# Patient Record
Sex: FEMALE | Race: WHITE | Employment: OTHER | URBAN - METROPOLITAN AREA
[De-identification: names, ages, dates, MRNs, and addresses within clinical notes are randomized per-mention and may not be internally consistent; named-entity substitution may affect disease eponyms.]

---

## 2017-01-05 ENCOUNTER — HOSPITAL ENCOUNTER (OUTPATIENT)
Dept: ENDOSCOPY | Age: 62
Discharge: HOME | End: 2017-01-05
Attending: INTERNAL MEDICINE | Admitting: INTERNAL MEDICINE

## 2017-01-05 VITALS
RESPIRATION RATE: 18 BRPM | OXYGEN SATURATION: 95 % | SYSTOLIC BLOOD PRESSURE: 160 MMHG | HEART RATE: 98 BPM | DIASTOLIC BLOOD PRESSURE: 81 MMHG

## 2017-01-18 ENCOUNTER — OFFICE VISIT (OUTPATIENT)
Dept: ENT CLINIC | Age: 62
End: 2017-01-18

## 2017-01-18 VITALS
SYSTOLIC BLOOD PRESSURE: 140 MMHG | BODY MASS INDEX: 37.21 KG/M2 | HEART RATE: 102 BPM | WEIGHT: 210 LBS | HEIGHT: 63 IN | DIASTOLIC BLOOD PRESSURE: 79 MMHG

## 2017-01-18 DIAGNOSIS — K21.9 LPRD (LARYNGOPHARYNGEAL REFLUX DISEASE): Primary | ICD-10-CM

## 2017-01-18 DIAGNOSIS — J37.0 CHRONIC LARYNGITIS: ICD-10-CM

## 2017-01-18 DIAGNOSIS — R13.12 OROPHARYNGEAL DYSPHAGIA: ICD-10-CM

## 2017-01-18 DIAGNOSIS — T17.308A CHOKING, INITIAL ENCOUNTER: ICD-10-CM

## 2017-01-18 PROCEDURE — 31575 DIAGNOSTIC LARYNGOSCOPY: CPT | Performed by: OTOLARYNGOLOGY

## 2017-01-18 RX ORDER — LANSOPRAZOLE 30 MG/1
30 CAPSULE, DELAYED RELEASE ORAL
Qty: 60 CAPSULE | Refills: 2 | Status: SHIPPED | OUTPATIENT
Start: 2017-01-18 | End: 2017-01-19 | Stop reason: SDUPTHER

## 2017-01-18 RX ORDER — PHENAZOPYRIDINE HYDROCHLORIDE 100 MG/1
TABLET, FILM COATED ORAL
COMMUNITY
Start: 2017-01-13 | End: 2018-05-04

## 2017-01-19 ENCOUNTER — TELEPHONE (OUTPATIENT)
Dept: ENT CLINIC | Age: 62
End: 2017-01-19

## 2017-01-19 DIAGNOSIS — J37.0 CHRONIC LARYNGITIS: ICD-10-CM

## 2017-01-19 DIAGNOSIS — K21.9 LPRD (LARYNGOPHARYNGEAL REFLUX DISEASE): ICD-10-CM

## 2017-01-19 RX ORDER — LANSOPRAZOLE 30 MG/1
CAPSULE, DELAYED RELEASE ORAL
Qty: 180 CAPSULE | Refills: 0 | Status: SHIPPED | OUTPATIENT
Start: 2017-01-19 | End: 2017-05-15 | Stop reason: SDUPTHER

## 2017-01-19 RX ORDER — LANSOPRAZOLE 30 MG/1
CAPSULE, DELAYED RELEASE ORAL
Qty: 60 CAPSULE | Refills: 2 | Status: SHIPPED | OUTPATIENT
Start: 2017-01-19 | End: 2017-01-19 | Stop reason: SDUPTHER

## 2017-02-20 ENCOUNTER — TELEPHONE (OUTPATIENT)
Dept: ENT CLINIC | Age: 62
End: 2017-02-20

## 2017-05-15 DIAGNOSIS — J37.0 CHRONIC LARYNGITIS: ICD-10-CM

## 2017-05-15 DIAGNOSIS — K21.9 LPRD (LARYNGOPHARYNGEAL REFLUX DISEASE): ICD-10-CM

## 2017-05-15 RX ORDER — LANSOPRAZOLE 30 MG/1
CAPSULE, DELAYED RELEASE ORAL
Qty: 60 CAPSULE | Refills: 0 | Status: SHIPPED | OUTPATIENT
Start: 2017-05-15 | End: 2017-06-12 | Stop reason: SDUPTHER

## 2017-06-12 ENCOUNTER — OFFICE VISIT (OUTPATIENT)
Dept: ENT CLINIC | Age: 62
End: 2017-06-12

## 2017-06-12 VITALS
HEIGHT: 63 IN | BODY MASS INDEX: 37.21 KG/M2 | SYSTOLIC BLOOD PRESSURE: 120 MMHG | DIASTOLIC BLOOD PRESSURE: 70 MMHG | HEART RATE: 100 BPM | WEIGHT: 210 LBS

## 2017-06-12 DIAGNOSIS — R05.3 CHRONIC COUGH: ICD-10-CM

## 2017-06-12 DIAGNOSIS — J37.0 CHRONIC LARYNGITIS: Chronic | ICD-10-CM

## 2017-06-12 DIAGNOSIS — K21.9 LPRD (LARYNGOPHARYNGEAL REFLUX DISEASE): ICD-10-CM

## 2017-06-12 DIAGNOSIS — J37.0 CHRONIC LARYNGITIS: ICD-10-CM

## 2017-06-12 DIAGNOSIS — R13.12 OROPHARYNGEAL DYSPHAGIA: ICD-10-CM

## 2017-06-12 DIAGNOSIS — K21.9 LPRD (LARYNGOPHARYNGEAL REFLUX DISEASE): Primary | Chronic | ICD-10-CM

## 2017-06-12 DIAGNOSIS — J38.7 LESION OF LARYNX: ICD-10-CM

## 2017-06-12 PROCEDURE — 31575 DIAGNOSTIC LARYNGOSCOPY: CPT | Performed by: OTOLARYNGOLOGY

## 2017-06-12 RX ORDER — LANSOPRAZOLE 30 MG/1
CAPSULE, DELAYED RELEASE ORAL
Qty: 60 CAPSULE | Refills: 3 | Status: SHIPPED | OUTPATIENT
Start: 2017-06-12 | End: 2017-09-06 | Stop reason: SDUPTHER

## 2017-06-12 RX ORDER — NORTRIPTYLINE HYDROCHLORIDE 25 MG/1
50 CAPSULE ORAL NIGHTLY
COMMUNITY

## 2017-06-12 RX ORDER — BUPROPION HYDROCHLORIDE 300 MG/1
300 TABLET ORAL EVERY MORNING
COMMUNITY

## 2017-06-14 ENCOUNTER — TELEPHONE (OUTPATIENT)
Dept: ENT CLINIC | Age: 62
End: 2017-06-14

## 2017-06-14 PROBLEM — M79.7 FIBROMYALGIA: Status: ACTIVE | Noted: 2017-06-14

## 2017-06-14 PROBLEM — E11.9 DIABETES MELLITUS (HCC): Status: ACTIVE | Noted: 2017-06-14

## 2017-06-14 PROBLEM — F20.0 CHRONIC PARANOID SCHIZOPHRENIA (HCC): Status: ACTIVE | Noted: 2017-06-14

## 2017-06-14 PROBLEM — R05.9 COUGH: Status: ACTIVE | Noted: 2017-06-12

## 2017-08-03 ENCOUNTER — HOSPITAL ENCOUNTER (OUTPATIENT)
Dept: SURGERY | Age: 62
Discharge: HOME | End: 2017-08-03
Attending: OTOLARYNGOLOGY | Admitting: OTOLARYNGOLOGY

## 2017-08-03 VITALS
TEMPERATURE: 97 F | DIASTOLIC BLOOD PRESSURE: 66 MMHG | OXYGEN SATURATION: 95 % | SYSTOLIC BLOOD PRESSURE: 125 MMHG | RESPIRATION RATE: 14 BRPM | WEIGHT: 193.12 LBS | BODY MASS INDEX: 34.22 KG/M2 | HEIGHT: 63 IN | HEART RATE: 90 BPM

## 2017-08-03 LAB
GLUCOSE BLD-MCNC: 100 MG/DL (ref 70–99)
GLUCOSE BLD-MCNC: 123 MG/DL (ref 70–99)
PERFORMED ON: ABNORMAL
PERFORMED ON: ABNORMAL

## 2017-08-03 PROCEDURE — 31535 LARYNGOSCOPY W/BIOPSY: CPT | Performed by: OTOLARYNGOLOGY

## 2017-08-03 RX ORDER — APREPITANT 40 MG/1
40 CAPSULE ORAL ONCE
Status: COMPLETED | OUTPATIENT
Start: 2017-08-03 | End: 2017-08-03

## 2017-08-03 RX ORDER — SODIUM CHLORIDE 9 MG/ML
INJECTION, SOLUTION INTRAVENOUS CONTINUOUS
Status: DISCONTINUED | OUTPATIENT
Start: 2017-08-03 | End: 2017-08-04 | Stop reason: HOSPADM

## 2017-08-03 RX ORDER — SCOLOPAMINE TRANSDERMAL SYSTEM 1 MG/1
PATCH, EXTENDED RELEASE TRANSDERMAL
Status: DISCONTINUED
Start: 2017-08-03 | End: 2017-08-04 | Stop reason: HOSPADM

## 2017-08-03 RX ORDER — APREPITANT 40 MG/1
CAPSULE ORAL
Status: COMPLETED
Start: 2017-08-03 | End: 2017-08-03

## 2017-08-03 RX ORDER — LIDOCAINE HYDROCHLORIDE 10 MG/ML
0.5 INJECTION, SOLUTION EPIDURAL; INFILTRATION; INTRACAUDAL; PERINEURAL ONCE
Status: DISCONTINUED | OUTPATIENT
Start: 2017-08-03 | End: 2017-08-04 | Stop reason: HOSPADM

## 2017-08-03 RX ORDER — SCOLOPAMINE TRANSDERMAL SYSTEM 1 MG/1
1 PATCH, EXTENDED RELEASE TRANSDERMAL
Status: DISCONTINUED | OUTPATIENT
Start: 2017-08-03 | End: 2017-08-04 | Stop reason: HOSPADM

## 2017-08-03 RX ADMIN — APREPITANT 40 MG: 40 CAPSULE ORAL at 09:13

## 2017-08-03 ASSESSMENT — PAIN - FUNCTIONAL ASSESSMENT: PAIN_FUNCTIONAL_ASSESSMENT: 0-10

## 2017-08-03 ASSESSMENT — PAIN SCALES - GENERAL
PAINLEVEL_OUTOF10: 0

## 2017-08-03 ASSESSMENT — ENCOUNTER SYMPTOMS: SHORTNESS OF BREATH: 1

## 2017-08-08 ENCOUNTER — TELEPHONE (OUTPATIENT)
Dept: OTOLARYNGOLOGY | Age: 62
End: 2017-08-08

## 2017-09-06 ENCOUNTER — OFFICE VISIT (OUTPATIENT)
Dept: ENT CLINIC | Age: 62
End: 2017-09-06

## 2017-09-06 ENCOUNTER — TELEPHONE (OUTPATIENT)
Dept: ENT CLINIC | Age: 62
End: 2017-09-06

## 2017-09-06 ENCOUNTER — HOSPITAL ENCOUNTER (OUTPATIENT)
Dept: OTHER | Age: 62
Discharge: HOME | End: 2017-09-06
Attending: OTOLARYNGOLOGY | Admitting: OTOLARYNGOLOGY

## 2017-09-06 VITALS
HEIGHT: 63 IN | WEIGHT: 200.6 LBS | SYSTOLIC BLOOD PRESSURE: 130 MMHG | DIASTOLIC BLOOD PRESSURE: 72 MMHG | HEART RATE: 93 BPM | BODY MASS INDEX: 35.54 KG/M2

## 2017-09-06 DIAGNOSIS — Z79.899 CURRENT USE OF PROTON PUMP INHIBITOR: ICD-10-CM

## 2017-09-06 DIAGNOSIS — J37.0 CHRONIC LARYNGITIS: ICD-10-CM

## 2017-09-06 DIAGNOSIS — K21.9 LPRD (LARYNGOPHARYNGEAL REFLUX DISEASE): Primary | ICD-10-CM

## 2017-09-06 DIAGNOSIS — R49.0 HOARSENESS OF VOICE: ICD-10-CM

## 2017-09-06 DIAGNOSIS — K21.9 LPRD (LARYNGOPHARYNGEAL REFLUX DISEASE): ICD-10-CM

## 2017-09-06 LAB — MAGNESIUM: 2.2 MG/DL (ref 1.8–2.4)

## 2017-09-06 PROCEDURE — 31575 DIAGNOSTIC LARYNGOSCOPY: CPT | Performed by: OTOLARYNGOLOGY

## 2017-09-06 RX ORDER — LANSOPRAZOLE 30 MG/1
CAPSULE, DELAYED RELEASE ORAL
Qty: 60 CAPSULE | Refills: 3 | Status: SHIPPED | OUTPATIENT
Start: 2017-09-06 | End: 2018-03-05 | Stop reason: SDUPTHER

## 2018-03-05 ENCOUNTER — OFFICE VISIT (OUTPATIENT)
Dept: ENT CLINIC | Age: 63
End: 2018-03-05

## 2018-03-05 VITALS
WEIGHT: 200 LBS | HEIGHT: 63 IN | BODY MASS INDEX: 35.44 KG/M2 | HEART RATE: 90 BPM | DIASTOLIC BLOOD PRESSURE: 74 MMHG | SYSTOLIC BLOOD PRESSURE: 126 MMHG

## 2018-03-05 DIAGNOSIS — J37.0 CHRONIC LARYNGITIS: ICD-10-CM

## 2018-03-05 DIAGNOSIS — K21.9 LPRD (LARYNGOPHARYNGEAL REFLUX DISEASE): ICD-10-CM

## 2018-03-05 PROCEDURE — 31575 DIAGNOSTIC LARYNGOSCOPY: CPT | Performed by: OTOLARYNGOLOGY

## 2018-03-05 RX ORDER — LANSOPRAZOLE 30 MG/1
CAPSULE, DELAYED RELEASE ORAL
Qty: 180 CAPSULE | Refills: 1 | Status: SHIPPED | OUTPATIENT
Start: 2018-03-05 | End: 2018-05-17 | Stop reason: SDUPTHER

## 2018-04-02 ENCOUNTER — TELEPHONE (OUTPATIENT)
Dept: ENT CLINIC | Age: 63
End: 2018-04-02

## 2018-04-10 ENCOUNTER — OFFICE VISIT (OUTPATIENT)
Dept: ENT CLINIC | Age: 63
End: 2018-04-10

## 2018-04-10 VITALS
SYSTOLIC BLOOD PRESSURE: 133 MMHG | HEART RATE: 92 BPM | WEIGHT: 217 LBS | HEIGHT: 63 IN | DIASTOLIC BLOOD PRESSURE: 68 MMHG | BODY MASS INDEX: 38.45 KG/M2

## 2018-04-10 DIAGNOSIS — K13.79 LESION OF HARD PALATE: Primary | ICD-10-CM

## 2018-04-10 PROCEDURE — 99213 OFFICE O/P EST LOW 20 MIN: CPT | Performed by: OTOLARYNGOLOGY

## 2018-04-10 RX ORDER — CLARITHROMYCIN 500 MG/1
500 TABLET, COATED ORAL 2 TIMES DAILY
Qty: 20 TABLET | Refills: 0 | Status: SHIPPED | OUTPATIENT
Start: 2018-04-10 | End: 2018-04-20

## 2018-05-04 ENCOUNTER — OFFICE VISIT (OUTPATIENT)
Dept: ENT CLINIC | Age: 63
End: 2018-05-04

## 2018-05-04 VITALS — SYSTOLIC BLOOD PRESSURE: 133 MMHG | HEART RATE: 92 BPM | DIASTOLIC BLOOD PRESSURE: 65 MMHG

## 2018-05-04 DIAGNOSIS — H93.13 SUBJECTIVE TINNITUS OF BOTH EARS: Primary | ICD-10-CM

## 2018-05-04 PROCEDURE — 99214 OFFICE O/P EST MOD 30 MIN: CPT | Performed by: OTOLARYNGOLOGY

## 2018-05-15 ENCOUNTER — OFFICE VISIT (OUTPATIENT)
Dept: AUDIOLOGY | Age: 63
End: 2018-05-15

## 2018-05-15 DIAGNOSIS — H90.3 SENSORY HEARING LOSS, BILATERAL: Primary | ICD-10-CM

## 2018-05-15 PROCEDURE — 92550 TYMPANOMETRY & REFLEX THRESH: CPT | Performed by: AUDIOLOGIST

## 2018-05-15 PROCEDURE — 92557 COMPREHENSIVE HEARING TEST: CPT | Performed by: AUDIOLOGIST

## 2018-05-16 ENCOUNTER — TELEPHONE (OUTPATIENT)
Dept: ENT CLINIC | Age: 63
End: 2018-05-16

## 2018-05-16 DIAGNOSIS — K21.9 LPRD (LARYNGOPHARYNGEAL REFLUX DISEASE): ICD-10-CM

## 2018-05-16 DIAGNOSIS — J37.0 CHRONIC LARYNGITIS: ICD-10-CM

## 2018-05-17 RX ORDER — LANSOPRAZOLE 30 MG/1
CAPSULE, DELAYED RELEASE ORAL
Qty: 180 CAPSULE | Refills: 0 | Status: SHIPPED | OUTPATIENT
Start: 2018-05-17 | End: 2018-08-03 | Stop reason: SDUPTHER

## 2018-06-27 ENCOUNTER — OFFICE VISIT (OUTPATIENT)
Dept: ENT CLINIC | Age: 63
End: 2018-06-27

## 2018-06-27 VITALS
HEIGHT: 63 IN | WEIGHT: 200 LBS | BODY MASS INDEX: 35.44 KG/M2 | DIASTOLIC BLOOD PRESSURE: 73 MMHG | SYSTOLIC BLOOD PRESSURE: 135 MMHG | HEART RATE: 91 BPM

## 2018-06-27 DIAGNOSIS — H93.13 SUBJECTIVE TINNITUS OF BOTH EARS: Primary | Chronic | ICD-10-CM

## 2018-06-27 DIAGNOSIS — H90.3 BILATERAL HIGH FREQUENCY SENSORINEURAL HEARING LOSS: Chronic | ICD-10-CM

## 2018-06-27 PROCEDURE — 99214 OFFICE O/P EST MOD 30 MIN: CPT | Performed by: OTOLARYNGOLOGY

## 2018-06-27 NOTE — PATIENT INSTRUCTIONS
· You should consider amplification therapy, hearing aid, if you are having difficulty communicating and/or hearing important sounds, and feel a need to hear better. · You should return if your ears plug up with ear wax causing difficulty hearing or pressure. · You should return for an annual hearing test to monitor your hearing, and whenever your hearing further decreases significantly. · You should employ the tinnitus masking techniques and strategies we discussed, as needed. Bedside tinnitus masking devices (eg. a white noise machine, noise machine, noise jani on your cell phone, fan on in the room, radio with light music or tuned between stations to white noise static) can be very helpful. · You should avoid exposure to excessively high levels of noise/sound and use hearing protection measures we discussed, as needed, if such exposure is unavoidable. · I recommend that you use Lipoflavonoid Plus, (use brand name, not generic, for the first six months), for treatment of your tinnitus. This is available \"over the counter\" at your pharmacy. You should take two orally three times a day for the first 60 days, then one orally three times a day thereafter. Take this medication continuously for six months. If you have seen no improvement in your tinnitus after six months, you should consider cessation of this treatment. · NO Q-TIPS IN THE EARS  You should never clean your ears with a Q-tip, cotton tipped applicator, Liliya pin, paper clip, or any other instrument. I recommend only use of one the several ear wax removal kits available \"over the counter\" (eg. [employer] or Murine, or The [employer]) if you feel a need to try to remove ear wax. No other methods should be self used for this purpose as there is danger of injury to the ear and risk of irreparable and irreversible permanent hearing loss.

## 2018-08-03 DIAGNOSIS — J37.0 CHRONIC LARYNGITIS: ICD-10-CM

## 2018-08-03 DIAGNOSIS — K21.9 LPRD (LARYNGOPHARYNGEAL REFLUX DISEASE): ICD-10-CM

## 2018-08-03 RX ORDER — LANSOPRAZOLE 30 MG/1
CAPSULE, DELAYED RELEASE ORAL
Qty: 180 CAPSULE | Refills: 0 | Status: SHIPPED | OUTPATIENT
Start: 2018-08-03 | End: 2018-11-01 | Stop reason: SDUPTHER

## 2018-09-05 ENCOUNTER — OFFICE VISIT (OUTPATIENT)
Dept: ENT CLINIC | Age: 63
End: 2018-09-05

## 2018-09-05 VITALS
BODY MASS INDEX: 35.79 KG/M2 | HEART RATE: 89 BPM | DIASTOLIC BLOOD PRESSURE: 75 MMHG | HEIGHT: 63 IN | SYSTOLIC BLOOD PRESSURE: 129 MMHG | WEIGHT: 202 LBS

## 2018-09-05 DIAGNOSIS — K21.9 LPRD (LARYNGOPHARYNGEAL REFLUX DISEASE): ICD-10-CM

## 2018-09-05 DIAGNOSIS — J37.0 CHRONIC LARYNGITIS: ICD-10-CM

## 2018-09-05 PROCEDURE — 31575 DIAGNOSTIC LARYNGOSCOPY: CPT | Performed by: OTOLARYNGOLOGY

## 2018-09-05 PROCEDURE — 99999 PR OFFICE/OUTPT VISIT,PROCEDURE ONLY: CPT | Performed by: OTOLARYNGOLOGY

## 2018-09-05 NOTE — PROGRESS NOTES
lidocaine solution by nasal sprayer, twice. After about ten minutes, the Endo-sheath was placed on the flexible fiberoptic nasopharyngolaryngoscope, which then was inserted through the right nare/nasal cavity and advanced to the nasopharynx and then to the hypopharynx and larynx. After adequate endoscopic visualization, the endoscope was removed. The patient appeared to tolerate the procedure well with no evidence of perioperative complications. Lynch Kendal / Moni Bette / Nixonen Leak / Corral Night was seen today for laryngitis. Diagnoses and all orders for this visit:    Chronic laryngitis    LPRD (laryngopharyngeal reflux disease)        RECOMMENDATIONS / PLAN    1. I discussed Joseph fundoplication. Patient will consider and ask gastroenterologist about this if she has increased problems. 2. Return in about 6 months (around 3/5/2019) for repeat flexible fiberoptic throat endoscopy, recheck/follow-up, and sooner if condition worsens.

## 2018-09-26 PROBLEM — R05.9 COUGH: Status: RESOLVED | Noted: 2017-06-12 | Resolved: 2018-09-26

## 2018-11-01 DIAGNOSIS — J37.0 CHRONIC LARYNGITIS: ICD-10-CM

## 2018-11-01 DIAGNOSIS — K21.9 LPRD (LARYNGOPHARYNGEAL REFLUX DISEASE): ICD-10-CM

## 2018-11-02 RX ORDER — LANSOPRAZOLE 30 MG/1
CAPSULE, DELAYED RELEASE ORAL
Qty: 180 CAPSULE | Refills: 0 | Status: SHIPPED | OUTPATIENT
Start: 2018-11-02 | End: 2019-01-30 | Stop reason: SDUPTHER

## 2019-03-05 ENCOUNTER — OFFICE VISIT (OUTPATIENT)
Dept: ENT CLINIC | Age: 64
End: 2019-03-05
Payer: COMMERCIAL

## 2019-03-05 VITALS
DIASTOLIC BLOOD PRESSURE: 79 MMHG | WEIGHT: 217 LBS | BODY MASS INDEX: 38.45 KG/M2 | HEIGHT: 63 IN | HEART RATE: 93 BPM | SYSTOLIC BLOOD PRESSURE: 136 MMHG

## 2019-03-05 DIAGNOSIS — J37.0 CHRONIC LARYNGITIS: ICD-10-CM

## 2019-03-05 DIAGNOSIS — K21.9 LPRD (LARYNGOPHARYNGEAL REFLUX DISEASE): Primary | ICD-10-CM

## 2019-03-05 PROCEDURE — 31575 DIAGNOSTIC LARYNGOSCOPY: CPT | Performed by: OTOLARYNGOLOGY

## 2019-03-05 RX ORDER — LANSOPRAZOLE 30 MG/1
CAPSULE, DELAYED RELEASE ORAL
Qty: 180 CAPSULE | Refills: 1 | Status: SHIPPED | OUTPATIENT
Start: 2019-03-05 | End: 2019-10-02 | Stop reason: SDUPTHER

## 2019-07-22 ENCOUNTER — APPOINTMENT (OUTPATIENT)
Dept: GENERAL RADIOLOGY | Age: 64
End: 2019-07-22
Payer: COMMERCIAL

## 2019-07-22 ENCOUNTER — HOSPITAL ENCOUNTER (EMERGENCY)
Age: 64
Discharge: HOME | End: 2019-07-22
Payer: COMMERCIAL

## 2019-07-22 ENCOUNTER — APPOINTMENT (OUTPATIENT)
Dept: CT IMAGING | Age: 64
End: 2019-07-22
Payer: COMMERCIAL

## 2019-07-22 VITALS
OXYGEN SATURATION: 93 % | WEIGHT: 200 LBS | DIASTOLIC BLOOD PRESSURE: 73 MMHG | BODY MASS INDEX: 35.44 KG/M2 | TEMPERATURE: 98.8 F | HEIGHT: 63 IN | RESPIRATION RATE: 20 BRPM | SYSTOLIC BLOOD PRESSURE: 148 MMHG | HEART RATE: 99 BPM

## 2019-07-22 DIAGNOSIS — K59.00 CONSTIPATION, UNSPECIFIED CONSTIPATION TYPE: ICD-10-CM

## 2019-07-22 DIAGNOSIS — R13.10 DYSPHAGIA, UNSPECIFIED TYPE: ICD-10-CM

## 2019-07-22 DIAGNOSIS — N10 ACUTE PYELONEPHRITIS: Primary | ICD-10-CM

## 2019-07-22 LAB
A/G RATIO: 1 (ref 1.1–2.2)
ALBUMIN SERPL-MCNC: 3.6 G/DL (ref 3.4–5)
ALP BLD-CCNC: 130 U/L (ref 40–129)
ALT SERPL-CCNC: 15 U/L (ref 10–40)
ANION GAP SERPL CALCULATED.3IONS-SCNC: 14 MMOL/L (ref 3–16)
ANISOCYTOSIS: ABNORMAL
AST SERPL-CCNC: 17 U/L (ref 15–37)
ATYPICAL LYMPHOCYTE RELATIVE PERCENT: 1 % (ref 0–6)
BACTERIA: ABNORMAL /HPF
BANDED NEUTROPHILS RELATIVE PERCENT: 5 % (ref 0–7)
BASOPHILS ABSOLUTE: 0.1 K/UL (ref 0–0.2)
BASOPHILS RELATIVE PERCENT: 1 %
BILIRUB SERPL-MCNC: <0.2 MG/DL (ref 0–1)
BILIRUBIN URINE: NEGATIVE
BLOOD, URINE: NEGATIVE
BUN BLDV-MCNC: 8 MG/DL (ref 7–20)
CALCIUM SERPL-MCNC: 9.1 MG/DL (ref 8.3–10.6)
CHLORIDE BLD-SCNC: 100 MMOL/L (ref 99–110)
CLARITY: CLEAR
CO2: 22 MMOL/L (ref 21–32)
COLOR: YELLOW
CREAT SERPL-MCNC: 0.6 MG/DL (ref 0.6–1.2)
EOSINOPHILS ABSOLUTE: 0 K/UL (ref 0–0.6)
EOSINOPHILS RELATIVE PERCENT: 0 %
EPITHELIAL CELLS, UA: 1 /HPF (ref 0–5)
GFR AFRICAN AMERICAN: >60
GFR NON-AFRICAN AMERICAN: >60
GLOBULIN: 3.7 G/DL
GLUCOSE BLD-MCNC: 119 MG/DL (ref 70–99)
GLUCOSE URINE: NEGATIVE MG/DL
HCT VFR BLD CALC: 33.5 % (ref 36–48)
HEMATOLOGY PATH CONSULT: YES
HEMOGLOBIN: 10.5 G/DL (ref 12–16)
HYALINE CASTS: 0 /LPF (ref 0–8)
KETONES, URINE: NEGATIVE MG/DL
LEUKOCYTE ESTERASE, URINE: ABNORMAL
LIPASE: 6 U/L (ref 13–60)
LYMPHOCYTES ABSOLUTE: 0.7 K/UL (ref 1–5.1)
LYMPHOCYTES RELATIVE PERCENT: 8 %
MCH RBC QN AUTO: 21.8 PG (ref 26–34)
MCHC RBC AUTO-ENTMCNC: 31.5 G/DL (ref 31–36)
MCV RBC AUTO: 69.1 FL (ref 80–100)
MICROCYTES: ABNORMAL
MICROSCOPIC EXAMINATION: YES
MONOCYTES ABSOLUTE: 0.6 K/UL (ref 0–1.3)
MONOCYTES RELATIVE PERCENT: 8 %
NEUTROPHILS ABSOLUTE: 6.2 K/UL (ref 1.7–7.7)
NEUTROPHILS RELATIVE PERCENT: 77 %
NITRITE, URINE: POSITIVE
OCCULT BLOOD DIAGNOSTIC: NORMAL
PDW BLD-RTO: 19.1 % (ref 12.4–15.4)
PH UA: 7.5 (ref 5–8)
PLATELET # BLD: 288 K/UL (ref 135–450)
PLATELET SLIDE REVIEW: ADEQUATE
PMV BLD AUTO: 9 FL (ref 5–10.5)
POTASSIUM SERPL-SCNC: 3.9 MMOL/L (ref 3.5–5.1)
PROTEIN UA: 30 MG/DL
RBC # BLD: 4.84 M/UL (ref 4–5.2)
RBC # BLD: NORMAL 10*6/UL
RBC UA: 2 /HPF (ref 0–4)
SLIDE REVIEW: ABNORMAL
SODIUM BLD-SCNC: 136 MMOL/L (ref 136–145)
SPECIFIC GRAVITY UA: >1.03 (ref 1–1.03)
TOTAL PROTEIN: 7.3 G/DL (ref 6.4–8.2)
URINE REFLEX TO CULTURE: YES
URINE TYPE: ABNORMAL
UROBILINOGEN, URINE: 1 E.U./DL
WBC # BLD: 7.5 K/UL (ref 4–11)
WBC UA: 17 /HPF (ref 0–5)

## 2019-07-22 PROCEDURE — 74177 CT ABD & PELVIS W/CONTRAST: CPT

## 2019-07-22 PROCEDURE — 99283 EMERGENCY DEPT VISIT LOW MDM: CPT

## 2019-07-22 PROCEDURE — 83690 ASSAY OF LIPASE: CPT

## 2019-07-22 PROCEDURE — G0328 FECAL BLOOD SCRN IMMUNOASSAY: HCPCS

## 2019-07-22 PROCEDURE — 87077 CULTURE AEROBIC IDENTIFY: CPT

## 2019-07-22 PROCEDURE — 2580000003 HC RX 258: Performed by: PHYSICIAN ASSISTANT

## 2019-07-22 PROCEDURE — 74018 RADEX ABDOMEN 1 VIEW: CPT

## 2019-07-22 PROCEDURE — 96374 THER/PROPH/DIAG INJ IV PUSH: CPT

## 2019-07-22 PROCEDURE — 36415 COLL VENOUS BLD VENIPUNCTURE: CPT

## 2019-07-22 PROCEDURE — 80053 COMPREHEN METABOLIC PANEL: CPT

## 2019-07-22 PROCEDURE — 96361 HYDRATE IV INFUSION ADD-ON: CPT

## 2019-07-22 PROCEDURE — 87086 URINE CULTURE/COLONY COUNT: CPT

## 2019-07-22 PROCEDURE — 6360000002 HC RX W HCPCS: Performed by: PHYSICIAN ASSISTANT

## 2019-07-22 PROCEDURE — 85025 COMPLETE CBC W/AUTO DIFF WBC: CPT

## 2019-07-22 PROCEDURE — 6360000004 HC RX CONTRAST MEDICATION: Performed by: PHYSICIAN ASSISTANT

## 2019-07-22 PROCEDURE — 87186 SC STD MICRODIL/AGAR DIL: CPT

## 2019-07-22 PROCEDURE — 81001 URINALYSIS AUTO W/SCOPE: CPT

## 2019-07-22 RX ORDER — 0.9 % SODIUM CHLORIDE 0.9 %
1000 INTRAVENOUS SOLUTION INTRAVENOUS ONCE
Status: COMPLETED | OUTPATIENT
Start: 2019-07-22 | End: 2019-07-22

## 2019-07-22 RX ORDER — CEPHALEXIN 500 MG/1
500 CAPSULE ORAL 2 TIMES DAILY
Qty: 20 CAPSULE | Refills: 0 | Status: SHIPPED | OUTPATIENT
Start: 2019-07-22 | End: 2019-08-01

## 2019-07-22 RX ADMIN — Medication 1 G: at 19:11

## 2019-07-22 RX ADMIN — SODIUM CHLORIDE 1000 ML: 9 INJECTION, SOLUTION INTRAVENOUS at 16:28

## 2019-07-22 RX ADMIN — IOPAMIDOL 75 ML: 755 INJECTION, SOLUTION INTRAVENOUS at 17:43

## 2019-07-22 ASSESSMENT — ENCOUNTER SYMPTOMS
NAUSEA: 0
SORE THROAT: 0
VOMITING: 0
TROUBLE SWALLOWING: 1
BACK PAIN: 0
BLOOD IN STOOL: 0
CONSTIPATION: 1
SHORTNESS OF BREATH: 0

## 2019-07-22 ASSESSMENT — PAIN DESCRIPTION - PAIN TYPE: TYPE: ACUTE PAIN;CHRONIC PAIN

## 2019-07-22 ASSESSMENT — PAIN SCALES - GENERAL: PAINLEVEL_OUTOF10: 8

## 2019-07-22 ASSESSMENT — PAIN DESCRIPTION - LOCATION: LOCATION: ABDOMEN

## 2019-07-22 NOTE — ED PROVIDER NOTES
have allergy listed to Keflex but she states this is more of an intolerance and she only had diarrhea with this in the past.  After discussion with shared decision making about antibiotic choice patient does agree to being on Keflex. She will be started on Rocephin here. Discharged with magnesium citrate for constipation. Given her intermittent issues with her constipation and her dysphagia specifically with pills she will be referred to GI also. She would prefer to go home. Her vitals are unremarkable laboratory testing is unremarkable. Believe we can try outpatient treatment for her pyelonephritis. She understood her strict return precautions return here for any continual vomiting, fevers or worsening of symptoms. Low suspicion for perforated viscus, diverticulitis, obstruction or other emergent etiology. FINAL IMPRESSION      1. Acute pyelonephritis    2.  Constipation, unspecified constipation type          DISPOSITION/PLAN   DISPOSITION Discharge - Pending Orders Complete 07/22/2019 07:03:02 PM      PATIENT REFERREDTO:  Hveer Shafer  [address] [place] [address]  [phone]    Schedule an appointment as soon as possible for a visit in 3 days  For re-check    [facility]  [address] [place] [address]  [phone]    As needed      DISCHARGE MEDICATIONS:  New Prescriptions    CEPHALEXIN (KEFLEX) 500 MG CAPSULE    Take 1 capsule by mouth 2 times daily for 10 days    MAGNESIUM CITRATE SOLUTION    Take 296 mLs by mouth once for 1 dose       DISCONTINUED MEDICATIONS:  Discontinued Medications    No medications on file              (Please note that portions ofthis note were completed with a voice recognition program.  Efforts were made to edit the dictations but occasionally words are mis-transcribed.)    Cory Locke PA-C (electronically signed)           Cory Locke PA-C  07/22/19 [id number]

## 2019-07-23 LAB — HEMATOLOGY PATH CONSULT: NORMAL

## 2019-07-24 LAB
ORGANISM: ABNORMAL
URINE CULTURE, ROUTINE: ABNORMAL
URINE CULTURE, ROUTINE: ABNORMAL

## 2019-10-02 ENCOUNTER — OFFICE VISIT (OUTPATIENT)
Dept: ENT CLINIC | Age: 64
End: 2019-10-02
Payer: COMMERCIAL

## 2019-10-02 VITALS
SYSTOLIC BLOOD PRESSURE: 132 MMHG | HEIGHT: 63 IN | BODY MASS INDEX: 34.09 KG/M2 | HEART RATE: 81 BPM | WEIGHT: 192.4 LBS | DIASTOLIC BLOOD PRESSURE: 86 MMHG

## 2019-10-02 DIAGNOSIS — J37.0 CHRONIC LARYNGITIS: ICD-10-CM

## 2019-10-02 DIAGNOSIS — K21.9 LPRD (LARYNGOPHARYNGEAL REFLUX DISEASE): Primary | ICD-10-CM

## 2019-10-02 PROBLEM — Z48.89 ENCOUNTER FOR POST SURGICAL WOUND CHECK: Status: ACTIVE | Noted: 2018-11-08

## 2019-10-02 PROBLEM — Z98.890 PONV (POSTOPERATIVE NAUSEA AND VOMITING): Status: ACTIVE | Noted: 2018-10-01

## 2019-10-02 PROBLEM — R11.2 PONV (POSTOPERATIVE NAUSEA AND VOMITING): Status: ACTIVE | Noted: 2018-10-01

## 2019-10-02 PROBLEM — G45.8 TRANSIENT ISCHEMIC ATTACK, ANTERIOR CIRCULATION, ACUTE: Status: ACTIVE | Noted: 2019-09-18

## 2019-10-02 PROBLEM — Z09 HOSPITAL DISCHARGE FOLLOW-UP: Status: ACTIVE | Noted: 2018-11-08

## 2019-10-02 PROBLEM — J45.30 MILD PERSISTENT ASTHMA WITHOUT COMPLICATION: Status: ACTIVE | Noted: 2018-10-01

## 2019-10-02 PROBLEM — M48.061 SPINAL STENOSIS OF LUMBAR REGION: Status: ACTIVE | Noted: 2018-08-27

## 2019-10-02 PROBLEM — R20.0 NUMBNESS AND TINGLING OF BOTH FEET: Status: ACTIVE | Noted: 2018-11-08

## 2019-10-02 PROBLEM — R20.2 NUMBNESS AND TINGLING OF BOTH FEET: Status: ACTIVE | Noted: 2018-11-08

## 2019-10-02 PROBLEM — R47.89 WORD FINDING DIFFICULTY: Status: ACTIVE | Noted: 2019-09-18

## 2019-10-02 PROBLEM — M54.50 LUMBAR SPINE PAIN: Status: ACTIVE | Noted: 2018-11-08

## 2019-10-02 PROCEDURE — 31575 DIAGNOSTIC LARYNGOSCOPY: CPT | Performed by: OTOLARYNGOLOGY

## 2019-10-02 RX ORDER — GABAPENTIN 300 MG/1
600 CAPSULE ORAL
COMMUNITY
Start: 2019-09-06

## 2019-10-02 RX ORDER — FLUCONAZOLE 100 MG/1
100 TABLET ORAL
COMMUNITY
Start: 2019-09-27 | End: 2019-10-11

## 2019-10-02 RX ORDER — RANITIDINE 300 MG/1
300 TABLET ORAL NIGHTLY
COMMUNITY
Start: 2019-09-06 | End: 2020-09-02

## 2019-10-02 RX ORDER — LANSOPRAZOLE 30 MG/1
CAPSULE, DELAYED RELEASE ORAL
Qty: 180 CAPSULE | Refills: 1 | Status: SHIPPED | OUTPATIENT
Start: 2019-10-02 | End: 2020-04-04 | Stop reason: SDUPTHER

## 2019-11-01 PROBLEM — Z09 HOSPITAL DISCHARGE FOLLOW-UP: Status: RESOLVED | Noted: 2018-11-08 | Resolved: 2019-11-01

## 2020-03-02 ENCOUNTER — OFFICE VISIT (OUTPATIENT)
Dept: ENT CLINIC | Age: 65
End: 2020-03-02
Payer: COMMERCIAL

## 2020-03-02 VITALS
SYSTOLIC BLOOD PRESSURE: 147 MMHG | HEART RATE: 83 BPM | DIASTOLIC BLOOD PRESSURE: 72 MMHG | BODY MASS INDEX: 35.93 KG/M2 | WEIGHT: 202.8 LBS | HEIGHT: 63 IN

## 2020-03-02 PROCEDURE — 31575 DIAGNOSTIC LARYNGOSCOPY: CPT | Performed by: OTOLARYNGOLOGY

## 2020-04-04 RX ORDER — LANSOPRAZOLE 30 MG/1
CAPSULE, DELAYED RELEASE ORAL
Qty: 180 CAPSULE | Refills: 1 | Status: SHIPPED | OUTPATIENT
Start: 2020-04-04 | End: 2020-10-05 | Stop reason: SDUPTHER

## 2020-04-04 RX ORDER — LANSOPRAZOLE 30 MG/1
CAPSULE, DELAYED RELEASE ORAL
Qty: 302 CAPSULE | Refills: 0 | Status: SHIPPED | OUTPATIENT
Start: 2020-04-04 | End: 2020-04-04

## 2020-04-17 ENCOUNTER — VIRTUAL VISIT (OUTPATIENT)
Dept: ENT CLINIC | Age: 65
End: 2020-04-17
Payer: COMMERCIAL

## 2020-04-17 PROBLEM — J04.0 LARYNGITIS, ACUTE: Status: ACTIVE | Noted: 2020-04-17

## 2020-04-17 PROCEDURE — 99213 OFFICE O/P EST LOW 20 MIN: CPT | Performed by: OTOLARYNGOLOGY

## 2020-04-17 RX ORDER — CIPROFLOXACIN 500 MG/1
500 TABLET, FILM COATED ORAL 2 TIMES DAILY
Qty: 20 TABLET | Refills: 0 | Status: SHIPPED | OUTPATIENT
Start: 2020-04-17 | End: 2020-04-27

## 2020-04-17 RX ORDER — METHYLPREDNISOLONE 4 MG/1
TABLET ORAL
Qty: 1 KIT | Refills: 0 | Status: SHIPPED | OUTPATIENT
Start: 2020-04-17 | End: 2020-09-02

## 2020-04-17 NOTE — PATIENT INSTRUCTIONS
1. Rest your voice whenever hoarse. 2. Maintain adequate hydration to avoid dehydration. 3. Use Mucinex 1200 mg twice daily for two weeks. (over the counter medication)   4. Schedule a visit in September for flexible throat endoscopy and follow up. 5. Call for a visit sooner if hoarseness does not clear with treatment.

## 2020-04-17 NOTE — PROGRESS NOTES
2020    TELEHEALTH EVALUATION -- Audio/Visual (During ANR-30 public health emergency)      Chief Complaint   Patient presents with    Hoarse       HPI:  Braden Antoine (:  1955) has requested an audio/video evaluation for the following concern(s):  Hoarseness. She stated that she was hoarse when she woke up yesterday morning. She has continued to be hoarse since yesterday morning. It may be getting a little worse. She denied dyspnea. She has \"a kind of wheezing, in my throat, when I when breathe out. \"  She has an inhaler for her asthma. She has not used it since hoarseness started. She is taking prevacid twice a day for LPRD and chronic reflux laryngitis. She has never been a cigarette smoker or tobacco product user. She had a lot of secondary cigarette exposure at work for 18 years, but not at home. She stated that she saw her PCP for a MyChart VV on . She was diagnosed with sinusitis. This was treated with doxycycline 100 mg Q12H for 10 days. She also used a Neti pot, This was on or about . She stated that the sinusitis cleared up. Review of Systems  CONSTITUTIONAL:  Denied fever. Denied chills. Denied unexplained weight loss, over 20 pounds. EARS, NOSE, THROAT:  (+) Otalgia, left ear and into neck started 3 days ago.  (+) hearing loss, left ear for about two months.  (+) sore throat al ittle bit at top of throat started last night at bedtime. Denied otorrhea, tinnitus, epistaxis, nasal dyspnea, rhinorrhea, and chronic hoarseness. RESPIRATORY:  Denied dyspnea or SOB. Prior to Visit Medications    Medication Sig Taking? Authorizing Provider   ciprofloxacin (CIPRO) 500 MG tablet Take 1 tablet by mouth 2 times daily for 10 days Yes Toy Pitt MD   methylPREDNISolone (MEDROL DOSEPACK) 4 MG tablet Take by mouth, as directed by package instructions.  Yes Toy Pitt MD   lansoprazole (PREVACID) 30 MG delayed release capsule TAKE 1 CAPSULE TWICE A DAY ON EMPTY STOMACH (NOT EATEN/DRUNK ANYTHING FOR 2 HOURS AND EAT MEAL/SNACK 45 TO 60 MINUTES AFTER EACH DOSE)  Kayy Perez MD   ranitidine (ZANTAC) 300 MG tablet Take 300 mg by mouth nightly  Historical Provider, MD   gabapentin (NEURONTIN) 300 MG capsule Take 600 mg by mouth. Historical Provider, MD   magnesium citrate solution Take 296 mLs by mouth once for 1 dose  Jama Wang PA-C   buPROPion (WELLBUTRIN XL) 300 MG extended release tablet Take 300 mg by mouth every morning  Historical Provider, MD   nortriptyline (PAMELOR) 25 MG capsule Take 50 mg by mouth nightly   Historical Provider, MD   [place] 400 MCG/ACT AEPB inhaler   Historical Provider, MD   loperamide (IMODIUM) 2 MG capsule   Historical Provider, MD   budesonide-formoterol (SYMBICORT) 160-4.5 MCG/ACT AERO Inhale 2 puffs into the lungs 2 times daily  Historical Provider, MD   metformin (GLUCOPHAGE) 1000 MG tablet Take 1 tablet by mouth 2 times daily (with meals). Annamaria Ortega PA-C   albuterol-ipratropium (COMBIVENT)  MCG/ACT inhaler Inhale 2 puffs into the lungs 4 times daily as needed for Wheezing. Annamaria Ortega PA-C   venlafaxine (EFFEXOR-XR) 150 MG XR capsule Take 75 mg by mouth 2 times daily. Historical Provider, MD   dextroamphetamine (DEXEDRINE SPANSULE) 15 MG SR capsule Take 15 mg by mouth 4 times daily. Historical Provider, MD   meclizine (ANTIVERT) 12.5 MG tablet Take 25 mg by mouth 4 times daily. Historical Provider, MD   ondansetron (ZOFRAN-ODT) 4 MG disintegrating tablet Take 8 mg by mouth daily   Historical Provider, MD   orphenadrine (NORFLEX) 100 MG tablet Take 100 mg by mouth daily   Historical Provider, MD   aripiprazole (ABILIFY) 5 MG tablet Take 2 mg by mouth daily   Historical Provider, MD   amlodipine (NORVASC) 5 MG tablet Take 10 mg by mouth daily.   Historical Provider, MD       Social History     Tobacco Use    Smoking status: Never Smoker    Smokeless tobacco: Never Used

## 2020-07-22 ENCOUNTER — HOSPITAL ENCOUNTER (OUTPATIENT)
Dept: WOMENS IMAGING | Age: 65
Discharge: HOME | End: 2020-07-22
Payer: MEDICARE

## 2020-07-22 PROCEDURE — 77066 DX MAMMO INCL CAD BI: CPT

## 2020-09-02 ENCOUNTER — OFFICE VISIT (OUTPATIENT)
Dept: ENT CLINIC | Age: 65
End: 2020-09-02
Payer: MEDICARE

## 2020-09-02 VITALS — HEART RATE: 90 BPM | DIASTOLIC BLOOD PRESSURE: 80 MMHG | SYSTOLIC BLOOD PRESSURE: 145 MMHG | TEMPERATURE: 98.2 F

## 2020-09-02 PROCEDURE — 31575 DIAGNOSTIC LARYNGOSCOPY: CPT | Performed by: OTOLARYNGOLOGY

## 2020-09-02 RX ORDER — CLOTRIMAZOLE 10 MG/1
10 LOZENGE ORAL; TOPICAL
Qty: 50 TABLET | Refills: 0 | Status: SHIPPED | OUTPATIENT
Start: 2020-09-02 | End: 2020-09-12

## 2020-09-02 NOTE — PROGRESS NOTES
Chief Complaint   Patient presents with    Laryngitis     recheck LPRD and chronic reflux laryngitis. PROCEDURE:  FLEXIBLE FIBEROPTIC NASOPHARYNGOLARYNGOSCOPY  INDICATION:  Need for detailed endoscopic examination of the larynx and pharynx to evaluate the upper aerodigestive tract for recheck LPRD and chronic reflux laryngitis. Patient stated she is still having trouble with voice and it goes out at times. \"My voice keeps going away on me. \"      INFORMED CONSENT:  The procedure was described to the patient, including method of anesthesia. The patient was advised of the medical necessity for this procedure. The risks and potential complications were discussed, including, but not limited to, bleeding, infection, adverse reaction to medications, hoarseness, sore throat, inability to obtain adequate visualization, and future need for rigid operative endoscopy. The expected outcome, potential benefits and the alternatives of therapy were discussed. Tato Bang asked appropriate questions and then expressed the lack of any further questions, understanding, acceptance, and the desire to undergo with this procedure, granting verbal informed consent. FINDINGS:  There was moderate to severe edema and erythema of the arytenoid and interarytenoid mucosa consistent with posterior laryngitis secondary to laryngopharyngeal reflux. The vocal cords appeared to be normal, with no nodule, ulceration, polyp, leukoplakia or other lesions. The vocal cords appeared to be normally mobile bilaterally with midline approximation on phonation. Sensation of the hypopharynx and larynx appeared to be normal when touched by the end of the flexible scope. The nasopharynx, eustachian tube orifices and fossa of Rosenmüller, oropharynx, base of tongue, hypopharynx, supraglottis, subglottis, and piriform sinuses all appeared to be normal, with no lesions. Visualization was excellent throughout the examination.        DESCRIPTION OF PROCEDURE:  The right and left nasal cavity was topically anesthetized and decongested with a 50-50 mixture of 0.05% oxymetazoline solution and topical 4% lidocaine solution by nasal sprayer, twice. After about ten minutes, the flexible fiberoptic nasopharyngolaryngoscope, with camera attached, was inserted through the right nare/nasal cavity and advanced to the nasopharynx and then to the hypopharynx and larynx. The [employer] [place] video system was used. After adequate endoscopic visualization, the endoscope was removed. The patient appeared to tolerate the procedure well with no evidence of perioperative complications. Rayo Francisco / Omar Ugalde / Bella Salazar was seen today for laryngitis. Diagnoses and all orders for this visit:    LPRD (laryngopharyngeal reflux disease)    Chronic laryngitis    Chronic sinusitis, unspecified location    Chronic cough    Laryngeal candidiasis  -     clotrimazole (MYCELEX) 10 MG aida; Take 1 tablet by mouth 5 times daily for 10 days Slowly dissolve in mouth and swallow. RECOMMENDATIONS / PLAN    1. Proceed with CT sinus. 2. Return in about 1 month (around 10/2/2020) for recheck/follow-up, review CT scan, possible nasal endoscopy, and sooner if condition worsens.

## 2020-09-03 ENCOUNTER — HOSPITAL ENCOUNTER (OUTPATIENT)
Dept: CT IMAGING | Age: 65
Discharge: HOME | End: 2020-09-03
Payer: MEDICARE

## 2020-09-03 PROCEDURE — 70486 CT MAXILLOFACIAL W/O DYE: CPT

## 2020-09-08 ENCOUNTER — TELEPHONE (OUTPATIENT)
Dept: ENT CLINIC | Age: 65
End: 2020-09-08

## 2020-10-05 ENCOUNTER — OFFICE VISIT (OUTPATIENT)
Dept: ENT CLINIC | Age: 65
End: 2020-10-05
Payer: MEDICARE

## 2020-10-05 VITALS — TEMPERATURE: 97.5 F | HEART RATE: 102 BPM | DIASTOLIC BLOOD PRESSURE: 72 MMHG | SYSTOLIC BLOOD PRESSURE: 157 MMHG

## 2020-10-05 PROBLEM — R05.3 CHRONIC COUGH: Status: ACTIVE | Noted: 2020-10-05

## 2020-10-05 PROBLEM — R51.9 FRONTAL HEADACHE: Status: ACTIVE | Noted: 2020-10-05

## 2020-10-05 PROBLEM — J32.9 CHRONIC SINUSITIS: Chronic | Status: ACTIVE | Noted: 2020-10-05

## 2020-10-05 PROCEDURE — 99213 OFFICE O/P EST LOW 20 MIN: CPT | Performed by: OTOLARYNGOLOGY

## 2020-10-05 RX ORDER — FLUTICASONE PROPIONATE 50 MCG
SPRAY, SUSPENSION (ML) NASAL
Qty: 1 BOTTLE | Refills: 2 | Status: SHIPPED | OUTPATIENT
Start: 2020-10-05 | End: 2020-10-06

## 2020-10-05 RX ORDER — LANSOPRAZOLE 30 MG/1
CAPSULE, DELAYED RELEASE ORAL
Qty: 180 CAPSULE | Refills: 1 | Status: SHIPPED | OUTPATIENT
Start: 2020-10-05 | End: 2020-10-06

## 2020-10-05 NOTE — PROGRESS NOTES
[facility]         PCP:  Teresa Dobbs      CHIEF COMPLAINT:  Chief Complaint   Patient presents with    Sinusitis    Cough       HISTORY OF PRESENT ILLNESS:   Gorge Webster is a 72 y.o. female here for recheck and follow-up of a problem located in the sinuses and throat. The quality is chronic cough and chronic sinusitis. She has ongoing LPRD and chronic reflux laryngitis. She stated that [facility] voice is good. \"      REVIEW OF SYSTEMS:   EARS, NOSE, THROAT:  Denied otorrhea, otalgia, hearing loss, tinnitus, epistaxis, nasal dyspnea, rhinorrhea, sore throat and chronic hoarseness. EXAMINATION:      Vitals:    10/05/20 1043   BP: (!) 157/72   Pulse: 102   Temp: 97.5 °F (36.4 °C)      VITALS SIGNS were reviewed. GENERAL APPEARANCE:  Well developed, well nourished, no apparent distress, no apparent deformities. ABILITY TO COMMUNICATE/QUALITY OF VOICE:  Communicated without difficulty. Normal voice. No hot potato voice. No hoarseness. EXTERNAL EAR/NOSE:  Normal pinnae and mastoids. Normal external nose. EARS, OTOSCOPY: The TMs and EACs appeared to be normal bilaterally. NOSE:  The nasal septum was midline. The inferior turbinates were normal.  The nasal mucosa and secretions were normal.  No pus or polyps were seen. SINUSES:  The maxillary and frontal sinuses were nontender, bilaterally. LIPS, TEETH, AND GUMS:   Normal.   (+) OROPHARYNX/ORAL CAVITY:  Post tonsillectomy. Oral mucosa, hard and soft palates, tongue, and pharynx were normal.      NECK:  No masses or tenderness. The laryngeal cartilages and hyoid bone were normal, with normal laryngeal crepitus. No abnormal appearance, asymmetry or abnormal tracheal position. PALPATION OF LYMPH NODES, CERVICAL, FACIAL AND SUPRACLAVICULAR:  No lymphadenopathy.             REVIEW OF IMAGES:       I independently reviewed the images of the CT scan of the sinuses, showing a large septal spur to the left projection to the left lateral nasal wall. Narrative    EXAMINATION:    CT OF THE SINUS WITHOUT CONTRAST  9/3/2020 5:29 pm      FINDINGS:    SINUSES/MASTOIDS:  The maxillary, sphenoid, ethmoid and frontal sinuses are    clear.  The bilateral ostiomeatal units are patent.  Ethmoid roofs are    symmetric.  The mastoid air cells are well aerated.         SOFT TISSUES:  Visualized soft tissues demonstrate no acute abnormality.  The    visualized portion of the intracranial contents demonstrate no gross acute    abnormality.              Impression    No evidence of sinusitis.               IMPRESSION / DIAGNOSES / ORDERS:       Forrest Shannon was seen today for sinusitis and cough. Diagnoses and all orders for this visit:    Chronic sinusitis, unspecified location  Comments:  possible vacuum congestion sinusitis headache syndrome  Orders:  -      fluticasone (FLONASE) 50 MCG/ACT nasal spray; 2 sprays in each nostril daily. Frontal headache  -     fluticasone (FLONASE) 50 MCG/ACT nasal spray; 2 sprays in each nostril daily. LPRD (laryngopharyngeal reflux disease)  -     lansoprazole (PREVACID) 30 MG delayed release capsule; one tab by mouth twice a day on empty stomach (when have not eaten/drunk anything for 2 hrs) and eat meal/snack 45 - 60 mins after each dose    Chronic laryngitis  -     lansoprazole (PREVACID) 30 MG delayed release capsule; one tab by mouth twice a day on empty stomach (when have not eaten/drunk anything for 2 hrs) and eat meal/snack 45 - 60 mins after each dose    Chronic cough  -     lansoprazole (PREVACID) 30 MG delayed release capsule; one tab by mouth twice a day on empty stomach (when have not eaten/drunk anything for 2 hrs) and eat meal/snack 45 - 60 mins after each dose    Chronic rhinitis  -     fluticasone (FLONASE) 50 MCG/ACT nasal spray; 2 sprays in each nostril daily.            RECOMMENDATIONS / PLAN:    Patient Instructions   Afrin rhinogenic headache test:  Use 0.05% oxymetazoline nasal decongestant spray (eg. Afrin, Duration, 4-Way) three sprays in both nostrils at the start of your headache for 10 days. Use never use nasal decongestant sprays for longer than six consecutive days. Use Flonase daily in both sides of the nose. Follow-up    1. Will consider FESS for vacuum congestion sinus headache syndrome. 2. Return in about 6 weeks (around 11/16/2020) for recheck/follow-up, and sooner if condition worsens.

## 2020-10-05 NOTE — PATIENT INSTRUCTIONS
Afrin rhinogenic headache test:  Use 0.05% oxymetazoline nasal decongestant spray (eg. Afrin, Duration, 4-Way) three sprays in both nostrils at the start of your headache for 10 days. Use never use nasal decongestant sprays for longer than six consecutive days. Use Flonase daily in both sides of the nose.

## 2020-10-06 RX ORDER — FLUTICASONE PROPIONATE 50 MCG
SPRAY, SUSPENSION (ML) NASAL
Qty: 1 BOTTLE | Refills: 2 | Status: SHIPPED | OUTPATIENT
Start: 2020-10-06 | End: 2020-10-06

## 2020-10-06 RX ORDER — FLUTICASONE PROPIONATE 50 MCG
SPRAY, SUSPENSION (ML) NASAL
Qty: 1 BOTTLE | Refills: 2 | OUTPATIENT
Start: 2020-10-06 | End: 2021-04-27

## 2020-10-06 RX ORDER — LANSOPRAZOLE 30 MG/1
CAPSULE, DELAYED RELEASE ORAL
Qty: 180 CAPSULE | Refills: 1 | OUTPATIENT
Start: 2020-10-06 | End: 2021-03-22 | Stop reason: SDUPTHER

## 2020-10-06 RX ORDER — LANSOPRAZOLE 30 MG/1
CAPSULE, DELAYED RELEASE ORAL
Qty: 180 CAPSULE | Refills: 1 | Status: SHIPPED | OUTPATIENT
Start: 2020-10-06 | End: 2020-10-06

## 2020-10-22 ENCOUNTER — OFFICE VISIT (OUTPATIENT)
Dept: ENT CLINIC | Age: 65
End: 2020-10-22
Payer: MEDICARE

## 2020-10-22 VITALS
SYSTOLIC BLOOD PRESSURE: 148 MMHG | BODY MASS INDEX: 37.73 KG/M2 | DIASTOLIC BLOOD PRESSURE: 78 MMHG | WEIGHT: 213 LBS | HEART RATE: 99 BPM

## 2020-10-22 PROCEDURE — 99213 OFFICE O/P EST LOW 20 MIN: CPT | Performed by: OTOLARYNGOLOGY

## 2020-10-22 NOTE — PROGRESS NOTES
[facility]       PCP:  Jo Calvert      CHIEF COMPLAINT:  Chief Complaint   Patient presents with    Sinus Problem     sinus pressure, drainage, things feel better        HISTORY OF PRESENT ILLNESS:   Myrle Phalen is a 72 y.o. female here for recheck and follow-up of a sinus problem. Since the last visit here, her sinus symptoms has been improved. The Afrin test did not help. She reported that she had a headache on Saturday. \"I laid down in bed on Saturday and had pain in the middle of my forehead [pointed to the glabella and mid forehead area] and it hurt bad, the worst I've ever had. I was down for three days, 'cause it hurt so bad. Ibuprofen didn't touch it. Sudafed and Tylenol helped some. Patient reported that all her previous sinus symptoms are \"gone now since yesterday afternoon about 3 o'clock. \"        EXAMINATION:       Constitutional:  VITALS SIGNS reviewed. Vitals:    10/22/20 1441   BP: (!) 148/78   Pulse: 99     VITAL SIGNS: Vital signs were reviewed. SINUSES:  The maxillary and frontal sinuses were nontender, bilaterally. EARS, OTOSCOPY: Tympanic membranes and external auditory canals appear to be normal bilaterally. EXTERNAL EAR/NOSE: Normal pinnae and mastoids. Normal external nose. NOSE:  The nasal septum was midline. The inferior turbinates were normal.  The nasal mucosa and secretions were normal.  No pus or polyps were seen. OROPHARYNX/ORAL CAVITY:  Oral mucosa, hard and soft palates, tongue, and pharynx were normal.      NECK:  No masses or tenderness. No abnormal appearance, asymmetry or abnormal tracheal position. PALPATION OF LYMPH NODES, CERVICAL, FACIAL AND SUPRACLAVICULAR:  No lymphadenopathy. IMPRESSION / Earlyne Ates / Satinder Buttner / PROCEDURES:       Myrle Phalen was seen today for sinus problem.     Diagnoses and all orders for this visit:    Nonintractable headache, unspecified

## 2020-10-30 ENCOUNTER — OFFICE VISIT (OUTPATIENT)
Dept: PRIMARY CARE CLINIC | Age: 65
End: 2020-10-30
Payer: MEDICARE

## 2020-10-30 PROCEDURE — 99211 OFF/OP EST MAY X REQ PHY/QHP: CPT | Performed by: NURSE PRACTITIONER

## 2020-10-30 NOTE — PATIENT INSTRUCTIONS

## 2020-10-30 NOTE — PROGRESS NOTES
Aida Gonzales received a viral test for COVID-19. They were educated on isolation and quarantine as appropriate. For any symptoms, they were directed to seek care from their PCP, given contact information to establish with a doctor, directed to an urgent care or the emergency room.

## 2020-10-31 LAB — SARS-COV-2, PCR: NOT DETECTED

## 2020-11-03 NOTE — RESULT ENCOUNTER NOTE

## 2020-11-05 ENCOUNTER — HOSPITAL ENCOUNTER (OUTPATIENT)
Dept: ENDOSCOPY | Age: 65
Discharge: HOME | End: 2020-11-05
Payer: MEDICARE

## 2020-11-05 PROCEDURE — 3609015500 HC GASTRIC/DUODENAL MOTILITY &/OR MANOMETRY STUDY

## 2020-11-05 NOTE — PROGRESS NOTES
Time in Room: 1039   Patient verbalized understanding of PH/Esophageal manometry study. Probe inserted into right nostril with no resistance. Study completed. Discharge instructions given. Patient denied further questions, nausea or pain. Walked to exit by this RN.   Time out of Room: 1151     Patient plans to arrive tomorrow at noon for probe removal.

## 2020-11-09 ENCOUNTER — OFFICE VISIT (OUTPATIENT)
Dept: NEUROLOGY | Age: 65
End: 2020-11-09
Payer: MEDICARE

## 2020-11-09 VITALS
DIASTOLIC BLOOD PRESSURE: 82 MMHG | SYSTOLIC BLOOD PRESSURE: 150 MMHG | BODY MASS INDEX: 37.73 KG/M2 | HEART RATE: 93 BPM | WEIGHT: 213 LBS

## 2020-11-09 PROCEDURE — 99203 OFFICE O/P NEW LOW 30 MIN: CPT | Performed by: PSYCHIATRY & NEUROLOGY
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
## 2020-11-24 ENCOUNTER — HOSPITAL ENCOUNTER (EMERGENCY)
Age: 65
Discharge: HOME | End: 2020-11-24
Attending: EMERGENCY MEDICINE
Payer: MEDICARE

## 2020-11-24 ENCOUNTER — APPOINTMENT (OUTPATIENT)
Dept: CT IMAGING | Age: 65
End: 2020-11-24
Payer: MEDICARE

## 2020-11-24 ENCOUNTER — APPOINTMENT (OUTPATIENT)
Dept: GENERAL RADIOLOGY | Age: 65
End: 2020-11-24
Payer: MEDICARE

## 2020-11-24 VITALS
DIASTOLIC BLOOD PRESSURE: 79 MMHG | SYSTOLIC BLOOD PRESSURE: 155 MMHG | TEMPERATURE: 98.9 F | OXYGEN SATURATION: 93 % | WEIGHT: 213 LBS | BODY MASS INDEX: 37.74 KG/M2 | HEIGHT: 63 IN | HEART RATE: 80 BPM | RESPIRATION RATE: 18 BRPM

## 2020-11-24 LAB
ANION GAP SERPL CALCULATED.3IONS-SCNC: 14 MMOL/L (ref 3–16)
BASOPHILS ABSOLUTE: 0 K/UL (ref 0–0.2)
BASOPHILS RELATIVE PERCENT: 0.4 %
BUN BLDV-MCNC: 15 MG/DL (ref 7–20)
CALCIUM SERPL-MCNC: 8.9 MG/DL (ref 8.3–10.6)
CHLORIDE BLD-SCNC: 100 MMOL/L (ref 99–110)
CO2: 22 MMOL/L (ref 21–32)
CREAT SERPL-MCNC: 0.6 MG/DL (ref 0.6–1.2)
EOSINOPHILS ABSOLUTE: 0.1 K/UL (ref 0–0.6)
EOSINOPHILS RELATIVE PERCENT: 0.9 %
GFR AFRICAN AMERICAN: >60
GFR NON-AFRICAN AMERICAN: >60
GLUCOSE BLD-MCNC: 147 MG/DL (ref 70–99)
HCT VFR BLD CALC: 38.2 % (ref 36–48)
HEMOGLOBIN: 12.4 G/DL (ref 12–16)
LACTIC ACID: 1.3 MMOL/L (ref 0.4–2)
LYMPHOCYTES ABSOLUTE: 1.1 K/UL (ref 1–5.1)
LYMPHOCYTES RELATIVE PERCENT: 12.2 %
MCH RBC QN AUTO: 23.9 PG (ref 26–34)
MCHC RBC AUTO-ENTMCNC: 32.5 G/DL (ref 31–36)
MCV RBC AUTO: 73.7 FL (ref 80–100)
MONOCYTES ABSOLUTE: 0.5 K/UL (ref 0–1.3)
MONOCYTES RELATIVE PERCENT: 5.7 %
NEUTROPHILS ABSOLUTE: 7.3 K/UL (ref 1.7–7.7)
NEUTROPHILS RELATIVE PERCENT: 80.8 %
PDW BLD-RTO: 18.1 % (ref 12.4–15.4)
PLATELET # BLD: 409 K/UL (ref 135–450)
PMV BLD AUTO: 8.2 FL (ref 5–10.5)
POTASSIUM SERPL-SCNC: 4.3 MMOL/L (ref 3.5–5.1)
PRO-BNP: 97 PG/ML (ref 0–124)
PROCALCITONIN: 0.05 NG/ML (ref 0–0.15)
RBC # BLD: 5.18 M/UL (ref 4–5.2)
SODIUM BLD-SCNC: 136 MMOL/L (ref 136–145)
TROPONIN: <0.01 NG/ML
WBC # BLD: 9.1 K/UL (ref 4–11)

## 2020-11-24 PROCEDURE — 80048 BASIC METABOLIC PNL TOTAL CA: CPT

## 2020-11-24 PROCEDURE — 71045 X-RAY EXAM CHEST 1 VIEW: CPT

## 2020-11-24 PROCEDURE — 84145 PROCALCITONIN (PCT): CPT

## 2020-11-24 PROCEDURE — 99283 EMERGENCY DEPT VISIT LOW MDM: CPT

## 2020-11-24 PROCEDURE — 71260 CT THORAX DX C+: CPT

## 2020-11-24 PROCEDURE — 83605 ASSAY OF LACTIC ACID: CPT

## 2020-11-24 PROCEDURE — 85025 COMPLETE CBC W/AUTO DIFF WBC: CPT

## 2020-11-24 PROCEDURE — 84484 ASSAY OF TROPONIN QUANT: CPT

## 2020-11-24 PROCEDURE — 93005 ELECTROCARDIOGRAM TRACING: CPT | Performed by: EMERGENCY MEDICINE

## 2020-11-24 PROCEDURE — 6360000004 HC RX CONTRAST MEDICATION: Performed by: EMERGENCY MEDICINE

## 2020-11-24 PROCEDURE — 83880 ASSAY OF NATRIURETIC PEPTIDE: CPT

## 2020-11-24 PROCEDURE — 87040 BLOOD CULTURE FOR BACTERIA: CPT

## 2020-11-24 RX ORDER — BENZONATATE 100 MG/1
100 CAPSULE ORAL 2 TIMES DAILY PRN
Qty: 20 CAPSULE | Refills: 0 | Status: SHIPPED | OUTPATIENT
Start: 2020-11-24 | End: 2020-12-01

## 2020-11-24 RX ORDER — ONDANSETRON 4 MG/1
4 TABLET, ORALLY DISINTEGRATING ORAL EVERY 8 HOURS PRN
Qty: 20 TABLET | Refills: 0 | Status: SHIPPED | OUTPATIENT
Start: 2020-11-24

## 2020-11-24 RX ADMIN — IOPAMIDOL 75 ML: 755 INJECTION, SOLUTION INTRAVENOUS at 21:06

## 2020-11-24 ASSESSMENT — PAIN SCALES - GENERAL: PAINLEVEL_OUTOF10: 5

## 2020-11-24 ASSESSMENT — PAIN DESCRIPTION - PAIN TYPE: TYPE: ACUTE PAIN

## 2020-11-25 ENCOUNTER — CARE COORDINATION (OUTPATIENT)
Dept: CARE COORDINATION | Age: 65
End: 2020-11-25

## 2020-11-25 LAB
EKG ATRIAL RATE: 99 BPM
EKG DIAGNOSIS: NORMAL
EKG P AXIS: 49 DEGREES
EKG P-R INTERVAL: 148 MS
EKG Q-T INTERVAL: 344 MS
EKG QRS DURATION: 94 MS
EKG QTC CALCULATION (BAZETT): 441 MS
EKG R AXIS: 48 DEGREES
EKG T AXIS: 56 DEGREES
EKG VENTRICULAR RATE: 99 BPM

## 2020-11-25 PROCEDURE — 93010 ELECTROCARDIOGRAM REPORT: CPT | Performed by: INTERNAL MEDICINE
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
## 2020-11-25 NOTE — ED NOTES
O2 sats between 95-97% and HR 97 during ambulation in ED     Rosalba Villavicencio RN  11/24/20 0359

## 2020-11-27 ENCOUNTER — CARE COORDINATION (OUTPATIENT)
Dept: CARE COORDINATION | Age: 65
End: 2020-11-27

## 2020-11-28 LAB
BLOOD CULTURE, ROUTINE: NORMAL
CULTURE, BLOOD 2: NORMAL

## 2021-01-06 ENCOUNTER — HOSPITAL ENCOUNTER (OUTPATIENT)
Dept: GENERAL RADIOLOGY | Age: 66
Discharge: HOME | End: 2021-01-06
Payer: MEDICARE

## 2021-01-06 DIAGNOSIS — R49.0 HOARSENESS: ICD-10-CM

## 2021-01-06 DIAGNOSIS — R49.0 VOICE HOARSENESS: ICD-10-CM

## 2021-01-06 PROCEDURE — 74220 X-RAY XM ESOPHAGUS 1CNTRST: CPT

## 2021-01-06 PROCEDURE — 74240 X-RAY XM UPR GI TRC 1CNTRST: CPT

## 2021-02-10 ENCOUNTER — OFFICE VISIT (OUTPATIENT)
Dept: NEUROLOGY | Age: 66
End: 2021-02-10
Payer: MEDICARE

## 2021-02-10 VITALS
TEMPERATURE: 96.8 F | HEART RATE: 95 BPM | DIASTOLIC BLOOD PRESSURE: 80 MMHG | BODY MASS INDEX: 37.74 KG/M2 | HEIGHT: 63 IN | WEIGHT: 213 LBS | SYSTOLIC BLOOD PRESSURE: 150 MMHG

## 2021-02-10 DIAGNOSIS — G43.009 MIGRAINE WITHOUT AURA AND WITHOUT STATUS MIGRAINOSUS, NOT INTRACTABLE: Primary | ICD-10-CM

## 2021-02-10 PROCEDURE — 99213 OFFICE O/P EST LOW 20 MIN: CPT | Performed by: PSYCHIATRY & NEUROLOGY
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
## 2021-02-17 ENCOUNTER — TELEPHONE (OUTPATIENT)
Dept: NEUROLOGY | Age: 66
End: 2021-02-17

## 2021-02-17 NOTE — TELEPHONE ENCOUNTER
Pt calling and states that last Thursday she had what felt like fluid running down her forehead and pressure in the middle of her forehead. She states her  told her to take baby Asprin and so she did. Pt wants to know if this is normal for her forehead to feel like this?   CB# [phone]

## 2021-02-17 NOTE — TELEPHONE ENCOUNTER
Relayed to Steve Reyes that Per VRR, its hard to say but he is not concerned. I did ask if she was keeping the diary he told her to keep at last visit. She hasn't but will start now. She will  Document the length, how often and what medications if any that she has taken. I advised to now over use the ibuprofen and baby aspirin if at all possible to not use it. She gave verbal understanding and will call back if they get worse in nature or duration.

## 2021-03-22 ENCOUNTER — OFFICE VISIT (OUTPATIENT)
Dept: ENT CLINIC | Age: 66
End: 2021-03-22
Payer: MEDICARE

## 2021-03-22 VITALS
HEIGHT: 63 IN | WEIGHT: 213 LBS | DIASTOLIC BLOOD PRESSURE: 82 MMHG | TEMPERATURE: 97.3 F | SYSTOLIC BLOOD PRESSURE: 170 MMHG | BODY MASS INDEX: 37.74 KG/M2

## 2021-03-22 DIAGNOSIS — R05.3 CHRONIC COUGH: ICD-10-CM

## 2021-03-22 DIAGNOSIS — K21.9 LPRD (LARYNGOPHARYNGEAL REFLUX DISEASE): ICD-10-CM

## 2021-03-22 DIAGNOSIS — J37.0 CHRONIC LARYNGITIS: ICD-10-CM

## 2021-03-22 PROCEDURE — 31575 DIAGNOSTIC LARYNGOSCOPY: CPT | Performed by: OTOLARYNGOLOGY

## 2021-03-22 RX ORDER — LANSOPRAZOLE 30 MG/1
CAPSULE, DELAYED RELEASE ORAL
Qty: 180 CAPSULE | Refills: 0 | Status: SHIPPED | OUTPATIENT
Start: 2021-03-22 | End: 2021-07-20

## 2021-03-22 NOTE — PROGRESS NOTES
Chief Complaint   Patient presents with    Laryngitis     recheck of LPRD and chronic reflux laryngitis       PROCEDURE:  FLEXIBLE FIBEROPTIC NASOPHARYNGOLARYNGOSCOPY  INDICATION:  Inadequate visualization by indirect laryngoscopy mirror examination and need for detailed endoscopic examination of the larynx and pharynx to evaluate the upper aerodigestive tract for recheck LPRD and chronic reflux laryngitis. \"Whenever I go off pantoprazole, I have burning real bad and I feel like I'm [place] [employer]. \"  She had a sinus infection at the end of February which was treated at an Urgent Care with an antibiotic. She \"got better. \"  \"My throat is alright. I can still feel the yucky in my throat and my voice keeps going out, off and on, twice since February. \"      INFORMED CONSENT:  The procedure was described to the patient, including method of anesthesia. The patient was advised of the medical necessity for this procedure. The risks and potential complications were discussed, including, but not limited to, bleeding, infection, adverse reaction to medications, hoarseness, sore throat, inability to obtain adequate visualization, and possible future need for rigid operative endoscopy. The expected outcome, potential benefits and the alternatives of therapy were discussed. Misha Stover asked appropriate questions and then expressed the lack of any further questions, understanding, acceptance, and the desire to undergo with this procedure, granting verbal informed consent. FINDINGS:  There was moderate edema and erythema of the arytenoid and interarytenoid mucosa consistent with posterior laryngitis secondary to laryngopharyngeal reflux. The vocal cords appeared to be normal, with no nodule, ulceration, polyp, leukoplakia or other lesions, and appeared to be normally mobile bilaterally with midline approximation on phonation.   Sensation of the hypopharynx and larynx appeared to be normal when touched by the end of the flexible scope. The nasopharynx, eustachian tube orifices and fossa of Rosenmüller, oropharynx, base of tongue, hypopharynx, supraglottis, subglottis, and piriform sinuses all appeared to be normal, with no lesions. Visualization was excellent throughout the examination. DESCRIPTION OF PROCEDURE:  The right and left nasal cavity was topically anesthetized and decongested with a 50-50 mixture of 0.05% oxymetazoline solution and topical 4% lidocaine solution by nasal sprayer, twice. After about ten minutes, the flexible fiberoptic nasopharyngolaryngoscope, with camera attached, was inserted through the right nare/nasal cavity and advanced to the nasopharynx and then to the hypopharynx and larynx. The [employer] video system was used. After adequate endoscopic visualization, the endoscope was removed. The patient appeared to tolerate the procedure well with no evidence of perioperative complications. Fani Khan / Darla Kussmaul / Alyssa Pettit was seen today for laryngitis. Diagnoses and all orders for this visit:    LPRD (laryngopharyngeal reflux disease)  -     lansoprazole (PREVACID) 30 MG delayed release capsule; one tab by mouth twice a day on empty stomach (when have not eaten/drunk anything for 2 hrs) and eat meal/snack 45 - 60 mins after each dose    Chronic laryngitis  -     lansoprazole (PREVACID) 30 MG delayed release capsule; one tab by mouth twice a day on empty stomach (when have not eaten/drunk anything for 2 hrs) and eat meal/snack 45 - 60 mins after each dose    Chronic cough  -     lansoprazole (PREVACID) 30 MG delayed release capsule; one tab by mouth twice a day on empty stomach (when have not eaten/drunk anything for 2 hrs) and eat meal/snack 45 - 60 mins after each dose           RECOMMENDATIONS / PLAN    Patient Instructions   1. Continue Flonase at 2 sprays in each nostril once daily. 2. Continue Prevacid/lansoprazole once daily.   Call office if your throat symptoms worsen. Follow up  Return in about 6 months (around 9/22/2021) for repeat flexible fiberoptic throat endoscopy, recheck/follow-up, and sooner if condition worsens.

## 2021-04-09 ENCOUNTER — TELEPHONE (OUTPATIENT)
Dept: ENT CLINIC | Age: 66
End: 2021-04-09

## 2021-04-09 NOTE — TELEPHONE ENCOUNTER
Patient calling to inform us that she is taking lansoprazole twice a day. She tried to take it once a day and she was in too much pain.  She is also back to doing flonase twice a day, \"to keep the gunk out of her nose\"

## 2021-04-15 PROBLEM — R00.2 PALPITATIONS: Status: ACTIVE | Noted: 2021-04-15

## 2021-04-15 NOTE — PROGRESS NOTES
[address]   Cardiac Evaluation      Patient: [facility]  YOB: 1955         Chief Complaint   Patient presents with    Hypertension    Tachycardia        Referring provider: Lokesh Yepez MD    History of Present Illness:   Ms Francine Steve is a 72 y.o. female here as a new patient referred by Dr Elvira Evans for heart palpitations. She wore a holter monitor for 24 hours that showed NSR with rare PAC, ECG was unremarkable. She does not have any history of heart disease. Franklin Savage states today that she had COVID in November and was seen in the hospital a couple of times. She has had SOB and feels her heart racing since her diagnosis. She states that she feels burning behind her left breast some times, it is not associated with exertion. With regard to medication therapy he/she has been compliant with prescribed regimen and has tolerated therapy to date. Past Medical History:   has a past medical history of Arthritis, Asthma, Brain injury ([address] Bronchitis, Diabetes mellitus ([address] DM (diabetes mellitus screen), Elevated cholesterol with high triglycerides, Fall, HIGH CHOLESTEROL, Hypertension, Neuropathy, Panic attacks, PONV (postoperative nausea and vomiting), Post traumatic stress disorder, Short-term memory loss, Sleep apnea, and Vertigo. Surgical History:   has a past surgical history that includes Hysterectomy; eye surgery (cataract B); Carpal tunnel release; Finger trigger release; Gallbladder surgery; bladder suspension; Skin cancer excision; blepharoplasty; and laryngoscopy (08/03/2017).      Current Outpatient Medications   Medication Sig Dispense Refill    aclidinium (TUDORZA PRESSAIR) 400 MCG/ACT AEPB inhaler USE 1 INHALATION EVERY 12 HOURS      tiZANidine (ZANAFLEX) 2 MG tablet Take 2 mg by mouth every 12 hours as needed      nabumetone (RELAFEN) 750 MG tablet Take 750 mg by mouth 2 times daily      lansoprazole (PREVACID) 30 MG delayed release capsule one tab by mouth twice a day on empty stomach (when have not eaten/drunk anything for 2 hrs) and eat meal/snack 45 - 60 mins after each dose 180 capsule 0    ondansetron (ZOFRAN ODT) 4 MG disintegrating tablet Take 1 tablet by mouth every 8 hours as needed for Nausea or Vomiting 20 tablet 0    gabapentin (NEURONTIN) 300 MG capsule Take 600 mg by mouth. 2 caps 4 times a day.  buPROPion (WELLBUTRIN XL) 300 MG extended release tablet Take 300 mg by mouth every morning      nortriptyline (PAMELOR) 25 MG capsule Take 50 mg by mouth nightly       TUDORZA PRESSAIR 400 MCG/ACT AEPB inhaler       budesonide-formoterol (SYMBICORT) 160-4.5 MCG/ACT AERO Inhale 2 puffs into the lungs 2 times daily      metformin (GLUCOPHAGE) 1000 MG tablet Take 1 tablet by mouth 2 times daily (with meals). 60 tablet 0    albuterol-ipratropium (COMBIVENT)  MCG/ACT inhaler Inhale 2 puffs into the lungs 4 times daily as needed for Wheezing. 1 Inhaler 0    venlafaxine (EFFEXOR-XR) 150 MG XR capsule Take 75 mg by mouth daily       meclizine (ANTIVERT) 12.5 MG tablet Take 25 mg by mouth 4 times daily.  aripiprazole (ABILIFY) 5 MG tablet Take 2 mg by mouth daily       amLODIPine (NORVASC) 10 MG tablet Take 10 mg by mouth daily       fluticasone (FLONASE) 50 MCG/ACT nasal spray 2 sprays in each nostril daily. 1 Bottle 2     No current facility-administered medications for this visit.         Allergies:  Prochlorperazine, Promethazine, Cymbalta [duloxetine hcl], Morphine and related, Amoxicillin-pot clavulanate, Atenolol, Demerol, Dilaudid [hydromorphone hcl], Duloxetine, Ezetimibe, Furosemide, Glucosamine-chondroitin, Keflex [cephalexin], Ketorolac, Ketorolac tromethamine, Lisinopril, Meperidine, Morphine, Phenergan [promethazine hcl], Prochlorperazine edisylate, Statins, and Codeine     Social History:  Social History     Socioeconomic History    Marital status:      Spouse name: Not on file    Number of children: Not on file  Years of education: Not on file    Highest education level: Not on file   Occupational History    Not on file   Social Needs    Financial resource strain: Not on file    Food insecurity     Worry: Not on file     Inability: Not on file    Transportation needs     Medical: Not on file     Non-medical: Not on file   Tobacco Use    Smoking status: Never Smoker    Smokeless tobacco: Never Used   Substance and Sexual Activity    Alcohol use: No    Drug use: No    Sexual activity: Not on file   Lifestyle    Physical activity     Days per week: Not on file     Minutes per session: Not on file    Stress: Not on file   Relationships    Social connections     Talks on phone: Not on file     Gets together: Not on file     Attends [employer] service: Not on file     Active member of club or organization: Not on file     Attends meetings of clubs or organizations: Not on file     Relationship status: Not on file    Intimate partner violence     Fear of current or ex partner: Not on file     Emotionally abused: Not on file     Physically abused: Not on file     Forced sexual activity: Not on file   Other Topics Concern    Not on file   Social History Narrative    Not on file       Family History:   Family History   Problem Relation Age of Onset    Heart Disease Mother     Heart Disease Father      Family history has been reviewed and not pertinent except as noted above. Review of Systems:   · Constitutional: there has been no unanticipated weight loss. No change in energy or activity level   · Eyes: No visual changes   · ENT: No Headaches, hearing loss or vertigo. No mouth sores or sore throat. · Cardiovascular: Reviewed in HPI  · Respiratory: No cough or wheezing, no sputum production. · Gastrointestinal: No abdominal pain, appetite loss, blood in stools. No change in bowel or bladder habits.   · Genitourinary: No nocturia, dysuria, trouble voiding  · Musculoskeletal:  No gait disturbance, weakness or joint complaints. · Integumentary: No rash or pruritis. · Neurological: No headache, change in muscle strength, numbness or tingling. No change in gait, balance, coordination, mood, affect, memory, mentation, behavior. · Psychiatric: No anxiety or depression  · Endocrine: No malaise or fever  · Hematologic/Lymphatic: No abnormal bruising or bleeding, blood clots or swollen lymph nodes. · Allergic/Immunologic: No nasal congestion or hives. Physical Examination:    Vitals:    04/23/21 0719   BP: 138/64   Site: Right Upper Arm   Position: Sitting   Cuff Size: Large Adult   Pulse: 89   SpO2: 97%   Weight: 206 lb (93.4 kg)   Height: 5' 3\" (1.6 m)     Body mass index is 36.49 kg/m². Wt Readings from Last 3 Encounters:   04/23/21 206 lb (93.4 kg)   03/22/21 213 lb (96.6 kg)   02/10/21 213 lb (96.6 kg)      BP Readings from Last 3 Encounters:   04/23/21 138/64   03/22/21 (!) 170/82   02/10/21 (!) 150/80        Physical Examination:    · CONSTITUTIONAL: Well developed, well nourished  · EYES: PERRLA. No xanthelasma, sclera non icteric  · EARS,NOSE,MOUTH,THROAT:  Mucous membranes moist, normal hearing  · NECK: Supple, JVP normal, thyroid not enlarged. Carotids 2+ without bruits  · RESPIRATORY: Normal effort, no rales or rhonchi  · CARDIOVASCULAR: Normal PMI, regular rate and rhythm, no murmurs, rub or gallop. No edema. Radial pulses present and equal  · CHEST: No scar or masses  · ABDOMEN: Normal bowel sounds. No masses or tenderness. No bruit  · MUSCULOSKELETAL: No clubbing or cyanosis. Moves all extremities well. Normal gait  · SKIN:  Warm and dry. No rashes  · NEUROLOGIC: Cranial nerves intact. Alert and oriented  · PSYCHIATRIC: Calm affect. Appears to have normal judgement and insight    All testing and labs listed below were personally reviewed by myself. Assessment/Plan  1. Palpitations    2. Essential hypertension    3.  SOB (shortness of breath)          Palpitations  3/2021 24 hour holter monitor - NSR

## 2021-04-23 ENCOUNTER — OFFICE VISIT (OUTPATIENT)
Dept: CARDIOLOGY CLINIC | Age: 66
End: 2021-04-23
Payer: MEDICARE

## 2021-04-23 VITALS
DIASTOLIC BLOOD PRESSURE: 64 MMHG | BODY MASS INDEX: 36.5 KG/M2 | HEART RATE: 89 BPM | SYSTOLIC BLOOD PRESSURE: 138 MMHG | HEIGHT: 63 IN | WEIGHT: 206 LBS | OXYGEN SATURATION: 97 %

## 2021-04-23 DIAGNOSIS — R06.02 SOB (SHORTNESS OF BREATH): ICD-10-CM

## 2021-04-23 DIAGNOSIS — I10 ESSENTIAL HYPERTENSION: ICD-10-CM

## 2021-04-23 DIAGNOSIS — R00.2 PALPITATIONS: Primary | ICD-10-CM

## 2021-04-23 PROCEDURE — 99204 OFFICE O/P NEW MOD 45 MIN: CPT | Performed by: INTERNAL MEDICINE

## 2021-04-23 RX ORDER — TIZANIDINE 2 MG/1
2 TABLET ORAL EVERY 12 HOURS PRN
COMMUNITY
Start: 2020-07-17

## 2021-04-23 RX ORDER — NABUMETONE 750 MG/1
750 TABLET, FILM COATED ORAL 2 TIMES DAILY
COMMUNITY
Start: 2021-04-16

## 2021-04-23 RX ORDER — ACLIDINIUM BROMIDE 400 UG/1
POWDER, METERED RESPIRATORY (INHALATION)
COMMUNITY
Start: 2020-08-13

## 2021-04-23 NOTE — ASSESSMENT & PLAN NOTE
Dyspnea with exertion  Moderate   Anginal equivalent~ burning in left side; mid sternal pain/pressure with exertion. Concerning for ischemia.   Plan~ will get [email or web address]

## 2021-04-27 DIAGNOSIS — R51.9 FRONTAL HEADACHE: ICD-10-CM

## 2021-04-27 DIAGNOSIS — J32.9 CHRONIC SINUSITIS, UNSPECIFIED LOCATION: Chronic | ICD-10-CM

## 2021-04-27 DIAGNOSIS — J31.0 CHRONIC RHINITIS: ICD-10-CM

## 2021-04-27 RX ORDER — FLUTICASONE PROPIONATE 50 MCG
SPRAY, SUSPENSION (ML) NASAL
Qty: 1 BOTTLE | Refills: 0 | Status: SHIPPED | OUTPATIENT
Start: 2021-04-27 | End: 2021-06-10

## 2021-04-27 NOTE — TELEPHONE ENCOUNTER
Requested Prescriptions     Pending Prescriptions Disp Refills    fluticasone (FLONASE) 50 MCG/ACT nasal spray [Pharmacy Med Name: FLUTICASONE PROP 50 MCG SPRAY] 1 Bottle 0     Sig: SPRAY TWO SPRAYS IN EACH NOSTRIL ONCE DAILY       Last OV 3/22/21  Next OV 9/20/21

## 2021-04-30 ENCOUNTER — HOSPITAL ENCOUNTER (OUTPATIENT)
Dept: NON INVASIVE DIAGNOSTICS | Age: 66
Discharge: HOME | End: 2021-04-30
Payer: MEDICARE

## 2021-04-30 DIAGNOSIS — R06.02 SOB (SHORTNESS OF BREATH): ICD-10-CM

## 2021-04-30 LAB
LV EF: 70 %
LVEF MODALITY: NORMAL

## 2021-04-30 PROCEDURE — 93017 CV STRESS TEST TRACING ONLY: CPT | Performed by: INTERNAL MEDICINE

## 2021-04-30 PROCEDURE — 6360000002 HC RX W HCPCS: Performed by: INTERNAL MEDICINE

## 2021-04-30 PROCEDURE — 3430000000 HC RX DIAGNOSTIC RADIOPHARMACEUTICAL: Performed by: INTERNAL MEDICINE

## 2021-04-30 PROCEDURE — 78452 HT MUSCLE IMAGE SPECT MULT: CPT | Performed by: INTERNAL MEDICINE

## 2021-04-30 PROCEDURE — A9502 TC99M TETROFOSMIN: HCPCS | Performed by: INTERNAL MEDICINE

## 2021-04-30 RX ORDER — AMINOPHYLLINE DIHYDRATE 25 MG/ML
100 INJECTION, SOLUTION INTRAVENOUS ONCE
Status: COMPLETED | OUTPATIENT
Start: 2021-04-30 | End: 2021-04-30

## 2021-04-30 RX ADMIN — TETROFOSMIN 30 MILLICURIE: 1.38 INJECTION, POWDER, LYOPHILIZED, FOR SOLUTION INTRAVENOUS at 08:57

## 2021-04-30 RX ADMIN — REGADENOSON 0.4 MG: 0.08 INJECTION, SOLUTION INTRAVENOUS at 08:38

## 2021-04-30 RX ADMIN — AMINOPHYLLINE 100 MG: 25 INJECTION, SOLUTION INTRAVENOUS at 08:45

## 2021-04-30 RX ADMIN — TETROFOSMIN 10 MILLICURIE: 1.38 INJECTION, POWDER, LYOPHILIZED, FOR SOLUTION INTRAVENOUS at 07:48

## 2021-04-30 NOTE — RESULT ENCOUNTER NOTE
LM for patient to call back to office. Her stress was normal but need to follow up on her palpitations since last ov to determine if a 30 day monitor is necessary and have her come in to get one.

## 2021-04-30 NOTE — PROGRESS NOTES
Instructed on Lexiscan Stress Test Procedure including possible side effects/ adverse reactions. Patient verbalizes  understanding and denies having any questions . See [address] [place] [address] Cardiology

## 2021-04-30 NOTE — PROGRESS NOTES
Aminophylline given per lexiscan protocol in stress lab for c/o persistant nausea. Aminophylline given per lexiscan protocol in stress lab for c/o persistant nausea. Aminophylline given per lexiscan protocol in stress lab for c/o persistant nausea.

## 2021-05-03 ENCOUNTER — TELEPHONE (OUTPATIENT)
Dept: CARDIOLOGY CLINIC | Age: 66
End: 2021-05-03

## 2021-05-04 NOTE — RESULT ENCOUNTER NOTE
Attempted to call patient again, lm for her to call back for normal stress results. If she returns call and is still feeling palpitations, we can have her come in for a 30 day monitor.  bi

## 2021-05-11 ENCOUNTER — TELEPHONE (OUTPATIENT)
Dept: CARDIOLOGY CLINIC | Age: 66
End: 2021-05-11

## 2021-05-11 NOTE — TELEPHONE ENCOUNTER
----- Message from Alexandria Anderson RN sent at 4/30/2021 12:37 PM EDT -----  LM for patient to call back to office. Her stress was normal but need to follow up on her palpitations since last ov to determine if a 30 day monitor is necessary and have her come in to get one.

## 2021-06-10 DIAGNOSIS — J32.9 CHRONIC SINUSITIS, UNSPECIFIED LOCATION: Chronic | ICD-10-CM

## 2021-06-10 DIAGNOSIS — J31.0 CHRONIC RHINITIS: ICD-10-CM

## 2021-06-10 DIAGNOSIS — R51.9 FRONTAL HEADACHE: ICD-10-CM

## 2021-06-10 RX ORDER — FLUTICASONE PROPIONATE 50 MCG
SPRAY, SUSPENSION (ML) NASAL
Qty: 1 BOTTLE | Refills: 0 | Status: SHIPPED | OUTPATIENT
Start: 2021-06-10 | End: 2021-09-20 | Stop reason: SDUPTHER

## 2021-06-10 NOTE — TELEPHONE ENCOUNTER
Requested Prescriptions     Pending Prescriptions Disp Refills    fluticasone (FLONASE) 50 MCG/ACT nasal spray [Pharmacy Med Name: FLUTICASONE PROP 50 MCG SPRAY] 1 Bottle 0     Sig: SPRAY TWO SPRAYS IN EACH NOSTRIL ONCE DAILY       Last OV 3/22/21  Next OV 9/20/21  Last filled 4/27/21

## 2021-07-19 ENCOUNTER — TELEPHONE (OUTPATIENT)
Dept: ENT CLINIC | Age: 66
End: 2021-07-19

## 2021-07-19 NOTE — TELEPHONE ENCOUNTER
A prescription for 180 tablets was written on 3/22/2021 and she was changed to once daily dosing. That supply will last for 6-month and therefore refill is not needed until at least 9/22/2021. She has a follow-up appointment on 9/20/2021. I will consider refill at that visit.

## 2021-07-20 ENCOUNTER — TELEPHONE (OUTPATIENT)
Dept: ENT CLINIC | Age: 66
End: 2021-07-20

## 2021-07-20 DIAGNOSIS — J37.0 CHRONIC LARYNGITIS: ICD-10-CM

## 2021-07-20 DIAGNOSIS — K21.9 LPRD (LARYNGOPHARYNGEAL REFLUX DISEASE): ICD-10-CM

## 2021-07-20 DIAGNOSIS — R05.3 CHRONIC COUGH: ICD-10-CM

## 2021-07-20 RX ORDER — LANSOPRAZOLE 30 MG/1
CAPSULE, DELAYED RELEASE ORAL
Qty: 180 CAPSULE | Refills: 0 | Status: SHIPPED | OUTPATIENT
Start: 2021-07-20 | End: 2021-07-21 | Stop reason: SDUPTHER

## 2021-07-20 NOTE — TELEPHONE ENCOUNTER
Refill was erroneously sent today for 180 tablets. I called pharmacy and cancelled that prescription. Per my previous note a refill in not needed until at least 9/22/2021. I will refill at the 9/20/21 visit if I am going to continue this medication.

## 2021-07-21 ENCOUNTER — TELEPHONE (OUTPATIENT)
Dept: ENT CLINIC | Age: 66
End: 2021-07-21

## 2021-07-21 DIAGNOSIS — K21.9 LPRD (LARYNGOPHARYNGEAL REFLUX DISEASE): ICD-10-CM

## 2021-07-21 DIAGNOSIS — R05.3 CHRONIC COUGH: ICD-10-CM

## 2021-07-21 DIAGNOSIS — J37.0 CHRONIC LARYNGITIS: ICD-10-CM

## 2021-07-21 RX ORDER — LANSOPRAZOLE 30 MG/1
CAPSULE, DELAYED RELEASE ORAL
Qty: 180 CAPSULE | Refills: 0 | Status: SHIPPED | OUTPATIENT
Start: 2021-07-21 | End: 2021-10-21

## 2021-07-21 NOTE — TELEPHONE ENCOUNTER
Upon further investigation by Renita Machuca, patient went back to twice daily dosing on or about 4/09/2021. Therefore, it turns out that a refill is needed. Refilled lansoprazole.

## 2021-09-20 ENCOUNTER — OFFICE VISIT (OUTPATIENT)
Dept: ENT CLINIC | Age: 66
End: 2021-09-20
Payer: MEDICARE

## 2021-09-20 VITALS — HEART RATE: 82 BPM | SYSTOLIC BLOOD PRESSURE: 155 MMHG | DIASTOLIC BLOOD PRESSURE: 81 MMHG

## 2021-09-20 DIAGNOSIS — J32.9 CHRONIC SINUSITIS, UNSPECIFIED LOCATION: Chronic | ICD-10-CM

## 2021-09-20 DIAGNOSIS — R51.9 FRONTAL HEADACHE: ICD-10-CM

## 2021-09-20 DIAGNOSIS — J31.0 CHRONIC RHINITIS: ICD-10-CM

## 2021-09-20 DIAGNOSIS — R05.3 CHRONIC COUGH: ICD-10-CM

## 2021-09-20 DIAGNOSIS — K21.9 LPRD (LARYNGOPHARYNGEAL REFLUX DISEASE): Primary | ICD-10-CM

## 2021-09-20 DIAGNOSIS — J37.0 CHRONIC LARYNGITIS: ICD-10-CM

## 2021-09-20 PROCEDURE — 31575 DIAGNOSTIC LARYNGOSCOPY: CPT | Performed by: OTOLARYNGOLOGY

## 2021-09-20 RX ORDER — FLUTICASONE PROPIONATE 50 MCG
SPRAY, SUSPENSION (ML) NASAL
Qty: 48 G | Refills: 1 | Status: SHIPPED | OUTPATIENT
Start: 2021-09-20 | End: 2022-08-02 | Stop reason: SDUPTHER

## 2021-09-20 NOTE — PROGRESS NOTES
Chief Complaint   Patient presents with    Follow-up     Flex scope, refill on medications as well        PROCEDURE:  FLEXIBLE FIBEROPTIC NASOPHARYNGOLARYNGOSCOPY  INDICATION:  Need for detailed endoscopic examination of the larynx and pharynx to evaluate the upper aerodigestive tract for recheck LPRD and chronic reflux laryngitis. \"I tried once a day and my stomach hurt. I have to take it twice a day or my stomach burns up. \"  \"I've noticed that my voice is not changing any more. I'm back to my normal voice, almost.\"  Most recently went off for two weeks and throat got worse and resumed PPI therapy. Has sinus congestion symptoms for two days I feel stuffy in nose and around eyes. INFORMED CONSENT:  The procedure was described to the patient, including method of anesthesia. The patient was advised of the medical necessity for this procedure. The risks and potential complications were discussed, including, but not limited to, bleeding, infection, adverse reaction to medications, hoarseness, sore throat, inability to obtain adequate visualization, and possible future need for rigid operative endoscopy. The expected outcome, potential benefits and the alternatives of therapy were discussed. Manolo Boothe asked appropriate questions and then expressed the lack of any further questions, understanding, acceptance, and the desire to undergo with this procedure, granting verbal informed consent. FINDINGS:  There was mild to moderate edema and erythema of the arytenoid and interarytenoid mucosa consistent with posterior laryngitis secondary to laryngopharyngeal reflux. The vocal cords appeared to be normal, with no nodule, ulceration, polyp, leukoplakia or other lesions, and appeared to be normally mobile bilaterally with midline approximation on phonation. Sensation of the hypopharynx and larynx appeared to be normal when touched by the end of the flexible scope.  The nasopharynx, eustachian tube orifices and fossa of Rosenmüller, oropharynx, base of tongue, hypopharynx, supraglottis, subglottis, and piriform sinuses all appeared to be normal, with no lesions. Visualization was excellent throughout the examination. DESCRIPTION OF PROCEDURE:  The right and left nasal cavity was topically anesthetized and decongested with a 50-50 mixture of 0.05% oxymetazoline solution and topical 4% lidocaine solution by nasal sprayer, twice. After about ten minutes, the flexible fiberoptic nasopharyngolaryngoscope, with camera attached, was inserted through the right nare/nasal cavity and advanced to the nasopharynx and then to the hypopharynx and larynx. The [address] video system was used. After adequate endoscopic visualization, the endoscope was removed. The patient appeared to tolerate the procedure well with no evidence of perioperative complications. Marzena Urbina / Felicity Olivarez / Eliseo Hernandez was seen today for follow-up. Diagnoses and all orders for this visit:    LPRD (laryngopharyngeal reflux disease)    Chronic laryngitis    Chronic cough    Chronic sinusitis, unspecified location  Comments:  possible vacuum congestion sinusitis headache syndrome  Orders:  -     fluticasone (FLONASE) 50 MCG/ACT nasal spray; SPRAY TWO SPRAYS IN EACH NOSTRIL ONCE DAILY    Frontal headache  -     fluticasone (FLONASE) 50 MCG/ACT nasal spray; SPRAY TWO SPRAYS IN EACH NOSTRIL ONCE DAILY    Chronic rhinitis  -     fluticasone (FLONASE) 50 MCG/ACT nasal spray; SPRAY TWO SPRAYS IN EACH NOSTRIL ONCE DAILY           RECOMMENDATIONS / PLAN    1. See Patient Instructions on file for this visit, which were discussed with the patient. 2. Return in about 1 year (around 9/20/2022).

## 2021-09-20 NOTE — PATIENT INSTRUCTIONS
RHINOSINUSITIS CARE  · You may use acetaminophen (eg. Tylenol) or Ibuprofen (eg. Advil) (over the counter medications) as needed for fever or pain. · Use a 12 hour decongestant spray, 0.05% oxymetazoline (e.g. Afrin, Duration, 4-Way). Spray each nostril twice three times a day for three days, then two times a day for 2 days, and then stop for two days and then repeat the cycle once. · Take one Mucinex (blue box) maximum strength 1200 mg tablet every 12 hours daily for the next 14 days. · A steam inhaler mask device may be helpful. These can be purchased at your pharmacy. · Use a saline nasal/sinus irrigation system, e.g. NeilMed sinus rinse, twice daily to help to clear secretions and drainage. Use distilled water; DO NOT USE TAP WATER! This is because of the possibility of amoeba or other microorganisms in tap water, which can result in a fatal disease. Alternatively, you could use a blue bulb syringe and solution of 1/4 tsp of table salt in 8 ounces (one cup or 240 ml) of distilled water. · Use fluticasone 2 sprays in each nostril once daily. · It is important to take all your medications as prescribed. Please continue your antibiotics as prescribed.      Please call the office if your condition worsens or if sinus symptoms persist longer than 10 days.       ===================================================================      ADVERSE AND SIDE EFFECTS OF MEDICATIONS:    Please be aware of the following possible adverse reactions, side effects, and complications of the following medications, including, but not limited to: allergic reaction, interactions with other medications, nausea, headache, diarrhea, persistent symptoms, failure to improve, and the following:     Fluticasone:  nosebleed, burning sensation, atrophy of nasal mucosa, septal ulceration or perforation, nasal irritation, nasal yeast infection,  drowsiness, bad taste.      ~~~>>> Also please read the medication information in this AVS and all information given to you by the pharmacist.         Patient Education        fluticasone nasal  Pronunciation:  floo TIK a sone  Brand:  Flonase, Veramyst, Felisha Mustache  What is the most important information I should know about fluticasone nasal?  Follow all directions on your medicine label and package. Tell each of your healthcare providers about all your medical conditions, allergies, and all medicines you use. What is fluticasone nasal?  Fluticasone nasal (for the nose) is a steroid medicine that is used to treat nasal congestion, sneezing, runny nose, and itchy or watery eyes caused by seasonal or year-round allergies. The Felisha Mustache brand of this medicine is for use only in adults. Veramyst may be used in children as young as 3years old. Flonase is for use in adults and children who are at least 3years old. Fluticasone nasal may also be used for purposes not listed in this medication guide. What should I discuss with my healthcare provider before using fluticasone nasal?  You should not use fluticasone nasal if you are allergic to it. Fluticasone can weaken your immune system,  making it easier for you to get an infection or worsening an infection you already have or recently had. Tell your doctor about any illness or infection you have had within the past several weeks. Tell your doctor if you have ever had:  · sores or ulcers inside your nose;  · injury of or surgery on your nose;  · glaucoma or cataracts;  · liver disease;  · diabetes;  · a weak immune system; or  · any type of infection (bacterial, fungal, viral, or parasitic). If you use fluticasone nasal without a prescription and you have any medical conditions, ask a doctor or pharmacist if this medicine is safe for you. Tell your doctor if you are pregnant or breast-feeding. How should I use fluticasone nasal?  Follow all directions on your prescription label and read all medication guides or instruction sheets.  Use the medicine exactly as directed. Do not share this medicine with another person, even if they have the same symptoms you have. Your dose will depend on the fluticasone brand or strength you use, and your dose may change once your symptoms improve. Follow all dosing instructions very carefully. A child using the nasal spray should be supervised by an adult. Read and carefully follow any Instructions for Use provided with your medicine. Ask your doctor or pharmacist if you do not understand these instructions. Shake the nasal spray just before each use. If you switched to fluticasone from another steroid medicine, you should not stop using it suddenly. Follow your doctor's instructions about tapering your dose. It may take several days before your symptoms improve. Keep using the medication as directed and tell your doctor if your symptoms do not improve after a week of treatment. Store fluticasone nasal in an upright position at room temperature, away from moisture and heat. Throw the spray bottle away after you have used 120 sprays, even if there is still medicine left in the bottle. What happens if I miss a dose? Use the medicine as soon as you can, but skip the missed dose if it is almost time for your next dose. Do not use two doses at one time. What happens if I overdose? Seek emergency medical attention or call the Poison Help line at 1-226.729.8710. An overdose of fluticasone nasal is not expected to produce life threatening symptoms. Long term use of steroid medicine can lead to glaucoma, cataracts, thinning skin, easy bruising, changes in body fat (especially in your face, neck, back, and waist), increased acne or facial hair, menstrual problems, impotence, or loss of interest in sex. What should I avoid while using fluticasone nasal?  Avoid getting the spray in your eyes or mouth. If this does happen, rinse with water. Avoid being near people who are sick or have infections.  Call your doctor for preventive treatment if you are exposed to chickenpox or measles. These conditions can be serious or even fatal in people who are using fluticasone nasal.  What are the possible side effects of fluticasone nasal?  Get emergency medical help if you have signs of an allergic reaction: hives, rash; feeling light-headed; difficult breathing; swelling of your face, lips, tongue, or throat. Call your doctor at once if you have:  · severe or ongoing nosebleeds;  · noisy breathing, runny nose, or crusting around your nostrils;  · redness, sores, or white patches in your mouth or throat;  · fever, chills, body aches;  · blurred vision, eye pain, or seeing halos around lights;  · any wound that will not heal; or  · signs of a hormonal disorder --worsening tiredness or muscle weakness, feeling light-headed, nausea, vomiting. Steroid medicine can affect growth in children. Tell your doctor if your child is not growing at a normal rate while using this medicine. Common side effects may include:  · minor nosebleed, burning or itching in your nose;  · sores or white patches inside or around your nose;  · cough, trouble breathing;  · headache, back pain;  · sinus pain, sore throat, fever; or  · nausea, vomiting. This is not a complete list of side effects and others may occur. Call your doctor for medical advice about side effects. You may report side effects to FDA at 9-884-FDA-7210. What other drugs will affect fluticasone nasal?  Tell your doctor about all your other medicines, especially:  · antifungal medicine; or  · antiviral medicine to treat hepatis C or HIV/AIDS. This list is not complete. Other drugs may affect fluticasone nasal, including prescription and over-the-counter medicines, vitamins, and herbal products. Not all possible drug interactions are listed here. Where can I get more information?   Your pharmacist can provide more information about fluticasone nasal.  Remember, keep this and all other medicines out of the reach of children, never share your medicines with others, and use this medication only for the indication prescribed. Every effort has been made to ensure that the information provided by [address] is accurate, up-to-date, and complete, but no guarantee is made to that effect. Drug information contained herein may be time sensitive. [facility] information has been compiled for use by healthcare practitioners and consumers in the [place] and therefore [facility] does not warrant that uses outside of the [place] are appropriate, unless specifically indicated otherwise. [facility]'s drug information does not endorse drugs, diagnose patients or recommend therapy. [facility]'s drug information is an informational resource designed to assist licensed healthcare practitioners in caring for their patients and/or to serve consumers viewing this service as a supplement to, and not a substitute for, the expertise, skill, knowledge and judgment of healthcare practitioners. The absence of a warning for a given drug or drug combination in no way should be construed to indicate that the drug or drug combination is safe, effective or appropriate for any given patient. [facility] does not assume any responsibility for any aspect of healthcare administered with the aid of information [facility] provides. The information contained herein is not intended to cover all possible uses, directions, precautions, warnings, drug interactions, allergic reactions, or adverse effects. If you have questions about the drugs you are taking, check with your doctor, nurse or pharmacist.  Copyright 2585-8478 [address]: 10.02. Revision date: 12/30/2019. Care instructions adapted under license by [facility] ([facility]). If you have questions about a medical condition or this instruction, always ask your healthcare professional. [place] [address] any warranty or liability for your use of this information.

## 2021-10-08 ENCOUNTER — HOSPITAL ENCOUNTER (OUTPATIENT)
Dept: WOMENS IMAGING | Age: 66
Discharge: HOME | End: 2021-10-08
Payer: MEDICARE

## 2021-10-08 VITALS — BODY MASS INDEX: 35.44 KG/M2 | HEIGHT: 63 IN | WEIGHT: 200 LBS

## 2021-10-08 DIAGNOSIS — R92.8 ABNORMAL MAMMOGRAM: ICD-10-CM

## 2021-10-08 PROCEDURE — G0279 TOMOSYNTHESIS, MAMMO: HCPCS

## 2021-10-19 ENCOUNTER — TELEPHONE (OUTPATIENT)
Dept: ENT CLINIC | Age: 66
End: 2021-10-19

## 2021-10-19 DIAGNOSIS — R05.3 CHRONIC COUGH: ICD-10-CM

## 2021-10-19 DIAGNOSIS — J37.0 CHRONIC LARYNGITIS: ICD-10-CM

## 2021-10-19 DIAGNOSIS — K21.9 LPRD (LARYNGOPHARYNGEAL REFLUX DISEASE): ICD-10-CM

## 2021-10-21 RX ORDER — LANSOPRAZOLE 30 MG/1
CAPSULE, DELAYED RELEASE ORAL
Qty: 180 CAPSULE | Refills: 1 | Status: SHIPPED | OUTPATIENT
Start: 2021-10-21 | End: 2022-04-19

## 2021-10-25 NOTE — TELEPHONE ENCOUNTER
Patient calling and requesting her rx rf for her Prevacid  30 mg     She does say that on her last visit dr Davian Brewer said that he would fill the request for her if she called    Patient is out ,    Eric       Please advise

## 2021-11-15 NOTE — PROGRESS NOTES
[facility]   Cardiac Evaluation      Patient: Ralf Keating  YOB: 1955         Chief Complaint   Patient presents with    Hypertension    6 Month Follow-Up        Referring provider: Israel Mcgraw MD    History of Present Illness:   Ms Ellen Malik is a 72 y.o. female here for 6 month follow up for sob and palpitations. She previously wore a holter monitor for 24 hours that showed NSR with rare PAC, ECG was unremarkable. She does not have any history of heart disease. Covid in [place]. Today she is here feeling ok. Her son recently had a heart attack and was coded for 2 minutes while being sent to . He ended up getting stents. She reports feeling ok herself from a cardiac standpoint. She gets short of breath when walking due to her asthma. She denies any chest pain or palpitations. With regard to medication therapy he/she has been compliant with prescribed regimen and has tolerated therapy to date. Past Medical History:   has a past medical history of Arthritis, Asthma, Brain injury ([address] Bronchitis, Diabetes mellitus ([address] DM (diabetes mellitus screen), Elevated cholesterol with high triglycerides, Fall, HIGH CHOLESTEROL, Hypertension, Neuropathy, Panic attacks, PONV (postoperative nausea and vomiting), Post traumatic stress disorder, Short-term memory loss, Sleep apnea, and Vertigo. Surgical History:   has a past surgical history that includes Hysterectomy; eye surgery (cataract B); Carpal tunnel release; Finger trigger release; Gallbladder surgery; bladder suspension; Skin cancer excision; blepharoplasty; and laryngoscopy (08/03/2017).      Current Outpatient Medications   Medication Sig Dispense Refill    oxybutynin (DITROPAN-XL) 10 MG extended release tablet       lansoprazole (PREVACID) 30 MG delayed release capsule TAKE 1 TABLET BY MOUTH TWO TIMES A DAY ON EMPTY STOMACH (WHEN HAVE NOT EATEN/DRUNK ANYTHING FOR 2 HRS) AND EAT MEAL/SNACK 45-60 MINUTES AFTER EACH DOSE 180 capsule 1    fluticasone (FLONASE) 50 MCG/ACT nasal spray SPRAY TWO SPRAYS IN EACH NOSTRIL ONCE DAILY 48 g 1    aclidinium (TUDORZA PRESSAIR) 400 MCG/ACT AEPB inhaler USE 1 INHALATION EVERY 12 HOURS      tiZANidine (ZANAFLEX) 2 MG tablet Take 2 mg by mouth every 12 hours as needed      nabumetone (RELAFEN) 750 MG tablet Take 750 mg by mouth 2 times daily      ondansetron (ZOFRAN ODT) 4 MG disintegrating tablet Take 1 tablet by mouth every 8 hours as needed for Nausea or Vomiting 20 tablet 0    gabapentin (NEURONTIN) 300 MG capsule Take 600 mg by mouth. 2 caps 4 times a day.  buPROPion (WELLBUTRIN XL) 300 MG extended release tablet Take 300 mg by mouth every morning      nortriptyline (PAMELOR) 25 MG capsule Take 50 mg by mouth nightly       TUDORZA PRESSAIR 400 MCG/ACT AEPB inhaler       budesonide-formoterol (SYMBICORT) 160-4.5 MCG/ACT AERO Inhale 2 puffs into the lungs 2 times daily      metformin (GLUCOPHAGE) 1000 MG tablet Take 1 tablet by mouth 2 times daily (with meals). 60 tablet 0    albuterol-ipratropium (COMBIVENT)  MCG/ACT inhaler Inhale 2 puffs into the lungs 4 times daily as needed for Wheezing. 1 Inhaler 0    venlafaxine (EFFEXOR-XR) 150 MG XR capsule Take 75 mg by mouth daily       meclizine (ANTIVERT) 12.5 MG tablet Take 25 mg by mouth 4 times daily.  aripiprazole (ABILIFY) 5 MG tablet Take 2 mg by mouth daily       amLODIPine (NORVASC) 10 MG tablet Take 10 mg by mouth daily        No current facility-administered medications for this visit.        Allergies:  Prochlorperazine, Promethazine, Cymbalta [duloxetine hcl], Morphine and related, Amoxicillin-pot clavulanate, Atenolol, Demerol, Dilaudid [hydromorphone hcl], Duloxetine, Ezetimibe, Furosemide, Glucosamine-chondroitin, Keflex [cephalexin], Ketorolac, Ketorolac tromethamine, Lisinopril, Meperidine, Morphine, Phenergan [promethazine hcl], Prochlorperazine edisylate, Statins, and Codeine     Social History:  Social History     Socioeconomic History    Marital status:      Spouse name: Not on file    Number of children: Not on file    Years of education: Not on file    Highest education level: Not on file   Occupational History    Not on file   Tobacco Use    Smoking status: Never Smoker    Smokeless tobacco: Never Used   Vaping Use    Vaping Use: Never used   Substance and Sexual Activity    Alcohol use: No    Drug use: No    Sexual activity: Not on file   Other Topics Concern    Not on file   Social History Narrative    Not on file     Social Determinants of Health     Financial Resource Strain:     Difficulty of Paying Living Expenses: Not on file   Food Insecurity:     Worried About [address] [employer] in the Last Year: Not on file    [place]ayo of Food in the Last Year: Not on file   Transportation Needs:     Lack of Transportation (Medical): Not on file    Lack of Transportation (Non-Medical):  Not on file   Physical Activity:     Days of Exercise per Week: Not on file    Minutes of Exercise per Session: Not on file   Stress:     Feeling of Stress : Not on file   Social Connections:     Frequency of Communication with Friends and Family: Not on file    Frequency of Social Gatherings with Friends and Family: Not on file    Attends Mosque Services: Not on file    Active Member of [address] [place] [address] [employer] or Organizations: Not on file    Attends Club or Organization Meetings: Not on file    Marital Status: Not on file   Intimate Partner Violence:     Fear of Current or Ex-Partner: Not on file    Emotionally Abused: Not on file    Physically Abused: Not on file    Sexually Abused: Not on file   Housing Stability:     Unable to Pay for Housing in the Last Year: Not on file    Number of [place] in the Last Year: Not on file    Unstable Housing in the Last Year: Not on file       Family History:   Family History   Problem Relation Age of Onset    Heart Disease Mother     Heart Disease Father     Breast Cancer Sister     Breast Cancer Maternal Cousin     Breast Cancer Paternal Cousin      Family history has been reviewed and not pertinent except as noted above. Review of Systems:   · Constitutional: there has been no unanticipated weight loss. No change in energy or activity level   · Eyes: No visual changes   · ENT: No Headaches, hearing loss or vertigo. No mouth sores or sore throat. · Cardiovascular: Reviewed in HPI  · Respiratory: No cough or wheezing, no sputum production. · Gastrointestinal: No abdominal pain, appetite loss, blood in stools. No change in bowel or bladder habits. · Genitourinary: No nocturia, dysuria, trouble voiding  · Musculoskeletal:  No gait disturbance, weakness or joint complaints. · Integumentary: No rash or pruritis. · Neurological: No headache, change in muscle strength, numbness or tingling. No change in gait, balance, coordination, mood, affect, memory, mentation, behavior. · Psychiatric: No anxiety or depression  · Endocrine: No malaise or fever  · Hematologic/Lymphatic: No abnormal bruising or bleeding, blood clots or swollen lymph nodes. · Allergic/Immunologic: No nasal congestion or hives. Physical Examination:    Vitals:    11/19/21 1505   BP: 132/70   Site: Left Upper Arm   Position: Sitting   Cuff Size: Medium Adult   Pulse: 93   SpO2: 99%   Weight: 199 lb (90.3 kg)   Height: 5' 3\" (1.6 m)     Body mass index is 35.25 kg/m². Wt Readings from Last 3 Encounters:   11/19/21 199 lb (90.3 kg)   10/08/21 200 lb (90.7 kg)   04/23/21 206 lb (93.4 kg)      BP Readings from Last 3 Encounters:   11/19/21 132/70   09/20/21 (!) 155/81   04/23/21 138/64        Physical Examination:    · CONSTITUTIONAL: Well developed, well nourished  · EYES: PERRLA. No xanthelasma, sclera non icteric  · EARS,NOSE,MOUTH,THROAT:  Mucous membranes moist, normal hearing  · NECK: Supple, JVP normal, thyroid not enlarged.  Carotids 2+ without bruits  · RESPIRATORY: Normal effort, no rales or rhonchi  · CARDIOVASCULAR: Normal PMI, regular rate and rhythm, no murmurs, rub or gallop. No edema. Radial pulses present and equal  · CHEST: No scar or masses  · ABDOMEN: Normal bowel sounds. No masses or tenderness. No bruit  · MUSCULOSKELETAL: No clubbing or cyanosis. Moves all extremities well. Normal gait  · SKIN:  Warm and dry. No rashes  · NEUROLOGIC: Cranial nerves intact. Alert and oriented  · PSYCHIATRIC: Calm affect. Appears to have normal judgement and insight    All testing and labs listed below were personally reviewed by myself. Stress 4/21    Summary  Normal myocardial perfusion. Normal LV size and systolic function. Assessment/Plan  1. Palpitations    2. SOB (shortness of breath)          Palpitations  3/2021 24 hour holter monitor - NSR with rare PACs  Plan~ no plan for 30 day monitor at this time, palpitations have resolved. SOB (shortness of breath)  Dyspnea~ no  Anginal equivalent~ no  Plan~ stable; normal stress test in April '21      No orders of the defined types were placed in this encounter. Follow up jose antonio Lin MD      Thank you for allowing to me to participate in the care of [facility]. Scribe's Attestation: This note was scribed in the presence of Dr. Adamaris Junior MD by Larry Shabazz RN.    I, Dr. Adamaris Junior, personally performed the services described in this documentation, as scribed by the above signed scribe in my presence. It is both accurate and complete to my knowledge. I agree with the details independently gathered by the clinical support staff, while the remaining scribed note accurately describes my personal service to the patient.

## 2021-11-15 NOTE — ASSESSMENT & PLAN NOTE
3/2021 24 hour holter monitor - NSR with rare PACs  Plan~ no plan for 30 day monitor at this time, palpitations have resolved.

## 2021-11-19 ENCOUNTER — OFFICE VISIT (OUTPATIENT)
Dept: CARDIOLOGY CLINIC | Age: 66
End: 2021-11-19
Payer: MEDICARE

## 2021-11-19 VITALS
HEART RATE: 93 BPM | BODY MASS INDEX: 35.26 KG/M2 | HEIGHT: 63 IN | SYSTOLIC BLOOD PRESSURE: 132 MMHG | DIASTOLIC BLOOD PRESSURE: 70 MMHG | OXYGEN SATURATION: 99 % | WEIGHT: 199 LBS

## 2021-11-19 DIAGNOSIS — R00.2 PALPITATIONS: ICD-10-CM

## 2021-11-19 DIAGNOSIS — R06.02 SOB (SHORTNESS OF BREATH): ICD-10-CM

## 2021-11-19 PROCEDURE — 99214 OFFICE O/P EST MOD 30 MIN: CPT | Performed by: INTERNAL MEDICINE

## 2021-11-19 RX ORDER — OXYBUTYNIN CHLORIDE 10 MG/1
TABLET, EXTENDED RELEASE ORAL
COMMUNITY
Start: 2021-11-09

## 2022-04-18 DIAGNOSIS — R05.3 CHRONIC COUGH: ICD-10-CM

## 2022-04-18 DIAGNOSIS — J37.0 CHRONIC LARYNGITIS: ICD-10-CM

## 2022-04-18 DIAGNOSIS — K21.9 LPRD (LARYNGOPHARYNGEAL REFLUX DISEASE): ICD-10-CM

## 2022-04-19 RX ORDER — LANSOPRAZOLE 30 MG/1
CAPSULE, DELAYED RELEASE ORAL
Qty: 180 CAPSULE | Refills: 0 | Status: SHIPPED | OUTPATIENT
Start: 2022-04-19 | End: 2022-07-18

## 2022-04-19 NOTE — TELEPHONE ENCOUNTER
Refill done. Please call patient and advise that she is to call for refill when this refill runs out in July. Will continue to refill to last until 9/20/2022. Please remind patient she needs an appointment on or before 9/20/2022 for continued treatment and refills.

## 2022-07-16 DIAGNOSIS — K21.9 LPRD (LARYNGOPHARYNGEAL REFLUX DISEASE): ICD-10-CM

## 2022-07-16 DIAGNOSIS — R05.3 CHRONIC COUGH: ICD-10-CM

## 2022-07-16 DIAGNOSIS — J37.0 CHRONIC LARYNGITIS: ICD-10-CM

## 2022-07-18 ENCOUNTER — TELEPHONE (OUTPATIENT)
Dept: ENT CLINIC | Age: 67
End: 2022-07-18

## 2022-07-18 RX ORDER — LANSOPRAZOLE 30 MG/1
CAPSULE, DELAYED RELEASE ORAL
Qty: 120 CAPSULE | Refills: 0 | Status: SHIPPED | OUTPATIENT
Start: 2022-07-18 | End: 2022-08-02 | Stop reason: SDUPTHER

## 2022-07-18 NOTE — TELEPHONE ENCOUNTER
Refill Request:     Last Office Visit:  9/20/2021     Next Scheduled Visit : Visit date not found     Medication Requested:Lansoprazole    Pharmacy:    [facility] [id number] - [address] [place] [address], [address] [place], [place] [address]  Phone: [phone] Fax: [phone]

## 2022-07-18 NOTE — TELEPHONE ENCOUNTER
Please see my note of 4/19/2022. Patient is required to have a visit on or before 9/20/2022 for further refills. I will prescribe enough to last until that date.

## 2022-07-18 NOTE — TELEPHONE ENCOUNTER
Please advise on refill of LANSOPRAZOLE,    Called [facility] for pt to call office and schedule an appt on or before 9/20/22 to continue refills,

## 2022-08-02 ENCOUNTER — OFFICE VISIT (OUTPATIENT)
Dept: ENT CLINIC | Age: 67
End: 2022-08-02
Payer: MEDICARE

## 2022-08-02 VITALS
HEART RATE: 81 BPM | OXYGEN SATURATION: 94 % | DIASTOLIC BLOOD PRESSURE: 84 MMHG | TEMPERATURE: 97.5 F | SYSTOLIC BLOOD PRESSURE: 138 MMHG

## 2022-08-02 DIAGNOSIS — J31.0 CHRONIC RHINITIS: Chronic | ICD-10-CM

## 2022-08-02 DIAGNOSIS — R05.3 CHRONIC COUGH: Chronic | ICD-10-CM

## 2022-08-02 DIAGNOSIS — R51.9 FRONTAL HEADACHE: Chronic | ICD-10-CM

## 2022-08-02 DIAGNOSIS — K21.9 LPRD (LARYNGOPHARYNGEAL REFLUX DISEASE): Chronic | ICD-10-CM

## 2022-08-02 PROCEDURE — 1123F ACP DISCUSS/DSCN MKR DOCD: CPT | Performed by: OTOLARYNGOLOGY

## 2022-08-02 PROCEDURE — 99214 OFFICE O/P EST MOD 30 MIN: CPT | Performed by: OTOLARYNGOLOGY

## 2022-08-02 RX ORDER — FLUTICASONE PROPIONATE 50 MCG
SPRAY, SUSPENSION (ML) NASAL
Qty: 48 G | Refills: 1 | Status: SHIPPED | OUTPATIENT
Start: 2022-08-02

## 2022-08-02 RX ORDER — LANSOPRAZOLE 30 MG/1
CAPSULE, DELAYED RELEASE ORAL
Qty: 120 CAPSULE | Refills: 0 | Status: SHIPPED | OUTPATIENT
Start: 2022-08-02

## 2022-08-02 RX ORDER — TOPIRAMATE 25 MG/1
TABLET ORAL
COMMUNITY
Start: 2022-07-16

## 2022-08-02 ASSESSMENT — ENCOUNTER SYMPTOMS
VOICE CHANGE: 0
SINUS PAIN: 0
RHINORRHEA: 0
SORE THROAT: 0
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
## 2022-09-27 ENCOUNTER — HOSPITAL ENCOUNTER (OUTPATIENT)
Dept: MRI IMAGING | Age: 67
Discharge: HOME | End: 2022-09-27
Payer: MEDICARE

## 2022-09-27 DIAGNOSIS — H04.163: ICD-10-CM

## 2022-09-27 PROCEDURE — 6360000004 HC RX CONTRAST MEDICATION: Performed by: OPHTHALMOLOGY

## 2022-09-27 PROCEDURE — 70543 MRI ORBT/FAC/NCK W/O &W/DYE: CPT

## 2022-09-27 PROCEDURE — A9579 GAD-BASE MR CONTRAST NOS,1ML: HCPCS | Performed by: OPHTHALMOLOGY

## 2022-09-27 RX ADMIN — GADOTERIDOL 19 ML: 279.3 INJECTION, SOLUTION INTRAVENOUS at 14:37

## 2022-11-10 DIAGNOSIS — R05.3 CHRONIC COUGH: Chronic | ICD-10-CM

## 2022-11-10 DIAGNOSIS — K21.9 LPRD (LARYNGOPHARYNGEAL REFLUX DISEASE): Chronic | ICD-10-CM

## 2022-11-14 RX ORDER — LANSOPRAZOLE 30 MG/1
CAPSULE, DELAYED RELEASE ORAL
Qty: 120 CAPSULE | Refills: 0 | Status: SHIPPED | OUTPATIENT
Start: 2022-11-14

## 2022-12-01 ENCOUNTER — HOSPITAL ENCOUNTER (OUTPATIENT)
Dept: MAMMOGRAPHY | Age: 67
Discharge: HOME | End: 2022-12-01
Payer: MEDICARE

## 2022-12-01 VITALS — HEIGHT: 63 IN | WEIGHT: 212 LBS | BODY MASS INDEX: 37.56 KG/M2

## 2022-12-01 DIAGNOSIS — Z12.31 BREAST CANCER SCREENING BY MAMMOGRAM: ICD-10-CM

## 2022-12-01 PROCEDURE — 77063 BREAST TOMOSYNTHESIS BI: CPT

## 2023-02-02 ENCOUNTER — OFFICE VISIT (OUTPATIENT)
Dept: ENT CLINIC | Age: 68
End: 2023-02-02
Payer: MEDICARE

## 2023-02-02 VITALS
WEIGHT: 210 LBS | SYSTOLIC BLOOD PRESSURE: 156 MMHG | DIASTOLIC BLOOD PRESSURE: 91 MMHG | BODY MASS INDEX: 37.21 KG/M2 | HEIGHT: 63 IN | HEART RATE: 96 BPM | TEMPERATURE: 97.3 F

## 2023-02-02 DIAGNOSIS — R51.9 FRONTAL HEADACHE: Chronic | ICD-10-CM

## 2023-02-02 DIAGNOSIS — J31.0 CHRONIC RHINITIS: Chronic | ICD-10-CM

## 2023-02-02 DIAGNOSIS — R05.3 CHRONIC COUGH: Chronic | ICD-10-CM

## 2023-02-02 DIAGNOSIS — K21.9 LPRD (LARYNGOPHARYNGEAL REFLUX DISEASE): Chronic | ICD-10-CM

## 2023-02-02 PROCEDURE — 99214 OFFICE O/P EST MOD 30 MIN: CPT | Performed by: OTOLARYNGOLOGY

## 2023-02-02 PROCEDURE — 1123F ACP DISCUSS/DSCN MKR DOCD: CPT | Performed by: OTOLARYNGOLOGY

## 2023-02-02 PROCEDURE — 3078F DIAST BP <80 MM HG: CPT | Performed by: OTOLARYNGOLOGY

## 2023-02-02 PROCEDURE — 3074F SYST BP LT 130 MM HG: CPT | Performed by: OTOLARYNGOLOGY

## 2023-02-02 RX ORDER — LANSOPRAZOLE 30 MG/1
CAPSULE, DELAYED RELEASE ORAL
Qty: 180 CAPSULE | Refills: 0 | Status: SHIPPED | OUTPATIENT
Start: 2023-02-02

## 2023-02-02 RX ORDER — FLUTICASONE PROPIONATE 50 MCG
SPRAY, SUSPENSION (ML) NASAL
Qty: 48 G | Refills: 0 | Status: SHIPPED | OUTPATIENT
Start: 2023-02-02

## 2023-02-02 ASSESSMENT — ENCOUNTER SYMPTOMS
RHINORRHEA: 0
SORE THROAT: 0
SINUS PAIN: 0
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
## 2023-02-09 NOTE — ED NOTES
Case management is following for discharge planning. The chart was reviewed. Ms. Carroll was transferred to the ICU due to concern for impending acute respiratory failure in the setting of lactic acidosis secondary to acute liver injury.     The pt is noted to be homeless. The previous  added resources to her chart. Will reassess for disposition and service needs once she is more medically stable.    Christen Bray RN  Case Management  [phone]     Nursing Discharge Notes:  -Patient discharged at this time in no acute distress after verbalizing understanding of discharge instructions.  -A copy of the AVS was reviewed with pt.  -Pt received applicable scripts which were reviewed with pt by this RN. -Pt was given the opportunity to ask questions before signing for discharge. -IV removed and dressing applied.    -Pt left ambulatory to lobby / discharge area. Patient Education:  Learner - Patient. Motivation and Readiness To Learn - Medium to High  Barriers To Learning - None  Learning Preference / Provided Instructions - Both written and verbal discharge instructions.        Nannette Arora RN  11/24/20 0651

## 2023-04-05 ENCOUNTER — HOSPITAL ENCOUNTER (EMERGENCY)
Age: 68
Discharge: HOME | End: 2023-04-05
Payer: MEDICARE

## 2023-04-05 ENCOUNTER — APPOINTMENT (OUTPATIENT)
Dept: CT IMAGING | Age: 68
End: 2023-04-05
Payer: MEDICARE

## 2023-04-05 VITALS
TEMPERATURE: 97.9 F | BODY MASS INDEX: 37.21 KG/M2 | SYSTOLIC BLOOD PRESSURE: 158 MMHG | DIASTOLIC BLOOD PRESSURE: 87 MMHG | HEIGHT: 63 IN | HEART RATE: 83 BPM | WEIGHT: 210 LBS | OXYGEN SATURATION: 94 % | RESPIRATION RATE: 14 BRPM
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
## 2023-04-05 DIAGNOSIS — M54.2 NECK PAIN: Primary | ICD-10-CM

## 2023-04-05 DIAGNOSIS — R51.9 NONINTRACTABLE HEADACHE, UNSPECIFIED CHRONICITY PATTERN, UNSPECIFIED HEADACHE TYPE: ICD-10-CM

## 2023-04-05 PROCEDURE — 70450 CT HEAD/BRAIN W/O DYE: CPT

## 2023-04-05 PROCEDURE — 72125 CT NECK SPINE W/O DYE: CPT

## 2023-04-05 ASSESSMENT — PAIN - FUNCTIONAL ASSESSMENT: PAIN_FUNCTIONAL_ASSESSMENT: 0-10

## 2023-04-05 ASSESSMENT — PAIN SCALES - GENERAL: PAINLEVEL_OUTOF10: 9

## 2023-04-05 ASSESSMENT — PAIN DESCRIPTION - LOCATION: LOCATION: NECK

## 2023-04-05 ASSESSMENT — LIFESTYLE VARIABLES: HOW MANY STANDARD DRINKS CONTAINING ALCOHOL DO YOU HAVE ON A TYPICAL DAY: PATIENT DOES NOT DRINK
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
## 2023-05-31 ENCOUNTER — TELEPHONE (OUTPATIENT)
Dept: CARDIOLOGY CLINIC | Age: 68
End: 2023-05-31

## 2023-05-31 NOTE — TELEPHONE ENCOUNTER
Spoke to pt. Agreeable to [address] [facility] location in order to be seen sooner. Given address. Discussed omitting fast foods and reducing chips, pretzels and processes foods, ham, zhu and lunch meats in order to reduce sodium to help her swelling. Pt is agreeable. Pt given address to [place] office.

## 2023-05-31 NOTE — TELEPHONE ENCOUNTER
New Patient Referral    Referring Provider Name:Self Referral Ab Bucio)   Phone [address]  Fax Number:   Address:      Diagnosis/Reason for Visit:CHF    Cardiac Clearance?  No    Cardiac Testing: (Yes/No/Unsure)     Date testing was completed?___________      Have records been requested? (Yes/No) No    Preferred Language:_______Eng________    needed? (Yes/No) No

## 2023-06-10 PROBLEM — I20.8 ANGINAL EQUIVALENT: Status: ACTIVE | Noted: 2023-06-10

## 2023-06-10 PROBLEM — E66.9 OBESITY (BMI 30-39.9): Status: ACTIVE | Noted: 2023-06-10

## 2023-06-10 PROBLEM — I20.89 ANGINAL EQUIVALENT: Status: ACTIVE | Noted: 2023-06-10

## 2023-06-10 PROBLEM — E87.6 HYPOKALEMIA: Status: ACTIVE | Noted: 2023-06-10

## 2023-06-10 PROBLEM — R94.39 ABNORMAL STRESS TEST: Status: ACTIVE | Noted: 2023-06-10

## 2023-06-13 PROBLEM — Z98.61 CAD S/P PERCUTANEOUS CORONARY ANGIOPLASTY: Status: ACTIVE | Noted: 2023-06-13

## 2023-06-13 PROBLEM — E66.09 OBESITY DUE TO EXCESS CALORIES WITH SERIOUS COMORBIDITY: Status: ACTIVE | Noted: 2023-06-10

## 2023-06-13 PROBLEM — E87.6 HYPOKALEMIA: Status: RESOLVED | Noted: 2023-06-10 | Resolved: 2023-06-13

## 2023-06-13 PROBLEM — I25.10 CAD S/P PERCUTANEOUS CORONARY ANGIOPLASTY: Status: ACTIVE | Noted: 2023-06-13

## 2023-06-16 ENCOUNTER — HOSPITAL ENCOUNTER (OUTPATIENT)
Age: 68
Setting detail: OBSERVATION
LOS: 2 days | Discharge: HOME | End: 2023-06-19
Attending: STUDENT IN AN ORGANIZED HEALTH CARE EDUCATION/TRAINING PROGRAM | Admitting: HOSPITALIST
Payer: MEDICARE

## 2023-06-16 DIAGNOSIS — R07.9 CHEST PAIN, UNSPECIFIED TYPE: Primary | ICD-10-CM

## 2023-06-16 DIAGNOSIS — F41.1 ANXIETY STATE: ICD-10-CM

## 2023-06-16 DIAGNOSIS — R77.8 ELEVATED TROPONIN: ICD-10-CM

## 2023-06-16 PROCEDURE — 96375 TX/PRO/DX INJ NEW DRUG ADDON: CPT

## 2023-06-16 PROCEDURE — 83880 ASSAY OF NATRIURETIC PEPTIDE: CPT

## 2023-06-16 PROCEDURE — 96374 THER/PROPH/DIAG INJ IV PUSH: CPT

## 2023-06-16 PROCEDURE — 80053 COMPREHEN METABOLIC PANEL: CPT

## 2023-06-16 PROCEDURE — 99285 EMERGENCY DEPT VISIT HI MDM: CPT

## 2023-06-16 PROCEDURE — 85025 COMPLETE CBC W/AUTO DIFF WBC: CPT

## 2023-06-16 PROCEDURE — 36415 COLL VENOUS BLD VENIPUNCTURE: CPT

## 2023-06-16 PROCEDURE — 84484 ASSAY OF TROPONIN QUANT: CPT

## 2023-06-16 PROCEDURE — 93005 ELECTROCARDIOGRAM TRACING: CPT | Performed by: STUDENT IN AN ORGANIZED HEALTH CARE EDUCATION/TRAINING PROGRAM

## 2023-06-16 PROCEDURE — 83735 ASSAY OF MAGNESIUM: CPT

## 2023-06-16 PROCEDURE — 85520 HEPARIN ASSAY: CPT

## 2023-06-17 ENCOUNTER — APPOINTMENT (OUTPATIENT)
Dept: GENERAL RADIOLOGY | Age: 68
End: 2023-06-17
Payer: MEDICARE

## 2023-06-17 LAB
ALBUMIN SERPL-MCNC: 4 G/DL (ref 3.4–5)
ALBUMIN SERPL-MCNC: 4 G/DL (ref 3.4–5)
ALBUMIN/GLOB SERPL: 1.5 {RATIO} (ref 1.1–2.2)
ALP SERPL-CCNC: 100 U/L (ref 40–129)
ALT SERPL-CCNC: 10 U/L (ref 10–40)
ANION GAP SERPL CALCULATED.3IONS-SCNC: 10 MMOL/L (ref 3–16)
ANION GAP SERPL CALCULATED.3IONS-SCNC: 12 MMOL/L (ref 3–16)
ANTI-XA UNFRAC HEPARIN: 0.38 IU/ML (ref 0.3–0.7)
ANTI-XA UNFRAC HEPARIN: <0.1 IU/ML (ref 0.3–0.7)
ANTI-XA UNFRAC HEPARIN: <0.1 IU/ML (ref 0.3–0.7)
AST SERPL-CCNC: 17 U/L (ref 15–37)
BASOPHILS # BLD: 0.1 K/UL (ref 0–0.2)
BASOPHILS # BLD: 0.1 K/UL (ref 0–0.2)
BASOPHILS NFR BLD: 1.3 %
BASOPHILS NFR BLD: 1.3 %
BILIRUB SERPL-MCNC: <0.2 MG/DL (ref 0–1)
BUN SERPL-MCNC: 13 MG/DL (ref 7–20)
BUN SERPL-MCNC: 14 MG/DL (ref 7–20)
CALCIUM SERPL-MCNC: 8.7 MG/DL (ref 8.3–10.6)
CALCIUM SERPL-MCNC: 8.8 MG/DL (ref 8.3–10.6)
CHLORIDE SERPL-SCNC: 104 MMOL/L (ref 99–110)
CHLORIDE SERPL-SCNC: 105 MMOL/L (ref 99–110)
CO2 SERPL-SCNC: 23 MMOL/L (ref 21–32)
CO2 SERPL-SCNC: 25 MMOL/L (ref 21–32)
CREAT SERPL-MCNC: 0.7 MG/DL (ref 0.6–1.2)
CREAT SERPL-MCNC: 0.8 MG/DL (ref 0.6–1.2)
CRP SERPL-MCNC: 15.5 MG/L (ref 0–5.1)
DEPRECATED RDW RBC AUTO: 15.2 % (ref 12.4–15.4)
DEPRECATED RDW RBC AUTO: 15.3 % (ref 12.4–15.4)
EKG ATRIAL RATE: 73 BPM
EKG DIAGNOSIS: NORMAL
EKG P AXIS: 44 DEGREES
EKG P-R INTERVAL: 172 MS
EKG Q-T INTERVAL: 388 MS
EKG QRS DURATION: 96 MS
EKG QTC CALCULATION (BAZETT): 427 MS
EKG R AXIS: 38 DEGREES
EKG T AXIS: 69 DEGREES
EKG VENTRICULAR RATE: 73 BPM
EOSINOPHIL # BLD: 0.8 K/UL (ref 0–0.6)
EOSINOPHIL # BLD: 0.8 K/UL (ref 0–0.6)
EOSINOPHIL NFR BLD: 9.4 %
EOSINOPHIL NFR BLD: 9.7 %
ERYTHROCYTE [SEDIMENTATION RATE] IN BLOOD BY WESTERGREN METHOD: 46 MM/HR (ref 0–30)
GFR SERPLBLD CREATININE-BSD FMLA CKD-EPI: >60 ML/MIN/{1.73_M2}
GFR SERPLBLD CREATININE-BSD FMLA CKD-EPI: >60 ML/MIN/{1.73_M2}
GLUCOSE BLD-MCNC: 120 MG/DL (ref 70–99)
GLUCOSE BLD-MCNC: 147 MG/DL (ref 70–99)
GLUCOSE BLD-MCNC: 150 MG/DL (ref 70–99)
GLUCOSE SERPL-MCNC: 146 MG/DL (ref 70–99)
GLUCOSE SERPL-MCNC: 151 MG/DL (ref 70–99)
HCT VFR BLD AUTO: 34.5 % (ref 36–48)
HCT VFR BLD AUTO: 35.6 % (ref 36–48)
HGB BLD-MCNC: 11.1 G/DL (ref 12–16)
HGB BLD-MCNC: 11.2 G/DL (ref 12–16)
LYMPHOCYTES # BLD: 1.5 K/UL (ref 1–5.1)
LYMPHOCYTES # BLD: 1.6 K/UL (ref 1–5.1)
LYMPHOCYTES NFR BLD: 19.1 %
LYMPHOCYTES NFR BLD: 19.8 %
MAGNESIUM SERPL-MCNC: 1.9 MG/DL (ref 1.8–2.4)
MCH RBC QN AUTO: 25.3 PG (ref 26–34)
MCH RBC QN AUTO: 25.7 PG (ref 26–34)
MCHC RBC AUTO-ENTMCNC: 31.5 G/DL (ref 31–36)
MCHC RBC AUTO-ENTMCNC: 32.3 G/DL (ref 31–36)
MCV RBC AUTO: 79.7 FL (ref 80–100)
MCV RBC AUTO: 80.4 FL (ref 80–100)
MONOCYTES # BLD: 0.6 K/UL (ref 0–1.3)
MONOCYTES # BLD: 0.7 K/UL (ref 0–1.3)
MONOCYTES NFR BLD: 7.5 %
MONOCYTES NFR BLD: 9.2 %
NEUTROPHILS # BLD: 4.8 K/UL (ref 1.7–7.7)
NEUTROPHILS # BLD: 5 K/UL (ref 1.7–7.7)
NEUTROPHILS NFR BLD: 60.3 %
NEUTROPHILS NFR BLD: 62.4 %
NT-PROBNP SERPL-MCNC: 210 PG/ML (ref 0–124)
PERFORMED ON: ABNORMAL
PHOSPHATE SERPL-MCNC: 3.5 MG/DL (ref 2.5–4.9)
PLATELET # BLD AUTO: 267 K/UL (ref 135–450)
PLATELET # BLD AUTO: 268 K/UL (ref 135–450)
PMV BLD AUTO: 9.5 FL (ref 5–10.5)
PMV BLD AUTO: 9.5 FL (ref 5–10.5)
POTASSIUM SERPL-SCNC: 3.4 MMOL/L (ref 3.5–5.1)
POTASSIUM SERPL-SCNC: 3.9 MMOL/L (ref 3.5–5.1)
PROT SERPL-MCNC: 6.6 G/DL (ref 6.4–8.2)
RBC # BLD AUTO: 4.33 M/UL (ref 4–5.2)
RBC # BLD AUTO: 4.43 M/UL (ref 4–5.2)
SODIUM SERPL-SCNC: 139 MMOL/L (ref 136–145)
SODIUM SERPL-SCNC: 140 MMOL/L (ref 136–145)
TROPONIN, HIGH SENSITIVITY: 1010 NG/L (ref 0–14)
TROPONIN, HIGH SENSITIVITY: 766 NG/L (ref 0–14)
TROPONIN, HIGH SENSITIVITY: 947 NG/L (ref 0–14)
WBC # BLD AUTO: 8 K/UL (ref 4–11)
WBC # BLD AUTO: 8.1 K/UL (ref 4–11)

## 2023-06-17 PROCEDURE — 6370000000 HC RX 637 (ALT 250 FOR IP): Performed by: HOSPITALIST

## 2023-06-17 PROCEDURE — 86140 C-REACTIVE PROTEIN: CPT

## 2023-06-17 PROCEDURE — 85652 RBC SED RATE AUTOMATED: CPT

## 2023-06-17 PROCEDURE — 6370000000 HC RX 637 (ALT 250 FOR IP): Performed by: INTERNAL MEDICINE

## 2023-06-17 PROCEDURE — 85520 HEPARIN ASSAY: CPT

## 2023-06-17 PROCEDURE — 96365 THER/PROPH/DIAG IV INF INIT: CPT

## 2023-06-17 PROCEDURE — 94640 AIRWAY INHALATION TREATMENT: CPT

## 2023-06-17 PROCEDURE — 96366 THER/PROPH/DIAG IV INF ADDON: CPT

## 2023-06-17 PROCEDURE — 6360000002 HC RX W HCPCS: Performed by: STUDENT IN AN ORGANIZED HEALTH CARE EDUCATION/TRAINING PROGRAM

## 2023-06-17 PROCEDURE — 6370000000 HC RX 637 (ALT 250 FOR IP): Performed by: STUDENT IN AN ORGANIZED HEALTH CARE EDUCATION/TRAINING PROGRAM

## 2023-06-17 PROCEDURE — 94761 N-INVAS EAR/PLS OXIMETRY MLT: CPT

## 2023-06-17 PROCEDURE — 2060000000 HC ICU INTERMEDIATE R&B

## 2023-06-17 PROCEDURE — 94150 VITAL CAPACITY TEST: CPT

## 2023-06-17 PROCEDURE — 2580000003 HC RX 258: Performed by: INTERNAL MEDICINE

## 2023-06-17 PROCEDURE — 80069 RENAL FUNCTION PANEL: CPT

## 2023-06-17 PROCEDURE — 36415 COLL VENOUS BLD VENIPUNCTURE: CPT

## 2023-06-17 PROCEDURE — 85025 COMPLETE CBC W/AUTO DIFF WBC: CPT

## 2023-06-17 PROCEDURE — 93010 ELECTROCARDIOGRAM REPORT: CPT | Performed by: INTERNAL MEDICINE

## 2023-06-17 PROCEDURE — 99223 1ST HOSP IP/OBS HIGH 75: CPT | Performed by: INTERNAL MEDICINE

## 2023-06-17 PROCEDURE — 84484 ASSAY OF TROPONIN QUANT: CPT

## 2023-06-17 PROCEDURE — 6360000002 HC RX W HCPCS: Performed by: INTERNAL MEDICINE

## 2023-06-17 PROCEDURE — 71045 X-RAY EXAM CHEST 1 VIEW: CPT

## 2023-06-17 PROCEDURE — 94760 N-INVAS EAR/PLS OXIMETRY 1: CPT

## 2023-06-17 PROCEDURE — 1200000000 HC SEMI PRIVATE

## 2023-06-17 RX ORDER — CARVEDILOL 6.25 MG/1
6.25 TABLET ORAL 2 TIMES DAILY WITH MEALS
Status: DISCONTINUED | OUTPATIENT
Start: 2023-06-17 | End: 2023-06-19 | Stop reason: HOSPADM

## 2023-06-17 RX ORDER — TOPIRAMATE 25 MG/1
100 TABLET ORAL DAILY
Status: DISCONTINUED | OUTPATIENT
Start: 2023-06-17 | End: 2023-06-19 | Stop reason: HOSPADM

## 2023-06-17 RX ORDER — NABUMETONE 750 MG/1
750 TABLET, FILM COATED ORAL 2 TIMES DAILY
Status: DISCONTINUED | OUTPATIENT
Start: 2023-06-18 | End: 2023-06-17

## 2023-06-17 RX ORDER — TIZANIDINE 4 MG/1
2 TABLET ORAL NIGHTLY
Status: DISCONTINUED | OUTPATIENT
Start: 2023-06-17 | End: 2023-06-19 | Stop reason: HOSPADM

## 2023-06-17 RX ORDER — NITROGLYCERIN 20 MG/100ML
5-200 INJECTION INTRAVENOUS CONTINUOUS
Status: DISCONTINUED | OUTPATIENT
Start: 2023-06-17 | End: 2023-06-17

## 2023-06-17 RX ORDER — GABAPENTIN 300 MG/1
600 CAPSULE ORAL 2 TIMES DAILY
Status: DISCONTINUED | OUTPATIENT
Start: 2023-06-17 | End: 2023-06-19 | Stop reason: HOSPADM

## 2023-06-17 RX ORDER — METHENAMINE HIPPURATE 1000 MG/1
1 TABLET ORAL 2 TIMES DAILY WITH MEALS
Status: DISCONTINUED | OUTPATIENT
Start: 2023-06-17 | End: 2023-06-19 | Stop reason: HOSPADM

## 2023-06-17 RX ORDER — VALSARTAN 40 MG/1
40 TABLET ORAL DAILY
Status: DISCONTINUED | OUTPATIENT
Start: 2023-06-17 | End: 2023-06-19 | Stop reason: HOSPADM

## 2023-06-17 RX ORDER — NORTRIPTYLINE HYDROCHLORIDE 25 MG/1
75 CAPSULE ORAL NIGHTLY
Status: DISCONTINUED | OUTPATIENT
Start: 2023-06-17 | End: 2023-06-19 | Stop reason: HOSPADM

## 2023-06-17 RX ORDER — ROSUVASTATIN CALCIUM 20 MG/1
20 TABLET, COATED ORAL NIGHTLY
Status: DISCONTINUED | OUTPATIENT
Start: 2023-06-17 | End: 2023-06-19 | Stop reason: HOSPADM

## 2023-06-17 RX ORDER — LORAZEPAM 2 MG/ML
1 INJECTION INTRAMUSCULAR ONCE
Status: COMPLETED | OUTPATIENT
Start: 2023-06-17 | End: 2023-06-17

## 2023-06-17 RX ORDER — INSULIN LISPRO 100 [IU]/ML
0-4 INJECTION, SOLUTION INTRAVENOUS; SUBCUTANEOUS NIGHTLY
Status: DISCONTINUED | OUTPATIENT
Start: 2023-06-17 | End: 2023-06-19 | Stop reason: HOSPADM

## 2023-06-17 RX ORDER — ISOSORBIDE MONONITRATE 60 MG/1
60 TABLET, EXTENDED RELEASE ORAL DAILY
Status: DISCONTINUED | OUTPATIENT
Start: 2023-06-17 | End: 2023-06-17

## 2023-06-17 RX ORDER — VENLAFAXINE HYDROCHLORIDE 37.5 MG/1
75 CAPSULE, EXTENDED RELEASE ORAL 2 TIMES DAILY
Status: DISCONTINUED | OUTPATIENT
Start: 2023-06-17 | End: 2023-06-19 | Stop reason: HOSPADM

## 2023-06-17 RX ORDER — ARIPIPRAZOLE 2 MG/1
2 TABLET ORAL NIGHTLY
Status: DISCONTINUED | OUTPATIENT
Start: 2023-06-17 | End: 2023-06-19 | Stop reason: HOSPADM

## 2023-06-17 RX ORDER — BUPROPION HYDROCHLORIDE 150 MG/1
300 TABLET ORAL EVERY MORNING
Status: DISCONTINUED | OUTPATIENT
Start: 2023-06-17 | End: 2023-06-19 | Stop reason: HOSPADM

## 2023-06-17 RX ORDER — HEPARIN SODIUM 1000 [USP'U]/ML
4000 INJECTION, SOLUTION INTRAVENOUS; SUBCUTANEOUS ONCE
Status: COMPLETED | OUTPATIENT
Start: 2023-06-17 | End: 2023-06-17

## 2023-06-17 RX ORDER — KETOROLAC TROMETHAMINE 15 MG/ML
15 INJECTION, SOLUTION INTRAMUSCULAR; INTRAVENOUS EVERY 6 HOURS PRN
Status: DISCONTINUED | OUTPATIENT
Start: 2023-06-17 | End: 2023-06-19 | Stop reason: HOSPADM

## 2023-06-17 RX ORDER — DEXTROSE MONOHYDRATE 100 MG/ML
INJECTION, SOLUTION INTRAVENOUS CONTINUOUS PRN
Status: DISCONTINUED | OUTPATIENT
Start: 2023-06-17 | End: 2023-06-19 | Stop reason: HOSPADM

## 2023-06-17 RX ORDER — HEPARIN SODIUM 1000 [USP'U]/ML
2000 INJECTION, SOLUTION INTRAVENOUS; SUBCUTANEOUS PRN
Status: DISCONTINUED | OUTPATIENT
Start: 2023-06-17 | End: 2023-06-17

## 2023-06-17 RX ORDER — ASPIRIN 81 MG/1
81 TABLET ORAL DAILY
Status: DISCONTINUED | OUTPATIENT
Start: 2023-06-17 | End: 2023-06-19 | Stop reason: HOSPADM

## 2023-06-17 RX ORDER — IPRATROPIUM BROMIDE AND ALBUTEROL SULFATE 2.5; .5 MG/3ML; MG/3ML
1 SOLUTION RESPIRATORY (INHALATION) 4 TIMES DAILY PRN
Status: DISCONTINUED | OUTPATIENT
Start: 2023-06-17 | End: 2023-06-19 | Stop reason: HOSPADM

## 2023-06-17 RX ORDER — HEPARIN SODIUM 10000 [USP'U]/100ML
5-30 INJECTION, SOLUTION INTRAVENOUS CONTINUOUS
Status: DISCONTINUED | OUTPATIENT
Start: 2023-06-17 | End: 2023-06-17

## 2023-06-17 RX ORDER — SODIUM CHLORIDE 0.9 % (FLUSH) 0.9 %
5-40 SYRINGE (ML) INJECTION PRN
Status: DISCONTINUED | OUTPATIENT
Start: 2023-06-17 | End: 2023-06-19 | Stop reason: HOSPADM

## 2023-06-17 RX ORDER — PANTOPRAZOLE SODIUM 40 MG/1
40 TABLET, DELAYED RELEASE ORAL
Status: DISCONTINUED | OUTPATIENT
Start: 2023-06-17 | End: 2023-06-19 | Stop reason: HOSPADM

## 2023-06-17 RX ORDER — NABUMETONE 750 MG/1
750 TABLET, FILM COATED ORAL 2 TIMES DAILY
Status: DISCONTINUED | OUTPATIENT
Start: 2023-06-17 | End: 2023-06-19 | Stop reason: HOSPADM

## 2023-06-17 RX ORDER — CLOPIDOGREL BISULFATE 75 MG/1
75 TABLET ORAL DAILY
Status: DISCONTINUED | OUTPATIENT
Start: 2023-06-17 | End: 2023-06-19 | Stop reason: HOSPADM

## 2023-06-17 RX ORDER — INSULIN LISPRO 100 [IU]/ML
0-4 INJECTION, SOLUTION INTRAVENOUS; SUBCUTANEOUS
Status: DISCONTINUED | OUTPATIENT
Start: 2023-06-17 | End: 2023-06-19 | Stop reason: HOSPADM

## 2023-06-17 RX ORDER — ONDANSETRON 2 MG/ML
4 INJECTION INTRAMUSCULAR; INTRAVENOUS EVERY 6 HOURS PRN
Status: DISCONTINUED | OUTPATIENT
Start: 2023-06-17 | End: 2023-06-19 | Stop reason: HOSPADM

## 2023-06-17 RX ORDER — BUDESONIDE 0.5 MG/2ML
1 INHALANT ORAL 2 TIMES DAILY
Status: DISCONTINUED | OUTPATIENT
Start: 2023-06-17 | End: 2023-06-19 | Stop reason: HOSPADM

## 2023-06-17 RX ORDER — ENOXAPARIN SODIUM 100 MG/ML
1 INJECTION SUBCUTANEOUS 2 TIMES DAILY
Status: DISCONTINUED | OUTPATIENT
Start: 2023-06-17 | End: 2023-06-19 | Stop reason: HOSPADM

## 2023-06-17 RX ORDER — MAGNESIUM HYDROXIDE/ALUMINUM HYDROXICE/SIMETHICONE 120; 1200; 1200 MG/30ML; MG/30ML; MG/30ML
30 SUSPENSION ORAL EVERY 6 HOURS PRN
Status: DISCONTINUED | OUTPATIENT
Start: 2023-06-17 | End: 2023-06-19 | Stop reason: HOSPADM

## 2023-06-17 RX ORDER — SODIUM CHLORIDE 0.9 % (FLUSH) 0.9 %
5-40 SYRINGE (ML) INJECTION EVERY 12 HOURS SCHEDULED
Status: DISCONTINUED | OUTPATIENT
Start: 2023-06-17 | End: 2023-06-19 | Stop reason: HOSPADM

## 2023-06-17 RX ORDER — SODIUM CHLORIDE 9 MG/ML
INJECTION, SOLUTION INTRAVENOUS PRN
Status: DISCONTINUED | OUTPATIENT
Start: 2023-06-17 | End: 2023-06-19 | Stop reason: HOSPADM

## 2023-06-17 RX ORDER — ISOSORBIDE MONONITRATE 60 MG/1
120 TABLET, EXTENDED RELEASE ORAL DAILY
Status: DISCONTINUED | OUTPATIENT
Start: 2023-06-18 | End: 2023-06-19 | Stop reason: HOSPADM

## 2023-06-17 RX ORDER — HEPARIN SODIUM 1000 [USP'U]/ML
4000 INJECTION, SOLUTION INTRAVENOUS; SUBCUTANEOUS PRN
Status: DISCONTINUED | OUTPATIENT
Start: 2023-06-17 | End: 2023-06-17

## 2023-06-17 RX ORDER — FUROSEMIDE 20 MG/1
20 TABLET ORAL 2 TIMES DAILY
Status: DISCONTINUED | OUTPATIENT
Start: 2023-06-17 | End: 2023-06-19 | Stop reason: HOSPADM

## 2023-06-17 RX ADMIN — KETOROLAC TROMETHAMINE 15 MG: 15 INJECTION, SOLUTION INTRAMUSCULAR; INTRAVENOUS at 21:18

## 2023-06-17 RX ADMIN — FUROSEMIDE 20 MG: 20 TABLET ORAL at 21:18

## 2023-06-17 RX ADMIN — FUROSEMIDE 20 MG: 20 TABLET ORAL at 11:56

## 2023-06-17 RX ADMIN — SODIUM CHLORIDE, PRESERVATIVE FREE 10 ML: 5 INJECTION INTRAVENOUS at 21:19

## 2023-06-17 RX ADMIN — Medication 2 PUFF: at 12:06

## 2023-06-17 RX ADMIN — HEPARIN SODIUM 10 UNITS/KG/HR: 10000 INJECTION, SOLUTION INTRAVENOUS at 03:51

## 2023-06-17 RX ADMIN — SODIUM CHLORIDE, PRESERVATIVE FREE 10 ML: 5 INJECTION INTRAVENOUS at 10:27

## 2023-06-17 RX ADMIN — NABUMETONE 750 MG: 750 TABLET, FILM COATED ORAL at 22:24

## 2023-06-17 RX ADMIN — GABAPENTIN 600 MG: 300 CAPSULE ORAL at 11:56

## 2023-06-17 RX ADMIN — LORAZEPAM 1 MG: 2 INJECTION, SOLUTION INTRAMUSCULAR; INTRAVENOUS at 00:51

## 2023-06-17 RX ADMIN — NITROGLYCERIN 0.5 INCH: 20 OINTMENT TOPICAL at 23:15

## 2023-06-17 RX ADMIN — NORTRIPTYLINE HYDROCHLORIDE 75 MG: 25 CAPSULE ORAL at 21:17

## 2023-06-17 RX ADMIN — HEPARIN SODIUM 4000 UNITS: 1000 INJECTION INTRAVENOUS; SUBCUTANEOUS at 03:47

## 2023-06-17 RX ADMIN — VENLAFAXINE HYDROCHLORIDE 75 MG: 37.5 CAPSULE, EXTENDED RELEASE ORAL at 21:18

## 2023-06-17 RX ADMIN — TOPIRAMATE 100 MG: 25 TABLET, FILM COATED ORAL at 10:26

## 2023-06-17 RX ADMIN — ONDANSETRON 4 MG: 2 INJECTION INTRAMUSCULAR; INTRAVENOUS at 00:53

## 2023-06-17 RX ADMIN — ROSUVASTATIN 20 MG: 20 TABLET, FILM COATED ORAL at 21:18

## 2023-06-17 RX ADMIN — METHENAMINE HIPPURATE 1 G: 1000 TABLET ORAL at 22:24

## 2023-06-17 RX ADMIN — ASPIRIN 81 MG: 81 TABLET, COATED ORAL at 10:25

## 2023-06-17 RX ADMIN — CARVEDILOL 6.25 MG: 6.25 TABLET, FILM COATED ORAL at 10:26

## 2023-06-17 RX ADMIN — ARIPIPRAZOLE 2 MG: 2 TABLET ORAL at 21:19

## 2023-06-17 RX ADMIN — NITROGLYCERIN 0.5 INCH: 20 OINTMENT TOPICAL at 03:51

## 2023-06-17 RX ADMIN — GABAPENTIN 600 MG: 300 CAPSULE ORAL at 21:18

## 2023-06-17 RX ADMIN — ISOSORBIDE MONONITRATE 60 MG: 60 TABLET, EXTENDED RELEASE ORAL at 10:26

## 2023-06-17 RX ADMIN — VALSARTAN 40 MG: 40 TABLET, FILM COATED ORAL at 10:24

## 2023-06-17 RX ADMIN — BUPROPION HYDROCHLORIDE 300 MG: 150 TABLET, EXTENDED RELEASE ORAL at 11:56

## 2023-06-17 RX ADMIN — PANTOPRAZOLE SODIUM 40 MG: 40 TABLET, DELAYED RELEASE ORAL at 10:24

## 2023-06-17 RX ADMIN — CLOPIDOGREL BISULFATE 75 MG: 75 TABLET ORAL at 10:24

## 2023-06-17 RX ADMIN — ENOXAPARIN SODIUM 100 MG: 100 INJECTION SUBCUTANEOUS at 21:19

## 2023-06-17 RX ADMIN — HEPARIN SODIUM 4000 UNITS: 1000 INJECTION INTRAVENOUS; SUBCUTANEOUS at 10:34

## 2023-06-17 RX ADMIN — Medication 2 PUFF: at 23:38

## 2023-06-17 RX ADMIN — CARVEDILOL 6.25 MG: 6.25 TABLET, FILM COATED ORAL at 16:13

## 2023-06-17 RX ADMIN — NITROGLYCERIN 0.5 INCH: 20 OINTMENT TOPICAL at 18:30

## 2023-06-17 RX ADMIN — TIZANIDINE 2 MG: 4 TABLET ORAL at 21:18

## 2023-06-17 RX ADMIN — VENLAFAXINE HYDROCHLORIDE 75 MG: 37.5 CAPSULE, EXTENDED RELEASE ORAL at 10:49

## 2023-06-17 ASSESSMENT — PAIN DESCRIPTION - DIRECTION: RADIATING_TOWARDS: JAW

## 2023-06-17 ASSESSMENT — PAIN DESCRIPTION - DESCRIPTORS
DESCRIPTORS: SHARP
DESCRIPTORS: STABBING

## 2023-06-17 ASSESSMENT — PAIN DESCRIPTION - ONSET: ONSET: ON-GOING

## 2023-06-17 ASSESSMENT — PAIN DESCRIPTION - ORIENTATION
ORIENTATION: MID;LEFT
ORIENTATION: LEFT;MID

## 2023-06-17 ASSESSMENT — PAIN DESCRIPTION - LOCATION
LOCATION: CHEST
LOCATION: CHEST

## 2023-06-17 ASSESSMENT — PAIN DESCRIPTION - FREQUENCY: FREQUENCY: INTERMITTENT

## 2023-06-17 ASSESSMENT — PAIN SCALES - GENERAL
PAINLEVEL_OUTOF10: 6
PAINLEVEL_OUTOF10: 6

## 2023-06-17 ASSESSMENT — LIFESTYLE VARIABLES
HOW OFTEN DO YOU HAVE A DRINK CONTAINING ALCOHOL: NEVER
HOW MANY STANDARD DRINKS CONTAINING ALCOHOL DO YOU HAVE ON A TYPICAL DAY: PATIENT DOES NOT DRINK

## 2023-06-18 LAB
GLUCOSE BLD-MCNC: 105 MG/DL (ref 70–99)
GLUCOSE BLD-MCNC: 129 MG/DL (ref 70–99)
GLUCOSE BLD-MCNC: 88 MG/DL (ref 70–99)
GLUCOSE BLD-MCNC: 88 MG/DL (ref 70–99)
PERFORMED ON: ABNORMAL
PERFORMED ON: ABNORMAL
PERFORMED ON: NORMAL
PERFORMED ON: NORMAL

## 2023-06-18 PROCEDURE — 94640 AIRWAY INHALATION TREATMENT: CPT

## 2023-06-18 PROCEDURE — 6370000000 HC RX 637 (ALT 250 FOR IP): Performed by: HOSPITALIST

## 2023-06-18 PROCEDURE — 2060000000 HC ICU INTERMEDIATE R&B

## 2023-06-18 PROCEDURE — 6370000000 HC RX 637 (ALT 250 FOR IP): Performed by: NURSE PRACTITIONER

## 2023-06-18 PROCEDURE — 94761 N-INVAS EAR/PLS OXIMETRY MLT: CPT

## 2023-06-18 PROCEDURE — 99233 SBSQ HOSP IP/OBS HIGH 50: CPT | Performed by: NURSE PRACTITIONER

## 2023-06-18 PROCEDURE — 1200000000 HC SEMI PRIVATE

## 2023-06-18 PROCEDURE — 2580000003 HC RX 258: Performed by: INTERNAL MEDICINE

## 2023-06-18 PROCEDURE — 6370000000 HC RX 637 (ALT 250 FOR IP): Performed by: INTERNAL MEDICINE

## 2023-06-18 PROCEDURE — 6360000002 HC RX W HCPCS: Performed by: INTERNAL MEDICINE

## 2023-06-18 RX ORDER — COLCHICINE 0.6 MG/1
0.6 TABLET ORAL DAILY
Status: DISCONTINUED | OUTPATIENT
Start: 2023-06-18 | End: 2023-06-19 | Stop reason: HOSPADM

## 2023-06-18 RX ORDER — IBUPROFEN 400 MG/1
400 TABLET ORAL EVERY 6 HOURS PRN
Status: DISCONTINUED | OUTPATIENT
Start: 2023-06-18 | End: 2023-06-19 | Stop reason: HOSPADM

## 2023-06-18 RX ADMIN — NITROGLYCERIN 0.5 INCH: 20 OINTMENT TOPICAL at 17:13

## 2023-06-18 RX ADMIN — SODIUM CHLORIDE, PRESERVATIVE FREE 10 ML: 5 INJECTION INTRAVENOUS at 09:58

## 2023-06-18 RX ADMIN — VENLAFAXINE HYDROCHLORIDE 75 MG: 37.5 CAPSULE, EXTENDED RELEASE ORAL at 09:56

## 2023-06-18 RX ADMIN — CLOPIDOGREL BISULFATE 75 MG: 75 TABLET ORAL at 09:56

## 2023-06-18 RX ADMIN — NORTRIPTYLINE HYDROCHLORIDE 75 MG: 25 CAPSULE ORAL at 21:28

## 2023-06-18 RX ADMIN — CARVEDILOL 6.25 MG: 6.25 TABLET, FILM COATED ORAL at 17:13

## 2023-06-18 RX ADMIN — NABUMETONE 750 MG: 750 TABLET, FILM COATED ORAL at 09:55

## 2023-06-18 RX ADMIN — TOPIRAMATE 100 MG: 25 TABLET, FILM COATED ORAL at 09:56

## 2023-06-18 RX ADMIN — ISOSORBIDE MONONITRATE 120 MG: 60 TABLET, EXTENDED RELEASE ORAL at 09:56

## 2023-06-18 RX ADMIN — PANTOPRAZOLE SODIUM 40 MG: 40 TABLET, DELAYED RELEASE ORAL at 05:50

## 2023-06-18 RX ADMIN — GABAPENTIN 600 MG: 300 CAPSULE ORAL at 21:27

## 2023-06-18 RX ADMIN — METHENAMINE HIPPURATE 1 G: 1000 TABLET ORAL at 09:54

## 2023-06-18 RX ADMIN — FUROSEMIDE 20 MG: 20 TABLET ORAL at 21:28

## 2023-06-18 RX ADMIN — ENOXAPARIN SODIUM 100 MG: 100 INJECTION SUBCUTANEOUS at 09:57

## 2023-06-18 RX ADMIN — NITROGLYCERIN 0.5 INCH: 20 OINTMENT TOPICAL at 12:32

## 2023-06-18 RX ADMIN — CARVEDILOL 6.25 MG: 6.25 TABLET, FILM COATED ORAL at 09:57

## 2023-06-18 RX ADMIN — TIZANIDINE 2 MG: 4 TABLET ORAL at 21:27

## 2023-06-18 RX ADMIN — BUPROPION HYDROCHLORIDE 300 MG: 150 TABLET, EXTENDED RELEASE ORAL at 09:56

## 2023-06-18 RX ADMIN — SODIUM CHLORIDE, PRESERVATIVE FREE 10 ML: 5 INJECTION INTRAVENOUS at 21:33

## 2023-06-18 RX ADMIN — ARIPIPRAZOLE 2 MG: 2 TABLET ORAL at 21:37

## 2023-06-18 RX ADMIN — VALSARTAN 40 MG: 40 TABLET, FILM COATED ORAL at 09:56

## 2023-06-18 RX ADMIN — Medication 2 PUFF: at 09:57

## 2023-06-18 RX ADMIN — ASPIRIN 81 MG: 81 TABLET, COATED ORAL at 09:56

## 2023-06-18 RX ADMIN — ENOXAPARIN SODIUM 100 MG: 100 INJECTION SUBCUTANEOUS at 21:29

## 2023-06-18 RX ADMIN — FUROSEMIDE 20 MG: 20 TABLET ORAL at 09:57

## 2023-06-18 RX ADMIN — VENLAFAXINE HYDROCHLORIDE 75 MG: 37.5 CAPSULE, EXTENDED RELEASE ORAL at 21:37

## 2023-06-18 RX ADMIN — COLCHICINE 0.6 MG: 0.6 TABLET, FILM COATED ORAL at 09:57

## 2023-06-18 RX ADMIN — ROSUVASTATIN 20 MG: 20 TABLET, FILM COATED ORAL at 21:28

## 2023-06-18 RX ADMIN — Medication 2 PUFF: at 21:43

## 2023-06-18 RX ADMIN — GABAPENTIN 600 MG: 300 CAPSULE ORAL at 09:56

## 2023-06-18 RX ADMIN — METHENAMINE HIPPURATE 1 G: 1000 TABLET ORAL at 21:28

## 2023-06-18 RX ADMIN — NABUMETONE 750 MG: 750 TABLET, FILM COATED ORAL at 21:28

## 2023-06-18 RX ADMIN — NITROGLYCERIN 0.5 INCH: 20 OINTMENT TOPICAL at 05:51

## 2023-06-18 ASSESSMENT — PAIN DESCRIPTION - LOCATION: LOCATION: CHEST

## 2023-06-18 ASSESSMENT — PAIN DESCRIPTION - ONSET: ONSET: ON-GOING

## 2023-06-18 ASSESSMENT — PAIN DESCRIPTION - DESCRIPTORS: DESCRIPTORS: SORE;DULL

## 2023-06-18 ASSESSMENT — PAIN SCALES - GENERAL: PAINLEVEL_OUTOF10: 5

## 2023-06-18 ASSESSMENT — PAIN DESCRIPTION - FREQUENCY: FREQUENCY: INTERMITTENT

## 2023-06-18 ASSESSMENT — PAIN DESCRIPTION - ORIENTATION: ORIENTATION: MID;LEFT

## 2023-06-18 ASSESSMENT — PAIN DESCRIPTION - PAIN TYPE: TYPE: ACUTE PAIN

## 2023-06-19 VITALS
HEART RATE: 70 BPM | RESPIRATION RATE: 18 BRPM | WEIGHT: 222 LBS | BODY MASS INDEX: 39.34 KG/M2 | OXYGEN SATURATION: 94 % | DIASTOLIC BLOOD PRESSURE: 70 MMHG | SYSTOLIC BLOOD PRESSURE: 113 MMHG | TEMPERATURE: 97.6 F | HEIGHT: 63 IN

## 2023-06-19 LAB
DEPRECATED RDW RBC AUTO: 15.4 % (ref 12.4–15.4)
GLUCOSE BLD-MCNC: 100 MG/DL (ref 70–99)
GLUCOSE BLD-MCNC: 104 MG/DL (ref 70–99)
GLUCOSE BLD-MCNC: 115 MG/DL (ref 70–99)
GLUCOSE BLD-MCNC: 138 MG/DL (ref 70–99)
HCT VFR BLD AUTO: 34.1 % (ref 36–48)
HGB BLD-MCNC: 10.9 G/DL (ref 12–16)
LV EF: 55 %
LVEF MODALITY: NORMAL
MCH RBC QN AUTO: 25.9 PG (ref 26–34)
MCHC RBC AUTO-ENTMCNC: 32 G/DL (ref 31–36)
MCV RBC AUTO: 80.9 FL (ref 80–100)
PERFORMED ON: ABNORMAL
PLATELET # BLD AUTO: 256 K/UL (ref 135–450)
PMV BLD AUTO: 8.8 FL (ref 5–10.5)
RBC # BLD AUTO: 4.22 M/UL (ref 4–5.2)
TROPONIN, HIGH SENSITIVITY: 464 NG/L (ref 0–14)
WBC # BLD AUTO: 6.8 K/UL (ref 4–11)

## 2023-06-19 PROCEDURE — 36415 COLL VENOUS BLD VENIPUNCTURE: CPT

## 2023-06-19 PROCEDURE — 94640 AIRWAY INHALATION TREATMENT: CPT

## 2023-06-19 PROCEDURE — G0378 HOSPITAL OBSERVATION PER HR: HCPCS

## 2023-06-19 PROCEDURE — 97530 THERAPEUTIC ACTIVITIES: CPT

## 2023-06-19 PROCEDURE — 84484 ASSAY OF TROPONIN QUANT: CPT

## 2023-06-19 PROCEDURE — 97165 OT EVAL LOW COMPLEX 30 MIN: CPT

## 2023-06-19 PROCEDURE — 2580000003 HC RX 258: Performed by: INTERNAL MEDICINE

## 2023-06-19 PROCEDURE — 93325 DOPPLER ECHO COLOR FLOW MAPG: CPT

## 2023-06-19 PROCEDURE — 6370000000 HC RX 637 (ALT 250 FOR IP): Performed by: NURSE PRACTITIONER

## 2023-06-19 PROCEDURE — 85027 COMPLETE CBC AUTOMATED: CPT

## 2023-06-19 PROCEDURE — 94761 N-INVAS EAR/PLS OXIMETRY MLT: CPT

## 2023-06-19 PROCEDURE — 6360000002 HC RX W HCPCS: Performed by: INTERNAL MEDICINE

## 2023-06-19 PROCEDURE — 97116 GAIT TRAINING THERAPY: CPT

## 2023-06-19 PROCEDURE — 6370000000 HC RX 637 (ALT 250 FOR IP): Performed by: INTERNAL MEDICINE

## 2023-06-19 PROCEDURE — 99233 SBSQ HOSP IP/OBS HIGH 50: CPT | Performed by: NURSE PRACTITIONER

## 2023-06-19 PROCEDURE — 6370000000 HC RX 637 (ALT 250 FOR IP): Performed by: HOSPITALIST

## 2023-06-19 PROCEDURE — 93308 TTE F-UP OR LMTD: CPT

## 2023-06-19 PROCEDURE — 97161 PT EVAL LOW COMPLEX 20 MIN: CPT

## 2023-06-19 PROCEDURE — 93321 DOPPLER ECHO F-UP/LMTD STD: CPT

## 2023-06-19 RX ORDER — COLCHICINE 0.6 MG/1
0.6 TABLET ORAL DAILY
Qty: 30 TABLET | Refills: 0 | Status: SHIPPED | OUTPATIENT
Start: 2023-06-19 | End: 2023-07-19

## 2023-06-19 RX ADMIN — ASPIRIN 81 MG: 81 TABLET, COATED ORAL at 09:28

## 2023-06-19 RX ADMIN — GABAPENTIN 600 MG: 300 CAPSULE ORAL at 09:28

## 2023-06-19 RX ADMIN — CLOPIDOGREL BISULFATE 75 MG: 75 TABLET ORAL at 09:28

## 2023-06-19 RX ADMIN — BUPROPION HYDROCHLORIDE 300 MG: 150 TABLET, EXTENDED RELEASE ORAL at 09:28

## 2023-06-19 RX ADMIN — NABUMETONE 750 MG: 750 TABLET, FILM COATED ORAL at 09:29

## 2023-06-19 RX ADMIN — CARVEDILOL 6.25 MG: 6.25 TABLET, FILM COATED ORAL at 16:25

## 2023-06-19 RX ADMIN — SODIUM CHLORIDE, PRESERVATIVE FREE 10 ML: 5 INJECTION INTRAVENOUS at 09:28

## 2023-06-19 RX ADMIN — Medication 2 PUFF: at 09:25

## 2023-06-19 RX ADMIN — NITROGLYCERIN 0.5 INCH: 20 OINTMENT TOPICAL at 01:19

## 2023-06-19 RX ADMIN — CARVEDILOL 6.25 MG: 6.25 TABLET, FILM COATED ORAL at 09:28

## 2023-06-19 RX ADMIN — TOPIRAMATE 100 MG: 25 TABLET, FILM COATED ORAL at 09:27

## 2023-06-19 RX ADMIN — COLCHICINE 0.6 MG: 0.6 TABLET, FILM COATED ORAL at 09:28

## 2023-06-19 RX ADMIN — VALSARTAN 40 MG: 40 TABLET, FILM COATED ORAL at 09:28

## 2023-06-19 RX ADMIN — ISOSORBIDE MONONITRATE 120 MG: 60 TABLET, EXTENDED RELEASE ORAL at 09:27

## 2023-06-19 RX ADMIN — PANTOPRAZOLE SODIUM 40 MG: 40 TABLET, DELAYED RELEASE ORAL at 07:54

## 2023-06-19 RX ADMIN — FUROSEMIDE 20 MG: 20 TABLET ORAL at 09:28

## 2023-06-19 RX ADMIN — ENOXAPARIN SODIUM 100 MG: 100 INJECTION SUBCUTANEOUS at 09:28

## 2023-06-19 RX ADMIN — VENLAFAXINE HYDROCHLORIDE 75 MG: 37.5 CAPSULE, EXTENDED RELEASE ORAL at 09:58

## 2023-06-19 RX ADMIN — METHENAMINE HIPPURATE 1 G: 1000 TABLET ORAL at 09:32

## 2023-06-19 ASSESSMENT — PAIN DESCRIPTION - LOCATION
LOCATION: CHEST
LOCATION_2: GENERALIZED

## 2023-06-19 ASSESSMENT — PAIN SCALES - GENERAL
PAINLEVEL_OUTOF10: 2
PAINLEVEL_OUTOF10: 0
PAINLEVEL_OUTOF10: 4
PAINLEVEL_OUTOF10: 6
PAINLEVEL_OUTOF10: 2

## 2023-06-19 ASSESSMENT — PAIN DESCRIPTION - DESCRIPTORS
DESCRIPTORS: ACHING
DESCRIPTORS: SHARP

## 2023-06-19 ASSESSMENT — PAIN DESCRIPTION - PAIN TYPE: TYPE: ACUTE PAIN

## 2023-06-19 ASSESSMENT — PAIN DESCRIPTION - ORIENTATION
ORIENTATION: LEFT
ORIENTATION: MID

## 2023-06-19 ASSESSMENT — PAIN DESCRIPTION - INTENSITY: RATING_2: 3

## 2023-06-19 NOTE — PROGRESS NOTES
[facility]   Phone: [phone]    Occupational Therapy    [x] Initial Evaluation            [] Daily Treatment Note         [] Discharge Summary      Patient: Sean Black   : 1955   MRN: [id number]   Date of Service:  2023    Admitting Diagnosis:  Chest pain  Current Admission Summary: :  Sean Black is a 76 y.o. female who presented with complaints of chest pain. Onset of symptoms last night. Patient had a work-up with shortness of breath as well.  2 hours prior to arrival lasted. Describe midsternal chest pain patient said feels anxious similar to when she had stent placed on a day. Patient recently sent patient with. Currently rates pain 4 out of 10 improving nitro. Denies any fever chills nausea vomiting diarrhea. Past Medical History:  has a past medical history of Arthritis, Asthma, Brain injury ([address] Bronchitis, Diabetes mellitus ([address] DM (diabetes mellitus screen), Elevated cholesterol with high triglycerides, Fall, HIGH CHOLESTEROL, Hypertension, Neuropathy, Panic attacks, PONV (postoperative nausea and vomiting), Post traumatic stress disorder, Short-term memory loss, Sleep apnea, and Vertigo. Past Surgical History:  has a past surgical history that includes Hysterectomy; eye surgery (cataract B); Carpal tunnel release; Finger trigger release; Gallbladder surgery; bladder suspension; Skin cancer excision; blepharoplasty; and laryngoscopy (2017). Discharge Recommendations: Sean Black scored a 22/24 on the AM-PAC ADL Inpatient form. Current research shows that an AM-PAC score of 18 or greater is typically associated with a discharge to the patient's home setting. Based on the patient's AM-PAC score, and their current ADL deficits, it is recommended that the patient have 2-3 sessions per week of Occupational Therapy at d/c to increase the patient's independence.   At this time, this patient demonstrates the

## 2023-06-19 NOTE — PLAN OF CARE
Problem: Respiratory - Adult  Goal: Achieves optimal ventilation and oxygenation  Outcome: Progressing  Flowsheets (Taken 6/19/2023 0830)  Achieves optimal ventilation and oxygenation:   Assess for changes in respiratory status   Assess for changes in mentation and behavior   Position to facilitate oxygenation and minimize respiratory effort   Oxygen supplementation based on oxygen saturation or arterial blood gases     Problem: Musculoskeletal - Adult  Goal: Return mobility to safest level of function  Outcome: Progressing  Flowsheets (Taken 6/19/2023 0830)  Return Mobility to Safest Level of Function:   Assess patient stability and activity tolerance for standing, transferring and ambulating with or without assistive devices   Assist with transfers and ambulation using safe patient handling equipment as needed   Ensure adequate protection for wounds/incisions during mobilization   Obtain physical therapy/occupational therapy consults as needed     Problem: Cardiovascular - Adult  Goal: Maintains optimal cardiac output and hemodynamic stability  Outcome: Progressing  Flowsheets (Taken 6/19/2023 0830)  Maintains optimal cardiac output and hemodynamic stability:   Monitor blood pressure and heart rate   Monitor urine output and notify Licensed Independent Practitioner for values outside of normal range   Assess for signs of decreased cardiac output   Administer fluid and/or volume expanders as ordered     Problem: Pain  Goal: Verbalizes/displays adequate comfort level or baseline comfort level  Outcome: Progressing     Problem: Chronic Conditions and Co-morbidities  Goal: Patient's chronic conditions and co-morbidity symptoms are monitored and maintained or improved  Outcome: Progressing  Flowsheets (Taken 6/19/2023 0830)  Care Plan - Patient's Chronic Conditions and Co-Morbidity Symptoms are Monitored and Maintained or Improved:   Collaborate with multidisciplinary team to address chronic and comorbid conditions

## 2023-06-19 NOTE — PLAN OF CARE
Problem: Discharge Planning  Goal: Discharge to home or other facility with appropriate resources  Outcome: Adequate for Discharge  Flowsheets (Taken 6/19/2023 0830)  Discharge to home or other facility with appropriate resources:   Identify barriers to discharge with patient and caregiver   Arrange for needed discharge resources and transportation as appropriate   Identify discharge learning needs (meds, wound care, etc)     Problem: Safety - Adult  Goal: Free from fall injury  Outcome: Adequate for Discharge     Problem: ABCDS Injury Assessment  Goal: Absence of physical injury  Outcome: Adequate for Discharge     Problem: Respiratory - Adult  Goal: Achieves optimal ventilation and oxygenation  6/19/2023 1854 by Mark Sow RN  Outcome: Adequate for Discharge  6/19/2023 1358 by Mark Sow RN  Outcome: Progressing  Flowsheets (Taken 6/19/2023 0830)  Achieves optimal ventilation and oxygenation:   Assess for changes in respiratory status   Assess for changes in mentation and behavior   Position to facilitate oxygenation and minimize respiratory effort   Oxygen supplementation based on oxygen saturation or arterial blood gases

## 2023-06-19 NOTE — PROGRESS NOTES
V2.0    [facility] Progress Note      Name:  Renita Najera /Age/Sex: 1955  (76 y.o. female)   MRN & CSN:  [id number] & [id number] Encounter Date/Time: 2023 7:14 AM EDT   Location:  [address] [place] [id number] PCP: Rafaela Templeton MD     Attending:Con Ambrose MD       Hospital Day: 4    Assessment and Recommendations   Renita Najera is a 76 y.o. female with pmh of  who presents with Chest pain      Plan:   Chest pain  -Non-STEMI  -Patient is status post PCI with elevated troponin  -Appreciate cardiology recommendation  -ESR CRP noted  inflammatory markers elevated. Will start colchicine daily for 30 days. PRN ibuprofen. Awaiting echo  Appreciate cardio recs       Smoker  Discussed cessation. Insulin-dependent diabetes mellitus  -Continue sliding scale Lantus. Asthma continue breathing treatment  Obstructive sleep apnea  Fibromyalgia  Chronic anemia            Diet ADULT DIET; Regular; Low Fat/Low Chol/High Fiber/2 gm Na   DVT Prophylaxis [] Lovenox, []  Heparin, [] SCDs, [] Ambulation,  [] Eliquis, [] Xarelto  [] Coumadin   Code Status Full Code   Disposition   Home pending echo today    Surrogate Decision Maker/ POA       Personally reviewed Lab Studies and Imaging         Subjective:     Chief Complaint:     Renita Najera is a 76 y.o. female who presents with elevated chest pain improving. Currently much better. Denies any shortness of breath this morning      Review of Systems:      Pertinent positives and negatives discussed in HPI    Objective:      Intake/Output Summary (Last 24 hours) at 2023 0726  Last data filed at 2023 1001  Gross per 24 hour   Intake 474 ml   Output --   Net 474 ml      Vitals:   Vitals:    23 2130 23 2143 23 0112 23 0351   BP: (!) 147/80  109/62 103/64   Pulse: 66 68 68 68   Resp:    Temp: 97.9 °F (36.6 °C)  97.6 °F (36.4 °C) 98.1 °F (36.7 °C)   TempSrc: Oral  Oral Oral   SpO2: 93% 93% 93% 95%   Weight:       Height:

## 2023-06-19 NOTE — PROGRESS NOTES
DISCHARGE SUMMARY from Nurse    PATIENT INSTRUCTIONS:    What to do at Home:  Recommended activity: activity as tolerated,     If you experience any of the following symptoms Shortness of breath, chest pain, and/or sudden weight gain, please follow up with pcp. *  Please give a list of your current medications to your Primary Care Provider. *  Please update this list whenever your medications are discontinued, doses are      changed, or new medications (including over-the-counter products) are added. *  Please carry medication information at all times in case of emergency situations. These are general instructions for a healthy lifestyle:    No smoking/ No tobacco products/ Avoid exposure to second hand smoke  Surgeon General's Warning:  Quitting smoking now greatly reduces serious risk to your health. Obesity, smoking, and sedentary lifestyle greatly increases your risk for illness    A healthy diet, regular physical exercise & weight monitoring are important for maintaining a healthy lifestyle    You may be retaining fluid if you have a history of heart failure or if you experience any of the following symptoms:  Weight gain of 3 pounds or more overnight or 5 pounds in a week, increased swelling in our hands or feet or shortness of breath while lying flat in bed. Please call your doctor as soon as you notice any of these symptoms; do not wait until your next office visit. The discharge information has been reviewed with the patient and spouse. The patient and spouse verbalized understanding. Discharge medications reviewed with the patient and spouse and appropriate educational materials and side effects teaching were provided.   ___________________________________________________________________________________________________________________________________

## 2023-06-19 NOTE — CARE COORDINATION
Case Management Assessment  Initial Evaluation    Date/Time of Evaluation: 6/19/2023 11:36 AM  Assessment Completed by: SHIRA Pina    If patient is discharged prior to next notation, then this note serves as note for discharge by case management. Patient Name: Shilpi Cantu                   YOB: 1955  Diagnosis: Chest pain [R07.9]  Anxiety state [F41.1]  Elevated troponin [R77.8]  Chest pain, unspecified type [R07.9]                   Date / Time: 6/16/2023 11:21 PM    Patient Admission Status: Inpatient   Readmission Risk (Low < 19, Mod (19-27), High > 27): Readmission Risk Score: 17.8    Current PCP: Maribell Sahu MD  PCP verified by CM? Yes    Chart Reviewed: Yes      History Provided by: Patient  Patient Orientation: Alert and Oriented    Patient Cognition: Alert    Hospitalization in the last 30 days (Readmission):  Yes    If yes, Readmission Assessment in  Navigator will be completed. Advance Directives:      Code Status: Full Code   Patient's Primary Decision Maker is: Legal Next of Kin      Discharge Planning:    Patient lives with: Spouse/Significant Other Type of Home: House  Primary Care Giver: Self  Patient Support Systems include: Spouse/Significant Other, Family Members   Current Financial resources: Medicare  Current community resources: None  Current services prior to admission: None            Current DME:              Type of Home Care services:  None    ADLS  Prior functional level: Independent in ADLs/IADLs  Current functional level: Independent in ADLs/IADLs    PT AM-PAC: 20 /24  OT AM-PAC: 25 /24    Family can provide assistance at DC: Yes ( can assit with any [facility])  Would you like Case Management to discuss the discharge plan with any other family members/significant others, and if so, who?  Yes ()  Plans to Return to Present Housing: Yes  Other Identified Issues/Barriers to RETURNING to current housing: Yes  Potential Assistance needed

## 2023-06-19 NOTE — PROGRESS NOTES
[address]   Cardiology Progress Note     Date: 6/19/2023  Admit Date: 6/16/2023     Reason for consultation:     Chief Complaint   Patient presents with    Shortness of Breath     PT brought in from home by EMS. PT states it is hard to get a breath in. Followed by mild chest pain. Pt had stents placed Monday. History of Present Illness: History obtained from patient and medical record. Anmol Denton is a 76 y.o. female who presented to the hospital with complaints of chest pain and SOB. She states it is difficult to take a full breath. She feels a tightness in her throat. She also had chest tightness a few days after her stent. She had PCI to the LAD/D1 with MUSHTAQ 4 days ago. She had some discomfort post PCI which improved the next day. She states the tighness/fullness in her throat is unchanged from her admission 1 week ago. She had an elevated troponin. ECG is normal I have been asked to provide consultation regarding further management and testing. (per consult note)    Interval Hx: Prior to PCI main complaint was SOB. No CP. Main presenting symptom was pressure located in high mid chest / lower neck like she could not get air in. Also with left sided sharp chest pain. This has been intermittent and lasts seconds at a time. Today, pt states she is feeling better. One episode on left sharp chest discomfort that lasted \"half a second\". Walked with therapy this AM and felt like she tolerated well without exertional symptoms. Tele has remained stable without arrhythmia. She is feeling ready to dc home. Patient seen and examined. Clinical notes reviewed. Telemetry reviewed. No new complaints today. No major events overnight. Denies having chest pain, palpitations, shortness of breath, orthopnea/PND, cough, or dizziness at the time of this visit.       Past Medical History:  Past Medical History:   Diagnosis Date    Arthritis fingers, knees    Asthma     Brain injury ([facility] [address]     had brain

## 2023-06-19 NOTE — CARE COORDINATION
06/19/23 1132   Readmission Assessment   Number of Days since last admission? 1-7 days   Previous Disposition Home with Family   Who is being Interviewed Patient   What was the patient's/caregiver's perception as to why they think they needed to return back to the hospital? Other (Comment)  (Bruised Artery)   Did you visit your Primary Care Physician after you left the hospital, before you returned this time? No   Why weren't you able to visit your PCP? Did not have an appointment   Did you see a specialist, such as Cardiac, Pulmonary, Orthopedic Physician, etc. after you left the hospital? No   Who advised the patient to return to the hospital? Self-referral   Does the patient report anything that got in the way of taking their medications? No   In our efforts to provide the best possible care to you and others like you, can you think of anything that we could have done to help you after you left the hospital the first time, so that you might not have needed to return so soon?  Other (Comment)  (No comment.)         Electronically signed by SHIRA Tucker on 6/19/2023 at 11:34 AM

## 2023-06-19 NOTE — CARE COORDINATION
06/19/23 1656   IMM Letter   IMM Letter given to Patient/Family/Significant other/Guardian/POA/by: IMM Letter given to Patient by SW. Patient in agreement with D/C home.    IMM Letter date given: 06/19/23   IMM Letter time given: 1655         Electronically signed by SHIRA Purvis on 6/19/2023 at 4:57 PM

## 2023-06-19 NOTE — PROGRESS NOTES
[facility] [facility] Department   Phone: [phone]    Physical Therapy    [x] Initial Evaluation            [] Daily Treatment Note         [] Discharge Summary      Patient: Alfonso Lew   : 1955   MRN: [id number]   Date of Service:  2023  Admitting Diagnosis: Chest pain  Current Admission Summary: Per H&P on  \"A 76 y.o. patient who presented to the hospital with complaints of chest pain and SOB. She states it is difficult to take a full breath. She feels a tightness in her throat. She also had chest tightness a few days after her stent. She had PCI to the LAD/D1 with MUSHTAQ 4 days ago. She had some discomfort post PCI which improved the next day. She states the tighness/fullness in her throat is unchanged from her admission 1 week ago. She had an elevated troponin. ECG is normal I have been asked to provide consultation regarding further management and testing. Past Medical History:  has a past medical history of Arthritis, Asthma, Brain injury ([address] Bronchitis, Diabetes mellitus ([address] DM (diabetes mellitus screen), Elevated cholesterol with high triglycerides, Fall, HIGH CHOLESTEROL, Hypertension, Neuropathy, Panic attacks, PONV (postoperative nausea and vomiting), Post traumatic stress disorder, Short-term memory loss, Sleep apnea, and Vertigo. Past Surgical History:  has a past surgical history that includes Hysterectomy; eye surgery (cataract B); Carpal tunnel release; Finger trigger release; Gallbladder surgery; bladder suspension; Skin cancer excision; blepharoplasty; and laryngoscopy (2017). Discharge Recommendations: Alfonso Lew scored a 20/24 on the AM-PAC short mobility form. Current research shows that an AM-PAC score of 18 or greater is typically associated with a discharge to the patient's home setting.  Based on the patient's AM-PAC score and their current functional mobility deficits, it is recommended that the patient have 2-3

## 2023-06-22 ENCOUNTER — OFFICE VISIT (OUTPATIENT)
Dept: CARDIOLOGY CLINIC | Age: 68
End: 2023-06-22
Payer: MEDICARE

## 2023-06-22 DIAGNOSIS — E11.59 HYPERTENSION ASSOCIATED WITH DIABETES (HCC): ICD-10-CM

## 2023-06-22 DIAGNOSIS — I25.10 CORONARY ARTERY DISEASE INVOLVING NATIVE HEART, UNSPECIFIED VESSEL OR LESION TYPE, UNSPECIFIED WHETHER ANGINA PRESENT: Primary | ICD-10-CM

## 2023-06-22 DIAGNOSIS — I15.2 HYPERTENSION ASSOCIATED WITH DIABETES (HCC): ICD-10-CM

## 2023-06-22 DIAGNOSIS — E78.2 MIXED HYPERLIPIDEMIA: ICD-10-CM

## 2023-06-22 DIAGNOSIS — I31.9 PERICARDITIS, UNSPECIFIED CHRONICITY, UNSPECIFIED TYPE: ICD-10-CM

## 2023-06-22 PROCEDURE — 3078F DIAST BP <80 MM HG: CPT | Performed by: NURSE PRACTITIONER

## 2023-06-22 PROCEDURE — 1123F ACP DISCUSS/DSCN MKR DOCD: CPT | Performed by: NURSE PRACTITIONER

## 2023-06-22 PROCEDURE — 3044F HG A1C LEVEL LT 7.0%: CPT | Performed by: NURSE PRACTITIONER

## 2023-06-22 PROCEDURE — 99214 OFFICE O/P EST MOD 30 MIN: CPT | Performed by: NURSE PRACTITIONER

## 2023-06-22 PROCEDURE — 3074F SYST BP LT 130 MM HG: CPT | Performed by: NURSE PRACTITIONER

## 2023-06-22 PROCEDURE — 93000 ELECTROCARDIOGRAM COMPLETE: CPT | Performed by: NURSE PRACTITIONER

## 2023-06-22 RX ORDER — ONDANSETRON 4 MG/1
4 TABLET, FILM COATED ORAL DAILY
COMMUNITY

## 2023-06-22 RX ORDER — ONDANSETRON HYDROCHLORIDE 8 MG/1
8 TABLET, FILM COATED ORAL DAILY
COMMUNITY

## 2023-06-22 RX ORDER — ISOSORBIDE MONONITRATE 120 MG/1
120 TABLET, EXTENDED RELEASE ORAL DAILY
Qty: 30 TABLET | Refills: 3 | Status: SHIPPED | OUTPATIENT
Start: 2023-06-22

## 2023-06-30 ENCOUNTER — TELEPHONE (OUTPATIENT)
Dept: CARDIOLOGY CLINIC | Age: 68
End: 2023-06-30

## 2023-06-30 RX ORDER — FUROSEMIDE 40 MG/1
40 TABLET ORAL 2 TIMES DAILY
Qty: 90 TABLET | Refills: 3 | Status: SHIPPED | OUTPATIENT
Start: 2023-06-30

## 2023-07-03 ENCOUNTER — HOSPITAL ENCOUNTER (OUTPATIENT)
Age: 68
Discharge: HOME | End: 2023-07-03
Payer: MEDICARE

## 2023-07-03 VITALS
WEIGHT: 214.7 LBS | BODY MASS INDEX: 38.04 KG/M2 | OXYGEN SATURATION: 95 % | HEIGHT: 63 IN | HEART RATE: 75 BPM | DIASTOLIC BLOOD PRESSURE: 62 MMHG | SYSTOLIC BLOOD PRESSURE: 112 MMHG

## 2023-07-03 DIAGNOSIS — I31.9 PERICARDITIS, UNSPECIFIED CHRONICITY, UNSPECIFIED TYPE: ICD-10-CM

## 2023-07-03 LAB
CRP SERPL-MCNC: <3 MG/L (ref 0–5.1)
ERYTHROCYTE [SEDIMENTATION RATE] IN BLOOD BY WESTERGREN METHOD: 21 MM/HR (ref 0–30)

## 2023-07-03 PROCEDURE — 86140 C-REACTIVE PROTEIN: CPT

## 2023-07-03 PROCEDURE — 85652 RBC SED RATE AUTOMATED: CPT

## 2023-07-03 PROCEDURE — 36415 COLL VENOUS BLD VENIPUNCTURE: CPT

## 2023-07-05 DIAGNOSIS — I20.8 ANGINAL EQUIVALENT (HCC): ICD-10-CM

## 2023-07-05 DIAGNOSIS — I25.83 CORONARY ARTERY DISEASE DUE TO LIPID RICH PLAQUE: ICD-10-CM

## 2023-07-05 DIAGNOSIS — R06.02 SOB (SHORTNESS OF BREATH): ICD-10-CM

## 2023-07-05 DIAGNOSIS — I25.10 CAD S/P PERCUTANEOUS CORONARY ANGIOPLASTY: Primary | ICD-10-CM

## 2023-07-05 DIAGNOSIS — I25.10 CORONARY ARTERY DISEASE DUE TO LIPID RICH PLAQUE: ICD-10-CM

## 2023-07-05 DIAGNOSIS — Z98.61 CAD S/P PERCUTANEOUS CORONARY ANGIOPLASTY: Primary | ICD-10-CM

## 2023-07-06 ENCOUNTER — TELEPHONE (OUTPATIENT)
Dept: CARDIOLOGY CLINIC | Age: 68
End: 2023-07-06

## 2023-07-06 NOTE — TELEPHONE ENCOUNTER
----- Message from VALE Rowell CNP sent at 7/6/2023  3:40 PM EDT -----  Inflammatory markers have normalized.  Follow up as planned

## 2023-07-11 ENCOUNTER — HOSPITAL ENCOUNTER (OUTPATIENT)
Dept: CARDIAC CATH/INVASIVE PROCEDURES | Age: 68
Discharge: HOME | End: 2023-07-11
Attending: INTERNAL MEDICINE | Admitting: INTERNAL MEDICINE
Payer: MEDICARE

## 2023-07-11 VITALS
HEART RATE: 72 BPM | SYSTOLIC BLOOD PRESSURE: 161 MMHG | WEIGHT: 214 LBS | HEIGHT: 63 IN | RESPIRATION RATE: 18 BRPM | DIASTOLIC BLOOD PRESSURE: 79 MMHG | BODY MASS INDEX: 37.92 KG/M2

## 2023-07-11 LAB
ANION GAP SERPL CALCULATED.3IONS-SCNC: 14 MMOL/L (ref 3–16)
BUN SERPL-MCNC: 16 MG/DL (ref 7–20)
CALCIUM SERPL-MCNC: 9.4 MG/DL (ref 8.3–10.6)
CHLORIDE SERPL-SCNC: 107 MMOL/L (ref 99–110)
CO2 SERPL-SCNC: 22 MMOL/L (ref 21–32)
CREAT SERPL-MCNC: 0.9 MG/DL (ref 0.6–1.2)
DEPRECATED RDW RBC AUTO: 15.8 % (ref 12.4–15.4)
EKG ATRIAL RATE: 72 BPM
EKG DIAGNOSIS: NORMAL
EKG P AXIS: 49 DEGREES
EKG P-R INTERVAL: 178 MS
EKG Q-T INTERVAL: 392 MS
EKG QRS DURATION: 100 MS
EKG QTC CALCULATION (BAZETT): 429 MS
EKG R AXIS: 46 DEGREES
EKG T AXIS: 65 DEGREES
EKG VENTRICULAR RATE: 72 BPM
GFR SERPLBLD CREATININE-BSD FMLA CKD-EPI: >60 ML/MIN/{1.73_M2}
GLUCOSE SERPL-MCNC: 104 MG/DL (ref 70–99)
HCT VFR BLD AUTO: 40.8 % (ref 36–48)
HGB BLD-MCNC: 12.8 G/DL (ref 12–16)
LEFT VENTRICULAR EJECTION FRACTION MODE: NORMAL
LV EF: 65 %
LV EF: 65 %
LVEF MODALITY: NORMAL
MCH RBC QN AUTO: 25.4 PG (ref 26–34)
MCHC RBC AUTO-ENTMCNC: 31.5 G/DL (ref 31–36)
MCV RBC AUTO: 80.6 FL (ref 80–100)
PLATELET # BLD AUTO: 209 K/UL (ref 135–450)
PMV BLD AUTO: 9.6 FL (ref 5–10.5)
POTASSIUM SERPL-SCNC: 4.1 MMOL/L (ref 3.5–5.1)
RBC # BLD AUTO: 5.06 M/UL (ref 4–5.2)
SODIUM SERPL-SCNC: 143 MMOL/L (ref 136–145)
WBC # BLD AUTO: 6.5 K/UL (ref 4–11)

## 2023-07-11 PROCEDURE — C1894 INTRO/SHEATH, NON-LASER: HCPCS

## 2023-07-11 PROCEDURE — 99152 MOD SED SAME PHYS/QHP 5/>YRS: CPT

## 2023-07-11 PROCEDURE — 93005 ELECTROCARDIOGRAM TRACING: CPT | Performed by: INTERNAL MEDICINE

## 2023-07-11 PROCEDURE — 2709999900 HC NON-CHARGEABLE SUPPLY

## 2023-07-11 PROCEDURE — 93458 L HRT ARTERY/VENTRICLE ANGIO: CPT | Performed by: INTERNAL MEDICINE

## 2023-07-11 PROCEDURE — 85027 COMPLETE CBC AUTOMATED: CPT

## 2023-07-11 PROCEDURE — 6360000002 HC RX W HCPCS

## 2023-07-11 PROCEDURE — 6360000004 HC RX CONTRAST MEDICATION: Performed by: INTERNAL MEDICINE

## 2023-07-11 PROCEDURE — 80048 BASIC METABOLIC PNL TOTAL CA: CPT

## 2023-07-11 PROCEDURE — 93458 L HRT ARTERY/VENTRICLE ANGIO: CPT

## 2023-07-11 PROCEDURE — 99153 MOD SED SAME PHYS/QHP EA: CPT

## 2023-07-11 PROCEDURE — 36415 COLL VENOUS BLD VENIPUNCTURE: CPT

## 2023-07-11 PROCEDURE — C1769 GUIDE WIRE: HCPCS

## 2023-07-11 PROCEDURE — 2500000003 HC RX 250 WO HCPCS

## 2023-07-11 PROCEDURE — 93010 ELECTROCARDIOGRAM REPORT: CPT | Performed by: INTERNAL MEDICINE

## 2023-07-11 PROCEDURE — 99152 MOD SED SAME PHYS/QHP 5/>YRS: CPT | Performed by: INTERNAL MEDICINE

## 2023-07-11 RX ORDER — SODIUM CHLORIDE 0.9 % (FLUSH) 0.9 %
5-40 SYRINGE (ML) INJECTION PRN
Status: DISCONTINUED | OUTPATIENT
Start: 2023-07-11 | End: 2023-07-11 | Stop reason: HOSPADM

## 2023-07-11 RX ORDER — SODIUM CHLORIDE 9 MG/ML
INJECTION, SOLUTION INTRAVENOUS PRN
Status: DISCONTINUED | OUTPATIENT
Start: 2023-07-11 | End: 2023-07-11 | Stop reason: HOSPADM

## 2023-07-11 RX ORDER — ONDANSETRON 2 MG/ML
4 INJECTION INTRAMUSCULAR; INTRAVENOUS EVERY 6 HOURS PRN
Status: DISCONTINUED | OUTPATIENT
Start: 2023-07-11 | End: 2023-07-11 | Stop reason: HOSPADM

## 2023-07-11 RX ORDER — ACETAMINOPHEN 325 MG/1
650 TABLET ORAL EVERY 4 HOURS PRN
Status: DISCONTINUED | OUTPATIENT
Start: 2023-07-11 | End: 2023-07-11 | Stop reason: HOSPADM

## 2023-07-11 RX ORDER — SODIUM CHLORIDE 9 MG/ML
INJECTION, SOLUTION INTRAVENOUS CONTINUOUS
Status: DISCONTINUED | OUTPATIENT
Start: 2023-07-11 | End: 2023-07-11 | Stop reason: HOSPADM

## 2023-07-11 RX ORDER — SODIUM CHLORIDE 0.9 % (FLUSH) 0.9 %
5-40 SYRINGE (ML) INJECTION EVERY 12 HOURS SCHEDULED
Status: DISCONTINUED | OUTPATIENT
Start: 2023-07-11 | End: 2023-07-11 | Stop reason: HOSPADM

## 2023-07-11 RX ADMIN — IOPAMIDOL 68 ML: 755 INJECTION, SOLUTION INTRAVENOUS at 14:39

## 2023-07-11 NOTE — H&P
[facility]     Outpatient Follow Up Note    CHIEF COMPLAINT / HPI: Hospital Follow Up secondary to status post coronary artery stenting    Hospital record has been reviewed  Hospital Course progressed as follows per discharge summary:     Hospital Course: 6/9/23-6/13/23  Devan Otoole is a 76y.o. year old female who presented to [facility] on 6/9/2023  8:20 PM.       Admitted for abnormal stress test. Underwent cardiac cath and had angioplasty to LAD. On the last day of hospital stay, patient was doing well. Denied chest pain. Breathing well. No other acute complaints. The patient expressed appropriate understanding of and agreement with the discharge recommendations, medications, and plan. Hospitalized 6/16/23-6/19/23  Readmitted with CP. Inflammatory markers elevated. Limited echo stable. D/c on colchicine and increased nitrate. Devan Otoole is 76 y.o. female who presents today for a routine follow up after a recent hospitalization related to the above mentioned issues. Subjective:     Prior to PCI main complaint was SOB. No CP. Main presenting symptom on hospital readmission was pressure located in high mid chest / lower neck like she could not get air in. Also with left sided sharp chest pain. Intermittent in nature lasting seconds at a time. Continues to feel mid chest pressure and left sided sharp chest pain. But thinks it is slowly improving since hospital dc. Chest pain is regardless of activity. Has random \"spikes\" in pain. No worsening CP with laying back in bed. No improvement in CP with colchicine. Feels like she has a toothache in her left jaw. This started following her PCI. States she felt  it during second hospitalization when her nitro paste was taken off. Jaw pain has been persistent occurring intermittently over half the day. Sometimes can be associated with CP and sometime can feel it independently. She denies dental concerns.      Sob while

## 2023-07-11 NOTE — PRE SEDATION
Scale: Activity:  2 - Able to move 4 extremities voluntarily on command  Respiration:  2 - Able to breathe deeply and cough freely  Circulation:  2 - BP+/- 20mmHg of normal  Consciousness:  2 - Fully awake  Oxygen Saturation (color):  2 - Able to maintain oxygen saturation >92% on room air    Sedation/Anesthesia Plan:  Guard the patient's safety and welfare. Minimize physical discomfort and pain. Minimize negative psychological responses to treatment by providing sedation and analgesia and maximize the potential amnesia. Patient to meet pre-procedure discharge plan.     Medication Planned:  midazolam intravenously and fentanyl intravenously    Patient is an appropriate candidate for plan of sedation:   yes      Electronically signed by Adithya Lazcano MD on 7/11/2023 at 1:43 PM

## 2023-07-11 NOTE — PROCEDURES
Patient:  Samantha Miner   :   1955    Procedural Summary  ~Consent:   Obtained written and verbal consent      Risks/benefits explained in detail  ~Procedure:    Left Heart Catheterization  ~Medications:    Procedural sedation with minimal conscious sedation  ~Complications:   None  ~Blood Loss:    <10cc  ~Specimens:    None obtained  ~Pre-sedation re-evaluation: Performed immediately prior to procedure. Medication and Procedural Reconciliation:  An independent trained observer pushed medications at my direction. We monitored the patient's level of consciousness and vital signs/physiologic status throughout the procedure duration (see start and stop times below). Sedation: 3 mg Versed, 100 mcg Fentanyl  Sedation start: 140  Sedation stop: 1428    Cardiac Cath LVG:  Anatomy:   LM-nml   LAD-stent patent, Diag stent patent  Cx-dom mid 50%  OM- nml  RCA-smal non dom  LPDA- nml  LVEF- 65%  LVG- nml  LVEDP- 19      Contrast: 68  Flouro Time: 2.0  Access: R radial a    Impression  ~Coronary Angiography w/ patent stents  ~LVG with LVEF of 65 and no regional wall motion abnormalities  ~No cardiac cause for chest pain        Recommendation  ~Aggressive medical treatment and risk factor modification  ~1. Medications reviewed, no changes at this time. 4. Patient has been advised on the importance of regular exercise of at least 20-30 minutes daily alternating with aerobic and isometric activities. 5. Patient counseled about and offered assistance for smoking cessation   6. No indication for cardiac rehab  7.  Follow up in 2-3 months with cardiology           Brandi Gusman MD, MD 2023 2:32 PM

## 2023-07-19 ENCOUNTER — OFFICE VISIT (OUTPATIENT)
Dept: CARDIOLOGY CLINIC | Age: 68
End: 2023-07-19

## 2023-07-19 VITALS
BODY MASS INDEX: 38.15 KG/M2 | WEIGHT: 215.3 LBS | HEIGHT: 63 IN | HEART RATE: 78 BPM | DIASTOLIC BLOOD PRESSURE: 64 MMHG | OXYGEN SATURATION: 98 % | SYSTOLIC BLOOD PRESSURE: 122 MMHG

## 2023-07-19 DIAGNOSIS — I25.10 CORONARY ARTERY DISEASE INVOLVING NATIVE HEART, UNSPECIFIED VESSEL OR LESION TYPE, UNSPECIFIED WHETHER ANGINA PRESENT: Primary | ICD-10-CM

## 2023-07-19 DIAGNOSIS — I10 ESSENTIAL HYPERTENSION: ICD-10-CM

## 2023-07-19 DIAGNOSIS — E78.2 MIXED HYPERLIPIDEMIA: ICD-10-CM
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
## 2023-08-02 ENCOUNTER — TELEPHONE (OUTPATIENT)
Dept: CARDIOLOGY CLINIC | Age: 68
End: 2023-08-02

## 2023-08-02 NOTE — TELEPHONE ENCOUNTER
Pt called stating they have not heard anything from cardiac rehab, however they did call their insurance and the insurance inform them they need a medical necessity letter stating why they need cardiac rehab, pt stated the code they them was 002.     Pls advise thank you

## 2023-08-16 ENCOUNTER — TELEPHONE (OUTPATIENT)
Dept: CARDIAC REHAB | Age: 68
End: 2023-08-16

## 2023-08-16 NOTE — TELEPHONE ENCOUNTER
Called pt. To confirm cardiac rehab evaluation appt. Th 1300. Coming. Told about $15 copay/visit until meets max. 00P. Has met $1547.25 out of $3,000. Pt. Will bill you on monthly basis. Verbalized understanding.

## 2023-08-17 ENCOUNTER — HOSPITAL ENCOUNTER (OUTPATIENT)
Dept: CARDIAC REHAB | Age: 68
Setting detail: THERAPIES SERIES
Discharge: HOME | End: 2023-08-17
Payer: MEDICARE

## 2023-08-17 VITALS — HEIGHT: 63 IN | HEART RATE: 78 BPM | BODY MASS INDEX: 38.27 KG/M2 | WEIGHT: 216 LBS | RESPIRATION RATE: 16 BRPM

## 2023-08-17 PROCEDURE — 93798 PHYS/QHP OP CAR RHAB W/ECG: CPT

## 2023-08-17 ASSESSMENT — PATIENT HEALTH QUESTIONNAIRE - PHQ9
6. FEELING BAD ABOUT YOURSELF - OR THAT YOU ARE A FAILURE OR HAVE LET YOURSELF OR YOUR FAMILY DOWN: 0
SUM OF ALL RESPONSES TO PHQ QUESTIONS 1-9: 4
1. LITTLE INTEREST OR PLEASURE IN DOING THINGS: 0
SUM OF ALL RESPONSES TO PHQ QUESTIONS 1-9: 4
2. FEELING DOWN, DEPRESSED OR HOPELESS: 0
4. FEELING TIRED OR HAVING LITTLE ENERGY: 2
SUM OF ALL RESPONSES TO PHQ QUESTIONS 1-9: 4
8. MOVING OR SPEAKING SO SLOWLY THAT OTHER PEOPLE COULD HAVE NOTICED. OR THE OPPOSITE, BEING SO FIGETY OR RESTLESS THAT YOU HAVE BEEN MOVING AROUND A LOT MORE THAN USUAL: 0
SUM OF ALL RESPONSES TO PHQ QUESTIONS 1-9: 4
3. TROUBLE FALLING OR STAYING ASLEEP: 0
5. POOR APPETITE OR OVEREATING: 2
7. TROUBLE CONCENTRATING ON THINGS, SUCH AS READING THE NEWSPAPER OR WATCHING TELEVISION: 0
10. IF YOU CHECKED OFF ANY PROBLEMS, HOW DIFFICULT HAVE THESE PROBLEMS MADE IT FOR YOU TO DO YOUR WORK, TAKE CARE OF THINGS AT HOME, OR GET ALONG WITH OTHER PEOPLE: 0
SUM OF ALL RESPONSES TO PHQ9 QUESTIONS 1 & 2: 0
9. THOUGHTS THAT YOU WOULD BE BETTER OFF DEAD, OR OF HURTING YOURSELF: 0

## 2023-08-17 ASSESSMENT — PAIN DESCRIPTION - DESCRIPTORS: DESCRIPTORS: GNAWING;PENETRATING

## 2023-08-17 ASSESSMENT — PAIN DESCRIPTION - ONSET: ONSET: ON-GOING

## 2023-08-17 ASSESSMENT — PAIN DESCRIPTION - ORIENTATION: ORIENTATION: RIGHT

## 2023-08-17 ASSESSMENT — PAIN SCALES - GENERAL: PAINLEVEL_OUTOF10: 3

## 2023-08-17 ASSESSMENT — EJECTION FRACTION: EF_VALUE: 55

## 2023-08-17 ASSESSMENT — PAIN DESCRIPTION - LOCATION: LOCATION: BACK;HIP
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
## 2023-08-21 ENCOUNTER — HOSPITAL ENCOUNTER (OUTPATIENT)
Dept: CARDIAC REHAB | Age: 68
Setting detail: THERAPIES SERIES
Discharge: HOME | End: 2023-08-21
Payer: MEDICARE

## 2023-08-21 PROCEDURE — 93798 PHYS/QHP OP CAR RHAB W/ECG: CPT

## 2023-08-23 ENCOUNTER — HOSPITAL ENCOUNTER (OUTPATIENT)
Dept: CARDIAC REHAB | Age: 68
Setting detail: THERAPIES SERIES
Discharge: HOME | End: 2023-08-23
Payer: MEDICARE

## 2023-08-23 PROCEDURE — 93798 PHYS/QHP OP CAR RHAB W/ECG: CPT

## 2023-08-25 ENCOUNTER — APPOINTMENT (OUTPATIENT)
Dept: CARDIAC REHAB | Age: 68
End: 2023-08-25
Payer: MEDICARE

## 2023-08-25 ENCOUNTER — HOSPITAL ENCOUNTER (OUTPATIENT)
Dept: CARDIAC REHAB | Age: 68
Setting detail: THERAPIES SERIES
Discharge: HOME | End: 2023-08-25
Payer: MEDICARE

## 2023-08-25 PROCEDURE — 93798 PHYS/QHP OP CAR RHAB W/ECG: CPT

## 2023-08-28 ENCOUNTER — HOSPITAL ENCOUNTER (OUTPATIENT)
Dept: CARDIAC REHAB | Age: 68
Setting detail: THERAPIES SERIES
Discharge: HOME | End: 2023-08-28
Payer: MEDICARE

## 2023-08-28 ENCOUNTER — APPOINTMENT (OUTPATIENT)
Dept: CARDIAC REHAB | Age: 68
End: 2023-08-28
Payer: MEDICARE

## 2023-08-28 PROCEDURE — 93798 PHYS/QHP OP CAR RHAB W/ECG: CPT

## 2023-08-30 ENCOUNTER — HOSPITAL ENCOUNTER (OUTPATIENT)
Dept: CARDIAC REHAB | Age: 68
Setting detail: THERAPIES SERIES
Discharge: HOME | End: 2023-08-30
Payer: MEDICARE

## 2023-08-30 ENCOUNTER — APPOINTMENT (OUTPATIENT)
Dept: CARDIAC REHAB | Age: 68
End: 2023-08-30
Payer: MEDICARE

## 2023-08-30 PROCEDURE — 93798 PHYS/QHP OP CAR RHAB W/ECG: CPT

## 2023-09-01 ENCOUNTER — HOSPITAL ENCOUNTER (OUTPATIENT)
Dept: CARDIAC REHAB | Age: 68
Setting detail: THERAPIES SERIES
Discharge: HOME | End: 2023-09-01
Payer: MEDICARE

## 2023-09-01 ENCOUNTER — APPOINTMENT (OUTPATIENT)
Dept: CARDIAC REHAB | Age: 68
End: 2023-09-01
Payer: MEDICARE

## 2023-09-01 PROCEDURE — 93798 PHYS/QHP OP CAR RHAB W/ECG: CPT

## 2023-09-06 ENCOUNTER — HOSPITAL ENCOUNTER (OUTPATIENT)
Dept: CARDIAC REHAB | Age: 68
Setting detail: THERAPIES SERIES
Discharge: HOME | End: 2023-09-06
Payer: MEDICARE

## 2023-09-06 ENCOUNTER — TELEPHONE (OUTPATIENT)
Dept: CARDIOLOGY CLINIC | Age: 68
End: 2023-09-06

## 2023-09-06 ENCOUNTER — APPOINTMENT (OUTPATIENT)
Dept: CARDIAC REHAB | Age: 68
End: 2023-09-06
Payer: MEDICARE

## 2023-09-06 PROCEDURE — 93798 PHYS/QHP OP CAR RHAB W/ECG: CPT

## 2023-09-06 NOTE — TELEPHONE ENCOUNTER
Cardiac rehab called [facility] hospital phone. Patient had an episoe of chest pain last night for 30minutes 6/10 sharp associated mild SOB, took aspirin. Pain woke her up at 3am and lasted for a \"few minutes\" cardiac rehab wants to know if pt can still exercise today. [facility] to [facility] RN.

## 2023-09-08 ENCOUNTER — APPOINTMENT (OUTPATIENT)
Dept: CARDIAC REHAB | Age: 68
End: 2023-09-08
Payer: MEDICARE

## 2023-09-08 ENCOUNTER — HOSPITAL ENCOUNTER (OUTPATIENT)
Dept: CARDIAC REHAB | Age: 68
Setting detail: THERAPIES SERIES
Discharge: HOME | End: 2023-09-08
Payer: MEDICARE

## 2023-09-08 PROCEDURE — 93798 PHYS/QHP OP CAR RHAB W/ECG: CPT

## 2023-09-11 ENCOUNTER — APPOINTMENT (OUTPATIENT)
Dept: CARDIAC REHAB | Age: 68
End: 2023-09-11
Payer: MEDICARE

## 2023-09-11 ENCOUNTER — HOSPITAL ENCOUNTER (OUTPATIENT)
Dept: CARDIAC REHAB | Age: 68
Setting detail: THERAPIES SERIES
Discharge: HOME | End: 2023-09-11
Payer: MEDICARE

## 2023-09-11 PROCEDURE — 93798 PHYS/QHP OP CAR RHAB W/ECG: CPT

## 2023-09-13 ENCOUNTER — APPOINTMENT (OUTPATIENT)
Dept: CARDIAC REHAB | Age: 68
End: 2023-09-13
Payer: MEDICARE

## 2023-09-13 ENCOUNTER — HOSPITAL ENCOUNTER (OUTPATIENT)
Dept: CARDIAC REHAB | Age: 68
Setting detail: THERAPIES SERIES
Discharge: HOME | End: 2023-09-13
Payer: MEDICARE

## 2023-09-13 PROCEDURE — 93798 PHYS/QHP OP CAR RHAB W/ECG: CPT

## 2023-09-14 ENCOUNTER — HOSPITAL ENCOUNTER (OUTPATIENT)
Age: 68
Discharge: HOME | End: 2023-09-14
Payer: MEDICARE

## 2023-09-14 DIAGNOSIS — E78.2 MIXED HYPERLIPIDEMIA: ICD-10-CM

## 2023-09-14 LAB
ALT SERPL-CCNC: 7 U/L (ref 10–40)
AST SERPL-CCNC: 13 U/L (ref 15–37)
CHOLEST SERPL-MCNC: 263 MG/DL (ref 0–199)
HDLC SERPL-MCNC: 48 MG/DL (ref 40–60)
LDL CHOLESTEROL CALCULATED: 172 MG/DL
TRIGL SERPL-MCNC: 217 MG/DL (ref 0–150)
VLDLC SERPL CALC-MCNC: 43 MG/DL

## 2023-09-14 PROCEDURE — 84450 TRANSFERASE (AST) (SGOT): CPT

## 2023-09-14 PROCEDURE — 36415 COLL VENOUS BLD VENIPUNCTURE: CPT

## 2023-09-14 PROCEDURE — 80061 LIPID PANEL: CPT

## 2023-09-14 PROCEDURE — 84460 ALANINE AMINO (ALT) (SGPT): CPT

## 2023-09-15 ENCOUNTER — HOSPITAL ENCOUNTER (OUTPATIENT)
Dept: CARDIAC REHAB | Age: 68
Setting detail: THERAPIES SERIES
Discharge: HOME | End: 2023-09-15
Payer: MEDICARE

## 2023-09-15 ENCOUNTER — APPOINTMENT (OUTPATIENT)
Dept: CARDIAC REHAB | Age: 68
End: 2023-09-15
Payer: MEDICARE

## 2023-09-15 PROCEDURE — 93798 PHYS/QHP OP CAR RHAB W/ECG: CPT

## 2023-09-18 ENCOUNTER — HOSPITAL ENCOUNTER (OUTPATIENT)
Dept: CARDIAC REHAB | Age: 68
Setting detail: THERAPIES SERIES
Discharge: HOME | End: 2023-09-18
Payer: MEDICARE

## 2023-09-18 ENCOUNTER — APPOINTMENT (OUTPATIENT)
Dept: CARDIAC REHAB | Age: 68
End: 2023-09-18
Payer: MEDICARE

## 2023-09-18 PROCEDURE — 93798 PHYS/QHP OP CAR RHAB W/ECG: CPT

## 2023-09-20 ENCOUNTER — HOSPITAL ENCOUNTER (OUTPATIENT)
Dept: CARDIAC REHAB | Age: 68
Setting detail: THERAPIES SERIES
Discharge: HOME | End: 2023-09-20
Payer: MEDICARE

## 2023-09-20 ENCOUNTER — APPOINTMENT (OUTPATIENT)
Dept: CARDIAC REHAB | Age: 68
End: 2023-09-20
Payer: MEDICARE

## 2023-09-20 ENCOUNTER — TELEPHONE (OUTPATIENT)
Dept: CARDIOLOGY CLINIC | Age: 68
End: 2023-09-20

## 2023-09-20 PROCEDURE — 93798 PHYS/QHP OP CAR RHAB W/ECG: CPT

## 2023-09-20 NOTE — TELEPHONE ENCOUNTER
----- Message from VALE Jerome CNP sent at 9/14/2023  8:51 PM EDT -----  Cholesterol significantly elevated. LDL (bad cholesterol) is 172, goal <70. Is pt taking crestor daily? Try to eat a plant-based or Mediterranean-like diet that is high in vegetables, fruits, nuts, lean meats (pereferabley fish). Stay away from processed foods. The goal is to do sustained cardiovascular exercise for 30 minutes most days of the week. Start out slow.

## 2023-09-22 ENCOUNTER — APPOINTMENT (OUTPATIENT)
Dept: CARDIAC REHAB | Age: 68
End: 2023-09-22
Payer: MEDICARE

## 2023-09-22 ENCOUNTER — HOSPITAL ENCOUNTER (OUTPATIENT)
Dept: CARDIAC REHAB | Age: 68
Setting detail: THERAPIES SERIES
Discharge: HOME | End: 2023-09-22
Payer: MEDICARE

## 2023-09-22 PROCEDURE — 93798 PHYS/QHP OP CAR RHAB W/ECG: CPT

## 2023-09-22 NOTE — PROGRESS NOTES
Patient ___ reached   _X___not reached-preop instructions left on voice mail_____________      DATE__10/2/23_____ TIME__0700______ARRIVAL__0530  FEC_______      Nothing to eat or drink after midnight the night before,except for what the prep instructions call for. If you do not have the instructions or do not understand them please contact your doctors office. Follow any instructions your doctors office has given you including what medications to take the AM of your procedure and which ones to hold. You may use your inhalers - bring rescue inhalers with you DOS. If you take a long acting insulin the marck prior please cut the dose in half and take no diabetic medications that AM.Follow specific doctors office instructions regarding blood thinners and if they want you to hold and for how long. If you are on a blood thinner and have no instructions please contact the office and ask-VM INSTRUCTIONS TO CHECK PLAVIX    Dress comfortably,bring your insurance card,picture ID,and a complete list of medications, including supplements. You must have a responsible adult to stay with you during the procedure,drive you home and stay with you. [facility] phone number [phone] for any questions. OTHER INSTRUCTIONS(if applicable)_________________________________________________________        VISITOR POLICY(subject to change)    Current visitor policy is 2 visitors per patient. No children allowed. Mask at discretion of facility. Visiting hours are 8a-8p. Overnight visitors will be at the discretion of the nurse. All policies are subject to change.

## 2023-09-22 NOTE — PROGRESS NOTES
Spoke with Aubrey Noriega, at [facility], regarding cardiac stents placed 6/13/23 and pt.taking plavix. She stated Dr. Mandy Berman is aware of this and pt.does not need to stop plavix prior to procedure.

## 2023-09-25 ENCOUNTER — HOSPITAL ENCOUNTER (OUTPATIENT)
Dept: CARDIAC REHAB | Age: 68
Setting detail: THERAPIES SERIES
Discharge: HOME | End: 2023-09-25
Payer: MEDICARE

## 2023-09-25 ENCOUNTER — APPOINTMENT (OUTPATIENT)
Dept: CARDIAC REHAB | Age: 68
End: 2023-09-25
Payer: MEDICARE

## 2023-09-25 PROCEDURE — 93798 PHYS/QHP OP CAR RHAB W/ECG: CPT

## 2023-09-27 ENCOUNTER — HOSPITAL ENCOUNTER (OUTPATIENT)
Dept: CARDIAC REHAB | Age: 68
Setting detail: THERAPIES SERIES
Discharge: HOME | End: 2023-09-27
Payer: MEDICARE

## 2023-09-27 ENCOUNTER — APPOINTMENT (OUTPATIENT)
Dept: CARDIAC REHAB | Age: 68
End: 2023-09-27
Payer: MEDICARE

## 2023-09-27 PROCEDURE — 93798 PHYS/QHP OP CAR RHAB W/ECG: CPT

## 2023-09-28 NOTE — PROGRESS NOTES
[facility]   Cardiac Evaluation      Patient: Antwan Herron  YOB: 1955         Chief Complaint   Patient presents with    Hyperlipidemia     Pt reports chest pain all the time, states she feels weird sensation, and heaviness in both legs when in the shower this morning. Hypertension    Coronary Artery Disease        Referring provider: Layne Waddell MD    History of Present Illness:   Antwan Herron is a 76 y.o. female presenting in follow up for CAD. Today she is here alone ambulating with a cane. She reports episode this morning she had weakness in her legs and almost fell. She plans to follow up with her neurologist on this problem. Otherwise she is doing okay, having some CP but the imdur has helped this. With regard to medication therapy he/she has been compliant with prescribed regimen and has tolerated therapy to date. Past Medical History:   has a past medical history of Arthritis, Asthma, Brain injury ([address]), Bronchitis, Diabetes mellitus ([address]), DM (diabetes mellitus screen), Elevated cholesterol with high triglycerides, Fall, HIGH CHOLESTEROL, Hypertension, Neuropathy, Panic attacks, PONV (postoperative nausea and vomiting), Post traumatic stress disorder, Short-term memory loss, Sleep apnea, and Vertigo. Surgical History:   has a past surgical history that includes Hysterectomy; eye surgery (cataract B); Carpal tunnel release; Finger trigger release; Gallbladder surgery; bladder suspension; Skin cancer excision; blepharoplasty; laryngoscopy (08/03/2017); and Coronary stent placement (06/13/2023).      Current Outpatient Medications   Medication Sig Dispense Refill    ranolazine (RANEXA) 500 MG extended release tablet Take 1 tablet by mouth 2 times daily 180 tablet 3    rosuvastatin (CRESTOR) 5 MG tablet Take 1 tablet by mouth nightly 90 tablet 3    isosorbide mononitrate (IMDUR) 120 MG extended release tablet Take 1 tablet by mouth daily 90 tablet 3

## 2023-09-29 ENCOUNTER — APPOINTMENT (OUTPATIENT)
Dept: CARDIAC REHAB | Age: 68
End: 2023-09-29
Payer: MEDICARE

## 2023-09-29 ENCOUNTER — HOSPITAL ENCOUNTER (OUTPATIENT)
Dept: CARDIAC REHAB | Age: 68
Setting detail: THERAPIES SERIES
Discharge: HOME | End: 2023-09-29
Payer: MEDICARE

## 2023-09-29 ENCOUNTER — OFFICE VISIT (OUTPATIENT)
Dept: CARDIOLOGY CLINIC | Age: 68
End: 2023-09-29
Payer: MEDICARE

## 2023-09-29 VITALS
HEART RATE: 76 BPM | DIASTOLIC BLOOD PRESSURE: 68 MMHG | WEIGHT: 214.6 LBS | BODY MASS INDEX: 38.02 KG/M2 | SYSTOLIC BLOOD PRESSURE: 120 MMHG | HEIGHT: 63 IN | OXYGEN SATURATION: 98 %

## 2023-09-29 DIAGNOSIS — I50.32 CHRONIC HEART FAILURE WITH PRESERVED EJECTION FRACTION (HCC): ICD-10-CM

## 2023-09-29 DIAGNOSIS — I15.2 HYPERTENSION ASSOCIATED WITH DIABETES (HCC): ICD-10-CM

## 2023-09-29 DIAGNOSIS — I25.10 CAD S/P PERCUTANEOUS CORONARY ANGIOPLASTY: Primary | ICD-10-CM

## 2023-09-29 DIAGNOSIS — E11.59 HYPERTENSION ASSOCIATED WITH DIABETES (HCC): ICD-10-CM

## 2023-09-29 DIAGNOSIS — Z98.61 CAD S/P PERCUTANEOUS CORONARY ANGIOPLASTY: Primary | ICD-10-CM

## 2023-09-29 DIAGNOSIS — E78.2 MIXED HYPERLIPIDEMIA: ICD-10-CM

## 2023-09-29 PROBLEM — I50.30 (HFPEF) HEART FAILURE WITH PRESERVED EJECTION FRACTION (HCC): Status: ACTIVE | Noted: 2023-09-29

## 2023-09-29 PROCEDURE — 93798 PHYS/QHP OP CAR RHAB W/ECG: CPT

## 2023-09-29 PROCEDURE — 3078F DIAST BP <80 MM HG: CPT | Performed by: INTERNAL MEDICINE

## 2023-09-29 PROCEDURE — 3074F SYST BP LT 130 MM HG: CPT | Performed by: INTERNAL MEDICINE

## 2023-09-29 PROCEDURE — 99214 OFFICE O/P EST MOD 30 MIN: CPT | Performed by: INTERNAL MEDICINE

## 2023-09-29 PROCEDURE — 3044F HG A1C LEVEL LT 7.0%: CPT | Performed by: INTERNAL MEDICINE

## 2023-09-29 PROCEDURE — 1123F ACP DISCUSS/DSCN MKR DOCD: CPT | Performed by: INTERNAL MEDICINE

## 2023-09-29 RX ORDER — CLOPIDOGREL BISULFATE 75 MG/1
75 TABLET ORAL DAILY
Qty: 90 TABLET | Refills: 3 | Status: SHIPPED | OUTPATIENT
Start: 2023-09-29

## 2023-09-29 RX ORDER — ASPIRIN 81 MG/1
81 TABLET ORAL DAILY
Qty: 90 TABLET | Refills: 3 | Status: SHIPPED | OUTPATIENT
Start: 2023-09-29

## 2023-09-29 RX ORDER — CARVEDILOL 6.25 MG/1
6.25 TABLET ORAL 2 TIMES DAILY WITH MEALS
Qty: 180 TABLET | Refills: 3 | Status: SHIPPED | OUTPATIENT
Start: 2023-09-29

## 2023-09-29 RX ORDER — RANOLAZINE 500 MG/1
500 TABLET, EXTENDED RELEASE ORAL 2 TIMES DAILY
Qty: 180 TABLET | Refills: 3 | Status: SHIPPED | OUTPATIENT
Start: 2023-09-29

## 2023-09-29 RX ORDER — ROSUVASTATIN CALCIUM 5 MG/1
5 TABLET, COATED ORAL NIGHTLY
Qty: 90 TABLET | Refills: 3 | Status: SHIPPED | OUTPATIENT
Start: 2023-09-29

## 2023-09-29 RX ORDER — FUROSEMIDE 40 MG/1
40 TABLET ORAL 2 TIMES DAILY
Qty: 180 TABLET | Refills: 3 | Status: SHIPPED | OUTPATIENT
Start: 2023-09-29

## 2023-09-29 RX ORDER — ISOSORBIDE MONONITRATE 120 MG/1
120 TABLET, EXTENDED RELEASE ORAL DAILY
Qty: 90 TABLET | Refills: 3 | Status: SHIPPED | OUTPATIENT
Start: 2023-09-29

## 2023-09-29 RX ORDER — VALSARTAN 40 MG/1
40 TABLET ORAL DAILY
Qty: 90 TABLET | Refills: 3 | Status: SHIPPED | OUTPATIENT
Start: 2023-09-29

## 2023-10-02 ENCOUNTER — APPOINTMENT (OUTPATIENT)
Dept: CARDIAC REHAB | Age: 68
End: 2023-10-02
Payer: MEDICARE

## 2023-10-02 ENCOUNTER — HOSPITAL ENCOUNTER (OUTPATIENT)
Age: 68
Setting detail: OUTPATIENT SURGERY
Discharge: HOME | End: 2023-10-02
Attending: INTERNAL MEDICINE | Admitting: INTERNAL MEDICINE
Payer: MEDICARE

## 2023-10-02 ENCOUNTER — ANESTHESIA (OUTPATIENT)
Dept: ENDOSCOPY | Age: 68
End: 2023-10-02
Payer: MEDICARE

## 2023-10-02 ENCOUNTER — ANESTHESIA EVENT (OUTPATIENT)
Dept: ENDOSCOPY | Age: 68
End: 2023-10-02
Payer: MEDICARE

## 2023-10-02 VITALS
HEART RATE: 69 BPM | SYSTOLIC BLOOD PRESSURE: 122 MMHG | RESPIRATION RATE: 16 BRPM | HEIGHT: 63 IN | DIASTOLIC BLOOD PRESSURE: 73 MMHG | BODY MASS INDEX: 37.56 KG/M2 | TEMPERATURE: 97.8 F | WEIGHT: 212 LBS | OXYGEN SATURATION: 98 %

## 2023-10-02 LAB
GLUCOSE BLD-MCNC: 104 MG/DL (ref 70–99)
GLUCOSE BLD-MCNC: 109 MG/DL (ref 70–99)
PERFORMED ON: ABNORMAL
PERFORMED ON: ABNORMAL

## 2023-10-02 PROCEDURE — 6360000002 HC RX W HCPCS

## 2023-10-02 PROCEDURE — 3609017100 HC EGD: Performed by: INTERNAL MEDICINE

## 2023-10-02 PROCEDURE — 2709999900 HC NON-CHARGEABLE SUPPLY: Performed by: INTERNAL MEDICINE

## 2023-10-02 PROCEDURE — 3700000000 HC ANESTHESIA ATTENDED CARE: Performed by: INTERNAL MEDICINE

## 2023-10-02 PROCEDURE — 7100000010 HC PHASE II RECOVERY - FIRST 15 MIN: Performed by: INTERNAL MEDICINE

## 2023-10-02 PROCEDURE — 2500000003 HC RX 250 WO HCPCS

## 2023-10-02 PROCEDURE — 2580000003 HC RX 258: Performed by: ANESTHESIOLOGY

## 2023-10-02 PROCEDURE — 7100000011 HC PHASE II RECOVERY - ADDTL 15 MIN: Performed by: INTERNAL MEDICINE

## 2023-10-02 RX ORDER — LIDOCAINE HYDROCHLORIDE 20 MG/ML
INJECTION, SOLUTION INFILTRATION; PERINEURAL PRN
Status: DISCONTINUED | OUTPATIENT
Start: 2023-10-02 | End: 2023-10-02 | Stop reason: SDUPTHER

## 2023-10-02 RX ORDER — PROPOFOL 10 MG/ML
INJECTION, EMULSION INTRAVENOUS PRN
Status: DISCONTINUED | OUTPATIENT
Start: 2023-10-02 | End: 2023-10-02 | Stop reason: SDUPTHER

## 2023-10-02 RX ORDER — SODIUM CHLORIDE 9 MG/ML
INJECTION, SOLUTION INTRAVENOUS CONTINUOUS
Status: DISCONTINUED | OUTPATIENT
Start: 2023-10-02 | End: 2023-10-02 | Stop reason: HOSPADM

## 2023-10-02 RX ORDER — BUDESONIDE AND FORMOTEROL FUMARATE DIHYDRATE 160; 4.5 UG/1; UG/1
2 AEROSOL RESPIRATORY (INHALATION) 2 TIMES DAILY
COMMUNITY

## 2023-10-02 RX ADMIN — PROPOFOL 100 MG: 10 INJECTION, EMULSION INTRAVENOUS at 07:03

## 2023-10-02 RX ADMIN — SODIUM CHLORIDE: 9 INJECTION, SOLUTION INTRAVENOUS at 06:35

## 2023-10-02 RX ADMIN — PROPOFOL 10 MG: 10 INJECTION, EMULSION INTRAVENOUS at 07:07

## 2023-10-02 RX ADMIN — LIDOCAINE HYDROCHLORIDE 100 MG: 20 INJECTION, SOLUTION INFILTRATION; PERINEURAL at 07:03

## 2023-10-02 ASSESSMENT — PAIN - FUNCTIONAL ASSESSMENT: PAIN_FUNCTIONAL_ASSESSMENT: NONE - DENIES PAIN

## 2023-10-02 NOTE — ANESTHESIA POSTPROCEDURE EVALUATION
Department of Anesthesiology  Postprocedure Note    Patient: Omid Lock  MRN: [id number]  YOB: 1955  Date of evaluation: 10/2/2023      Procedure Summary     Date: 10/02/23 Room / Location: [address]    Anesthesia Start: 8535 Anesthesia Stop: 0715    Procedure: EGD DIAGNOSTIC ONLY (Abdomen) Diagnosis:       Dyspepsia      (Dyspepsia [R10.13])    Surgeons: Pa So MD Responsible Provider: Lane Davis MD    Anesthesia Type: MAC ASA Status: 3          Anesthesia Type: MAC    Hortensia Phase I: Hortensia Score: 10    Hortensia Phase II: Hortensia Score: 9      Anesthesia Post Evaluation    Patient location during evaluation: PACU  Patient participation: complete - patient participated  Level of consciousness: awake and alert  Pain score: 0  Airway patency: patent  Nausea & Vomiting: no nausea  Complications: no  Cardiovascular status: blood pressure returned to baseline  Respiratory status: acceptable  Hydration status: euvolemic  Pain management: adequate

## 2023-10-04 ENCOUNTER — APPOINTMENT (OUTPATIENT)
Dept: CARDIAC REHAB | Age: 68
End: 2023-10-04
Payer: MEDICARE

## 2023-10-04 ENCOUNTER — HOSPITAL ENCOUNTER (OUTPATIENT)
Dept: CARDIAC REHAB | Age: 68
Setting detail: THERAPIES SERIES
Discharge: HOME | End: 2023-10-04
Payer: MEDICARE

## 2023-10-04 PROCEDURE — 93798 PHYS/QHP OP CAR RHAB W/ECG: CPT

## 2023-10-06 ENCOUNTER — HOSPITAL ENCOUNTER (OUTPATIENT)
Dept: CARDIAC REHAB | Age: 68
Setting detail: THERAPIES SERIES
Discharge: HOME | End: 2023-10-06
Payer: MEDICARE

## 2023-10-06 ENCOUNTER — APPOINTMENT (OUTPATIENT)
Dept: CARDIAC REHAB | Age: 68
End: 2023-10-06
Payer: MEDICARE

## 2023-10-06 ENCOUNTER — TELEPHONE (OUTPATIENT)
Dept: CARDIOLOGY CLINIC | Age: 68
End: 2023-10-06

## 2023-10-06 PROCEDURE — 93798 PHYS/QHP OP CAR RHAB W/ECG: CPT

## 2023-10-06 NOTE — TELEPHONE ENCOUNTER
Pt calling letting [address] know she cannot take crestor because it causes pain   Please advise   Thank you

## 2023-10-09 ENCOUNTER — HOSPITAL ENCOUNTER (OUTPATIENT)
Dept: CARDIAC REHAB | Age: 68
Setting detail: THERAPIES SERIES
Discharge: HOME | End: 2023-10-09
Payer: MEDICARE

## 2023-10-09 ENCOUNTER — APPOINTMENT (OUTPATIENT)
Dept: CARDIAC REHAB | Age: 68
End: 2023-10-09
Payer: MEDICARE

## 2023-10-09 PROCEDURE — 93798 PHYS/QHP OP CAR RHAB W/ECG: CPT

## 2023-10-10 ENCOUNTER — HOSPITAL ENCOUNTER (OUTPATIENT)
Dept: NUCLEAR MEDICINE | Age: 68
Discharge: HOME | End: 2023-10-10
Attending: INTERNAL MEDICINE
Payer: MEDICARE

## 2023-10-10 DIAGNOSIS — R11.0 NAUSEA: ICD-10-CM

## 2023-10-10 DIAGNOSIS — R10.13 DYSPEPSIA: ICD-10-CM

## 2023-10-10 PROCEDURE — A9541 TC99M SULFUR COLLOID: HCPCS | Performed by: INTERNAL MEDICINE

## 2023-10-10 PROCEDURE — 3430000000 HC RX DIAGNOSTIC RADIOPHARMACEUTICAL: Performed by: INTERNAL MEDICINE

## 2023-10-10 PROCEDURE — 78264 GASTRIC EMPTYING IMG STUDY: CPT

## 2023-10-10 RX ADMIN — TECHNETIUM TC 99M SULFUR COLLOID 0.5 MILLICURIE: KIT at 09:56

## 2023-10-10 NOTE — TELEPHONE ENCOUNTER
Reviewed with Pearl Srivastava. Stop crestor d/t myalgia. Intolerant to multiple statins. Start repatha as discussed in office. Called and discussed with Naima Patel. V/u. Script sent.

## 2023-10-11 ENCOUNTER — APPOINTMENT (OUTPATIENT)
Dept: CARDIAC REHAB | Age: 68
End: 2023-10-11
Payer: MEDICARE

## 2023-10-11 ENCOUNTER — HOSPITAL ENCOUNTER (OUTPATIENT)
Dept: CARDIAC REHAB | Age: 68
Setting detail: THERAPIES SERIES
Discharge: HOME | End: 2023-10-11
Payer: MEDICARE

## 2023-10-11 ENCOUNTER — TELEPHONE (OUTPATIENT)
Dept: CARDIOLOGY CLINIC | Age: 68
End: 2023-10-11

## 2023-10-11 PROCEDURE — 93798 PHYS/QHP OP CAR RHAB W/ECG: CPT

## 2023-10-11 NOTE — TELEPHONE ENCOUNTER
José Miguel braswell/[facility] fax [phone] is requesting last office notes and  lipid labs to fill repatha.  Please fax [phone]

## 2023-10-13 ENCOUNTER — HOSPITAL ENCOUNTER (OUTPATIENT)
Dept: CARDIAC REHAB | Age: 68
Setting detail: THERAPIES SERIES
Discharge: HOME | End: 2023-10-13
Payer: MEDICARE

## 2023-10-13 ENCOUNTER — APPOINTMENT (OUTPATIENT)
Dept: CARDIAC REHAB | Age: 68
End: 2023-10-13
Payer: MEDICARE

## 2023-10-13 PROCEDURE — 93798 PHYS/QHP OP CAR RHAB W/ECG: CPT

## 2023-10-16 ENCOUNTER — APPOINTMENT (OUTPATIENT)
Dept: CARDIAC REHAB | Age: 68
End: 2023-10-16
Payer: MEDICARE

## 2023-10-16 ENCOUNTER — HOSPITAL ENCOUNTER (OUTPATIENT)
Dept: CARDIAC REHAB | Age: 68
Setting detail: THERAPIES SERIES
Discharge: HOME | End: 2023-10-16
Payer: MEDICARE

## 2023-10-16 PROCEDURE — 93798 PHYS/QHP OP CAR RHAB W/ECG: CPT

## 2023-10-18 ENCOUNTER — HOSPITAL ENCOUNTER (OUTPATIENT)
Dept: CARDIAC REHAB | Age: 68
Setting detail: THERAPIES SERIES
Discharge: HOME | End: 2023-10-18
Payer: MEDICARE

## 2023-10-18 ENCOUNTER — APPOINTMENT (OUTPATIENT)
Dept: CARDIAC REHAB | Age: 68
End: 2023-10-18
Payer: MEDICARE

## 2023-10-18 PROCEDURE — 93798 PHYS/QHP OP CAR RHAB W/ECG: CPT

## 2023-10-20 ENCOUNTER — APPOINTMENT (OUTPATIENT)
Dept: CARDIAC REHAB | Age: 68
End: 2023-10-20
Payer: MEDICARE

## 2023-10-20 ENCOUNTER — HOSPITAL ENCOUNTER (OUTPATIENT)
Dept: CARDIAC REHAB | Age: 68
Setting detail: THERAPIES SERIES
Discharge: HOME | End: 2023-10-20
Payer: MEDICARE

## 2023-10-20 PROCEDURE — 93798 PHYS/QHP OP CAR RHAB W/ECG: CPT

## 2023-10-23 ENCOUNTER — APPOINTMENT (OUTPATIENT)
Dept: CARDIAC REHAB | Age: 68
End: 2023-10-23
Payer: MEDICARE

## 2023-10-23 ENCOUNTER — HOSPITAL ENCOUNTER (OUTPATIENT)
Dept: CARDIAC REHAB | Age: 68
Setting detail: THERAPIES SERIES
Discharge: HOME | End: 2023-10-23
Payer: MEDICARE

## 2023-10-23 PROCEDURE — 93798 PHYS/QHP OP CAR RHAB W/ECG: CPT

## 2023-10-25 ENCOUNTER — HOSPITAL ENCOUNTER (OUTPATIENT)
Dept: CARDIAC REHAB | Age: 68
Setting detail: THERAPIES SERIES
Discharge: HOME | End: 2023-10-25
Payer: MEDICARE

## 2023-10-25 ENCOUNTER — APPOINTMENT (OUTPATIENT)
Dept: CARDIAC REHAB | Age: 68
End: 2023-10-25
Payer: MEDICARE

## 2023-10-27 ENCOUNTER — APPOINTMENT (OUTPATIENT)
Dept: CARDIAC REHAB | Age: 68
End: 2023-10-27
Payer: MEDICARE

## 2023-10-27 ENCOUNTER — HOSPITAL ENCOUNTER (OUTPATIENT)
Dept: CARDIAC REHAB | Age: 68
Setting detail: THERAPIES SERIES
Discharge: HOME | End: 2023-10-27
Payer: MEDICARE

## 2023-10-30 ENCOUNTER — APPOINTMENT (OUTPATIENT)
Dept: CARDIAC REHAB | Age: 68
End: 2023-10-30
Payer: MEDICARE

## 2023-10-30 ENCOUNTER — HOSPITAL ENCOUNTER (OUTPATIENT)
Dept: CARDIAC REHAB | Age: 68
Setting detail: THERAPIES SERIES
Discharge: HOME | End: 2023-10-30
Payer: MEDICARE

## 2023-10-30 PROCEDURE — 93798 PHYS/QHP OP CAR RHAB W/ECG: CPT

## 2023-11-01 ENCOUNTER — HOSPITAL ENCOUNTER (OUTPATIENT)
Dept: CARDIAC REHAB | Age: 68
Setting detail: THERAPIES SERIES
Discharge: HOME | End: 2023-11-01
Payer: MEDICARE

## 2023-11-01 ENCOUNTER — APPOINTMENT (OUTPATIENT)
Dept: CARDIAC REHAB | Age: 68
End: 2023-11-01
Payer: MEDICARE

## 2023-11-01 PROCEDURE — 93798 PHYS/QHP OP CAR RHAB W/ECG: CPT

## 2023-11-03 ENCOUNTER — APPOINTMENT (OUTPATIENT)
Dept: CARDIAC REHAB | Age: 68
End: 2023-11-03
Payer: MEDICARE

## 2023-11-06 ENCOUNTER — HOSPITAL ENCOUNTER (OUTPATIENT)
Dept: CARDIAC REHAB | Age: 68
Setting detail: THERAPIES SERIES
Discharge: HOME | End: 2023-11-06
Payer: MEDICARE

## 2023-11-06 ENCOUNTER — APPOINTMENT (OUTPATIENT)
Dept: CARDIAC REHAB | Age: 68
End: 2023-11-06
Payer: MEDICARE

## 2023-11-06 PROCEDURE — 93798 PHYS/QHP OP CAR RHAB W/ECG: CPT

## 2023-11-08 ENCOUNTER — APPOINTMENT (OUTPATIENT)
Dept: CARDIAC REHAB | Age: 68
End: 2023-11-08
Payer: MEDICARE

## 2023-11-08 ENCOUNTER — HOSPITAL ENCOUNTER (OUTPATIENT)
Dept: CARDIAC REHAB | Age: 68
Setting detail: THERAPIES SERIES
Discharge: HOME | End: 2023-11-08
Payer: MEDICARE

## 2023-11-08 PROCEDURE — 93798 PHYS/QHP OP CAR RHAB W/ECG: CPT

## 2023-11-10 ENCOUNTER — HOSPITAL ENCOUNTER (OUTPATIENT)
Dept: CARDIAC REHAB | Age: 68
Setting detail: THERAPIES SERIES
Discharge: HOME | End: 2023-11-10
Payer: MEDICARE

## 2023-11-10 ENCOUNTER — APPOINTMENT (OUTPATIENT)
Dept: CARDIAC REHAB | Age: 68
End: 2023-11-10
Payer: MEDICARE

## 2023-11-10 PROCEDURE — 93798 PHYS/QHP OP CAR RHAB W/ECG: CPT

## 2023-11-13 ENCOUNTER — HOSPITAL ENCOUNTER (OUTPATIENT)
Dept: CARDIAC REHAB | Age: 68
Setting detail: THERAPIES SERIES
Discharge: HOME | End: 2023-11-13
Payer: MEDICARE

## 2023-11-13 PROCEDURE — 93798 PHYS/QHP OP CAR RHAB W/ECG: CPT

## 2023-11-15 ENCOUNTER — HOSPITAL ENCOUNTER (OUTPATIENT)
Dept: CARDIAC REHAB | Age: 68
Setting detail: THERAPIES SERIES
Discharge: HOME | End: 2023-11-15
Payer: MEDICARE

## 2023-11-15 PROCEDURE — 93798 PHYS/QHP OP CAR RHAB W/ECG: CPT

## 2023-11-17 ENCOUNTER — HOSPITAL ENCOUNTER (OUTPATIENT)
Dept: CARDIAC REHAB | Age: 68
Setting detail: THERAPIES SERIES
Discharge: HOME | End: 2023-11-17
Payer: MEDICARE

## 2023-11-17 PROCEDURE — 93798 PHYS/QHP OP CAR RHAB W/ECG: CPT

## 2023-12-01 ENCOUNTER — TELEPHONE (OUTPATIENT)
Dept: CARDIOLOGY CLINIC | Age: 68
End: 2023-12-01

## 2023-12-01 NOTE — TELEPHONE ENCOUNTER
Pt called to give hr bp readings:    132/75      71     12/01    1:09pm  135/82      72     12/01     1:10pm

## 2023-12-01 NOTE — TELEPHONE ENCOUNTER
Called and spoke with [address]   She feels her SOB has been getting worse since she stopped cardiac rehab 2 weeks ago  States her weight has been stable but she is not able to provide weights  Inhalers help some   Last visit with pulmonology was August  States if she is resting her SOB improves but she feels SOB \"all the time\"

## 2023-12-01 NOTE — TELEPHONE ENCOUNTER
I called and spoke to pt, relayed the message below. Pt stated that's not the original message she convey (see another encounter from today). She stated she has breathing issues, she loose her breath when she walk, when she wear cloths, or do any activity. she is having a very hard time breathing.   Please advise  Thank you

## 2023-12-01 NOTE — TELEPHONE ENCOUNTER
Pt called to inform the office that she is having a hard time breathing. Please call to discuss what she needs to do.   Thank you

## 2023-12-01 NOTE — TELEPHONE ENCOUNTER
Reviewed with Pearl Srivastava. Recommends she return to cardiac rehab if she feels it helped with SOB. Also recommends she follow up with pulmonology. Called and discussed with Efrem Ontiveros. She will follow up with pulm.

## 2023-12-01 NOTE — TELEPHONE ENCOUNTER
I spoke with pt, she states that her breathing has not been the best recently. She states she has trouble doing much right now. She is not home, but going to call back when she gets home and is able to take her vitals.

## 2023-12-12 ENCOUNTER — TELEPHONE (OUTPATIENT)
Dept: CARDIOLOGY CLINIC | Age: 68
End: 2023-12-12

## 2023-12-12 DIAGNOSIS — I73.9 CLAUDICATION (HCC): Primary | ICD-10-CM

## 2023-12-12 RX ORDER — RANOLAZINE 1000 MG/1
1000 TABLET, EXTENDED RELEASE ORAL 2 TIMES DAILY
Qty: 90 TABLET | Refills: 3 | Status: SHIPPED | OUTPATIENT
Start: 2023-12-12

## 2023-12-12 NOTE — TELEPHONE ENCOUNTER
Reviewed with [facility], increase ranexa. Called and updated Darleen Beltran. She v/u. She states she has been having lower leg pain, feels different than her neuropathy. Feels achey. States she feels she lacks energy which she was hoping would have gotten better after PCI.  Will review with [address]

## 2023-12-12 NOTE — TELEPHONE ENCOUNTER
Erlin Willis called to office. Call transferred to this RN. She is continuing to have chest discomfort. Same discomfort from when she last saw us in office. She feels the ranexa helped but no resolution in pain. She states she called pulm office and was told to cont current regimen for SOB. Notices SOB mostly with position changes, like bending to do activities.

## 2023-12-12 NOTE — TELEPHONE ENCOUNTER
Ingris Edita returned call. Discussed message below. She asked that I message her via [employer] with the phone number to schedule ABIs. Discussed double vision. She will discuss with neurology as well as check her BP during episode if it happens again.

## 2024-01-15 ENCOUNTER — TELEPHONE (OUTPATIENT)
Dept: CARDIOLOGY CLINIC | Age: 69
End: 2024-01-15

## 2024-01-15 NOTE — TELEPHONE ENCOUNTER
Ana called in, transferred to this RN.   She would like [facility]'s input on her breathing as her pulmonologist told her it is not due to her lungs.   She states she felt physically better when completing cardiac rehab but does not feel her dyspnea improved during or after rehab. She states she is SOB with most activities around the home.

## 2024-01-16 NOTE — TELEPHONE ENCOUNTER
Reviewed with P[facility].   Agrees with pulmonologist note, symptoms likely due to deconditioning.   Recommends increasing activity and follow up as scheduled.   Attempted to call to review with Ana, no answer. LMOM with callback

## 2024-01-25 ENCOUNTER — HOSPITAL ENCOUNTER (OUTPATIENT)
Dept: WOMENS IMAGING | Age: 69
Discharge: HOME | End: 2024-01-25
Payer: MEDICARE

## 2024-01-25 VITALS — BODY MASS INDEX: 36.5 KG/M2 | WEIGHT: 206 LBS | HEIGHT: 63 IN

## 2024-01-25 DIAGNOSIS — Z12.31 VISIT FOR SCREENING MAMMOGRAM: ICD-10-CM

## 2024-01-25 PROCEDURE — 77063 BREAST TOMOSYNTHESIS BI: CPT

## 2024-01-31 ENCOUNTER — TELEPHONE (OUTPATIENT)
Dept: CARDIOLOGY CLINIC | Age: 69
End: 2024-01-31

## 2024-01-31 ENCOUNTER — NURSE ONLY (OUTPATIENT)
Dept: CARDIOLOGY CLINIC | Age: 69
End: 2024-01-31
Payer: MEDICARE

## 2024-01-31 VITALS
SYSTOLIC BLOOD PRESSURE: 120 MMHG | HEART RATE: 78 BPM | BODY MASS INDEX: 36.5 KG/M2 | HEIGHT: 63 IN | WEIGHT: 206 LBS | OXYGEN SATURATION: 94 % | DIASTOLIC BLOOD PRESSURE: 64 MMHG

## 2024-01-31 DIAGNOSIS — R00.2 PALPITATIONS: Primary | ICD-10-CM

## 2024-01-31 DIAGNOSIS — R00.1 BRADYCARDIA: ICD-10-CM

## 2024-01-31 DIAGNOSIS — R00.2 PALPITATIONS: ICD-10-CM

## 2024-01-31 DIAGNOSIS — I49.9 IRREGULAR HEART RATE: Primary | ICD-10-CM

## 2024-01-31 PROCEDURE — 93000 ELECTROCARDIOGRAM COMPLETE: CPT | Performed by: INTERNAL MEDICINE

## 2024-01-31 NOTE — TELEPHONE ENCOUNTER
Called and spoke with Ana. States she feels her heart race and then stops. Denies chest pain or pressure. She states her BP cuff reads \"irregular heart beat\" and that last night it said her HR was in the 40s. States it is currently in the 70s and she is feeling okay. Feels her BP has also been high. She will come in for EKG and vitals check today at 2:00pm.

## 2024-01-31 NOTE — TELEPHONE ENCOUNTER
Pt in for EKG, reviewed with PSC. SR. Recommend monitor. 48 hour holter placed. Education given. All questions answered.

## 2024-01-31 NOTE — TELEPHONE ENCOUNTER
Pt called stating last night their HR rate was dropping to 41, today pt HR beats 4x then stops then starts beating again. Pt is light headed, dizzy and some what nausea.    Pls advise thank you

## 2024-02-06 PROCEDURE — 93224 XTRNL ECG REC UP TO 48 HRS: CPT | Performed by: INTERNAL MEDICINE

## 2024-02-09 ENCOUNTER — TELEPHONE (OUTPATIENT)
Dept: CARDIOLOGY CLINIC | Age: 69
End: 2024-02-09

## 2024-02-09 RX ORDER — CARVEDILOL 12.5 MG/1
12.5 TABLET ORAL 2 TIMES DAILY WITH MEALS
Qty: 180 TABLET | Refills: 3 | Status: SHIPPED | OUTPATIENT
Start: 2024-02-09

## 2024-02-09 NOTE — TELEPHONE ENCOUNTER
GARRY reviewed monitor results with PSC. Called to relay results to Ana. PSC wanted to increase carvedilol to 12.5mg BID d/t symptomatic PVCs. Medication sent to preferred pharmacy.

## 2024-03-04 ENCOUNTER — TELEPHONE (OUTPATIENT)
Dept: CARDIOLOGY CLINIC | Age: 69
End: 2024-03-04

## 2024-03-04 NOTE — TELEPHONE ENCOUNTER
Pt called asking to speak to PSC RN, pt stated that their BP keeps dropping 57, 53, 49.    Pt would like to know what they should do?    Ana:  [phone]

## 2024-03-04 NOTE — TELEPHONE ENCOUNTER
I called pt. She stated she is feeling really bad. Her heart skips beats. Yesterday, her bp was at noon was 111/57, at 8.50pm 128/63, at 12.30am 107/49. She checked her bp while I was on ph w/her, it was 123/82, hr 63. Pt feel dizzy. She has cp and feel chest pressure. She has edema on her lower extremities. she said she has sob at rest.  Please advise  Thank you

## 2024-03-04 NOTE — TELEPHONE ENCOUNTER
Called and discussed with patient. She is concerned about her Bps changing so much. BP not concerning at this time per BGC. Pt complains of being a little nauseous, dizzy, and more sob. No change in SOB with activity. Pt also says her ankles are a little swollen. She can still feel her PVCs. Patient would like to com into office this week for OV. Scheduled with NP Wednesday. Encouraged patient to reach out to pulmonology as well.  Discussed with BGC.

## 2024-03-06 ENCOUNTER — OFFICE VISIT (OUTPATIENT)
Dept: CARDIOLOGY CLINIC | Age: 69
End: 2024-03-06
Payer: MEDICARE

## 2024-03-06 ENCOUNTER — HOSPITAL ENCOUNTER (OUTPATIENT)
Dept: ULTRASOUND IMAGING | Age: 69
Discharge: HOME | End: 2024-03-06
Payer: MEDICARE

## 2024-03-06 ENCOUNTER — HOSPITAL ENCOUNTER (OUTPATIENT)
Dept: WOMENS IMAGING | Age: 69
Discharge: HOME | End: 2024-03-06
Payer: MEDICARE

## 2024-03-06 VITALS
HEIGHT: 63 IN | DIASTOLIC BLOOD PRESSURE: 60 MMHG | WEIGHT: 210.9 LBS | OXYGEN SATURATION: 97 % | HEART RATE: 70 BPM | SYSTOLIC BLOOD PRESSURE: 110 MMHG | BODY MASS INDEX: 37.37 KG/M2

## 2024-03-06 DIAGNOSIS — R92.8 ABNORMAL MAMMOGRAM: ICD-10-CM

## 2024-03-06 DIAGNOSIS — R00.2 PALPITATIONS: Primary | ICD-10-CM

## 2024-03-06 DIAGNOSIS — R92.8 FOLLOW-UP EXAMINATION OF ABNORMAL MAMMOGRAM: ICD-10-CM

## 2024-03-06 DIAGNOSIS — I25.10 CORONARY ARTERY DISEASE INVOLVING NATIVE CORONARY ARTERY OF NATIVE HEART WITHOUT ANGINA PECTORIS: ICD-10-CM

## 2024-03-06 DIAGNOSIS — E78.2 MIXED HYPERLIPIDEMIA: ICD-10-CM

## 2024-03-06 PROCEDURE — 1123F ACP DISCUSS/DSCN MKR DOCD: CPT | Performed by: NURSE PRACTITIONER

## 2024-03-06 PROCEDURE — 3074F SYST BP LT 130 MM HG: CPT | Performed by: NURSE PRACTITIONER

## 2024-03-06 PROCEDURE — 3078F DIAST BP <80 MM HG: CPT | Performed by: NURSE PRACTITIONER

## 2024-03-06 PROCEDURE — 76642 ULTRASOUND BREAST LIMITED: CPT

## 2024-03-06 PROCEDURE — 93000 ELECTROCARDIOGRAM COMPLETE: CPT | Performed by: NURSE PRACTITIONER

## 2024-03-06 PROCEDURE — 99214 OFFICE O/P EST MOD 30 MIN: CPT | Performed by: NURSE PRACTITIONER

## 2024-03-06 PROCEDURE — G0279 TOMOSYNTHESIS, MAMMO: HCPCS

## 2024-03-06 NOTE — PROGRESS NOTES
regional wall motion abnormalities  ~Successful complex angioplasty and stenting of LAD/D2       Echo: April '23   Study Conclusions     - Left ventricle: The cavity size is normal. Wall thickness was increased in a pattern of mild LVH.     There is moderate focal basal hypertrophy. Systolic function was normal. The estimated ejection     fraction was in the range of 60% to 65%. Wall motion was normal; there were no regional wall     motion abnormalities. Abnormal relaxation with increased filling pressures.   - Right ventricle: Systolic function was normal. TAPSE: 2.1cm.Tricuspid annular systolic velocity:     11cm/s.   - Mitral valve: Mildly calcified annulus.   - Pulmonary arteries: Systolic pressure could not be accurately estimated.   - Inferior vena cava: The vessel was normal in size. The respirophasic diameter changes were in the     normal range (>= 50%), consistent with normal central venous pressure.     Stress Test: April '21  Summary   Normal myocardial perfusion.   Normal LV size and systolic function.    Assessment:      Diagnosis Orders   1. Palpitations   ~persistent / bothersome  ~PVC burden 4.84% on Holter : Jan - Feb '24  ~states to have worsened despite increasing carvedilol   ~last K+ 3.8  ~AP regular  ~hx of untreated sleep apnea Cardiac event monitor      2. Coronary artery disease involving native coronary artery of native heart without angina pectoris   ~stable   ~PTCA LAD & diag'2 July '23  ~angina equivalent : fatigue & SOB. Denies recurrence of fatigue  ~normal LVEF  ~DAPT / PCSK-9 / BB       3. Mixed hyperlipidemia   ~trig suboptimal otherwise controlled  ~repatha 140 mg every 14 days           I had the opportunity to review the clinical symptoms and presentation of Ana Gonzales.   Plan:     EKG: sinus rhythm 68, no acute changes  14 day event monitor d/t continued c/o palpitations unrevealing on Holter  Encouraged to f/u with pul for SOB/sleep apnea  F/U as scheduled with

## 2024-03-06 NOTE — PATIENT INSTRUCTIONS
14 day heart monitor since you're still experiencing palpitations / flutters    Make an appt with your pulmonologist for your shortness of breath and for sleep apnea    Keep appt with Dr. Bates

## 2024-03-12 ENCOUNTER — APPOINTMENT (OUTPATIENT)
Dept: GENERAL RADIOLOGY | Age: 69
DRG: 330 | End: 2024-03-12

## 2024-03-12 ENCOUNTER — APPOINTMENT (OUTPATIENT)
Dept: CT IMAGING | Age: 69
DRG: 330 | End: 2024-03-12
Attending: INTERNAL MEDICINE

## 2024-03-12 ENCOUNTER — HOSPITAL ENCOUNTER (INPATIENT)
Age: 69
LOS: 28 days | Discharge: HOME HEALTH | DRG: 330 | End: 2024-04-10
Attending: INTERNAL MEDICINE | Admitting: INTERNAL MEDICINE
Payer: MEDICARE

## 2024-03-12 DIAGNOSIS — I49.8 VENTRICULAR BIGEMINY: ICD-10-CM

## 2024-03-12 DIAGNOSIS — R47.81 SLURRED SPEECH: Primary | ICD-10-CM

## 2024-03-12 DIAGNOSIS — K92.2 UPPER GI BLEED: ICD-10-CM

## 2024-03-12 DIAGNOSIS — R79.89 ELEVATED TROPONIN: ICD-10-CM

## 2024-03-12 DIAGNOSIS — K92.0 HEMATEMESIS, UNSPECIFIED WHETHER NAUSEA PRESENT: ICD-10-CM

## 2024-03-12 PROBLEM — G45.9 TIA (TRANSIENT ISCHEMIC ATTACK): Status: ACTIVE | Noted: 2024-03-12

## 2024-03-12 PROBLEM — I49.3 VENTRICULAR ECTOPY: Status: ACTIVE | Noted: 2024-03-12

## 2024-03-12 PROBLEM — Z95.5 HISTORY OF HEART ARTERY STENT: Status: ACTIVE | Noted: 2024-03-12

## 2024-03-12 PROBLEM — R47.1 DYSARTHRIA: Status: ACTIVE | Noted: 2024-03-12

## 2024-03-12 PROBLEM — N19 ACUTE PRERENAL AZOTEMIA: Status: ACTIVE | Noted: 2024-03-12

## 2024-03-12 LAB
ANION GAP SERPL CALCULATED.3IONS-SCNC: 16 MMOL/L (ref 3–16)
BASOPHILS # BLD: 0.1 K/UL (ref 0–0.2)
BASOPHILS # BLD: 0.1 K/UL (ref 0–0.2)
BASOPHILS NFR BLD: 1 %
BASOPHILS NFR BLD: 1.3 %
BUN SERPL-MCNC: 56 MG/DL (ref 7–20)
CALCIUM SERPL-MCNC: 9.1 MG/DL (ref 8.3–10.6)
CHLORIDE SERPL-SCNC: 107 MMOL/L (ref 99–110)
CO2 SERPL-SCNC: 19 MMOL/L (ref 21–32)
CREAT SERPL-MCNC: 1.2 MG/DL (ref 0.6–1.2)
DEPRECATED RDW RBC AUTO: 16.3 % (ref 12.4–15.4)
DEPRECATED RDW RBC AUTO: 16.7 % (ref 12.4–15.4)
EKG ATRIAL RATE: 95 BPM
EKG DIAGNOSIS: NORMAL
EKG P AXIS: 36 DEGREES
EKG P-R INTERVAL: 166 MS
EKG Q-T INTERVAL: 372 MS
EKG QRS DURATION: 94 MS
EKG QTC CALCULATION (BAZETT): 467 MS
EKG R AXIS: 38 DEGREES
EKG T AXIS: 72 DEGREES
EKG VENTRICULAR RATE: 95 BPM
EOSINOPHIL # BLD: 0 K/UL (ref 0–0.6)
EOSINOPHIL # BLD: 0.1 K/UL (ref 0–0.6)
EOSINOPHIL NFR BLD: 0.3 %
EOSINOPHIL NFR BLD: 0.7 %
GFR SERPLBLD CREATININE-BSD FMLA CKD-EPI: 49 ML/MIN/{1.73_M2}
GLUCOSE BLD-MCNC: 132 MG/DL (ref 70–99)
GLUCOSE BLD-MCNC: 354 MG/DL (ref 70–99)
GLUCOSE SERPL-MCNC: 121 MG/DL (ref 70–99)
HCT VFR BLD AUTO: 24.4 % (ref 36–48)
HCT VFR BLD AUTO: 31.8 % (ref 36–48)
HEMOCCULT STL QL: NORMAL
HGB BLD-MCNC: 10.3 G/DL (ref 12–16)
HGB BLD-MCNC: 8 G/DL (ref 12–16)
LYMPHOCYTES # BLD: 2.1 K/UL (ref 1–5.1)
LYMPHOCYTES # BLD: 2.7 K/UL (ref 1–5.1)
LYMPHOCYTES NFR BLD: 20.9 %
LYMPHOCYTES NFR BLD: 26.1 %
MAGNESIUM SERPL-MCNC: 2.4 MG/DL (ref 1.8–2.4)
MCH RBC QN AUTO: 27.2 PG (ref 26–34)
MCH RBC QN AUTO: 28.1 PG (ref 26–34)
MCHC RBC AUTO-ENTMCNC: 32.3 G/DL (ref 31–36)
MCHC RBC AUTO-ENTMCNC: 32.8 G/DL (ref 31–36)
MCV RBC AUTO: 84.3 FL (ref 80–100)
MCV RBC AUTO: 85.9 FL (ref 80–100)
MONOCYTES # BLD: 0.4 K/UL (ref 0–1.3)
MONOCYTES # BLD: 0.8 K/UL (ref 0–1.3)
MONOCYTES NFR BLD: 4.4 %
MONOCYTES NFR BLD: 8.1 %
NEUTROPHILS # BLD: 6.5 K/UL (ref 1.7–7.7)
NEUTROPHILS # BLD: 7.3 K/UL (ref 1.7–7.7)
NEUTROPHILS NFR BLD: 63.8 %
NEUTROPHILS NFR BLD: 73.4 %
NT-PROBNP SERPL-MCNC: 384 PG/ML (ref 0–124)
PERFORMED ON: ABNORMAL
PERFORMED ON: ABNORMAL
PLATELET # BLD AUTO: 297 K/UL (ref 135–450)
PLATELET # BLD AUTO: 307 K/UL (ref 135–450)
PMV BLD AUTO: 10 FL (ref 5–10.5)
PMV BLD AUTO: 10.3 FL (ref 5–10.5)
POTASSIUM SERPL-SCNC: 3.9 MMOL/L (ref 3.5–5.1)
RBC # BLD AUTO: 2.84 M/UL (ref 4–5.2)
RBC # BLD AUTO: 3.77 M/UL (ref 4–5.2)
SODIUM SERPL-SCNC: 142 MMOL/L (ref 136–145)
TROPONIN, HIGH SENSITIVITY: 16 NG/L (ref 0–14)
TROPONIN, HIGH SENSITIVITY: 18 NG/L (ref 0–14)
TROPONIN, HIGH SENSITIVITY: 20 NG/L (ref 0–14)
TROPONIN, HIGH SENSITIVITY: 22 NG/L (ref 0–14)
WBC # BLD AUTO: 10.2 K/UL (ref 4–11)
WBC # BLD AUTO: 9.9 K/UL (ref 4–11)

## 2024-03-12 PROCEDURE — 93005 ELECTROCARDIOGRAM TRACING: CPT | Performed by: NURSE PRACTITIONER

## 2024-03-12 PROCEDURE — 83735 ASSAY OF MAGNESIUM: CPT

## 2024-03-12 PROCEDURE — 6370000000 HC RX 637 (ALT 250 FOR IP): Performed by: INTERNAL MEDICINE

## 2024-03-12 PROCEDURE — 70498 CT ANGIOGRAPHY NECK: CPT

## 2024-03-12 PROCEDURE — 36415 COLL VENOUS BLD VENIPUNCTURE: CPT

## 2024-03-12 PROCEDURE — 84484 ASSAY OF TROPONIN QUANT: CPT

## 2024-03-12 PROCEDURE — G0378 HOSPITAL OBSERVATION PER HR: HCPCS | Performed by: INTERNAL MEDICINE

## 2024-03-12 PROCEDURE — 96372 THER/PROPH/DIAG INJ SC/IM: CPT

## 2024-03-12 PROCEDURE — 93005 ELECTROCARDIOGRAM TRACING: CPT | Performed by: INTERNAL MEDICINE

## 2024-03-12 PROCEDURE — 99285 EMERGENCY DEPT VISIT HI MDM: CPT

## 2024-03-12 PROCEDURE — 70450 CT HEAD/BRAIN W/O DYE: CPT

## 2024-03-12 PROCEDURE — 6360000002 HC RX W HCPCS: Performed by: INTERNAL MEDICINE

## 2024-03-12 PROCEDURE — 99223 1ST HOSP IP/OBS HIGH 75: CPT | Performed by: INTERNAL MEDICINE

## 2024-03-12 PROCEDURE — 6370000000 HC RX 637 (ALT 250 FOR IP): Performed by: NURSE PRACTITIONER

## 2024-03-12 PROCEDURE — G0378 HOSPITAL OBSERVATION PER HR: HCPCS

## 2024-03-12 PROCEDURE — 6360000004 HC RX CONTRAST MEDICATION: Performed by: INTERNAL MEDICINE

## 2024-03-12 PROCEDURE — 82270 OCCULT BLOOD FECES: CPT

## 2024-03-12 PROCEDURE — 93010 ELECTROCARDIOGRAM REPORT: CPT | Performed by: INTERNAL MEDICINE

## 2024-03-12 PROCEDURE — 83880 ASSAY OF NATRIURETIC PEPTIDE: CPT

## 2024-03-12 PROCEDURE — 2580000003 HC RX 258: Performed by: NURSE PRACTITIONER

## 2024-03-12 PROCEDURE — 99222 1ST HOSP IP/OBS MODERATE 55: CPT | Performed by: INTERNAL MEDICINE

## 2024-03-12 PROCEDURE — 94640 AIRWAY INHALATION TREATMENT: CPT

## 2024-03-12 PROCEDURE — 71045 X-RAY EXAM CHEST 1 VIEW: CPT

## 2024-03-12 PROCEDURE — 85025 COMPLETE CBC W/AUTO DIFF WBC: CPT

## 2024-03-12 PROCEDURE — 83036 HEMOGLOBIN GLYCOSYLATED A1C: CPT

## 2024-03-12 PROCEDURE — 80048 BASIC METABOLIC PNL TOTAL CA: CPT

## 2024-03-12 PROCEDURE — 2580000003 HC RX 258: Performed by: INTERNAL MEDICINE

## 2024-03-12 RX ORDER — MECLIZINE HCL 12.5 MG/1
25 TABLET ORAL 4 TIMES DAILY
Status: DISCONTINUED | OUTPATIENT
Start: 2024-03-12 | End: 2024-04-10 | Stop reason: HOSPADM

## 2024-03-12 RX ORDER — ASPIRIN 81 MG/1
81 TABLET, CHEWABLE ORAL DAILY
Status: DISCONTINUED | OUTPATIENT
Start: 2024-03-12 | End: 2024-03-12

## 2024-03-12 RX ORDER — INSULIN LISPRO 100 [IU]/ML
0-8 INJECTION, SOLUTION INTRAVENOUS; SUBCUTANEOUS
Status: DISCONTINUED | OUTPATIENT
Start: 2024-03-13 | End: 2024-03-13

## 2024-03-12 RX ORDER — TOPIRAMATE 100 MG/1
100 TABLET, FILM COATED ORAL EVERY MORNING
Status: DISCONTINUED | OUTPATIENT
Start: 2024-03-13 | End: 2024-04-10 | Stop reason: HOSPADM

## 2024-03-12 RX ORDER — ACETAMINOPHEN 325 MG/1
650 TABLET ORAL EVERY 4 HOURS PRN
Status: DISCONTINUED | OUTPATIENT
Start: 2024-03-12 | End: 2024-04-10 | Stop reason: HOSPADM

## 2024-03-12 RX ORDER — BUPROPION HYDROCHLORIDE 300 MG/1
300 TABLET ORAL EVERY MORNING
Status: DISCONTINUED | OUTPATIENT
Start: 2024-03-12 | End: 2024-04-10 | Stop reason: HOSPADM

## 2024-03-12 RX ORDER — SODIUM CHLORIDE 9 MG/ML
INJECTION, SOLUTION INTRAVENOUS CONTINUOUS
Status: DISCONTINUED | OUTPATIENT
Start: 2024-03-12 | End: 2024-03-13

## 2024-03-12 RX ORDER — ISOSORBIDE MONONITRATE 30 MG/1
120 TABLET, EXTENDED RELEASE ORAL DAILY
Status: DISCONTINUED | OUTPATIENT
Start: 2024-03-12 | End: 2024-04-10 | Stop reason: HOSPADM

## 2024-03-12 RX ORDER — ENOXAPARIN SODIUM 100 MG/ML
40 INJECTION SUBCUTANEOUS NIGHTLY
Status: DISCONTINUED | OUTPATIENT
Start: 2024-03-12 | End: 2024-03-19

## 2024-03-12 RX ORDER — GLUCAGON 1 MG/ML
1 KIT INJECTION PRN
Status: DISCONTINUED | OUTPATIENT
Start: 2024-03-12 | End: 2024-04-10 | Stop reason: HOSPADM

## 2024-03-12 RX ORDER — CARVEDILOL 3.12 MG/1
3.12 TABLET ORAL 2 TIMES DAILY WITH MEALS
Status: DISCONTINUED | OUTPATIENT
Start: 2024-03-12 | End: 2024-04-10 | Stop reason: HOSPADM

## 2024-03-12 RX ORDER — INSULIN LISPRO 100 [IU]/ML
0-4 INJECTION, SOLUTION INTRAVENOUS; SUBCUTANEOUS NIGHTLY
Status: DISCONTINUED | OUTPATIENT
Start: 2024-03-12 | End: 2024-04-02

## 2024-03-12 RX ORDER — PANTOPRAZOLE SODIUM 40 MG/1
40 TABLET, DELAYED RELEASE ORAL
Status: DISCONTINUED | OUTPATIENT
Start: 2024-03-13 | End: 2024-03-13

## 2024-03-12 RX ORDER — ONDANSETRON 4 MG/1
8 TABLET, ORALLY DISINTEGRATING ORAL EVERY 8 HOURS PRN
Status: DISCONTINUED | OUTPATIENT
Start: 2024-03-12 | End: 2024-03-13

## 2024-03-12 RX ORDER — 0.9 % SODIUM CHLORIDE 0.9 %
500 INTRAVENOUS SOLUTION INTRAVENOUS ONCE
Status: COMPLETED | OUTPATIENT
Start: 2024-03-12 | End: 2024-03-13

## 2024-03-12 RX ORDER — CLOPIDOGREL BISULFATE 75 MG/1
75 TABLET ORAL DAILY
Status: DISCONTINUED | OUTPATIENT
Start: 2024-03-12 | End: 2024-03-14

## 2024-03-12 RX ORDER — DILTIAZEM HYDROCHLORIDE 120 MG/1
120 CAPSULE, COATED, EXTENDED RELEASE ORAL DAILY
Status: DISCONTINUED | OUTPATIENT
Start: 2024-03-12 | End: 2024-04-10 | Stop reason: HOSPADM

## 2024-03-12 RX ORDER — METHENAMINE HIPPURATE 1000 MG/1
1 TABLET ORAL 2 TIMES DAILY WITH MEALS
Status: DISCONTINUED | OUTPATIENT
Start: 2024-03-12 | End: 2024-03-12

## 2024-03-12 RX ORDER — ARIPIPRAZOLE 2 MG/1
2 TABLET ORAL NIGHTLY
Status: DISCONTINUED | OUTPATIENT
Start: 2024-03-12 | End: 2024-04-10 | Stop reason: HOSPADM

## 2024-03-12 RX ORDER — IPRATROPIUM BROMIDE AND ALBUTEROL SULFATE 2.5; .5 MG/3ML; MG/3ML
1 SOLUTION RESPIRATORY (INHALATION) EVERY 6 HOURS PRN
Status: DISCONTINUED | OUTPATIENT
Start: 2024-03-12 | End: 2024-03-21

## 2024-03-12 RX ORDER — ASPIRIN 81 MG/1
81 TABLET ORAL DAILY
Status: DISCONTINUED | OUTPATIENT
Start: 2024-03-12 | End: 2024-04-10 | Stop reason: HOSPADM

## 2024-03-12 RX ORDER — MELOXICAM 7.5 MG/1
15 TABLET ORAL DAILY
Status: DISCONTINUED | OUTPATIENT
Start: 2024-03-12 | End: 2024-03-13

## 2024-03-12 RX ORDER — ONDANSETRON 4 MG/1
8 TABLET, ORALLY DISINTEGRATING ORAL DAILY
Status: DISCONTINUED | OUTPATIENT
Start: 2024-03-12 | End: 2024-03-12

## 2024-03-12 RX ORDER — METHENAMINE HIPPURATE 1000 MG/1
1 TABLET ORAL 2 TIMES DAILY WITH MEALS
Status: DISCONTINUED | OUTPATIENT
Start: 2024-03-13 | End: 2024-04-10 | Stop reason: HOSPADM

## 2024-03-12 RX ORDER — TROSPIUM CHLORIDE 20 MG/1
20 TABLET, FILM COATED ORAL
Status: DISCONTINUED | OUTPATIENT
Start: 2024-03-12 | End: 2024-04-10 | Stop reason: HOSPADM

## 2024-03-12 RX ORDER — TIZANIDINE 4 MG/1
2 TABLET ORAL NIGHTLY
Status: DISCONTINUED | OUTPATIENT
Start: 2024-03-12 | End: 2024-04-10 | Stop reason: HOSPADM

## 2024-03-12 RX ORDER — RANOLAZINE 500 MG/1
1000 TABLET, EXTENDED RELEASE ORAL 2 TIMES DAILY
Status: DISCONTINUED | OUTPATIENT
Start: 2024-03-12 | End: 2024-04-10 | Stop reason: HOSPADM

## 2024-03-12 RX ORDER — NORTRIPTYLINE HYDROCHLORIDE 25 MG/1
75 CAPSULE ORAL NIGHTLY
Status: DISCONTINUED | OUTPATIENT
Start: 2024-03-12 | End: 2024-04-10 | Stop reason: HOSPADM

## 2024-03-12 RX ORDER — FUROSEMIDE 40 MG/1
40 TABLET ORAL 2 TIMES DAILY
Status: DISCONTINUED | OUTPATIENT
Start: 2024-03-12 | End: 2024-04-09

## 2024-03-12 RX ORDER — GABAPENTIN 300 MG/1
600 CAPSULE ORAL 4 TIMES DAILY
Status: DISCONTINUED | OUTPATIENT
Start: 2024-03-12 | End: 2024-04-10 | Stop reason: HOSPADM

## 2024-03-12 RX ORDER — DEXTROSE MONOHYDRATE 100 MG/ML
INJECTION, SOLUTION INTRAVENOUS CONTINUOUS PRN
Status: DISCONTINUED | OUTPATIENT
Start: 2024-03-12 | End: 2024-04-10 | Stop reason: HOSPADM

## 2024-03-12 RX ADMIN — SODIUM CHLORIDE 500 ML: 9 INJECTION, SOLUTION INTRAVENOUS at 23:19

## 2024-03-12 RX ADMIN — FUROSEMIDE 40 MG: 40 TABLET ORAL at 18:11

## 2024-03-12 RX ADMIN — TROSPIUM CHLORIDE 20 MG: 20 TABLET, FILM COATED ORAL at 17:24

## 2024-03-12 RX ADMIN — ASPIRIN 81 MG: 81 TABLET, COATED ORAL at 17:21

## 2024-03-12 RX ADMIN — TOPIRAMATE 150 MG: 100 TABLET, FILM COATED ORAL at 17:22

## 2024-03-12 RX ADMIN — ACETAMINOPHEN 650 MG: 325 TABLET ORAL at 22:30

## 2024-03-12 RX ADMIN — DILTIAZEM HYDROCHLORIDE 120 MG: 120 CAPSULE, COATED, EXTENDED RELEASE ORAL at 17:23

## 2024-03-12 RX ADMIN — TIZANIDINE 2 MG: 4 TABLET ORAL at 21:07

## 2024-03-12 RX ADMIN — GABAPENTIN 600 MG: 300 CAPSULE ORAL at 17:21

## 2024-03-12 RX ADMIN — ONDANSETRON 8 MG: 4 TABLET, ORALLY DISINTEGRATING ORAL at 17:23

## 2024-03-12 RX ADMIN — MELOXICAM 15 MG: 7.5 TABLET ORAL at 17:21

## 2024-03-12 RX ADMIN — ENOXAPARIN SODIUM 40 MG: 100 INJECTION SUBCUTANEOUS at 21:07

## 2024-03-12 RX ADMIN — GABAPENTIN 600 MG: 300 CAPSULE ORAL at 21:07

## 2024-03-12 RX ADMIN — SODIUM CHLORIDE: 9 INJECTION, SOLUTION INTRAVENOUS at 17:51

## 2024-03-12 RX ADMIN — RANOLAZINE 1000 MG: 500 TABLET, EXTENDED RELEASE ORAL at 21:07

## 2024-03-12 RX ADMIN — INSULIN LISPRO 4 UNITS: 100 INJECTION, SOLUTION INTRAVENOUS; SUBCUTANEOUS at 23:35

## 2024-03-12 RX ADMIN — CARVEDILOL 3.12 MG: 3.12 TABLET, FILM COATED ORAL at 18:11

## 2024-03-12 RX ADMIN — NORTRIPTYLINE HYDROCHLORIDE 75 MG: 25 CAPSULE ORAL at 21:07

## 2024-03-12 RX ADMIN — ONDANSETRON 8 MG: 4 TABLET, ORALLY DISINTEGRATING ORAL at 22:30

## 2024-03-12 RX ADMIN — Medication 2 PUFF: at 20:11

## 2024-03-12 RX ADMIN — CLOPIDOGREL BISULFATE 75 MG: 75 TABLET ORAL at 17:22

## 2024-03-12 RX ADMIN — MECLIZINE HYDROCHLORIDE 25 MG: 12.5 TABLET ORAL at 17:22

## 2024-03-12 RX ADMIN — ISOSORBIDE MONONITRATE 120 MG: 60 TABLET, EXTENDED RELEASE ORAL at 17:47

## 2024-03-12 RX ADMIN — IOPAMIDOL 75 ML: 755 INJECTION, SOLUTION INTRAVENOUS at 08:56

## 2024-03-12 RX ADMIN — ARIPIPRAZOLE 2 MG: 2 TABLET ORAL at 21:12

## 2024-03-12 RX ADMIN — MECLIZINE HYDROCHLORIDE 25 MG: 12.5 TABLET ORAL at 21:07

## 2024-03-12 RX ADMIN — BUPROPION HYDROCHLORIDE 300 MG: 300 TABLET, EXTENDED RELEASE ORAL at 18:11

## 2024-03-12 ASSESSMENT — PAIN - FUNCTIONAL ASSESSMENT: PAIN_FUNCTIONAL_ASSESSMENT: 0-10

## 2024-03-12 ASSESSMENT — PAIN DESCRIPTION - DESCRIPTORS: DESCRIPTORS: ACHING

## 2024-03-12 ASSESSMENT — PAIN DESCRIPTION - LOCATION: LOCATION: CHEST

## 2024-03-12 ASSESSMENT — PAIN SCALES - GENERAL: PAINLEVEL_OUTOF10: 5

## 2024-03-12 NOTE — PROGRESS NOTES
Pharmacy Home Medication Reconciliation Note    A medication reconciliation has been completed for Ana Gonzales 1955    Pharmacy: [facility] [address] [place], [place] [address], [place], [place]  Information provided by: patient and  w/med list    The patient's home medication list is as follows:  No current facility-administered medications on file prior to encounter.     Current Outpatient Medications on File Prior to Encounter   Medication Sig Dispense Refill    [DISCONTINUED] carvedilol (COREG) 12.5 MG tablet Take 1 tablet by mouth 2 times daily (with meals) 180 tablet 3    ranolazine (RANEXA) 1000 MG extended release tablet Take 1 tablet by mouth 2 times daily 90 tablet 3    Evolocumab 140 MG/ML SOAJ Inject 1 Adjustable Dose Pre-filled Pen Syringe into the skin every 14 days 6 Adjustable Dose Pre-filled Pen Syringe 3    budesonide-formoterol (SYMBICORT) 160-4.5 MCG/ACT AERO Inhale 2 puffs into the lungs 2 times daily      isosorbide mononitrate (IMDUR) 120 MG extended release tablet Take 1 tablet by mouth daily (Patient taking differently: Take 1 tablet by mouth daily Takes at 9:00 am.) 90 tablet 3    aspirin 81 MG EC tablet Take 1 tablet by mouth daily (Patient taking differently: Take 1 tablet by mouth nightly) 90 tablet 3    clopidogrel (PLAVIX) 75 MG tablet Take 1 tablet by mouth daily (Patient taking differently: Take 1 tablet by mouth daily Takes at 9:00 am.) 90 tablet 3    furosemide (LASIX) 40 MG tablet Take 1 tablet by mouth 2 times daily 180 tablet 3    ondansetron (ZOFRAN) 4 MG tablet Take 1 tablet by mouth nightly      topiramate (TOPAMAX) 100 MG tablet Take 1.5 tablets by mouth every evening 5 pm      Rimegepant Sulfate (NURTEC) 75 MG TBDP Take 1 tablet by mouth as needed      methenamine (HIPREX) 1 g tablet Take 1 tablet by mouth 2 times daily (with meals)      trospium (SANCTURA) 20 MG tablet Take 1 tablet by mouth nightly      topiramate (TOPAMAX) 100 MG tablet Take 1 tablet by mouth every

## 2024-03-12 NOTE — CONSULTS
[facility] [place]   CONSULTATION  [phone]      Chief Complaint   Patient presents with    Bradycardia     Pt coming in with  pt has been bradycardic, weakness, slurred speech for a few days.  Vomiting yesterday with black emesis             History of Present Illness:  Ana Gonzales is a 68 y.o. patient who presented to the hospital with complaints of sob, fatigue. Brought today by family to the ED for bradycardia. HR was 30 per home pulse ox and bp cuff. She has runs of bigeminy which reduce the pulse. I have been asked to provide consultation regarding further management and testing. f CAD s/p PTCA LAD & diag'2 July '23       Past Medical History:   has a past medical history of Arthritis, Asthma, Brain injury ([facility]), Bronchitis, CHF (congestive heart failure) ([facility]), Depression, Diabetes mellitus (HCC), DM (diabetes mellitus screen), Elevated cholesterol with high triglycerides, Fall, Fibromyalgia, GERD (gastroesophageal reflux disease), HIGH CHOLESTEROL, Hypertension, Microvascular angina, Migraine, Neuropathy, Panic attacks, PONV (postoperative nausea and vomiting), Post traumatic stress disorder, Short-term memory loss, Skin cancer, Sleep apnea, and Vertigo.    Surgical History:   has a past surgical history that includes Hysterectomy; eye surgery (cataract B); Carpal tunnel release; Finger trigger release; Gallbladder surgery; bladder suspension; Skin cancer excision; blepharoplasty; laryngoscopy (08/03/2017); Coronary stent placement (06/13/2023); joint replacement (Right); back surgery; and Upper gastrointestinal endoscopy (N/A, 10/2/2023).     Social History:   reports that she has never smoked. She has never used smokeless tobacco. She reports that she does not drink alcohol and does not use drugs.     Family History:  family history includes Breast Cancer in her maternal cousin and paternal cousin; Breast Cancer (age of onset: 49) in her sister; Heart Disease in her father and mother.

## 2024-03-12 NOTE — RT PROTOCOL NOTE
RT Inhaler-Nebulizer Bronchodilator Protocol Note    There is a bronchodilator order in the chart from a provider indicating to follow the RT Bronchodilator Protocol and there is an “Initiate RT Inhaler-Nebulizer Bronchodilator Protocol” order as well (see protocol at bottom of note).    CXR Findings:  XR CHEST PORTABLE    Result Date: 3/12/2024  No significant findings in the chest.       The findings from the last RT Protocol Assessment were as follows:   History Pulmonary Disease: Chronic pulmonary disease  Respiratory Pattern: Regular pattern and RR 12-20 bpm  Breath Sounds: Clear breath sounds  Cough: Strong, spontaneous, non-productive  Indication for Bronchodilator Therapy:    Bronchodilator Assessment Score: 2    Aerosolized bronchodilator medication orders have been revised according to the RT Inhaler-Nebulizer Bronchodilator Protocol below.    Respiratory Therapist to perform RT Therapy Protocol Assessment initially then follow the protocol.  Repeat RT Therapy Protocol Assessment PRN for score 0-3 or on second treatment, BID, and PRN for scores above 3.    No Indications - adjust the frequency to every 6 hours PRN wheezing or bronchospasm, if no treatments needed after 48 hours then discontinue using Per Protocol order mode.     If indication present, adjust the RT bronchodilator orders based on the Bronchodilator Assessment Score as indicated below.  Use Inhaler orders unless patient has one or more of the following: on home nebulizer, not able to hold breath for 10 seconds, is not alert and oriented, cannot activate and use MDI correctly, or respiratory rate 25 breaths per minute or more, then use the equivalent nebulizer order(s) with same Frequency and PRN reasons based on the score.  If a patient is on this medication at home then do not decrease Frequency below that used at home.    0-3 - enter or revise RT bronchodilator order(s) to equivalent RT Bronchodilator order with Frequency of every 4 hours

## 2024-03-12 NOTE — PROGRESS NOTES
03/12/24 1701   RT Protocol   History Pulmonary Disease 2   Respiratory pattern 0   Breath sounds 0   Cough 0   Bronchodilator Assessment Score 2

## 2024-03-12 NOTE — PLAN OF CARE
Problem: Pain  Goal: Verbalizes/displays adequate comfort level or baseline comfort level  3/12/2024 1959 by Lester Ojeda, RN  Outcome: Progressing  3/12/2024 1716 by Sujatha Santillan  Outcome: Progressing     Problem: Discharge Planning  Goal: Discharge to home or other facility with appropriate resources  3/12/2024 1959 by Lester Ojeda, RN  Outcome: Progressing  3/12/2024 1716 by Sujatha Santillan  Outcome: Progressing
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
## 2024-03-12 NOTE — ED NOTES
Patient goes into frequent runs of bigeminy that last approx 15-30 min. PT states her metoprolol was recently increased x3 weeks ago and she feels like it has made it worse.     PT asymptomatic during bigeminy episodes.     PT states her heart rate dropped this morning to 30's. Per  patient verified HR with B/P and SPO2 monitor.     PT states this morning with nico episode she felt \"fluttery\" sensation to her chest, SOB and lightheadedness.    0 = independent
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
## 2024-03-12 NOTE — PROGRESS NOTES
Patient seen in ED, room 07.  Admission completed through History.  IP nurse will need to begin with Implants and complete the admission including the 4 Eyes Assessment, Immunizations, Vaccines, Rights and Responsibilities, Orientation to room, Plan of Care, Education/Learning Assessment and Education Plan, white board, height and weight, pain assessment and head to toe assessment.  Patient is alert and is being admitted for TIA.  Home Medication reconciliation was completed by Shawn Zavala Tech.  All questions answered.    Transport arrived to take patient to assigned inpatient room.

## 2024-03-12 NOTE — ED NOTES
ED TO INPATIENT SBAR HANDOFF    Patient Name: Ana Gonzales   :  1955  68 y.o.   MRN:  [id number]  Preferred Name: N/A  ED Room #:  ED-0007/07  Family/Caregiver Present: yes (significant other)  Restraints no   Sitter no   Sepsis Risk Score Sepsis Risk Score: 1.1    Situation  Code Status: Prior No additional code details.    Allergies: Cymbalta [duloxetine hcl], Ketamine, Prochlorperazine, Promethazine, Amoxicillin-pot clavulanate, Atenolol, Demerol, Ezetimibe, Furosemide, Glucosamine-chondroitin, Keflex [cephalexin], Ketorolac, Lisinopril, Meperidine, Phenergan [promethazine hcl], Prochlorperazine edisylate, Statins, Codeine, and Morphine  Weight: Patient Vitals for the past 96 hrs (Last 3 readings):   Weight   24 0742 93.9 kg (207 lb)     Arrived from: home  Chief Complaint:   Chief Complaint   Patient presents with    Bradycardia     Pt coming in with  pt has been bradycardic, weakness, slurred speech for a few days.  Vomiting yesterday with black emesis      Hospital Problem/Diagnosis:  Principal Problem:    TIA (transient ischemic attack)  Resolved Problems:    * No resolved hospital problems. *    Imaging:   CTA HEAD NECK W CONTRAST   Final Result   1. No acute intracranial abnormality within constraints of CT acquisition.   2. Severe narrowing of the distal left P1 segment with otherwise fetal origin   of the left PCA.   3. Otherwise, no significant stenosis elsewhere within the head or neck.  No   large vessel occlusion.         CT HEAD WO CONTRAST   Final Result   1. No acute intracranial abnormality within constraints of CT acquisition.   2. Severe narrowing of the distal left P1 segment with otherwise fetal origin   of the left PCA.   3. Otherwise, no significant stenosis elsewhere within the head or neck.  No   large vessel occlusion.         XR CHEST PORTABLE   Final Result   No significant findings in the chest.           Abnormal labs:   Abnormal Labs Reviewed   CBC WITH AUTO  (!) 122/51 133/67 (!) 133/52   Pulse: 83 84 84 88   Resp: 17 16 17 20   Temp:       SpO2: 98% 98% 100% 97%   Weight:       Height:         FiO2 (%): N/A  O2 Flow Rate: O2 Device: None (Room air)    Cardiac Rhythm:    Pain Assessment: 0 [x] Verbal [] Xiong Delacruz Scale  Pain Scale: Pain Assessment  Pain Assessment: 0-10  Pain Level: 5  Pain Location: Chest  Pain Descriptors: Aching  Last documented pain score (0-10 scale) Pain Level: 5  Last documented pain medication administered: none  Mental Status: alert  Orientation Level: Orientation Level: Oriented X4  NIH Score: Total: 1  C-SSRS:    Bedside swallow:    Bowler Coma Scale (GCS): Bowler Coma Scale  Eye Opening: Spontaneous  Best Verbal Response: Oriented  Best Motor Response: Obeys commands  Bowler Coma Scale Score: 15  Active LDA's:   Peripheral IV 03/12/24 Distal;Right Forearm (Active)   Site Assessment Clean, dry & intact 03/12/24 0800   Line Status Blood return noted;Normal saline locked 03/12/24 0800   Line Care Connections checked and tightened 03/12/24 0800   Phlebitis Assessment No symptoms 03/12/24 0800   Infiltration Assessment 0 03/12/24 0800   Alcohol Cap Used No 03/12/24 0800   Dressing Status New dressing applied;Clean, dry & intact 03/12/24 0800   Dressing Type Transparent 03/12/24 0800   Dressing Intervention New 03/12/24 0800     PO Status: Regular  Pertinent or High Risk Medications/Drips: no   If Yes, please provide details: N/A  Pending Blood Product Administration: no       You may also review the ED PT Care Timeline found under the Summary Nursing Index tab.    Recommendation    Pending orders: [id number]   Plan for Discharge (if known): home  Additional Comments: Pt alert and neuro intact. PT walks using a cane. PT takes pills whole. PT has frequent runs of Bi-geminy that last 15-30 min. PT asymptomatic during episodes.   If any further questions, please call Sending RN at [phone]    Electronically signed by: Electronically signed by Debo

## 2024-03-13 ENCOUNTER — ANESTHESIA (OUTPATIENT)
Dept: ENDOSCOPY | Age: 69
End: 2024-03-13
Payer: MEDICARE

## 2024-03-13 ENCOUNTER — APPOINTMENT (OUTPATIENT)
Dept: GENERAL RADIOLOGY | Age: 69
DRG: 330 | End: 2024-03-13

## 2024-03-13 ENCOUNTER — ANESTHESIA EVENT (OUTPATIENT)
Dept: ENDOSCOPY | Age: 69
End: 2024-03-13
Payer: MEDICARE

## 2024-03-13 ENCOUNTER — APPOINTMENT (OUTPATIENT)
Dept: MRI IMAGING | Age: 69
DRG: 330 | End: 2024-03-13

## 2024-03-13 PROBLEM — I25.83 CORONARY ARTERY DISEASE DUE TO LIPID RICH PLAQUE: Status: ACTIVE | Noted: 2023-06-13

## 2024-03-13 PROBLEM — K92.2 UPPER GI BLEED: Status: ACTIVE | Noted: 2024-03-13

## 2024-03-13 PROBLEM — R47.81 SLURRED SPEECH: Status: ACTIVE | Noted: 2024-03-13

## 2024-03-13 PROBLEM — E11.9 TYPE 2 DIABETES MELLITUS, WITHOUT LONG-TERM CURRENT USE OF INSULIN (HCC): Status: RESOLVED | Noted: 2017-06-14 | Resolved: 2024-03-13

## 2024-03-13 LAB
ABO + RH BLD: NORMAL
ALBUMIN SERPL-MCNC: 3.2 G/DL (ref 3.4–5)
ALBUMIN/GLOB SERPL: 1.9 {RATIO} (ref 1.1–2.2)
ALP SERPL-CCNC: 40 U/L (ref 40–129)
ALT SERPL-CCNC: 7 U/L (ref 10–40)
ANION GAP SERPL CALCULATED.3IONS-SCNC: 12 MMOL/L (ref 3–16)
ANION GAP SERPL CALCULATED.3IONS-SCNC: 17 MMOL/L (ref 3–16)
AST SERPL-CCNC: 22 U/L (ref 15–37)
BILIRUB SERPL-MCNC: <0.2 MG/DL (ref 0–1)
BLD GP AB SCN SERPL QL: NORMAL
BLOOD BANK DISPENSE STATUS: NORMAL
BLOOD BANK DISPENSE STATUS: NORMAL
BLOOD BANK PRODUCT CODE: NORMAL
BLOOD BANK PRODUCT CODE: NORMAL
BPU ID: NORMAL
BPU ID: NORMAL
BUN SERPL-MCNC: 54 MG/DL (ref 7–20)
BUN SERPL-MCNC: 63 MG/DL (ref 7–20)
CALCIUM SERPL-MCNC: 7.8 MG/DL (ref 8.3–10.6)
CALCIUM SERPL-MCNC: 8.5 MG/DL (ref 8.3–10.6)
CHLORIDE SERPL-SCNC: 110 MMOL/L (ref 99–110)
CHLORIDE SERPL-SCNC: 112 MMOL/L (ref 99–110)
CO2 SERPL-SCNC: 14 MMOL/L (ref 21–32)
CO2 SERPL-SCNC: 20 MMOL/L (ref 21–32)
CREAT SERPL-MCNC: 1.2 MG/DL (ref 0.6–1.2)
CREAT SERPL-MCNC: 1.4 MG/DL (ref 0.6–1.2)
DEPRECATED RDW RBC AUTO: 16.3 % (ref 12.4–15.4)
DESCRIPTION BLOOD BANK: NORMAL
DESCRIPTION BLOOD BANK: NORMAL
EKG ATRIAL RATE: 108 BPM
EKG DIAGNOSIS: NORMAL
EKG P AXIS: 46 DEGREES
EKG P-R INTERVAL: 156 MS
EKG Q-T INTERVAL: 348 MS
EKG QRS DURATION: 100 MS
EKG QTC CALCULATION (BAZETT): 466 MS
EKG R AXIS: 53 DEGREES
EKG T AXIS: 53 DEGREES
EKG VENTRICULAR RATE: 108 BPM
EST. AVERAGE GLUCOSE BLD GHB EST-MCNC: 116.9 MG/DL
FIBRINOGEN PPP-MCNC: 247 MG/DL (ref 243–550)
GFR SERPLBLD CREATININE-BSD FMLA CKD-EPI: 41 ML/MIN/{1.73_M2}
GFR SERPLBLD CREATININE-BSD FMLA CKD-EPI: 49 ML/MIN/{1.73_M2}
GLUCOSE BLD-MCNC: 158 MG/DL (ref 70–99)
GLUCOSE BLD-MCNC: 159 MG/DL (ref 70–99)
GLUCOSE BLD-MCNC: 169 MG/DL (ref 70–99)
GLUCOSE SERPL-MCNC: 173 MG/DL (ref 70–99)
GLUCOSE SERPL-MCNC: 297 MG/DL (ref 70–99)
HBA1C MFR BLD: 5.7 %
HCT VFR BLD AUTO: 21.1 % (ref 36–48)
HCT VFR BLD AUTO: 25.7 % (ref 36–48)
HCT VFR BLD AUTO: 28.8 % (ref 36–48)
HCT VFR BLD AUTO: 30.8 % (ref 36–48)
HGB BLD-MCNC: 10.1 G/DL (ref 12–16)
HGB BLD-MCNC: 6.6 G/DL (ref 12–16)
HGB BLD-MCNC: 8.3 G/DL (ref 12–16)
HGB BLD-MCNC: 9.4 G/DL (ref 12–16)
MCH RBC QN AUTO: 27.9 PG (ref 26–34)
MCHC RBC AUTO-ENTMCNC: 32.3 G/DL (ref 31–36)
MCV RBC AUTO: 86.4 FL (ref 80–100)
PERFORMED ON: ABNORMAL
PLATELET # BLD AUTO: 214 K/UL (ref 135–450)
PMV BLD AUTO: 10.2 FL (ref 5–10.5)
POTASSIUM SERPL-SCNC: 4.2 MMOL/L (ref 3.5–5.1)
POTASSIUM SERPL-SCNC: 4.7 MMOL/L (ref 3.5–5.1)
PROT SERPL-MCNC: 4.9 G/DL (ref 6.4–8.2)
RBC # BLD AUTO: 2.97 M/UL (ref 4–5.2)
SODIUM SERPL-SCNC: 141 MMOL/L (ref 136–145)
SODIUM SERPL-SCNC: 144 MMOL/L (ref 136–145)
WBC # BLD AUTO: 18.6 K/UL (ref 4–11)

## 2024-03-13 PROCEDURE — 86900 BLOOD TYPING SEROLOGIC ABO: CPT

## 2024-03-13 PROCEDURE — 6360000002 HC RX W HCPCS: Performed by: INTERNAL MEDICINE

## 2024-03-13 PROCEDURE — 97530 THERAPEUTIC ACTIVITIES: CPT

## 2024-03-13 PROCEDURE — 93306 TTE W/DOPPLER COMPLETE: CPT

## 2024-03-13 PROCEDURE — 94640 AIRWAY INHALATION TREATMENT: CPT

## 2024-03-13 PROCEDURE — 71045 X-RAY EXAM CHEST 1 VIEW: CPT

## 2024-03-13 PROCEDURE — 2580000003 HC RX 258: Performed by: INTERNAL MEDICINE

## 2024-03-13 PROCEDURE — 86901 BLOOD TYPING SEROLOGIC RH(D): CPT

## 2024-03-13 PROCEDURE — 85384 FIBRINOGEN ACTIVITY: CPT

## 2024-03-13 PROCEDURE — 85027 COMPLETE CBC AUTOMATED: CPT

## 2024-03-13 PROCEDURE — 80053 COMPREHEN METABOLIC PANEL: CPT

## 2024-03-13 PROCEDURE — 6360000002 HC RX W HCPCS: Performed by: STUDENT IN AN ORGANIZED HEALTH CARE EDUCATION/TRAINING PROGRAM

## 2024-03-13 PROCEDURE — 86850 RBC ANTIBODY SCREEN: CPT

## 2024-03-13 PROCEDURE — C9113 INJ PANTOPRAZOLE SODIUM, VIA: HCPCS | Performed by: INTERNAL MEDICINE

## 2024-03-13 PROCEDURE — 2000000000 HC ICU R&B

## 2024-03-13 PROCEDURE — 97166 OT EVAL MOD COMPLEX 45 MIN: CPT

## 2024-03-13 PROCEDURE — 99232 SBSQ HOSP IP/OBS MODERATE 35: CPT | Performed by: NURSE PRACTITIONER

## 2024-03-13 PROCEDURE — 36430 TRANSFUSION BLD/BLD COMPNT: CPT

## 2024-03-13 PROCEDURE — 2709999900 HC NON-CHARGEABLE SUPPLY: Performed by: INTERNAL MEDICINE

## 2024-03-13 PROCEDURE — 36556 INSERT NON-TUNNEL CV CATH: CPT

## 2024-03-13 PROCEDURE — 51798 US URINE CAPACITY MEASURE: CPT

## 2024-03-13 PROCEDURE — 2500000003 HC RX 250 WO HCPCS: Performed by: NURSE ANESTHETIST, CERTIFIED REGISTERED

## 2024-03-13 PROCEDURE — 70551 MRI BRAIN STEM W/O DYE: CPT

## 2024-03-13 PROCEDURE — APPNB30 APP NON BILLABLE TIME 0-30 MINS

## 2024-03-13 PROCEDURE — 6370000000 HC RX 637 (ALT 250 FOR IP): Performed by: INTERNAL MEDICINE

## 2024-03-13 PROCEDURE — 3700000001 HC ADD 15 MINUTES (ANESTHESIA): Performed by: INTERNAL MEDICINE

## 2024-03-13 PROCEDURE — 6360000002 HC RX W HCPCS: Performed by: PHYSICIAN ASSISTANT

## 2024-03-13 PROCEDURE — 86923 COMPATIBILITY TEST ELECTRIC: CPT

## 2024-03-13 PROCEDURE — 7100000000 HC PACU RECOVERY - FIRST 15 MIN: Performed by: INTERNAL MEDICINE

## 2024-03-13 PROCEDURE — 0DJ08ZZ INSPECTION OF UPPER INTESTINAL TRACT, VIA NATURAL OR ARTIFICIAL OPENING ENDOSCOPIC: ICD-10-PCS | Performed by: INTERNAL MEDICINE

## 2024-03-13 PROCEDURE — 85018 HEMOGLOBIN: CPT

## 2024-03-13 PROCEDURE — 36415 COLL VENOUS BLD VENIPUNCTURE: CPT

## 2024-03-13 PROCEDURE — 93010 ELECTROCARDIOGRAM REPORT: CPT | Performed by: INTERNAL MEDICINE

## 2024-03-13 PROCEDURE — 6360000002 HC RX W HCPCS: Performed by: NURSE ANESTHETIST, CERTIFIED REGISTERED

## 2024-03-13 PROCEDURE — C9113 INJ PANTOPRAZOLE SODIUM, VIA: HCPCS | Performed by: PHYSICIAN ASSISTANT

## 2024-03-13 PROCEDURE — 7100000001 HC PACU RECOVERY - ADDTL 15 MIN: Performed by: INTERNAL MEDICINE

## 2024-03-13 PROCEDURE — 3700000000 HC ANESTHESIA ATTENDED CARE: Performed by: INTERNAL MEDICINE

## 2024-03-13 PROCEDURE — 2580000003 HC RX 258: Performed by: PHYSICIAN ASSISTANT

## 2024-03-13 PROCEDURE — 3609017100 HC EGD: Performed by: INTERNAL MEDICINE

## 2024-03-13 PROCEDURE — 94761 N-INVAS EAR/PLS OXIMETRY MLT: CPT

## 2024-03-13 PROCEDURE — P9016 RBC LEUKOCYTES REDUCED: HCPCS

## 2024-03-13 PROCEDURE — 05HM33Z INSERTION OF INFUSION DEVICE INTO RIGHT INTERNAL JUGULAR VEIN, PERCUTANEOUS APPROACH: ICD-10-PCS | Performed by: INTERNAL MEDICINE

## 2024-03-13 PROCEDURE — 85014 HEMATOCRIT: CPT

## 2024-03-13 PROCEDURE — 2580000003 HC RX 258: Performed by: ANESTHESIOLOGY

## 2024-03-13 PROCEDURE — 97162 PT EVAL MOD COMPLEX 30 MIN: CPT

## 2024-03-13 RX ORDER — SODIUM CHLORIDE 9 MG/ML
INJECTION, SOLUTION INTRAVENOUS PRN
Status: DISCONTINUED | OUTPATIENT
Start: 2024-03-13 | End: 2024-04-10 | Stop reason: HOSPADM

## 2024-03-13 RX ORDER — SODIUM CHLORIDE 0.9 % (FLUSH) 0.9 %
5-40 SYRINGE (ML) INJECTION PRN
Status: DISCONTINUED | OUTPATIENT
Start: 2024-03-13 | End: 2024-04-10 | Stop reason: HOSPADM

## 2024-03-13 RX ORDER — ONDANSETRON 2 MG/ML
4 INJECTION INTRAMUSCULAR; INTRAVENOUS EVERY 6 HOURS PRN
Status: DISCONTINUED | OUTPATIENT
Start: 2024-03-13 | End: 2024-04-10 | Stop reason: HOSPADM

## 2024-03-13 RX ORDER — SODIUM CHLORIDE, SODIUM LACTATE, POTASSIUM CHLORIDE, CALCIUM CHLORIDE 600; 310; 30; 20 MG/100ML; MG/100ML; MG/100ML; MG/100ML
INJECTION, SOLUTION INTRAVENOUS CONTINUOUS
Status: DISCONTINUED | OUTPATIENT
Start: 2024-03-13 | End: 2024-03-15

## 2024-03-13 RX ORDER — ACETAMINOPHEN 650 MG/1
650 SUPPOSITORY RECTAL EVERY 6 HOURS PRN
Status: DISCONTINUED | OUTPATIENT
Start: 2024-03-13 | End: 2024-04-10 | Stop reason: HOSPADM

## 2024-03-13 RX ORDER — PANTOPRAZOLE SODIUM 40 MG/10ML
80 INJECTION, POWDER, LYOPHILIZED, FOR SOLUTION INTRAVENOUS ONCE
Status: COMPLETED | OUTPATIENT
Start: 2024-03-13 | End: 2024-03-13

## 2024-03-13 RX ORDER — SODIUM CHLORIDE 9 MG/ML
INJECTION, SOLUTION INTRAVENOUS PRN
Status: DISCONTINUED | OUTPATIENT
Start: 2024-03-12 | End: 2024-03-23

## 2024-03-13 RX ORDER — INSULIN LISPRO 100 [IU]/ML
0-8 INJECTION, SOLUTION INTRAVENOUS; SUBCUTANEOUS EVERY 4 HOURS
Status: DISCONTINUED | OUTPATIENT
Start: 2024-03-13 | End: 2024-04-02

## 2024-03-13 RX ORDER — ONDANSETRON 4 MG/1
4 TABLET, ORALLY DISINTEGRATING ORAL EVERY 8 HOURS PRN
Status: DISCONTINUED | OUTPATIENT
Start: 2024-03-13 | End: 2024-03-13

## 2024-03-13 RX ORDER — SODIUM CHLORIDE 0.9 % (FLUSH) 0.9 %
5-40 SYRINGE (ML) INJECTION EVERY 12 HOURS SCHEDULED
Status: DISCONTINUED | OUTPATIENT
Start: 2024-03-13 | End: 2024-04-10 | Stop reason: HOSPADM

## 2024-03-13 RX ORDER — ONDANSETRON 8 MG/1
8 TABLET, ORALLY DISINTEGRATING ORAL EVERY 8 HOURS PRN
Status: DISCONTINUED | OUTPATIENT
Start: 2024-03-13 | End: 2024-04-10 | Stop reason: HOSPADM

## 2024-03-13 RX ORDER — LIDOCAINE HYDROCHLORIDE 20 MG/ML
INJECTION, SOLUTION INFILTRATION; PERINEURAL PRN
Status: DISCONTINUED | OUTPATIENT
Start: 2024-03-13 | End: 2024-03-13 | Stop reason: SDUPTHER

## 2024-03-13 RX ORDER — SODIUM CHLORIDE, SODIUM LACTATE, POTASSIUM CHLORIDE, AND CALCIUM CHLORIDE .6; .31; .03; .02 G/100ML; G/100ML; G/100ML; G/100ML
500 INJECTION, SOLUTION INTRAVENOUS ONCE
Status: DISCONTINUED | OUTPATIENT
Start: 2024-03-13 | End: 2024-03-13

## 2024-03-13 RX ORDER — METOCLOPRAMIDE HYDROCHLORIDE 5 MG/ML
10 INJECTION INTRAMUSCULAR; INTRAVENOUS EVERY 6 HOURS
Status: DISCONTINUED | OUTPATIENT
Start: 2024-03-13 | End: 2024-03-15

## 2024-03-13 RX ORDER — GLUCAGON 1 MG/ML
1 KIT INJECTION PRN
Status: DISCONTINUED | OUTPATIENT
Start: 2024-03-13 | End: 2024-03-13 | Stop reason: SDUPTHER

## 2024-03-13 RX ORDER — DIAZEPAM 5 MG/ML
5 INJECTION, SOLUTION INTRAMUSCULAR; INTRAVENOUS
Status: DISCONTINUED | OUTPATIENT
Start: 2024-03-13 | End: 2024-03-16

## 2024-03-13 RX ORDER — DIAZEPAM 5 MG/ML
5 INJECTION, SOLUTION INTRAMUSCULAR; INTRAVENOUS EVERY 8 HOURS PRN
Status: COMPLETED | OUTPATIENT
Start: 2024-03-13 | End: 2024-03-21

## 2024-03-13 RX ORDER — ORPHENADRINE CITRATE 30 MG/ML
60 INJECTION INTRAMUSCULAR; INTRAVENOUS NIGHTLY PRN
Status: COMPLETED | OUTPATIENT
Start: 2024-03-13 | End: 2024-03-26

## 2024-03-13 RX ORDER — PROPOFOL 10 MG/ML
INJECTION, EMULSION INTRAVENOUS PRN
Status: DISCONTINUED | OUTPATIENT
Start: 2024-03-13 | End: 2024-03-13 | Stop reason: SDUPTHER

## 2024-03-13 RX ORDER — SODIUM CHLORIDE, SODIUM LACTATE, POTASSIUM CHLORIDE, AND CALCIUM CHLORIDE .6; .31; .03; .02 G/100ML; G/100ML; G/100ML; G/100ML
500 INJECTION, SOLUTION INTRAVENOUS ONCE
Status: COMPLETED | OUTPATIENT
Start: 2024-03-13 | End: 2024-03-13

## 2024-03-13 RX ORDER — DEXTROSE MONOHYDRATE 100 MG/ML
INJECTION, SOLUTION INTRAVENOUS CONTINUOUS PRN
Status: DISCONTINUED | OUTPATIENT
Start: 2024-03-13 | End: 2024-03-17

## 2024-03-13 RX ORDER — SODIUM CHLORIDE 9 MG/ML
INJECTION, SOLUTION INTRAVENOUS CONTINUOUS
Status: DISCONTINUED | OUTPATIENT
Start: 2024-03-13 | End: 2024-03-13 | Stop reason: HOSPADM

## 2024-03-13 RX ORDER — ACETAMINOPHEN 325 MG/1
650 TABLET ORAL EVERY 6 HOURS PRN
Status: DISCONTINUED | OUTPATIENT
Start: 2024-03-13 | End: 2024-03-13

## 2024-03-13 RX ADMIN — Medication 2 PUFF: at 09:21

## 2024-03-13 RX ADMIN — PANTOPRAZOLE SODIUM 80 MG: 40 INJECTION, POWDER, FOR SOLUTION INTRAVENOUS at 00:27

## 2024-03-13 RX ADMIN — DIAZEPAM 5 MG: 5 INJECTION, SOLUTION INTRAMUSCULAR; INTRAVENOUS at 23:29

## 2024-03-13 RX ADMIN — PROPOFOL 50 MG: 10 INJECTION, EMULSION INTRAVENOUS at 10:24

## 2024-03-13 RX ADMIN — ACETAMINOPHEN 650 MG: 650 SUPPOSITORY RECTAL at 17:07

## 2024-03-13 RX ADMIN — METOCLOPRAMIDE 10 MG: 5 INJECTION, SOLUTION INTRAMUSCULAR; INTRAVENOUS at 23:28

## 2024-03-13 RX ADMIN — SODIUM CHLORIDE, POTASSIUM CHLORIDE, SODIUM LACTATE AND CALCIUM CHLORIDE: 600; 310; 30; 20 INJECTION, SOLUTION INTRAVENOUS at 07:28

## 2024-03-13 RX ADMIN — SODIUM CHLORIDE: 9 INJECTION, SOLUTION INTRAVENOUS at 10:19

## 2024-03-13 RX ADMIN — Medication 10 ML: at 17:13

## 2024-03-13 RX ADMIN — SODIUM CHLORIDE, POTASSIUM CHLORIDE, SODIUM LACTATE AND CALCIUM CHLORIDE 500 ML: 600; 310; 30; 20 INJECTION, SOLUTION INTRAVENOUS at 03:19

## 2024-03-13 RX ADMIN — SODIUM CHLORIDE, POTASSIUM CHLORIDE, SODIUM LACTATE AND CALCIUM CHLORIDE: 600; 310; 30; 20 INJECTION, SOLUTION INTRAVENOUS at 19:38

## 2024-03-13 RX ADMIN — SODIUM CHLORIDE, POTASSIUM CHLORIDE, SODIUM LACTATE AND CALCIUM CHLORIDE 500 ML: 600; 310; 30; 20 INJECTION, SOLUTION INTRAVENOUS at 03:36

## 2024-03-13 RX ADMIN — PROPOFOL 100 MCG/KG/MIN: 10 INJECTION, EMULSION INTRAVENOUS at 10:25

## 2024-03-13 RX ADMIN — PANTOPRAZOLE SODIUM 8 MG/HR: 40 INJECTION, POWDER, FOR SOLUTION INTRAVENOUS at 17:14

## 2024-03-13 RX ADMIN — SODIUM CHLORIDE, POTASSIUM CHLORIDE, SODIUM LACTATE AND CALCIUM CHLORIDE 500 ML: 600; 310; 30; 20 INJECTION, SOLUTION INTRAVENOUS at 00:39

## 2024-03-13 RX ADMIN — PANTOPRAZOLE SODIUM 8 MG/HR: 40 INJECTION, POWDER, FOR SOLUTION INTRAVENOUS at 09:04

## 2024-03-13 RX ADMIN — Medication 2 PUFF: at 21:35

## 2024-03-13 RX ADMIN — DIAZEPAM 5 MG: 5 INJECTION, SOLUTION INTRAMUSCULAR; INTRAVENOUS at 17:45

## 2024-03-13 RX ADMIN — Medication 10 ML: at 22:09

## 2024-03-13 RX ADMIN — METOCLOPRAMIDE 10 MG: 5 INJECTION, SOLUTION INTRAMUSCULAR; INTRAVENOUS at 14:35

## 2024-03-13 RX ADMIN — METOCLOPRAMIDE 10 MG: 5 INJECTION, SOLUTION INTRAMUSCULAR; INTRAVENOUS at 17:07

## 2024-03-13 RX ADMIN — SODIUM CHLORIDE, POTASSIUM CHLORIDE, SODIUM LACTATE AND CALCIUM CHLORIDE: 600; 310; 30; 20 INJECTION, SOLUTION INTRAVENOUS at 00:26

## 2024-03-13 RX ADMIN — LIDOCAINE HYDROCHLORIDE 100 MG: 20 INJECTION, SOLUTION INFILTRATION; PERINEURAL at 10:24

## 2024-03-13 ASSESSMENT — PAIN SCALES - GENERAL: PAINLEVEL_OUTOF10: 8

## 2024-03-13 ASSESSMENT — PAIN - FUNCTIONAL ASSESSMENT: PAIN_FUNCTIONAL_ASSESSMENT: 0-10

## 2024-03-13 ASSESSMENT — PAIN DESCRIPTION - DESCRIPTORS: DESCRIPTORS: ACHING

## 2024-03-13 ASSESSMENT — PAIN DESCRIPTION - LOCATION: LOCATION: BACK

## 2024-03-13 NOTE — PROGRESS NOTES
[facility]   Electrophysiology Progress Note     Date: 3/13/2024  Admit Date: 3/12/2024     Reason for consultation: PVC    Chief Complaint:   Chief Complaint   Patient presents with    Bradycardia     Pt coming in with  pt has been bradycardic, weakness, slurred speech for a few days.  Vomiting yesterday with black emesis        History of Present Illness: History obtained from patient and medical record.     Ana Gonzales is a 68 y.o. female with a past medical history of CAD status post PCI, diabetes, hypertension, sleep apnea, asthma, hyperlipidemia came to emergency room due to generalized weakness, slurred speech and low heart rate.  In the emergency room she was noted to have low pulse rate.  After connecting to the telemetry was noted to be due to frequent PVCs.  She has had history of palpitations and has a monitor on. A previous monitor in January showed 4% PVC. Patient was also noted to have coffee ground emesis at home.      Interval Hx: Today, she is awake and in the chair, son at bedside. NG in place. EGD will be repeated tomorrow per patient. NSR on tele currently, she had 11 beat run of vtach overnight, she was unaware this happen and states she can normally feel her PVCs. Denies chest pain or shortness of breath.    Patient seen and examined. Clinical notes reviewed. Telemetry reviewed.  No new complaints today. No major events overnight.   Denies having chest pain, palpitations, shortness of breath, orthopnea/PND, cough, or dizziness at the time of this visit.    Allergies:  Allergies   Allergen Reactions    Cymbalta [Duloxetine Hcl] Anxiety     Migraine, panic attack    Ketamine Nausea And Vomiting    Prochlorperazine Nausea And Vomiting and Other (See Comments)     Dystonic reaction    Promethazine Anxiety and Other (See Comments)     screams  Feels like in a hole    Amoxicillin-Pot Clavulanate      Pt states she tolerates Augmentin    Atenolol      Bradycardia    Demerol

## 2024-03-13 NOTE — PROGRESS NOTES
After ecg performed, this RN was called to the room for reports of bloody emesis. Upon assessment patient found to be hypotensive, oriented, diaphoretic and vomiting dark red fluid.     Provider notified and orders followed. MD en route. Charge RN and RN at bedside with this RN.     Will continue to assess.

## 2024-03-13 NOTE — CARE COORDINATION
Discharge Planning Note:    Chart reviewed and it appears that patient has minimal needs for discharge at this time. Risk Score 15 %     Primary Care Physician is  Mellisa Houston MD   Primary insurance is [employer] Adv.    PT/OT evals ordered and pending, CM will follow for needs.     Pt is from home with spouse and reported 1 sided deficits from previous brain surgery.     Please notify case management if any discharge needs are identified.      Case management will continue to follow progress and update discharge plan as needed.

## 2024-03-13 NOTE — PROGRESS NOTES
Noted  critical events overnight and seen hospitalists have assumed the care . Will follow from distance and reassume the care if and when she gets out of ICU

## 2024-03-13 NOTE — ANESTHESIA POSTPROCEDURE EVALUATION
Department of Anesthesiology  Postprocedure Note    Patient: Ana Gonzales  MRN: [id number]  YOB: 1955  Date of evaluation: 3/13/2024    Procedure Summary       Date: 03/13/24 Room / Location: [facility] 06 / [facility]    Anesthesia Start: 1020 Anesthesia Stop: 1059    Procedure: ESOPHAGOGASTRODUODENOSCOPY DIAGNOSTIC ONLY (Abdomen) Diagnosis:       Hematemesis, unspecified whether nausea present      (Hematemesis, unspecified whether nausea present [K92.0])    Surgeons: Hakeem Zaldivar MD Responsible Provider: Chad Garcia MD    Anesthesia Type: MAC, general ASA Status: 4            Anesthesia Type: No value filed.    Hortensia Phase I: Hortensia Score: 5    Hortensia Phase II:      Anesthesia Post Evaluation    Patient location during evaluation: PACU  Patient participation: complete - patient participated  Level of consciousness: awake and alert  Airway patency: patent  Nausea & Vomiting: no vomiting and no nausea  Cardiovascular status: hemodynamically stable  Respiratory status: acceptable  Hydration status: stable  Pain management: adequate    No notable events documented.

## 2024-03-13 NOTE — ANESTHESIA PRE PROCEDURE
Physical Therapy Discharge Summary    Name: Angel Farmer Jr.  MRN: [id number]   Principal Problem: CVA (cerebral vascular accident)     Patient Discharged from acute Physical Therapy on 2019.  Please refer to prior PT noted date on 2019 for functional status.     Assessment:     Patient appropriate for care in another setting.    Objective:     GOALS:   Multidisciplinary Problems     Physical Therapy Goals        Problem: Physical Therapy Goal    Goal Priority Disciplines Outcome Goal Variances Interventions   Physical Therapy Goal     PT, PT/OT Ongoing (interventions implemented as appropriate)     Description:  Goals to be met by: 3/27/19     Patient will increase functional independence with mobility by performin. Supine <> sit with MInimal Assistance  2. Sit to stand transfer with Contact Guard Assistance  3. Bed to chair transfer with Contact Guard Assistance using Rolling Walker  4. Gait  x 25 feet with Contact Guard Assistance using Rolling Walker.                       Reasons for Discontinuation of Therapy Services  Transfer to alternate level of care.      Plan:     Patient Discharged to: Skilled Nursing Facility.    Ruddy Story, PT  3/1/2019  
CAD:, CHF:                  Neuro/Psych:   (+) neuromuscular disease:, TIA, headaches:, psychiatric history:            GI/Hepatic/Renal:   (+) GERD:          Endo/Other:    (+) DiabetesType I DM, Type II DM.                 Abdominal:             Vascular:          Other Findings:       Anesthesia Plan      MAC and general     ASA 4                               Branden Vasquez MD   3/13/2024

## 2024-03-13 NOTE — PROGRESS NOTES
Patient seen earlier today by my colleague. Full progress note to follow tomorrow.     In brief, patient had some coffee ground emesis on arrival. Concern for GI bleed.      ASSESSMENT:    Active Hospital Problems    Diagnosis     Upper GI bleed [K92.2]     TIA (transient ischemic attack) [G45.9]     Dysarthria [R47.1]     Ventricular ectopy [I49.3]     Acute prerenal azotemia [N19]     History of heart artery stent [Z95.5]     (HFpEF) heart failure with preserved ejection fraction (HCC) [I50.30]     ASHD (arteriosclerotic heart disease) [I25.10]     Type 2 diabetes mellitus, without long-term current use of insulin (HCC) [E11.9]     Hypertension [I10]     Mixed hyperlipidemia [E78.2]          PLAN:    -EGD today  -GI consulted  -PPI gtt  -NPO  -Hold ppx anticoagulation        Tc Shearer DO  3/13/2024  9:32 AM

## 2024-03-13 NOTE — PROGRESS NOTES
Report given to ICU RN at bedside. Patient escorted with MD, RN x2.    All belongings and chart sent with patient.    No further questions.

## 2024-03-13 NOTE — CARE COORDINATION
03/13/24 1434   IMM Letter   IMM Letter given to Patient/Family/Significant other/Guardian/POA/by: Nanda Hernandez RN CM - given for upgrade to inpatient status   IMM Letter date given: 03/13/24   IMM Letter time given: 1422  (copy made for hard chart)

## 2024-03-13 NOTE — PROGRESS NOTES
[facility] - Inpatient Rehabilitation Department   Phone: [phone]    Physical Therapy    [x] Initial Evaluation            [] Daily Treatment Note         [] Discharge Summary      Patient: Ana Gonzales   : 1955   MRN: [id number]   Date of Service:  3/13/2024  Admitting Diagnosis: TIA (transient ischemic attack)    Current Admission Summary: This is a 68 y.o. female who was brought in by self for generalized weakness and slurred speech over the past couple days.  Patient was also found to be bradycardic.  Her  at bedside gives most the history.  He states that she has been following with Dr. Bates and the cardiology group.  He is not sure if he has seen an EP physician.  She has had difficulty with palpitations and PVCs.  She has had a heart monitor placed but recently they placed a second heart monitor and this time will be for a 2-week period.  Patient has intermittent slurred speech.  Patient does have a history of a subdural hemorrhage.  She has had intracranial surgery according to her  states that 1 side of her body is affected chronically.     Past Medical History:  has a past medical history of Arthritis, Asthma, Brain injury ([facility]), Bronchitis, CHF (congestive heart failure) ([facility]), Depression, Diabetes mellitus ([facility]), DM (diabetes mellitus screen), Elevated cholesterol with high triglycerides, Fall, Fibromyalgia, GERD (gastroesophageal reflux disease), HIGH CHOLESTEROL, Hypertension, Microvascular angina, Migraine, Neuropathy, Panic attacks, PONV (postoperative nausea and vomiting), Post traumatic stress disorder, Short-term memory loss, Skin cancer, Sleep apnea, and Vertigo.    Past Surgical History:  has a past surgical history that includes Hysterectomy; eye surgery (cataract B); Carpal tunnel release; Finger trigger release; Gallbladder surgery; bladder suspension; Skin cancer excision; blepharoplasty; laryngoscopy (2017); Coronary stent placement

## 2024-03-13 NOTE — PROGRESS NOTES
Pt back in room 3917.     NG to right nare at 64. Low continuous suction. 30ml flush. No output at this time.     Vitals stable.     Vitals:    03/13/24 1145   BP: (!) 138/56   Pulse: 94   Resp: 18   Temp: 100.2 °F (37.9 °C)   SpO2:          Repeat hgb 10.1.     Pt denies pain.     Orders released.      at bedside and updated.     Pt is now a full code.     Echo is this shift.     MRI, PT/OT/ Speech pending.

## 2024-03-13 NOTE — PROGRESS NOTES
Pt arrived from endo to PACU bay 3. Reported received from endo staff. Pt asleep upon arrival; spontaneous respirations noted. Pt arrives on room air, placed on 2L oxygen via nasal cannula; vitals as charted. Pt arrives with NG tube in place, secured to nares with adhesive dressing.
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
## 2024-03-13 NOTE — PROGRESS NOTES
[facility] - Inpatient Rehabilitation Department   Phone: [phone]    Occupational Therapy    [x] Initial Evaluation            [] Daily Treatment Note         [] Discharge Summary      Patient: Ana Gonzales   : 1955   MRN: [id number]   Date of Service:  3/13/2024    Admitting Diagnosis:  TIA (transient ischemic attack)  Current Admission Summary: Ana Gonzales is a 68 y.o. female with a PMH of depression, diabetes, GERD, hyperlipidemia, hypertension, history of CAD status post PTCA LAD and diagonal 2023 who presented to ED with complaint of bradycardia with weakness and emesis.  Per reports she was brought in today by family due to bradycardia with heart rate in the 30s by home pulse ox and blood pressure cough of note she has bigeminy.  She was seen in consult by cardiology and EP.  Per EP diagnosis of palpitation and PVCs with bigeminy to continue on beta-blocker adding a calcium channel blocker considering adding an antiarrhythmic.  Also and her presenting symptoms was bloody emesis.   endorses that prior to presentation she had dark emesis about 200 cc at home.  No previous episodes.  I was called to patient's bedside by patient's nurse due to abdominal pain nausea and large coffee-ground emesis about 200 cc.   On examination at bedside patient is lethargic, responsive, with cold extremities.  Blood pressure systolic in the 60s with diminished pulse.  Patient was placed on NC oxygen given bolus IV fluid 500 cc  NS.  Of note and labs BUN was elevated at 56.  Stat CBC was 8.0 down from 10.3, troponin of 18.  Due to concerns of bleed.  Patient is being transferred to the ICU for further management.  Patient is being kept n.p.o. type and screen 2 units of blood IV Protonix 80 mg given CBC every 4 hours.  GI consulted.  Discussed plan of care with  at bedside.  Past Medical History:  has a past medical history of Arthritis, Asthma, Brain injury (HCC), Bronchitis, CHF  toileting at stand by assistance   Patient will complete functional transfers at stand by assistance   Patient will complete functional mobility at stand by assistance   Patient will complete bed mobility at modified independent     Above goals reviewed on 3/13/2024.  All goals are ongoing at this time unless indicated above.       Therapy Session Time     Individual Group Co-treatment   Time In    1452   Time Out    1530   Minutes    38        Timed Code Treatment Minutes:   23 mins  Total Treatment Minutes:  38 mins       Electronically Signed By: Aldo Gross, MARY Gross OTR/L [id number]

## 2024-03-13 NOTE — RT PROTOCOL NOTE
RT Inhaler-Nebulizer Bronchodilator Protocol Note    There is a bronchodilator order in the chart from a provider indicating to follow the RT Bronchodilator Protocol and there is an “Initiate RT Inhaler-Nebulizer Bronchodilator Protocol” order as well (see protocol at bottom of note).    CXR Findings:  XR CHEST PORTABLE    Result Date: 3/13/2024  Interval placement a right IJ central venous catheter without discrete pneumothorax seen.     XR CHEST PORTABLE    Result Date: 3/12/2024  No significant findings in the chest.       The findings from the last RT Protocol Assessment were as follows:   History Pulmonary Disease: None or smoker <15 pack years  Respiratory Pattern: Regular pattern and RR 12-20 bpm  Breath Sounds: Slightly diminished and/or crackles  Cough: Strong, spontaneous, non-productive  Indication for Bronchodilator Therapy: On home bronchodilators  Bronchodilator Assessment Score: 2    Aerosolized bronchodilator medication orders have been revised according to the RT Inhaler-Nebulizer Bronchodilator Protocol below.    Respiratory Therapist to perform RT Therapy Protocol Assessment initially then follow the protocol.  Repeat RT Therapy Protocol Assessment PRN for score 0-3 or on second treatment, BID, and PRN for scores above 3.    No Indications - adjust the frequency to every 6 hours PRN wheezing or bronchospasm, if no treatments needed after 48 hours then discontinue using Per Protocol order mode.     If indication present, adjust the RT bronchodilator orders based on the Bronchodilator Assessment Score as indicated below.  Use Inhaler orders unless patient has one or more of the following: on home nebulizer, not able to hold breath for 10 seconds, is not alert and oriented, cannot activate and use MDI correctly, or respiratory rate 25 breaths per minute or more, then use the equivalent nebulizer order(s) with same Frequency and PRN reasons based on the score.  If a patient is on this medication at

## 2024-03-13 NOTE — SIGNIFICANT EVENT
APRN notified by RN of patient with c/o hot flashes, abd pain and worsening nausea.  -will start PRN acetaminophen, ondansetron  -given constellation of symptoms with known CAD, troponin and STAT EKG ordered  -further pending results of above/re-eval by attending in AM    VALE Painting - NP

## 2024-03-13 NOTE — ACP (ADVANCE CARE PLANNING)
Advanced Care Planning Note.    Purpose of Encounter: Advanced care planning in light of GIB  Parties In Attendance: Patient, ,  Kamlesh  Decisional Capacity: Yes  Subjective: Mild abdominal pain. Some coffee ground emesis earlier this AM.  Objective: Hgb 8.3, Cr 1.2.  Goals of Care Determination: Patient/POA wishes to seek full treatment  Plan:  EGD. NPO. PPI gtt. Labs. GI consult.  Code Status: Full code    Time spent on Advanced care Plannin minutes  Advanced Care Planning Documents: Completed advanced directives on chart, , Kamlesh, is the POA.    Tc Shearer DO  3/13/2024 9:35 AM

## 2024-03-13 NOTE — PROGRESS NOTES
Neurology consult noted for dysarthria.  Attempted see the patient on multiple occasions this morning.  Patient has been off Endo procedure.  We will follow-up with patient tomorrow.   Order placed. Therapy plan updated and medication renewed on Therapy plan.    Routed to Dr. Conteh to sign Nucala order under Therapy plan.   Routed to YENNI Knutson.

## 2024-03-13 NOTE — PROGRESS NOTES
Pt transported back to ICU with RN and transporter. Bedside report given to Shiloh PEOPLES, v/u. Pt returned with all belongings.    Breath sounds clear and equal bilaterally.

## 2024-03-13 NOTE — BRIEF OP NOTE
Brief Postoperative Note      Patient: Ana Gonzales  YOB: 1955  MRN: [id number]    Date of Procedure: 3/13/2024    Pre-Op Diagnosis Codes:     * Hematemesis, unspecified whether nausea present [K92.0]       Procedure(s):  ESOPHAGOGASTRODUODENOSCOPY DIAGNOSTIC ONLY    Surgeon(s):  Hakeem Zaldivar MD    Anesthesia: Monitor Anesthesia Care    Estimated Blood Loss (mL): Minimal    Complications: None    Findings:   Limited endoscopic evaluation due to large amounts of clots in the proximal stomach.  Healing distal esophageal ulcer and tortuous esophagus.  1 cm hiatal hernia.  S/p 16 Faroese NG tube at the right nostril.  The NGT was attached to low intermittent suction.    Plans:  Keep patient NPO. IV PPI, monitor H&H, transfuse hemoglobin above 8.  IV Reglan 10 mg every 6 hours to mobilize blood in the stomach.  Will plan for repeat EGD tomorrow (under GA).    Electronically signed by Hakeem Zaldivar MD on 3/13/2024 at 10:51 AM
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
## 2024-03-13 NOTE — PROGRESS NOTES
SLP ATTEMPT  Ana Gonzales  1955    SLP/Dysphagia Evaluation orders received. Pt is currently held NPO for potential EGD this date, with clinical swallow evaluation unable to be completed at this time. Will re-attempt when Pt is no longer held NPO and as schedule allows.    Taisha Shen [facility]-SLP#[id number]    Addendum  Second attempt made to complete SLP/Dysphagia evaluation. Pt remains NPO and is now NG to suction. Nurse requests we re-attempt on 3/14/24 as Pt is able to tolerate.    Taisha Shen [facility]-SLP#[id number]

## 2024-03-14 ENCOUNTER — ANESTHESIA EVENT (OUTPATIENT)
Dept: ENDOSCOPY | Age: 69
End: 2024-03-14
Payer: MEDICARE

## 2024-03-14 ENCOUNTER — ANESTHESIA (OUTPATIENT)
Dept: ENDOSCOPY | Age: 69
End: 2024-03-14
Payer: MEDICARE

## 2024-03-14 PROBLEM — G45.1 TIA INVOLVING LEFT INTERNAL CAROTID ARTERY: Status: ACTIVE | Noted: 2024-03-12

## 2024-03-14 LAB
ALBUMIN SERPL-MCNC: 3 G/DL (ref 3.4–5)
ANION GAP SERPL CALCULATED.3IONS-SCNC: 8 MMOL/L (ref 3–16)
APTT BLD: 30.7 SEC (ref 22.7–35.9)
BASOPHILS # BLD: 0.1 K/UL (ref 0–0.2)
BASOPHILS NFR BLD: 0.6 %
BUN SERPL-MCNC: 33 MG/DL (ref 7–20)
CALCIUM SERPL-MCNC: 8.1 MG/DL (ref 8.3–10.6)
CHLORIDE SERPL-SCNC: 111 MMOL/L (ref 99–110)
CO2 SERPL-SCNC: 23 MMOL/L (ref 21–32)
CREAT SERPL-MCNC: 0.8 MG/DL (ref 0.6–1.2)
DEPRECATED RDW RBC AUTO: 15.5 % (ref 12.4–15.4)
EOSINOPHIL # BLD: 0 K/UL (ref 0–0.6)
EOSINOPHIL NFR BLD: 0.3 %
GFR SERPLBLD CREATININE-BSD FMLA CKD-EPI: >60 ML/MIN/{1.73_M2}
GLUCOSE BLD-MCNC: 146 MG/DL (ref 70–99)
GLUCOSE BLD-MCNC: 151 MG/DL (ref 70–99)
GLUCOSE BLD-MCNC: 152 MG/DL (ref 70–99)
GLUCOSE BLD-MCNC: 155 MG/DL (ref 70–99)
GLUCOSE BLD-MCNC: 164 MG/DL (ref 70–99)
GLUCOSE SERPL-MCNC: 174 MG/DL (ref 70–99)
HCT VFR BLD AUTO: 24.3 % (ref 36–48)
HCT VFR BLD AUTO: 24.7 % (ref 36–48)
HCT VFR BLD AUTO: 25.2 % (ref 36–48)
HCT VFR BLD AUTO: 28.1 % (ref 36–48)
HGB BLD-MCNC: 8.1 G/DL (ref 12–16)
HGB BLD-MCNC: 8.4 G/DL (ref 12–16)
HGB BLD-MCNC: 8.4 G/DL (ref 12–16)
HGB BLD-MCNC: 9.4 G/DL (ref 12–16)
INR PPP: 1.42 (ref 0.84–1.16)
IRON SATN MFR SERPL: 7 % (ref 15–50)
IRON SERPL-MCNC: 16 UG/DL (ref 37–145)
LYMPHOCYTES # BLD: 1.6 K/UL (ref 1–5.1)
LYMPHOCYTES NFR BLD: 17.6 %
MAGNESIUM SERPL-MCNC: 1.9 MG/DL (ref 1.8–2.4)
MCH RBC QN AUTO: 29.2 PG (ref 26–34)
MCHC RBC AUTO-ENTMCNC: 34.1 G/DL (ref 31–36)
MCV RBC AUTO: 85.7 FL (ref 80–100)
MONOCYTES # BLD: 0.8 K/UL (ref 0–1.3)
MONOCYTES NFR BLD: 9.2 %
NEUTROPHILS # BLD: 6.4 K/UL (ref 1.7–7.7)
NEUTROPHILS NFR BLD: 72.3 %
PERFORMED ON: ABNORMAL
PHOSPHATE SERPL-MCNC: 2 MG/DL (ref 2.5–4.9)
PLATELET # BLD AUTO: 168 K/UL (ref 135–450)
PMV BLD AUTO: 9.4 FL (ref 5–10.5)
POTASSIUM SERPL-SCNC: 3.5 MMOL/L (ref 3.5–5.1)
PROTHROMBIN TIME: 17.3 SEC (ref 11.5–14.8)
RBC # BLD AUTO: 2.89 M/UL (ref 4–5.2)
SODIUM SERPL-SCNC: 142 MMOL/L (ref 136–145)
TIBC SERPL-MCNC: 221 UG/DL (ref 260–445)
WBC # BLD AUTO: 8.9 K/UL (ref 4–11)

## 2024-03-14 PROCEDURE — 80069 RENAL FUNCTION PANEL: CPT

## 2024-03-14 PROCEDURE — 0D9680Z DRAINAGE OF STOMACH WITH DRAINAGE DEVICE, VIA NATURAL OR ARTIFICIAL OPENING ENDOSCOPIC: ICD-10-PCS | Performed by: INTERNAL MEDICINE

## 2024-03-14 PROCEDURE — 83540 ASSAY OF IRON: CPT

## 2024-03-14 PROCEDURE — 36592 COLLECT BLOOD FROM PICC: CPT

## 2024-03-14 PROCEDURE — C9113 INJ PANTOPRAZOLE SODIUM, VIA: HCPCS | Performed by: INTERNAL MEDICINE

## 2024-03-14 PROCEDURE — 88305 TISSUE EXAM BY PATHOLOGIST: CPT

## 2024-03-14 PROCEDURE — 85018 HEMOGLOBIN: CPT

## 2024-03-14 PROCEDURE — 7100000000 HC PACU RECOVERY - FIRST 15 MIN: Performed by: INTERNAL MEDICINE

## 2024-03-14 PROCEDURE — 85025 COMPLETE CBC W/AUTO DIFF WBC: CPT

## 2024-03-14 PROCEDURE — 83735 ASSAY OF MAGNESIUM: CPT

## 2024-03-14 PROCEDURE — 85014 HEMATOCRIT: CPT

## 2024-03-14 PROCEDURE — 0DB68ZX EXCISION OF STOMACH, VIA NATURAL OR ARTIFICIAL OPENING ENDOSCOPIC, DIAGNOSTIC: ICD-10-PCS | Performed by: INTERNAL MEDICINE

## 2024-03-14 PROCEDURE — 2709999900 HC NON-CHARGEABLE SUPPLY: Performed by: INTERNAL MEDICINE

## 2024-03-14 PROCEDURE — 2580000003 HC RX 258: Performed by: INTERNAL MEDICINE

## 2024-03-14 PROCEDURE — 6360000002 HC RX W HCPCS: Performed by: STUDENT IN AN ORGANIZED HEALTH CARE EDUCATION/TRAINING PROGRAM

## 2024-03-14 PROCEDURE — 2000000000 HC ICU R&B

## 2024-03-14 PROCEDURE — 83550 IRON BINDING TEST: CPT

## 2024-03-14 PROCEDURE — 0CBS8ZX EXCISION OF LARYNX, VIA NATURAL OR ARTIFICIAL OPENING ENDOSCOPIC, DIAGNOSTIC: ICD-10-PCS | Performed by: INTERNAL MEDICINE

## 2024-03-14 PROCEDURE — APPNB30 APP NON BILLABLE TIME 0-30 MINS

## 2024-03-14 PROCEDURE — 99223 1ST HOSP IP/OBS HIGH 75: CPT | Performed by: PSYCHIATRY & NEUROLOGY

## 2024-03-14 PROCEDURE — 6370000000 HC RX 637 (ALT 250 FOR IP): Performed by: INTERNAL MEDICINE

## 2024-03-14 PROCEDURE — 92610 EVALUATE SWALLOWING FUNCTION: CPT

## 2024-03-14 PROCEDURE — 3609012400 HC EGD TRANSORAL BIOPSY SINGLE/MULTIPLE: Performed by: INTERNAL MEDICINE

## 2024-03-14 PROCEDURE — 92526 ORAL FUNCTION THERAPY: CPT

## 2024-03-14 PROCEDURE — 6360000002 HC RX W HCPCS: Performed by: INTERNAL MEDICINE

## 2024-03-14 PROCEDURE — 3700000001 HC ADD 15 MINUTES (ANESTHESIA): Performed by: INTERNAL MEDICINE

## 2024-03-14 PROCEDURE — 0W3P8ZZ CONTROL BLEEDING IN GASTROINTESTINAL TRACT, VIA NATURAL OR ARTIFICIAL OPENING ENDOSCOPIC: ICD-10-PCS | Performed by: INTERNAL MEDICINE

## 2024-03-14 PROCEDURE — 3700000000 HC ANESTHESIA ATTENDED CARE: Performed by: INTERNAL MEDICINE

## 2024-03-14 PROCEDURE — APPSS60 APP SPLIT SHARED TIME 46-60 MINUTES

## 2024-03-14 PROCEDURE — 2720000010 HC SURG SUPPLY STERILE: Performed by: INTERNAL MEDICINE

## 2024-03-14 PROCEDURE — 85610 PROTHROMBIN TIME: CPT

## 2024-03-14 PROCEDURE — 3609012900 HC EGD FOREIGN BODY REMOVAL: Performed by: INTERNAL MEDICINE

## 2024-03-14 PROCEDURE — 99232 SBSQ HOSP IP/OBS MODERATE 35: CPT | Performed by: NURSE PRACTITIONER

## 2024-03-14 PROCEDURE — 2500000003 HC RX 250 WO HCPCS: Performed by: NURSE ANESTHETIST, CERTIFIED REGISTERED

## 2024-03-14 PROCEDURE — 6360000002 HC RX W HCPCS: Performed by: NURSE ANESTHETIST, CERTIFIED REGISTERED

## 2024-03-14 PROCEDURE — 3609013000 HC EGD TRANSORAL CONTROL BLEEDING ANY METHOD: Performed by: INTERNAL MEDICINE

## 2024-03-14 PROCEDURE — 85730 THROMBOPLASTIN TIME PARTIAL: CPT

## 2024-03-14 PROCEDURE — 7100000001 HC PACU RECOVERY - ADDTL 15 MIN: Performed by: INTERNAL MEDICINE

## 2024-03-14 PROCEDURE — 0DC68ZZ EXTIRPATION OF MATTER FROM STOMACH, VIA NATURAL OR ARTIFICIAL OPENING ENDOSCOPIC: ICD-10-PCS | Performed by: INTERNAL MEDICINE

## 2024-03-14 RX ORDER — SUCCINYLCHOLINE/SOD CL,ISO/PF 200MG/10ML
SYRINGE (ML) INTRAVENOUS PRN
Status: DISCONTINUED | OUTPATIENT
Start: 2024-03-14 | End: 2024-03-14 | Stop reason: SDUPTHER

## 2024-03-14 RX ORDER — CLOPIDOGREL BISULFATE 75 MG/1
75 TABLET ORAL DAILY
Status: DISCONTINUED | OUTPATIENT
Start: 2024-03-17 | End: 2024-04-10 | Stop reason: HOSPADM

## 2024-03-14 RX ORDER — PROPOFOL 10 MG/ML
INJECTION, EMULSION INTRAVENOUS PRN
Status: DISCONTINUED | OUTPATIENT
Start: 2024-03-14 | End: 2024-03-14 | Stop reason: SDUPTHER

## 2024-03-14 RX ORDER — ASPIRIN 81 MG/1
81 TABLET ORAL DAILY
Status: CANCELLED | OUTPATIENT
Start: 2024-03-15

## 2024-03-14 RX ORDER — ONDANSETRON 2 MG/ML
INJECTION INTRAMUSCULAR; INTRAVENOUS PRN
Status: DISCONTINUED | OUTPATIENT
Start: 2024-03-14 | End: 2024-03-14 | Stop reason: SDUPTHER

## 2024-03-14 RX ORDER — LIDOCAINE HYDROCHLORIDE 20 MG/ML
INJECTION, SOLUTION INFILTRATION; PERINEURAL PRN
Status: DISCONTINUED | OUTPATIENT
Start: 2024-03-14 | End: 2024-03-14 | Stop reason: SDUPTHER

## 2024-03-14 RX ORDER — MORPHINE SULFATE 4 MG/ML
4 INJECTION, SOLUTION INTRAMUSCULAR; INTRAVENOUS EVERY 4 HOURS PRN
Status: DISCONTINUED | OUTPATIENT
Start: 2024-03-14 | End: 2024-03-17

## 2024-03-14 RX ADMIN — Medication 10 ML: at 21:07

## 2024-03-14 RX ADMIN — Medication 10 ML: at 12:43

## 2024-03-14 RX ADMIN — SODIUM CHLORIDE, POTASSIUM CHLORIDE, SODIUM LACTATE AND CALCIUM CHLORIDE: 600; 310; 30; 20 INJECTION, SOLUTION INTRAVENOUS at 03:02

## 2024-03-14 RX ADMIN — METOCLOPRAMIDE 10 MG: 5 INJECTION, SOLUTION INTRAMUSCULAR; INTRAVENOUS at 17:47

## 2024-03-14 RX ADMIN — FUROSEMIDE 40 MG: 40 TABLET ORAL at 17:46

## 2024-03-14 RX ADMIN — FUROSEMIDE 40 MG: 40 TABLET ORAL at 12:42

## 2024-03-14 RX ADMIN — Medication 160 MG: at 08:49

## 2024-03-14 RX ADMIN — TOPIRAMATE 150 MG: 100 TABLET, FILM COATED ORAL at 17:46

## 2024-03-14 RX ADMIN — PROPOFOL 60 MG: 10 INJECTION, EMULSION INTRAVENOUS at 08:49

## 2024-03-14 RX ADMIN — ARIPIPRAZOLE 2 MG: 2 TABLET ORAL at 21:06

## 2024-03-14 RX ADMIN — ACETAMINOPHEN 650 MG: 325 TABLET ORAL at 21:06

## 2024-03-14 RX ADMIN — ACETAMINOPHEN 650 MG: 325 TABLET ORAL at 12:47

## 2024-03-14 RX ADMIN — GABAPENTIN 600 MG: 300 CAPSULE ORAL at 17:46

## 2024-03-14 RX ADMIN — PANTOPRAZOLE SODIUM 8 MG/HR: 40 INJECTION, POWDER, FOR SOLUTION INTRAVENOUS at 03:05

## 2024-03-14 RX ADMIN — IRON SUCROSE 200 MG: 20 INJECTION, SOLUTION INTRAVENOUS at 13:05

## 2024-03-14 RX ADMIN — MECLIZINE HYDROCHLORIDE 25 MG: 12.5 TABLET ORAL at 17:23

## 2024-03-14 RX ADMIN — MORPHINE SULFATE 4 MG: 4 INJECTION, SOLUTION INTRAMUSCULAR; INTRAVENOUS at 17:55

## 2024-03-14 RX ADMIN — NORTRIPTYLINE HYDROCHLORIDE 75 MG: 25 CAPSULE ORAL at 21:05

## 2024-03-14 RX ADMIN — METOCLOPRAMIDE 10 MG: 5 INJECTION, SOLUTION INTRAMUSCULAR; INTRAVENOUS at 12:35

## 2024-03-14 RX ADMIN — METOCLOPRAMIDE 10 MG: 5 INJECTION, SOLUTION INTRAMUSCULAR; INTRAVENOUS at 06:12

## 2024-03-14 RX ADMIN — LIDOCAINE HYDROCHLORIDE 60 MG: 20 INJECTION, SOLUTION INFILTRATION; PERINEURAL at 08:49

## 2024-03-14 RX ADMIN — CARVEDILOL 3.12 MG: 3.12 TABLET, FILM COATED ORAL at 17:50

## 2024-03-14 RX ADMIN — GABAPENTIN 600 MG: 300 CAPSULE ORAL at 12:42

## 2024-03-14 RX ADMIN — ISOSORBIDE MONONITRATE 120 MG: 60 TABLET, EXTENDED RELEASE ORAL at 12:41

## 2024-03-14 RX ADMIN — MECLIZINE HYDROCHLORIDE 25 MG: 12.5 TABLET ORAL at 21:05

## 2024-03-14 RX ADMIN — ONDANSETRON 4 MG: 2 INJECTION INTRAMUSCULAR; INTRAVENOUS at 08:52

## 2024-03-14 RX ADMIN — MORPHINE SULFATE 4 MG: 4 INJECTION, SOLUTION INTRAMUSCULAR; INTRAVENOUS at 22:28

## 2024-03-14 RX ADMIN — ACETAMINOPHEN 650 MG: 650 SUPPOSITORY RECTAL at 03:37

## 2024-03-14 RX ADMIN — ONDANSETRON 8 MG: 4 TABLET, ORALLY DISINTEGRATING ORAL at 13:56

## 2024-03-14 RX ADMIN — PANTOPRAZOLE SODIUM 8 MG/HR: 40 INJECTION, POWDER, FOR SOLUTION INTRAVENOUS at 17:38

## 2024-03-14 ASSESSMENT — PAIN SCALES - GENERAL
PAINLEVEL_OUTOF10: 4
PAINLEVEL_OUTOF10: 6
PAINLEVEL_OUTOF10: 8
PAINLEVEL_OUTOF10: 3
PAINLEVEL_OUTOF10: 2
PAINLEVEL_OUTOF10: 6
PAINLEVEL_OUTOF10: 0
PAINLEVEL_OUTOF10: 10

## 2024-03-14 ASSESSMENT — PAIN DESCRIPTION - LOCATION
LOCATION: ABDOMEN
LOCATION: BACK

## 2024-03-14 ASSESSMENT — PAIN - FUNCTIONAL ASSESSMENT
PAIN_FUNCTIONAL_ASSESSMENT: NONE - DENIES PAIN

## 2024-03-14 ASSESSMENT — PAIN SCALES - WONG BAKER: WONGBAKER_NUMERICALRESPONSE: NO HURT

## 2024-03-14 ASSESSMENT — PAIN DESCRIPTION - DESCRIPTORS
DESCRIPTORS: DISCOMFORT
DESCRIPTORS: ACHING;DISCOMFORT
DESCRIPTORS: BURNING

## 2024-03-14 ASSESSMENT — PAIN DESCRIPTION - ORIENTATION
ORIENTATION: MID

## 2024-03-14 NOTE — BRIEF OP NOTE
Brief Postoperative Note      Patient: Ana Gonzales  YOB: 1955  MRN: [id number]    Date of Procedure: 3/14/2024    Pre-Op Diagnosis Codes:     * Hematemesis, unspecified whether nausea present [K92.0]       Procedure(s):  ESOPHAGOGASTRODUODENOSCOPY BIOPSY  ESOPHAGOGASTRODUODENOSCOPY CONTROL HEMORRHAGE  ESOPHAGOGASTRODUODENOSCOPY FOREIGN BODY REMOVAL    Surgeon(s):  Hakeem Zaldivar MD      Anesthesia: General    Estimated Blood Loss (mL): less than 50     Complications: None    Specimens:   ID Type Source Tests Collected by Time Destination   A : A) gastric bx r/o h.pylori Tissue Stomach SURGICAL PATHOLOGY Hakeem Zaldivar MD 3/14/2024 0961        Findings:  Small hiatal hernia with small healing esophageal ulcer.  Her recent upper GI bleed is likely from proximal gastric body ulcer with pigmented spot, 2 clips were placed for endoscopic hemostasis.  Large amounts of liquid and blood clots in the proximal stomach, aggressively suction and blood clots removed with Preston net.  Patchy gastritis, biopsied to rule out H. pylori.     Plans:  Await biopsies.  Absolutely no NSAIDs. IV PPI for 3 days then switch to oral PPI twice daily for 8 weeks then daily thereafter.  Monitor H&H, transfuse to keep hemoglobin above 8.  IV venofer 200 mg while admitted (max of 5 days) and then switch to oral iron sulfate 325 mg BID X 3 months. Restart baby aspirin today. May resume Plavix in 48 hours.  Full liquids then advance as tolerated.          Electronically signed by Hakeem Zaldivar MD on 3/14/2024 at 9:30 AM

## 2024-03-14 NOTE — PROGRESS NOTES
Pt arrived to endo preop accompanied by Floor RN. Win carrillo clamped and reconnected to low continuous suction. Pt is drowsy though oriented x4. Pt agrees with consent and receptive to plan of care. Right neck Triple lumen catheter intact with LR infusion and Prtonix infusing well. White port is clamped with brisk blood return and flushed with 10 cc Normal Saline. IV Normal Saline started per white port.Pt  at bedside.

## 2024-03-14 NOTE — CONSULTS
In patient Neurology consult        [facility] Neurology      MD Ana Mace  1955    Date of Service: 3/14/2024    Referring Physician: Tc Shearer DO      Reason for the consult and CC: Acute speech disturbance    HPI:   The patient is a 68 y.o.  years old female with past medical history of hypertension, hyperlipidemia, diabetes, and other medical problems comes to the hospital with slurred speech, dark emesis, and bradycardia.  Neurology was consulted for speech disturbance.  In the emergency room CT head showed no acute intracranial abnormality, CTA head and neck showed intracranial stenosis.  Lab workup showed hemoglobin 10.6.  While in the emergency room, patient's speech was back to her normal.  Patient was mated to the hospital further evaluation.  Yesterday morning, patient had coffee-ground emesis, abdominal pain, nausea.  Patient was found to have decreased hemoglobin, 6.6.  During this time patient was having constellation of symptoms including abdominal pain, hematemesis, tachycardia.  Patient was transferred to the ICU.  Patient had EGD done yesterday which showed gastric ulcer and several blood clots in her stomach.  Patient also had MRI brain done yesterday which showed no acute stroke.      Today patient is seen after repeat EGD.  Patient is lethargic but can follow directions.  She reports mild nausea but denies headache, dizziness, dysarthria, vision changes.  Patient last year and had cardiac stent placement.  She has been on DAPT with aspirin and Plavix.  The review of systems unremarkable       Constitutional:   Vitals:    03/14/24 1200 03/14/24 1241 03/14/24 1300 03/14/24 1400   BP: (!) 122/95 (!) 122/95 (!) 140/72    Pulse: 88  85 89   Resp: 17  26 19   Temp: 97.6 °F (36.4 °C)      TempSrc: Axillary      SpO2: 96%  95% 93%   Weight:       Height:             I personally reviewed and updated social history, past medical history, medications, allergy, surgical

## 2024-03-14 NOTE — ANESTHESIA POSTPROCEDURE EVALUATION
Department of Anesthesiology  Postprocedure Note    Patient: Ana Gonzales  MRN: [id number]  YOB: 1955  Date of evaluation: 3/14/2024    Procedure Summary       Date: 03/14/24 Room / Location: [place] [address] / [facility]    Anesthesia Start: 0843 Anesthesia Stop: 0935    Procedures:       ESOPHAGOGASTRODUODENOSCOPY BIOPSY (Abdomen)      ESOPHAGOGASTRODUODENOSCOPY CONTROL HEMORRHAGE (Abdomen)      ESOPHAGOGASTRODUODENOSCOPY FOREIGN BODY REMOVAL (Abdomen) Diagnosis:       Hematemesis, unspecified whether nausea present      (Hematemesis, unspecified whether nausea present [K92.0])    Surgeons: Hakeem Zaldivar MD Responsible Provider: VALORIE Wood MD    Anesthesia Type: general ASA Status: 4            Anesthesia Type: No value filed.    Hortensia Phase I: Hortensia Score: 9    Hortensia Phase II:      Anesthesia Post Evaluation    Patient location during evaluation: PACU  Patient participation: complete - patient participated  Level of consciousness: awake and alert  Pain score: 0  Airway patency: patent  Nausea & Vomiting: no nausea  Cardiovascular status: blood pressure returned to baseline  Respiratory status: acceptable  Hydration status: euvolemic  Pain management: adequate    No notable events documented.

## 2024-03-14 NOTE — PROGRESS NOTES
Reviewed pt problem list, history, H&P and assessment preoperatively.  Scope verified using 2 person system.  Family in waiting room.    Electronically signed by Mel Arevalo RN on 3/14/2024 at 8:45 AM

## 2024-03-14 NOTE — ANESTHESIA PRE PROCEDURE
Department of Anesthesiology  Preprocedure Note       Name:  Ana Gonzales   Age:  68 y.o.  :  1955                                          MRN:  [id number]         Date:  3/14/2024      Surgeon: Surgeon(s):  Hakeem Zaldivar MD    Procedure: Procedure(s):  ESOPHAGOGASTRODUODENOSCOPY DIAGNOSTIC ONLY    Medications prior to admission:   Prior to Admission medications    Medication Sig Start Date End Date Taking? Authorizing Provider   ranolazine (RANEXA) 1000 MG extended release tablet Take 1 tablet by mouth 2 times daily 23   Sb Bates MD   Evolocumab 140 MG/ML SOAJ Inject 1 Adjustable Dose Pre-filled Pen Syringe into the skin every 14 days 10/10/23   Sb Bates MD   budesonide-formoterol (SYMBICORT) 160-4.5 MCG/ACT AERO Inhale 2 puffs into the lungs 2 times daily    Lex Cota MD   isosorbide mononitrate (IMDUR) 120 MG extended release tablet Take 1 tablet by mouth daily  Patient taking differently: Take 1 tablet by mouth daily Takes at 9:00 am. 23   Sb Bates MD   aspirin 81 MG EC tablet Take 1 tablet by mouth daily  Patient taking differently: Take 1 tablet by mouth nightly 23   Sb Bates MD   clopidogrel (PLAVIX) 75 MG tablet Take 1 tablet by mouth daily  Patient taking differently: Take 1 tablet by mouth daily Takes at 9:00 am. 23   Sb Bates MD   furosemide (LASIX) 40 MG tablet Take 1 tablet by mouth 2 times daily 23   Sb Bates MD   ondansetron (ZOFRAN) 4 MG tablet Take 1 tablet by mouth nightly    Lex Cota MD   topiramate (TOPAMAX) 100 MG tablet Take 1.5 tablets by mouth every evening 5 pm    Lex Cota MD   Rimegepant Sulfate (NURTEC) 75 MG TBDP Take 1 tablet by mouth as needed    Lex Cota MD   methenamine (HIPREX) 1 g tablet Take 1 tablet by mouth 2 times daily (with meals)    Lex Cota MD   trospium (SANCTURA) 20 MG tablet Take 1 tablet by mouth nightly    Lex Cota MD

## 2024-03-14 NOTE — PROGRESS NOTES
Facility/Department: [facility] ICU  Speech Language Pathology  DYSPHAGIA BEDSIDE SWALLOW EVALUATION     Patient: Ana Gonzales   : 1955   MRN: [id number]      Evaluation Date: 3/14/2024   Admitting Diagnosis: TIA (transient ischemic attack) [G45.9]  Slurred speech [R47.81]  Ventricular bigeminy [I49.8]  Elevated troponin [R79.89]  Upper GI bleed [K92.2]  Pain: Denies                                                       H&P: Ana Gonzales is a 68 y.o. female with history of CAD status post PCI, diabetes, hypertension, sleep apnea, asthma, hyperlipidemia came to emergency room due to generalized weakness, slurred speech and also noticing low heart rate.  In the emergency room she was noted to have low pulse rate.  However after connecting to the telemetry was noted to be due to frequent PVCs.  She has had history of palpitations and has a monitor on.  A previous monitor in January showed 4% PVC.  History of subdural hematoma requiring surgery about 25 years ago.  Has had issues with vertigo and nausea and vomiting since.  History of CAD and had left heart cath 2023 with angioplasty and stenting of LAD/D2.  She is on aspirin and Plavix.  History of diabetes, hypertension, hyperlipidemia, GERD, depression, gastroparesis (not on meds).  History of arthritis on NSAID nabumetone.      She was having weakness, palpitations and bradycardia at home.  Was brought to the ER for this.  Also had nausea and coffee-ground emesis for 2 days at home.  No black or bloody bowel movements.  Would have cramping abdominal pain for 2 days which would be in the upper abdomen but more so in the left abdomen.  Used to be on Prevacid for GERD but reports that she was taken off of PPI by GI.      Imaging:  Chest X-ray:   IMPRESSION:  No significant findings in the chest.    MRI:  IMPRESSION:  No acute intracranial abnormality.     Minimal chronic microvascular disease.    Prior Modified Barium Swallow Study: 16  Modified  Barium Swallow evaluation completed on 11/1/2016.  Results indicate mild/moderate oropharyngeal dysphagia with intermittent SILENT, trace laryngeal penetration noted during the swallow with thin liquids. Laryngeal penetration appears to be non-building with no aspiration directly viewed. Pooling to the underside of the epiglottis, with incomplete epiglottic inversion and with reduced hyolaryngeal elevation contribute to reduced airway protection during the swallow and to subsequent intermittent laryngeal penetration with thin liquids. Pt is also incidentally noted with an atypical  \"prominence\" of the lower pharynx, anterior to the pyriform sinuses, which appears to impede full epiglottic distention and inversion during the swallow with all textures (see Radiologist report). Although no overt aspiration with thin liquids was directly viewed throughout this study, intermittent laryngeal penetration with reduced airway protection during the swallow increases potential for episodic aspiration if precautions are not followed. Intermittent SILENT laryngeal penetration with thin liquids also likely contributes to Pt's reported sensation of something \"stuck\" in her throat when swallowing. No overt evidence of esophageal dysphagia or backflow to the lower pharynx was noted with any texture throughout this study.       History/Prior Level of Function:   Living Status: Home  Prior Dysphagia History: Per chart review, Pt with remote history of dysphagia per MBS results of 11/1/16 (see above). A regular texture diet with thin liquids (no straws), meds with puree was recommended at that time. Currently the Pt reports no overt difficulty with swallowing function prior to this admission.  Reason for referral: SLP evaluation orders received due to prior hx of dysphagia     Dysphagia Impressions/Diagnosis: Oropharyngeal Dysphagia   Pt underwent EGD prior to this assessment. Currently the Pt is awake and verbally responsive but

## 2024-03-14 NOTE — PROGRESS NOTES
Hospitalist Progress Note    Name:  Ana Gonzales    /Age/Sex: 1955  (68 y.o. female)  MRN & CSN:  [id number] & [id number]    PCP: Mellisa Houston MD    Date of Admission: 3/12/2024    Patient Status:  Inpatient     Chief Complaint:   Chief Complaint   Patient presents with    Bradycardia     Pt coming in with  pt has been bradycardic, weakness, slurred speech for a few days.  Vomiting yesterday with black emesis        Hospital Course:   Admitted for bradycardia with slurred speech. Concern for TIA. CT head non acute, but CTA showed severe narrowing of distal left P1 segment of left PCA, but otherwise imaging was non acute. MRI brain was non acute.    However, patient had low hemoglobin on admission and required 2U PRBC.  GI consulted.    Had EGD on 3/13 with GI and did not find any active bleeding, but did find blood clots and a large amount of liquid in the stomach. Placed on PPI gtt.    Underwent repeat EGD on 3/14 and did not show any bleed, but 2 clips were placed on small healing ulcer. Still showed blood clots in the stomach.    Subjective:  Today is:  Hospital Day: 3.  Patient seen and examined in ICU-3917/3917-.     Lying in bed. Having some abdominal pain. Tolerating small sips of water.      Medications:  Reviewed    Infusion Medications    sodium chloride      sodium chloride      lactated ringers IV soln 150 mL/hr at 24 0843    dextrose      pantoprazole (PROTONIX) 80 mg in sodium chloride 0.9 % 100 mL infusion 8 mg/hr (24 0655)    dextrose       Scheduled Medications    iron sucrose (VENOFER) 200 mg in sodium chloride 0.9 % 100 mL IVPB  200 mg IntraVENous Q24H    [START ON 3/17/2024] clopidogrel  75 mg Oral Daily    sodium chloride flush  5-40 mL IntraVENous 2 times per day    insulin lispro  0-8 Units SubCUTAneous Q4H    metoclopramide  10 mg IntraVENous Q6H    [Held by provider] dilTIAZem  120 mg Oral Daily    aspirin  81 mg Oral Daily    isosorbide

## 2024-03-14 NOTE — PROGRESS NOTES
[facility]   Electrophysiology Progress Note     Date: 3/14/2024  Admit Date: 3/12/2024     Reason for consultation: PVCs    Chief Complaint: Ilness  Chief Complaint   Patient presents with    Bradycardia     Pt coming in with  pt has been bradycardic, weakness, slurred speech for a few days.  Vomiting yesterday with black emesis        History of Present Illness: History obtained from patient and medical record.     Ana Gonzales is a 68 y.o. female with a past medical history of CAD status post PCI, diabetes, hypertension, sleep apnea, asthma, hyperlipidemia came to emergency room due to generalized weakness, slurred speech and low heart rate.  In the emergency room she was noted to have low pulse rate.  After connecting to the telemetry was noted to be due to frequent PVCs.  She has had history of palpitations and has a monitor on. A previous monitor in January showed 4% PVC. Patient was also noted to have coffee ground emesis at home.     Interval Hx: Today, she is awake in the chair,  at bedside.  EGD repeated today, two clips were placed. NSR with PVCs on tele currently.Denies chest pain or shortness of breath. ASA was restarted today. Plan to restart Plavix in 48 hours per patient and .     Patient seen and examined. Clinical notes reviewed. Telemetry reviewed.  No new complaints today. No major events overnight.   Denies having chest pain, palpitations, shortness of breath, orthopnea/PND, cough, or dizziness at the time of this visit.    Allergies:  Allergies   Allergen Reactions    Cymbalta [Duloxetine Hcl] Anxiety     Migraine, panic attack    Ketamine Nausea And Vomiting    Prochlorperazine Nausea And Vomiting and Other (See Comments)     Dystonic reaction    Promethazine Anxiety and Other (See Comments)     screams  Feels like in a hole    Amoxicillin-Pot Clavulanate      Pt states she tolerates Augmentin    Atenolol      Bradycardia    Demerol Other (See Comments)      MAKES ME FEEL VERY BIZARRE    Ezetimibe      Burning feet    Glucosamine-Chondroitin Other (See Comments)    Keflex [Cephalexin]     Ketorolac      Pt denies this allergy    Lisinopril      Photosensitive    Meperidine Nausea Only and Other (See Comments)     Dilaudid  MAKES ME FEEL VERY BIZARRE    Phenergan [Promethazine Hcl]     Prochlorperazine Edisylate     Statins Other (See Comments)    Codeine Nausea And Vomiting    Morphine Nausea And Vomiting and Other (See Comments)     MAKES ME FEEL VERY BIZARRE       Home Meds:  Prior to Visit Medications    Medication Sig Taking? Authorizing Provider   ranolazine (RANEXA) 1000 MG extended release tablet Take 1 tablet by mouth 2 times daily  Sb Bates MD   Evolocumab 140 MG/ML SOAJ Inject 1 Adjustable Dose Pre-filled Pen Syringe into the skin every 14 days  Sb Bates MD   budesonide-formoterol (SYMBICORT) 160-4.5 MCG/ACT AERO Inhale 2 puffs into the lungs 2 times daily  Lex Cota MD   isosorbide mononitrate (IMDUR) 120 MG extended release tablet Take 1 tablet by mouth daily  Patient taking differently: Take 1 tablet by mouth daily Takes at 9:00 am.  Sb Bates MD   aspirin 81 MG EC tablet Take 1 tablet by mouth daily  Patient taking differently: Take 1 tablet by mouth nightly  Sb Bates MD   clopidogrel (PLAVIX) 75 MG tablet Take 1 tablet by mouth daily  Patient taking differently: Take 1 tablet by mouth daily Takes at 9:00 am.  Sb Bates MD   furosemide (LASIX) 40 MG tablet Take 1 tablet by mouth 2 times daily  Sb Bates MD   ondansetron (ZOFRAN) 4 MG tablet Take 1 tablet by mouth nightly  Lex Cota MD   topiramate (TOPAMAX) 100 MG tablet Take 1.5 tablets by mouth every evening 5 pm  Lex Cota MD   Rimegepant Sulfate (NURTEC) 75 MG TBDP Take 1 tablet by mouth as needed  Lex Cota MD   methenamine (HIPREX) 1 g tablet Take 1 tablet by mouth 2 times daily (with meals)  Lex Cota

## 2024-03-14 NOTE — PROGRESS NOTES
Awake,alert and oriented ,denies pain or nausea, vss,Criteria met to transfer back to room 3917 ,ICU notified

## 2024-03-14 NOTE — PROGRESS NOTES
Pt in room 3917. Vitals stable.     Liquid diet. NG removed.      at bedside.     Vitals:    03/14/24 1026   BP: (!) 116/102   Pulse: 88   Resp: 19   Temp: 97.5 °F (36.4 °C)   SpO2: 94%

## 2024-03-15 ENCOUNTER — APPOINTMENT (OUTPATIENT)
Dept: GENERAL RADIOLOGY | Age: 69
DRG: 330 | End: 2024-03-15

## 2024-03-15 ENCOUNTER — APPOINTMENT (OUTPATIENT)
Dept: CT IMAGING | Age: 69
DRG: 330 | End: 2024-03-15

## 2024-03-15 PROBLEM — I49.8 VENTRICULAR BIGEMINY: Status: ACTIVE | Noted: 2024-03-15

## 2024-03-15 PROBLEM — K56.609 SBO (SMALL BOWEL OBSTRUCTION) (HCC): Status: ACTIVE | Noted: 2024-03-15

## 2024-03-15 PROBLEM — G45.9 TIA (TRANSIENT ISCHEMIC ATTACK): Status: ACTIVE | Noted: 2019-09-18

## 2024-03-15 PROBLEM — N17.9 AKI (ACUTE KIDNEY INJURY) (HCC): Status: ACTIVE | Noted: 2024-03-15

## 2024-03-15 LAB
ALBUMIN SERPL-MCNC: 3.2 G/DL (ref 3.4–5)
ALBUMIN/GLOB SERPL: 1.3 {RATIO} (ref 1.1–2.2)
ALP SERPL-CCNC: 45 U/L (ref 40–129)
ALT SERPL-CCNC: <5 U/L (ref 10–40)
ANION GAP SERPL CALCULATED.3IONS-SCNC: 12 MMOL/L (ref 3–16)
AST SERPL-CCNC: 9 U/L (ref 15–37)
BASOPHILS # BLD: 0 K/UL (ref 0–0.2)
BASOPHILS NFR BLD: 0.4 %
BILIRUB SERPL-MCNC: <0.2 MG/DL (ref 0–1)
BLOOD BANK DISPENSE STATUS: NORMAL
BLOOD BANK PRODUCT CODE: NORMAL
BPU ID: NORMAL
BUN SERPL-MCNC: 21 MG/DL (ref 7–20)
CALCIUM SERPL-MCNC: 8 MG/DL (ref 8.3–10.6)
CHLORIDE SERPL-SCNC: 104 MMOL/L (ref 99–110)
CHOLEST SERPL-MCNC: 68 MG/DL (ref 0–199)
CO2 SERPL-SCNC: 23 MMOL/L (ref 21–32)
CREAT SERPL-MCNC: 0.8 MG/DL (ref 0.6–1.2)
DEPRECATED RDW RBC AUTO: 15.6 % (ref 12.4–15.4)
DESCRIPTION BLOOD BANK: NORMAL
EOSINOPHIL # BLD: 0.4 K/UL (ref 0–0.6)
EOSINOPHIL NFR BLD: 3.4 %
EST. AVERAGE GLUCOSE BLD GHB EST-MCNC: 108.3 MG/DL
GFR SERPLBLD CREATININE-BSD FMLA CKD-EPI: >60 ML/MIN/{1.73_M2}
GLUCOSE BLD-MCNC: 131 MG/DL (ref 70–99)
GLUCOSE BLD-MCNC: 151 MG/DL (ref 70–99)
GLUCOSE BLD-MCNC: 165 MG/DL (ref 70–99)
GLUCOSE BLD-MCNC: 166 MG/DL (ref 70–99)
GLUCOSE BLD-MCNC: 94 MG/DL (ref 70–99)
GLUCOSE SERPL-MCNC: 181 MG/DL (ref 70–99)
HBA1C MFR BLD: 5.4 %
HCT VFR BLD AUTO: 23.4 % (ref 36–48)
HCT VFR BLD AUTO: 24.1 % (ref 36–48)
HCT VFR BLD AUTO: 24.3 % (ref 36–48)
HCT VFR BLD AUTO: 24.9 % (ref 36–48)
HDLC SERPL-MCNC: 44 MG/DL (ref 40–60)
HGB BLD-MCNC: 7.7 G/DL (ref 12–16)
HGB BLD-MCNC: 8.1 G/DL (ref 12–16)
HGB BLD-MCNC: 8.1 G/DL (ref 12–16)
HGB BLD-MCNC: 8.2 G/DL (ref 12–16)
LDLC SERPL CALC-MCNC: -2 MG/DL
LYMPHOCYTES # BLD: 1.6 K/UL (ref 1–5.1)
LYMPHOCYTES NFR BLD: 15 %
MAGNESIUM SERPL-MCNC: 1.8 MG/DL (ref 1.8–2.4)
MCH RBC QN AUTO: 29.4 PG (ref 26–34)
MCHC RBC AUTO-ENTMCNC: 33.5 G/DL (ref 31–36)
MCV RBC AUTO: 87.6 FL (ref 80–100)
MONOCYTES # BLD: 1 K/UL (ref 0–1.3)
MONOCYTES NFR BLD: 9 %
NEUTROPHILS # BLD: 7.7 K/UL (ref 1.7–7.7)
NEUTROPHILS NFR BLD: 72.2 %
PERFORMED ON: ABNORMAL
PERFORMED ON: NORMAL
PHOSPHATE SERPL-MCNC: 4.1 MG/DL (ref 2.5–4.9)
PLATELET # BLD AUTO: 200 K/UL (ref 135–450)
PMV BLD AUTO: 9.5 FL (ref 5–10.5)
POTASSIUM SERPL-SCNC: 3.4 MMOL/L (ref 3.5–5.1)
PROT SERPL-MCNC: 5.6 G/DL (ref 6.4–8.2)
RBC # BLD AUTO: 2.75 M/UL (ref 4–5.2)
SODIUM SERPL-SCNC: 139 MMOL/L (ref 136–145)
TRIGL SERPL-MCNC: 129 MG/DL (ref 0–150)
VLDLC SERPL CALC-MCNC: 26 MG/DL
WBC # BLD AUTO: 10.6 K/UL (ref 4–11)

## 2024-03-15 PROCEDURE — APPSS60 APP SPLIT SHARED TIME 46-60 MINUTES: Performed by: NURSE PRACTITIONER

## 2024-03-15 PROCEDURE — 85014 HEMATOCRIT: CPT

## 2024-03-15 PROCEDURE — 36592 COLLECT BLOOD FROM PICC: CPT

## 2024-03-15 PROCEDURE — 2580000003 HC RX 258: Performed by: INTERNAL MEDICINE

## 2024-03-15 PROCEDURE — APPNB30 APP NON BILLABLE TIME 0-30 MINS: Performed by: NURSE PRACTITIONER

## 2024-03-15 PROCEDURE — 6360000002 HC RX W HCPCS: Performed by: INTERNAL MEDICINE

## 2024-03-15 PROCEDURE — 97530 THERAPEUTIC ACTIVITIES: CPT

## 2024-03-15 PROCEDURE — 85025 COMPLETE CBC W/AUTO DIFF WBC: CPT

## 2024-03-15 PROCEDURE — 94640 AIRWAY INHALATION TREATMENT: CPT

## 2024-03-15 PROCEDURE — 36415 COLL VENOUS BLD VENIPUNCTURE: CPT

## 2024-03-15 PROCEDURE — 6370000000 HC RX 637 (ALT 250 FOR IP): Performed by: INTERNAL MEDICINE

## 2024-03-15 PROCEDURE — 6360000002 HC RX W HCPCS: Performed by: STUDENT IN AN ORGANIZED HEALTH CARE EDUCATION/TRAINING PROGRAM

## 2024-03-15 PROCEDURE — 83036 HEMOGLOBIN GLYCOSYLATED A1C: CPT

## 2024-03-15 PROCEDURE — 74177 CT ABD & PELVIS W/CONTRAST: CPT

## 2024-03-15 PROCEDURE — 83735 ASSAY OF MAGNESIUM: CPT

## 2024-03-15 PROCEDURE — 2000000000 HC ICU R&B

## 2024-03-15 PROCEDURE — 99233 SBSQ HOSP IP/OBS HIGH 50: CPT | Performed by: INTERNAL MEDICINE

## 2024-03-15 PROCEDURE — C9113 INJ PANTOPRAZOLE SODIUM, VIA: HCPCS | Performed by: INTERNAL MEDICINE

## 2024-03-15 PROCEDURE — 97535 SELF CARE MNGMENT TRAINING: CPT

## 2024-03-15 PROCEDURE — 74018 RADEX ABDOMEN 1 VIEW: CPT

## 2024-03-15 PROCEDURE — APPNB30 APP NON BILLABLE TIME 0-30 MINS

## 2024-03-15 PROCEDURE — 99223 1ST HOSP IP/OBS HIGH 75: CPT | Performed by: SURGERY

## 2024-03-15 PROCEDURE — 99232 SBSQ HOSP IP/OBS MODERATE 35: CPT | Performed by: PSYCHIATRY & NEUROLOGY

## 2024-03-15 PROCEDURE — 84100 ASSAY OF PHOSPHORUS: CPT

## 2024-03-15 PROCEDURE — 80053 COMPREHEN METABOLIC PANEL: CPT

## 2024-03-15 PROCEDURE — 6360000004 HC RX CONTRAST MEDICATION: Performed by: NURSE PRACTITIONER

## 2024-03-15 PROCEDURE — 80061 LIPID PANEL: CPT

## 2024-03-15 PROCEDURE — 85018 HEMOGLOBIN: CPT

## 2024-03-15 PROCEDURE — APPSS30 APP SPLIT SHARED TIME 16-30 MINUTES

## 2024-03-15 PROCEDURE — 36430 TRANSFUSION BLD/BLD COMPNT: CPT

## 2024-03-15 RX ORDER — FUROSEMIDE 10 MG/ML
20 INJECTION INTRAMUSCULAR; INTRAVENOUS ONCE
Status: COMPLETED | OUTPATIENT
Start: 2024-03-15 | End: 2024-03-15

## 2024-03-15 RX ORDER — POTASSIUM CHLORIDE 29.8 MG/ML
20 INJECTION INTRAVENOUS ONCE
Status: COMPLETED | OUTPATIENT
Start: 2024-03-15 | End: 2024-03-15

## 2024-03-15 RX ORDER — SODIUM CHLORIDE 9 MG/ML
INJECTION, SOLUTION INTRAVENOUS PRN
Status: DISCONTINUED | OUTPATIENT
Start: 2024-03-15 | End: 2024-03-23

## 2024-03-15 RX ORDER — FUROSEMIDE 10 MG/ML
20 INJECTION INTRAMUSCULAR; INTRAVENOUS 2 TIMES DAILY
Status: DISCONTINUED | OUTPATIENT
Start: 2024-03-15 | End: 2024-03-19

## 2024-03-15 RX ADMIN — POTASSIUM CHLORIDE 20 MEQ: 29.8 INJECTION, SOLUTION INTRAVENOUS at 11:30

## 2024-03-15 RX ADMIN — GABAPENTIN 600 MG: 300 CAPSULE ORAL at 20:57

## 2024-03-15 RX ADMIN — IOPAMIDOL 75 ML: 755 INJECTION, SOLUTION INTRAVENOUS at 02:42

## 2024-03-15 RX ADMIN — MORPHINE SULFATE 4 MG: 4 INJECTION, SOLUTION INTRAMUSCULAR; INTRAVENOUS at 06:21

## 2024-03-15 RX ADMIN — DIAZEPAM 5 MG: 5 INJECTION, SOLUTION INTRAMUSCULAR; INTRAVENOUS at 00:30

## 2024-03-15 RX ADMIN — TIZANIDINE 2 MG: 4 TABLET ORAL at 20:58

## 2024-03-15 RX ADMIN — MECLIZINE HYDROCHLORIDE 25 MG: 12.5 TABLET ORAL at 20:58

## 2024-03-15 RX ADMIN — FUROSEMIDE 20 MG: 10 INJECTION, SOLUTION INTRAMUSCULAR; INTRAVENOUS at 20:56

## 2024-03-15 RX ADMIN — PANTOPRAZOLE SODIUM 8 MG/HR: 40 INJECTION, POWDER, FOR SOLUTION INTRAVENOUS at 15:11

## 2024-03-15 RX ADMIN — ARIPIPRAZOLE 2 MG: 2 TABLET ORAL at 20:57

## 2024-03-15 RX ADMIN — METOCLOPRAMIDE 10 MG: 5 INJECTION, SOLUTION INTRAMUSCULAR; INTRAVENOUS at 06:21

## 2024-03-15 RX ADMIN — MORPHINE SULFATE 4 MG: 4 INJECTION, SOLUTION INTRAMUSCULAR; INTRAVENOUS at 01:58

## 2024-03-15 RX ADMIN — IRON SUCROSE 200 MG: 20 INJECTION, SOLUTION INTRAVENOUS at 13:56

## 2024-03-15 RX ADMIN — FUROSEMIDE 20 MG: 10 INJECTION, SOLUTION INTRAMUSCULAR; INTRAVENOUS at 16:00

## 2024-03-15 RX ADMIN — Medication 2 PUFF: at 22:13

## 2024-03-15 RX ADMIN — Medication 10 ML: at 11:12

## 2024-03-15 RX ADMIN — METOCLOPRAMIDE 10 MG: 5 INJECTION, SOLUTION INTRAMUSCULAR; INTRAVENOUS at 11:12

## 2024-03-15 RX ADMIN — PANTOPRAZOLE SODIUM 8 MG/HR: 40 INJECTION, POWDER, FOR SOLUTION INTRAVENOUS at 01:45

## 2024-03-15 RX ADMIN — METOCLOPRAMIDE 10 MG: 5 INJECTION, SOLUTION INTRAMUSCULAR; INTRAVENOUS at 00:14

## 2024-03-15 RX ADMIN — Medication 10 ML: at 22:01

## 2024-03-15 RX ADMIN — NORTRIPTYLINE HYDROCHLORIDE 75 MG: 25 CAPSULE ORAL at 20:57

## 2024-03-15 ASSESSMENT — PAIN DESCRIPTION - LOCATION
LOCATION: ABDOMEN
LOCATION: ABDOMEN

## 2024-03-15 ASSESSMENT — PAIN - FUNCTIONAL ASSESSMENT: PAIN_FUNCTIONAL_ASSESSMENT: INTOLERABLE, UNABLE TO DO ANY ACTIVE OR PASSIVE ACTIVITIES

## 2024-03-15 ASSESSMENT — PAIN DESCRIPTION - ORIENTATION: ORIENTATION: MID

## 2024-03-15 ASSESSMENT — PAIN DESCRIPTION - DESCRIPTORS
DESCRIPTORS: BURNING
DESCRIPTORS: BURNING

## 2024-03-15 ASSESSMENT — PAIN SCALES - GENERAL
PAINLEVEL_OUTOF10: 10
PAINLEVEL_OUTOF10: 3
PAINLEVEL_OUTOF10: 8

## 2024-03-15 NOTE — PROGRESS NOTES
Hospitalist Progress Note    Name:  Ana Gonzales    /Age/Sex: 1955  (68 y.o. female)  MRN & CSN:  [id number] & [id number]    PCP: Mellisa Houston MD    Date of Admission: 3/12/2024    Patient Status:  Inpatient     Chief Complaint:   Chief Complaint   Patient presents with    Bradycardia     Pt coming in with  pt has been bradycardic, weakness, slurred speech for a few days.  Vomiting yesterday with black emesis        Hospital Course:   Admitted for bradycardia with slurred speech. Concern for TIA. CT head non acute, but CTA showed severe narrowing of distal left P1 segment of left PCA, but otherwise imaging was non acute. MRI brain was non acute.    However, patient had low hemoglobin on admission and required 2U PRBC.  GI consulted.    Had EGD on 3/13 with GI and did not find any active bleeding, but did find blood clots and a large amount of liquid in the stomach. Placed on PPI gtt.    Underwent repeat EGD on 3/14 and did not show any bleed, but 2 clips were placed on small healing ulcer. Still showed blood clots in the stomach.    CT abd/pel on 3/15 showed dilated stomach and small loops reflecting a partial SBO.    Subjective:  Today is:  Hospital Day: 4.  Patient seen and examined in ICU-3917/3917-.     Lying in bed. Having more abdominal pain and distention today.      Medications:  Reviewed    Infusion Medications    sodium chloride      sodium chloride      lactated ringers IV soln Stopped (24 1400)    dextrose      pantoprazole (PROTONIX) 80 mg in sodium chloride 0.9 % 100 mL infusion 8 mg/hr (03/15/24 0626)    dextrose       Scheduled Medications    furosemide  20 mg IntraVENous BID    iron sucrose (VENOFER) 200 mg in sodium chloride 0.9 % 100 mL IVPB  200 mg IntraVENous Q24H    [START ON 3/17/2024] clopidogrel  75 mg Oral Daily    sodium chloride flush  5-40 mL IntraVENous 2 times per day    insulin lispro  0-8 Units SubCUTAneous Q4H    dilTIAZem  120 mg Oral Daily     Contraindicated  GI ppx: PPI  Diet: Diet NPO Exceptions are: Ice Chips, Sips of Water with Meds  Code Status: Full Code    PT/OT Eval Status: ordered    Disposition:  Continue PPI gtt. SBO seen on CT. NGT in place. General surgery consulted.      I personally spent 31 minutes in providing critical care. Total critical care time includes caring for direct patient contact, management of life support systems, review of data including imaging and labs, discussions with other team members and physicians, but excludes procedures.      Tc Shearer,   3/15/2024  2:16 PM

## 2024-03-15 NOTE — CARE COORDINATION
Case Management Assessment  Initial Evaluation    Date/Time of Evaluation: 3/15/2024 3:54 PM  Assessment Completed by: TATY JOSEPH RN    If patient is discharged prior to next notation, then this note serves as note for discharge by case management.    Patient Name: Ana Gonzales                   YOB: 1955  Diagnosis: TIA (transient ischemic attack) [G45.9]  Slurred speech [R47.81]  Ventricular bigeminy [I49.8]  Elevated troponin [R79.89]  Upper GI bleed [K92.2]                   Date / Time: 3/12/2024  7:35 AM    Patient Admission Status: Inpatient   Readmission Risk (Low < 19, Mod (19-27), High > 27): Readmission Risk Score: 14.6    Current PCP: Mellisa Houston MD  PCP verified by CM? Yes    Chart Reviewed: Yes      History Provided by: Significant Other  Patient Orientation: Alert and Oriented, Other (see comment) (per  alert and oriented pt sleeping at this time)    Patient Cognition: Other (see comment) (patient sleeping)    Hospitalization in the last 30 days (Readmission):  No    If yes, Readmission Assessment in CM Navigator will be completed.    Advance Directives:      Code Status: Full Code   Patient's Primary Decision Maker is: Legal Next of Kin      Discharge Planning:    Patient lives with: Spouse/Significant Other Type of Home: House (they stay on the main level 3 steps up to home and one to get into door)  Primary Care Giver: Self  Patient Support Systems include: Spouse/Significant Other, Children   Current Financial resources: Medicare  Current community resources: None  Current services prior to admission: Durable Medical Equipment            Current DME: [facility]            Type of Home Care services:  None    ADLS  Prior functional level: Independent in ADLs/IADLs  Current functional level: Assistance with the following:, Other (see comment), Mobility (ARU recommended,  assists with setting up medications)    PT AM-PAC: 11 /24  OT AM-PAC: 8 /24    Family can

## 2024-03-15 NOTE — PROGRESS NOTES
WFL      Subjective  General: Patient very lethargic and soft spoken.  Spouse reports he tried to get patient up today but she wouldn't get up for him.    Pain: 0/10  Pain Interventions: not applicable       Functional Mobility  Bed Mobility:  Supine to Sit: 2 person assistance with MOD x2   Sit to Supine: 2 person assistance with MOD x2   Scooting: MAX assist scooting towards  Comments:    Transfers:  Sit to stand transfer: moderate assistance  Stand to sit transfer: moderate assistance  Comments:  Gait belt donned.  Patient able to stand for approximately 2 minutes to allow for brief change and bed to be made.  Patient requesting to sit d/t fatigue.  Ambulation:  Ambulation not tested on this date secondary to weakness, fatigue, lethargy.  Distance: N/A  Gait Mechanics: N/A  Comments:    Stair Mobility:  Stair mobility not completed on this date.  Comments:  Wheelchair Mobility:  No w/c mobility completed on this date.  Comments:  Balance:  Static Sitting Balance: fair (-): maintains balance at SBA with use of UE support  Dynamic Sitting Balance: fair (-): maintains balance at CGA with use of UE support  Static Standing Balance: fair (-): maintains balance at MIN with use of UE support  Dynamic Standing Balance: fair (-): maintains balance at MIN with use of UE support  Comments:    Other Therapeutic Interventions    Functional Outcomes  AM-PAC Inpatient Mobility Raw Score : 16              Cognition  WFL  Orientation:    alert and oriented x 4  Command Following:   WFL    Education  Barriers To Learning: none  Patient Education: patient educated on goals, PT role and benefits, plan of care, general safety, functional mobility training, proper use of assistive device/equipment, family education, transfer training, discharge recommendations  Learning Assessment:  patient verbalizes and demonstrates understanding    Assessment  Activity Tolerance: Significantly limited secondary to weakness, balance deficits, and

## 2024-03-15 NOTE — SIGNIFICANT EVENT
Hospitalist    Called to bedside by nurse.  Patient having 10/10 abdominal pain worse epigastric but pain is diffuse with guarding.    Bowel sounds hypoactive, with distention.   Recent EGD yesterday  Stat labs  CT abdomen and pelvis with IV contrast.      Lauryn Cardenas NP
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
## 2024-03-15 NOTE — PLAN OF CARE
[facility]        Assessment & Plan of Care    History of Present Illness: Ms. Gonzales is a 68 y.o. female who presented to the ED 3 days ago for generalized weakness and slurred speech that began a couple of days prior to presentation.  She was found to be bradycardic on admission.  Other associated symptoms includes chills, sweats, decreased appetite, and coffee-ground emesis.  Hemoglobin noted to decrease from 10.3 g/dL on admission to 6.6 g/dL two nights ago, and the patient received 2 unit PRBC.  No fevers noted, diarrhea, or bloody stools.  Has constipation at baseline and only passes stool once every 1-2 weeks.  No prior bowel obstruction.  Lengthy medical history including history of subdural hematoma requiring surgery about 25 years ago with vertigo and nausea and vomiting since, CAD with angioplasty and stenting of LAD/D2 in June 2023 (takes aspirin and Plavix--last dose was 3/12/2024), diabetes (no longer on medications), hypertension, hyperlipidemia, GERD (no longer on PPI at home), gastroparesis, asthma, arthritis (takes NSAID/Nabumetone, twice daily), fibromyalgia, and depression.  She has been having PVCs, following with Dr. Bates (cardiology), and was placed on Holter monitor just prior to admission.  Prior abdominal surgeries includes hysterectomy, cholecystectomy, and bladder suspension surgery.  Last colonoscopy was in 2008, and reports having a negative Cologuard about 2 weeks ago.  Nonsmoker.    She underwent an EGD yesterday (3/14/2024) which revealed gastric body ulcer with pigmented spot and 2 clips were placed.  NGT was removed following the EGD and she was started on a clear liquid diet.  Intake was poor, and later in the night she developed abdominal distention and severe (10 out of 10), right lower quadrant pain, that was constant.  Patient is unable to characterize the pain.  NGT was replaced this morning and drained nearly 2 liters of bile
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
## 2024-03-15 NOTE — PROGRESS NOTES
Speech Language Pathology  HOLD  Ana Gonzales   1955     Attempted to see pt for dysphagia therapy follow-up. Per discussion with RN, pt NPO at this time for SBO. Will hold and re-attempt to follow-up as pt is able to participate.     Thanks,  Sommer Back MA CCC-SLP #[id number]  Speech Language Pathologist

## 2024-03-15 NOTE — PROGRESS NOTES
Paged GI to report drop in Hgb to 7.7. Dr Luu states that hospitalist would order blood transfusion not GI group. Therefore, no new orders received.

## 2024-03-15 NOTE — PROGRESS NOTES
Received order from Dr Gaitan to transfuse 1 unit PRBC. Unit is transfusing at this time without any adverse reaction noted.

## 2024-03-15 NOTE — PROGRESS NOTES
Ana Gonzales  Neurology Follow-up  [facility] Neurology    Date of Service: 3/15/2024    Subjective:   CC: Follow up today regarding: Acute speech disturbance    Events noted. Chart and lab reviewed.  Patient seen this morning, resting in bed.   is at bedside.  Patient noted to have distention and nausea vomiting overnight.  Stat CT abdomen was done which showed possible small bowel obstruction.  She currently has NG tube replaced.  She currently is n.p.o.  She complains of abdominal pain but otherwise she denies headache, dizziness, dysarthria.  Other review of systems unremarkable      ROS : A 10-12 system review obtained and updated today and is unremarkable except as mentioned  in my interval history.     Past medical history, social history, medication and family history reviewed.       Objective:  Exam:   Constitutional:   Vitals:    03/15/24 1100 03/15/24 1200 03/15/24 1300 03/15/24 1400   BP: (!) 121/53 (!) 139/59     Pulse: 90 97 93 92   Resp: 15 11 16 17   Temp:  98.8 °F (37.1 °C)     TempSrc:  Axillary     SpO2: 93% 94%  94%   Weight:       Height:         General appearance: Lethargic, appears in no acute distress.   Mental Status:   Oriented to person, place, problem, and time.    Memory: Good immediate recall.  Intact remote memory  Normal attention span and concentration.  Language: intact naming, repeating and fluency   Good fund of Knowledge.   Cranial Nerves:   II:   Pupils: equal, round, reactive to light  III,IV,VI: Extra Ocular Movements are intact. No nystagmus  V: Facial sensation is intact  VII: Facial strength and movements: intact and symmetric  XII: Tongue movements are normal  Musculoskeletal: Generalized weakness, able to move all 4 extremities spontaneously.  Tone: Normal tone.   Reflexes: Symmetric 2+ in both arms and legs.  Coordination: No abnormal movements, no tremors  Sensation: No abnormal sensory loss  Gait/Posture: Deferred testing due to patient  AM    MCV 87.6 03/15/2024 02:15 AM    RDW 15.6 03/15/2024 02:15 AM     03/15/2024 02:15 AM     Lab Results   Component Value Date    INR 1.42 (H) 03/14/2024    PROTIME 17.3 (H) 03/14/2024       Neuroimaging was independently reviewed by me and discussed results with the patient  I reviewed blood testing and other test results and discussed results with the patient      Impression:    Acute aphasia, could likely be a TIA.  MRI brain showed no acute stroke  Left PCA stenosis.  Maximize medical therapy  Acute GI bleed  Small bowel obstruction  Hypertension  Hyperlipidemia  Diabetes      Recommendation    Continue supportive care  PT, OT, speech when appropriate  Aspiration precautions  Will discuss with Dr. Mendoza, further recommendations to follow  We will follow from periphery, please call if you have any questions  Discussed with family, ICU nurse        VALE Davis - CNP    Attending Supervising Physician's Attestation Statement       The patient was seen 3/15/2024 in conjunction with the nurse practitioner with independent history, evaluation and examination. I agree with the note which has been adjusted to reflect my findings, with the addition of the following:       Patient seen today and examined  She is currently in the ICU  Remains to be waxing and waning but can follow her EXTR intermittentlyNot having abdominal distention  AAO x 3  Waxing and waning  Poor attention  Normal speech  No focal weakness, poor effort  No abnormal CN    Acute aphasia and metabolic encephalopathy.  Possible acute encephalopathy versus TIA  GI bleed  SBO  Hypertension  Diabetes      Continue current supportive care  Continue current workup for GI bleed and SBO  Neurochecks  Avoid low blood pressure  Aspirin DVT prophylaxis when medically stable  PT, OT and speech  Follow CBC  Nothing to add from neurology at this point  Will follow from periphery    Electronically signed by Lory Robertson MD on 3/15/24 at 3:29 PM

## 2024-03-15 NOTE — PROGRESS NOTES
Gastroenterology Progress Note    Ana Gonzales is a 68 y.o. female patient.  1. Slurred speech    2. Ventricular bigeminy    3. Elevated troponin    4. Upper GI bleed    5. Hematemesis, unspecified whether nausea present        Admission Date: 3/12/2024  Hospital Day: Hospital Day: 4  Attending: Tc Shearer DO  Date of service: 3/15/24    SUBJECTIVE:    Severe abdominal pain last. Urgent CT suggests SBO  Patient is currently very confused  Has not had a bowel movement for 2 days      ROS:  Cardiovascular ROS: no chest pain or dyspnea on exertion  Gastrointestinal ROS: see above  Respiratory ROS: no cough, shortness of breath, or wheezing    Physical    VITALS:  BP (!) 143/61   Pulse 94   Temp 98.3 °F (36.8 °C) (Axillary)   Resp 16   Ht 1.6 m (5' 3\")   Wt 97.6 kg (215 lb 2.7 oz)   SpO2 95%   BMI 38.12 kg/m²   TEMPERATURE:  Current - Temp: 98.3 °F (36.8 °C); Max - Temp  Av.4 °F (36.9 °C)  Min: 97.6 °F (36.4 °C)  Max: 99.7 °F (37.6 °C)    NAD  RRR, Nl s1s2  Lungs CTA Bilaterally, normal effort  Abdomen soft, distended, diffusely tender, no bowel sounds normal  AAOx0, No asterixis     Data      CBC:   Recent Labs     24  0630 24  1030 24  0452 24  1300 03/15/24  0100 03/15/24  0215 03/15/24  0900   WBC 18.6*  --  8.9  --   --  10.6  --    RBC 2.97*  --  2.89*  --   --  2.75*  --    HGB 8.3*   < > 8.4*   < > 8.2* 8.1* 8.1*   HCT 25.7*   < > 24.7*   < > 24.9* 24.1* 24.3*     --  168  --   --  200  --    MCV 86.4  --  85.7  --   --  87.6  --    MCH 27.9  --  29.2  --   --  29.4  --    MCHC 32.3  --  34.1  --   --  33.5  --    RDW 16.3*  --  15.5*  --   --  15.6*  --     < > = values in this interval not displayed.        BMP:  Recent Labs     24  0630 24  0452 03/15/24  0215    142 139   K 4.2 3.5 3.4*   * 111* 104   CO2 20* 23 23   BUN 63* 33* 21*   CREATININE 1.2 0.8 0.8   CALCIUM 7.8* 8.1* 8.0*   GLUCOSE 173* 174* 181*        Hepatic Function  Panel:   Recent Labs     03/13/24  0630 03/15/24  0215   AST 22 9*   ALT 7* <5*   BILITOT <0.2 <0.2   ALKPHOS 40 45       No results for input(s): \"LIPASE\", \"AMYLASE\" in the last 72 hours.  Recent Labs     03/14/24  0452   PROTIME 17.3*   INR 1.42*     No results for input(s): \"PTT\" in the last 72 hours.  No results for input(s): \"OCCULTBLD\" in the last 72 hours.      Radiology Review:    CT ABDOMEN PELVIS W IV CONTRAST Additional Contrast? None   Preliminary Result   Dilated stomach and small loops with multiple air-fluid levels.  Relative   changes of bowel loop caliber in the right lower abdomen reflecting at least   partial small bowel obstruction.  Consider NG tube placement.      Thickening of the wall of the small bowel loops in the right lower abdomen,   which may reflect underlying infectious/inflammatory changes.      Small amount of free mesenteric fluid may be due to small-bowel obstruction   and/or inflammatory changes.      Gas density within the urinary bladder.  This may be due to recent   instrumentation; however, correlate clinically with signs of cystitis.         MRI BRAIN WO CONTRAST   Final Result   No acute intracranial abnormality.      Minimal chronic microvascular disease.         XR CHEST PORTABLE   Final Result   Interval placement a right IJ central venous catheter without discrete   pneumothorax seen.         CTA HEAD NECK W CONTRAST   Final Result   1. No acute intracranial abnormality within constraints of CT acquisition.   2. Severe narrowing of the distal left P1 segment with otherwise fetal origin   of the left PCA.   3. Otherwise, no significant stenosis elsewhere within the head or neck.  No   large vessel occlusion.         CT HEAD WO CONTRAST   Final Result   1. No acute intracranial abnormality within constraints of CT acquisition.   2. Severe narrowing of the distal left P1 segment with otherwise fetal origin   of the left PCA.   3. Otherwise, no significant stenosis

## 2024-03-15 NOTE — PROGRESS NOTES
balance at CGA without use of UE support  Dynamic Sitting Balance: fair: maintains balance at CGA without use of UE support  Static Standing Balance: poor (+): requires min (A) to maintain balance  Dynamic Standing Balance: poor (+): requires min (A) to maintain balance  Comments:    Other Therapeutic Interventions    Functional Outcomes  AM-PAC Inpatient Daily Activity Raw Score: 8                                    Cognition  WFL  Orientation:    alert and oriented x 4  Command Following:   WFL     Education  Barriers To Learning: none  Patient Education: patient educated on goals, OT role and benefits, plan of care, proper use of assistive device/equipment, energy conservation, family education, transfer training, discharge recommendations  Learning Assessment:  patient verbalizes and demonstrates understanding    Assessment  Activity Tolerance: fair - pt's vital monitored throughout. BP: 122/58 (lying supine), 121/50 (sitting EOB), 105/67 (standing EOB)  Impairments Requiring Therapeutic Intervention: decreased functional mobility, decreased ADL status, decreased ROM, decreased strength, decreased endurance, decreased sensation, decreased balance, increased pain  Prognosis: good  Clinical Assessment: Pt presents with deficits listed above. Pt is currently below baseline and requires assistance with ADLs and mobility. Pt would benefit from continued skilled OT to return to OF.   Safety Interventions: patient left in chair, chair alarm in place, call light within reach, gait belt, patient at risk for falls, nurse notified, and family/caregiver present    Plan  Frequency: 3-5 x/per week  Current Treatment Recommendations: strengthening, ROM, balance training, functional mobility training, transfer training, gait training, stair training, endurance training, patient/caregiver education, ADL/self-care training, safety education, and equipment evaluation/education    Goals  Patient Goals: to return home   Short Term  Goals:  Time Frame: prior to D/C  Patient will complete upper body ADL at minimal assistance   Patient will complete lower body ADL at minimal assistance   Patient will complete toileting at stand by assistance   Patient will complete functional transfers at stand by assistance   Patient will complete functional mobility at stand by assistance   Patient will complete bed mobility at modified independent     Above goals reviewed on 3/15/2024.  All goals are ongoing at this time unless indicated above.       Therapy Session Time     Individual Group Co-treatment   Time In    1255   Time Out    1333   Minutes    38        Timed Code Treatment Minutes:   38  Total Treatment Minutes:  38 mins       Electronically Signed By: KATELIN LU OT

## 2024-03-15 NOTE — PROGRESS NOTES
0118: Patient complaining of abdominal pain, burning that is tender with minimal touch. Morphine given at 2228 without any improvement. Abdomen is distended at the middle with hypoactive bowel sounds. Pain was not this bad until now. Perfect served NP Sommer Celis.     0200: Lauryn Cardenas, NP at bedside to assess Pt. New order received for STAT labs and CT of abdomen. PRN Morphine given per NP.     0230: Patient taken down to CT with this RN in stable condition.     0258: Pt return from CT. Patient stated pain is not as bad, keeping NPO except ice chips and sips of water.

## 2024-03-16 ENCOUNTER — APPOINTMENT (OUTPATIENT)
Dept: GENERAL RADIOLOGY | Age: 69
DRG: 330 | End: 2024-03-16

## 2024-03-16 LAB
ALBUMIN SERPL-MCNC: 3.1 G/DL (ref 3.4–5)
ANION GAP SERPL CALCULATED.3IONS-SCNC: 12 MMOL/L (ref 3–16)
ANISOCYTOSIS BLD QL SMEAR: ABNORMAL
BASOPHILS # BLD: 0 K/UL (ref 0–0.2)
BASOPHILS NFR BLD: 0 %
BUN SERPL-MCNC: 16 MG/DL (ref 7–20)
CALCIUM SERPL-MCNC: 8.2 MG/DL (ref 8.3–10.6)
CHLORIDE SERPL-SCNC: 104 MMOL/L (ref 99–110)
CO2 SERPL-SCNC: 23 MMOL/L (ref 21–32)
CREAT SERPL-MCNC: 0.6 MG/DL (ref 0.6–1.2)
DEPRECATED RDW RBC AUTO: 15.8 % (ref 12.4–15.4)
EOSINOPHIL # BLD: 0.3 K/UL (ref 0–0.6)
EOSINOPHIL NFR BLD: 4 %
GFR SERPLBLD CREATININE-BSD FMLA CKD-EPI: >60 ML/MIN/{1.73_M2}
GLUCOSE BLD-MCNC: 101 MG/DL (ref 70–99)
GLUCOSE BLD-MCNC: 102 MG/DL (ref 70–99)
GLUCOSE BLD-MCNC: 112 MG/DL (ref 70–99)
GLUCOSE BLD-MCNC: 117 MG/DL (ref 70–99)
GLUCOSE BLD-MCNC: 133 MG/DL (ref 70–99)
GLUCOSE BLD-MCNC: 93 MG/DL (ref 70–99)
GLUCOSE SERPL-MCNC: 140 MG/DL (ref 70–99)
HCT VFR BLD AUTO: 25 % (ref 36–48)
HCT VFR BLD AUTO: 25.3 % (ref 36–48)
HCT VFR BLD AUTO: 26.3 % (ref 36–48)
HGB BLD-MCNC: 8.7 G/DL (ref 12–16)
HGB BLD-MCNC: 8.7 G/DL (ref 12–16)
HGB BLD-MCNC: 8.9 G/DL (ref 12–16)
LYMPHOCYTES # BLD: 2.3 K/UL (ref 1–5.1)
LYMPHOCYTES NFR BLD: 25 %
MAGNESIUM SERPL-MCNC: 2 MG/DL (ref 1.8–2.4)
MCH RBC QN AUTO: 30.1 PG (ref 26–34)
MCHC RBC AUTO-ENTMCNC: 34.9 G/DL (ref 31–36)
MCV RBC AUTO: 86.1 FL (ref 80–100)
MONOCYTES # BLD: 0.8 K/UL (ref 0–1.3)
MONOCYTES NFR BLD: 9 %
NEUTROPHILS # BLD: 5 K/UL (ref 1.7–7.7)
NEUTROPHILS NFR BLD: 57 %
NEUTS BAND NFR BLD MANUAL: 3 % (ref 0–7)
NRBC BLD-RTO: 1 /100 WBC
PERFORMED ON: ABNORMAL
PERFORMED ON: NORMAL
PHOSPHATE SERPL-MCNC: 2.4 MG/DL (ref 2.5–4.9)
PLATELET # BLD AUTO: 178 K/UL (ref 135–450)
PLATELET BLD QL SMEAR: ADEQUATE
PMV BLD AUTO: 9.3 FL (ref 5–10.5)
POLYCHROMASIA BLD QL SMEAR: ABNORMAL
POTASSIUM SERPL-SCNC: 3 MMOL/L (ref 3.5–5.1)
RBC # BLD AUTO: 2.9 M/UL (ref 4–5.2)
SLIDE REVIEW: ABNORMAL
SODIUM SERPL-SCNC: 139 MMOL/L (ref 136–145)
VARIANT LYMPHS NFR BLD MANUAL: 2 % (ref 0–6)
WBC # BLD AUTO: 8.4 K/UL (ref 4–11)

## 2024-03-16 PROCEDURE — 6370000000 HC RX 637 (ALT 250 FOR IP): Performed by: INTERNAL MEDICINE

## 2024-03-16 PROCEDURE — 2580000003 HC RX 258: Performed by: INTERNAL MEDICINE

## 2024-03-16 PROCEDURE — 99231 SBSQ HOSP IP/OBS SF/LOW 25: CPT | Performed by: SURGERY

## 2024-03-16 PROCEDURE — 2000000000 HC ICU R&B

## 2024-03-16 PROCEDURE — 2580000003 HC RX 258: Performed by: STUDENT IN AN ORGANIZED HEALTH CARE EDUCATION/TRAINING PROGRAM

## 2024-03-16 PROCEDURE — 85014 HEMATOCRIT: CPT

## 2024-03-16 PROCEDURE — 85025 COMPLETE CBC W/AUTO DIFF WBC: CPT

## 2024-03-16 PROCEDURE — 36592 COLLECT BLOOD FROM PICC: CPT

## 2024-03-16 PROCEDURE — 74019 RADEX ABDOMEN 2 VIEWS: CPT

## 2024-03-16 PROCEDURE — 83735 ASSAY OF MAGNESIUM: CPT

## 2024-03-16 PROCEDURE — C9113 INJ PANTOPRAZOLE SODIUM, VIA: HCPCS | Performed by: INTERNAL MEDICINE

## 2024-03-16 PROCEDURE — 6360000002 HC RX W HCPCS: Performed by: INTERNAL MEDICINE

## 2024-03-16 PROCEDURE — 6360000002 HC RX W HCPCS: Performed by: STUDENT IN AN ORGANIZED HEALTH CARE EDUCATION/TRAINING PROGRAM

## 2024-03-16 PROCEDURE — 85018 HEMOGLOBIN: CPT

## 2024-03-16 PROCEDURE — 80069 RENAL FUNCTION PANEL: CPT

## 2024-03-16 PROCEDURE — 94761 N-INVAS EAR/PLS OXIMETRY MLT: CPT

## 2024-03-16 PROCEDURE — 94640 AIRWAY INHALATION TREATMENT: CPT

## 2024-03-16 PROCEDURE — 2500000003 HC RX 250 WO HCPCS: Performed by: STUDENT IN AN ORGANIZED HEALTH CARE EDUCATION/TRAINING PROGRAM

## 2024-03-16 RX ORDER — POTASSIUM CHLORIDE 29.8 MG/ML
20 INJECTION INTRAVENOUS PRN
Status: DISCONTINUED | OUTPATIENT
Start: 2024-03-16 | End: 2024-04-10 | Stop reason: HOSPADM

## 2024-03-16 RX ADMIN — ASPIRIN 81 MG: 81 TABLET, COATED ORAL at 08:30

## 2024-03-16 RX ADMIN — RANOLAZINE 1000 MG: 500 TABLET, EXTENDED RELEASE ORAL at 08:28

## 2024-03-16 RX ADMIN — POTASSIUM CHLORIDE 20 MEQ: 400 INJECTION, SOLUTION INTRAVENOUS at 11:04

## 2024-03-16 RX ADMIN — GABAPENTIN 600 MG: 300 CAPSULE ORAL at 13:43

## 2024-03-16 RX ADMIN — NORTRIPTYLINE HYDROCHLORIDE 75 MG: 25 CAPSULE ORAL at 21:40

## 2024-03-16 RX ADMIN — MECLIZINE HYDROCHLORIDE 25 MG: 12.5 TABLET ORAL at 13:43

## 2024-03-16 RX ADMIN — MECLIZINE HYDROCHLORIDE 25 MG: 12.5 TABLET ORAL at 17:16

## 2024-03-16 RX ADMIN — TIZANIDINE 2 MG: 4 TABLET ORAL at 21:07

## 2024-03-16 RX ADMIN — MORPHINE SULFATE 4 MG: 4 INJECTION, SOLUTION INTRAMUSCULAR; INTRAVENOUS at 23:08

## 2024-03-16 RX ADMIN — Medication 2 PUFF: at 07:56

## 2024-03-16 RX ADMIN — TOPIRAMATE 150 MG: 100 TABLET, FILM COATED ORAL at 17:16

## 2024-03-16 RX ADMIN — RANOLAZINE 1000 MG: 500 TABLET, EXTENDED RELEASE ORAL at 21:08

## 2024-03-16 RX ADMIN — TOPIRAMATE 100 MG: 100 TABLET, FILM COATED ORAL at 08:30

## 2024-03-16 RX ADMIN — GABAPENTIN 600 MG: 300 CAPSULE ORAL at 08:30

## 2024-03-16 RX ADMIN — Medication 2 PUFF: at 21:03

## 2024-03-16 RX ADMIN — MECLIZINE HYDROCHLORIDE 25 MG: 12.5 TABLET ORAL at 21:07

## 2024-03-16 RX ADMIN — PANTOPRAZOLE SODIUM 8 MG/HR: 40 INJECTION, POWDER, FOR SOLUTION INTRAVENOUS at 00:21

## 2024-03-16 RX ADMIN — Medication 10 ML: at 21:06

## 2024-03-16 RX ADMIN — PANTOPRAZOLE SODIUM 8 MG/HR: 40 INJECTION, POWDER, FOR SOLUTION INTRAVENOUS at 11:48

## 2024-03-16 RX ADMIN — ARIPIPRAZOLE 2 MG: 2 TABLET ORAL at 21:06

## 2024-03-16 RX ADMIN — MECLIZINE HYDROCHLORIDE 25 MG: 12.5 TABLET ORAL at 08:27

## 2024-03-16 RX ADMIN — Medication 10 ML: at 08:26

## 2024-03-16 RX ADMIN — GABAPENTIN 600 MG: 300 CAPSULE ORAL at 21:07

## 2024-03-16 RX ADMIN — FUROSEMIDE 20 MG: 10 INJECTION, SOLUTION INTRAMUSCULAR; INTRAVENOUS at 17:16

## 2024-03-16 RX ADMIN — TROSPIUM CHLORIDE 20 MG: 20 TABLET, FILM COATED ORAL at 06:36

## 2024-03-16 RX ADMIN — FUROSEMIDE 20 MG: 10 INJECTION, SOLUTION INTRAMUSCULAR; INTRAVENOUS at 08:27

## 2024-03-16 RX ADMIN — Medication 15.63 MMOL: at 08:52

## 2024-03-16 RX ADMIN — ISOSORBIDE MONONITRATE 120 MG: 60 TABLET, EXTENDED RELEASE ORAL at 08:35

## 2024-03-16 RX ADMIN — DILTIAZEM HYDROCHLORIDE 120 MG: 120 CAPSULE, COATED, EXTENDED RELEASE ORAL at 08:27

## 2024-03-16 RX ADMIN — GABAPENTIN 600 MG: 300 CAPSULE ORAL at 17:16

## 2024-03-16 RX ADMIN — IRON SUCROSE 200 MG: 20 INJECTION, SOLUTION INTRAVENOUS at 14:15

## 2024-03-16 RX ADMIN — BUPROPION HYDROCHLORIDE 300 MG: 300 TABLET, EXTENDED RELEASE ORAL at 08:30

## 2024-03-16 RX ADMIN — POTASSIUM CHLORIDE 20 MEQ: 400 INJECTION, SOLUTION INTRAVENOUS at 08:26

## 2024-03-16 RX ADMIN — PANTOPRAZOLE SODIUM 8 MG/HR: 40 INJECTION, POWDER, FOR SOLUTION INTRAVENOUS at 21:25

## 2024-03-16 RX ADMIN — POTASSIUM CHLORIDE 20 MEQ: 400 INJECTION, SOLUTION INTRAVENOUS at 09:59

## 2024-03-16 ASSESSMENT — PAIN DESCRIPTION - LOCATION
LOCATION: NECK
LOCATION: NECK
LOCATION: KNEE

## 2024-03-16 ASSESSMENT — PAIN SCALES - GENERAL
PAINLEVEL_OUTOF10: 10
PAINLEVEL_OUTOF10: 0
PAINLEVEL_OUTOF10: 8
PAINLEVEL_OUTOF10: 9

## 2024-03-16 ASSESSMENT — PAIN SCALES - WONG BAKER
WONGBAKER_NUMERICALRESPONSE: NO HURT
WONGBAKER_NUMERICALRESPONSE: HURTS LITTLE MORE
WONGBAKER_NUMERICALRESPONSE: NO HURT

## 2024-03-16 ASSESSMENT — PAIN DESCRIPTION - DESCRIPTORS: DESCRIPTORS: SHOOTING;TEARING

## 2024-03-16 ASSESSMENT — PAIN - FUNCTIONAL ASSESSMENT: PAIN_FUNCTIONAL_ASSESSMENT: ACTIVITIES ARE NOT PREVENTED

## 2024-03-16 ASSESSMENT — PAIN DESCRIPTION - ORIENTATION: ORIENTATION: LEFT

## 2024-03-16 NOTE — PROGRESS NOTES
Gastroenterology Progress Note    Ana Gonzales is a 68 y.o. female patient.  1. Slurred speech    2. Ventricular bigeminy    3. Elevated troponin    4. Upper GI bleed    5. Hematemesis, unspecified whether nausea present        Admission Date: 3/12/2024  Hospital Day: Hospital Day: 5  Attending: Tc Shearer DO  Date of service: 3/16/24    SUBJECTIVE:    Feels better after NGT insertion   No recurrent GI bleeding. NGT output greenish yellow   and grand daughter at bedside    ROS:  Cardiovascular ROS: no chest pain or dyspnea on exertion  Gastrointestinal ROS: see above  Respiratory ROS: no cough, shortness of breath, or wheezing    Physical    VITALS:  /77   Pulse 90   Temp 98.6 °F (37 °C) (Temporal)   Resp 17   Ht 1.6 m (5' 3\")   Wt 97.6 kg (215 lb 2.7 oz)   SpO2 96%   BMI 38.12 kg/m²   TEMPERATURE:  Current - Temp: 98.6 °F (37 °C); Max - Temp  Av.6 °F (37 °C)  Min: 98.4 °F (36.9 °C)  Max: 98.8 °F (37.1 °C)    NAD  RRR, Nl s1s2  Lungs CTA Bilaterally, normal effort  Abdomen soft, mild tenderness and distension, hypoactive bowel sounds  AAOx3, No asterixis     Data      CBC:   Recent Labs     24  0452 24  1300 03/15/24  0215 03/15/24  0900 03/15/24  1510 24  0024 24  0526   WBC 8.9  --  10.6  --   --   --  8.4   RBC 2.89*  --  2.75*  --   --   --  2.90*   HGB 8.4*   < > 8.1*   < > 7.7* 8.9* 8.7*   HCT 24.7*   < > 24.1*   < > 23.4* 26.3* 25.0*     --  200  --   --   --  178   MCV 85.7  --  87.6  --   --   --  86.1   MCH 29.2  --  29.4  --   --   --  30.1   MCHC 34.1  --  33.5  --   --   --  34.9   RDW 15.5*  --  15.6*  --   --   --  15.8*   NRBC  --   --   --   --   --   --  1*   BANDSPCT  --   --   --   --   --   --  3    < > = values in this interval not displayed.        BMP:  Recent Labs     24  0452 03/15/24  0215 24  0526    139 139   K 3.5 3.4* 3.0*   * 104 104   CO2 23 23 23   BUN 33* 21* 16   CREATININE 0.8 0.8 0.6

## 2024-03-16 NOTE — PROGRESS NOTES
Hospitalist Progress Note    Name:  Ana Gonzales    /Age/Sex: 1955  (68 y.o. female)  MRN & CSN:  [id number] & [id number]    PCP: Mellisa Houston MD    Date of Admission: 3/12/2024    Patient Status:  Inpatient     Chief Complaint:   Chief Complaint   Patient presents with    Bradycardia     Pt coming in with  pt has been bradycardic, weakness, slurred speech for a few days.  Vomiting yesterday with black emesis        Hospital Course:   Admitted for bradycardia with slurred speech. Concern for TIA. CT head non acute, but CTA showed severe narrowing of distal left P1 segment of left PCA, but otherwise imaging was non acute. MRI brain was non acute.    However, patient had low hemoglobin on admission and required 2U PRBC.  GI consulted.    Had EGD on 3/13 with GI and did not find any active bleeding, but did find blood clots and a large amount of liquid in the stomach. Placed on PPI gtt.    Underwent repeat EGD on 3/14 and did not show any bleed, but 2 clips were placed on small healing ulcer. Still showed blood clots in the stomach.    CT abd/pel on 3/15 showed dilated stomach and small loops reflecting a partial SBO.    Subjective:  Today is:  Hospital Day: 5.  Patient seen and examined in ICU-3917/3917-.     Sitting in chair. Had BM today. Abdominal pain is improved, though still distended. NGT present.      Medications:  Reviewed    Infusion Medications    sodium chloride      sodium chloride      sodium chloride      dextrose      pantoprazole (PROTONIX) 80 mg in sodium chloride 0.9 % 100 mL infusion 8 mg/hr (24 0021)    dextrose       Scheduled Medications    furosemide  20 mg IntraVENous BID    iron sucrose (VENOFER) 200 mg in sodium chloride 0.9 % 100 mL IVPB  200 mg IntraVENous Q24H    [Held by provider] clopidogrel  75 mg Oral Daily    sodium chloride flush  5-40 mL IntraVENous 2 times per day    insulin lispro  0-8 Units SubCUTAneous Q4H    dilTIAZem  120 mg Oral Daily

## 2024-03-16 NOTE — PROGRESS NOTES
Ana Gonzales is a 68 y.o. female patient.    Chief complaint-abdominal pain    [id number]-old female seen in follow-up for a small bowel obstruction  Current Facility-Administered Medications   Medication Dose Route Frequency Provider Last Rate Last Admin    potassium chloride 20 mEq/50 mL IVPB (Central Line)  20 mEq IntraVENous PRN Tc Shearer DO 50 mL/hr at 03/16/24 1104 20 mEq at 03/16/24 1104    sodium phosphate 15.63 mmol in sodium chloride 0.9 % 250 mL IVPB  0.16 mmol/kg IntraVENous PRN Tc Shearer DO 62.5 mL/hr at 03/16/24 0852 15.63 mmol at 03/16/24 0852    furosemide (LASIX) injection 20 mg  20 mg IntraVENous BID Tc Shearer DO   20 mg at 03/16/24 0827    0.9 % sodium chloride infusion   IntraVENous PRN Matthew Gaitan MD        iron sucrose (VENOFER) 200 mg in sodium chloride 0.9 % 100 mL IVPB  200 mg IntraVENous Q24H Hakeem Zaldivar MD   Stopped at 03/15/24 1457    [Held by provider] clopidogrel (PLAVIX) tablet 75 mg  75 mg Oral Daily Tc Shearer DO        morphine injection 4 mg  4 mg IntraVENous Q4H PRN Tc Shearer DO   4 mg at 03/15/24 0621    0.9 % sodium chloride infusion   IntraVENous PRN Hakeem Zaldivar MD        sodium chloride flush 0.9 % injection 5-40 mL  5-40 mL IntraVENous 2 times per day Hakeem Zaldivar MD   10 mL at 03/16/24 0826    sodium chloride flush 0.9 % injection 5-40 mL  5-40 mL IntraVENous PRN Hakeem Zaldivar MD        0.9 % sodium chloride infusion   IntraVENous PRN Hakeem Zaldivar MD        ondansetron (ZOFRAN) injection 4 mg  4 mg IntraVENous Q6H PRN Hakeem Zaldivar MD        acetaminophen (TYLENOL) suppository 650 mg  650 mg Rectal Q6H PRN Hakeem Zaldivar MD   650 mg at 03/14/24 0337    ondansetron (ZOFRAN-ODT) disintegrating tablet 8 mg  8 mg Oral Q8H PRN Hakeem Zaldivar MD   8 mg at 03/14/24 1356    dextrose 10 % infusion   IntraVENous Continuous PRN Hakeem Zaldivar MD        insulin lispro (HUMALOG) injection vial 0-8 Units  0-8 Units SubCUTAneous Q4H Hakeem Zaldivar MD        pantoprazole

## 2024-03-17 ENCOUNTER — APPOINTMENT (OUTPATIENT)
Dept: CT IMAGING | Age: 69
DRG: 330 | End: 2024-03-17

## 2024-03-17 PROBLEM — J04.30 SUPRAGLOTTITIS WITHOUT AIRWAY OBSTRUCTION: Status: ACTIVE | Noted: 2024-03-17

## 2024-03-17 PROBLEM — K21.9 LARYNGOPHARYNGEAL REFLUX (LPR): Status: ACTIVE | Noted: 2024-03-17

## 2024-03-17 PROBLEM — K11.21 ACUTE PAROTITIS: Status: ACTIVE | Noted: 2024-03-17

## 2024-03-17 LAB
ALBUMIN SERPL-MCNC: 3 G/DL (ref 3.4–5)
ANION GAP SERPL CALCULATED.3IONS-SCNC: 10 MMOL/L (ref 3–16)
ANISOCYTOSIS BLD QL SMEAR: ABNORMAL
BASOPHILS # BLD: 0 K/UL (ref 0–0.2)
BASOPHILS NFR BLD: 0 %
BUN SERPL-MCNC: 14 MG/DL (ref 7–20)
CALCIUM SERPL-MCNC: 8.3 MG/DL (ref 8.3–10.6)
CHLORIDE SERPL-SCNC: 107 MMOL/L (ref 99–110)
CO2 SERPL-SCNC: 22 MMOL/L (ref 21–32)
CREAT SERPL-MCNC: 0.6 MG/DL (ref 0.6–1.2)
DEPRECATED RDW RBC AUTO: 16 % (ref 12.4–15.4)
EOSINOPHIL # BLD: 0.2 K/UL (ref 0–0.6)
EOSINOPHIL NFR BLD: 2 %
GFR SERPLBLD CREATININE-BSD FMLA CKD-EPI: >60 ML/MIN/{1.73_M2}
GLUCOSE BLD-MCNC: 105 MG/DL (ref 70–99)
GLUCOSE BLD-MCNC: 109 MG/DL (ref 70–99)
GLUCOSE BLD-MCNC: 121 MG/DL (ref 70–99)
GLUCOSE BLD-MCNC: 124 MG/DL (ref 70–99)
GLUCOSE BLD-MCNC: 146 MG/DL (ref 70–99)
GLUCOSE BLD-MCNC: 154 MG/DL (ref 70–99)
GLUCOSE SERPL-MCNC: 131 MG/DL (ref 70–99)
HCT VFR BLD AUTO: 26.2 % (ref 36–48)
HGB BLD-MCNC: 8.8 G/DL (ref 12–16)
LYMPHOCYTES # BLD: 2.2 K/UL (ref 1–5.1)
LYMPHOCYTES NFR BLD: 19 %
MCH RBC QN AUTO: 28.9 PG (ref 26–34)
MCHC RBC AUTO-ENTMCNC: 33.6 G/DL (ref 31–36)
MCV RBC AUTO: 86.2 FL (ref 80–100)
MONOCYTES # BLD: 0.6 K/UL (ref 0–1.3)
MONOCYTES NFR BLD: 5 %
NEUTROPHILS # BLD: 8.7 K/UL (ref 1.7–7.7)
NEUTROPHILS NFR BLD: 67 %
NEUTS BAND NFR BLD MANUAL: 7 % (ref 0–7)
NRBC BLD-RTO: 1 /100 WBC
PERFORMED ON: ABNORMAL
PHOSPHATE SERPL-MCNC: 2.7 MG/DL (ref 2.5–4.9)
PLATELET # BLD AUTO: 227 K/UL (ref 135–450)
PLATELET BLD QL SMEAR: ADEQUATE
PMV BLD AUTO: 8.9 FL (ref 5–10.5)
POLYCHROMASIA BLD QL SMEAR: ABNORMAL
POTASSIUM SERPL-SCNC: 3.2 MMOL/L (ref 3.5–5.1)
RBC # BLD AUTO: 3.04 M/UL (ref 4–5.2)
SLIDE REVIEW: ABNORMAL
SODIUM SERPL-SCNC: 139 MMOL/L (ref 136–145)
WBC # BLD AUTO: 11.8 K/UL (ref 4–11)

## 2024-03-17 PROCEDURE — 6360000002 HC RX W HCPCS: Performed by: INTERNAL MEDICINE

## 2024-03-17 PROCEDURE — 99231 SBSQ HOSP IP/OBS SF/LOW 25: CPT | Performed by: SURGERY

## 2024-03-17 PROCEDURE — 2580000003 HC RX 258: Performed by: STUDENT IN AN ORGANIZED HEALTH CARE EDUCATION/TRAINING PROGRAM

## 2024-03-17 PROCEDURE — 2580000003 HC RX 258: Performed by: INTERNAL MEDICINE

## 2024-03-17 PROCEDURE — 6370000000 HC RX 637 (ALT 250 FOR IP): Performed by: INTERNAL MEDICINE

## 2024-03-17 PROCEDURE — 1200000000 HC SEMI PRIVATE

## 2024-03-17 PROCEDURE — 2700000000 HC OXYGEN THERAPY PER DAY

## 2024-03-17 PROCEDURE — 6360000004 HC RX CONTRAST MEDICATION: Performed by: STUDENT IN AN ORGANIZED HEALTH CARE EDUCATION/TRAINING PROGRAM

## 2024-03-17 PROCEDURE — 99223 1ST HOSP IP/OBS HIGH 75: CPT | Performed by: STUDENT IN AN ORGANIZED HEALTH CARE EDUCATION/TRAINING PROGRAM

## 2024-03-17 PROCEDURE — 94761 N-INVAS EAR/PLS OXIMETRY MLT: CPT

## 2024-03-17 PROCEDURE — 80069 RENAL FUNCTION PANEL: CPT

## 2024-03-17 PROCEDURE — 70491 CT SOFT TISSUE NECK W/DYE: CPT

## 2024-03-17 PROCEDURE — 85025 COMPLETE CBC W/AUTO DIFF WBC: CPT

## 2024-03-17 PROCEDURE — C9113 INJ PANTOPRAZOLE SODIUM, VIA: HCPCS | Performed by: INTERNAL MEDICINE

## 2024-03-17 PROCEDURE — 6360000002 HC RX W HCPCS: Performed by: STUDENT IN AN ORGANIZED HEALTH CARE EDUCATION/TRAINING PROGRAM

## 2024-03-17 PROCEDURE — 94640 AIRWAY INHALATION TREATMENT: CPT

## 2024-03-17 PROCEDURE — 31575 DIAGNOSTIC LARYNGOSCOPY: CPT | Performed by: STUDENT IN AN ORGANIZED HEALTH CARE EDUCATION/TRAINING PROGRAM

## 2024-03-17 PROCEDURE — C9113 INJ PANTOPRAZOLE SODIUM, VIA: HCPCS | Performed by: STUDENT IN AN ORGANIZED HEALTH CARE EDUCATION/TRAINING PROGRAM

## 2024-03-17 RX ORDER — HYDROMORPHONE HYDROCHLORIDE 1 MG/ML
0.75 INJECTION, SOLUTION INTRAMUSCULAR; INTRAVENOUS; SUBCUTANEOUS
Status: DISCONTINUED | OUTPATIENT
Start: 2024-03-17 | End: 2024-03-19

## 2024-03-17 RX ORDER — PANTOPRAZOLE SODIUM 40 MG/10ML
40 INJECTION, POWDER, LYOPHILIZED, FOR SOLUTION INTRAVENOUS 2 TIMES DAILY
Status: DISCONTINUED | OUTPATIENT
Start: 2024-03-17 | End: 2024-03-25

## 2024-03-17 RX ORDER — DEXAMETHASONE SODIUM PHOSPHATE 10 MG/ML
6 INJECTION, SOLUTION INTRAMUSCULAR; INTRAVENOUS EVERY 8 HOURS
Status: DISCONTINUED | OUTPATIENT
Start: 2024-03-17 | End: 2024-03-20

## 2024-03-17 RX ADMIN — ISOSORBIDE MONONITRATE 120 MG: 60 TABLET, EXTENDED RELEASE ORAL at 09:09

## 2024-03-17 RX ADMIN — BUPROPION HYDROCHLORIDE 300 MG: 300 TABLET, EXTENDED RELEASE ORAL at 08:56

## 2024-03-17 RX ADMIN — TOPIRAMATE 100 MG: 100 TABLET, FILM COATED ORAL at 08:41

## 2024-03-17 RX ADMIN — PANTOPRAZOLE SODIUM 8 MG/HR: 40 INJECTION, POWDER, FOR SOLUTION INTRAVENOUS at 09:30

## 2024-03-17 RX ADMIN — ARIPIPRAZOLE 2 MG: 2 TABLET ORAL at 21:07

## 2024-03-17 RX ADMIN — TROSPIUM CHLORIDE 20 MG: 20 TABLET, FILM COATED ORAL at 18:29

## 2024-03-17 RX ADMIN — SODIUM CHLORIDE 3000 MG: 900 INJECTION INTRAVENOUS at 15:52

## 2024-03-17 RX ADMIN — Medication 2 PUFF: at 20:42

## 2024-03-17 RX ADMIN — GABAPENTIN 600 MG: 300 CAPSULE ORAL at 18:14

## 2024-03-17 RX ADMIN — DEXAMETHASONE SODIUM PHOSPHATE 6 MG: 10 INJECTION, SOLUTION INTRAMUSCULAR; INTRAVENOUS at 15:53

## 2024-03-17 RX ADMIN — Medication 10 ML: at 09:10

## 2024-03-17 RX ADMIN — ASPIRIN 81 MG: 81 TABLET, COATED ORAL at 08:41

## 2024-03-17 RX ADMIN — DEXAMETHASONE SODIUM PHOSPHATE 6 MG: 10 INJECTION, SOLUTION INTRAMUSCULAR; INTRAVENOUS at 22:14

## 2024-03-17 RX ADMIN — GABAPENTIN 600 MG: 300 CAPSULE ORAL at 13:39

## 2024-03-17 RX ADMIN — DILTIAZEM HYDROCHLORIDE 120 MG: 120 CAPSULE, COATED, EXTENDED RELEASE ORAL at 08:42

## 2024-03-17 RX ADMIN — CARVEDILOL 3.12 MG: 3.12 TABLET, FILM COATED ORAL at 08:41

## 2024-03-17 RX ADMIN — TOPIRAMATE 150 MG: 100 TABLET, FILM COATED ORAL at 18:15

## 2024-03-17 RX ADMIN — MORPHINE SULFATE 4 MG: 4 INJECTION, SOLUTION INTRAMUSCULAR; INTRAVENOUS at 04:48

## 2024-03-17 RX ADMIN — IOPAMIDOL 75 ML: 755 INJECTION, SOLUTION INTRAVENOUS at 11:07

## 2024-03-17 RX ADMIN — POTASSIUM CHLORIDE 20 MEQ: 400 INJECTION, SOLUTION INTRAVENOUS at 19:44

## 2024-03-17 RX ADMIN — Medication 2 PUFF: at 09:15

## 2024-03-17 RX ADMIN — MECLIZINE HYDROCHLORIDE 25 MG: 12.5 TABLET ORAL at 17:00

## 2024-03-17 RX ADMIN — MECLIZINE HYDROCHLORIDE 25 MG: 12.5 TABLET ORAL at 21:07

## 2024-03-17 RX ADMIN — FUROSEMIDE 20 MG: 10 INJECTION, SOLUTION INTRAMUSCULAR; INTRAVENOUS at 18:14

## 2024-03-17 RX ADMIN — Medication 10 ML: at 20:37

## 2024-03-17 RX ADMIN — METHENAMINE HIPPURATE 1 G: 1 TABLET ORAL at 18:21

## 2024-03-17 RX ADMIN — MECLIZINE HYDROCHLORIDE 25 MG: 12.5 TABLET ORAL at 09:09

## 2024-03-17 RX ADMIN — TROSPIUM CHLORIDE 20 MG: 20 TABLET, FILM COATED ORAL at 09:09

## 2024-03-17 RX ADMIN — GABAPENTIN 600 MG: 300 CAPSULE ORAL at 21:07

## 2024-03-17 RX ADMIN — FUROSEMIDE 20 MG: 10 INJECTION, SOLUTION INTRAMUSCULAR; INTRAVENOUS at 08:42

## 2024-03-17 RX ADMIN — SODIUM CHLORIDE 3000 MG: 900 INJECTION INTRAVENOUS at 21:22

## 2024-03-17 RX ADMIN — RANOLAZINE 1000 MG: 500 TABLET, EXTENDED RELEASE ORAL at 08:54

## 2024-03-17 RX ADMIN — ENOXAPARIN SODIUM 40 MG: 100 INJECTION SUBCUTANEOUS at 21:06

## 2024-03-17 RX ADMIN — MORPHINE SULFATE 4 MG: 4 INJECTION, SOLUTION INTRAMUSCULAR; INTRAVENOUS at 08:44

## 2024-03-17 RX ADMIN — MECLIZINE HYDROCHLORIDE 25 MG: 12.5 TABLET ORAL at 13:39

## 2024-03-17 RX ADMIN — METHENAMINE HIPPURATE 1 G: 1 TABLET ORAL at 08:41

## 2024-03-17 RX ADMIN — TIZANIDINE 2 MG: 4 TABLET ORAL at 21:06

## 2024-03-17 RX ADMIN — PANTOPRAZOLE SODIUM 40 MG: 40 INJECTION, POWDER, FOR SOLUTION INTRAVENOUS at 21:07

## 2024-03-17 RX ADMIN — RANOLAZINE 1000 MG: 500 TABLET, EXTENDED RELEASE ORAL at 21:07

## 2024-03-17 RX ADMIN — HYDROMORPHONE HYDROCHLORIDE 0.75 MG: 1 INJECTION, SOLUTION INTRAMUSCULAR; INTRAVENOUS; SUBCUTANEOUS at 21:25

## 2024-03-17 RX ADMIN — NORTRIPTYLINE HYDROCHLORIDE 75 MG: 25 CAPSULE ORAL at 22:05

## 2024-03-17 RX ADMIN — GABAPENTIN 600 MG: 300 CAPSULE ORAL at 08:43

## 2024-03-17 RX ADMIN — CARVEDILOL 3.12 MG: 3.12 TABLET, FILM COATED ORAL at 18:15

## 2024-03-17 RX ADMIN — POTASSIUM CHLORIDE 20 MEQ: 400 INJECTION, SOLUTION INTRAVENOUS at 21:22

## 2024-03-17 RX ADMIN — IRON SUCROSE 200 MG: 20 INJECTION, SOLUTION INTRAVENOUS at 13:38

## 2024-03-17 ASSESSMENT — PAIN SCALES - WONG BAKER
WONGBAKER_NUMERICALRESPONSE: NO HURT
WONGBAKER_NUMERICALRESPONSE: HURTS LITTLE MORE
WONGBAKER_NUMERICALRESPONSE: HURTS A LITTLE BIT
WONGBAKER_NUMERICALRESPONSE: HURTS A LITTLE BIT
WONGBAKER_NUMERICALRESPONSE: HURTS LITTLE MORE
WONGBAKER_NUMERICALRESPONSE: NO HURT
WONGBAKER_NUMERICALRESPONSE: NO HURT
WONGBAKER_NUMERICALRESPONSE: HURTS A LITTLE BIT
WONGBAKER_NUMERICALRESPONSE: NO HURT

## 2024-03-17 ASSESSMENT — PAIN DESCRIPTION - LOCATION
LOCATION: NECK
LOCATION: NECK;JAW

## 2024-03-17 ASSESSMENT — PAIN SCALES - GENERAL
PAINLEVEL_OUTOF10: 0
PAINLEVEL_OUTOF10: 7
PAINLEVEL_OUTOF10: 6
PAINLEVEL_OUTOF10: 6
PAINLEVEL_OUTOF10: 3
PAINLEVEL_OUTOF10: 3
PAINLEVEL_OUTOF10: 6
PAINLEVEL_OUTOF10: 6
PAINLEVEL_OUTOF10: 7
PAINLEVEL_OUTOF10: 0
PAINLEVEL_OUTOF10: 5
PAINLEVEL_OUTOF10: 1
PAINLEVEL_OUTOF10: 7

## 2024-03-17 ASSESSMENT — PAIN DESCRIPTION - ORIENTATION
ORIENTATION: LEFT

## 2024-03-17 ASSESSMENT — PAIN DESCRIPTION - DESCRIPTORS
DESCRIPTORS: ACHING;BURNING
DESCRIPTORS: SORE;TEARING
DESCRIPTORS: ACHING

## 2024-03-17 ASSESSMENT — PAIN - FUNCTIONAL ASSESSMENT
PAIN_FUNCTIONAL_ASSESSMENT: PREVENTS OR INTERFERES SOME ACTIVE ACTIVITIES AND ADLS
PAIN_FUNCTIONAL_ASSESSMENT: ACTIVITIES ARE NOT PREVENTED

## 2024-03-17 NOTE — CONSULTS
[facility]  DIVISION OF OTOLARYNGOLOGY- HEAD & NECK SURGERY  CONSULT      Patient Name: Ana Gonzales  Medical Record Number:  [id number]  Primary Care Physician:  Mellisa Houston MD  Date of Consultation: 3/17/2024    Chief Complaint: Left facial swelling, airway swelling      HISTORY OF PRESENT ILLNESS  Ana is a(n) 68 y.o. female who presents as a consult to the ENT service for left-sided facial and neck swelling.  CT scan of the neck was performed demonstrating supraglottic edema and left-sided facial swelling consistent with acute parotitis.  Over the past couple days patient has had mildly worsening left-sided facial and neck pain or tenderness.   Currently on oxygen via nasal cannula.  She has a history of SERVANDO but has not worn her CPAP in many years.  Currently admitted for small bowel obstruction.  She follows with my partner, Dr. Ervin, who has seen her in the past for edema of her bilateral arytenoids, laryngopharyngeal reflux.    Patient's  was at bedside.  Neither the patient nor her  express any worsening shortness of breath or noisy breathing concerns.  Patient denies dysphagia or sore throat.  No recent prolonged intubations.      Patient Active Problem List   Diagnosis    SERVANDO (obstructive sleep apnea)    Hyperlipidemia    HTN (hypertension), benign    PTSD (post-traumatic stress disorder)    Vitamin D deficiency    Acute bronchitis with bronchospasm    Chest pain    Oropharyngeal dysphagia    Choking    Chronic laryngitis    Abdominal pain, epigastric    Allergic rhinitis    Asthma    Acute asthma exacerbation    Carpal tunnel syndrome    Chronic diarrhea    Constipation    Dizziness and giddiness    Other long term (current) drug therapy    Fibromyalgia    Gastro-esophageal reflux disease without esophagitis    History of colonic polyps    Irritable bowel syndrome    Gonalgia    Menopausal and perimenopausal disorder    Microcytic anemia    Muscle ache    Nonspecific  Telephone Encounter by Alma Oh RN at 7/8/2021 10:10 AM     Author: Alma Oh RN Service: -- Author Type: Registered Nurse    Filed: 7/8/2021 10:11 AM Encounter Date: 7/8/2021 Status: Signed    : Alma Oh RN (Registered Nurse)       Patient notified of CT results. Phone number for radiology scheduling provided to set up the MRI. She will follow up with Dr. Alfredo after the MRI is completed.    Alma Oh RN  [facility]  [phone]          3/17/2024    TECHNIQUE:  CT of the neck was performed with the administration of intravenous contrast.  Multiplanar reformatted images are provided for review. Automated exposure  control, iterative reconstruction, and/or weight based adjustment of the  mA/kV was utilized to reduce the radiation dose to as low as reasonably  achievable.    COMPARISON:  CT angiogram head and neck 03/12/2024.    HISTORY:  ORDERING SYSTEM PROVIDED HISTORY:  Left neck lymph node swelling.  TECHNOLOGIST PROVIDED HISTORY:  Reason for Exam:  Left neck lymph node swelling  Additional Contrast?  1  Reason for Exam:  Left neck lymph node swelling.    FINDINGS:  PHARYNX/LARYNX:  An enteric tube is present.  The epiglottis is markedly  edematous.  The supraglottic soft tissues are edematous.  There is severe  supraglottic airway narrowing.  There is no focal fluid collection or soft  tissue mass identified.    SALIVARY GLANDS/THYROID:  The left parotid gland is edematous.  There is  inflammatory stranding surrounding the left parotid gland.  There is no focal  fluid collection.  There is no sialolith.  The right parotid gland is normal.  The submandibular glands are normal.    LYMPH NODES:  No cervical or supraclavicular lymphadenopathy is seen.    SOFT TISSUES:  There is abnormal subcutaneous soft tissue stranding along the  left side of the face and extending into the neck.    BRAIN/ORBITS/SINUSES:  Limited evaluation of the brain and orbits is  unremarkable.  The paranasal sinuses and mastoid air cells are clear.    LUNG APICES/SUPERIOR MEDIASTINUM:  Subsegmental atelectasis is present within  the right upper lobe.  The lung apices are otherwise clear.  There is no  superior mediastinal lymphadenopathy.    BONES:  No lytic or blastic osseous lesions are identified.    Impression  1. Interval development of marked edema involving the epiglottis and  supraglottic soft tissues resulting in severe supraglottic airway narrowing.  The findings are

## 2024-03-17 NOTE — PLAN OF CARE
Problem: Pain  Goal: Verbalizes/displays adequate comfort level or baseline comfort level  Outcome: Progressing     Problem: Discharge Planning  Goal: Discharge to home or other facility with appropriate resources  Outcome: Progressing     Problem: Safety - Adult  Goal: Free from fall injury  Outcome: Progressing     Problem: Chronic Conditions and Co-morbidities  Goal: Patient's chronic conditions and co-morbidity symptoms are monitored and maintained or improved  Outcome: Progressing     Problem: ABCDS Injury Assessment  Goal: Absence of physical injury  Outcome: Progressing     Problem: Skin/Tissue Integrity  Goal: Absence of new skin breakdown  Description: 1.  Monitor for areas of redness and/or skin breakdown  2.  Assess vascular access sites hourly  3.  Every 4-6 hours minimum:  Change oxygen saturation probe site  4.  Every 4-6 hours:  If on nasal continuous positive airway pressure, respiratory therapy assess nares and determine need for appliance change or resting period.  Outcome: Progressing

## 2024-03-17 NOTE — PROGRESS NOTES
Hospitalist Progress Note    Name:  Ana Gonzales    /Age/Sex: 1955  (68 y.o. female)  MRN & CSN:  [id number] & [id number]    PCP: Mellisa Houston MD    Date of Admission: 3/12/2024    Patient Status:  Inpatient     Chief Complaint:   Chief Complaint   Patient presents with    Bradycardia     Pt coming in with  pt has been bradycardic, weakness, slurred speech for a few days.  Vomiting yesterday with black emesis        Hospital Course:   Admitted for bradycardia with slurred speech. Concern for TIA. CT head non acute, but CTA showed severe narrowing of distal left P1 segment of left PCA, but otherwise imaging was non acute. MRI brain was non acute.    However, patient had low hemoglobin on admission and required 2U PRBC.  GI consulted.    Had EGD on 3/13 with GI and did not find any active bleeding, but did find blood clots and a large amount of liquid in the stomach. Placed on PPI gtt.    Underwent repeat EGD on 3/14 and did not show any bleed, but 2 clips were placed on small healing ulcer. Still showed blood clots in the stomach.    CT abd/pel on 3/15 showed dilated stomach and small loops reflecting a partial SBO.    Subjective:  Today is:  Hospital Day: 6.  Patient seen and examined in ICU-3917/3917-.     In bed. Having left sided neck pain with swelling. NGT still present. Decreased abdominal pain.      Medications:  Reviewed    Infusion Medications    sodium chloride      sodium chloride      sodium chloride      dextrose       Scheduled Medications    pantoprazole  40 mg IntraVENous BID    furosemide  20 mg IntraVENous BID    iron sucrose (VENOFER) 200 mg in sodium chloride 0.9 % 100 mL IVPB  200 mg IntraVENous Q24H    [Held by provider] clopidogrel  75 mg Oral Daily    sodium chloride flush  5-40 mL IntraVENous 2 times per day    insulin lispro  0-8 Units SubCUTAneous Q4H    dilTIAZem  120 mg Oral Daily    aspirin  81 mg Oral Daily    isosorbide mononitrate  120 mg Oral Daily  Hypertension  -Continue home antihypertensives    Hyperlipidemia  -Continue home statin    Type 2 diabetes  -SSI        Discussed management of the case with GI who recommended PPI BID for 8 weeks.    Drugs that require monitoring for toxicity include: Subq Insulin and the method of monitoring was/is BG    DVT ppx: Contraindicated  GI ppx: PPI  Diet: Diet NPO Exceptions are: Ice Chips, Sips of Water with Meds, Popsicles  Code Status: Full Code    PT/OT Eval Status: ordered    Disposition:  Convert PPI gtt to IV BID now. SBO seen on CT. NGT in place. General surgery consulted. Left neck edema and erythema noted, CT neck ordered. ENT consulted.      I personally spent 31 minutes in providing critical care. Total critical care time includes caring for direct patient contact, management of life support systems, review of data including imaging and labs, discussions with other team members and physicians, but excludes procedures.      Tc Shearer,   3/17/2024  12:19 PM

## 2024-03-17 NOTE — PROGRESS NOTES
Ana Gonzales is a 68 y.o. female patient.    Chief complaint-abdominal pain    [id number]-old female seen in follow-up for small bowel obstruction  Current Facility-Administered Medications   Medication Dose Route Frequency Provider Last Rate Last Admin    pantoprazole (PROTONIX) injection 40 mg  40 mg IntraVENous BID Tc Shearer DO        HYDROmorphone HCl PF (DILAUDID) injection 0.75 mg  0.75 mg IntraVENous Q3H PRN Tc Shearer DO        ampicillin-sulbactam (UNASYN) 3,000 mg in sodium chloride 0.9 % 100 mL IVPB (mini-bag)  3,000 mg IntraVENous Q6H Tc Shearer DO        dexAMETHasone (PF) (DECADRON) injection 6 mg  6 mg IntraVENous Q8H Tc Shearer DO        potassium chloride 20 mEq/50 mL IVPB (Central Line)  20 mEq IntraVENous PRN Tc Shearer DO 50 mL/hr at 03/16/24 1104 20 mEq at 03/16/24 1104    sodium phosphate 15.63 mmol in sodium chloride 0.9 % 250 mL IVPB  0.16 mmol/kg IntraVENous PRN Tc Shearer DO   Stopped at 03/16/24 1252    furosemide (LASIX) injection 20 mg  20 mg IntraVENous BID Tc Shearer DO   20 mg at 03/17/24 0842    0.9 % sodium chloride infusion   IntraVENous PRN Matthew Gaitan MD        iron sucrose (VENOFER) 200 mg in sodium chloride 0.9 % 100 mL IVPB  200 mg IntraVENous Q24H Hakeem Zaldivar  mL/hr at 03/17/24 1338 200 mg at 03/17/24 1338    [Held by provider] clopidogrel (PLAVIX) tablet 75 mg  75 mg Oral Daily Tc Shearer DO        0.9 % sodium chloride infusion   IntraVENous PRN Hakeem Zaldivar MD        sodium chloride flush 0.9 % injection 5-40 mL  5-40 mL IntraVENous 2 times per day Hakeem Zaldivar MD   10 mL at 03/17/24 0910    sodium chloride flush 0.9 % injection 5-40 mL  5-40 mL IntraVENous PRN Hakeem Zaldivar MD        0.9 % sodium chloride infusion   IntraVENous PRN Hakeem Zaldivar MD        ondansetron (ZOFRAN) injection 4 mg  4 mg IntraVENous Q6H PRN Hakeem Zaldivar MD        acetaminophen (TYLENOL) suppository 650 mg  650 mg Rectal Q6H PRN Hakeem Zaldivar MD   650 mg

## 2024-03-17 NOTE — PROGRESS NOTES
Pt has Left jaw/ lymph area pain ongoing and worsing since day shift. Pt given morphine 4mg Pain 10/10 which decreased to 7/10. Pt admitted for TIA involving left internal carotid artery but currently has a GI bleed. Issue reported to day shift physicians per RN report. Informed Dr. Delgadillo. Care ongoing

## 2024-03-18 ENCOUNTER — APPOINTMENT (OUTPATIENT)
Dept: GENERAL RADIOLOGY | Age: 69
DRG: 330 | End: 2024-03-18

## 2024-03-18 PROBLEM — E44.1 MILD MALNUTRITION (HCC): Chronic | Status: ACTIVE | Noted: 2024-03-18

## 2024-03-18 LAB
ALBUMIN SERPL-MCNC: 3.1 G/DL (ref 3.4–5)
ANION GAP SERPL CALCULATED.3IONS-SCNC: 14 MMOL/L (ref 3–16)
ANION GAP SERPL CALCULATED.3IONS-SCNC: 15 MMOL/L (ref 3–16)
ANISOCYTOSIS BLD QL SMEAR: ABNORMAL
BASOPHILS # BLD: 0 K/UL (ref 0–0.2)
BASOPHILS NFR BLD: 0 %
BUN SERPL-MCNC: 16 MG/DL (ref 7–20)
BUN SERPL-MCNC: 19 MG/DL (ref 7–20)
CALCIUM SERPL-MCNC: 8.7 MG/DL (ref 8.3–10.6)
CALCIUM SERPL-MCNC: 8.8 MG/DL (ref 8.3–10.6)
CHLORIDE SERPL-SCNC: 104 MMOL/L (ref 99–110)
CHLORIDE SERPL-SCNC: 106 MMOL/L (ref 99–110)
CO2 SERPL-SCNC: 20 MMOL/L (ref 21–32)
CO2 SERPL-SCNC: 22 MMOL/L (ref 21–32)
CREAT SERPL-MCNC: 0.6 MG/DL (ref 0.6–1.2)
CREAT SERPL-MCNC: 0.7 MG/DL (ref 0.6–1.2)
DEPRECATED RDW RBC AUTO: 16.5 % (ref 12.4–15.4)
EOSINOPHIL # BLD: 0 K/UL (ref 0–0.6)
EOSINOPHIL NFR BLD: 0 %
GFR SERPLBLD CREATININE-BSD FMLA CKD-EPI: >60 ML/MIN/{1.73_M2}
GFR SERPLBLD CREATININE-BSD FMLA CKD-EPI: >60 ML/MIN/{1.73_M2}
GLUCOSE BLD-MCNC: 163 MG/DL (ref 70–99)
GLUCOSE BLD-MCNC: 167 MG/DL (ref 70–99)
GLUCOSE BLD-MCNC: 168 MG/DL (ref 70–99)
GLUCOSE BLD-MCNC: 168 MG/DL (ref 70–99)
GLUCOSE BLD-MCNC: 170 MG/DL (ref 70–99)
GLUCOSE SERPL-MCNC: 159 MG/DL (ref 70–99)
GLUCOSE SERPL-MCNC: 171 MG/DL (ref 70–99)
HCT VFR BLD AUTO: 25.7 % (ref 36–48)
HGB BLD-MCNC: 8.5 G/DL (ref 12–16)
LYMPHOCYTES # BLD: 1.8 K/UL (ref 1–5.1)
LYMPHOCYTES NFR BLD: 11 %
MCH RBC QN AUTO: 29.2 PG (ref 26–34)
MCHC RBC AUTO-ENTMCNC: 33.3 G/DL (ref 31–36)
MCV RBC AUTO: 87.7 FL (ref 80–100)
MONOCYTES # BLD: 0.5 K/UL (ref 0–1.3)
MONOCYTES NFR BLD: 3 %
MYELOCYTES NFR BLD MANUAL: 1 %
NEUTROPHILS # BLD: 14.1 K/UL (ref 1.7–7.7)
NEUTROPHILS NFR BLD: 80 %
NEUTS BAND NFR BLD MANUAL: 5 % (ref 0–7)
PERFORMED ON: ABNORMAL
PHOSPHATE SERPL-MCNC: 3.3 MG/DL (ref 2.5–4.9)
PLATELET # BLD AUTO: 303 K/UL (ref 135–450)
PLATELET BLD QL SMEAR: ADEQUATE
PMV BLD AUTO: 8.4 FL (ref 5–10.5)
POLYCHROMASIA BLD QL SMEAR: ABNORMAL
POTASSIUM SERPL-SCNC: 3.6 MMOL/L (ref 3.5–5.1)
POTASSIUM SERPL-SCNC: 4 MMOL/L (ref 3.5–5.1)
RBC # BLD AUTO: 2.93 M/UL (ref 4–5.2)
SLIDE REVIEW: ABNORMAL
SODIUM SERPL-SCNC: 140 MMOL/L (ref 136–145)
SODIUM SERPL-SCNC: 141 MMOL/L (ref 136–145)
WBC # BLD AUTO: 16.4 K/UL (ref 4–11)

## 2024-03-18 PROCEDURE — 2500000003 HC RX 250 WO HCPCS: Performed by: NURSE PRACTITIONER

## 2024-03-18 PROCEDURE — 6360000004 HC RX CONTRAST MEDICATION

## 2024-03-18 PROCEDURE — 85025 COMPLETE CBC W/AUTO DIFF WBC: CPT

## 2024-03-18 PROCEDURE — 74019 RADEX ABDOMEN 2 VIEWS: CPT

## 2024-03-18 PROCEDURE — APPSS15 APP SPLIT SHARED TIME 0-15 MINUTES: Performed by: NURSE PRACTITIONER

## 2024-03-18 PROCEDURE — APPNB30 APP NON BILLABLE TIME 0-30 MINS: Performed by: NURSE PRACTITIONER

## 2024-03-18 PROCEDURE — 74250 X-RAY XM SM INT 1CNTRST STD: CPT

## 2024-03-18 PROCEDURE — 6370000000 HC RX 637 (ALT 250 FOR IP): Performed by: INTERNAL MEDICINE

## 2024-03-18 PROCEDURE — 2580000003 HC RX 258: Performed by: STUDENT IN AN ORGANIZED HEALTH CARE EDUCATION/TRAINING PROGRAM

## 2024-03-18 PROCEDURE — 99232 SBSQ HOSP IP/OBS MODERATE 35: CPT | Performed by: SURGERY

## 2024-03-18 PROCEDURE — 6360000002 HC RX W HCPCS: Performed by: STUDENT IN AN ORGANIZED HEALTH CARE EDUCATION/TRAINING PROGRAM

## 2024-03-18 PROCEDURE — 2580000003 HC RX 258: Performed by: INTERNAL MEDICINE

## 2024-03-18 PROCEDURE — 6360000002 HC RX W HCPCS: Performed by: INTERNAL MEDICINE

## 2024-03-18 PROCEDURE — 1200000000 HC SEMI PRIVATE

## 2024-03-18 PROCEDURE — C9113 INJ PANTOPRAZOLE SODIUM, VIA: HCPCS | Performed by: STUDENT IN AN ORGANIZED HEALTH CARE EDUCATION/TRAINING PROGRAM

## 2024-03-18 PROCEDURE — 99222 1ST HOSP IP/OBS MODERATE 55: CPT | Performed by: STUDENT IN AN ORGANIZED HEALTH CARE EDUCATION/TRAINING PROGRAM

## 2024-03-18 PROCEDURE — 80069 RENAL FUNCTION PANEL: CPT

## 2024-03-18 RX ADMIN — GABAPENTIN 600 MG: 300 CAPSULE ORAL at 18:33

## 2024-03-18 RX ADMIN — ISOSORBIDE MONONITRATE 120 MG: 60 TABLET, EXTENDED RELEASE ORAL at 08:36

## 2024-03-18 RX ADMIN — PANTOPRAZOLE SODIUM 40 MG: 40 INJECTION, POWDER, FOR SOLUTION INTRAVENOUS at 08:38

## 2024-03-18 RX ADMIN — ASPIRIN 81 MG: 81 TABLET, COATED ORAL at 08:37

## 2024-03-18 RX ADMIN — Medication 10 ML: at 20:59

## 2024-03-18 RX ADMIN — DILTIAZEM HYDROCHLORIDE 120 MG: 120 CAPSULE, COATED, EXTENDED RELEASE ORAL at 08:38

## 2024-03-18 RX ADMIN — DEXAMETHASONE SODIUM PHOSPHATE 6 MG: 10 INJECTION, SOLUTION INTRAMUSCULAR; INTRAVENOUS at 06:54

## 2024-03-18 RX ADMIN — METHENAMINE HIPPURATE 1 G: 1 TABLET ORAL at 18:42

## 2024-03-18 RX ADMIN — MECLIZINE HYDROCHLORIDE 25 MG: 12.5 TABLET ORAL at 08:36

## 2024-03-18 RX ADMIN — ENOXAPARIN SODIUM 40 MG: 100 INJECTION SUBCUTANEOUS at 20:51

## 2024-03-18 RX ADMIN — HYDROMORPHONE HYDROCHLORIDE 0.75 MG: 1 INJECTION, SOLUTION INTRAMUSCULAR; INTRAVENOUS; SUBCUTANEOUS at 11:13

## 2024-03-18 RX ADMIN — DEXAMETHASONE SODIUM PHOSPHATE 6 MG: 10 INJECTION, SOLUTION INTRAMUSCULAR; INTRAVENOUS at 20:49

## 2024-03-18 RX ADMIN — PANTOPRAZOLE SODIUM 40 MG: 40 INJECTION, POWDER, FOR SOLUTION INTRAVENOUS at 20:49

## 2024-03-18 RX ADMIN — FUROSEMIDE 20 MG: 10 INJECTION, SOLUTION INTRAMUSCULAR; INTRAVENOUS at 08:38

## 2024-03-18 RX ADMIN — IOHEXOL 300 ML: 350 INJECTION, SOLUTION INTRAVENOUS at 15:06

## 2024-03-18 RX ADMIN — GABAPENTIN 600 MG: 300 CAPSULE ORAL at 20:48

## 2024-03-18 RX ADMIN — METHENAMINE HIPPURATE 1 G: 1 TABLET ORAL at 08:29

## 2024-03-18 RX ADMIN — Medication 10 ML: at 08:38

## 2024-03-18 RX ADMIN — ASCORBIC ACID, VITAMIN A PALMITATE, CHOLECALCIFEROL, THIAMINE HYDROCHLORIDE, RIBOFLAVIN-5 PHOSPHATE SODIUM, PYRIDOXINE HYDROCHLORIDE, NIACINAMIDE, DEXPANTHENOL, ALPHA-TOCOPHEROL ACETATE, VITAMIN K1, FOLIC ACID, BIOTIN, CYANOCOBALAMIN: 200; 3300; 200; 6; 3.6; 6; 40; 15; 10; 150; 600; 60; 5 INJECTION, SOLUTION INTRAVENOUS at 19:50

## 2024-03-18 RX ADMIN — TIZANIDINE 2 MG: 4 TABLET ORAL at 20:49

## 2024-03-18 RX ADMIN — SODIUM CHLORIDE, PRESERVATIVE FREE 10 ML: 5 INJECTION INTRAVENOUS at 09:02

## 2024-03-18 RX ADMIN — CARVEDILOL 3.12 MG: 3.12 TABLET, FILM COATED ORAL at 08:28

## 2024-03-18 RX ADMIN — TROSPIUM CHLORIDE 20 MG: 20 TABLET, FILM COATED ORAL at 18:33

## 2024-03-18 RX ADMIN — BUPROPION HYDROCHLORIDE 300 MG: 300 TABLET, EXTENDED RELEASE ORAL at 08:37

## 2024-03-18 RX ADMIN — SODIUM CHLORIDE 3000 MG: 900 INJECTION INTRAVENOUS at 18:41

## 2024-03-18 RX ADMIN — TROSPIUM CHLORIDE 20 MG: 20 TABLET, FILM COATED ORAL at 08:30

## 2024-03-18 RX ADMIN — HYDROMORPHONE HYDROCHLORIDE 0.75 MG: 1 INJECTION, SOLUTION INTRAMUSCULAR; INTRAVENOUS; SUBCUTANEOUS at 06:54

## 2024-03-18 RX ADMIN — RANOLAZINE 1000 MG: 500 TABLET, EXTENDED RELEASE ORAL at 21:03

## 2024-03-18 RX ADMIN — NORTRIPTYLINE HYDROCHLORIDE 75 MG: 25 CAPSULE ORAL at 21:04

## 2024-03-18 RX ADMIN — FUROSEMIDE 20 MG: 10 INJECTION, SOLUTION INTRAMUSCULAR; INTRAVENOUS at 18:32

## 2024-03-18 RX ADMIN — TOPIRAMATE 100 MG: 100 TABLET, FILM COATED ORAL at 08:37

## 2024-03-18 RX ADMIN — SODIUM CHLORIDE, PRESERVATIVE FREE 10 ML: 5 INJECTION INTRAVENOUS at 09:03

## 2024-03-18 RX ADMIN — MECLIZINE HYDROCHLORIDE 25 MG: 12.5 TABLET ORAL at 21:03

## 2024-03-18 RX ADMIN — IRON SUCROSE 200 MG: 20 INJECTION, SOLUTION INTRAVENOUS at 12:57

## 2024-03-18 RX ADMIN — RANOLAZINE 1000 MG: 500 TABLET, EXTENDED RELEASE ORAL at 08:37

## 2024-03-18 RX ADMIN — GABAPENTIN 600 MG: 300 CAPSULE ORAL at 12:50

## 2024-03-18 RX ADMIN — GABAPENTIN 600 MG: 300 CAPSULE ORAL at 08:36

## 2024-03-18 RX ADMIN — TOPIRAMATE 150 MG: 100 TABLET, FILM COATED ORAL at 18:32

## 2024-03-18 RX ADMIN — I.V. FAT EMULSION 250 ML: 20 EMULSION INTRAVENOUS at 19:55

## 2024-03-18 RX ADMIN — CARVEDILOL 3.12 MG: 3.12 TABLET, FILM COATED ORAL at 18:33

## 2024-03-18 RX ADMIN — DEXAMETHASONE SODIUM PHOSPHATE 6 MG: 10 INJECTION, SOLUTION INTRAMUSCULAR; INTRAVENOUS at 12:55

## 2024-03-18 RX ADMIN — SODIUM CHLORIDE 3000 MG: 900 INJECTION INTRAVENOUS at 03:14

## 2024-03-18 RX ADMIN — MECLIZINE HYDROCHLORIDE 25 MG: 12.5 TABLET ORAL at 12:55

## 2024-03-18 RX ADMIN — ARIPIPRAZOLE 2 MG: 2 TABLET ORAL at 20:48

## 2024-03-18 RX ADMIN — SODIUM CHLORIDE 3000 MG: 900 INJECTION INTRAVENOUS at 08:45

## 2024-03-18 RX ADMIN — HYDROMORPHONE HYDROCHLORIDE 0.75 MG: 1 INJECTION, SOLUTION INTRAMUSCULAR; INTRAVENOUS; SUBCUTANEOUS at 20:51

## 2024-03-18 RX ADMIN — MECLIZINE HYDROCHLORIDE 25 MG: 12.5 TABLET ORAL at 18:33

## 2024-03-18 RX ADMIN — HYDROMORPHONE HYDROCHLORIDE 0.75 MG: 1 INJECTION, SOLUTION INTRAMUSCULAR; INTRAVENOUS; SUBCUTANEOUS at 03:10

## 2024-03-18 ASSESSMENT — PAIN DESCRIPTION - LOCATION
LOCATION: NECK
LOCATION: BACK
LOCATION: NECK
LOCATION: NECK

## 2024-03-18 ASSESSMENT — PAIN SCALES - GENERAL
PAINLEVEL_OUTOF10: 7
PAINLEVEL_OUTOF10: 10
PAINLEVEL_OUTOF10: 6
PAINLEVEL_OUTOF10: 7
PAINLEVEL_OUTOF10: 2
PAINLEVEL_OUTOF10: 0
PAINLEVEL_OUTOF10: 8
PAINLEVEL_OUTOF10: 6
PAINLEVEL_OUTOF10: 0

## 2024-03-18 ASSESSMENT — PAIN DESCRIPTION - DESCRIPTORS
DESCRIPTORS: ACHING
DESCRIPTORS: ACHING;SHARP

## 2024-03-18 ASSESSMENT — PAIN DESCRIPTION - ORIENTATION
ORIENTATION: LEFT

## 2024-03-18 ASSESSMENT — PAIN SCALES - WONG BAKER
WONGBAKER_NUMERICALRESPONSE: NO HURT

## 2024-03-18 ASSESSMENT — PAIN - FUNCTIONAL ASSESSMENT: PAIN_FUNCTIONAL_ASSESSMENT: ACTIVITIES ARE NOT PREVENTED

## 2024-03-18 NOTE — PROGRESS NOTES
Hospitalist Progress Note    Name:  Ana Gonzales    /Age/Sex: 1955  (68 y.o. female)  MRN & CSN:  [id number] & [id number]    PCP: Mellisa Houston MD    Date of Admission: 3/12/2024    Patient Status:  Inpatient     Chief Complaint:   Chief Complaint   Patient presents with    Bradycardia     Pt coming in with  pt has been bradycardic, weakness, slurred speech for a few days.  Vomiting yesterday with black emesis        Hospital Course:   Admitted for bradycardia with slurred speech. Concern for TIA. CT head non acute, but CTA showed severe narrowing of distal left P1 segment of left PCA, but otherwise imaging was non acute. MRI brain was non acute.    However, patient had low hemoglobin on admission and required 2U PRBC.  GI consulted.    Had EGD on 3/13 with GI and did not find any active bleeding, but did find blood clots and a large amount of liquid in the stomach. Placed on PPI gtt.    Underwent repeat EGD on 3/14 and did not show any bleed, but 2 clips were placed on small healing ulcer. Still showed blood clots in the stomach.    CT abd/pel on 3/15 showed dilated stomach and small loops reflecting a partial SBO.    CT neck shows left sided neck edema. Started on decadron and unasyn. ENT consulted.    Subjective:  Today is:  Hospital Day: 7.  Patient seen and examined in ICU-3917/3917-01.     In bed. Still having neck pain. Abdominal pain is minimal today. NGT still in place.      Medications:  Reviewed    Infusion Medications    sodium chloride      sodium chloride      sodium chloride      dextrose       Scheduled Medications    pantoprazole  40 mg IntraVENous BID    ampicillin-sulbactam  3,000 mg IntraVENous Q6H    dexAMETHasone  6 mg IntraVENous Q8H    furosemide  20 mg IntraVENous BID    iron sucrose (VENOFER) 200 mg in sodium chloride 0.9 % 100 mL IVPB  200 mg IntraVENous Q24H    [Held by provider] clopidogrel  75 mg Oral Daily    sodium chloride flush  5-40 mL IntraVENous 2  placed on small healing ulcer  -On PPI IV BID; will need BID PPI for 8 weeks starting on 3/17; will switch to PO when able  -Advanace diet per GI  -GI consulted    Left neck swelling  -CT soft tissue neck w/wo contrast shows left neck edema  -Continue decadron 6mg IV QD  -Continue unasyn  -Pain control  -ENT consulted    SBO  -CT abd/pel on 3/15 showed dilated stomach and small loops reflecting a partial SBO  -NGT placed to LIS  -Last BM on 3/16 per nursing  -NPO  -May need ex lap with surgery on 3/19 if no improvement today  -General surgery consulted    TIA - resolved  -Stroke like symptoms likely 2/2 ABLA and not an actual TIA  -MRI head non acute  -Echo 3/13 showed LVEF 55-60%, no RWMA  -ASA ordered  -Has a statin allergy  -TIA/CVA order set ordered  -Neurology consulted  -ARU consulted    Chronic diastolic HF  -Not in an acute exacerbation  -CXR non acute  - on admission  -Lasix 20mg IV BID  -I/Os     Hypertension  -Continue home antihypertensives    Hyperlipidemia  -Continue home statin    Type 2 diabetes  -SSI        Discussed management of the case with GI who recommended PPI BID for 8 weeks.    Drugs that require monitoring for toxicity include: Subq Insulin and the method of monitoring was/is BG    DVT ppx: Contraindicated  GI ppx: PPI  Diet: Diet NPO Exceptions are: Ice Chips, Sips of Water with Meds, Popsicles  Code Status: Full Code    PT/OT Eval Status: ordered    Disposition:  PPI IV BID now. SBO seen on CT. NGT in place and NPO. Abd XR with possible bowel follow through today. General surgery consulted. Left neck edema and erythema seen on CT neck. ENT consulted. On unasyn and decadron.      Tc Shearer DO  3/18/2024  9:07 AM

## 2024-03-18 NOTE — PROGRESS NOTES
Clinical Pharmacy Note    Pharmacy consulted by Aria Carter to manage TPN    Goal TPN rate: 80 ml/hr    Access: CVC  Indication: SBO    Labs:  General Labs:  BMP:    Lab Results   Component Value Date/Time     03/18/2024 05:42 AM    K 3.6 03/18/2024 05:42 AM    K 3.4 03/15/2024 02:15 AM     03/18/2024 05:42 AM    CO2 20 03/18/2024 05:42 AM    BUN 19 03/18/2024 05:42 AM    LABALBU 3.1 03/18/2024 05:42 AM    CREATININE 0.6 03/18/2024 05:42 AM    CALCIUM 8.8 03/18/2024 05:42 AM    GFRAA >60 11/24/2020 06:29 PM    GFRAA >60 01/13/2013 05:24 AM    LABGLOM >60 03/18/2024 05:42 AM    GLUCOSE 171 03/18/2024 05:42 AM       Electrolyte replacement as follows:   No replacement necessary    Blood sugars over past 24 hours: 159-171    Blood sugar management:  Plan to initiate Humalog q4 hour sliding scale at medium dose.    Plan to initiate TPN at 40 ml/hr.    Thank you for allowing pharmacy to participate in the care of this patient.    Kathleen Chung, PharmD  PGY-1 Pharmacy Resident  [id number]

## 2024-03-18 NOTE — PROGRESS NOTES
Pt back from xray.  Baseline assessment unchanged.  VSS  Monitor NSR  Pt states L neck pain 7/10 \"only if I move\"  Pt satisfied with pain control at this time.  Overdue meds given.  NGT hooked to suction prior to giving meds & canister with at least 400 ml's bile colored drainage almost immediately.  Mariangel care provided, brief & purewick changed.  Repositioned

## 2024-03-18 NOTE — PROGRESS NOTES
Speech Language Pathology  HOLD    Ana Gonzales  1955    Attempted to see pt for dysphagia therapy this date. Per discussion with RN, pt is being held NPO for potential procedure. Will hold treatment at this time and attempt to follow-up as schedule allows.     Thank you,     Brina Pisano M.A., CCC-SLP [phone]  Speech-Language Pathologist         [

## 2024-03-18 NOTE — PROGRESS NOTES
Nutrition Note    RECOMMENDATIONS  PO Diet: NPO  Nutrition Support:   Recommend Bag 1: Clinimix 5/20 starting at 40 mL/hr  Recommend Bag 2: As long as electrolytes stable or WNL, advance Clinimix 5/20 to goal rate of 80 mL/hr  Recommend 250 mL 20% lipids 2 times per week     NUTRITION ASSESSMENT   Pt triggered for consult on TPN recommendations, has SBO. Off unit upon visiting this morning, information obtained from chart review. Admitted with 2 day hx of coffee-ground emesis. Started on full liquid diet 3/14 after EGD but developed abdominal distention and severe pain, made NPO 3/15 and NG tube placed for decompression with high output. May require abdominal exploration tomorrow. RD will place TPN recommendations above and continue to monitor.     Nutrition Related Findings: Labs reviewed. LBM 3/16. Edema: trace generalized; non-pitting BUE, BLE.  Wounds: None  Nutrition Education:  Education not indicated   Nutrition Goals: Initiate nutrition support, by next RD assessment, Tolerate nutrition support at goal rate     MALNUTRITION ASSESSMENT   Acute Illness  Malnutrition Status: Mild malnutrition  Findings of the 6 clinical characteristics of malnutrition:  Energy Intake:  50% or less of estimated energy requirements for 5 or more days  Weight Loss:  No significant weight loss     Body Fat Loss:  Unable to assess     Muscle Mass Loss:  Unable to assess    Fluid Accumulation:  No significant fluid accumulation     Strength:  Not Performed    NUTRITION DIAGNOSIS   Other (Comment), In context of acute illness or injury (Mild malnutrition) related to altered GI function, inadequate protein-energy intake as evidenced by NPO or clear liquid status due to medical condition, Criteria as identified in malnutrition assessment    CURRENT NUTRITION THERAPIES  Diet NPO Exceptions are: Ice Chips, Sips of Water with Meds, Popsicles  PN-Adult Premix 5/20 - Standard Electrolytes - Central Line     PO Intake: NPO   PO Supplement

## 2024-03-18 NOTE — PROGRESS NOTES
Shift summary:    Pt had CT of neck that revealed left neck soft tissue swelling.  Dr. Paige consulted and saw patient in afternoon.  Pt started on decadron and antibiotics.  Should pt require to go to surgery, Dr. Paige requests to speak with anesthesia prior to surgery.    Pt family/ updated with all care and plan of care and patient condition.

## 2024-03-18 NOTE — CARE COORDINATION
PM&R referral received.  Chart reviewed and evaluating.  Pending possible ex. Lap on 3/19.  Full PM&R note to follow if patient accepted.  Dr SHANEL Shelley updated.     [facility] will continue to follow progress and update discharge plan as needed.      Felix Paula, BSN, .[phone]

## 2024-03-18 NOTE — PROGRESS NOTES
[facility]  HEAD AND NECK - ENT  PROGRESS  NOTE    Date of Service: 3/18/2024    ASSESSMENT: Ana Gonzales is a 68 y.o. female consulted to the ENT service for:    PLAN/RECOMMENDATIONS:     Supraglottitis without airway obstruction  Laryngopharyngeal reflux  - No concern for respiratory compromise on examination today.  No stridor or noisy breathing.  Recommend continuing Unasyn and at least another 24 hours of scheduled Decadron.  Maintain airway precautions.  Will continue to follow.  -Continue 40 mg Protonix twice daily  -General surgery following for small bowel obstruction with possible plans for abdominal exploration.  If needs intubated would recommend awake fiberoptic intubation unless edema has decreased.     Acute left parotitis  -No overnight worsening  -Likely secondary to oral dryness.  Currently n.p.o. for her small bowel obstruction.  Consider ice chips for comfort to help moisturize the oral cavity.    - Continue Unasyn.  Parotitis can take weeks for full recovery, if left facial swelling and pain worsen consider treatment for MRSA including vancomycin  -Recommend sialagogues such as sugar-free sour candies or lemon drops to help stimulate salivary flow if possible, warm compresses, posterior to anterior left parotid massage.    SUBJECTIVE:   Significant overnight events: Left-sided neck pain feels a little bit better.  No noisy breathing.  No shortness of breath.    OBJECTIVE:  Physical Exam:   Temp (24hrs), Av °F (36.7 °C), Min:97.5 °F (36.4 °C), Max:98.4 °F (36.9 °C)    Vitals:    24 0600 24 0800 24 0900 24 1118   BP: (!) 142/54 (!) 150/63 (!) 147/78    Pulse: 81 80 82    Resp:      Temp:  97.5 °F (36.4 °C)     TempSrc:  Temporal     SpO2: 94% 98% 97%    Weight:       Height:    1.6 m (5' 3\")     I/O last 3 completed shifts:  In: 919.1 [P.O.:500; I.V.:110.2; NG/GT:200; IV Piggyback:108.9]  Out: 3300 [Urine:1300; Emesis/NG output:2000]    GENERAL: No Acute

## 2024-03-18 NOTE — PLAN OF CARE
Problem: Pain  Goal: Verbalizes/displays adequate comfort level or baseline comfort level  3/18/2024 1539 by Lissy Johns RN  Outcome: Progressing  Flowsheets (Taken 3/18/2024 0800)  Verbalizes/displays adequate comfort level or baseline comfort level:   Encourage patient to monitor pain and request assistance   Assess pain using appropriate pain scale   Administer analgesics based on type and severity of pain and evaluate response   Implement non-pharmacological measures as appropriate and evaluate response   Notify Licensed Independent Practitioner if interventions unsuccessful or patient reports new pain    Problem: Discharge Planning  Goal: Discharge to home or other facility with appropriate resources  3/18/2024 1539 by Lissy Johns, RN  Outcome: Progressing  Flowsheets (Taken 3/18/2024 0800)  Discharge to home or other facility with appropriate resources:   Identify barriers to discharge with patient and caregiver   Arrange for needed discharge resources and transportation as appropriate   Identify discharge learning needs (meds, wound care, etc)   Refer to discharge planning if patient needs post-hospital services based on physician order or complex needs related to functional status, cognitive ability or social support system    Problem: Safety - Adult  Goal: Free from fall injury  3/18/2024 1539 by Lissy Johns, RN  Outcome: Progressing  Flowsheets (Taken 3/18/2024 0550 by Jesu Jeronimo, RN)  Free From Fall Injury: Instruct family/caregiver on patient safety    Problem: Chronic Conditions and Co-morbidities  Goal: Patient's chronic conditions and co-morbidity symptoms are monitored and maintained or improved  3/18/2024 1539 by Lissy Johns, RN  Outcome: Progressing  Flowsheets (Taken 3/18/2024 0800)  Care Plan - Patient's Chronic Conditions and Co-Morbidity Symptoms are Monitored and Maintained or Improved:   Monitor and assess patient's chronic conditions and comorbid symptoms for

## 2024-03-18 NOTE — PLAN OF CARE
Problem: Pain  Goal: Verbalizes/displays adequate comfort level or baseline comfort level  Outcome: Progressing  Flowsheets (Taken 3/17/2024 2000)  Verbalizes/displays adequate comfort level or baseline comfort level:   Encourage patient to monitor pain and request assistance   Assess pain using appropriate pain scale   Administer analgesics based on type and severity of pain and evaluate response   Implement non-pharmacological measures as appropriate and evaluate response     Problem: Discharge Planning  Goal: Discharge to home or other facility with appropriate resources  Outcome: Progressing  Flowsheets (Taken 3/17/2024 2000)  Discharge to home or other facility with appropriate resources:   Identify barriers to discharge with patient and caregiver   Arrange for needed discharge resources and transportation as appropriate   Identify discharge learning needs (meds, wound care, etc)     Problem: Safety - Adult  Goal: Free from fall injury  Outcome: Progressing  Flowsheets (Taken 3/18/2024 0550)  Free From Fall Injury: Instruct family/caregiver on patient safety     Problem: Chronic Conditions and Co-morbidities  Goal: Patient's chronic conditions and co-morbidity symptoms are monitored and maintained or improved  Outcome: Progressing  Flowsheets (Taken 3/17/2024 2000)  Care Plan - Patient's Chronic Conditions and Co-Morbidity Symptoms are Monitored and Maintained or Improved:   Monitor and assess patient's chronic conditions and comorbid symptoms for stability, deterioration, or improvement   Collaborate with multidisciplinary team to address chronic and comorbid conditions and prevent exacerbation or deterioration

## 2024-03-18 NOTE — PROGRESS NOTES
[facility]        Progress Note    Patient Name: Ana Gonzales  MRN: [id number]  YOB: 1955  Date of Evaluation: 3/18/2024    Chief Complaint: Abdominal pain    Subjective:  No acute events overnight  Denies significant abdominal pain, neck pain is about the same, swelling reported as improved  No nausea or vomiting, NGT in place with high volume output  No flatus or stool  Resting in bed at this time      Vital Signs:  Patient Vitals for the past 24 hrs:   BP Temp Temp src Pulse Resp SpO2 Height Weight   24 1200 -- 97.2 °F (36.2 °C) Temporal -- 20 -- -- --   24 1118 -- -- -- -- -- -- 1.6 m (5' 3\") --   24 0900 (!) 147/78 -- -- 82 18 97 % -- --   24 0800 (!) 150/63 97.5 °F (36.4 °C) Temporal 80 18 98 % -- --   24 0600 (!) 142/54 -- -- 81 -- 94 % -- --   24 0400 (!) 109/42 98.1 °F (36.7 °C) Temporal 83 16 91 % -- 96.4 kg (212 lb 8.4 oz)   24 0340 -- -- -- -- 20 -- -- --   24 0310 -- -- -- -- 15 -- -- --   24 0200 (!) 108/49 -- -- 85 -- 93 % -- --   24 0000 100/80 98.2 °F (36.8 °C) Temporal 88 20 98 % -- --   24 2200 (!) 104/48 -- -- 88 -- 96 % -- --   24 -- -- -- -- 16 -- -- --   24 -- -- -- -- 15 -- -- --   24 -- -- -- 93 -- 98 % -- --   24 -- -- -- 93 -- 99 % -- --   24 (!) 110/43 97.7 °F (36.5 °C) Temporal 90 20 95 % -- --   24 1900 (!) 113/46 -- -- 89 -- 95 % -- --   24 1800 (!) 104/39 -- -- 88 -- 95 % -- --   24 1700 (!) 113/41 -- -- 88 -- 95 % -- --   24 1600 (!) 113/48 98.4 °F (36.9 °C) Temporal 88 18 99 % -- --   24 1500 (!) 105/44 -- -- 87 -- 95 % -- --   24 1400 (!) 109/46 -- -- 87 -- 96 % -- --      TEMPERATURE HISTORY 24H: Temp (24hrs), Av.9 °F (36.6 °C), Min:97.2 °F (36.2 °C), Max:98.4 °F (36.9 °C)    BLOOD PRESSURE HISTORY: Systolic (36hrs), Av , Min:100 , Max:150    Diastolic

## 2024-03-19 ENCOUNTER — APPOINTMENT (OUTPATIENT)
Dept: GENERAL RADIOLOGY | Age: 69
DRG: 330 | End: 2024-03-19

## 2024-03-19 ENCOUNTER — ANESTHESIA EVENT (OUTPATIENT)
Dept: OPERATING ROOM | Age: 69
End: 2024-03-19
Payer: MEDICARE

## 2024-03-19 ENCOUNTER — ANESTHESIA (OUTPATIENT)
Dept: OPERATING ROOM | Age: 69
End: 2024-03-19
Payer: MEDICARE

## 2024-03-19 LAB
ABO + RH BLD: NORMAL
ALBUMIN SERPL-MCNC: 3.2 G/DL (ref 3.4–5)
ALP SERPL-CCNC: 92 U/L (ref 40–129)
ALT SERPL-CCNC: <5 U/L (ref 10–40)
ANION GAP SERPL CALCULATED.3IONS-SCNC: 12 MMOL/L (ref 3–16)
ANISOCYTOSIS BLD QL SMEAR: ABNORMAL
AST SERPL-CCNC: 6 U/L (ref 15–37)
BASOPHILS # BLD: 0 K/UL (ref 0–0.2)
BASOPHILS NFR BLD: 0 %
BILIRUB DIRECT SERPL-MCNC: <0.2 MG/DL (ref 0–0.3)
BILIRUB INDIRECT SERPL-MCNC: ABNORMAL MG/DL (ref 0–1)
BILIRUB SERPL-MCNC: <0.2 MG/DL (ref 0–1)
BLD GP AB SCN SERPL QL: NORMAL
BUN SERPL-MCNC: 29 MG/DL (ref 7–20)
CALCIUM SERPL-MCNC: 9 MG/DL (ref 8.3–10.6)
CHLORIDE SERPL-SCNC: 101 MMOL/L (ref 99–110)
CO2 SERPL-SCNC: 24 MMOL/L (ref 21–32)
CREAT SERPL-MCNC: 0.6 MG/DL (ref 0.6–1.2)
DEPRECATED RDW RBC AUTO: 16.3 % (ref 12.4–15.4)
EOSINOPHIL # BLD: 0 K/UL (ref 0–0.6)
EOSINOPHIL NFR BLD: 0 %
GFR SERPLBLD CREATININE-BSD FMLA CKD-EPI: >60 ML/MIN/{1.73_M2}
GLUCOSE BLD-MCNC: 178 MG/DL (ref 70–99)
GLUCOSE BLD-MCNC: 204 MG/DL (ref 70–99)
GLUCOSE BLD-MCNC: 210 MG/DL (ref 70–99)
GLUCOSE BLD-MCNC: 224 MG/DL (ref 70–99)
GLUCOSE BLD-MCNC: 244 MG/DL (ref 70–99)
GLUCOSE BLD-MCNC: 245 MG/DL (ref 70–99)
GLUCOSE BLD-MCNC: 252 MG/DL (ref 70–99)
GLUCOSE BLD-MCNC: 322 MG/DL (ref 70–99)
GLUCOSE SERPL-MCNC: 218 MG/DL (ref 70–99)
HCT VFR BLD AUTO: 25.9 % (ref 36–48)
HGB BLD-MCNC: 8.8 G/DL (ref 12–16)
LYMPHOCYTES # BLD: 1.1 K/UL (ref 1–5.1)
LYMPHOCYTES NFR BLD: 6 %
MACROCYTES BLD QL SMEAR: ABNORMAL
MAGNESIUM SERPL-MCNC: 2.3 MG/DL (ref 1.8–2.4)
MCH RBC QN AUTO: 29.4 PG (ref 26–34)
MCHC RBC AUTO-ENTMCNC: 33.9 G/DL (ref 31–36)
MCV RBC AUTO: 86.8 FL (ref 80–100)
MONOCYTES # BLD: 0.6 K/UL (ref 0–1.3)
MONOCYTES NFR BLD: 3 %
NEUTROPHILS # BLD: 17.3 K/UL (ref 1.7–7.7)
NEUTROPHILS NFR BLD: 91 %
PERFORMED ON: ABNORMAL
PHOSPHATE SERPL-MCNC: 3.3 MG/DL (ref 2.5–4.9)
PLATELET # BLD AUTO: 363 K/UL (ref 135–450)
PMV BLD AUTO: 8 FL (ref 5–10.5)
POTASSIUM SERPL-SCNC: 3.1 MMOL/L (ref 3.5–5.1)
PROT SERPL-MCNC: 6.4 G/DL (ref 6.4–8.2)
RBC # BLD AUTO: 2.99 M/UL (ref 4–5.2)
SODIUM SERPL-SCNC: 137 MMOL/L (ref 136–145)
TRIGL SERPL-MCNC: 132 MG/DL (ref 0–150)
WBC # BLD AUTO: 19 K/UL (ref 4–11)

## 2024-03-19 PROCEDURE — 6370000000 HC RX 637 (ALT 250 FOR IP): Performed by: INTERNAL MEDICINE

## 2024-03-19 PROCEDURE — 0DTJ0ZZ RESECTION OF APPENDIX, OPEN APPROACH: ICD-10-PCS | Performed by: SURGERY

## 2024-03-19 PROCEDURE — 1200000000 HC SEMI PRIVATE

## 2024-03-19 PROCEDURE — 80069 RENAL FUNCTION PANEL: CPT

## 2024-03-19 PROCEDURE — 6370000000 HC RX 637 (ALT 250 FOR IP): Performed by: SURGERY

## 2024-03-19 PROCEDURE — 2580000003 HC RX 258: Performed by: NURSE ANESTHETIST, CERTIFIED REGISTERED

## 2024-03-19 PROCEDURE — 6360000002 HC RX W HCPCS: Performed by: STUDENT IN AN ORGANIZED HEALTH CARE EDUCATION/TRAINING PROGRAM

## 2024-03-19 PROCEDURE — 6360000002 HC RX W HCPCS: Performed by: NURSE PRACTITIONER

## 2024-03-19 PROCEDURE — 0DNV0ZZ RELEASE MESENTERY, OPEN APPROACH: ICD-10-PCS | Performed by: SURGERY

## 2024-03-19 PROCEDURE — 2580000003 HC RX 258: Performed by: SURGERY

## 2024-03-19 PROCEDURE — 86901 BLOOD TYPING SEROLOGIC RH(D): CPT

## 2024-03-19 PROCEDURE — 2709999900 HC NON-CHARGEABLE SUPPLY: Performed by: SURGERY

## 2024-03-19 PROCEDURE — APPNB30 APP NON BILLABLE TIME 0-30 MINS: Performed by: NURSE PRACTITIONER

## 2024-03-19 PROCEDURE — 31575 DIAGNOSTIC LARYNGOSCOPY: CPT | Performed by: STUDENT IN AN ORGANIZED HEALTH CARE EDUCATION/TRAINING PROGRAM

## 2024-03-19 PROCEDURE — 3600000004 HC SURGERY LEVEL 4 BASE: Performed by: SURGERY

## 2024-03-19 PROCEDURE — 2720000010 HC SURG SUPPLY STERILE: Performed by: SURGERY

## 2024-03-19 PROCEDURE — 3700000000 HC ANESTHESIA ATTENDED CARE: Performed by: SURGERY

## 2024-03-19 PROCEDURE — 3600000014 HC SURGERY LEVEL 4 ADDTL 15MIN: Performed by: SURGERY

## 2024-03-19 PROCEDURE — 86900 BLOOD TYPING SEROLOGIC ABO: CPT

## 2024-03-19 PROCEDURE — 88305 TISSUE EXAM BY PATHOLOGIST: CPT

## 2024-03-19 PROCEDURE — C9113 INJ PANTOPRAZOLE SODIUM, VIA: HCPCS | Performed by: SURGERY

## 2024-03-19 PROCEDURE — 86850 RBC ANTIBODY SCREEN: CPT

## 2024-03-19 PROCEDURE — 44800 EXCISION OF BOWEL POUCH: CPT | Performed by: SURGERY

## 2024-03-19 PROCEDURE — 83735 ASSAY OF MAGNESIUM: CPT

## 2024-03-19 PROCEDURE — 36415 COLL VENOUS BLD VENIPUNCTURE: CPT

## 2024-03-19 PROCEDURE — 85025 COMPLETE CBC W/AUTO DIFF WBC: CPT

## 2024-03-19 PROCEDURE — P9045 ALBUMIN (HUMAN), 5%, 250 ML: HCPCS | Performed by: NURSE ANESTHETIST, CERTIFIED REGISTERED

## 2024-03-19 PROCEDURE — 6360000002 HC RX W HCPCS: Performed by: NURSE ANESTHETIST, CERTIFIED REGISTERED

## 2024-03-19 PROCEDURE — 6370000000 HC RX 637 (ALT 250 FOR IP): Performed by: PHYSICIAN ASSISTANT

## 2024-03-19 PROCEDURE — 74019 RADEX ABDOMEN 2 VIEWS: CPT

## 2024-03-19 PROCEDURE — 7100000001 HC PACU RECOVERY - ADDTL 15 MIN: Performed by: SURGERY

## 2024-03-19 PROCEDURE — 2500000003 HC RX 250 WO HCPCS: Performed by: SURGERY

## 2024-03-19 PROCEDURE — 3700000001 HC ADD 15 MINUTES (ANESTHESIA): Performed by: SURGERY

## 2024-03-19 PROCEDURE — 2500000003 HC RX 250 WO HCPCS: Performed by: NURSE ANESTHETIST, CERTIFIED REGISTERED

## 2024-03-19 PROCEDURE — 0DBB0ZZ EXCISION OF ILEUM, OPEN APPROACH: ICD-10-PCS | Performed by: SURGERY

## 2024-03-19 PROCEDURE — C9113 INJ PANTOPRAZOLE SODIUM, VIA: HCPCS | Performed by: STUDENT IN AN ORGANIZED HEALTH CARE EDUCATION/TRAINING PROGRAM

## 2024-03-19 PROCEDURE — 7100000000 HC PACU RECOVERY - FIRST 15 MIN: Performed by: SURGERY

## 2024-03-19 PROCEDURE — 80076 HEPATIC FUNCTION PANEL: CPT

## 2024-03-19 PROCEDURE — 99232 SBSQ HOSP IP/OBS MODERATE 35: CPT | Performed by: STUDENT IN AN ORGANIZED HEALTH CARE EDUCATION/TRAINING PROGRAM

## 2024-03-19 PROCEDURE — 84478 ASSAY OF TRIGLYCERIDES: CPT

## 2024-03-19 PROCEDURE — 6360000002 HC RX W HCPCS: Performed by: SURGERY

## 2024-03-19 PROCEDURE — 86923 COMPATIBILITY TEST ELECTRIC: CPT

## 2024-03-19 PROCEDURE — 2580000003 HC RX 258: Performed by: STUDENT IN AN ORGANIZED HEALTH CARE EDUCATION/TRAINING PROGRAM

## 2024-03-19 RX ORDER — INSULIN LISPRO 100 [IU]/ML
5 INJECTION, SOLUTION INTRAVENOUS; SUBCUTANEOUS ONCE
Status: COMPLETED | OUTPATIENT
Start: 2024-03-19 | End: 2024-03-19

## 2024-03-19 RX ORDER — HYDROMORPHONE HYDROCHLORIDE 2 MG/ML
0.5 INJECTION, SOLUTION INTRAMUSCULAR; INTRAVENOUS; SUBCUTANEOUS EVERY 5 MIN PRN
Status: DISCONTINUED | OUTPATIENT
Start: 2024-03-19 | End: 2024-03-19 | Stop reason: HOSPADM

## 2024-03-19 RX ORDER — FENTANYL CITRATE 50 UG/ML
INJECTION, SOLUTION INTRAMUSCULAR; INTRAVENOUS PRN
Status: DISCONTINUED | OUTPATIENT
Start: 2024-03-19 | End: 2024-03-19 | Stop reason: SDUPTHER

## 2024-03-19 RX ORDER — PROPOFOL 10 MG/ML
INJECTION, EMULSION INTRAVENOUS PRN
Status: DISCONTINUED | OUTPATIENT
Start: 2024-03-19 | End: 2024-03-19 | Stop reason: SDUPTHER

## 2024-03-19 RX ORDER — NALOXONE HYDROCHLORIDE 0.4 MG/ML
INJECTION, SOLUTION INTRAMUSCULAR; INTRAVENOUS; SUBCUTANEOUS PRN
Status: DISCONTINUED | OUTPATIENT
Start: 2024-03-19 | End: 2024-03-19 | Stop reason: HOSPADM

## 2024-03-19 RX ORDER — MAGNESIUM HYDROXIDE 1200 MG/15ML
LIQUID ORAL CONTINUOUS PRN
Status: COMPLETED | OUTPATIENT
Start: 2024-03-19 | End: 2024-03-19

## 2024-03-19 RX ORDER — SODIUM CHLORIDE 9 MG/ML
INJECTION, SOLUTION INTRAVENOUS PRN
Status: DISCONTINUED | OUTPATIENT
Start: 2024-03-19 | End: 2024-03-23

## 2024-03-19 RX ORDER — SODIUM CHLORIDE 0.9 % (FLUSH) 0.9 %
5-40 SYRINGE (ML) INJECTION EVERY 12 HOURS SCHEDULED
Status: DISCONTINUED | OUTPATIENT
Start: 2024-03-19 | End: 2024-03-19 | Stop reason: HOSPADM

## 2024-03-19 RX ORDER — HYDROMORPHONE HYDROCHLORIDE 1 MG/ML
0.5 INJECTION, SOLUTION INTRAMUSCULAR; INTRAVENOUS; SUBCUTANEOUS
Status: DISCONTINUED | OUTPATIENT
Start: 2024-03-19 | End: 2024-03-20

## 2024-03-19 RX ORDER — SODIUM CHLORIDE 9 MG/ML
INJECTION, SOLUTION INTRAVENOUS PRN
Status: DISCONTINUED | OUTPATIENT
Start: 2024-03-19 | End: 2024-03-19 | Stop reason: HOSPADM

## 2024-03-19 RX ORDER — GLYCOPYRROLATE 0.2 MG/ML
INJECTION INTRAMUSCULAR; INTRAVENOUS PRN
Status: DISCONTINUED | OUTPATIENT
Start: 2024-03-19 | End: 2024-03-19 | Stop reason: SDUPTHER

## 2024-03-19 RX ORDER — DEXAMETHASONE SODIUM PHOSPHATE 4 MG/ML
INJECTION, SOLUTION INTRA-ARTICULAR; INTRALESIONAL; INTRAMUSCULAR; INTRAVENOUS; SOFT TISSUE PRN
Status: DISCONTINUED | OUTPATIENT
Start: 2024-03-19 | End: 2024-03-19 | Stop reason: SDUPTHER

## 2024-03-19 RX ORDER — FUROSEMIDE 10 MG/ML
20 INJECTION INTRAMUSCULAR; INTRAVENOUS DAILY
Status: DISCONTINUED | OUTPATIENT
Start: 2024-03-20 | End: 2024-03-22

## 2024-03-19 RX ORDER — SODIUM CHLORIDE 9 MG/ML
INJECTION, SOLUTION INTRAVENOUS CONTINUOUS PRN
Status: DISCONTINUED | OUTPATIENT
Start: 2024-03-19 | End: 2024-03-19 | Stop reason: SDUPTHER

## 2024-03-19 RX ORDER — ALBUMIN, HUMAN INJ 5% 5 %
SOLUTION INTRAVENOUS PRN
Status: DISCONTINUED | OUTPATIENT
Start: 2024-03-19 | End: 2024-03-19 | Stop reason: SDUPTHER

## 2024-03-19 RX ORDER — FAMOTIDINE 10 MG/ML
INJECTION, SOLUTION INTRAVENOUS PRN
Status: DISCONTINUED | OUTPATIENT
Start: 2024-03-19 | End: 2024-03-19 | Stop reason: SDUPTHER

## 2024-03-19 RX ORDER — SODIUM CHLORIDE 0.9 % (FLUSH) 0.9 %
5-40 SYRINGE (ML) INJECTION PRN
Status: DISCONTINUED | OUTPATIENT
Start: 2024-03-19 | End: 2024-03-19 | Stop reason: HOSPADM

## 2024-03-19 RX ORDER — HYDROMORPHONE HYDROCHLORIDE 2 MG/ML
INJECTION, SOLUTION INTRAMUSCULAR; INTRAVENOUS; SUBCUTANEOUS PRN
Status: DISCONTINUED | OUTPATIENT
Start: 2024-03-19 | End: 2024-03-19 | Stop reason: SDUPTHER

## 2024-03-19 RX ORDER — LIDOCAINE HYDROCHLORIDE 20 MG/ML
INJECTION, SOLUTION INFILTRATION; PERINEURAL PRN
Status: DISCONTINUED | OUTPATIENT
Start: 2024-03-19 | End: 2024-03-19 | Stop reason: SDUPTHER

## 2024-03-19 RX ORDER — DEXMEDETOMIDINE HYDROCHLORIDE 100 UG/ML
INJECTION, SOLUTION INTRAVENOUS PRN
Status: DISCONTINUED | OUTPATIENT
Start: 2024-03-19 | End: 2024-03-19 | Stop reason: SDUPTHER

## 2024-03-19 RX ORDER — ROCURONIUM BROMIDE 10 MG/ML
INJECTION, SOLUTION INTRAVENOUS PRN
Status: DISCONTINUED | OUTPATIENT
Start: 2024-03-19 | End: 2024-03-19 | Stop reason: SDUPTHER

## 2024-03-19 RX ORDER — ONDANSETRON 2 MG/ML
INJECTION INTRAMUSCULAR; INTRAVENOUS PRN
Status: DISCONTINUED | OUTPATIENT
Start: 2024-03-19 | End: 2024-03-19 | Stop reason: SDUPTHER

## 2024-03-19 RX ORDER — HYDROMORPHONE HYDROCHLORIDE 1 MG/ML
0.75 INJECTION, SOLUTION INTRAMUSCULAR; INTRAVENOUS; SUBCUTANEOUS
Status: DISCONTINUED | OUTPATIENT
Start: 2024-03-19 | End: 2024-03-20

## 2024-03-19 RX ORDER — ENOXAPARIN SODIUM 100 MG/ML
40 INJECTION SUBCUTANEOUS NIGHTLY
Status: DISCONTINUED | OUTPATIENT
Start: 2024-03-20 | End: 2024-04-10 | Stop reason: HOSPADM

## 2024-03-19 RX ORDER — POTASSIUM CHLORIDE 29.8 MG/ML
20 INJECTION INTRAVENOUS
Status: DISPENSED | OUTPATIENT
Start: 2024-03-19 | End: 2024-03-19

## 2024-03-19 RX ORDER — ONDANSETRON 2 MG/ML
4 INJECTION INTRAMUSCULAR; INTRAVENOUS
Status: DISCONTINUED | OUTPATIENT
Start: 2024-03-19 | End: 2024-03-19 | Stop reason: HOSPADM

## 2024-03-19 RX ADMIN — GABAPENTIN 600 MG: 300 CAPSULE ORAL at 21:39

## 2024-03-19 RX ADMIN — SUGAMMADEX 200 MG: 100 INJECTION, SOLUTION INTRAVENOUS at 16:26

## 2024-03-19 RX ADMIN — CARVEDILOL 3.12 MG: 3.12 TABLET, FILM COATED ORAL at 08:59

## 2024-03-19 RX ADMIN — HYDROMORPHONE HYDROCHLORIDE 0.5 MG: 2 INJECTION, SOLUTION INTRAMUSCULAR; INTRAVENOUS; SUBCUTANEOUS at 16:37

## 2024-03-19 RX ADMIN — RANOLAZINE 1000 MG: 500 TABLET, EXTENDED RELEASE ORAL at 21:38

## 2024-03-19 RX ADMIN — PROPOFOL 200 MG: 10 INJECTION, EMULSION INTRAVENOUS at 15:36

## 2024-03-19 RX ADMIN — PANTOPRAZOLE SODIUM 40 MG: 40 INJECTION, POWDER, FOR SOLUTION INTRAVENOUS at 21:41

## 2024-03-19 RX ADMIN — SODIUM CHLORIDE 3000 MG: 900 INJECTION INTRAVENOUS at 09:05

## 2024-03-19 RX ADMIN — SODIUM CHLORIDE: 9 INJECTION, SOLUTION INTRAVENOUS at 14:50

## 2024-03-19 RX ADMIN — FAMOTIDINE 20 MG: 10 INJECTION INTRAVENOUS at 15:25

## 2024-03-19 RX ADMIN — PHENYLEPHRINE HYDROCHLORIDE 100 MCG: 10 INJECTION INTRAVENOUS at 16:12

## 2024-03-19 RX ADMIN — TOPIRAMATE 150 MG: 100 TABLET, FILM COATED ORAL at 21:39

## 2024-03-19 RX ADMIN — DILTIAZEM HYDROCHLORIDE 120 MG: 120 CAPSULE, COATED, EXTENDED RELEASE ORAL at 08:59

## 2024-03-19 RX ADMIN — PHENYLEPHRINE HYDROCHLORIDE 100 MCG: 10 INJECTION INTRAVENOUS at 16:00

## 2024-03-19 RX ADMIN — SODIUM CHLORIDE 3000 MG: 900 INJECTION INTRAVENOUS at 00:57

## 2024-03-19 RX ADMIN — SODIUM CHLORIDE 3000 MG: 900 INJECTION INTRAVENOUS at 17:59

## 2024-03-19 RX ADMIN — PANTOPRAZOLE SODIUM 40 MG: 40 INJECTION, POWDER, FOR SOLUTION INTRAVENOUS at 08:52

## 2024-03-19 RX ADMIN — MECLIZINE HYDROCHLORIDE 25 MG: 12.5 TABLET ORAL at 21:38

## 2024-03-19 RX ADMIN — HYDROMORPHONE HYDROCHLORIDE 0.5 MG: 2 INJECTION, SOLUTION INTRAMUSCULAR; INTRAVENOUS; SUBCUTANEOUS at 16:41

## 2024-03-19 RX ADMIN — GLYCOPYRROLATE 0.2 MG: 0.2 INJECTION, SOLUTION INTRAMUSCULAR; INTRAVENOUS at 15:34

## 2024-03-19 RX ADMIN — NORTRIPTYLINE HYDROCHLORIDE 75 MG: 25 CAPSULE ORAL at 21:38

## 2024-03-19 RX ADMIN — PHENYLEPHRINE HYDROCHLORIDE 100 MCG: 10 INJECTION INTRAVENOUS at 15:40

## 2024-03-19 RX ADMIN — LIDOCAINE HYDROCHLORIDE 100 MG: 20 INJECTION, SOLUTION INFILTRATION; PERINEURAL at 15:36

## 2024-03-19 RX ADMIN — ROCURONIUM BROMIDE 50 MG: 10 INJECTION, SOLUTION INTRAVENOUS at 15:36

## 2024-03-19 RX ADMIN — DEXMEDETOMIDINE HYDROCHLORIDE 8 MCG: 100 INJECTION, SOLUTION INTRAVENOUS at 16:26

## 2024-03-19 RX ADMIN — HYDROMORPHONE HYDROCHLORIDE 0.5 MG: 2 INJECTION, SOLUTION INTRAMUSCULAR; INTRAVENOUS; SUBCUTANEOUS at 16:45

## 2024-03-19 RX ADMIN — ISOSORBIDE MONONITRATE 120 MG: 60 TABLET, EXTENDED RELEASE ORAL at 08:58

## 2024-03-19 RX ADMIN — ALBUMIN (HUMAN) 250 ML: 12.5 INJECTION, SOLUTION INTRAVENOUS at 15:49

## 2024-03-19 RX ADMIN — HYDROMORPHONE HYDROCHLORIDE 0.5 MG: 2 INJECTION, SOLUTION INTRAMUSCULAR; INTRAVENOUS; SUBCUTANEOUS at 16:39

## 2024-03-19 RX ADMIN — RANOLAZINE 1000 MG: 500 TABLET, EXTENDED RELEASE ORAL at 08:59

## 2024-03-19 RX ADMIN — CARVEDILOL 3.12 MG: 3.12 TABLET, FILM COATED ORAL at 21:39

## 2024-03-19 RX ADMIN — HYDROMORPHONE HYDROCHLORIDE 0.75 MG: 1 INJECTION, SOLUTION INTRAMUSCULAR; INTRAVENOUS; SUBCUTANEOUS at 01:37

## 2024-03-19 RX ADMIN — INSULIN LISPRO 5 UNITS: 100 INJECTION, SOLUTION INTRAVENOUS; SUBCUTANEOUS at 09:08

## 2024-03-19 RX ADMIN — DEXAMETHASONE SODIUM PHOSPHATE 4 MG: 4 INJECTION, SOLUTION INTRAMUSCULAR; INTRAVENOUS at 15:40

## 2024-03-19 RX ADMIN — INSULIN LISPRO 2 UNITS: 100 INJECTION, SOLUTION INTRAVENOUS; SUBCUTANEOUS at 07:01

## 2024-03-19 RX ADMIN — TROSPIUM CHLORIDE 20 MG: 20 TABLET, FILM COATED ORAL at 08:58

## 2024-03-19 RX ADMIN — ARIPIPRAZOLE 2 MG: 2 TABLET ORAL at 21:39

## 2024-03-19 RX ADMIN — TOPIRAMATE 100 MG: 100 TABLET, FILM COATED ORAL at 08:59

## 2024-03-19 RX ADMIN — FUROSEMIDE 20 MG: 10 INJECTION, SOLUTION INTRAMUSCULAR; INTRAVENOUS at 08:55

## 2024-03-19 RX ADMIN — POTASSIUM CHLORIDE 20 MEQ: 29.8 INJECTION, SOLUTION INTRAVENOUS at 11:41

## 2024-03-19 RX ADMIN — INSULIN LISPRO 6 UNITS: 100 INJECTION, SOLUTION INTRAVENOUS; SUBCUTANEOUS at 21:41

## 2024-03-19 RX ADMIN — INSULIN LISPRO 2 UNITS: 100 INJECTION, SOLUTION INTRAVENOUS; SUBCUTANEOUS at 11:45

## 2024-03-19 RX ADMIN — LEUCINE, PHENYLALANINE, LYSINE, METHIONINE, ISOLEUCINE, VALINE, HISTIDINE, THREONINE, TRYPTOPHAN, ALANINE, GLYCINE, ARGININE, PROLINE, SERINE, TYROSINE, SODIUM ACETATE, DIBASIC POTASSIUM PHOSPHATE, MAGNESIUM CHLORIDE, SODIUM CHLORIDE, CALCIUM CHLORIDE, DEXTROSE
365; 280; 290; 200; 300; 290; 240; 210; 90; 1035; 515; 575; 340; 250; 20; 340; 261; 51; 59; 33; 20 INJECTION INTRAVENOUS at 18:13

## 2024-03-19 RX ADMIN — PHENYLEPHRINE HYDROCHLORIDE 100 MCG: 10 INJECTION INTRAVENOUS at 15:45

## 2024-03-19 RX ADMIN — ALBUMIN (HUMAN) 250 ML: 12.5 INJECTION, SOLUTION INTRAVENOUS at 16:00

## 2024-03-19 RX ADMIN — INSULIN LISPRO 5 UNITS: 100 INJECTION, SOLUTION INTRAVENOUS; SUBCUTANEOUS at 00:14

## 2024-03-19 RX ADMIN — POTASSIUM CHLORIDE 20 MEQ: 400 INJECTION, SOLUTION INTRAVENOUS at 12:51

## 2024-03-19 RX ADMIN — ROCURONIUM BROMIDE 20 MG: 10 INJECTION, SOLUTION INTRAVENOUS at 15:55

## 2024-03-19 RX ADMIN — TIZANIDINE 2 MG: 4 TABLET ORAL at 21:39

## 2024-03-19 RX ADMIN — DEXAMETHASONE SODIUM PHOSPHATE 6 MG: 10 INJECTION, SOLUTION INTRAMUSCULAR; INTRAVENOUS at 10:04

## 2024-03-19 RX ADMIN — SODIUM CHLORIDE 3000 MG: 900 INJECTION INTRAVENOUS at 21:12

## 2024-03-19 RX ADMIN — FENTANYL CITRATE 50 MCG: 50 INJECTION, SOLUTION INTRAMUSCULAR; INTRAVENOUS at 15:36

## 2024-03-19 RX ADMIN — ONDANSETRON 4 MG: 2 INJECTION INTRAMUSCULAR; INTRAVENOUS at 15:42

## 2024-03-19 RX ADMIN — DEXAMETHASONE SODIUM PHOSPHATE 6 MG: 10 INJECTION, SOLUTION INTRAMUSCULAR; INTRAVENOUS at 21:50

## 2024-03-19 ASSESSMENT — PAIN DESCRIPTION - ORIENTATION
ORIENTATION: LEFT
ORIENTATION: LEFT

## 2024-03-19 ASSESSMENT — PAIN SCALES - WONG BAKER
WONGBAKER_NUMERICALRESPONSE: HURTS A LITTLE BIT

## 2024-03-19 ASSESSMENT — PAIN SCALES - GENERAL
PAINLEVEL_OUTOF10: 0
PAINLEVEL_OUTOF10: 4
PAINLEVEL_OUTOF10: 7
PAINLEVEL_OUTOF10: 7

## 2024-03-19 ASSESSMENT — PAIN - FUNCTIONAL ASSESSMENT: PAIN_FUNCTIONAL_ASSESSMENT: ACTIVITIES ARE NOT PREVENTED

## 2024-03-19 ASSESSMENT — PAIN DESCRIPTION - LOCATION
LOCATION: NECK
LOCATION: NECK

## 2024-03-19 ASSESSMENT — PAIN DESCRIPTION - PAIN TYPE: TYPE: ACUTE PAIN

## 2024-03-19 ASSESSMENT — PAIN DESCRIPTION - DESCRIPTORS: DESCRIPTORS: ACHING

## 2024-03-19 NOTE — PROGRESS NOTES
Pt admitted to PACU, awakens to verbal commands, abd incision CD&I, NGT in place connected to LIS- lg amt of brown drainage, blood sugar 178-  vss, NSR on tele, will monitor
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
## 2024-03-19 NOTE — BRIEF OP NOTE
Brief Postoperative Note      Patient: Ana Gonzales  YOB: 1955  MRN: [id number]    Date of Procedure: 3/19/2024    Pre-Op Diagnosis Codes:     * Small bowel obstruction (HCC) [K56.609]    Post-Op Diagnosis: Same       Procedure(s):  EXPLORATORY LAPAROTOMY, LYSIS OF ADHESIONS,MECKELS DIVERTICULECTOMY, APPENDECTOMY    Surgeon(s):  Srinivasa Morales MD    Assistant:  Surgical Assistant: Pablo Titus    Anesthesia: General    Estimated Blood Loss (mL): Minimal    Complications: None    Specimens:   ID Type Source Tests Collected by Time Destination   A : A)MECKELS DIVERTICULUM, APPENDIX Tissue Tissue SURGICAL PATHOLOGY Srinivasa Morales MD 3/19/2024 1614        Implants:  * No implants in log *      Drains:   NG/OG/NJ/NE Tube Nasogastric 16 fr Right nostril (Active)   Surrounding Skin Clean, dry & intact 03/19/24 0900   Securement device Adhesive based montoya 03/19/24 0900   Status Suction-low intermittent 03/19/24 0900   Placement Verified X-Ray (Initial) 03/19/24 0900   NG/OG/NJ/NE External Measurement (cm) 64 cm 03/19/24 0900   Drainage Appearance Green 03/19/24 0900   Free Water/Flush (mL) 30 mL 03/18/24 1000   Output (mL) 1300 ml 03/19/24 1138       Urinary Catheter 03/19/24 Muniz (Active)       [REMOVED] NG/OG/NJ/NE Tube Nasogastric 16 fr Right nostril (Removed)   Surrounding Skin Clean, dry & intact 03/13/24 2000   Securement device Adhesive based montoya 03/13/24 2000   Status Suction-low continuous 03/13/24 2000   Placement Verified Gastric Contents 03/13/24 2000   NG/OG/NJ/NE External Measurement (cm) 64 cm 03/13/24 2000   Free Water/Flush (mL) 30 mL 03/14/24 0337   Output (mL) 50 ml 03/14/24 0600       [REMOVED] Urinary Catheter 03/13/24 Muniz-Temperature (Removed)   Catheter Indications Need for fluid volume management of the critically ill patient in a critical care setting 03/13/24 0830   Site Assessment Pink 03/13/24 0830   Urine Color Yellow;Sneha 03/13/24 0830   Urine  Appearance Cloudy 03/13/24 0830   Urine Odor Malodorous 03/13/24 0830   Collection Container Standard 03/13/24 0830   Securement Method Securing device (Describe) 03/13/24 0830   Catheter Care  Perineal wipes 03/13/24 1400   Catheter Best Practices  Drainage tube clipped to bed;Catheter secured to thigh;Bag not on floor;Lack of dependent loop in tubing;Bag below bladder;Drainage bag less than half full 03/13/24 0830   Status Draining;Patent 03/13/24 0830   Output (mL) 50 mL 03/13/24 1730       [REMOVED] External Urinary Catheter (Removed)   Site Assessment Clean,dry & intact 03/19/24 0000   Placement Repositioned 03/18/24 1200   Securement Method Other (Comment) 03/18/24 1200   Catheter Care Catheter/Wick replaced 03/19/24 0000   Perineal Care Yes 03/18/24 1800   Suction 40 mmgHg continuous 03/18/24 1200   Urine Color Yellow 03/18/24 1200   Urine Appearance Hazy 03/18/24 1200   Urine Odor Malodorous 03/18/24 0600   Output (mL) 250 mL 03/18/24 1439       Findings: Small bowel obstruction, Meckel's diverticula  This procedure was not performed to treat colon cancer through resection      Electronically signed by Srinivasa Morales MD on 3/19/2024 at 4:23 PM

## 2024-03-19 NOTE — PLAN OF CARE
Problem: Pain  Goal: Verbalizes/displays adequate comfort level or baseline comfort level  3/19/2024 0411 by Alexandria Little RN  Outcome: Progressing  Flowsheets (Taken 3/18/2024 2000 by Jesu Jeronimo, RN)  Verbalizes/displays adequate comfort level or baseline comfort level:   Encourage patient to monitor pain and request assistance   Assess pain using appropriate pain scale   Administer analgesics based on type and severity of pain and evaluate response  3/18/2024 1539 by Lissy Johns RN  Outcome: Progressing  Flowsheets (Taken 3/18/2024 0800)  Verbalizes/displays adequate comfort level or baseline comfort level:   Encourage patient to monitor pain and request assistance   Assess pain using appropriate pain scale   Administer analgesics based on type and severity of pain and evaluate response   Implement non-pharmacological measures as appropriate and evaluate response   Notify Licensed Independent Practitioner if interventions unsuccessful or patient reports new pain     Problem: Discharge Planning  Goal: Discharge to home or other facility with appropriate resources  3/19/2024 0411 by Alexandria Little RN  Outcome: Progressing  Flowsheets (Taken 3/18/2024 2000 by Jesu Jeronimo, RN)  Discharge to home or other facility with appropriate resources:   Identify barriers to discharge with patient and caregiver   Identify discharge learning needs (meds, wound care, etc)   Arrange for interpreters to assist at discharge as needed   Arrange for needed discharge resources and transportation as appropriate  3/18/2024 1539 by Lissy Johns RN  Outcome: Progressing  Flowsheets (Taken 3/18/2024 0800)  Discharge to home or other facility with appropriate resources:   Identify barriers to discharge with patient and caregiver   Arrange for needed discharge resources and transportation as appropriate   Identify discharge learning needs (meds, wound care, etc)   Refer to discharge planning if patient needs

## 2024-03-19 NOTE — PROGRESS NOTES
Clinical Pharmacy Note    Pharmacy consulted by Aria Carter to manage TPN    Goal TPN rate: 80 ml/hr    Access: CVC  Indication: SBO    Labs:  General Labs:  BMP:    Lab Results   Component Value Date/Time     03/19/2024 04:30 AM    K 3.1 03/19/2024 04:30 AM    K 3.4 03/15/2024 02:15 AM     03/19/2024 04:30 AM    CO2 24 03/19/2024 04:30 AM    BUN 29 03/19/2024 04:30 AM    LABALBU 3.2 03/19/2024 04:30 AM    CREATININE 0.6 03/19/2024 04:30 AM    CALCIUM 9.0 03/19/2024 04:30 AM    GFRAA >60 11/24/2020 06:29 PM    GFRAA >60 01/13/2013 05:24 AM    LABGLOM >60 03/19/2024 04:30 AM    GLUCOSE 218 03/19/2024 04:30 AM       Electrolyte replacement as follows:   K 3.1 today, PRN potassium replacement ordered    Blood sugars over past 24 hours: 170-245    Blood sugar management:  Continue Humalog q4 hour sliding scale at medium dose.    Per dietitian recommendations, plan to increase TPN to 80 ml/hr. No signs of refeeding yet.     Thank you for allowing pharmacy to participate in the care of this patient.    Leo Resendez, PharmD  PGY1 Pharmacy Resident  3/19/2024 8:38 AM

## 2024-03-19 NOTE — PROGRESS NOTES
Pt resting, awakens to verbal commands, abd incision CD&I, appears comfortable. Weaned from simple mask to 4L NC. Vss. Muniz in place. IVF/TPN infusing, phase 1 discharge criteria met

## 2024-03-19 NOTE — ANESTHESIA PRE PROCEDURE
HYSTERECTOMY (CERVIX STATUS UNKNOWN)      JOINT REPLACEMENT Right     knee    LARYNGOSCOPY  08/03/2017     DIRECT LARYNGOSCOPY AND BIOPSIES OF RIGHT AND LEFT ARYTENOIDS    SKIN CANCER EXCISION      UPPER GASTROINTESTINAL ENDOSCOPY N/A 10/2/2023    EGD DIAGNOSTIC ONLY performed by Juve Encinas MD at [facility] ENDOSCOPY    UPPER GASTROINTESTINAL ENDOSCOPY N/A 3/13/2024    ESOPHAGOGASTRODUODENOSCOPY DIAGNOSTIC ONLY performed by Hakeem Zaldivar MD at [facility] ENDOSCOPY    UPPER GASTROINTESTINAL ENDOSCOPY N/A 3/14/2024    ESOPHAGOGASTRODUODENOSCOPY BIOPSY performed by Hakeem Zaldivar MD at [facility] ENDOSCOPY    UPPER GASTROINTESTINAL ENDOSCOPY N/A 3/14/2024    ESOPHAGOGASTRODUODENOSCOPY CONTROL HEMORRHAGE performed by Hakeem Zaldivar MD at [facility] ENDOSCOPY    UPPER GASTROINTESTINAL ENDOSCOPY N/A 3/14/2024    ESOPHAGOGASTRODUODENOSCOPY FOREIGN BODY REMOVAL performed by Hakeem Zaldivar MD at [facility] ENDOSCOPY       Social History:    Social History     Tobacco Use    Smoking status: Never    Smokeless tobacco: Never   Substance Use Topics    Alcohol use: No                                Counseling given: Not Answered      Vital Signs (Current):   Vitals:    03/19/24 0207 03/19/24 0400 03/19/24 0522 03/19/24 0845   BP:  126/69  (!) 146/77   Pulse:  72  73   Resp: 16 16     Temp:  98.2 °F (36.8 °C)     TempSrc:  Oral     SpO2:  97%  95%   Weight:   94.8 kg (209 lb)    Height:                                                  BP Readings from Last 3 Encounters:   03/19/24 (!) 146/77   03/06/24 110/60   01/31/24 120/64       NPO Status: Time of last liquid consumption: 0000                        Time of last solid consumption: 0000                        Date of last liquid consumption: 03/13/24                        Date of last solid food consumption: 03/12/24    BMI:   Wt Readings from Last 3 Encounters:   03/19/24 94.8 kg (209 lb)   03/06/24 95.7 kg (210 lb 14.4 oz)   01/31/24 93.4 kg (206 lb)     Body mass index is 37.02

## 2024-03-19 NOTE — PROGRESS NOTES
[facility]  HEAD AND NECK - ENT  PROGRESS  NOTE    Date of Service: 3/19/2024    ASSESSMENT: Ana Gonzales is a 68 y.o. female consulted to the ENT service for:    PLAN/RECOMMENDATIONS:     Supraglottitis without airway obstruction  Laryngopharyngeal reflux  - Repeat flexible fiberoptic laryngoscopy formed at bedside today.  Supraglottitis has resolved.  Bilateral true vocal folds easily visualized.  No airway compromise.  -Continue 40 mg Protonix twice daily  -General surgery planning for surgery later today.  I have informed the anesthesiologist, Dr. Vasquez, of laryngoscopy findings.  -No further ENT interventions.  Can consider discontinuation of scheduled IV Decadron tomorrow.  Follow-up in ENT clinic outpatient.     Acute left parotitis  -No overnight worsening  -Likely secondary to oral dryness.  Currently n.p.o. for her small bowel obstruction.  Consider ice chips for comfort to help moisturize the oral cavity.    - Continue Unasyn.  Parotitis can take weeks for full recovery, if left facial swelling and pain worsen consider treatment for MRSA including vancomycin  -Recommend sialagogues such as sugar-free sour candies or lemon drops to help stimulate salivary flow if possible, warm compresses, posterior to anterior left parotid massage.  -Continue antibiotics upon discharge.  Recommend follow-up in the ENT clinic to ensure resolution.  Please call or reconsult if left neck swelling worsens.    SUBJECTIVE:   Significant overnight events: Left-sided neck pain feels a little bit better.  No noisy breathing.  No shortness of breath.    OBJECTIVE:  Physical Exam:   Temp (24hrs), Av.5 °F (36.4 °C), Min:97.2 °F (36.2 °C), Max:98.2 °F (36.8 °C)    Vitals:    24 0207 24 0400 24 0522 24 0845   BP:  126/69  (!) 146/77   Pulse:  72  73   Resp: 16 16     Temp:  98.2 °F (36.8 °C)     TempSrc:  Oral     SpO2:  97%  95%   Weight:   94.8 kg (209 lb)    Height:         I/O last 3 completed

## 2024-03-19 NOTE — PROGRESS NOTES
Pt took essential meds well with NG clamped. Remains otherwise NPO for poss surgery today. NG in place at 65 Cm. Draining brown drainage. Has purewick in place draining dark sky urine. Denies pain when still. Abdomen is distended. Pt is axox4 and able to answer questions and follow commands throughout assessment. Spouse at bedside. Transport en route to take pt to Xray. .

## 2024-03-19 NOTE — PROGRESS NOTES
Speech Language Pathology  HOLD     Ana Gonzales  1955     Attempted to see pt for dysphagia therapy this date. Per chart review, pt is being held NPO for potential procedure. Will hold treatment at this time and re-attempt as pt is able to participate and as schedule allows.      Thank you,    Maxine Santiago M.A. CCC-SLP #[id number] 3/19/2024 1:43 PM  Speech-Language Pathologist

## 2024-03-19 NOTE — ANESTHESIA POSTPROCEDURE EVALUATION
Department of Anesthesiology  Postprocedure Note    Patient: Ana Gonzales  MRN: [id number]  YOB: 1955  Date of evaluation: 3/19/2024    Procedure Summary       Date: 03/19/24 Room / Location: [address]    Anesthesia Start: 1528 Anesthesia Stop:     Procedure: EXPLORATORY LAPAROTOMY, LYSIS OF ADHESIONS,MECKELS DIVERTICULECTOMY, APPENDECTOMY (Abdomen) Diagnosis:       Small bowel obstruction (HCC)      (Small bowel obstruction (HCC) [K56.609])    Surgeons: Srinivasa Morales MD Responsible Provider: Branden Vasquez MD    Anesthesia Type: general ASA Status: 4            Anesthesia Type: No value filed.    Hortensia Phase I: Hortensia Score: 10    Hortensia Phase II:      Anesthesia Post Evaluation    Patient location during evaluation: PACU  Patient participation: complete - patient participated  Level of consciousness: awake  Airway patency: patent  Nausea & Vomiting: no vomiting and no nausea  Cardiovascular status: hemodynamically stable  Respiratory status: acceptable  Hydration status: stable  Multimodal analgesia pain management approach  Pain management: adequate    No notable events documented.

## 2024-03-19 NOTE — PROGRESS NOTES
Pt lethargic and oriented.  On O2 via NC @ 2L. Saturations WNL.  No signs of SOB nor coughing.  Complaints of pain on the neck area at the abdomen. Given dilaudid as prescribed.  Abdomen distention noted.     Given report to SHELTON Ibrahim from T.  Properties and pt's chart packed and brought together with her.  Transported via bed with  alongside.  No complaints from the patient.

## 2024-03-19 NOTE — PROGRESS NOTES
Hospitalist Progress Note    Name:  Ana Gonzales    /Age/Sex: 1955  (68 y.o. female)  MRN & CSN:  [id number] & [id number]    PCP: Mellisa Houston MD    Date of Admission: 3/12/2024    Patient Status:  Inpatient     Chief Complaint:   Chief Complaint   Patient presents with    Bradycardia     Pt coming in with  pt has been bradycardic, weakness, slurred speech for a few days.  Vomiting yesterday with black emesis        Hospital Course:   Admitted for bradycardia with slurred speech. Concern for TIA. CT head non acute, but CTA showed severe narrowing of distal left P1 segment of left PCA, but otherwise imaging was non acute. MRI brain was non acute.    However, patient had low hemoglobin on admission and required 2U PRBC.  GI consulted and patient underwent an EGD on 3/13 with GI and did not find any active bleeding, but did find blood clots and a large amount of liquid in the stomach. Placed on PPI gtt.    She had a repeat EGD on 3/14 and did not show any bleed, but 2 clips were placed on small healing ulcer. Still showed blood clots in the stomach.    CT abd/pel on 3/15 showed dilated stomach and small loops reflecting a partial SBO.    CT neck shows left sided neck edema. Started on decadron and unasyn. ENT consulted.    Subjective:  Today is:  Hospital Day: 8.  Patient seen and examined in [facility]-[id number].     In bed. Still having L sided jaw/neck pain-soreness, not really any improvement. No difficulty swallowing or difficulty breathing. Denies flatus or BM.      Medications:  Reviewed    Infusion Medications    PN-Adult Premix 5/20 - Standard Electrolytes - Central Line      PN-Adult Premix 5/20 - Standard Electrolytes - Central Line 40 mL/hr at 24 1950    sodium chloride      sodium chloride      sodium chloride      dextrose       Scheduled Medications    potassium chloride  20 mEq IntraVENous Q1H    fat emulsion  250 mL IntraVENous Once per day on Mon Thu    pantoprazole

## 2024-03-19 NOTE — PROGRESS NOTES
Occupational/Physical Therapy       Ana Gonzales    PT/OT treatment sessions attempted but the pt is having surgery this afternoon.  Discussed with RN.  PT/OT will follow-up with this pt as the schedule allows.  Thanks.  Belle Tapia, PT DPT [id number]  Radha Carreno OTR/L

## 2024-03-20 LAB
ALBUMIN SERPL-MCNC: 3.3 G/DL (ref 3.4–5)
ALP SERPL-CCNC: 75 U/L (ref 40–129)
ALT SERPL-CCNC: <5 U/L (ref 10–40)
ANION GAP SERPL CALCULATED.3IONS-SCNC: 12 MMOL/L (ref 3–16)
ANISOCYTOSIS BLD QL SMEAR: ABNORMAL
AST SERPL-CCNC: 8 U/L (ref 15–37)
BASOPHILS # BLD: 0 K/UL (ref 0–0.2)
BASOPHILS NFR BLD: 0 %
BILIRUB DIRECT SERPL-MCNC: <0.2 MG/DL (ref 0–0.3)
BILIRUB INDIRECT SERPL-MCNC: ABNORMAL MG/DL (ref 0–1)
BILIRUB SERPL-MCNC: <0.2 MG/DL (ref 0–1)
BUN SERPL-MCNC: 25 MG/DL (ref 7–20)
CALCIUM SERPL-MCNC: 8.8 MG/DL (ref 8.3–10.6)
CHLORIDE SERPL-SCNC: 108 MMOL/L (ref 99–110)
CO2 SERPL-SCNC: 22 MMOL/L (ref 21–32)
CREAT SERPL-MCNC: 0.6 MG/DL (ref 0.6–1.2)
DEPRECATED RDW RBC AUTO: 16.7 % (ref 12.4–15.4)
EOSINOPHIL # BLD: 0 K/UL (ref 0–0.6)
EOSINOPHIL NFR BLD: 0 %
GFR SERPLBLD CREATININE-BSD FMLA CKD-EPI: >60 ML/MIN/{1.73_M2}
GLUCOSE BLD-MCNC: 132 MG/DL (ref 70–99)
GLUCOSE BLD-MCNC: 162 MG/DL (ref 70–99)
GLUCOSE BLD-MCNC: 278 MG/DL (ref 70–99)
GLUCOSE BLD-MCNC: 313 MG/DL (ref 70–99)
GLUCOSE BLD-MCNC: 314 MG/DL (ref 70–99)
GLUCOSE BLD-MCNC: 330 MG/DL (ref 70–99)
GLUCOSE BLD-MCNC: 351 MG/DL (ref 70–99)
GLUCOSE SERPL-MCNC: 331 MG/DL (ref 70–99)
HCT VFR BLD AUTO: 25.8 % (ref 36–48)
HGB BLD-MCNC: 8.4 G/DL (ref 12–16)
LYMPHOCYTES # BLD: 2.1 K/UL (ref 1–5.1)
LYMPHOCYTES NFR BLD: 14 %
MAGNESIUM SERPL-MCNC: 2 MG/DL (ref 1.8–2.4)
MCH RBC QN AUTO: 29 PG (ref 26–34)
MCHC RBC AUTO-ENTMCNC: 32.6 G/DL (ref 31–36)
MCV RBC AUTO: 89 FL (ref 80–100)
MONOCYTES # BLD: 0.7 K/UL (ref 0–1.3)
MONOCYTES NFR BLD: 5 %
NEUTROPHILS # BLD: 11.9 K/UL (ref 1.7–7.7)
NEUTROPHILS NFR BLD: 77 %
NEUTS BAND NFR BLD MANUAL: 4 % (ref 0–7)
PERFORMED ON: ABNORMAL
PHOSPHATE SERPL-MCNC: 2.2 MG/DL (ref 2.5–4.9)
PLATELET # BLD AUTO: 358 K/UL (ref 135–450)
PMV BLD AUTO: 8 FL (ref 5–10.5)
POLYCHROMASIA BLD QL SMEAR: ABNORMAL
POTASSIUM SERPL-SCNC: 3.8 MMOL/L (ref 3.5–5.1)
PROT SERPL-MCNC: 5.9 G/DL (ref 6.4–8.2)
RBC # BLD AUTO: 2.9 M/UL (ref 4–5.2)
SODIUM SERPL-SCNC: 142 MMOL/L (ref 136–145)
WBC # BLD AUTO: 14.7 K/UL (ref 4–11)

## 2024-03-20 PROCEDURE — 2700000000 HC OXYGEN THERAPY PER DAY

## 2024-03-20 PROCEDURE — 6370000000 HC RX 637 (ALT 250 FOR IP): Performed by: NURSE PRACTITIONER

## 2024-03-20 PROCEDURE — 51798 US URINE CAPACITY MEASURE: CPT

## 2024-03-20 PROCEDURE — 6360000002 HC RX W HCPCS: Performed by: NURSE PRACTITIONER

## 2024-03-20 PROCEDURE — 6370000000 HC RX 637 (ALT 250 FOR IP): Performed by: PHYSICIAN ASSISTANT

## 2024-03-20 PROCEDURE — APPSS15 APP SPLIT SHARED TIME 0-15 MINUTES: Performed by: NURSE PRACTITIONER

## 2024-03-20 PROCEDURE — 2580000003 HC RX 258: Performed by: SURGERY

## 2024-03-20 PROCEDURE — 83735 ASSAY OF MAGNESIUM: CPT

## 2024-03-20 PROCEDURE — 80076 HEPATIC FUNCTION PANEL: CPT

## 2024-03-20 PROCEDURE — 2580000003 HC RX 258: Performed by: INTERNAL MEDICINE

## 2024-03-20 PROCEDURE — 97164 PT RE-EVAL EST PLAN CARE: CPT

## 2024-03-20 PROCEDURE — 6370000000 HC RX 637 (ALT 250 FOR IP): Performed by: SURGERY

## 2024-03-20 PROCEDURE — 94760 N-INVAS EAR/PLS OXIMETRY 1: CPT

## 2024-03-20 PROCEDURE — 97535 SELF CARE MNGMENT TRAINING: CPT

## 2024-03-20 PROCEDURE — 6360000002 HC RX W HCPCS: Performed by: SURGERY

## 2024-03-20 PROCEDURE — 85025 COMPLETE CBC W/AUTO DIFF WBC: CPT

## 2024-03-20 PROCEDURE — 97530 THERAPEUTIC ACTIVITIES: CPT

## 2024-03-20 PROCEDURE — 97166 OT EVAL MOD COMPLEX 45 MIN: CPT

## 2024-03-20 PROCEDURE — 99024 POSTOP FOLLOW-UP VISIT: CPT | Performed by: SURGERY

## 2024-03-20 PROCEDURE — C9113 INJ PANTOPRAZOLE SODIUM, VIA: HCPCS | Performed by: SURGERY

## 2024-03-20 PROCEDURE — APPNB30 APP NON BILLABLE TIME 0-30 MINS: Performed by: NURSE PRACTITIONER

## 2024-03-20 PROCEDURE — 2500000003 HC RX 250 WO HCPCS: Performed by: SURGERY

## 2024-03-20 PROCEDURE — 1200000000 HC SEMI PRIVATE

## 2024-03-20 PROCEDURE — 80069 RENAL FUNCTION PANEL: CPT

## 2024-03-20 RX ORDER — INSULIN GLARGINE 100 [IU]/ML
12 INJECTION, SOLUTION SUBCUTANEOUS NIGHTLY
Status: DISCONTINUED | OUTPATIENT
Start: 2024-03-21 | End: 2024-03-22

## 2024-03-20 RX ORDER — INSULIN LISPRO 100 [IU]/ML
12 INJECTION, SOLUTION INTRAVENOUS; SUBCUTANEOUS ONCE
Status: COMPLETED | OUTPATIENT
Start: 2024-03-20 | End: 2024-03-20

## 2024-03-20 RX ORDER — INSULIN GLARGINE 100 [IU]/ML
18 INJECTION, SOLUTION SUBCUTANEOUS DAILY
Status: DISCONTINUED | OUTPATIENT
Start: 2024-03-20 | End: 2024-03-20

## 2024-03-20 RX ORDER — HYDROMORPHONE HYDROCHLORIDE 1 MG/ML
0.25 INJECTION, SOLUTION INTRAMUSCULAR; INTRAVENOUS; SUBCUTANEOUS
Status: DISCONTINUED | OUTPATIENT
Start: 2024-03-20 | End: 2024-03-22

## 2024-03-20 RX ORDER — HYDROMORPHONE HYDROCHLORIDE 1 MG/ML
0.5 INJECTION, SOLUTION INTRAMUSCULAR; INTRAVENOUS; SUBCUTANEOUS
Status: DISCONTINUED | OUTPATIENT
Start: 2024-03-20 | End: 2024-03-22

## 2024-03-20 RX ORDER — DEXTROSE AND SODIUM CHLORIDE 5; .9 G/100ML; G/100ML
INJECTION, SOLUTION INTRAVENOUS CONTINUOUS
Status: DISCONTINUED | OUTPATIENT
Start: 2024-03-20 | End: 2024-03-20

## 2024-03-20 RX ORDER — SODIUM CHLORIDE 9 MG/ML
INJECTION, SOLUTION INTRAVENOUS CONTINUOUS
Status: DISCONTINUED | OUTPATIENT
Start: 2024-03-20 | End: 2024-03-21

## 2024-03-20 RX ORDER — LIDOCAINE 4 G/G
1 PATCH TOPICAL DAILY
Status: DISCONTINUED | OUTPATIENT
Start: 2024-03-20 | End: 2024-04-10 | Stop reason: HOSPADM

## 2024-03-20 RX ADMIN — SODIUM CHLORIDE: 9 INJECTION, SOLUTION INTRAVENOUS at 20:10

## 2024-03-20 RX ADMIN — GABAPENTIN 600 MG: 300 CAPSULE ORAL at 17:25

## 2024-03-20 RX ADMIN — GABAPENTIN 600 MG: 300 CAPSULE ORAL at 08:02

## 2024-03-20 RX ADMIN — POTASSIUM PHOSPHATE, MONOBASIC POTASSIUM PHOSPHATE, DIBASIC 15 MMOL: 224; 236 INJECTION, SOLUTION, CONCENTRATE INTRAVENOUS at 11:27

## 2024-03-20 RX ADMIN — DILTIAZEM HYDROCHLORIDE 120 MG: 120 CAPSULE, COATED, EXTENDED RELEASE ORAL at 08:01

## 2024-03-20 RX ADMIN — SODIUM CHLORIDE 3000 MG: 900 INJECTION INTRAVENOUS at 20:13

## 2024-03-20 RX ADMIN — METHENAMINE HIPPURATE 1 G: 1 TABLET ORAL at 08:02

## 2024-03-20 RX ADMIN — METHENAMINE HIPPURATE 1 G: 1 TABLET ORAL at 17:24

## 2024-03-20 RX ADMIN — GABAPENTIN 600 MG: 300 CAPSULE ORAL at 20:16

## 2024-03-20 RX ADMIN — ISOSORBIDE MONONITRATE 120 MG: 60 TABLET, EXTENDED RELEASE ORAL at 08:01

## 2024-03-20 RX ADMIN — SODIUM CHLORIDE 3000 MG: 900 INJECTION INTRAVENOUS at 02:30

## 2024-03-20 RX ADMIN — TOPIRAMATE 150 MG: 100 TABLET, FILM COATED ORAL at 17:24

## 2024-03-20 RX ADMIN — MECLIZINE HYDROCHLORIDE 25 MG: 12.5 TABLET ORAL at 20:17

## 2024-03-20 RX ADMIN — CARVEDILOL 3.12 MG: 3.12 TABLET, FILM COATED ORAL at 08:01

## 2024-03-20 RX ADMIN — INSULIN LISPRO 6 UNITS: 100 INJECTION, SOLUTION INTRAVENOUS; SUBCUTANEOUS at 08:02

## 2024-03-20 RX ADMIN — TIZANIDINE 2 MG: 4 TABLET ORAL at 20:17

## 2024-03-20 RX ADMIN — BUPROPION HYDROCHLORIDE 300 MG: 300 TABLET, EXTENDED RELEASE ORAL at 08:02

## 2024-03-20 RX ADMIN — ASCORBIC ACID, VITAMIN A PALMITATE, CHOLECALCIFEROL, THIAMINE HYDROCHLORIDE, RIBOFLAVIN-5 PHOSPHATE SODIUM, PYRIDOXINE HYDROCHLORIDE, NIACINAMIDE, DEXPANTHENOL, ALPHA-TOCOPHEROL ACETATE, VITAMIN K1, FOLIC ACID, BIOTIN, CYANOCOBALAMIN: 200; 3300; 200; 6; 3.6; 6; 40; 15; 10; 150; 600; 60; 5 INJECTION, SOLUTION INTRAVENOUS at 18:39

## 2024-03-20 RX ADMIN — FUROSEMIDE 20 MG: 10 INJECTION, SOLUTION INTRAMUSCULAR; INTRAVENOUS at 08:01

## 2024-03-20 RX ADMIN — PANTOPRAZOLE SODIUM 40 MG: 40 INJECTION, POWDER, FOR SOLUTION INTRAVENOUS at 08:03

## 2024-03-20 RX ADMIN — MECLIZINE HYDROCHLORIDE 25 MG: 12.5 TABLET ORAL at 17:26

## 2024-03-20 RX ADMIN — MECLIZINE HYDROCHLORIDE 25 MG: 12.5 TABLET ORAL at 09:03

## 2024-03-20 RX ADMIN — MECLIZINE HYDROCHLORIDE 25 MG: 12.5 TABLET ORAL at 12:16

## 2024-03-20 RX ADMIN — HYDROMORPHONE HYDROCHLORIDE 0.75 MG: 1 INJECTION, SOLUTION INTRAMUSCULAR; INTRAVENOUS; SUBCUTANEOUS at 09:54

## 2024-03-20 RX ADMIN — RANOLAZINE 1000 MG: 500 TABLET, EXTENDED RELEASE ORAL at 20:16

## 2024-03-20 RX ADMIN — INSULIN LISPRO 8 UNITS: 100 INJECTION, SOLUTION INTRAVENOUS; SUBCUTANEOUS at 01:14

## 2024-03-20 RX ADMIN — INSULIN LISPRO 12 UNITS: 100 INJECTION, SOLUTION INTRAVENOUS; SUBCUTANEOUS at 12:24

## 2024-03-20 RX ADMIN — PANTOPRAZOLE SODIUM 40 MG: 40 INJECTION, POWDER, FOR SOLUTION INTRAVENOUS at 20:13

## 2024-03-20 RX ADMIN — SODIUM CHLORIDE 3000 MG: 900 INJECTION INTRAVENOUS at 08:30

## 2024-03-20 RX ADMIN — TROSPIUM CHLORIDE 20 MG: 20 TABLET, FILM COATED ORAL at 06:19

## 2024-03-20 RX ADMIN — ENOXAPARIN SODIUM 40 MG: 100 INJECTION SUBCUTANEOUS at 20:18

## 2024-03-20 RX ADMIN — GABAPENTIN 600 MG: 300 CAPSULE ORAL at 12:16

## 2024-03-20 RX ADMIN — TROSPIUM CHLORIDE 20 MG: 20 TABLET, FILM COATED ORAL at 17:25

## 2024-03-20 RX ADMIN — TOPIRAMATE 100 MG: 100 TABLET, FILM COATED ORAL at 08:01

## 2024-03-20 RX ADMIN — INSULIN LISPRO 4 UNITS: 100 INJECTION, SOLUTION INTRAVENOUS; SUBCUTANEOUS at 17:24

## 2024-03-20 RX ADMIN — INSULIN LISPRO 6 UNITS: 100 INJECTION, SOLUTION INTRAVENOUS; SUBCUTANEOUS at 04:44

## 2024-03-20 RX ADMIN — SODIUM CHLORIDE 3000 MG: 900 INJECTION INTRAVENOUS at 17:22

## 2024-03-20 RX ADMIN — Medication 10 ML: at 20:16

## 2024-03-20 RX ADMIN — CARVEDILOL 3.12 MG: 3.12 TABLET, FILM COATED ORAL at 17:25

## 2024-03-20 RX ADMIN — HYDROMORPHONE HYDROCHLORIDE 0.5 MG: 1 INJECTION, SOLUTION INTRAMUSCULAR; INTRAVENOUS; SUBCUTANEOUS at 18:33

## 2024-03-20 RX ADMIN — HYDROMORPHONE HYDROCHLORIDE 0.75 MG: 1 INJECTION, SOLUTION INTRAMUSCULAR; INTRAVENOUS; SUBCUTANEOUS at 05:49

## 2024-03-20 RX ADMIN — NORTRIPTYLINE HYDROCHLORIDE 75 MG: 25 CAPSULE ORAL at 20:16

## 2024-03-20 RX ADMIN — DEXAMETHASONE SODIUM PHOSPHATE 6 MG: 10 INJECTION, SOLUTION INTRAMUSCULAR; INTRAVENOUS at 05:52

## 2024-03-20 RX ADMIN — ASPIRIN 81 MG: 81 TABLET, COATED ORAL at 08:01

## 2024-03-20 RX ADMIN — ARIPIPRAZOLE 2 MG: 2 TABLET ORAL at 20:16

## 2024-03-20 RX ADMIN — RANOLAZINE 1000 MG: 500 TABLET, EXTENDED RELEASE ORAL at 08:01

## 2024-03-20 ASSESSMENT — PAIN SCALES - GENERAL
PAINLEVEL_OUTOF10: 1
PAINLEVEL_OUTOF10: 7
PAINLEVEL_OUTOF10: 4
PAINLEVEL_OUTOF10: 0

## 2024-03-20 ASSESSMENT — PAIN SCALES - WONG BAKER: WONGBAKER_NUMERICALRESPONSE: NO HURT

## 2024-03-20 NOTE — PROGRESS NOTES
Shift assessment complete, VSS, A&Ox4, medications given per MAR. Abdominal incision CDI, hypoactive bowel sounds, she is passing flatus. Patient reports pain 7/10, pain interventions implemented. Patient denies any further needs at this time. The care plan and education has been reviewed and mutually agreed upon with the patient.

## 2024-03-20 NOTE — PLAN OF CARE
Problem: Pain  Goal: Verbalizes/displays adequate comfort level or baseline comfort level  Outcome: Progressing     Problem: Discharge Planning  Goal: Discharge to home or other facility with appropriate resources  Outcome: Progressing     Problem: Safety - Adult  Goal: Free from fall injury  Outcome: Progressing     Problem: Chronic Conditions and Co-morbidities  Goal: Patient's chronic conditions and co-morbidity symptoms are monitored and maintained or improved  Outcome: Progressing     Problem: ABCDS Injury Assessment  Goal: Absence of physical injury  Outcome: Progressing     Problem: Skin/Tissue Integrity  Goal: Absence of new skin breakdown  Description: 1.  Monitor for areas of redness and/or skin breakdown  2.  Assess vascular access sites hourly  3.  Every 4-6 hours minimum:  Change oxygen saturation probe site  4.  Every 4-6 hours:  If on nasal continuous positive airway pressure, respiratory therapy assess nares and determine need for appliance change or resting period.  Outcome: Progressing     Problem: Nutrition Deficit:  Goal: Optimize nutritional status  Outcome: Progressing

## 2024-03-20 NOTE — PROGRESS NOTES
Assessment completed, see doc flowsheets. Pt is A&O X4. Lung sounds are clear. VSS. Tele on. Medication given per MAR. Patient has no needs at this time. Call light within in reach, will continue to monitor.

## 2024-03-20 NOTE — PROGRESS NOTES
Speech Language Pathology  Attempt/Discharge    Ana PEDRO Gonzales  1955    Attempted to see pt for dysphagia therapy this date. Per discussion with RN, pt continues with NGT to suction and is going to remain NPO for the next few days. Will discharge pt from SLP caseload at this time. Please re-order as pt is able to accept PO.     Thank you,     Brian Pisano M.A., CCC-SLP [phone]  Speech-Language Pathologist

## 2024-03-20 NOTE — CARE COORDINATION
SW reviewed pt's chart for discharge planning.  [facility] is still following and think pt may be a potential candidate for ARU pending updated therapy notes.  Will continue to follow for therapy recommendations and ARU's decision.    Electronically signed by SHIRA Buck, AGUSTÍN on 3/20/2024 at 9:15 AM

## 2024-03-20 NOTE — PROGRESS NOTES
[facility]        Progress Note    Patient Name: Ana Gonzales  MRN: [id number]  YOB: 1955  Date of Evaluation: 3/20/2024    Postoperative Day #1    Subjective:  No acute events overnight  Pain controlled  No nausea or vomiting, NGT in place with high volume output  Passing flatus, no stool  Resting in bed at this time, drowsy      Vital Signs:  Patient Vitals for the past 24 hrs:   BP Temp Temp src Pulse Resp SpO2 Weight   24 1155 -- -- -- 75 18 96 % --   24 1118 (!) 150/78 97.3 °F (36.3 °C) Oral 75 18 97 % --   24 1024 -- -- -- -- 17 -- --   24 0759 (!) 156/99 97.2 °F (36.2 °C) Axillary 72 16 94 % --   24 0615 -- -- -- -- -- -- 93 kg (205 lb)   24 0433 (!) 146/78 97.9 °F (36.6 °C) Axillary 69 18 95 % --   24 0104 129/63 97.5 °F (36.4 °C) Axillary 72 16 93 % --   24 2108 124/75 97.3 °F (36.3 °C) Axillary 72 18 95 % --   24 1815 114/62 97.1 °F (36.2 °C) Axillary 72 12 93 % --   24 1730 112/62 97.3 °F (36.3 °C) Axillary 72 12 91 % --   24 1700 (!) 118/56 -- -- 71 12 96 % --   24 1655 (!) 119/55 98.2 °F (36.8 °C) Temporal 71 11 96 % --   24 1650 (!) 118/54 -- -- 72 12 95 % --   24 1645 (!) 111/48 -- -- 73 (!) 9 92 % --   24 1640 (!) 122/55 98.6 °F (37 °C) Temporal 71 -- 98 % --   24 1508 133/69 96.8 °F (36 °C) Temporal 76 18 94 % --        TEMPERATURE HISTORY 24H: Temp (24hrs), Av.5 °F (36.4 °C), Min:96.8 °F (36 °C), Max:98.6 °F (37 °C)    BLOOD PRESSURE HISTORY: Systolic (36hrs), Av , Min:111 , Max:156    Diastolic (36hrs), Av, Min:48, Max:99      Intake/Output:  I/O last 3 completed shifts:  In: 4952.5 [P.O.:720; I.V.:1700; IV Piggyback:694.6]  Out: 5455 [Urine:3525; Emesis/NG output:1850; Blood:80]  I/O this shift:  In: -   Out: 1450 [Emesis/NG output:1450]  Drain/tube Output:       Physical Exam:  General: awake, drowsy, oriented to person, place,  methenamine  1 g Oral BID WC    aclidinium  1 puff Inhalation BID RT    Evolocumab  1 Adjustable Dose Pre-filled Pen Syringe SubCUTAneous Q14 Days    insulin lispro  0-4 Units SubCUTAneous Nightly     Continuous Infusions:   PN-Adult Premix 5/20 - Standard Electrolytes - Central Line      PN-Adult Premix 5/20 - Standard Electrolytes - Central Line 80 mL/hr at 03/19/24 1813    sodium chloride      sodium chloride      sodium chloride      sodium chloride      dextrose       PRN Meds:.HYDROmorphone **OR** HYDROmorphone, sodium chloride, potassium chloride, sodium phosphate 15.63 mmol in sodium chloride 0.9 % 250 mL IVPB, sodium chloride, sodium chloride, sodium chloride flush, sodium chloride, [DISCONTINUED] ondansetron **OR** ondansetron, [DISCONTINUED] acetaminophen **OR** acetaminophen, ondansetron, diazePAM, orphenadrine, perflutren lipid microspheres, ipratropium 0.5 mg-albuterol 2.5 mg, acetaminophen, dextrose bolus **OR** dextrose bolus, glucagon (rDNA), dextrose      Assessment:  68 y.o. female admitted with   1. Slurred speech    2. Ventricular bigeminy    3. Elevated troponin    4. Upper GI bleed    5. Hematemesis, unspecified whether nausea present        OR Date 3/19/2024, exploratory laparotomy with lysis of adhesions, Meckel diverticulectomy, and appendectomy for small bowel obstruction  Acute blood loss anemia, gastric ulcer  TIA, acute aphasia  Hypertension  CHF  CAD, on Plavix--last dose was 3/12/2024  Palpitation and PVC, bigeminy  Diabetes  Supraglottitis without airway obstruction, laryngopharyngeal reflux, acute left parotitis      Plan:  1. Pain controlled, incisional tenderness only, incision healing well, no nausea or vomiting, NGT in place with high volume output, passing flatus, no stool, vitals stable, decreased leukocytosis; continued supportive care, remove Muniz catheter, apply abdominal binder  2. NPO with NGT decompression; monitor bowel function  3. Continue TPN; monitor and correct

## 2024-03-20 NOTE — PROGRESS NOTES
Clinical Pharmacy Note    Pharmacy consulted by Aria Carter to manage TPN    Goal TPN rate: 80 ml/hr    Access: CVC  Indication: SBO    Labs:  General Labs:  BMP:    Lab Results   Component Value Date/Time     03/20/2024 04:40 AM    K 3.8 03/20/2024 04:40 AM    K 3.4 03/15/2024 02:15 AM     03/20/2024 04:40 AM    CO2 22 03/20/2024 04:40 AM    BUN 25 03/20/2024 04:40 AM    LABALBU 3.3 03/20/2024 04:40 AM    CREATININE 0.6 03/20/2024 04:40 AM    CALCIUM 8.8 03/20/2024 04:40 AM    GFRAA >60 11/24/2020 06:29 PM    GFRAA >60 01/13/2013 05:24 AM    LABGLOM >60 03/20/2024 04:40 AM    GLUCOSE 331 03/20/2024 04:40 AM       Electrolyte replacement as follows:   K normalized  Mg slight drop from 2.3 ? 2.0  Phos dropped from 3.3 ? 2.2. Concern that patient may be refeeding. Ordered replacement.    Blood sugars over past 24 hours: 170-245    Blood sugar management:  Continue Humalog q4 hour sliding scale at medium dose.  BG likely elevated from IV steroids for the last few days.    Given drop in phos and magnesium, will plan to decrease TPN to 40 ml/hr. Will re-advance from initial rate once electrolytes normalize.     Thank you for allowing pharmacy to participate in the care of this patient.    Leo Resendez, PharmD  PGY1 Pharmacy Resident  3/20/2024 8:19 AM

## 2024-03-20 NOTE — PROGRESS NOTES
[facility] - Inpatient Rehabilitation Department   Phone: [phone]    Occupational Therapy    [x] Initial Evaluation            [] Daily Treatment Note         [] Discharge Summary      Patient: Ana Gonzales   : 1955   MRN: [id number]   Date of Service:  3/20/2024    Admitting Diagnosis:  SBO (small bowel obstruction) ([facility])  Current Admission Summary: Admitted for bradycardia with slurred speech. Concern for TIA. CT head non acute, but CTA showed severe narrowing of distal left P1 segment of left PCA, but otherwise imaging was non acute. MRI brain was non acute.     However, patient had low hemoglobin on admission and required 2U PRBC.  GI consulted and patient underwent an EGD on 3/13 with GI and did not find any active bleeding, but did find blood clots and a large amount of liquid in the stomach. Placed on PPI gtt.     She had a repeat EGD on 3/14 and did not show any bleed, but 2 clips were placed on small healing ulcer. Still showed blood clots in the stomach.     CT abd/pel on 3/15 showed dilated stomach and small loops reflecting a partial SBO.     CT neck shows left sided neck edema. Started on decadron and unasyn. ENT consulted.  Past Medical History:  has a past medical history of Arthritis, Asthma, Brain injury ([facility]), Bronchitis, CHF (congestive heart failure) ([facility]), Depression, Diabetes mellitus ([facility]), DM (diabetes mellitus screen), Elevated cholesterol with high triglycerides, Fall, Fibromyalgia, GERD (gastroesophageal reflux disease), HIGH CHOLESTEROL, Hypertension, Microvascular angina, Migraine, Neuropathy, Panic attacks, PONV (postoperative nausea and vomiting), Post traumatic stress disorder, Short-term memory loss, Skin cancer, Sleep apnea, and Vertigo.  Past Surgical History:  has a past surgical history that includes Hysterectomy; eye surgery (cataract B); Carpal tunnel release; Finger trigger release; Gallbladder surgery; bladder suspension; Skin cancer excision;

## 2024-03-20 NOTE — OP NOTE
[address] [address]                            OPERATIVE REPORT      PATIENT NAME: SCAR KU              : 1955  MED REC NO: [id number]                      ROOM: [address] [place] [address]  ACCOUNT NO: [id number]                       ADMIT DATE: 2024  PROVIDER: Srinivasa Morales MD      DATE OF PROCEDURE:  2024    SURGEON:  Srinivasa Morales MD    PREOPERATIVE DIAGNOSIS:  Small bowel obstruction.    POSTOPERATIVE DIAGNOSIS:  Small bowel obstruction.    PROCEDURE:  Exploratory laparotomy with lysis of adhesions, Meckel diverticulectomy, and appendectomy.    ANESTHESIA:  General endotracheal.    ESTIMATED BLOOD LOSS:  Minimal.    COMPLICATIONS:  None.    SPECIMEN:  Appendix and Meckel diverticulum.    OPERATIVE INDICATIONS:  The patient is a 68-year-old female admitted with abdominal pain and distention.  Workup revealed a small bowel obstruction.  This did not respond to conservative management.  The patient's Plavix has been held for 6 days.  She was brought to the operative room today for exploratory laparotomy with lysis of adhesions and possible small bowel resection.  She was explained the risks, benefits, and possible complications.    DETAILS OF THE PROCEDURE:  The patient was brought to the operative suite, placed in supine position on the operative table.  After general endotracheal anesthesia, she was prepped and draped in the usual sterile fashion.    We made a midline incision surrounding the umbilicus.  The length of the incision was approximately 12 to 15 cm.  Dissection was carried to the midline until the peritoneal cavity was entered.  The omentum was reflected upward.  The patient had proximally dilated small bowel and collapsed distal small bowel.  We found the point of obstruction, which was a band related to Meckel diverticulum.  The band was cut.  This was the transition point of the obstruction.

## 2024-03-20 NOTE — PROGRESS NOTES
[facility] - Inpatient Rehabilitation Department   Phone: [phone]    Physical Therapy    [x] Re-Evaluation            [] Daily Treatment Note         [] Discharge Summary      Patient: Ana Gonzales   : 1955   MRN: [id number]   Date of Service:  3/20/2024  Admitting Diagnosis: SBO (small bowel obstruction) ([facility])    Current Admission Summary: This is a 68 y.o. female who was brought in by self for generalized weakness and slurred speech over the past couple days.  Patient was also found to be bradycardic.  Her  at bedside gives most the history.  He states that she has been following with Dr. Bates and the cardiology group.  He is not sure if he has seen an EP physician.  She has had difficulty with palpitations and PVCs.  She has had a heart monitor placed but recently they placed a second heart monitor and this time will be for a 2-week period.  Patient has intermittent slurred speech.  Patient does have a history of a subdural hemorrhage.  She has had intracranial surgery according to her  states that 1 side of her body is affected chronically.     3/15/2024:  Partial SBO found, being treated conservatively with NG tube placed today.  3/20/24: Exploratory laparotomy with lysis of adhesions, Meckel diverticulectomy and appendectomy     Past Medical History:  has a past medical history of Arthritis, Asthma, Brain injury ([facility]), Bronchitis, CHF (congestive heart failure) ([facility]), Depression, Diabetes mellitus ([facility]), DM (diabetes mellitus screen), Elevated cholesterol with high triglycerides, Fall, Fibromyalgia, GERD (gastroesophageal reflux disease), HIGH CHOLESTEROL, Hypertension, Microvascular angina, Migraine, Neuropathy, Panic attacks, PONV (postoperative nausea and vomiting), Post traumatic stress disorder, Short-term memory loss, Skin cancer, Sleep apnea, and Vertigo.    Past Surgical History:  has a past surgical history that includes Hysterectomy; eye surgery (cataract

## 2024-03-20 NOTE — PLAN OF CARE
Problem: Pain  Goal: Verbalizes/displays adequate comfort level or baseline comfort level  3/20/2024 1009 by Jadyn Locke RN  Outcome: Progressing  3/19/2024 2316 by Samia Alexis RN  Outcome: Progressing     Problem: Discharge Planning  Goal: Discharge to home or other facility with appropriate resources  3/20/2024 1009 by Jadyn Locke RN  Outcome: Progressing  3/19/2024 2316 by Samia Alexis RN  Outcome: Progressing     Problem: Safety - Adult  Goal: Free from fall injury  3/20/2024 1009 by Jadyn Locke RN  Outcome: Progressing  3/19/2024 2316 by Samia Alexis RN  Outcome: Progressing     Problem: Chronic Conditions and Co-morbidities  Goal: Patient's chronic conditions and co-morbidity symptoms are monitored and maintained or improved  3/20/2024 1009 by Jadyn Locke RN  Outcome: Progressing  3/19/2024 2316 by Samia Alexis RN  Outcome: Progressing     Problem: ABCDS Injury Assessment  Goal: Absence of physical injury  3/20/2024 1009 by Jadyn Locke RN  Outcome: Progressing  3/19/2024 2316 by Samia Alexis RN  Outcome: Progressing     Problem: Skin/Tissue Integrity  Goal: Absence of new skin breakdown  Description: 1.  Monitor for areas of redness and/or skin breakdown  2.  Assess vascular access sites hourly  3.  Every 4-6 hours minimum:  Change oxygen saturation probe site  4.  Every 4-6 hours:  If on nasal continuous positive airway pressure, respiratory therapy assess nares and determine need for appliance change or resting period.  3/20/2024 1009 by Jadyn Locke RN  Outcome: Progressing  3/19/2024 2316 by Samia Alexis RN  Outcome: Progressing     Problem: Nutrition Deficit:  Goal: Optimize nutritional status  3/20/2024 1009 by Jadyn Locke RN  Outcome: Progressing  3/19/2024 2316 by Samia Alexis RN  Outcome: Progressing

## 2024-03-20 NOTE — PROGRESS NOTES
Hospitalist Progress Note    Name:  Ana Gonzales    /Age/Sex: 1955  (68 y.o. female)  MRN & CSN:  [id number] & [id number]    PCP: Mellisa Houston MD    Date of Admission: 3/12/2024    Patient Status:  Inpatient     Chief Complaint:   Chief Complaint   Patient presents with    Bradycardia     Pt coming in with  pt has been bradycardic, weakness, slurred speech for a few days.  Vomiting yesterday with black emesis        Hospital Course:   Admitted for bradycardia with slurred speech. Concern for TIA. CT head non acute, but CTA showed severe narrowing of distal left P1 segment of left PCA, but otherwise imaging was non acute. MRI brain was non acute.    However, patient had low hemoglobin on admission and required 2U PRBC.  GI consulted and patient underwent an EGD on 3/13 with GI and did not find any active bleeding, but did find blood clots and a large amount of liquid in the stomach. Placed on PPI gtt.    She had a repeat EGD on 3/14 and did not show any bleed, but 2 clips were placed on small healing ulcer. Still showed blood clots in the stomach.    CT abd/pel on 3/15 showed dilated stomach and small loops reflecting a partial SBO.    CT neck shows left sided neck edema. Started on decadron and unasyn. ENT consulted.    Subjective:  Today is:  Hospital Day: 9.  Patient seen and examined in [address] [place], [place] [id number].     In bed. Underwent exploratory laparotomy with adhesiolysis, meckels diverticulectomy and appendectomy.  L sided jaw/neck pain improved today.She passed flatus once      Medications:  Reviewed    Infusion Medications    PN-Adult Premix 5/20 - Standard Electrolytes - Central Line      PN-Adult Premix 5/20 - Standard Electrolytes - Central Line 80 mL/hr at 24 1813    sodium chloride      sodium chloride      sodium chloride      sodium chloride      dextrose       Scheduled Medications    potassium phosphate IVPB (PERIPHERAL LINE)  15 mmol IntraVENous Once    [Held by  air-fluid levels.  Relative   changes of bowel loop caliber in the right lower abdomen reflecting at least   partial small bowel obstruction.  Consider NG tube placement.      Thickening of the wall of the small bowel loops in the right lower abdomen,   which may reflect underlying infectious/inflammatory changes.      Small amount of free mesenteric fluid may be due to small-bowel obstruction   and/or inflammatory changes.      Gas density within the urinary bladder.  This may be due to recent   instrumentation; however, correlate clinically with signs of cystitis.         MRI BRAIN WO CONTRAST   Final Result   No acute intracranial abnormality.      Minimal chronic microvascular disease.         XR CHEST PORTABLE   Final Result   Interval placement a right IJ central venous catheter without discrete   pneumothorax seen.         CTA HEAD NECK W CONTRAST   Final Result   1. No acute intracranial abnormality within constraints of CT acquisition.   2. Severe narrowing of the distal left P1 segment with otherwise fetal origin   of the left PCA.   3. Otherwise, no significant stenosis elsewhere within the head or neck.  No   large vessel occlusion.         CT HEAD WO CONTRAST   Final Result   1. No acute intracranial abnormality within constraints of CT acquisition.   2. Severe narrowing of the distal left P1 segment with otherwise fetal origin   of the left PCA.   3. Otherwise, no significant stenosis elsewhere within the head or neck.  No   large vessel occlusion.         XR CHEST PORTABLE   Final Result   No significant findings in the chest.                 Assessment/Plan:    Active Hospital Problems    Diagnosis     Mild malnutrition ([facility]) [E44.1]     Supraglottitis without airway obstruction [J04.30]     Acute parotitis [K11.21]     Laryngopharyngeal reflux (LPR) [K21.9]     Ventricular bigeminy [I49.8]     HANS (acute kidney injury) ([facility]) [N17.9]     SBO (small bowel obstruction) ([facility]) [K56.609]     Upper GI

## 2024-03-21 ENCOUNTER — APPOINTMENT (OUTPATIENT)
Dept: GENERAL RADIOLOGY | Age: 69
DRG: 330 | End: 2024-03-21

## 2024-03-21 LAB
ANION GAP SERPL CALCULATED.3IONS-SCNC: 10 MMOL/L (ref 3–16)
ANISOCYTOSIS BLD QL SMEAR: ABNORMAL
BACTERIA URNS QL MICRO: NORMAL /HPF
BASOPHILS # BLD: 0 K/UL (ref 0–0.2)
BASOPHILS NFR BLD: 0 %
BILIRUB UR QL STRIP.AUTO: NEGATIVE
BUN SERPL-MCNC: 22 MG/DL (ref 7–20)
CALCIUM SERPL-MCNC: 8.2 MG/DL (ref 8.3–10.6)
CHLORIDE SERPL-SCNC: 108 MMOL/L (ref 99–110)
CLARITY UR: CLEAR
CO2 SERPL-SCNC: 22 MMOL/L (ref 21–32)
COLOR UR: YELLOW
CREAT SERPL-MCNC: <0.5 MG/DL (ref 0.6–1.2)
DEPRECATED RDW RBC AUTO: 16 % (ref 12.4–15.4)
EOSINOPHIL # BLD: 0 K/UL (ref 0–0.6)
EOSINOPHIL NFR BLD: 0 %
EPI CELLS #/AREA URNS AUTO: 2 /HPF (ref 0–5)
GFR SERPLBLD CREATININE-BSD FMLA CKD-EPI: >60 ML/MIN/{1.73_M2}
GLUCOSE BLD-MCNC: 134 MG/DL (ref 70–99)
GLUCOSE BLD-MCNC: 140 MG/DL (ref 70–99)
GLUCOSE BLD-MCNC: 154 MG/DL (ref 70–99)
GLUCOSE BLD-MCNC: 165 MG/DL (ref 70–99)
GLUCOSE BLD-MCNC: 169 MG/DL (ref 70–99)
GLUCOSE SERPL-MCNC: 165 MG/DL (ref 70–99)
GLUCOSE UR STRIP.AUTO-MCNC: NEGATIVE MG/DL
HCT VFR BLD AUTO: 30 % (ref 36–48)
HGB BLD-MCNC: 9.6 G/DL (ref 12–16)
HGB UR QL STRIP.AUTO: NEGATIVE
HYALINE CASTS #/AREA URNS AUTO: 0 /LPF (ref 0–8)
KETONES UR STRIP.AUTO-MCNC: NEGATIVE MG/DL
LEUKOCYTE ESTERASE UR QL STRIP.AUTO: ABNORMAL
LYMPHOCYTES # BLD: 2.2 K/UL (ref 1–5.1)
LYMPHOCYTES NFR BLD: 12 %
MACROCYTES BLD QL SMEAR: ABNORMAL
MAGNESIUM SERPL-MCNC: 1.8 MG/DL (ref 1.8–2.4)
MCH RBC QN AUTO: 28.6 PG (ref 26–34)
MCHC RBC AUTO-ENTMCNC: 32.1 G/DL (ref 31–36)
MCV RBC AUTO: 89.3 FL (ref 80–100)
MONOCYTES # BLD: 0.9 K/UL (ref 0–1.3)
MONOCYTES NFR BLD: 5 %
NEUTROPHILS # BLD: 15 K/UL (ref 1.7–7.7)
NEUTROPHILS NFR BLD: 83 %
NITRITE UR QL STRIP.AUTO: NEGATIVE
PERFORMED ON: ABNORMAL
PH UR STRIP.AUTO: 6.5 [PH] (ref 5–8)
PHOSPHATE SERPL-MCNC: 3 MG/DL (ref 2.5–4.9)
PLATELET # BLD AUTO: 370 K/UL (ref 135–450)
PLATELET BLD QL SMEAR: ADEQUATE
PMV BLD AUTO: 8.3 FL (ref 5–10.5)
POLYCHROMASIA BLD QL SMEAR: ABNORMAL
POTASSIUM SERPL-SCNC: 3.4 MMOL/L (ref 3.5–5.1)
PROT UR STRIP.AUTO-MCNC: NEGATIVE MG/DL
RBC # BLD AUTO: 3.36 M/UL (ref 4–5.2)
RBC CLUMPS #/AREA URNS AUTO: 2 /HPF (ref 0–4)
SLIDE REVIEW: ABNORMAL
SODIUM SERPL-SCNC: 140 MMOL/L (ref 136–145)
SP GR UR STRIP.AUTO: 1.02 (ref 1–1.03)
UA COMPLETE W REFLEX CULTURE PNL UR: ABNORMAL
UA DIPSTICK W REFLEX MICRO PNL UR: YES
URN SPEC COLLECT METH UR: ABNORMAL
UROBILINOGEN UR STRIP-ACNC: 0.2 E.U./DL
WBC # BLD AUTO: 18.1 K/UL (ref 4–11)
WBC #/AREA URNS AUTO: 2 /HPF (ref 0–5)

## 2024-03-21 PROCEDURE — 84100 ASSAY OF PHOSPHORUS: CPT

## 2024-03-21 PROCEDURE — 2500000003 HC RX 250 WO HCPCS: Performed by: SURGERY

## 2024-03-21 PROCEDURE — 97530 THERAPEUTIC ACTIVITIES: CPT

## 2024-03-21 PROCEDURE — 97535 SELF CARE MNGMENT TRAINING: CPT

## 2024-03-21 PROCEDURE — 51702 INSERT TEMP BLADDER CATH: CPT

## 2024-03-21 PROCEDURE — 51798 US URINE CAPACITY MEASURE: CPT

## 2024-03-21 PROCEDURE — 6370000000 HC RX 637 (ALT 250 FOR IP): Performed by: SURGERY

## 2024-03-21 PROCEDURE — 6360000002 HC RX W HCPCS: Performed by: SURGERY

## 2024-03-21 PROCEDURE — 2580000003 HC RX 258: Performed by: SURGERY

## 2024-03-21 PROCEDURE — 97116 GAIT TRAINING THERAPY: CPT

## 2024-03-21 PROCEDURE — 97110 THERAPEUTIC EXERCISES: CPT

## 2024-03-21 PROCEDURE — 99024 POSTOP FOLLOW-UP VISIT: CPT | Performed by: SURGERY

## 2024-03-21 PROCEDURE — 81001 URINALYSIS AUTO W/SCOPE: CPT

## 2024-03-21 PROCEDURE — 85025 COMPLETE CBC W/AUTO DIFF WBC: CPT

## 2024-03-21 PROCEDURE — 83735 ASSAY OF MAGNESIUM: CPT

## 2024-03-21 PROCEDURE — 6370000000 HC RX 637 (ALT 250 FOR IP): Performed by: PHYSICIAN ASSISTANT

## 2024-03-21 PROCEDURE — 51701 INSERT BLADDER CATHETER: CPT

## 2024-03-21 PROCEDURE — 80048 BASIC METABOLIC PNL TOTAL CA: CPT

## 2024-03-21 PROCEDURE — 6360000002 HC RX W HCPCS: Performed by: NURSE PRACTITIONER

## 2024-03-21 PROCEDURE — 94640 AIRWAY INHALATION TREATMENT: CPT

## 2024-03-21 PROCEDURE — 6370000000 HC RX 637 (ALT 250 FOR IP): Performed by: INTERNAL MEDICINE

## 2024-03-21 PROCEDURE — C9113 INJ PANTOPRAZOLE SODIUM, VIA: HCPCS | Performed by: SURGERY

## 2024-03-21 PROCEDURE — 6370000000 HC RX 637 (ALT 250 FOR IP): Performed by: NURSE PRACTITIONER

## 2024-03-21 PROCEDURE — APPNB30 APP NON BILLABLE TIME 0-30 MINS: Performed by: NURSE PRACTITIONER

## 2024-03-21 PROCEDURE — 74018 RADEX ABDOMEN 1 VIEW: CPT

## 2024-03-21 PROCEDURE — APPSS15 APP SPLIT SHARED TIME 0-15 MINUTES: Performed by: NURSE PRACTITIONER

## 2024-03-21 PROCEDURE — 94761 N-INVAS EAR/PLS OXIMETRY MLT: CPT

## 2024-03-21 PROCEDURE — 1200000000 HC SEMI PRIVATE

## 2024-03-21 RX ORDER — POTASSIUM CHLORIDE 7.45 MG/ML
10 INJECTION INTRAVENOUS
Status: COMPLETED | OUTPATIENT
Start: 2024-03-21 | End: 2024-03-21

## 2024-03-21 RX ORDER — POTASSIUM CHLORIDE 7.45 MG/ML
10 INJECTION INTRAVENOUS
Status: DISCONTINUED | OUTPATIENT
Start: 2024-03-21 | End: 2024-03-21 | Stop reason: DRUGHIGH

## 2024-03-21 RX ORDER — MAGNESIUM SULFATE IN WATER 40 MG/ML
2000 INJECTION, SOLUTION INTRAVENOUS ONCE
Status: COMPLETED | OUTPATIENT
Start: 2024-03-21 | End: 2024-03-21

## 2024-03-21 RX ORDER — ALBUTEROL SULFATE 90 UG/1
2 AEROSOL, METERED RESPIRATORY (INHALATION) EVERY 6 HOURS PRN
Status: DISCONTINUED | OUTPATIENT
Start: 2024-03-21 | End: 2024-04-10 | Stop reason: HOSPADM

## 2024-03-21 RX ADMIN — MECLIZINE HYDROCHLORIDE 25 MG: 12.5 TABLET ORAL at 09:35

## 2024-03-21 RX ADMIN — Medication 2 PUFF: at 08:52

## 2024-03-21 RX ADMIN — RANOLAZINE 1000 MG: 500 TABLET, EXTENDED RELEASE ORAL at 20:09

## 2024-03-21 RX ADMIN — DILTIAZEM HYDROCHLORIDE 120 MG: 120 CAPSULE, COATED, EXTENDED RELEASE ORAL at 09:34

## 2024-03-21 RX ADMIN — MAGNESIUM SULFATE HEPTAHYDRATE 2000 MG: 40 INJECTION, SOLUTION INTRAVENOUS at 12:51

## 2024-03-21 RX ADMIN — LEUCINE, PHENYLALANINE, LYSINE, METHIONINE, ISOLEUCINE, VALINE, HISTIDINE, THREONINE, TRYPTOPHAN, ALANINE, GLYCINE, ARGININE, PROLINE, SERINE, TYROSINE, SODIUM ACETATE, DIBASIC POTASSIUM PHOSPHATE, MAGNESIUM CHLORIDE, SODIUM CHLORIDE, CALCIUM CHLORIDE, DEXTROSE
365; 280; 290; 200; 300; 290; 240; 210; 90; 1035; 515; 575; 340; 250; 20; 340; 261; 51; 59; 33; 20 INJECTION INTRAVENOUS at 18:36

## 2024-03-21 RX ADMIN — CARVEDILOL 3.12 MG: 3.12 TABLET, FILM COATED ORAL at 09:35

## 2024-03-21 RX ADMIN — TIZANIDINE 2 MG: 4 TABLET ORAL at 20:10

## 2024-03-21 RX ADMIN — TOPIRAMATE 100 MG: 100 TABLET, FILM COATED ORAL at 09:35

## 2024-03-21 RX ADMIN — SODIUM CHLORIDE 3000 MG: 900 INJECTION INTRAVENOUS at 08:56

## 2024-03-21 RX ADMIN — MECLIZINE HYDROCHLORIDE 25 MG: 12.5 TABLET ORAL at 13:02

## 2024-03-21 RX ADMIN — HYDROMORPHONE HYDROCHLORIDE 0.25 MG: 1 INJECTION, SOLUTION INTRAMUSCULAR; INTRAVENOUS; SUBCUTANEOUS at 22:07

## 2024-03-21 RX ADMIN — DIAZEPAM 5 MG: 5 INJECTION, SOLUTION INTRAMUSCULAR; INTRAVENOUS at 20:26

## 2024-03-21 RX ADMIN — METHENAMINE HIPPURATE 1 G: 1 TABLET ORAL at 09:34

## 2024-03-21 RX ADMIN — ISOSORBIDE MONONITRATE 120 MG: 60 TABLET, EXTENDED RELEASE ORAL at 09:35

## 2024-03-21 RX ADMIN — Medication 10 ML: at 08:58

## 2024-03-21 RX ADMIN — ENOXAPARIN SODIUM 40 MG: 100 INJECTION SUBCUTANEOUS at 20:10

## 2024-03-21 RX ADMIN — POTASSIUM CHLORIDE 10 MEQ: 7.46 INJECTION, SOLUTION INTRAVENOUS at 16:47

## 2024-03-21 RX ADMIN — ASPIRIN 81 MG: 81 TABLET, COATED ORAL at 09:35

## 2024-03-21 RX ADMIN — SODIUM CHLORIDE 3000 MG: 900 INJECTION INTRAVENOUS at 15:13

## 2024-03-21 RX ADMIN — HYDROMORPHONE HYDROCHLORIDE 0.5 MG: 1 INJECTION, SOLUTION INTRAMUSCULAR; INTRAVENOUS; SUBCUTANEOUS at 04:49

## 2024-03-21 RX ADMIN — GABAPENTIN 600 MG: 300 CAPSULE ORAL at 09:35

## 2024-03-21 RX ADMIN — POTASSIUM CHLORIDE 10 MEQ: 7.46 INJECTION, SOLUTION INTRAVENOUS at 14:03

## 2024-03-21 RX ADMIN — GABAPENTIN 600 MG: 300 CAPSULE ORAL at 20:09

## 2024-03-21 RX ADMIN — PANTOPRAZOLE SODIUM 40 MG: 40 INJECTION, POWDER, FOR SOLUTION INTRAVENOUS at 20:09

## 2024-03-21 RX ADMIN — Medication 2 PUFF: at 21:03

## 2024-03-21 RX ADMIN — FUROSEMIDE 20 MG: 10 INJECTION, SOLUTION INTRAMUSCULAR; INTRAVENOUS at 09:34

## 2024-03-21 RX ADMIN — PANTOPRAZOLE SODIUM 40 MG: 40 INJECTION, POWDER, FOR SOLUTION INTRAVENOUS at 09:34

## 2024-03-21 RX ADMIN — HYDROMORPHONE HYDROCHLORIDE 0.5 MG: 1 INJECTION, SOLUTION INTRAMUSCULAR; INTRAVENOUS; SUBCUTANEOUS at 01:25

## 2024-03-21 RX ADMIN — SODIUM CHLORIDE 3000 MG: 900 INJECTION INTRAVENOUS at 03:07

## 2024-03-21 RX ADMIN — I.V. FAT EMULSION 250 ML: 20 EMULSION INTRAVENOUS at 18:38

## 2024-03-21 RX ADMIN — ARIPIPRAZOLE 2 MG: 2 TABLET ORAL at 20:10

## 2024-03-21 RX ADMIN — RANOLAZINE 1000 MG: 500 TABLET, EXTENDED RELEASE ORAL at 09:35

## 2024-03-21 RX ADMIN — POTASSIUM CHLORIDE 10 MEQ: 7.46 INJECTION, SOLUTION INTRAVENOUS at 12:44

## 2024-03-21 RX ADMIN — INSULIN GLARGINE 12 UNITS: 100 INJECTION, SOLUTION SUBCUTANEOUS at 20:09

## 2024-03-21 RX ADMIN — TROSPIUM CHLORIDE 20 MG: 20 TABLET, FILM COATED ORAL at 09:35

## 2024-03-21 RX ADMIN — HYDROMORPHONE HYDROCHLORIDE 0.25 MG: 1 INJECTION, SOLUTION INTRAMUSCULAR; INTRAVENOUS; SUBCUTANEOUS at 09:32

## 2024-03-21 RX ADMIN — SODIUM CHLORIDE 3000 MG: 900 INJECTION INTRAVENOUS at 20:08

## 2024-03-21 RX ADMIN — GABAPENTIN 600 MG: 300 CAPSULE ORAL at 13:02

## 2024-03-21 RX ADMIN — NORTRIPTYLINE HYDROCHLORIDE 75 MG: 25 CAPSULE ORAL at 20:10

## 2024-03-21 RX ADMIN — MECLIZINE HYDROCHLORIDE 25 MG: 12.5 TABLET ORAL at 20:09

## 2024-03-21 RX ADMIN — POTASSIUM CHLORIDE 10 MEQ: 7.46 INJECTION, SOLUTION INTRAVENOUS at 14:57

## 2024-03-21 RX ADMIN — BUPROPION HYDROCHLORIDE 300 MG: 300 TABLET, EXTENDED RELEASE ORAL at 09:35

## 2024-03-21 ASSESSMENT — PAIN DESCRIPTION - DESCRIPTORS
DESCRIPTORS: BURNING;SHARP

## 2024-03-21 ASSESSMENT — PAIN SCALES - GENERAL
PAINLEVEL_OUTOF10: 0
PAINLEVEL_OUTOF10: 7
PAINLEVEL_OUTOF10: 2
PAINLEVEL_OUTOF10: 10
PAINLEVEL_OUTOF10: 0
PAINLEVEL_OUTOF10: 9
PAINLEVEL_OUTOF10: 5
PAINLEVEL_OUTOF10: 9
PAINLEVEL_OUTOF10: 0

## 2024-03-21 ASSESSMENT — PAIN DESCRIPTION - PAIN TYPE
TYPE: SURGICAL PAIN
TYPE: SURGICAL PAIN

## 2024-03-21 ASSESSMENT — PAIN - FUNCTIONAL ASSESSMENT
PAIN_FUNCTIONAL_ASSESSMENT: ACTIVITIES ARE NOT PREVENTED
PAIN_FUNCTIONAL_ASSESSMENT: ACTIVITIES ARE NOT PREVENTED

## 2024-03-21 ASSESSMENT — PAIN DESCRIPTION - LOCATION
LOCATION: ABDOMEN;BACK

## 2024-03-21 ASSESSMENT — PAIN DESCRIPTION - ORIENTATION
ORIENTATION: MID

## 2024-03-21 NOTE — PROGRESS NOTES
Nutrition Note    RECOMMENDATIONS  PO Diet: NPO  Nutrition Support:   Recommend K+, Mg replacement  Recommend discontinue IV fluids of NS at 75 mL/hr  Recommend advance next bag of Clinimix 5/20 to goal of 80 mL/hr  Recommend 250 mL 20% lipids 2 times per week    NUTRITION ASSESSMENT   Pt triggered follow-up. S/p ex lap with BRANDON, Meckel diverticulectomy, and appendectomy for SBO 3/19. Continues NPO with NG tube in place for decompression. TPN advanced to goal of 80 mL/hr 3/19 but reduced back to 40 mL/hr yesterday per pharm d/t shift in electrolytes. Recommend advance next bag of Clinimix 5/20 back to goal of 80 mL/hr this evening. RD will continue to monitor.     Nutrition Related Findings: NS at 75 mL/hr. K+ 3.4. POCG 134, 140. LBM 3/16. Edema: trace generalized; non-pitting BUE, BLE.  Wounds: Surgical Incision  Nutrition Education:  Education not indicated   Nutrition Goals: Tolerate nutrition support at goal rate, by next RD assessment, Initiate PO diet     MALNUTRITION ASSESSMENT   Acute Illness  Malnutrition Status: Mild malnutrition (as determined by initial RD assessment 3/18)  Findings of the 6 clinical characteristics of malnutrition:  Energy Intake:  50% or less of estimated energy requirements for 5 or more days (as determined by initial RD assessment on 3/18; however, TPN will now provide adequate nutrition)  Weight Loss:  No significant weight loss     Body Fat Loss:  No significant body fat loss     Muscle Mass Loss:  No significant muscle mass loss    Fluid Accumulation:  No significant fluid accumulation Extremities, Generalized   Strength:  Not Performed    NUTRITION DIAGNOSIS   Inadequate oral intake related to altered GI function as evidenced by NPO or clear liquid status due to medical condition, nutrition support - parenteral nutrition    CURRENT NUTRITION THERAPIES  Diet NPO Exceptions are: Ice Chips, Sips of Water with Meds, Popsicles  PN-Adult Premix 5/20 - Standard Electrolytes -  Central Line     PO Intake: NPO   PO Supplement Intake:NPO    Current Parenteral Nutrition Recs:  Type and Formula: Premix Central   Lipids: 250ml, Two times weekly  Duration: Continuous  Current PN Order Provides: Clinimx 5/20 at 40 mL/hr provides 960 mL total volume, 845 calories, 48 grams of protein, and dextrose load of 1.32 mg/kg/min  Goal PN Orders Provides: Clinimix 5/20 at 80 mL/hr provides 1920 mL total volume, 1690 calories, 96 grams of protein, and dextrose load of 2.64 mg/kg/min    ANTHROPOMETRICS  Current Height: 160 cm (5' 3\")  Current Weight - Scale: 100.9 kg (222 lb 6.4 oz)    Admission weight: 93 kg (205 lb)  Ideal Body Weight (IBW): 115 lbs  (52 kg)        BMI: 39.3    COMPARATIVE STANDARDS  Total Energy Requirements (kcals/day): 0086-8312     Protein (g):         Fluid (mL/day):  0412-5897    The patient will be monitored per nutrition standards of care. Consult dietitian if additional nutrition interventions are needed prior to RD reassessment.     Taylor Baird MS, RD, LD    Contact: [phone]

## 2024-03-21 NOTE — PROGRESS NOTES
[facility] - Inpatient Rehabilitation Department   Phone: [phone]    Occupational Therapy    [] Initial Evaluation            [x] Daily Treatment Note         [] Discharge Summary      Patient: Ana Gonzales   : 1955   MRN: [id number]   Date of Service:  3/21/2024    Admitting Diagnosis:  SBO (small bowel obstruction) ([facility])  Current Admission Summary: Admitted for bradycardia with slurred speech. Concern for TIA. CT head non acute, but CTA showed severe narrowing of distal left P1 segment of left PCA, but otherwise imaging was non acute. MRI brain was non acute.     However, patient had low hemoglobin on admission and required 2U PRBC.  GI consulted and patient underwent an EGD on 3/13 with GI and did not find any active bleeding, but did find blood clots and a large amount of liquid in the stomach. Placed on PPI gtt.     She had a repeat EGD on 3/14 and did not show any bleed, but 2 clips were placed on small healing ulcer. Still showed blood clots in the stomach.     CT abd/pel on 3/15 showed dilated stomach and small loops reflecting a partial SBO.     CT neck shows left sided neck edema. Started on decadron and unasyn. ENT consulted.  Past Medical History:  has a past medical history of Arthritis, Asthma, Brain injury ([facility]), Bronchitis, CHF (congestive heart failure) ([facility]), Depression, Diabetes mellitus ([facility]), DM (diabetes mellitus screen), Elevated cholesterol with high triglycerides, Fall, Fibromyalgia, GERD (gastroesophageal reflux disease), HIGH CHOLESTEROL, Hypertension, Microvascular angina, Migraine, Neuropathy, Panic attacks, PONV (postoperative nausea and vomiting), Post traumatic stress disorder, Short-term memory loss, Skin cancer, Sleep apnea, and Vertigo.  Past Surgical History:  has a past surgical history that includes Hysterectomy; eye surgery (cataract B); Carpal tunnel release; Finger trigger release; Gallbladder surgery; bladder suspension; Skin cancer excision;

## 2024-03-21 NOTE — PROGRESS NOTES
Ana Gonzales  3/21/2024  [id number]    Chief Complaint: SBO (small bowel obstruction) ([facility])    Subjective   Patient underwent ex-lap with lysis of adhesions, Meckel diverticulectomy, and appendectomy on 3/20. She remains on TPN, while NPO with NGT decompression. She is feeling well today overall. Complaining mostly of pain, fullness of her left jaw/neck, which is overall improving since starting antibiotics. She continues to report generalized weakness.          Objective   Objective:  Patient Vitals for the past 24 hrs:   BP Temp Temp src Pulse Resp SpO2 Height Weight   03/21/24 1602 136/72 97.2 °F (36.2 °C) Oral 77 19 96 % -- --   03/21/24 1115 127/76 97.2 °F (36.2 °C) Oral 80 18 94 % -- --   03/21/24 1002 -- -- -- -- 16 -- -- --   03/21/24 0921 137/77 97.8 °F (36.6 °C) Oral 80 17 93 % -- --   03/21/24 0852 -- -- -- 78 18 96 % -- --   03/21/24 0850 -- -- -- 78 18 96 % -- --   03/21/24 0820 -- -- -- -- -- -- 1.6 m (5' 3\") --   03/21/24 0541 -- -- -- -- -- -- -- 100.9 kg (222 lb 6.4 oz)   03/21/24 0519 -- -- -- -- 18 -- -- --   03/21/24 0430 130/79 97.6 °F (36.4 °C) Oral 76 18 92 % -- --   03/21/24 0155 -- -- -- -- 18 -- -- --   03/21/24 0125 -- -- -- -- 18 -- -- --   03/20/24 2348 133/72 97.2 °F (36.2 °C) Oral 69 18 95 % -- --   03/20/24 2002 121/68 97.2 °F (36.2 °C) Oral 66 18 96 % -- --   03/20/24 1903 -- -- -- -- 18 -- -- --     Gen: No distress, pleasant.   HEENT: NGT in place. Left angle of the jaw swelling.   CV: No audible murmurs, well perfused extremities  Resp: No respiratory distress. No increased WOB  Abd: Soft, nontender nondistended  Ext: No edema.   Neuro: Alert, oriented, appropriately interactive. 4/5 strength throughout.     Laboratory data: Available via EMR.     Therapy progress:    PT    Rolling: Level of difficulty - A Lot   Sit to Stand from a Chair: Level of difficulty - A Little  Supine to Sit: Level of difficulty - A Lot     Bed to Chair: Physical Assistance Required - A Lot  Ambulate

## 2024-03-21 NOTE — PROGRESS NOTES
Hospitalist Progress Note    Name:  Ana Gonzales    /Age/Sex: 1955  (68 y.o. female)  MRN & CSN:  [id number] & [id number]    PCP: Mellisa Houston MD    Date of Admission: 3/12/2024    Patient Status:  Inpatient     Chief Complaint:   Chief Complaint   Patient presents with    Bradycardia     Pt coming in with  pt has been bradycardic, weakness, slurred speech for a few days.  Vomiting yesterday with black emesis        Hospital Course:   Admitted for bradycardia with slurred speech. Concern for TIA. CT head non acute, but CTA showed severe narrowing of distal left P1 segment of left PCA, but otherwise imaging was non acute. MRI brain was non acute.    However, patient had low hemoglobin on admission and required 2U PRBC.  GI consulted and patient underwent an EGD on 3/13 with GI and did not find any active bleeding, but did find blood clots and a large amount of liquid in the stomach. Placed on PPI gtt.    She had a repeat EGD on 3/14 and did not show any bleed, but 2 clips were placed on small healing ulcer. Still showed blood clots in the stomach.    CT abd/pel on 3/15 showed dilated stomach and small loops reflecting a partial SBO.    CT neck shows left sided neck edema. Started on decadron and unasyn. ENT consulted.    Subjective:  Today is:  Hospital Day: 10.  Patient seen and examined in [id number].     Up in chair. Not feeling. Complaining of low back pain while in chair. Has chronic back issues per . NG was not draining during the night. It was advanced 10 cm then started draining this am. No BM did pass some flatus.      Medications:  Reviewed    Infusion Medications    PN-Adult Premix 5/20 - Standard Electrolytes - Central Line      PN-Adult Premix 5/20 - Standard Electrolytes - Central Line 40 mL/hr at 24 0636    sodium chloride      sodium chloride      sodium chloride      sodium chloride      dextrose       Scheduled Medications    mometasone-formoterol  2  worse and has been using ice)    Supraglottitis, Laryngopharyngeal reflex  -resolved per ENT  -off decadron decadron.    TIA - resolved  -Stroke like symptoms likely 2/2 ABLA and not an actual TIA  -MRI head non acute  -Echo 3/13 showed LVEF 55-60%, no RWMA  -ASA ordered  -Has a statin allergy  -TIA/CVA order set ordered  -Neurology has seen    Chronic diastolic HF  -Not in an acute exacerbation  -CXR non acute  - on admission  -continue lasix at once daily  -I/Os    Bigeminy  -on b blocker and calcium channel blocker  -EP has seen  -will need stress test as well as outpatient cardiac MRI     Hypertension  -Continue home antihypertensives    Hyperlipidemia  -Continue home statin    Type 2 diabetes  -SSI        Discussed management of the case with GI who recommended PPI BID for 8 weeks.    Drugs that require monitoring for toxicity include: Subq Insulin and the method of monitoring was/is BG    DVT ppx: Contraindicated  GI ppx: PPI  Diet: Diet NPO Exceptions are: Ice Chips, Sips of Water with Meds, Popsicles  PN-Adult Premix 5/20 - Standard Electrolytes - Central Line  PN-Adult Premix 5/20 - Standard Electrolytes - Central Line  Code Status: Full Code    PT/OT Eval Status: ordered    Disposition:  PRAVEENAD      Lynsey Garza PA-C  3/21/2024  1:29 PM

## 2024-03-21 NOTE — PROGRESS NOTES
[facility]  Coordination Testing:   WFL    ROM:   (B) LE AROM WFL  Strength:   (B) LE strength grossly +3    Decision Making: medium complexity  Clinical Presentation: evolving      Subjective  General: Patient agreeable to PT/OT treatment. Pt drowsy and VCs needed to keep eyes open during treatment. Patient's  present during session.    Pain: Patient does not rate upon questioning (Complains of abdominal pain with movement)   Pain Interventions: RN notified, repositioned , and therapy activities modified       Functional Mobility    Bed Mobility  Supine to Sit: 2 person assistance with mod A   Scooting: minimal assistance  Comments: HOB elevated, CGA sitting balance EOB.   Transfers  Sit to stand transfer: minimal assistance  Stand to sit transfer: minimal assistance  Comments: Up to RW; verbal cueing for hand placement   Ambulation  Surface:level surface  Assistive Device: rolling walker  Assistance: minimal assistance  Distance: 10 ft  Gait Mechanics: scoliosis lean to R side, mild flexed trunk, VCs to keep RW close. Pt with decreased step length and height, min A for RW guidance.   Comments:  Pt amb in room and sat in chair. Pt moving very slowly and extremely fatigued, SOB. O2 sats on RA 95% and HR 98.   Stair Mobility  Stair mobility not completed on this date.  Comments:  Wheelchair Mobility:  No w/c mobility completed on this date.  Comments:  Balance  Static Sitting Balance: fair: maintains balance at CGA without use of UE support  Dynamic Sitting Balance: fair: maintains balance at CGA without use of UE support  Static Standing Balance: fair (-): maintains balance at CGA with use of UE support  Dynamic Standing Balance: poor (+): requires min (A) to maintain balance  Comments: RW in standing and walking.    Other Therapeutic Interventions  Pt assisted with bathing and gown change, see OT notes. Pt walked in room and positioned in chair. Pt performed seated there ex including LAQs, HR/TR, GS x 10  each bilat. Pt needing constant VCs to stay awake and progress there ex. Discussed DC recommendations with pt and spouse. Pt left with xray in room for imaging.     Functional Outcomes  AM-PAC Inpatient Mobility Raw Score : 13              Cognition  WFL  Orientation:    alert and oriented x 4  Command Following:   WFL    Education  Barriers To Learning: none  Patient Education: patient educated on goals, PT role and benefits, plan of care, general safety, functional mobility training, proper use of assistive device/equipment, family education, transfer training, discharge recommendations  Learning Assessment:  patient verbalizes and demonstrates understanding    Assessment  Activity Tolerance: Improving; limited by abdominal pain and overall weakness from recent surgery and hospital admission.   Impairments Requiring Therapeutic Intervention: decreased functional mobility, decreased strength, decreased endurance, decreased balance  Prognosis: good  Clinical Assessment: Patient admitted with hematemesis, slurred speech, s/p EGD 3/13/24. Patient now s/p exploratory laparotomy with lysis of adhesions. Meckel diverticulectomy and appendectomy.  She required mod A x2 for bed mobility and min A  for transfers and gait with a RW. Not able to assess ambulation due to fatigue following transfer. Goals updated below. Patient would benefit from additional skilled PT upon discharge to promote independence and safety with functional mobility.   Safety Interventions: patient left in chair, chair alarm in place, call light within reach, gait belt, patient at risk for falls, nurse notified, and family/caregiver present    Plan  Frequency: 3-5 x/per week  Current Treatment Recommendations: strengthening, balance training, functional mobility training, transfer training, gait training, stair training, endurance training, patient/caregiver education, safety education, and equipment evaluation/education    Goals  Patient Goals:

## 2024-03-21 NOTE — PROGRESS NOTES
[facility]        Progress Note    Patient Name: Ana Gonzales  MRN: [id number]  YOB: 1955  Date of Evaluation: 3/21/2024    Postoperative Day #2    Subjective:  No acute events overnight  Pain controlled  No nausea or vomiting, NGT in place with high volume output  Passing flatus, no stool, denies feeling bloated  Required straight catheterization overnight--has not voided yet  Up to chair at this time, remains drowsy      Vital Signs:  Patient Vitals for the past 24 hrs:   BP Temp Temp src Pulse Resp SpO2 Height Weight   24 1115 127/76 97.2 °F (36.2 °C) Oral 80 18 94 % -- --   24 1002 -- -- -- -- 16 -- -- --   24 0921 137/77 97.8 °F (36.6 °C) Oral 80 17 93 % -- --   24 0852 -- -- -- 78 18 96 % -- --   24 0850 -- -- -- 78 18 96 % -- --   24 0820 -- -- -- -- -- -- 1.6 m (5' 3\") --   24 0541 -- -- -- -- -- -- -- 100.9 kg (222 lb 6.4 oz)   24 0519 -- -- -- -- 18 -- -- --   24 0430 130/79 97.6 °F (36.4 °C) Oral 76 18 92 % -- --   24 0155 -- -- -- -- 18 -- -- --   24 0125 -- -- -- -- 18 -- -- --   24 2348 133/72 97.2 °F (36.2 °C) Oral 69 18 95 % -- --   24 2002 121/68 97.2 °F (36.2 °C) Oral 66 18 96 % -- --   24 1903 -- -- -- -- 18 -- -- --   24 1558 (!) 155/70 97.7 °F (36.5 °C) Axillary 76 18 99 % -- --   24 1226 135/75 97.7 °F (36.5 °C) Axillary 74 17 95 % -- --        TEMPERATURE HISTORY 24H: Temp (24hrs), Av.5 °F (36.4 °C), Min:97.2 °F (36.2 °C), Max:97.8 °F (36.6 °C)    BLOOD PRESSURE HISTORY: Systolic (36hrs), Av , Min:121 , Max:156    Diastolic (36hrs), Av, Min:63, Max:99      Intake/Output:  I/O last 3 completed shifts:  In: 4633.9 [P.O.:1020; I.V.:4.7; NG/GT:120; IV Piggyback:974.1]  Out: 4275 [Urine:; Emesis/NG output:2250]  I/O this shift:  In: -   Out: 800 [Emesis/NG output:800]  Drain/tube Output:       Physical Exam:  General: awake,

## 2024-03-21 NOTE — CARE COORDINATION
DISCHARGE PLANNING:  [facility] is still following and will start pre-cert today.      CM team will continue to follow.  Isamar Hammonds, RN Case Manager  [phone]

## 2024-03-21 NOTE — CARE COORDINATION
Prior Authorization submitted for ARU approval with a reference number: [id number]     [facility]U will continue to follow progress and update discharge plan as needed.    VANDANA BettsN, .[phone]

## 2024-03-21 NOTE — PROGRESS NOTES
2002: Shift assessment completed. VSS. Medications given per MAR. Bedside table and call light within reach. Son at bedside. Pt was sleeping when I entered the room, easily awoken. NGT,draining green bile; stopped for 30 meds for oral medications. External catheter checks, cleaned, very low output. The care plan and education has been reviewed and mutually agreed upon with the patient.     0515: Bladder scan at 359mL. Ok per hospitalist to straight cath.     Linette Roldan RN

## 2024-03-21 NOTE — PLAN OF CARE
Problem: Pain  Goal: Verbalizes/displays adequate comfort level or baseline comfort level  3/21/2024 1021 by Jadyn Locke RN  Outcome: Progressing  3/20/2024 2233 by Linette Roldan RN  Outcome: Progressing     Problem: Discharge Planning  Goal: Discharge to home or other facility with appropriate resources  3/21/2024 1021 by Jadyn Locke RN  Outcome: Progressing  3/20/2024 2233 by Linette Roldan RN  Outcome: Progressing     Problem: Safety - Adult  Goal: Free from fall injury  3/21/2024 1021 by Jadyn Locke RN  Outcome: Progressing  3/20/2024 2233 by Linette Roldan RN  Outcome: Progressing     Problem: Chronic Conditions and Co-morbidities  Goal: Patient's chronic conditions and co-morbidity symptoms are monitored and maintained or improved  3/21/2024 1021 by Jadyn Locke RN  Outcome: Progressing  3/20/2024 2233 by Linette Roldan RN  Outcome: Progressing     Problem: ABCDS Injury Assessment  Goal: Absence of physical injury  3/21/2024 1021 by Jadyn Locke RN  Outcome: Progressing  3/20/2024 2233 by Linette Roldan RN  Outcome: Progressing     Problem: Skin/Tissue Integrity  Goal: Absence of new skin breakdown  Description: 1.  Monitor for areas of redness and/or skin breakdown  2.  Assess vascular access sites hourly  3.  Every 4-6 hours minimum:  Change oxygen saturation probe site  4.  Every 4-6 hours:  If on nasal continuous positive airway pressure, respiratory therapy assess nares and determine need for appliance change or resting period.  3/21/2024 1021 by Jadyn Locke RN  Outcome: Progressing  3/20/2024 2233 by Linette Roldan RN  Outcome: Progressing     Problem: Nutrition Deficit:  Goal: Optimize nutritional status  3/21/2024 1021 by Jadyn Locke RN  Outcome: Progressing  Flowsheets (Taken 3/21/2024 0820 by Taylor Baird, MS, RD, LD)  Nutrient intake appropriate for improving, restoring, or maintaining nutritional needs:   Recommend, monitor,  and adjust tube feedings and TPN/PPN based on assessed needs   Monitor oral intake, labs, and treatment plans  3/20/2024 2233 by Linette Roldan, RN  Outcome: Progressing

## 2024-03-21 NOTE — PROGRESS NOTES
New order for sainz catheter. 18 Filipino and 10 mL balloon catheter was inserted, pt tolerated well. Mariangel care was performed pre- and post- insertion, securement device used, and educated pt to keep bag below bladder and off the floor. Urine sample collected.

## 2024-03-21 NOTE — PROGRESS NOTES
Shift assessment complete, VSS, A&Ox4, medications given per MAR. Patient with some nausea this AM, NG tube was found at 45, I advanced it to 55 and 800mL immediately emptied. Xray ordered to verify placement. Patient does however take large amounts of water when taking medications. She reports pain 9/10 this AM, pain interventions implemented. Dressing to abdomen is CDI. Patient denies any further needs at this time. The care plan and education has been reviewed and mutually agreed upon with the patient. All safety precautions in place.

## 2024-03-21 NOTE — PROGRESS NOTES
Clinical Pharmacy Note    Pharmacy consulted by Aria Carter to manage TPN    Goal TPN rate: 80 ml/hr    Access: CVC  Indication: SBO    Labs:  General Labs:  BMP:    Lab Results   Component Value Date/Time     03/21/2024 05:25 AM    K 3.4 03/21/2024 05:25 AM    K 3.4 03/15/2024 02:15 AM     03/21/2024 05:25 AM    CO2 22 03/21/2024 05:25 AM    BUN 22 03/21/2024 05:25 AM    LABALBU 3.3 03/20/2024 04:40 AM    CREATININE <0.5 03/21/2024 05:25 AM    CALCIUM 8.2 03/21/2024 05:25 AM    GFRAA >60 11/24/2020 06:29 PM    GFRAA >60 01/13/2013 05:24 AM    LABGLOM >60 03/21/2024 05:25 AM    GLUCOSE 165 03/21/2024 05:25 AM       Electrolyte replacement as follows:   K dropped from 3.8 ? 3.4, ordered 40 mEq IV replacement  Mg slight drop from 2.0 ? 1.8, ordered 2000 mg IV replacement  Phos dropped from 2.2 ? 3.0, normalized    Blood sugars over past 24 hours: 132-162    Blood sugar management:  Continue Humalog q4 hour sliding scale at medium dose.  Lantus 12 units QHS added  Steroids discontinued    Per RD recommendations, will increase TPN to 80 ml/hr.     Thank you for allowing pharmacy to participate in the care of this patient.    Leo Resendez, PharmD  PGY1 Pharmacy Resident  3/21/2024 10:58 AM

## 2024-03-21 NOTE — PLAN OF CARE
Problem: Pain  Goal: Verbalizes/displays adequate comfort level or baseline comfort level  3/20/2024 2233 by Linette Roldan RN  Outcome: Progressing  3/20/2024 1009 by Jadyn Locke RN  Outcome: Progressing     Problem: Discharge Planning  Goal: Discharge to home or other facility with appropriate resources  3/20/2024 2233 by Linette Roldan RN  Outcome: Progressing  3/20/2024 1009 by Jadyn Locke RN  Outcome: Progressing     Problem: Safety - Adult  Goal: Free from fall injury  3/20/2024 2233 by Linette Roldan RN  Outcome: Progressing  3/20/2024 1009 by Jadyn Locke RN  Outcome: Progressing     Problem: Chronic Conditions and Co-morbidities  Goal: Patient's chronic conditions and co-morbidity symptoms are monitored and maintained or improved  3/20/2024 2233 by Linette Roldan RN  Outcome: Progressing  3/20/2024 1009 by Jadyn Locke RN  Outcome: Progressing     Problem: ABCDS Injury Assessment  Goal: Absence of physical injury  3/20/2024 2233 by Linette Roldan RN  Outcome: Progressing  3/20/2024 1009 by Jadyn Locke RN  Outcome: Progressing     Problem: Skin/Tissue Integrity  Goal: Absence of new skin breakdown  Description: 1.  Monitor for areas of redness and/or skin breakdown  2.  Assess vascular access sites hourly  3.  Every 4-6 hours minimum:  Change oxygen saturation probe site  4.  Every 4-6 hours:  If on nasal continuous positive airway pressure, respiratory therapy assess nares and determine need for appliance change or resting period.  3/20/2024 2233 by Linette Roldan RN  Outcome: Progressing  3/20/2024 1009 by Jadyn Locke RN  Outcome: Progressing     Problem: Nutrition Deficit:  Goal: Optimize nutritional status  3/20/2024 2233 by Linette Roldan RN  Outcome: Progressing  3/20/2024 1009 by Jadyn Locke RN  Outcome: Progressing

## 2024-03-22 LAB
ALBUMIN SERPL-MCNC: 2.9 G/DL (ref 3.4–5)
ALP SERPL-CCNC: 67 U/L (ref 40–129)
ALT SERPL-CCNC: <5 U/L (ref 10–40)
ANION GAP SERPL CALCULATED.3IONS-SCNC: 12 MMOL/L (ref 3–16)
AST SERPL-CCNC: 7 U/L (ref 15–37)
BASOPHILS # BLD: 0 K/UL (ref 0–0.2)
BASOPHILS NFR BLD: 0 %
BILIRUB DIRECT SERPL-MCNC: <0.2 MG/DL (ref 0–0.3)
BILIRUB INDIRECT SERPL-MCNC: ABNORMAL MG/DL (ref 0–1)
BILIRUB SERPL-MCNC: <0.2 MG/DL (ref 0–1)
BUN SERPL-MCNC: 12 MG/DL (ref 7–20)
CALCIUM SERPL-MCNC: 8.1 MG/DL (ref 8.3–10.6)
CHLORIDE SERPL-SCNC: 104 MMOL/L (ref 99–110)
CO2 SERPL-SCNC: 20 MMOL/L (ref 21–32)
CREAT SERPL-MCNC: <0.5 MG/DL (ref 0.6–1.2)
DEPRECATED RDW RBC AUTO: 15.6 % (ref 12.4–15.4)
EOSINOPHIL # BLD: 0.8 K/UL (ref 0–0.6)
EOSINOPHIL NFR BLD: 7 %
GFR SERPLBLD CREATININE-BSD FMLA CKD-EPI: >60 ML/MIN/{1.73_M2}
GLUCOSE BLD-MCNC: 212 MG/DL (ref 70–99)
GLUCOSE BLD-MCNC: 218 MG/DL (ref 70–99)
GLUCOSE BLD-MCNC: 218 MG/DL (ref 70–99)
GLUCOSE BLD-MCNC: 224 MG/DL (ref 70–99)
GLUCOSE BLD-MCNC: 228 MG/DL (ref 70–99)
GLUCOSE BLD-MCNC: 230 MG/DL (ref 70–99)
GLUCOSE SERPL-MCNC: 220 MG/DL (ref 70–99)
HCT VFR BLD AUTO: 25 % (ref 36–48)
HGB BLD-MCNC: 8.2 G/DL (ref 12–16)
LYMPHOCYTES # BLD: 1.2 K/UL (ref 1–5.1)
LYMPHOCYTES NFR BLD: 10 %
MAGNESIUM SERPL-MCNC: 1.9 MG/DL (ref 1.8–2.4)
MCH RBC QN AUTO: 29.3 PG (ref 26–34)
MCHC RBC AUTO-ENTMCNC: 32.9 G/DL (ref 31–36)
MCV RBC AUTO: 88.9 FL (ref 80–100)
MONOCYTES # BLD: 0.5 K/UL (ref 0–1.3)
MONOCYTES NFR BLD: 4 %
NEUTROPHILS # BLD: 9.3 K/UL (ref 1.7–7.7)
NEUTROPHILS NFR BLD: 79 %
PERFORMED ON: ABNORMAL
PHOSPHATE SERPL-MCNC: 2.8 MG/DL (ref 2.5–4.9)
PLATELET # BLD AUTO: 309 K/UL (ref 135–450)
PLATELET BLD QL SMEAR: ABNORMAL
PMV BLD AUTO: 7.9 FL (ref 5–10.5)
POLYCHROMASIA BLD QL SMEAR: ABNORMAL
POTASSIUM SERPL-SCNC: 3.2 MMOL/L (ref 3.5–5.1)
POTASSIUM SERPL-SCNC: 4.9 MMOL/L (ref 3.5–5.1)
PROT SERPL-MCNC: 5.4 G/DL (ref 6.4–8.2)
RBC # BLD AUTO: 2.81 M/UL (ref 4–5.2)
SLIDE REVIEW: ABNORMAL
SMUDGE CELLS BLD QL SMEAR: PRESENT
SODIUM SERPL-SCNC: 136 MMOL/L (ref 136–145)
WBC # BLD AUTO: 11.8 K/UL (ref 4–11)

## 2024-03-22 PROCEDURE — 6370000000 HC RX 637 (ALT 250 FOR IP): Performed by: SURGERY

## 2024-03-22 PROCEDURE — 6360000002 HC RX W HCPCS: Performed by: SURGERY

## 2024-03-22 PROCEDURE — 6370000000 HC RX 637 (ALT 250 FOR IP): Performed by: PHYSICIAN ASSISTANT

## 2024-03-22 PROCEDURE — 84132 ASSAY OF SERUM POTASSIUM: CPT

## 2024-03-22 PROCEDURE — 80048 BASIC METABOLIC PNL TOTAL CA: CPT

## 2024-03-22 PROCEDURE — 2580000003 HC RX 258: Performed by: SURGERY

## 2024-03-22 PROCEDURE — 6360000002 HC RX W HCPCS: Performed by: PHYSICIAN ASSISTANT

## 2024-03-22 PROCEDURE — 97110 THERAPEUTIC EXERCISES: CPT

## 2024-03-22 PROCEDURE — APPNB30 APP NON BILLABLE TIME 0-30 MINS: Performed by: NURSE PRACTITIONER

## 2024-03-22 PROCEDURE — 6370000000 HC RX 637 (ALT 250 FOR IP): Performed by: NURSE PRACTITIONER

## 2024-03-22 PROCEDURE — 2580000003 HC RX 258: Performed by: NURSE PRACTITIONER

## 2024-03-22 PROCEDURE — 99024 POSTOP FOLLOW-UP VISIT: CPT | Performed by: SURGERY

## 2024-03-22 PROCEDURE — 1200000000 HC SEMI PRIVATE

## 2024-03-22 PROCEDURE — 6360000002 HC RX W HCPCS: Performed by: NURSE PRACTITIONER

## 2024-03-22 PROCEDURE — 84100 ASSAY OF PHOSPHORUS: CPT

## 2024-03-22 PROCEDURE — 97535 SELF CARE MNGMENT TRAINING: CPT

## 2024-03-22 PROCEDURE — 83735 ASSAY OF MAGNESIUM: CPT

## 2024-03-22 PROCEDURE — 2500000003 HC RX 250 WO HCPCS: Performed by: NURSE PRACTITIONER

## 2024-03-22 PROCEDURE — 80076 HEPATIC FUNCTION PANEL: CPT

## 2024-03-22 PROCEDURE — 97116 GAIT TRAINING THERAPY: CPT

## 2024-03-22 PROCEDURE — 97530 THERAPEUTIC ACTIVITIES: CPT

## 2024-03-22 PROCEDURE — APPSS15 APP SPLIT SHARED TIME 0-15 MINUTES: Performed by: NURSE PRACTITIONER

## 2024-03-22 PROCEDURE — C9113 INJ PANTOPRAZOLE SODIUM, VIA: HCPCS | Performed by: SURGERY

## 2024-03-22 PROCEDURE — 85025 COMPLETE CBC W/AUTO DIFF WBC: CPT

## 2024-03-22 PROCEDURE — 94761 N-INVAS EAR/PLS OXIMETRY MLT: CPT

## 2024-03-22 PROCEDURE — 94640 AIRWAY INHALATION TREATMENT: CPT

## 2024-03-22 RX ORDER — SODIUM CHLORIDE 9 MG/ML
INJECTION, SOLUTION INTRAVENOUS CONTINUOUS
Status: DISCONTINUED | OUTPATIENT
Start: 2024-03-22 | End: 2024-03-23

## 2024-03-22 RX ORDER — POTASSIUM CHLORIDE 29.8 MG/ML
20 INJECTION INTRAVENOUS
Status: COMPLETED | OUTPATIENT
Start: 2024-03-22 | End: 2024-03-22

## 2024-03-22 RX ORDER — FUROSEMIDE 10 MG/ML
20 INJECTION INTRAMUSCULAR; INTRAVENOUS 2 TIMES DAILY
Status: DISCONTINUED | OUTPATIENT
Start: 2024-03-22 | End: 2024-03-24

## 2024-03-22 RX ORDER — HYDROMORPHONE HYDROCHLORIDE 1 MG/ML
0.25 INJECTION, SOLUTION INTRAMUSCULAR; INTRAVENOUS; SUBCUTANEOUS
Status: DISCONTINUED | OUTPATIENT
Start: 2024-03-22 | End: 2024-04-03

## 2024-03-22 RX ORDER — INSULIN GLARGINE 100 [IU]/ML
18 INJECTION, SOLUTION SUBCUTANEOUS NIGHTLY
Status: DISCONTINUED | OUTPATIENT
Start: 2024-03-22 | End: 2024-04-06

## 2024-03-22 RX ADMIN — ONDANSETRON 4 MG: 2 INJECTION INTRAMUSCULAR; INTRAVENOUS at 21:21

## 2024-03-22 RX ADMIN — ISOSORBIDE MONONITRATE 120 MG: 60 TABLET, EXTENDED RELEASE ORAL at 10:00

## 2024-03-22 RX ADMIN — GABAPENTIN 600 MG: 300 CAPSULE ORAL at 17:38

## 2024-03-22 RX ADMIN — MECLIZINE HYDROCHLORIDE 25 MG: 12.5 TABLET ORAL at 10:01

## 2024-03-22 RX ADMIN — POTASSIUM CHLORIDE 20 MEQ: 400 INJECTION, SOLUTION INTRAVENOUS at 10:59

## 2024-03-22 RX ADMIN — BUPROPION HYDROCHLORIDE 300 MG: 300 TABLET, EXTENDED RELEASE ORAL at 10:01

## 2024-03-22 RX ADMIN — SODIUM CHLORIDE 3000 MG: 900 INJECTION INTRAVENOUS at 03:09

## 2024-03-22 RX ADMIN — INSULIN LISPRO 2 UNITS: 100 INJECTION, SOLUTION INTRAVENOUS; SUBCUTANEOUS at 04:35

## 2024-03-22 RX ADMIN — POTASSIUM CHLORIDE 20 MEQ: 29.8 INJECTION, SOLUTION INTRAVENOUS at 14:55

## 2024-03-22 RX ADMIN — NORTRIPTYLINE HYDROCHLORIDE 75 MG: 25 CAPSULE ORAL at 20:35

## 2024-03-22 RX ADMIN — TOPIRAMATE 150 MG: 100 TABLET, FILM COATED ORAL at 17:37

## 2024-03-22 RX ADMIN — TIZANIDINE 2 MG: 4 TABLET ORAL at 20:37

## 2024-03-22 RX ADMIN — INSULIN LISPRO 2 UNITS: 100 INJECTION, SOLUTION INTRAVENOUS; SUBCUTANEOUS at 10:00

## 2024-03-22 RX ADMIN — ASPIRIN 81 MG: 81 TABLET, COATED ORAL at 10:01

## 2024-03-22 RX ADMIN — RANOLAZINE 1000 MG: 500 TABLET, EXTENDED RELEASE ORAL at 20:37

## 2024-03-22 RX ADMIN — HYDROMORPHONE HYDROCHLORIDE 0.5 MG: 1 INJECTION, SOLUTION INTRAMUSCULAR; INTRAVENOUS; SUBCUTANEOUS at 05:44

## 2024-03-22 RX ADMIN — POTASSIUM CHLORIDE 20 MEQ: 400 INJECTION, SOLUTION INTRAVENOUS at 12:04

## 2024-03-22 RX ADMIN — GABAPENTIN 600 MG: 300 CAPSULE ORAL at 20:36

## 2024-03-22 RX ADMIN — INSULIN LISPRO 2 UNITS: 100 INJECTION, SOLUTION INTRAVENOUS; SUBCUTANEOUS at 21:21

## 2024-03-22 RX ADMIN — FUROSEMIDE 20 MG: 10 INJECTION, SOLUTION INTRAMUSCULAR; INTRAVENOUS at 09:45

## 2024-03-22 RX ADMIN — SODIUM CHLORIDE: 9 INJECTION, SOLUTION INTRAVENOUS at 10:56

## 2024-03-22 RX ADMIN — DILTIAZEM HYDROCHLORIDE 120 MG: 120 CAPSULE, COATED, EXTENDED RELEASE ORAL at 10:01

## 2024-03-22 RX ADMIN — Medication 2 PUFF: at 08:58

## 2024-03-22 RX ADMIN — ENOXAPARIN SODIUM 40 MG: 100 INJECTION SUBCUTANEOUS at 20:36

## 2024-03-22 RX ADMIN — ARIPIPRAZOLE 2 MG: 2 TABLET ORAL at 20:36

## 2024-03-22 RX ADMIN — RANOLAZINE 1000 MG: 500 TABLET, EXTENDED RELEASE ORAL at 10:02

## 2024-03-22 RX ADMIN — TROSPIUM CHLORIDE 20 MG: 20 TABLET, FILM COATED ORAL at 16:11

## 2024-03-22 RX ADMIN — HYDROMORPHONE HYDROCHLORIDE 0.25 MG: 1 INJECTION, SOLUTION INTRAMUSCULAR; INTRAVENOUS; SUBCUTANEOUS at 02:31

## 2024-03-22 RX ADMIN — INSULIN LISPRO 2 UNITS: 100 INJECTION, SOLUTION INTRAVENOUS; SUBCUTANEOUS at 16:11

## 2024-03-22 RX ADMIN — MECLIZINE HYDROCHLORIDE 25 MG: 12.5 TABLET ORAL at 17:38

## 2024-03-22 RX ADMIN — PANTOPRAZOLE SODIUM 40 MG: 40 INJECTION, POWDER, FOR SOLUTION INTRAVENOUS at 20:37

## 2024-03-22 RX ADMIN — FUROSEMIDE 20 MG: 10 INJECTION, SOLUTION INTRAMUSCULAR; INTRAVENOUS at 17:38

## 2024-03-22 RX ADMIN — MECLIZINE HYDROCHLORIDE 25 MG: 12.5 TABLET ORAL at 12:04

## 2024-03-22 RX ADMIN — INSULIN LISPRO 2 UNITS: 100 INJECTION, SOLUTION INTRAVENOUS; SUBCUTANEOUS at 11:57

## 2024-03-22 RX ADMIN — ORPHENADRINE CITRATE 60 MG: 60 INJECTION INTRAMUSCULAR; INTRAVENOUS at 23:08

## 2024-03-22 RX ADMIN — POTASSIUM CHLORIDE 20 MEQ: 29.8 INJECTION, SOLUTION INTRAVENOUS at 13:04

## 2024-03-22 RX ADMIN — SODIUM CHLORIDE 3000 MG: 900 INJECTION INTRAVENOUS at 09:48

## 2024-03-22 RX ADMIN — INSULIN GLARGINE 18 UNITS: 100 INJECTION, SOLUTION SUBCUTANEOUS at 21:09

## 2024-03-22 RX ADMIN — GABAPENTIN 600 MG: 300 CAPSULE ORAL at 10:01

## 2024-03-22 RX ADMIN — Medication 2 PUFF: at 19:58

## 2024-03-22 RX ADMIN — PANTOPRAZOLE SODIUM 40 MG: 40 INJECTION, POWDER, FOR SOLUTION INTRAVENOUS at 09:45

## 2024-03-22 RX ADMIN — GABAPENTIN 600 MG: 300 CAPSULE ORAL at 12:04

## 2024-03-22 RX ADMIN — TROSPIUM CHLORIDE 20 MG: 20 TABLET, FILM COATED ORAL at 05:50

## 2024-03-22 RX ADMIN — HYDROMORPHONE HYDROCHLORIDE 0.5 MG: 1 INJECTION, SOLUTION INTRAMUSCULAR; INTRAVENOUS; SUBCUTANEOUS at 09:43

## 2024-03-22 RX ADMIN — MECLIZINE HYDROCHLORIDE 25 MG: 12.5 TABLET ORAL at 21:01

## 2024-03-22 RX ADMIN — CARVEDILOL 3.12 MG: 3.12 TABLET, FILM COATED ORAL at 10:01

## 2024-03-22 RX ADMIN — INSULIN LISPRO 2 UNITS: 100 INJECTION, SOLUTION INTRAVENOUS; SUBCUTANEOUS at 00:23

## 2024-03-22 RX ADMIN — METHENAMINE HIPPURATE 1 G: 1 TABLET ORAL at 17:38

## 2024-03-22 RX ADMIN — METHENAMINE HIPPURATE 1 G: 1 TABLET ORAL at 10:01

## 2024-03-22 RX ADMIN — SODIUM CHLORIDE 3000 MG: 900 INJECTION INTRAVENOUS at 16:11

## 2024-03-22 RX ADMIN — SODIUM CHLORIDE 3000 MG: 900 INJECTION INTRAVENOUS at 20:49

## 2024-03-22 RX ADMIN — TOPIRAMATE 100 MG: 100 TABLET, FILM COATED ORAL at 10:01

## 2024-03-22 RX ADMIN — CARVEDILOL 3.12 MG: 3.12 TABLET, FILM COATED ORAL at 17:39

## 2024-03-22 RX ADMIN — ASCORBIC ACID, VITAMIN A PALMITATE, CHOLECALCIFEROL, THIAMINE HYDROCHLORIDE, RIBOFLAVIN-5 PHOSPHATE SODIUM, PYRIDOXINE HYDROCHLORIDE, NIACINAMIDE, DEXPANTHENOL, ALPHA-TOCOPHEROL ACETATE, VITAMIN K1, FOLIC ACID, BIOTIN, CYANOCOBALAMIN: 200; 3300; 200; 6; 3.6; 6; 40; 15; 10; 150; 600; 60; 5 INJECTION, SOLUTION INTRAVENOUS at 17:50

## 2024-03-22 ASSESSMENT — PAIN SCALES - GENERAL
PAINLEVEL_OUTOF10: 7
PAINLEVEL_OUTOF10: 6
PAINLEVEL_OUTOF10: 9
PAINLEVEL_OUTOF10: 8
PAINLEVEL_OUTOF10: 3
PAINLEVEL_OUTOF10: 8
PAINLEVEL_OUTOF10: 8
PAINLEVEL_OUTOF10: 4
PAINLEVEL_OUTOF10: 6

## 2024-03-22 ASSESSMENT — PAIN DESCRIPTION - DESCRIPTORS
DESCRIPTORS: STABBING
DESCRIPTORS: DISCOMFORT;ACHING;CRAMPING
DESCRIPTORS: SHOOTING;THROBBING
DESCRIPTORS: ACHING;DISCOMFORT
DESCRIPTORS: STABBING

## 2024-03-22 ASSESSMENT — PAIN DESCRIPTION - ORIENTATION
ORIENTATION: RIGHT;LEFT
ORIENTATION: RIGHT;LEFT;MID
ORIENTATION: LEFT
ORIENTATION: RIGHT;LEFT
ORIENTATION: LOWER

## 2024-03-22 ASSESSMENT — PAIN DESCRIPTION - LOCATION
LOCATION: NECK
LOCATION: NECK
LOCATION: ABDOMEN
LOCATION: NECK;ABDOMEN
LOCATION: NECK;ABDOMEN

## 2024-03-22 ASSESSMENT — PAIN DESCRIPTION - PAIN TYPE: TYPE: ACUTE PAIN

## 2024-03-22 NOTE — PROGRESS NOTES
[facility] - Inpatient Rehabilitation Department   Phone: [phone]    Occupational Therapy    [] Initial Evaluation            [x] Daily Treatment Note         [] Discharge Summary      Patient: Ana Gonzales   : 1955   MRN: [id number]   Date of Service:  3/22/2024    Admitting Diagnosis:  SBO (small bowel obstruction) ([facility])  Current Admission Summary: Admitted for bradycardia with slurred speech. Concern for TIA. CT head non acute, but CTA showed severe narrowing of distal left P1 segment of left PCA, but otherwise imaging was non acute. MRI brain was non acute.     However, patient had low hemoglobin on admission and required 2U PRBC.  GI consulted and patient underwent an EGD on 3/13 with GI and did not find any active bleeding, but did find blood clots and a large amount of liquid in the stomach. Placed on PPI gtt.     She had a repeat EGD on 3/14 and did not show any bleed, but 2 clips were placed on small healing ulcer. Still showed blood clots in the stomach.     CT abd/pel on 3/15 showed dilated stomach and small loops reflecting a partial SBO.     CT neck shows left sided neck edema. Started on decadron and unasyn. ENT consulted.  Past Medical History:  has a past medical history of Arthritis, Asthma, Brain injury ([facility]), Bronchitis, CHF (congestive heart failure) ([facility]), Depression, Diabetes mellitus ([facility]), DM (diabetes mellitus screen), Elevated cholesterol with high triglycerides, Fall, Fibromyalgia, GERD (gastroesophageal reflux disease), HIGH CHOLESTEROL, Hypertension, Microvascular angina, Migraine, Neuropathy, Panic attacks, PONV (postoperative nausea and vomiting), Post traumatic stress disorder, Short-term memory loss, Skin cancer, Sleep apnea, and Vertigo.  Past Surgical History:  has a past surgical history that includes Hysterectomy; eye surgery (cataract B); Carpal tunnel release; Finger trigger release; Gallbladder surgery; bladder suspension; Skin cancer excision;

## 2024-03-22 NOTE — PROGRESS NOTES
Hospitalist Progress Note    Name:  Ana Gonzales    /Age/Sex: 1955  (68 y.o. female)  MRN & CSN:  [id number] & [id number]    PCP: Mellisa Houston MD    Date of Admission: 3/12/2024    Patient Status:  Inpatient     Chief Complaint:   Chief Complaint   Patient presents with    Bradycardia     Pt coming in with  pt has been bradycardic, weakness, slurred speech for a few days.  Vomiting yesterday with black emesis        Hospital Course:   Admitted for bradycardia with slurred speech. Concern for TIA. CT head non acute, but CTA showed severe narrowing of distal left P1 segment of left PCA, but otherwise imaging was non acute. MRI brain was non acute.    However, patient had low hemoglobin on admission and required 2U PRBC.  GI consulted and patient underwent an EGD on 3/13 with GI and did not find any active bleeding, but did find blood clots and a large amount of liquid in the stomach. Placed on PPI gtt.    She had a repeat EGD on 3/14 and did not show any bleed, but 2 clips were placed on small healing ulcer. Still showed blood clots in the stomach.    CT abd/pel on 3/15 showed dilated stomach and small loops reflecting a partial SBO.    CT neck shows left sided neck edema. Started on decadron and unasyn. ENT consulted.    Developed some delirium the night of 3/21.     Subjective:  Today is:  Hospital Day: 11.  Patient seen and examined in [id number].     Had delirium last night. Try to pull out lines etc. This am she is sleepy but awakens and is alert and oriented. States she is feeling better. Back pain improved. No nausea. Passing flatus but no BM.      Medications:  Reviewed    Infusion Medications    PN-Adult Premix 5/20 - Standard Electrolytes - Central Line      sodium chloride 20 mL/hr at 24 1056    PN-Adult Premix 5/20 - Standard Electrolytes - Central Line 80 mL/hr at 24 0604    sodium chloride      sodium chloride      sodium chloride      sodium chloride

## 2024-03-22 NOTE — PROGRESS NOTES
1953: Shift assessment completed. VSS. Medications given per MAR. CHG wipes used for f/c and IJ cleanings. Bedside table and call light within reach. The care plan and education has been reviewed and mutually agreed upon with the patient.     Linette Roldan RN

## 2024-03-22 NOTE — PROGRESS NOTES
APRN contacted by RN-reported patient very fidgety pulling on the catheter and G-tube to leads pull up images.  She states  feels this is not the patient's baseline at all.  Likely worsened by hospitalization, current illness or medical concerns, pain  -Frequent reorientation, avoid stimuli at night, keep vitals minimal at night, open blinds during the day, avoid lines and catheters if possible, bowel regimens, pain control, avoid benzodiazepines and antipsychotics  -Could consider trazodone or Seroquel if needed at night

## 2024-03-22 NOTE — PLAN OF CARE
Problem: Pain  Goal: Verbalizes/displays adequate comfort level or baseline comfort level  Outcome: Progressing  Flowsheets (Taken 3/22/2024 0207)  Verbalizes/displays adequate comfort level or baseline comfort level:   Encourage patient to monitor pain and request assistance   Assess pain using appropriate pain scale     Problem: Chronic Conditions and Co-morbidities  Goal: Patient's chronic conditions and co-morbidity symptoms are monitored and maintained or improved  Outcome: Progressing  Flowsheets (Taken 3/22/2024 7942)  Care Plan - Patient's Chronic Conditions and Co-Morbidity Symptoms are Monitored and Maintained or Improved:   Monitor and assess patient's chronic conditions and comorbid symptoms for stability, deterioration, or improvement   Collaborate with multidisciplinary team to address chronic and comorbid conditions and prevent exacerbation or deterioration     Problem: Skin/Tissue Integrity  Goal: Absence of new skin breakdown  Description: 1.  Monitor for areas of redness and/or skin breakdown  2.  Assess vascular access sites hourly  3.  Every 4-6 hours minimum:  Change oxygen saturation probe site  4.  Every 4-6 hours:  If on nasal continuous positive airway pressure, respiratory therapy assess nares and determine need for appliance change or resting period.  Outcome: Progressing   Nikolas Olivo RN  3/22/2024

## 2024-03-22 NOTE — PROGRESS NOTES
Clinical Pharmacy Note    Pharmacy consulted by Aria Carter to manage TPN    Goal TPN rate: 80 ml/hr    Access: CVC  Indication: SBO    Labs:  General Labs:  BMP:    Lab Results   Component Value Date/Time     03/22/2024 05:40 AM    K 3.2 03/22/2024 05:40 AM    K 3.4 03/15/2024 02:15 AM     03/22/2024 05:40 AM    CO2 20 03/22/2024 05:40 AM    BUN 12 03/22/2024 05:40 AM    LABALBU 2.9 03/22/2024 05:40 AM    CREATININE <0.5 03/22/2024 05:40 AM    CALCIUM 8.1 03/22/2024 05:40 AM    GFRAA >60 11/24/2020 06:29 PM    GFRAA >60 01/13/2013 05:24 AM    LABGLOM >60 03/22/2024 05:40 AM    GLUCOSE 220 03/22/2024 05:40 AM       Electrolyte replacement as follows:   K dropped from 3.4 ? 3.2, 40 mEq of replacement already ordered, ordered additional 20 mEq.   Mg increased from 1.8 ? 1.9  Phos dropped normalized at 2.8    Blood sugars over past 24 hours: 140 - 228    Blood sugar management:  Continue Humalog q4 hour sliding scale at medium dose.  Continue Lantus 12 units QHS       Will continue TPN at 80 ml/hr.     Thank you for allowing pharmacy to participate in the care of this patient.    Nicole Norton, PharmD Candidate 2024  Pharmacy Student  3/22/2024 9:55 AM

## 2024-03-22 NOTE — PROGRESS NOTES
[facility]        Progress Note    Patient Name: Ana Gonzales  MRN: [id number]  YOB: 1955  Date of Evaluation: 3/22/2024    Postoperative Day #3    Subjective:  Confusion/restlessness overnight, otherwise no acute events  Pain controlled  No nausea or vomiting, NGT in place with high volume output  Passing flatus, no stool, denies feeling bloated  Was unable to void and Muniz placed yesterday  Resting in bed at this time, remains drowsy      Vital Signs:  Patient Vitals for the past 24 hrs:   BP Temp Temp src Pulse Resp SpO2 Weight   24 1159 122/60 97.2 °F (36.2 °C) Oral 81 20 96 % --   24 1013 -- -- -- -- 22 -- --   24 0934 (!) 151/76 97.6 °F (36.4 °C) Oral 82 20 98 % --   24 0902 -- -- -- -- -- 96 % --   24 0614 -- -- -- -- 18 -- --   24 0600 -- -- -- -- -- -- 100.9 kg (222 lb 6.5 oz)   24 0544 -- -- -- -- 18 -- --   24 0437 117/73 -- -- 82 20 94 % --   24 0301 -- -- -- -- 18 -- --   24 0231 -- -- -- -- 18 -- --   24 0025 131/70 -- -- 84 18 94 % --   24 2237 -- -- -- -- 18 -- --   247 -- -- -- -- 18 -- --   24 1953 127/75 97.7 °F (36.5 °C) Oral 80 18 94 % --   24 1602 136/72 97.2 °F (36.2 °C) Oral 77 19 96 % --        TEMPERATURE HISTORY 24H: Temp (24hrs), Av.4 °F (36.3 °C), Min:97.2 °F (36.2 °C), Max:97.7 °F (36.5 °C)    BLOOD PRESSURE HISTORY: Systolic (36hrs), Av , Min:117 , Max:151    Diastolic (36hrs), Av, Min:60, Max:79      Intake/Output:  I/O last 3 completed shifts:  In: 4773.5 [I.V.:4.7; NG/GT:120; IV Piggyback:1476.7]  Out: 5600 [Urine:3100; Emesis/NG output:2500]  I/O this shift:  In: -   Out: 375 [Urine:375]  Drain/tube Output:       Physical Exam:  General: awake, drowsy, oriented to person, place, time  Lungs: unlabored respirations  Abdomen: soft, non-distended, incisional tenderness only, bowel sounds hypoactive  Skin/Wound: healing  well, no drainage, no erythema, well approximated    Labs:  CBC:    Recent Labs     03/20/24  0440 03/21/24  1133 03/22/24  0540   WBC 14.7* 18.1* 11.8*   HGB 8.4* 9.6* 8.2*   HCT 25.8* 30.0* 25.0*    370 309       BMP:    Recent Labs     03/20/24  0440 03/21/24  0525 03/22/24  0540    140 136   K 3.8 3.4* 3.2*    108 104   CO2 22 22 20*   BUN 25* 22* 12   CREATININE 0.6 <0.5* <0.5*   GLUCOSE 331* 165* 220*       Hepatic:    Recent Labs     03/20/24  0440 03/22/24  0540   AST 8* 7*   ALT <5* <5*   BILITOT <0.2 <0.2   ALKPHOS 75 67       Amylase:  No results found for: \"AMYLASE\"  Lipase:    Lab Results   Component Value Date/Time    LIPASE 6.0 07/22/2019 04:22 PM    LIPASE 21 09/21/2010 12:44 PM      Mag:    Lab Results   Component Value Date/Time    MG 1.90 03/22/2024 05:40 AM    MG 1.80 03/21/2024 05:25 AM     Phos:     Lab Results   Component Value Date/Time    PHOS 2.8 03/22/2024 05:40 AM    PHOS 3.0 03/21/2024 05:25 AM      Coags:   Lab Results   Component Value Date/Time    PROTIME 17.3 03/14/2024 04:52 AM    INR 1.42 03/14/2024 04:52 AM    APTT 30.7 03/14/2024 04:52 AM       Cultures:  Anaerobic culture  No results found for: \"LABANAE\"  Fungus stain  No results found for requested labs within last 30 days.     Gram stain  No results found for requested labs within last 30 days.     Organism  Lab Results   Component Value Date/Time    ORG Escherichia coli (A) 07/22/2019 06:29 PM     Surgical culture  No results found for: \"CXSURG\"  Blood culture 1  No results found for requested labs within last 30 days.     Blood culture 2  No results found for requested labs within last 30 days.     Fecal occult  Results in Past 30 Days  Result Component Current Result Ref Range Previous Result Ref Range   Occult Blood Diagnostic Result: Negative  Normal range: Negative   (3/12/2024)  Not in Time Range      GI bacterial pathogens by PCR  No results found for requested labs within last 30 days.     ROBERT

## 2024-03-22 NOTE — PROGRESS NOTES
[facility] - Inpatient Rehabilitation Department   Phone: [phone]    Physical Therapy    [] Re-Evaluation            [x] Daily Treatment Note         [] Discharge Summary      Patient: Ana Gonzales   : 1955   MRN: [id number]   Date of Service:  3/22/2024  Admitting Diagnosis: SBO (small bowel obstruction) ([facility])    Current Admission Summary: This is a 68 y.o. female who was brought in by self for generalized weakness and slurred speech over the past couple days.  Patient was also found to be bradycardic.  Her  at bedside gives most the history.  He states that she has been following with Dr. Bates and the cardiology group.  He is not sure if he has seen an EP physician.  She has had difficulty with palpitations and PVCs.  She has had a heart monitor placed but recently they placed a second heart monitor and this time will be for a 2-week period.  Patient has intermittent slurred speech.  Patient does have a history of a subdural hemorrhage.  She has had intracranial surgery according to her  states that 1 side of her body is affected chronically.     3/15/2024:  Partial SBO found, being treated conservatively with NG tube placed today.  3/20/24: Exploratory laparotomy with lysis of adhesions, Meckel diverticulectomy and appendectomy     Past Medical History:  has a past medical history of Arthritis, Asthma, Brain injury ([facility]), Bronchitis, CHF (congestive heart failure) ([facility]), Depression, Diabetes mellitus ([facility]), DM (diabetes mellitus screen), Elevated cholesterol with high triglycerides, Fall, Fibromyalgia, GERD (gastroesophageal reflux disease), HIGH CHOLESTEROL, Hypertension, Microvascular angina, Migraine, Neuropathy, Panic attacks, PONV (postoperative nausea and vomiting), Post traumatic stress disorder, Short-term memory loss, Skin cancer, Sleep apnea, and Vertigo.    Past Surgical History:  has a past surgical history that includes Hysterectomy; eye surgery (cataract    [employer]  Coordination Testing:   WFL    ROM:   (B) LE AROM WFL  Strength:   (B) LE strength grossly +3    Decision Making: medium complexity  Clinical Presentation: evolving      Subjective  General: Patient and family eager for PT/OT treatment today. Pt supine in bed upon arrival. 3 family members present during session.   Pain: 5/10.  Location: abdomen, L side of neck  Pain Interventions: RN notified, repositioned , and therapy activities modified       Functional Mobility    Bed Mobility  Supine to Sit: maximum assistance  Rolling Left: moderate assistance  Scooting: minimal assistance  Comments: HOB elevated, CGA Progressing to SBA sitting balance EOB.  Transfers  Sit to stand transfer: minimal assistance  Stand to sit transfer: minimal assistance  Stand step transfer: minimal assistance  Toilet transfer: moderate assistance  Comments: Up to RW; verbal cueing/TCs for hand placement  EOB elevated for each transfer.Pt transferred from EOB, recliner, BSC.   Ambulation  Surface:level surface  Assistive Device: rolling walker  Assistance: minimal assistance, moderate assistance  Distance: 15 ft  Gait Mechanics: scoliosis lean to R side, mild flexed trunk, VCs to keep RW close. Pt with decreased step length and height, min A for RW guidance. Mod A for direction changes and at end of walking due to fatigue.   Comments:  Pt amb in room and sat in chair. Pt moving very slowly and extremely fatigued, SOB. Chair needed to move up to patient due to fatigue.   Stair Mobility  Stair mobility not completed on this date.  Comments:  Wheelchair Mobility:  No w/c mobility completed on this date.  Comments:  Balance  Static Sitting Balance: fair: maintains balance at CGA without use of UE support  Dynamic Sitting Balance: fair: maintains balance at CGA without use of UE support  Static Standing Balance: fair (-): maintains balance at CGA with use of UE support  Dynamic Standing Balance: poor (+): requires min (A) to maintain

## 2024-03-22 NOTE — PROGRESS NOTES
Ana Gonzales  3/22/2024  [id number]    Chief Complaint: SBO (small bowel obstruction) ([facility])    Subjective   Patient reports that she is doing well today. Slept poorly overnight, experiencing some restlessness and confusion. Neck pain is feeling better. Denies any significant abdominal pain.          Objective   Objective:  Patient Vitals for the past 24 hrs:   BP Temp Temp src Pulse Resp SpO2 Weight   03/22/24 1159 122/60 97.2 °F (36.2 °C) Oral 81 20 96 % --   03/22/24 1013 -- -- -- -- 22 -- --   03/22/24 0934 (!) 151/76 97.6 °F (36.4 °C) Oral 82 20 98 % --   03/22/24 0902 -- -- -- -- -- 96 % --   03/22/24 0614 -- -- -- -- 18 -- --   03/22/24 0600 -- -- -- -- -- -- 100.9 kg (222 lb 6.5 oz)   03/22/24 0544 -- -- -- -- 18 -- --   03/22/24 0437 117/73 -- -- 82 20 94 % --   03/22/24 0301 -- -- -- -- 18 -- --   03/22/24 0231 -- -- -- -- 18 -- --   03/22/24 0025 131/70 -- -- 84 18 94 % --   03/21/24 2237 -- -- -- -- 18 -- --   03/21/24 2207 -- -- -- -- 18 -- --   03/21/24 1953 127/75 97.7 °F (36.5 °C) Oral 80 18 94 % --     Gen: No distress, pleasant.   HEENT: NGT in place to suction.   CV: No audible murmurs, well perfused extremities  Resp: No respiratory distress. No increased WOB  Abd: Soft, nontender nondistended  Ext: No edema.   Neuro: Alert, oriented, appropriately interactive. 4/5 strength throughout.     Laboratory data: Available via EMR. Hypokalemic, improved post-repletion.     Therapy progress:    PT    Rolling: Level of difficulty - A Lot   Sit to Stand from a Chair: Level of difficulty - A Little  Supine to Sit: Level of difficulty - A Lot     Bed to Chair: Physical Assistance Required - A Little  Ambulate Across Room: Physical Assistance Required - A Little  Ascend 3-5 Steps With HR: Physical Assistance Required - A Little    OT    Eating: Physical Assistance Required - A Lot  Grooming: Physical Assistance Required - A Lot  LB Dressing: Physical Assistance Required - Total  UB Dressing: Physical

## 2024-03-23 LAB
ABO + RH BLD: NORMAL
ANION GAP SERPL CALCULATED.3IONS-SCNC: 11 MMOL/L (ref 3–16)
BASOPHILS # BLD: 0 K/UL (ref 0–0.2)
BASOPHILS NFR BLD: 0 %
BLD GP AB SCN SERPL QL: NORMAL
BLOOD BANK DISPENSE STATUS: NORMAL
BLOOD BANK DISPENSE STATUS: NORMAL
BLOOD BANK PRODUCT CODE: NORMAL
BLOOD BANK PRODUCT CODE: NORMAL
BPU ID: NORMAL
BPU ID: NORMAL
BUN SERPL-MCNC: 14 MG/DL (ref 7–20)
CALCIUM SERPL-MCNC: 8.4 MG/DL (ref 8.3–10.6)
CHLORIDE SERPL-SCNC: 105 MMOL/L (ref 99–110)
CO2 SERPL-SCNC: 20 MMOL/L (ref 21–32)
CREAT SERPL-MCNC: <0.5 MG/DL (ref 0.6–1.2)
DEPRECATED RDW RBC AUTO: 15.8 % (ref 12.4–15.4)
DESCRIPTION BLOOD BANK: NORMAL
DESCRIPTION BLOOD BANK: NORMAL
EOSINOPHIL # BLD: 0.2 K/UL (ref 0–0.6)
EOSINOPHIL NFR BLD: 2 %
GFR SERPLBLD CREATININE-BSD FMLA CKD-EPI: >60 ML/MIN/{1.73_M2}
GLUCOSE BLD-MCNC: 155 MG/DL (ref 70–99)
GLUCOSE BLD-MCNC: 168 MG/DL (ref 70–99)
GLUCOSE BLD-MCNC: 186 MG/DL (ref 70–99)
GLUCOSE BLD-MCNC: 186 MG/DL (ref 70–99)
GLUCOSE BLD-MCNC: 192 MG/DL (ref 70–99)
GLUCOSE BLD-MCNC: 193 MG/DL (ref 70–99)
GLUCOSE SERPL-MCNC: 187 MG/DL (ref 70–99)
HCT VFR BLD AUTO: 27.2 % (ref 36–48)
HGB BLD-MCNC: 8.7 G/DL (ref 12–16)
LYMPHOCYTES # BLD: 0.4 K/UL (ref 1–5.1)
LYMPHOCYTES NFR BLD: 4 %
MAGNESIUM SERPL-MCNC: 1.8 MG/DL (ref 1.8–2.4)
MCH RBC QN AUTO: 28.9 PG (ref 26–34)
MCHC RBC AUTO-ENTMCNC: 32.2 G/DL (ref 31–36)
MCV RBC AUTO: 89.8 FL (ref 80–100)
MONOCYTES # BLD: 0.1 K/UL (ref 0–1.3)
MONOCYTES NFR BLD: 1 %
NEUTROPHILS # BLD: 10 K/UL (ref 1.7–7.7)
NEUTROPHILS NFR BLD: 93 %
PERFORMED ON: ABNORMAL
PHOSPHATE SERPL-MCNC: 3 MG/DL (ref 2.5–4.9)
PLATELET # BLD AUTO: 342 K/UL (ref 135–450)
PLATELET BLD QL SMEAR: ABNORMAL
PMV BLD AUTO: 8.3 FL (ref 5–10.5)
POLYCHROMASIA BLD QL SMEAR: ABNORMAL
POTASSIUM SERPL-SCNC: 3.8 MMOL/L (ref 3.5–5.1)
RBC # BLD AUTO: 3.02 M/UL (ref 4–5.2)
SLIDE REVIEW: ABNORMAL
SODIUM SERPL-SCNC: 136 MMOL/L (ref 136–145)
TOXIC GRANULES BLD QL SMEAR: PRESENT
WBC # BLD AUTO: 10.7 K/UL (ref 4–11)

## 2024-03-23 PROCEDURE — 6370000000 HC RX 637 (ALT 250 FOR IP): Performed by: NURSE PRACTITIONER

## 2024-03-23 PROCEDURE — 94761 N-INVAS EAR/PLS OXIMETRY MLT: CPT

## 2024-03-23 PROCEDURE — C9113 INJ PANTOPRAZOLE SODIUM, VIA: HCPCS | Performed by: SURGERY

## 2024-03-23 PROCEDURE — 94640 AIRWAY INHALATION TREATMENT: CPT

## 2024-03-23 PROCEDURE — 80048 BASIC METABOLIC PNL TOTAL CA: CPT

## 2024-03-23 PROCEDURE — 6360000002 HC RX W HCPCS: Performed by: SURGERY

## 2024-03-23 PROCEDURE — 2500000003 HC RX 250 WO HCPCS: Performed by: INTERNAL MEDICINE

## 2024-03-23 PROCEDURE — 99024 POSTOP FOLLOW-UP VISIT: CPT | Performed by: SURGERY

## 2024-03-23 PROCEDURE — 6370000000 HC RX 637 (ALT 250 FOR IP): Performed by: SURGERY

## 2024-03-23 PROCEDURE — 6360000002 HC RX W HCPCS: Performed by: NURSE PRACTITIONER

## 2024-03-23 PROCEDURE — APPSS15 APP SPLIT SHARED TIME 0-15 MINUTES: Performed by: NURSE PRACTITIONER

## 2024-03-23 PROCEDURE — 85025 COMPLETE CBC W/AUTO DIFF WBC: CPT

## 2024-03-23 PROCEDURE — 86850 RBC ANTIBODY SCREEN: CPT

## 2024-03-23 PROCEDURE — 86901 BLOOD TYPING SEROLOGIC RH(D): CPT

## 2024-03-23 PROCEDURE — 84100 ASSAY OF PHOSPHORUS: CPT

## 2024-03-23 PROCEDURE — 6370000000 HC RX 637 (ALT 250 FOR IP): Performed by: PHYSICIAN ASSISTANT

## 2024-03-23 PROCEDURE — 6360000002 HC RX W HCPCS: Performed by: PHYSICIAN ASSISTANT

## 2024-03-23 PROCEDURE — 2580000003 HC RX 258: Performed by: SURGERY

## 2024-03-23 PROCEDURE — 83735 ASSAY OF MAGNESIUM: CPT

## 2024-03-23 PROCEDURE — APPNB30 APP NON BILLABLE TIME 0-30 MINS: Performed by: NURSE PRACTITIONER

## 2024-03-23 PROCEDURE — 86900 BLOOD TYPING SEROLOGIC ABO: CPT

## 2024-03-23 PROCEDURE — 1200000000 HC SEMI PRIVATE

## 2024-03-23 RX ADMIN — LEUCINE, PHENYLALANINE, LYSINE, METHIONINE, ISOLEUCINE, VALINE, HISTIDINE, THREONINE, TRYPTOPHAN, ALANINE, GLYCINE, ARGININE, PROLINE, SERINE, TYROSINE, SODIUM ACETATE, DIBASIC POTASSIUM PHOSPHATE, MAGNESIUM CHLORIDE, SODIUM CHLORIDE, CALCIUM CHLORIDE, DEXTROSE
365; 280; 290; 200; 300; 290; 240; 210; 90; 1035; 515; 575; 340; 250; 20; 340; 261; 51; 59; 33; 20 INJECTION INTRAVENOUS at 18:13

## 2024-03-23 RX ADMIN — ISOSORBIDE MONONITRATE 120 MG: 60 TABLET, EXTENDED RELEASE ORAL at 09:04

## 2024-03-23 RX ADMIN — MECLIZINE HYDROCHLORIDE 25 MG: 12.5 TABLET ORAL at 17:09

## 2024-03-23 RX ADMIN — DILTIAZEM HYDROCHLORIDE 120 MG: 120 CAPSULE, COATED, EXTENDED RELEASE ORAL at 09:04

## 2024-03-23 RX ADMIN — TOPIRAMATE 100 MG: 100 TABLET, FILM COATED ORAL at 09:34

## 2024-03-23 RX ADMIN — FUROSEMIDE 20 MG: 10 INJECTION, SOLUTION INTRAMUSCULAR; INTRAVENOUS at 09:05

## 2024-03-23 RX ADMIN — Medication 2 PUFF: at 09:26

## 2024-03-23 RX ADMIN — PANTOPRAZOLE SODIUM 40 MG: 40 INJECTION, POWDER, FOR SOLUTION INTRAVENOUS at 21:28

## 2024-03-23 RX ADMIN — GABAPENTIN 600 MG: 300 CAPSULE ORAL at 09:04

## 2024-03-23 RX ADMIN — CARVEDILOL 3.12 MG: 3.12 TABLET, FILM COATED ORAL at 09:04

## 2024-03-23 RX ADMIN — MECLIZINE HYDROCHLORIDE 25 MG: 12.5 TABLET ORAL at 21:44

## 2024-03-23 RX ADMIN — GABAPENTIN 600 MG: 300 CAPSULE ORAL at 13:34

## 2024-03-23 RX ADMIN — ENOXAPARIN SODIUM 40 MG: 100 INJECTION SUBCUTANEOUS at 21:25

## 2024-03-23 RX ADMIN — ARIPIPRAZOLE 2 MG: 2 TABLET ORAL at 21:25

## 2024-03-23 RX ADMIN — HYDROMORPHONE HYDROCHLORIDE 0.25 MG: 1 INJECTION, SOLUTION INTRAMUSCULAR; INTRAVENOUS; SUBCUTANEOUS at 13:33

## 2024-03-23 RX ADMIN — SODIUM CHLORIDE 3000 MG: 900 INJECTION INTRAVENOUS at 04:00

## 2024-03-23 RX ADMIN — HYDROMORPHONE HYDROCHLORIDE 0.25 MG: 1 INJECTION, SOLUTION INTRAMUSCULAR; INTRAVENOUS; SUBCUTANEOUS at 04:03

## 2024-03-23 RX ADMIN — TROSPIUM CHLORIDE 20 MG: 20 TABLET, FILM COATED ORAL at 05:23

## 2024-03-23 RX ADMIN — GABAPENTIN 600 MG: 300 CAPSULE ORAL at 17:08

## 2024-03-23 RX ADMIN — GABAPENTIN 600 MG: 300 CAPSULE ORAL at 21:26

## 2024-03-23 RX ADMIN — NORTRIPTYLINE HYDROCHLORIDE 75 MG: 25 CAPSULE ORAL at 21:25

## 2024-03-23 RX ADMIN — METHENAMINE HIPPURATE 1 G: 1 TABLET ORAL at 17:08

## 2024-03-23 RX ADMIN — METHENAMINE HIPPURATE 1 G: 1 TABLET ORAL at 09:04

## 2024-03-23 RX ADMIN — Medication 2 PUFF: at 20:21

## 2024-03-23 RX ADMIN — ONDANSETRON 4 MG: 2 INJECTION INTRAMUSCULAR; INTRAVENOUS at 21:28

## 2024-03-23 RX ADMIN — SODIUM CHLORIDE 3000 MG: 900 INJECTION INTRAVENOUS at 08:56

## 2024-03-23 RX ADMIN — CARVEDILOL 3.12 MG: 3.12 TABLET, FILM COATED ORAL at 17:08

## 2024-03-23 RX ADMIN — TOPIRAMATE 150 MG: 100 TABLET, FILM COATED ORAL at 17:16

## 2024-03-23 RX ADMIN — TROSPIUM CHLORIDE 20 MG: 20 TABLET, FILM COATED ORAL at 17:08

## 2024-03-23 RX ADMIN — BUPROPION HYDROCHLORIDE 300 MG: 300 TABLET, EXTENDED RELEASE ORAL at 09:04

## 2024-03-23 RX ADMIN — INSULIN GLARGINE 18 UNITS: 100 INJECTION, SOLUTION SUBCUTANEOUS at 21:28

## 2024-03-23 RX ADMIN — TIZANIDINE 2 MG: 4 TABLET ORAL at 21:25

## 2024-03-23 RX ADMIN — SODIUM CHLORIDE 3000 MG: 900 INJECTION INTRAVENOUS at 16:08

## 2024-03-23 RX ADMIN — Medication 10 ML: at 09:07

## 2024-03-23 RX ADMIN — CLOPIDOGREL BISULFATE 75 MG: 75 TABLET ORAL at 09:05

## 2024-03-23 RX ADMIN — ASPIRIN 81 MG: 81 TABLET, COATED ORAL at 09:05

## 2024-03-23 RX ADMIN — SODIUM CHLORIDE 3000 MG: 900 INJECTION INTRAVENOUS at 21:42

## 2024-03-23 RX ADMIN — FUROSEMIDE 20 MG: 10 INJECTION, SOLUTION INTRAMUSCULAR; INTRAVENOUS at 17:17

## 2024-03-23 RX ADMIN — RANOLAZINE 1000 MG: 500 TABLET, EXTENDED RELEASE ORAL at 21:25

## 2024-03-23 RX ADMIN — HYDROMORPHONE HYDROCHLORIDE 0.25 MG: 1 INJECTION, SOLUTION INTRAMUSCULAR; INTRAVENOUS; SUBCUTANEOUS at 21:28

## 2024-03-23 RX ADMIN — MECLIZINE HYDROCHLORIDE 25 MG: 12.5 TABLET ORAL at 13:35

## 2024-03-23 RX ADMIN — HYDROMORPHONE HYDROCHLORIDE 0.25 MG: 1 INJECTION, SOLUTION INTRAMUSCULAR; INTRAVENOUS; SUBCUTANEOUS at 00:46

## 2024-03-23 RX ADMIN — MECLIZINE HYDROCHLORIDE 25 MG: 12.5 TABLET ORAL at 09:33

## 2024-03-23 RX ADMIN — PANTOPRAZOLE SODIUM 40 MG: 40 INJECTION, POWDER, FOR SOLUTION INTRAVENOUS at 09:03

## 2024-03-23 RX ADMIN — RANOLAZINE 1000 MG: 500 TABLET, EXTENDED RELEASE ORAL at 09:33

## 2024-03-23 ASSESSMENT — PAIN DESCRIPTION - ORIENTATION
ORIENTATION: RIGHT
ORIENTATION: MID
ORIENTATION: LEFT
ORIENTATION: RIGHT

## 2024-03-23 ASSESSMENT — PAIN SCALES - GENERAL
PAINLEVEL_OUTOF10: 8
PAINLEVEL_OUTOF10: 8
PAINLEVEL_OUTOF10: 4
PAINLEVEL_OUTOF10: 5
PAINLEVEL_OUTOF10: 6
PAINLEVEL_OUTOF10: 2
PAINLEVEL_OUTOF10: 8
PAINLEVEL_OUTOF10: 6
PAINLEVEL_OUTOF10: 2
PAINLEVEL_OUTOF10: 8
PAINLEVEL_OUTOF10: 8
PAINLEVEL_OUTOF10: 9

## 2024-03-23 ASSESSMENT — PAIN - FUNCTIONAL ASSESSMENT: PAIN_FUNCTIONAL_ASSESSMENT: ACTIVITIES ARE NOT PREVENTED

## 2024-03-23 ASSESSMENT — PAIN DESCRIPTION - DESCRIPTORS
DESCRIPTORS: ACHING
DESCRIPTORS: ACHING
DESCRIPTORS: ACHING;CRAMPING;DISCOMFORT

## 2024-03-23 ASSESSMENT — PAIN DESCRIPTION - LOCATION
LOCATION: SHOULDER
LOCATION: ABDOMEN;NECK
LOCATION: NECK

## 2024-03-23 ASSESSMENT — PAIN DESCRIPTION - PAIN TYPE: TYPE: CHRONIC PAIN

## 2024-03-23 NOTE — PLAN OF CARE
Problem: Pain  Goal: Verbalizes/displays adequate comfort level or baseline comfort level  Recent Flowsheet Documentation  Taken 3/23/2024 0858 by Praveena Hu RN  Verbalizes/displays adequate comfort level or baseline comfort level:   Encourage patient to monitor pain and request assistance   Assess pain using appropriate pain scale   Administer analgesics based on type and severity of pain and evaluate response  3/23/2024 0001 by Tracy Alexander RN  Outcome: Progressing     Problem: Discharge Planning  Goal: Discharge to home or other facility with appropriate resources  Recent Flowsheet Documentation  Taken 3/23/2024 0858 by Praveena Hu RN  Discharge to home or other facility with appropriate resources: Identify barriers to discharge with patient and caregiver  3/23/2024 0001 by Tracy Alexander RN  Outcome: Progressing     Problem: Safety - Adult  Goal: Free from fall injury  3/23/2024 0001 by Tracy Alexander RN  Outcome: Progressing     Problem: Chronic Conditions and Co-morbidities  Goal: Patient's chronic conditions and co-morbidity symptoms are monitored and maintained or improved  Recent Flowsheet Documentation  Taken 3/23/2024 0858 by Praveena Hu RN  Care Plan - Patient's Chronic Conditions and Co-Morbidity Symptoms are Monitored and Maintained or Improved: Monitor and assess patient's chronic conditions and comorbid symptoms for stability, deterioration, or improvement  3/23/2024 0001 by Tracy Alexander RN  Outcome: Progressing     Problem: ABCDS Injury Assessment  Goal: Absence of physical injury  3/23/2024 0001 by Tracy Alexander RN  Outcome: Progressing     Problem: Skin/Tissue Integrity  Goal: Absence of new skin breakdown  Description: 1.  Monitor for areas of redness and/or skin breakdown  2.  Assess vascular access sites hourly  3.  Every 4-6 hours minimum:  Change oxygen saturation probe site  4.  Every 4-6 hours:  If on nasal continuous positive airway pressure, respiratory therapy assess

## 2024-03-23 NOTE — PROGRESS NOTES
Hospitalist Progress Note    Name:  Ana Gonzales    /Age/Sex: 1955  (68 y.o. female)  MRN & CSN:  [id number] & [id number]    PCP: Mellisa Houston MD    Date of Admission: 3/12/2024    Patient Status:  Inpatient     Chief Complaint:   Chief Complaint   Patient presents with    Bradycardia     Pt coming in with  pt has been bradycardic, weakness, slurred speech for a few days.  Vomiting yesterday with black emesis        Hospital Course:   Admitted for bradycardia with slurred speech. Concern for TIA. CT head non acute, but CTA showed severe narrowing of distal left P1 segment of left PCA, but otherwise imaging was non acute. MRI brain was non acute.    However, patient had low hemoglobin on admission and required 2U PRBC.  GI consulted and patient underwent an EGD on 3/13 with GI and did not find any active bleeding, but did find blood clots and a large amount of liquid in the stomach. Placed on PPI gtt.    She had a repeat EGD on 3/14 and did not show any bleed, but 2 clips were placed on small healing ulcer. Still showed blood clots in the stomach.    CT abd/pel on 3/15 showed dilated stomach and small loops reflecting a partial SBO.    CT neck shows left sided neck edema. Started on decadron and unasyn. ENT consulted.    Developed some delirium the night of 3/21.     Subjective:  Today is:  Hospital Day: 12.  Patient seen and examined in 4N-4457/4457-02.     Still with some delirium last night but not as bad. Had NG tube removed this am.States she is feeling better. Back pain improved. L neck pain had improved with heat yesterday.      Medications:  Reviewed    Infusion Medications    PN-Adult Premix 5/20 - Standard Electrolytes - Central Line      PN-Adult Premix 5/20 - Standard Electrolytes - Central Line 80 mL/hr at 24 1750    sodium chloride      dextrose       Scheduled Medications    insulin glargine  18 Units SubCUTAneous Nightly    furosemide  20 mg IntraVENous BID     thinks this made It worse and has been using ice)    Supraglottitis, Laryngopharyngeal reflex  -resolved per ENT  -off decadron decadron.    TIA - resolved  -Stroke like symptoms likely 2/2 ABLA and not an actual TIA  -MRI head non acute  -Echo 3/13 showed LVEF 55-60%, no RWMA  -ASA ordered  -Has a statin allergy  -TIA/CVA order set ordered  -Neurology has seen    Chronic diastolic HF  -Not in an acute exacerbation  -CXR non acute  - on admission  -Weight is up to 222-normally around 207. Give additional dose of lasix today.  -I/Os    Bigeminy  -on b blocker and calcium channel blocker  -EP has seen  -will need stress test as well as outpatient cardiac MRI     Hypertension  -Continue home antihypertensives    Hyperlipidemia  -Continue home statin    Type 2 diabetes  -SSI        Discussed management of the case with GI who recommended PPI BID for 8 weeks.    Drugs that require monitoring for toxicity include: Subq Insulin and the method of monitoring was/is BG    DVT ppx: Contraindicated  GI ppx: PPI  Diet: Diet NPO Exceptions are: Ice Chips, Sips of Water with Meds, Popsicles  PN-Adult Premix 5/20 - Standard Electrolytes - Central Line  PN-Adult Premix 5/20 - Standard Electrolytes - Central Line  Code Status: Full Code    PT/OT Eval Status: ordered    Disposition:  TBSOY Garza PA-C  3/23/2024  12:38 PM

## 2024-03-23 NOTE — PLAN OF CARE
Problem: Pain  Goal: Verbalizes/displays adequate comfort level or baseline comfort level  3/23/2024 0001 by Tracy Alexander RN  Outcome: Progressing  3/22/2024 1759 by Nikolas Olivo RN  Outcome: Progressing  Flowsheets (Taken 3/22/2024 1759)  Verbalizes/displays adequate comfort level or baseline comfort level:   Encourage patient to monitor pain and request assistance   Assess pain using appropriate pain scale     Problem: Discharge Planning  Goal: Discharge to home or other facility with appropriate resources  Outcome: Progressing     Problem: Safety - Adult  Goal: Free from fall injury  Outcome: Progressing     Problem: Chronic Conditions and Co-morbidities  Goal: Patient's chronic conditions and co-morbidity symptoms are monitored and maintained or improved  3/23/2024 0001 by Tracy Alexander RN  Outcome: Progressing  3/22/2024 1759 by Nikolas Olivo RN  Outcome: Progressing  Flowsheets (Taken 3/22/2024 1759)  Care Plan - Patient's Chronic Conditions and Co-Morbidity Symptoms are Monitored and Maintained or Improved:   Monitor and assess patient's chronic conditions and comorbid symptoms for stability, deterioration, or improvement   Collaborate with multidisciplinary team to address chronic and comorbid conditions and prevent exacerbation or deterioration     Problem: ABCDS Injury Assessment  Goal: Absence of physical injury  Outcome: Progressing     Problem: Skin/Tissue Integrity  Goal: Absence of new skin breakdown  Description: 1.  Monitor for areas of redness and/or skin breakdown  2.  Assess vascular access sites hourly  3.  Every 4-6 hours minimum:  Change oxygen saturation probe site  4.  Every 4-6 hours:  If on nasal continuous positive airway pressure, respiratory therapy assess nares and determine need for appliance change or resting period.  3/23/2024 0001 by Tracy Alexander RN  Outcome: Progressing  3/22/2024 1759 by Nikolas Olvio RN  Outcome: Progressing     Problem: Nutrition Deficit:  Goal:

## 2024-03-23 NOTE — PROGRESS NOTES
Shift assessment completed. Neuro WNL. Reports pain 8/10 to neck at this time. AM meds administered per MAR. NGT and sainz cath remain in place draining well. The care plan and education has been reviewed and mutually agreed upon with the patient. Standard safety measures in place.  at bedside.

## 2024-03-23 NOTE — PROGRESS NOTES
[facility]        Progress Note    Patient Name: Ana Gonzales  MRN: [id number]  YOB: 1955  Date of Evaluation: 3/23/2024    Postoperative Day #4    Subjective:  No acute events overnight  Pain controlled  No nausea or vomiting, NGT in place with low output  Passing flatus and stool, denies bloating  Resting in bed at this time, more alert today      Vital Signs:  Patient Vitals for the past 24 hrs:   BP Temp Temp src Pulse Resp SpO2 Weight   24 0928 -- -- -- -- -- 96 % --   24 0858 136/71 97.6 °F (36.4 °C) Oral 82 20 96 % --   24 0433 -- -- -- -- 20 -- --   24 0412 -- -- -- -- -- -- 96.1 kg (211 lb 12.8 oz)   24 0400 122/78 -- -- 83 20 95 % --   24 0116 -- -- -- -- 20 -- --   24 0046 -- -- -- -- 20 -- --   24 2308 132/73 -- -- 84 18 96 % --   24 128/72 97.3 °F (36.3 °C) Oral 84 20 93 % --   24 -- -- -- 78 18 96 % --   24 1159 122/60 97.2 °F (36.2 °C) Oral 81 20 96 % --        TEMPERATURE HISTORY 24H: Temp (24hrs), Av.4 °F (36.3 °C), Min:97.2 °F (36.2 °C), Max:97.6 °F (36.4 °C)    BLOOD PRESSURE HISTORY: Systolic (36hrs), Av , Min:117 , Max:151    Diastolic (36hrs), Av, Min:60, Max:78      Intake/Output:  I/O last 3 completed shifts:  In: 2435.2 [IV Piggyback:832.1]  Out: 5525 [Urine:4275; Emesis/NG output:1250]  I/O this shift:  In: 60 [P.O.:60]  Out: 1450 [Urine:1300; Emesis/NG output:150]  Drain/tube Output:       Physical Exam:  General: awake, alert, oriented to person, place, time  Lungs: unlabored respirations  Abdomen: soft, non-distended, incisional tenderness only, bowel sounds active  Skin/Wound: healing well, no drainage, no erythema, well approximated    Labs:  CBC:    Recent Labs     24  1133 24  0540 24  0423   WBC 18.1* 11.8* 10.7   HGB 9.6* 8.2* 8.7*   HCT 30.0* 25.0* 27.2*    309 342       BMP:    Recent Labs     24  0525

## 2024-03-23 NOTE — PROGRESS NOTES
Clinical Pharmacy Note    Pharmacy consulted by Aria Carter to manage TPN    Goal TPN rate: 80 ml/hr    Access: CVC  Indication: SBO    Labs:  General Labs:  BMP:    Lab Results   Component Value Date/Time     03/23/2024 04:23 AM    K 3.8 03/23/2024 04:23 AM    K 3.4 03/15/2024 02:15 AM     03/23/2024 04:23 AM    CO2 20 03/23/2024 04:23 AM    BUN 14 03/23/2024 04:23 AM    LABALBU 2.9 03/22/2024 05:40 AM    CREATININE <0.5 03/23/2024 04:23 AM    CALCIUM 8.4 03/23/2024 04:23 AM    GFRAA >60 11/24/2020 06:29 PM    GFRAA >60 01/13/2013 05:24 AM    LABGLOM >60 03/23/2024 04:23 AM    GLUCOSE 187 03/23/2024 04:23 AM       Electrolyte replacement as follows:   None needed    Blood sugars over past 24 hours: 168-192    Blood sugar management:  Continue Humalog q4 hour sliding scale at medium dose.  Continue Lantus 12 units QHS       Will continue TPN at 80 ml/hr.     Thank you for allowing pharmacy to participate in the care of this patient.    Kathleen Chung, PharmD  PGY-1 Pharmacy Resident  [id number]

## 2024-03-24 LAB
ANION GAP SERPL CALCULATED.3IONS-SCNC: 10 MMOL/L (ref 3–16)
BASOPHILS # BLD: 0 K/UL (ref 0–0.2)
BASOPHILS NFR BLD: 0 %
BUN SERPL-MCNC: 18 MG/DL (ref 7–20)
CALCIUM SERPL-MCNC: 9 MG/DL (ref 8.3–10.6)
CHLORIDE SERPL-SCNC: 103 MMOL/L (ref 99–110)
CO2 SERPL-SCNC: 23 MMOL/L (ref 21–32)
CREAT SERPL-MCNC: 0.5 MG/DL (ref 0.6–1.2)
DEPRECATED RDW RBC AUTO: 16.2 % (ref 12.4–15.4)
EOSINOPHIL # BLD: 0.1 K/UL (ref 0–0.6)
EOSINOPHIL NFR BLD: 1 %
GFR SERPLBLD CREATININE-BSD FMLA CKD-EPI: >60 ML/MIN/{1.73_M2}
GLUCOSE BLD-MCNC: 133 MG/DL (ref 70–99)
GLUCOSE BLD-MCNC: 150 MG/DL (ref 70–99)
GLUCOSE BLD-MCNC: 153 MG/DL (ref 70–99)
GLUCOSE BLD-MCNC: 169 MG/DL (ref 70–99)
GLUCOSE BLD-MCNC: 170 MG/DL (ref 70–99)
GLUCOSE BLD-MCNC: 194 MG/DL (ref 70–99)
GLUCOSE SERPL-MCNC: 153 MG/DL (ref 70–99)
HCT VFR BLD AUTO: 26.2 % (ref 36–48)
HGB BLD-MCNC: 8.7 G/DL (ref 12–16)
LYMPHOCYTES # BLD: 0.9 K/UL (ref 1–5.1)
LYMPHOCYTES NFR BLD: 8 %
MAGNESIUM SERPL-MCNC: 1.9 MG/DL (ref 1.8–2.4)
MCH RBC QN AUTO: 29.7 PG (ref 26–34)
MCHC RBC AUTO-ENTMCNC: 33.3 G/DL (ref 31–36)
MCV RBC AUTO: 89 FL (ref 80–100)
MONOCYTES # BLD: 0.6 K/UL (ref 0–1.3)
MONOCYTES NFR BLD: 5 %
NEUTROPHILS # BLD: 9.7 K/UL (ref 1.7–7.7)
NEUTROPHILS NFR BLD: 86 %
PERFORMED ON: ABNORMAL
PHOSPHATE SERPL-MCNC: 4 MG/DL (ref 2.5–4.9)
PLATELET # BLD AUTO: 364 K/UL (ref 135–450)
PLATELET BLD QL SMEAR: ADEQUATE
PMV BLD AUTO: 8 FL (ref 5–10.5)
POLYCHROMASIA BLD QL SMEAR: ABNORMAL
POTASSIUM SERPL-SCNC: 4.2 MMOL/L (ref 3.5–5.1)
RBC # BLD AUTO: 2.94 M/UL (ref 4–5.2)
SLIDE REVIEW: ABNORMAL
SODIUM SERPL-SCNC: 136 MMOL/L (ref 136–145)
TOXIC GRANULES BLD QL SMEAR: PRESENT
WBC # BLD AUTO: 11.3 K/UL (ref 4–11)

## 2024-03-24 PROCEDURE — 6360000002 HC RX W HCPCS: Performed by: FAMILY MEDICINE

## 2024-03-24 PROCEDURE — 6360000002 HC RX W HCPCS: Performed by: NURSE PRACTITIONER

## 2024-03-24 PROCEDURE — 2580000003 HC RX 258: Performed by: SURGERY

## 2024-03-24 PROCEDURE — 6370000000 HC RX 637 (ALT 250 FOR IP): Performed by: PHYSICIAN ASSISTANT

## 2024-03-24 PROCEDURE — 99024 POSTOP FOLLOW-UP VISIT: CPT | Performed by: SURGERY

## 2024-03-24 PROCEDURE — 85025 COMPLETE CBC W/AUTO DIFF WBC: CPT

## 2024-03-24 PROCEDURE — C9113 INJ PANTOPRAZOLE SODIUM, VIA: HCPCS | Performed by: SURGERY

## 2024-03-24 PROCEDURE — APPSS15 APP SPLIT SHARED TIME 0-15 MINUTES: Performed by: NURSE PRACTITIONER

## 2024-03-24 PROCEDURE — 6370000000 HC RX 637 (ALT 250 FOR IP): Performed by: FAMILY MEDICINE

## 2024-03-24 PROCEDURE — 6370000000 HC RX 637 (ALT 250 FOR IP): Performed by: SURGERY

## 2024-03-24 PROCEDURE — APPNB30 APP NON BILLABLE TIME 0-30 MINS: Performed by: NURSE PRACTITIONER

## 2024-03-24 PROCEDURE — 80048 BASIC METABOLIC PNL TOTAL CA: CPT

## 2024-03-24 PROCEDURE — 6360000002 HC RX W HCPCS: Performed by: SURGERY

## 2024-03-24 PROCEDURE — 83735 ASSAY OF MAGNESIUM: CPT

## 2024-03-24 PROCEDURE — 94640 AIRWAY INHALATION TREATMENT: CPT

## 2024-03-24 PROCEDURE — 1200000000 HC SEMI PRIVATE

## 2024-03-24 PROCEDURE — 6370000000 HC RX 637 (ALT 250 FOR IP): Performed by: NURSE PRACTITIONER

## 2024-03-24 PROCEDURE — 6360000002 HC RX W HCPCS: Performed by: PHYSICIAN ASSISTANT

## 2024-03-24 PROCEDURE — 94761 N-INVAS EAR/PLS OXIMETRY MLT: CPT

## 2024-03-24 PROCEDURE — 84100 ASSAY OF PHOSPHORUS: CPT

## 2024-03-24 PROCEDURE — 2500000003 HC RX 250 WO HCPCS: Performed by: INTERNAL MEDICINE

## 2024-03-24 RX ORDER — METOCLOPRAMIDE HYDROCHLORIDE 5 MG/ML
5 INJECTION INTRAMUSCULAR; INTRAVENOUS ONCE
Status: COMPLETED | OUTPATIENT
Start: 2024-03-24 | End: 2024-03-24

## 2024-03-24 RX ORDER — KETOROLAC TROMETHAMINE 15 MG/ML
15 INJECTION, SOLUTION INTRAMUSCULAR; INTRAVENOUS ONCE
Status: COMPLETED | OUTPATIENT
Start: 2024-03-24 | End: 2024-03-24

## 2024-03-24 RX ORDER — LANOLIN ALCOHOL/MO/W.PET/CERES
3 CREAM (GRAM) TOPICAL
Status: COMPLETED | OUTPATIENT
Start: 2024-03-24 | End: 2024-03-24

## 2024-03-24 RX ORDER — FUROSEMIDE 10 MG/ML
20 INJECTION INTRAMUSCULAR; INTRAVENOUS DAILY
Status: DISCONTINUED | OUTPATIENT
Start: 2024-03-25 | End: 2024-04-10 | Stop reason: HOSPADM

## 2024-03-24 RX ADMIN — MELATONIN TAB 3 MG 3 MG: 3 TAB at 23:42

## 2024-03-24 RX ADMIN — DILTIAZEM HYDROCHLORIDE 120 MG: 120 CAPSULE, COATED, EXTENDED RELEASE ORAL at 08:23

## 2024-03-24 RX ADMIN — HYDROMORPHONE HYDROCHLORIDE 0.25 MG: 1 INJECTION, SOLUTION INTRAMUSCULAR; INTRAVENOUS; SUBCUTANEOUS at 20:41

## 2024-03-24 RX ADMIN — ASPIRIN 81 MG: 81 TABLET, COATED ORAL at 08:22

## 2024-03-24 RX ADMIN — BUPROPION HYDROCHLORIDE 300 MG: 300 TABLET, EXTENDED RELEASE ORAL at 08:21

## 2024-03-24 RX ADMIN — CLOPIDOGREL BISULFATE 75 MG: 75 TABLET ORAL at 08:22

## 2024-03-24 RX ADMIN — Medication 10 ML: at 08:23

## 2024-03-24 RX ADMIN — Medication 2 PUFF: at 20:13

## 2024-03-24 RX ADMIN — MECLIZINE HYDROCHLORIDE 25 MG: 12.5 TABLET ORAL at 13:15

## 2024-03-24 RX ADMIN — HYDROMORPHONE HYDROCHLORIDE 0.25 MG: 1 INJECTION, SOLUTION INTRAMUSCULAR; INTRAVENOUS; SUBCUTANEOUS at 23:42

## 2024-03-24 RX ADMIN — ISOSORBIDE MONONITRATE 120 MG: 60 TABLET, EXTENDED RELEASE ORAL at 08:22

## 2024-03-24 RX ADMIN — CARVEDILOL 3.12 MG: 3.12 TABLET, FILM COATED ORAL at 08:22

## 2024-03-24 RX ADMIN — METOCLOPRAMIDE 5 MG: 5 INJECTION, SOLUTION INTRAMUSCULAR; INTRAVENOUS at 22:34

## 2024-03-24 RX ADMIN — MECLIZINE HYDROCHLORIDE 25 MG: 12.5 TABLET ORAL at 16:38

## 2024-03-24 RX ADMIN — SODIUM CHLORIDE 3000 MG: 900 INJECTION INTRAVENOUS at 02:08

## 2024-03-24 RX ADMIN — MECLIZINE HYDROCHLORIDE 25 MG: 12.5 TABLET ORAL at 08:22

## 2024-03-24 RX ADMIN — FUROSEMIDE 20 MG: 10 INJECTION, SOLUTION INTRAMUSCULAR; INTRAVENOUS at 08:21

## 2024-03-24 RX ADMIN — GABAPENTIN 600 MG: 300 CAPSULE ORAL at 16:37

## 2024-03-24 RX ADMIN — SODIUM CHLORIDE 3000 MG: 900 INJECTION INTRAVENOUS at 08:20

## 2024-03-24 RX ADMIN — LEUCINE, PHENYLALANINE, LYSINE, METHIONINE, ISOLEUCINE, VALINE, HISTIDINE, THREONINE, TRYPTOPHAN, ALANINE, GLYCINE, ARGININE, PROLINE, SERINE, TYROSINE, SODIUM ACETATE, DIBASIC POTASSIUM PHOSPHATE, MAGNESIUM CHLORIDE, SODIUM CHLORIDE, CALCIUM CHLORIDE, DEXTROSE
365; 280; 290; 200; 300; 290; 240; 210; 90; 1035; 515; 575; 340; 250; 20; 340; 261; 51; 59; 33; 20 INJECTION INTRAVENOUS at 18:19

## 2024-03-24 RX ADMIN — TOPIRAMATE 100 MG: 100 TABLET, FILM COATED ORAL at 08:22

## 2024-03-24 RX ADMIN — GABAPENTIN 600 MG: 300 CAPSULE ORAL at 08:21

## 2024-03-24 RX ADMIN — TROSPIUM CHLORIDE 20 MG: 20 TABLET, FILM COATED ORAL at 05:02

## 2024-03-24 RX ADMIN — PANTOPRAZOLE SODIUM 40 MG: 40 INJECTION, POWDER, FOR SOLUTION INTRAVENOUS at 08:21

## 2024-03-24 RX ADMIN — KETOROLAC TROMETHAMINE 15 MG: 15 INJECTION, SOLUTION INTRAMUSCULAR; INTRAVENOUS at 13:15

## 2024-03-24 RX ADMIN — METHENAMINE HIPPURATE 1 G: 1 TABLET ORAL at 16:37

## 2024-03-24 RX ADMIN — GABAPENTIN 600 MG: 300 CAPSULE ORAL at 13:15

## 2024-03-24 RX ADMIN — SODIUM CHLORIDE 3000 MG: 900 INJECTION INTRAVENOUS at 13:15

## 2024-03-24 RX ADMIN — HYDROMORPHONE HYDROCHLORIDE 0.25 MG: 1 INJECTION, SOLUTION INTRAMUSCULAR; INTRAVENOUS; SUBCUTANEOUS at 17:28

## 2024-03-24 RX ADMIN — HYDROMORPHONE HYDROCHLORIDE 0.25 MG: 1 INJECTION, SOLUTION INTRAMUSCULAR; INTRAVENOUS; SUBCUTANEOUS at 02:03

## 2024-03-24 RX ADMIN — ENOXAPARIN SODIUM 40 MG: 100 INJECTION SUBCUTANEOUS at 20:43

## 2024-03-24 RX ADMIN — TOPIRAMATE 150 MG: 100 TABLET, FILM COATED ORAL at 16:38

## 2024-03-24 RX ADMIN — METHENAMINE HIPPURATE 1 G: 1 TABLET ORAL at 08:22

## 2024-03-24 RX ADMIN — CARVEDILOL 3.12 MG: 3.12 TABLET, FILM COATED ORAL at 16:38

## 2024-03-24 RX ADMIN — SODIUM CHLORIDE 3000 MG: 900 INJECTION INTRAVENOUS at 20:31

## 2024-03-24 RX ADMIN — RANOLAZINE 1000 MG: 500 TABLET, EXTENDED RELEASE ORAL at 08:21

## 2024-03-24 RX ADMIN — TROSPIUM CHLORIDE 20 MG: 20 TABLET, FILM COATED ORAL at 16:37

## 2024-03-24 RX ADMIN — INSULIN GLARGINE 18 UNITS: 100 INJECTION, SOLUTION SUBCUTANEOUS at 20:42

## 2024-03-24 RX ADMIN — ONDANSETRON 4 MG: 2 INJECTION INTRAMUSCULAR; INTRAVENOUS at 17:24

## 2024-03-24 RX ADMIN — PANTOPRAZOLE SODIUM 40 MG: 40 INJECTION, POWDER, FOR SOLUTION INTRAVENOUS at 20:43

## 2024-03-24 ASSESSMENT — PAIN SCALES - GENERAL
PAINLEVEL_OUTOF10: 7
PAINLEVEL_OUTOF10: 10
PAINLEVEL_OUTOF10: 8
PAINLEVEL_OUTOF10: 9
PAINLEVEL_OUTOF10: 5
PAINLEVEL_OUTOF10: 7

## 2024-03-24 ASSESSMENT — PAIN - FUNCTIONAL ASSESSMENT: PAIN_FUNCTIONAL_ASSESSMENT: ACTIVITIES ARE NOT PREVENTED

## 2024-03-24 ASSESSMENT — PAIN DESCRIPTION - ORIENTATION
ORIENTATION: RIGHT;LEFT
ORIENTATION: MID
ORIENTATION: MID

## 2024-03-24 ASSESSMENT — PAIN DESCRIPTION - LOCATION
LOCATION: ABDOMEN
LOCATION: NECK
LOCATION: ABDOMEN
LOCATION: ABDOMEN

## 2024-03-24 ASSESSMENT — PAIN DESCRIPTION - DESCRIPTORS
DESCRIPTORS: BURNING
DESCRIPTORS: DISCOMFORT
DESCRIPTORS: BURNING
DESCRIPTORS: BURNING

## 2024-03-24 ASSESSMENT — PAIN DESCRIPTION - PAIN TYPE: TYPE: SURGICAL PAIN

## 2024-03-24 NOTE — PROGRESS NOTES
[facility]        Progress Note    Patient Name: Ana Gonzales  MRN: [id number]  YOB: 1955  Date of Evaluation: 3/24/2024    Postoperative Day #5    Subjective:  No acute events overnight  Pain controlled  No nausea or vomiting since NGT removed  Passing flatus, last stool yesterday, denies bloating  Voiding since Muniz removed  Up to chair at this time, more alert today and in good spirits      Vital Signs:  Patient Vitals for the past 24 hrs:   BP Temp Temp src Pulse Resp SpO2 Weight   24 0925 -- -- -- 82 16 99 % --   24 0800 137/74 97.6 °F (36.4 °C) Oral 72 16 93 % --   24 0505 -- -- -- -- -- -- 96.5 kg (212 lb 11.2 oz)   24 0445 112/70 -- -- 66 20 94 % --   24 0233 -- -- -- -- 18 -- --   24 0203 -- -- -- -- 20 -- --   24 0030 (!) 108/59 -- -- 69 20 93 % --   24 2221 -- -- -- 79 18 96 % --   24 2158 -- -- -- -- 18 -- --   24 2115 (!) 145/77 97.5 °F (36.4 °C) Oral 79 20 97 % --   24 1403 -- -- -- -- 16 -- --   24 1333 -- -- -- -- 16 -- --        TEMPERATURE HISTORY 24H: Temp (24hrs), Av.6 °F (36.4 °C), Min:97.5 °F (36.4 °C), Max:97.6 °F (36.4 °C)    BLOOD PRESSURE HISTORY: Systolic (36hrs), Av , Min:108 , Max:145    Diastolic (36hrs), Av, Min:59, Max:78      Intake/Output:  I/O last 3 completed shifts:  In: 3766.5 [P.O.:60; I.V.:334; IV Piggyback:610.7]  Out: 3250 [Urine:2900; Emesis/NG output:350]  I/O this shift:  In: 60 [P.O.:60]  Out: -   Drain/tube Output:       Physical Exam:  General: awake, alert, oriented to person, place, time  Lungs: unlabored respirations  Abdomen: soft, non-distended, incisional tenderness only, bowel sounds active  Skin/Wound: healing well, no drainage, no erythema, well approximated    Labs:  CBC:    Recent Labs     24  0540 24  0423 24  0500   WBC 11.8* 10.7 11.3*   HGB 8.2* 8.7* 8.7*   HCT 25.0* 27.2* 26.2*    342

## 2024-03-24 NOTE — PLAN OF CARE
Problem: Pain  Goal: Verbalizes/displays adequate comfort level or baseline comfort level  Outcome: Progressing  Flowsheets (Taken 3/23/2024 0858 by Praveena Hu, RN)  Verbalizes/displays adequate comfort level or baseline comfort level:   Encourage patient to monitor pain and request assistance   Assess pain using appropriate pain scale   Administer analgesics based on type and severity of pain and evaluate response     Problem: Discharge Planning  Goal: Discharge to home or other facility with appropriate resources  Outcome: Progressing  Flowsheets (Taken 3/23/2024 0858 by Praveena Hu, RN)  Discharge to home or other facility with appropriate resources: Identify barriers to discharge with patient and caregiver     Problem: Safety - Adult  Goal: Free from fall injury  Outcome: Progressing     Problem: Chronic Conditions and Co-morbidities  Goal: Patient's chronic conditions and co-morbidity symptoms are monitored and maintained or improved  Outcome: Progressing  Flowsheets (Taken 3/23/2024 0858 by Praveena Hu RN)  Care Plan - Patient's Chronic Conditions and Co-Morbidity Symptoms are Monitored and Maintained or Improved: Monitor and assess patient's chronic conditions and comorbid symptoms for stability, deterioration, or improvement     Problem: ABCDS Injury Assessment  Goal: Absence of physical injury  Outcome: Progressing     Problem: Skin/Tissue Integrity  Goal: Absence of new skin breakdown  Description: 1.  Monitor for areas of redness and/or skin breakdown  2.  Assess vascular access sites hourly  3.  Every 4-6 hours minimum:  Change oxygen saturation probe site  4.  Every 4-6 hours:  If on nasal continuous positive airway pressure, respiratory therapy assess nares and determine need for appliance change or resting period.  Outcome: Progressing     Problem: Nutrition Deficit:  Goal: Optimize nutritional status  Outcome: Progressing     Problem: Musculoskeletal - Adult  Goal: Return mobility to

## 2024-03-24 NOTE — PROGRESS NOTES
Hospitalist Progress Note    Name:  Ana Gonzales    /Age/Sex: 1955  (68 y.o. female)  MRN & CSN:  [id number] & [id number]    PCP: Mellisa Houston MD    Date of Admission: 3/12/2024    Patient Status:  Inpatient     Chief Complaint:   Chief Complaint   Patient presents with    Bradycardia     Pt coming in with  pt has been bradycardic, weakness, slurred speech for a few days.  Vomiting yesterday with black emesis        Hospital Course:   Admitted for bradycardia with slurred speech. Concern for TIA. CT head non acute, but CTA showed severe narrowing of distal left P1 segment of left PCA, but otherwise imaging was non acute. MRI brain was non acute.    However, patient had low hemoglobin on admission and required 2U PRBC.  GI consulted and patient underwent an EGD on 3/13 with GI and did not find any active bleeding, but did find blood clots and a large amount of liquid in the stomach. Placed on PPI gtt.    She had a repeat EGD on 3/14 and did not show any bleed, but 2 clips were placed on small healing ulcer. Still showed blood clots in the stomach.    CT abd/pel on 3/15 showed dilated stomach and small loops reflecting a partial SBO.    CT neck shows left sided neck edema. Started on decadron and unasyn. ENT consulted.    Developed some delirium the night of 3/21.       Subjective:  Today is:  Hospital Day: 13.  Patient seen and examined in [address] [place], [place] [address]37/4457-02.   Slept well last night. Feeling better. No nausea. No bm.      Medications:  Reviewed    Infusion Medications    PN-Adult Premix 5/20 - Standard Electrolytes - Central Line      PN-Adult Premix 5/20 - Standard Electrolytes - Central Line 80 mL/hr at 24 1813    sodium chloride      dextrose       Scheduled Medications    [START ON 3/25/2024] furosemide  20 mg IntraVENous Daily    ketorolac  15 mg IntraVENous Once    insulin glargine  18 Units SubCUTAneous Nightly    mometasone-formoterol  2 puff Inhalation BID RT    lidocaine

## 2024-03-25 ENCOUNTER — APPOINTMENT (OUTPATIENT)
Dept: GENERAL RADIOLOGY | Age: 69
DRG: 330 | End: 2024-03-25

## 2024-03-25 LAB
ALBUMIN SERPL-MCNC: 3.6 G/DL (ref 3.4–5)
ALP SERPL-CCNC: 91 U/L (ref 40–129)
ALT SERPL-CCNC: 9 U/L (ref 10–40)
ANION GAP SERPL CALCULATED.3IONS-SCNC: 14 MMOL/L (ref 3–16)
ANISOCYTOSIS BLD QL SMEAR: ABNORMAL
AST SERPL-CCNC: 12 U/L (ref 15–37)
BACTERIA URNS QL MICRO: ABNORMAL /HPF
BASOPHILS # BLD: 0 K/UL (ref 0–0.2)
BASOPHILS NFR BLD: 0 %
BILIRUB DIRECT SERPL-MCNC: <0.2 MG/DL (ref 0–0.3)
BILIRUB INDIRECT SERPL-MCNC: ABNORMAL MG/DL (ref 0–1)
BILIRUB SERPL-MCNC: <0.2 MG/DL (ref 0–1)
BILIRUB UR QL STRIP.AUTO: NEGATIVE
BUN SERPL-MCNC: 25 MG/DL (ref 7–20)
CALCIUM SERPL-MCNC: 9.5 MG/DL (ref 8.3–10.6)
CHLORIDE SERPL-SCNC: 99 MMOL/L (ref 99–110)
CLARITY UR: CLEAR
CO2 SERPL-SCNC: 21 MMOL/L (ref 21–32)
COLOR UR: ABNORMAL
CREAT SERPL-MCNC: 0.6 MG/DL (ref 0.6–1.2)
DEPRECATED RDW RBC AUTO: 16.7 % (ref 12.4–15.4)
EOSINOPHIL # BLD: 0.2 K/UL (ref 0–0.6)
EOSINOPHIL NFR BLD: 1 %
EPI CELLS #/AREA URNS AUTO: 1 /HPF (ref 0–5)
GFR SERPLBLD CREATININE-BSD FMLA CKD-EPI: >60 ML/MIN/{1.73_M2}
GLUCOSE BLD-MCNC: 148 MG/DL (ref 70–99)
GLUCOSE BLD-MCNC: 170 MG/DL (ref 70–99)
GLUCOSE BLD-MCNC: 173 MG/DL (ref 70–99)
GLUCOSE BLD-MCNC: 187 MG/DL (ref 70–99)
GLUCOSE BLD-MCNC: 196 MG/DL (ref 70–99)
GLUCOSE BLD-MCNC: 219 MG/DL (ref 70–99)
GLUCOSE BLD-MCNC: 250 MG/DL (ref 70–99)
GLUCOSE SERPL-MCNC: 201 MG/DL (ref 70–99)
GLUCOSE UR STRIP.AUTO-MCNC: NEGATIVE MG/DL
HCT VFR BLD AUTO: 30.5 % (ref 36–48)
HCT VFR BLD AUTO: 31.5 % (ref 36–48)
HGB BLD-MCNC: 10.1 G/DL (ref 12–16)
HGB BLD-MCNC: 9.9 G/DL (ref 12–16)
HGB UR QL STRIP.AUTO: NEGATIVE
HYALINE CASTS #/AREA URNS AUTO: 0 /LPF (ref 0–8)
KETONES UR STRIP.AUTO-MCNC: NEGATIVE MG/DL
LEUKOCYTE ESTERASE UR QL STRIP.AUTO: ABNORMAL
LYMPHOCYTES # BLD: 0.7 K/UL (ref 1–5.1)
LYMPHOCYTES NFR BLD: 4 %
MAGNESIUM SERPL-MCNC: 2.1 MG/DL (ref 1.8–2.4)
MCH RBC QN AUTO: 28.7 PG (ref 26–34)
MCHC RBC AUTO-ENTMCNC: 32.5 G/DL (ref 31–36)
MCV RBC AUTO: 88.3 FL (ref 80–100)
MONOCYTES # BLD: 0.2 K/UL (ref 0–1.3)
MONOCYTES NFR BLD: 1 %
NEUTROPHILS # BLD: 15.6 K/UL (ref 1.7–7.7)
NEUTROPHILS NFR BLD: 90 %
NEUTS BAND NFR BLD MANUAL: 4 % (ref 0–7)
NITRITE UR QL STRIP.AUTO: NEGATIVE
NRBC BLD-RTO: 1 /100 WBC
PERFORMED ON: ABNORMAL
PH UR STRIP.AUTO: 5.5 [PH] (ref 5–8)
PHOSPHATE SERPL-MCNC: 4.4 MG/DL (ref 2.5–4.9)
PLATELET # BLD AUTO: 510 K/UL (ref 135–450)
PLATELET BLD QL SMEAR: ABNORMAL
PMV BLD AUTO: 8.7 FL (ref 5–10.5)
POLYCHROMASIA BLD QL SMEAR: ABNORMAL
POTASSIUM SERPL-SCNC: 3.7 MMOL/L (ref 3.5–5.1)
PROT SERPL-MCNC: 7 G/DL (ref 6.4–8.2)
PROT UR STRIP.AUTO-MCNC: ABNORMAL MG/DL
RBC # BLD AUTO: 3.46 M/UL (ref 4–5.2)
RBC CLUMPS #/AREA URNS AUTO: 5 /HPF (ref 0–4)
SLIDE REVIEW: ABNORMAL
SODIUM SERPL-SCNC: 134 MMOL/L (ref 136–145)
SP GR UR STRIP.AUTO: 1.02 (ref 1–1.03)
TOXIC GRANULES BLD QL SMEAR: PRESENT
UA DIPSTICK W REFLEX MICRO PNL UR: YES
URN SPEC COLLECT METH UR: ABNORMAL
UROBILINOGEN UR STRIP-ACNC: 1 E.U./DL
WBC # BLD AUTO: 16.6 K/UL (ref 4–11)
WBC #/AREA URNS AUTO: 1 /HPF (ref 0–5)

## 2024-03-25 PROCEDURE — 2580000003 HC RX 258: Performed by: SURGERY

## 2024-03-25 PROCEDURE — 80048 BASIC METABOLIC PNL TOTAL CA: CPT

## 2024-03-25 PROCEDURE — 81001 URINALYSIS AUTO W/SCOPE: CPT

## 2024-03-25 PROCEDURE — 99024 POSTOP FOLLOW-UP VISIT: CPT | Performed by: SURGERY

## 2024-03-25 PROCEDURE — 6370000000 HC RX 637 (ALT 250 FOR IP): Performed by: PHYSICIAN ASSISTANT

## 2024-03-25 PROCEDURE — 85018 HEMOGLOBIN: CPT

## 2024-03-25 PROCEDURE — 1200000000 HC SEMI PRIVATE

## 2024-03-25 PROCEDURE — 6370000000 HC RX 637 (ALT 250 FOR IP): Performed by: NURSE PRACTITIONER

## 2024-03-25 PROCEDURE — 83735 ASSAY OF MAGNESIUM: CPT

## 2024-03-25 PROCEDURE — C9113 INJ PANTOPRAZOLE SODIUM, VIA: HCPCS | Performed by: SURGERY

## 2024-03-25 PROCEDURE — 2580000003 HC RX 258: Performed by: PHYSICIAN ASSISTANT

## 2024-03-25 PROCEDURE — 74019 RADEX ABDOMEN 2 VIEWS: CPT

## 2024-03-25 PROCEDURE — 80076 HEPATIC FUNCTION PANEL: CPT

## 2024-03-25 PROCEDURE — 94640 AIRWAY INHALATION TREATMENT: CPT

## 2024-03-25 PROCEDURE — 6360000002 HC RX W HCPCS: Performed by: SURGERY

## 2024-03-25 PROCEDURE — 74018 RADEX ABDOMEN 1 VIEW: CPT

## 2024-03-25 PROCEDURE — 84100 ASSAY OF PHOSPHORUS: CPT

## 2024-03-25 PROCEDURE — C9113 INJ PANTOPRAZOLE SODIUM, VIA: HCPCS | Performed by: PHYSICIAN ASSISTANT

## 2024-03-25 PROCEDURE — 2500000003 HC RX 250 WO HCPCS: Performed by: SURGERY

## 2024-03-25 PROCEDURE — APPSS15 APP SPLIT SHARED TIME 0-15 MINUTES: Performed by: NURSE PRACTITIONER

## 2024-03-25 PROCEDURE — 85025 COMPLETE CBC W/AUTO DIFF WBC: CPT

## 2024-03-25 PROCEDURE — 71046 X-RAY EXAM CHEST 2 VIEWS: CPT

## 2024-03-25 PROCEDURE — 85014 HEMATOCRIT: CPT

## 2024-03-25 PROCEDURE — APPNB30 APP NON BILLABLE TIME 0-30 MINS: Performed by: NURSE PRACTITIONER

## 2024-03-25 PROCEDURE — 6360000002 HC RX W HCPCS: Performed by: NURSE PRACTITIONER

## 2024-03-25 PROCEDURE — 6360000002 HC RX W HCPCS: Performed by: PHYSICIAN ASSISTANT

## 2024-03-25 PROCEDURE — 2500000003 HC RX 250 WO HCPCS: Performed by: INTERNAL MEDICINE

## 2024-03-25 RX ORDER — BISACODYL 10 MG
10 SUPPOSITORY, RECTAL RECTAL ONCE
Status: COMPLETED | OUTPATIENT
Start: 2024-03-25 | End: 2024-03-25

## 2024-03-25 RX ORDER — OXYMETAZOLINE HYDROCHLORIDE 0.05 G/100ML
2 SPRAY NASAL ONCE
Status: ACTIVE | OUTPATIENT
Start: 2024-03-25 | End: 2024-03-28

## 2024-03-25 RX ORDER — METOCLOPRAMIDE HYDROCHLORIDE 5 MG/ML
10 INJECTION INTRAMUSCULAR; INTRAVENOUS EVERY 6 HOURS
Status: DISCONTINUED | OUTPATIENT
Start: 2024-03-25 | End: 2024-03-26

## 2024-03-25 RX ORDER — LIDOCAINE HYDROCHLORIDE 20 MG/ML
JELLY TOPICAL ONCE
Status: DISCONTINUED | OUTPATIENT
Start: 2024-03-25 | End: 2024-03-25 | Stop reason: RX

## 2024-03-25 RX ADMIN — PANTOPRAZOLE SODIUM 40 MG: 40 INJECTION, POWDER, FOR SOLUTION INTRAVENOUS at 08:35

## 2024-03-25 RX ADMIN — FUROSEMIDE 20 MG: 10 INJECTION, SOLUTION INTRAMUSCULAR; INTRAVENOUS at 08:36

## 2024-03-25 RX ADMIN — METOCLOPRAMIDE 10 MG: 5 INJECTION, SOLUTION INTRAMUSCULAR; INTRAVENOUS at 22:54

## 2024-03-25 RX ADMIN — DEXTROSE MONOHYDRATE 125 ML: 100 INJECTION, SOLUTION INTRAVENOUS at 22:43

## 2024-03-25 RX ADMIN — METOCLOPRAMIDE 10 MG: 5 INJECTION, SOLUTION INTRAMUSCULAR; INTRAVENOUS at 16:04

## 2024-03-25 RX ADMIN — SODIUM CHLORIDE 3000 MG: 900 INJECTION INTRAVENOUS at 04:05

## 2024-03-25 RX ADMIN — Medication 10 ML: at 08:41

## 2024-03-25 RX ADMIN — SODIUM CHLORIDE 3000 MG: 900 INJECTION INTRAVENOUS at 14:28

## 2024-03-25 RX ADMIN — HYDROMORPHONE HYDROCHLORIDE 0.25 MG: 1 INJECTION, SOLUTION INTRAMUSCULAR; INTRAVENOUS; SUBCUTANEOUS at 09:34

## 2024-03-25 RX ADMIN — SODIUM CHLORIDE 3000 MG: 900 INJECTION INTRAVENOUS at 20:30

## 2024-03-25 RX ADMIN — I.V. FAT EMULSION 250 ML: 20 EMULSION INTRAVENOUS at 19:01

## 2024-03-25 RX ADMIN — ASCORBIC ACID, VITAMIN A PALMITATE, CHOLECALCIFEROL, THIAMINE HYDROCHLORIDE, RIBOFLAVIN-5 PHOSPHATE SODIUM, PYRIDOXINE HYDROCHLORIDE, NIACINAMIDE, DEXPANTHENOL, ALPHA-TOCOPHEROL ACETATE, VITAMIN K1, FOLIC ACID, BIOTIN, CYANOCOBALAMIN: 200; 3300; 200; 6; 3.6; 6; 40; 15; 10; 150; 600; 60; 5 INJECTION, SOLUTION INTRAVENOUS at 18:22

## 2024-03-25 RX ADMIN — METOCLOPRAMIDE 10 MG: 5 INJECTION, SOLUTION INTRAMUSCULAR; INTRAVENOUS at 12:04

## 2024-03-25 RX ADMIN — SODIUM CHLORIDE 3000 MG: 900 INJECTION INTRAVENOUS at 08:34

## 2024-03-25 RX ADMIN — ONDANSETRON 4 MG: 2 INJECTION INTRAMUSCULAR; INTRAVENOUS at 09:11

## 2024-03-25 RX ADMIN — ONDANSETRON 4 MG: 2 INJECTION INTRAMUSCULAR; INTRAVENOUS at 02:14

## 2024-03-25 RX ADMIN — INSULIN GLARGINE 18 UNITS: 100 INJECTION, SOLUTION SUBCUTANEOUS at 20:18

## 2024-03-25 RX ADMIN — BISACODYL 10 MG: 10 SUPPOSITORY RECTAL at 12:04

## 2024-03-25 RX ADMIN — PANTOPRAZOLE SODIUM 8 MG/HR: 40 INJECTION, POWDER, FOR SOLUTION INTRAVENOUS at 22:54

## 2024-03-25 RX ADMIN — Medication 2 PUFF: at 21:06

## 2024-03-25 RX ADMIN — PANTOPRAZOLE SODIUM 8 MG/HR: 40 INJECTION, POWDER, FOR SOLUTION INTRAVENOUS at 12:05

## 2024-03-25 RX ADMIN — HYDROMORPHONE HYDROCHLORIDE 0.25 MG: 1 INJECTION, SOLUTION INTRAMUSCULAR; INTRAVENOUS; SUBCUTANEOUS at 03:43

## 2024-03-25 ASSESSMENT — PAIN SCALES - GENERAL
PAINLEVEL_OUTOF10: 8
PAINLEVEL_OUTOF10: 0
PAINLEVEL_OUTOF10: 8
PAINLEVEL_OUTOF10: 8

## 2024-03-25 ASSESSMENT — PAIN DESCRIPTION - DESCRIPTORS: DESCRIPTORS: BURNING

## 2024-03-25 ASSESSMENT — PAIN DESCRIPTION - ORIENTATION: ORIENTATION: MID

## 2024-03-25 ASSESSMENT — PAIN DESCRIPTION - LOCATION: LOCATION: ABDOMEN

## 2024-03-25 NOTE — PROGRESS NOTES
AUDRA notified for: pt has continued to vomit green bile, maybe has gone 1 hour without. She is very bloated as well NGT removed on 3/23. Pt is requesting the NGT be replaced as she is miserable. Hypoactive BS on LUQ/LLQ; absent on R side per RN JONATHAN Roldan.  Ordered NG placement protocol. Will add STAT KUB. Updated RN to monitor closely.

## 2024-03-25 NOTE — PROGRESS NOTES
Clinical Pharmacy Note    Pharmacy consulted by Aria Carter to manage TPN    Goal TPN rate: 80 ml/hr    Access: CVC  Indication: SBO    Labs:  General Labs:  BMP:    Lab Results   Component Value Date/Time     03/25/2024 06:00 AM    K 3.7 03/25/2024 06:00 AM    K 3.4 03/15/2024 02:15 AM    CL 99 03/25/2024 06:00 AM    CO2 21 03/25/2024 06:00 AM    BUN 25 03/25/2024 06:00 AM    LABALBU 3.6 03/25/2024 06:00 AM    CREATININE 0.6 03/25/2024 06:00 AM    CALCIUM 9.5 03/25/2024 06:00 AM    GFRAA >60 11/24/2020 06:29 PM    GFRAA >60 01/13/2013 05:24 AM    LABGLOM >60 03/25/2024 06:00 AM    GLUCOSE 201 03/25/2024 06:00 AM       Electrolyte replacement as follows:   None needed    Blood sugars over past 24 hours: 150 - 194    Blood sugar management:  Continue Humalog q4 hour sliding scale at medium dose.  Continue Lantus 12 units QHS       Will continue TPN at 80 ml/hr.     Thank you for allowing pharmacy to participate in the care of this patient.    Nicole Norton, PharmD Candidate 2024  Pharmacy Student

## 2024-03-25 NOTE — PROGRESS NOTES
Hospitalist Progress Note    Name:  Ana Gonzales    /Age/Sex: 1955  (68 y.o. female)  MRN & CSN:  [id number] & [id number]    PCP: Mellisa Houston MD    Date of Admission: 3/12/2024    Patient Status:  Inpatient     Chief Complaint:   Chief Complaint   Patient presents with    Bradycardia     Pt coming in with  pt has been bradycardic, weakness, slurred speech for a few days.  Vomiting yesterday with black emesis        Hospital Course:   Admitted for bradycardia with slurred speech. Concern for TIA. CT head non acute, but CTA showed severe narrowing of distal left P1 segment of left PCA, but otherwise imaging was non acute. MRI brain was non acute.    However, patient had low hemoglobin on admission and required 2U PRBC.  GI consulted and patient underwent an EGD on 3/13 with GI and did not find any active bleeding, but did find blood clots and a large amount of liquid in the stomach. Placed on PPI gtt.    She had a repeat EGD on 3/14 and did not show any bleed, but 2 clips were placed on small healing ulcer. Still showed blood clots in the stomach.    CT abd/pel on 3/15 showed dilated stomach and small loops reflecting a partial SBO.    CT neck shows left sided neck edema. Started on decadron and unasyn. ENT consulted.    Developed some delirium the night of 3/21.   NG removed over the weekend. The replace the evening of 3/24 for vomiting. Had some hematemesis morning of 3/25.      Subjective:  Today is:  Hospital Day: 14.  Patient seen and examined in [facility]-4457/4457-02.   Was miserable last night. Vomiting numerous times. Then had NG placed yielding 2l out. Then started vomiting blood.      Medications:  Reviewed    Infusion Medications    pantoprazole (PROTONIX) 80 mg in sodium chloride 0.9 % 100 mL infusion      PN-Adult Premix 5/20 - Standard Electrolytes - Central Line      PN-Adult Premix 5/20 - Standard Electrolytes - Central Line 80 mL/hr at 24 0546    sodium chloride       Dr Morales. On TPN. Started clears yesterday and had NG removed but was replaced on evening of 3/24 for vomiting.Xray revealed ileus.   Urinary retention- placed 3/21. UA unremarkable.Muniz removed 3/23  4. Leukocytosis-up today. Suspect reactive.     5. Acute Parotitis--CT soft tissue neck w/wo contrast shows left neck edema.Decadron discontinued 3/20. Continue warm compress    6. Supraglottitis, Laryngopharyngeal reflex--resolved per ENT  -off decadron decadron.    7. TIA - resolved. Stroke like symptoms likely 2/2 ABLA and not an actual TIA. MRI head non acute.-Echo 3/13 showed LVEF 55-60%, no RWMA.-Neurology has seen    8. Chronic diastolic HF--Not in an acute exacerbation.    9.Bigeminy  -on b blocker and calcium channel blocker  -EP has seen  -will need stress test as well as outpatient cardiac MRI    10.Type 2 diabetes  -SSI        Discussed management of the case with GI who recommended PPI BID for 8 weeks.    Drugs that require monitoring for toxicity include: Subq Insulin and the method of monitoring was/is BG    DVT ppx: Contraindicated  GI ppx: PPI  Diet: PN-Adult Premix 5/20 - Standard Electrolytes - Central Line  Diet NPO  PN-Adult Premix 5/20 - Standard Electrolytes - Central Line  Code Status: Full Code    PT/OT Eval Status: ordered    Disposition:  TBD      Lynsey Garza PA-C  3/25/2024  11:11 AM

## 2024-03-25 NOTE — PROGRESS NOTES
2011: Shift assessment completed. VSS. Medications given per MAR. Pt has been vomiting consistently, green bile. Pt feels more comfortable in the chair. Bedside table and call light within reach. The care plan and education has been reviewed and mutually agreed upon with the patient.     0300: NGT ordered.     0330: NGT placed. Xray in place. 2000mL of green bile out immediately     0450: hospitalist ordered NPO and CT with contrast of abdomen d/t possible illeus noted on XR    Linette Roldan RN

## 2024-03-25 NOTE — PLAN OF CARE
Problem: Pain  Goal: Verbalizes/displays adequate comfort level or baseline comfort level  Outcome: Progressing     Problem: Discharge Planning  Goal: Discharge to home or other facility with appropriate resources  Outcome: Progressing     Problem: Safety - Adult  Goal: Free from fall injury  Outcome: Progressing     Problem: Chronic Conditions and Co-morbidities  Goal: Patient's chronic conditions and co-morbidity symptoms are monitored and maintained or improved  Outcome: Progressing     Problem: ABCDS Injury Assessment  Goal: Absence of physical injury  Outcome: Progressing     Problem: Skin/Tissue Integrity  Goal: Absence of new skin breakdown  Description: 1.  Monitor for areas of redness and/or skin breakdown  2.  Assess vascular access sites hourly  3.  Every 4-6 hours minimum:  Change oxygen saturation probe site  4.  Every 4-6 hours:  If on nasal continuous positive airway pressure, respiratory therapy assess nares and determine need for appliance change or resting period.  Outcome: Progressing     Problem: Nutrition Deficit:  Goal: Optimize nutritional status  Outcome: Progressing     Problem: Musculoskeletal - Adult  Goal: Return mobility to safest level of function  Outcome: Progressing  Goal: Maintain proper alignment of affected body part  Outcome: Progressing     Problem: Gastrointestinal - Adult  Goal: Minimal or absence of nausea and vomiting  Outcome: Progressing  Goal: Maintains or returns to baseline bowel function  Outcome: Progressing     Problem: Genitourinary - Adult  Goal: Absence of urinary retention  Outcome: Progressing     Problem: Metabolic/Fluid and Electrolytes - Adult  Goal: Electrolytes maintained within normal limits  Outcome: Progressing  Goal: Hemodynamic stability and optimal renal function maintained  Outcome: Progressing  Goal: Glucose maintained within prescribed range  Outcome: Progressing

## 2024-03-25 NOTE — PROGRESS NOTES
Occupational and Physical Therapy  Ana Gonzales    Nurse stated to please hold this date due to medical status. Nurse stated had a terrible night and appears to be vomiting blood.       Will continue to monitor and will see for PT/OT once medically cleared.   in room and aware that therapy will be held this date.    Thank you,  Josephine Sebastian, OTR/L [id number]

## 2024-03-25 NOTE — PROGRESS NOTES
Gastroenterology Progress Note      Ana Gonzales is a 68 y.o. female patient.  1. Slurred speech    2. Ventricular bigeminy    3. Elevated troponin    4. Upper GI bleed    5. Hematemesis, unspecified whether nausea present        SUBJECTIVE:  Pt seen previously this admission for coffee ground emesis. EGD 3/14 revealed gastric body ulcer with pigmented spot s/p 2 clips.  She was later found to have SBO and underwent surgery 3/20. She improved. NG tube was removed Saturday. Overnight she had nausea and bilious vomiting. NG tube placed with 2 L bilious gastric aspirate. She did have a nosebleed when the NG tube was placed.  4 hours later she vomited red blood with clots.  NG tube currently draining brown and green aspirate.  Denies abdominal pain.  Only had 1 BM this admission.  Passing some flatus today.        Physical    VITALS:  /75   Pulse 100   Temp 97.5 °F (36.4 °C) (Axillary)   Resp 18   Ht 1.6 m (5' 3\")   Wt 96.6 kg (213 lb)   SpO2 92%   BMI 37.73 kg/m²   TEMPERATURE:  Current - Temp: 97.5 °F (36.4 °C); Max - Temp  Av.5 °F (36.4 °C)  Min: 97.3 °F (36.3 °C)  Max: 97.5 °F (36.4 °C)    NAD  RRR, Nl s1s2  Lungs CTA Bilaterally, normal effort  Abdomen soft, ND, NT, no HSM, Bowel sounds normal  AAOx3     Data    Data Review:    Recent Labs     24  0423 24  0500 24  0600   WBC 10.7 11.3* 16.6*   HGB 8.7* 8.7* 9.9*   HCT 27.2* 26.2* 30.5*   MCV 89.8 89.0 88.3    364 510*     Recent Labs     24  0423 24  0500 24  0600    136 134*   K 3.8 4.2 3.7    103 99   CO2 20* 23 21   PHOS 3.0 4.0 4.4   BUN 14 18 25*   CREATININE <0.5* 0.5* 0.6     Recent Labs     24  0600   AST 12*   ALT 9*   BILIDIR <0.2   BILITOT <0.2   ALKPHOS 91     No results for input(s): \"LIPASE\", \"AMYLASE\" in the last 72 hours.  No results for input(s): \"PROTIME\", \"INR\" in the last 72 hours.  No results for input(s): \"PTT\" in the last 72 hours.     AXR  continue on proton pump inhibitor    And do an upper endoscopic evaluation tomorrow due to the fact that she has already had ulcers which bled from the gastric body    Vinay Verdin MD

## 2024-03-25 NOTE — PROGRESS NOTES
[facility]        Progress Note    Patient Name: Ana Gonzales  MRN: [id number]  YOB: 1955  Date of Evaluation: 3/25/2024    Postoperative Day #6    Subjective:  Nausea and vomiting overnight, NGT placed then had bloody output/vomiting, high NGT output  Pain controlled  No further stool, bloating better following NGT placement  Resting in bed at this time      Vital Signs:  Patient Vitals for the past 24 hrs:   BP Temp Temp src Pulse Resp SpO2 Height Weight   24 1139 (!) 111/59 97.6 °F (36.4 °C) Axillary 93 17 93 % -- --   24 1004 -- -- -- -- 16 -- -- --   24 0827 -- -- -- -- -- -- 1.6 m (5' 2.99\") --   24 0815 133/75 97.5 °F (36.4 °C) Axillary 100 18 92 % -- --   24 0600 -- -- -- -- -- -- -- 96.6 kg (213 lb)   24 0515 -- -- -- -- -- -- -- 96.6 kg (213 lb)   24 0413 -- -- -- -- 18 -- -- --   24 0346 135/77 -- -- -- -- -- -- --   24 0343 -- -- -- -- 18 -- -- --   24 0012 -- -- -- -- 18 -- -- --   24 2342 -- -- -- -- 18 -- -- --   24 -- -- -- -- 18 -- -- --   24 -- -- -- 90 18 95 % -- --   24 119/65 97.3 °F (36.3 °C) Oral 85 18 95 % -- --   24 1728 -- -- -- -- 18 -- -- --   24 1645 (!) 141/78 97.5 °F (36.4 °C) Axillary 77 16 99 % -- --   24 1315 (!) 149/50 97.5 °F (36.4 °C) Axillary 79 16 99 % -- --        TEMPERATURE HISTORY 24H: Temp (24hrs), Av.5 °F (36.4 °C), Min:97.3 °F (36.3 °C), Max:97.6 °F (36.4 °C)    BLOOD PRESSURE HISTORY: Systolic (36hrs), Av , Min:111 , Max:149    Diastolic (36hrs), Av, Min:50, Max:78      Intake/Output:  I/O last 3 completed shifts:  In: 3951.2 [P.O.:510; IV Piggyback:567.3]  Out: 2900 [Urine:400; Emesis/NG output:2500]  I/O this shift:  In: -   Out: 200 [Urine:200]  Drain/tube Output:       Physical Exam:  General: awake, alert, oriented to person, place, time  Lungs: unlabored respirations  Abdomen:  only, incision healing well, no further stool, bloating better following NGT placement, vitals stable, increased leucocytosis, stable hemoglobin, CXR/AXR reviewed; continued supportive care, PPI, GI reevaluating--possible EGD today or tomorrow  2. NPO with NGT decompression; monitor bowel function, give Dulcolax suppository and Reglan  3. TPN; monitor and correct electrolytes  4. Activity as tolerated, ambulate TID--PT/OT following  5. Pulmonary toilet, incentive spirometry  6. PRN analgesics and antiemetics--minimizing narcotics as tolerated  7. DVT prophylaxis with SCD's; hold Lovenox and Plavix  8. Management of medical comorbid etiologies per primary team and consulting services    EDUCATION:  Educated patient on plan of care and disease process--all questions answered.    Plans discussed with patient and nursing.  Reviewed and discussed with Dr. Morales.      Signed:  Aria Carter, VALE - CNP  3/25/2024 12:41 PM    Postop day #6  Had nausea and vomiting after NG tube removed over the weekend  NG tube reinserted  Had large volume of NG tube output  Abdominal x-ray showed findings consistent with an ileus  Chest x-ray unremarkable  Add IV Reglan  Try Dulcolax suppository  Continue TPN  Increase ambulation

## 2024-03-25 NOTE — PROGRESS NOTES
Pt had hematemesis this morning; MD notified. Shift assessment completed. Neuro WNL. Denies any pain at this time. AM meds administered per MAR. PO meds held. Pt remains NPO. NGT remains in place; draining brown and green aspirate. The care plan and education has been reviewed and mutually agreed upon with the patient. Standard safety measures in place.  at bedside.

## 2024-03-26 ENCOUNTER — ANESTHESIA (OUTPATIENT)
Dept: ENDOSCOPY | Age: 69
End: 2024-03-26
Payer: MEDICARE

## 2024-03-26 ENCOUNTER — ANESTHESIA EVENT (OUTPATIENT)
Dept: ENDOSCOPY | Age: 69
End: 2024-03-26
Payer: MEDICARE

## 2024-03-26 LAB
ANION GAP SERPL CALCULATED.3IONS-SCNC: 12 MMOL/L (ref 3–16)
BASOPHILS # BLD: 0.1 K/UL (ref 0–0.2)
BASOPHILS NFR BLD: 0.6 %
BUN SERPL-MCNC: 25 MG/DL (ref 7–20)
CALCIUM SERPL-MCNC: 9.3 MG/DL (ref 8.3–10.6)
CHLORIDE SERPL-SCNC: 103 MMOL/L (ref 99–110)
CO2 SERPL-SCNC: 23 MMOL/L (ref 21–32)
CREAT SERPL-MCNC: <0.5 MG/DL (ref 0.6–1.2)
DEPRECATED RDW RBC AUTO: 16.3 % (ref 12.4–15.4)
EOSINOPHIL # BLD: 0.3 K/UL (ref 0–0.6)
EOSINOPHIL NFR BLD: 2.3 %
GFR SERPLBLD CREATININE-BSD FMLA CKD-EPI: >90 ML/MIN/{1.73_M2}
GLUCOSE BLD-MCNC: 139 MG/DL (ref 70–99)
GLUCOSE BLD-MCNC: 153 MG/DL (ref 70–99)
GLUCOSE BLD-MCNC: 171 MG/DL (ref 70–99)
GLUCOSE BLD-MCNC: 172 MG/DL (ref 70–99)
GLUCOSE BLD-MCNC: 182 MG/DL (ref 70–99)
GLUCOSE BLD-MCNC: 196 MG/DL (ref 70–99)
GLUCOSE BLD-MCNC: 218 MG/DL (ref 70–99)
GLUCOSE BLD-MCNC: 88 MG/DL (ref 70–99)
GLUCOSE SERPL-MCNC: 161 MG/DL (ref 70–99)
HCT VFR BLD AUTO: 28.5 % (ref 36–48)
HCT VFR BLD AUTO: 29.5 % (ref 36–48)
HCT VFR BLD AUTO: 30.4 % (ref 36–48)
HGB BLD-MCNC: 9.5 G/DL (ref 12–16)
HGB BLD-MCNC: 9.5 G/DL (ref 12–16)
HGB BLD-MCNC: 9.7 G/DL (ref 12–16)
LYMPHOCYTES # BLD: 1.4 K/UL (ref 1–5.1)
LYMPHOCYTES NFR BLD: 10.8 %
MAGNESIUM SERPL-MCNC: 2 MG/DL (ref 1.8–2.4)
MCH RBC QN AUTO: 28.3 PG (ref 26–34)
MCHC RBC AUTO-ENTMCNC: 32 G/DL (ref 31–36)
MCV RBC AUTO: 88.5 FL (ref 80–100)
MONOCYTES # BLD: 1.1 K/UL (ref 0–1.3)
MONOCYTES NFR BLD: 7.9 %
NEUTROPHILS # BLD: 10.5 K/UL (ref 1.7–7.7)
NEUTROPHILS NFR BLD: 78.4 %
PERFORMED ON: ABNORMAL
PERFORMED ON: NORMAL
PHOSPHATE SERPL-MCNC: 3.8 MG/DL (ref 2.5–4.9)
PLATELET # BLD AUTO: 573 K/UL (ref 135–450)
PMV BLD AUTO: 8.5 FL (ref 5–10.5)
POTASSIUM SERPL-SCNC: 3.4 MMOL/L (ref 3.5–5.1)
RBC # BLD AUTO: 3.44 M/UL (ref 4–5.2)
SODIUM SERPL-SCNC: 138 MMOL/L (ref 136–145)
WBC # BLD AUTO: 13.4 K/UL (ref 4–11)

## 2024-03-26 PROCEDURE — 97116 GAIT TRAINING THERAPY: CPT

## 2024-03-26 PROCEDURE — 3700000001 HC ADD 15 MINUTES (ANESTHESIA): Performed by: INTERNAL MEDICINE

## 2024-03-26 PROCEDURE — 1200000000 HC SEMI PRIVATE

## 2024-03-26 PROCEDURE — 85025 COMPLETE CBC W/AUTO DIFF WBC: CPT

## 2024-03-26 PROCEDURE — 83735 ASSAY OF MAGNESIUM: CPT

## 2024-03-26 PROCEDURE — 2580000003 HC RX 258: Performed by: SURGERY

## 2024-03-26 PROCEDURE — 80048 BASIC METABOLIC PNL TOTAL CA: CPT

## 2024-03-26 PROCEDURE — 2500000003 HC RX 250 WO HCPCS: Performed by: NURSE ANESTHETIST, CERTIFIED REGISTERED

## 2024-03-26 PROCEDURE — 85018 HEMOGLOBIN: CPT

## 2024-03-26 PROCEDURE — 6360000002 HC RX W HCPCS: Performed by: INTERNAL MEDICINE

## 2024-03-26 PROCEDURE — 6370000000 HC RX 637 (ALT 250 FOR IP): Performed by: SURGERY

## 2024-03-26 PROCEDURE — APPSS15 APP SPLIT SHARED TIME 0-15 MINUTES: Performed by: NURSE PRACTITIONER

## 2024-03-26 PROCEDURE — 97535 SELF CARE MNGMENT TRAINING: CPT

## 2024-03-26 PROCEDURE — 94761 N-INVAS EAR/PLS OXIMETRY MLT: CPT

## 2024-03-26 PROCEDURE — 6370000000 HC RX 637 (ALT 250 FOR IP): Performed by: NURSE PRACTITIONER

## 2024-03-26 PROCEDURE — 6360000002 HC RX W HCPCS: Performed by: NURSE PRACTITIONER

## 2024-03-26 PROCEDURE — 0BH18EZ INSERTION OF ENDOTRACHEAL AIRWAY INTO TRACHEA, VIA NATURAL OR ARTIFICIAL OPENING ENDOSCOPIC: ICD-10-PCS | Performed by: INTERNAL MEDICINE

## 2024-03-26 PROCEDURE — APPNB30 APP NON BILLABLE TIME 0-30 MINS: Performed by: NURSE PRACTITIONER

## 2024-03-26 PROCEDURE — 99024 POSTOP FOLLOW-UP VISIT: CPT | Performed by: SURGERY

## 2024-03-26 PROCEDURE — 6360000002 HC RX W HCPCS: Performed by: PHYSICIAN ASSISTANT

## 2024-03-26 PROCEDURE — 7100000000 HC PACU RECOVERY - FIRST 15 MIN: Performed by: INTERNAL MEDICINE

## 2024-03-26 PROCEDURE — 84100 ASSAY OF PHOSPHORUS: CPT

## 2024-03-26 PROCEDURE — C9113 INJ PANTOPRAZOLE SODIUM, VIA: HCPCS | Performed by: PHYSICIAN ASSISTANT

## 2024-03-26 PROCEDURE — 2500000003 HC RX 250 WO HCPCS: Performed by: SURGERY

## 2024-03-26 PROCEDURE — 97530 THERAPEUTIC ACTIVITIES: CPT

## 2024-03-26 PROCEDURE — 2580000003 HC RX 258: Performed by: PHYSICIAN ASSISTANT

## 2024-03-26 PROCEDURE — 7100000001 HC PACU RECOVERY - ADDTL 15 MIN: Performed by: INTERNAL MEDICINE

## 2024-03-26 PROCEDURE — 3700000000 HC ANESTHESIA ATTENDED CARE: Performed by: INTERNAL MEDICINE

## 2024-03-26 PROCEDURE — 6360000002 HC RX W HCPCS: Performed by: NURSE ANESTHETIST, CERTIFIED REGISTERED

## 2024-03-26 PROCEDURE — 2709999900 HC NON-CHARGEABLE SUPPLY: Performed by: INTERNAL MEDICINE

## 2024-03-26 PROCEDURE — 6370000000 HC RX 637 (ALT 250 FOR IP): Performed by: PHYSICIAN ASSISTANT

## 2024-03-26 PROCEDURE — 85014 HEMATOCRIT: CPT

## 2024-03-26 PROCEDURE — 94640 AIRWAY INHALATION TREATMENT: CPT

## 2024-03-26 PROCEDURE — 6360000002 HC RX W HCPCS: Performed by: SURGERY

## 2024-03-26 PROCEDURE — 3609013300 HC EGD TUBE PLACEMENT: Performed by: INTERNAL MEDICINE

## 2024-03-26 RX ORDER — SUCCINYLCHOLINE/SOD CL,ISO/PF 200MG/10ML
SYRINGE (ML) INTRAVENOUS PRN
Status: DISCONTINUED | OUTPATIENT
Start: 2024-03-26 | End: 2024-03-26 | Stop reason: SDUPTHER

## 2024-03-26 RX ORDER — LIDOCAINE HYDROCHLORIDE 20 MG/ML
INJECTION, SOLUTION INFILTRATION; PERINEURAL PRN
Status: DISCONTINUED | OUTPATIENT
Start: 2024-03-26 | End: 2024-03-26 | Stop reason: SDUPTHER

## 2024-03-26 RX ORDER — POTASSIUM CHLORIDE 29.8 MG/ML
20 INJECTION INTRAVENOUS ONCE
Status: COMPLETED | OUTPATIENT
Start: 2024-03-26 | End: 2024-03-26

## 2024-03-26 RX ORDER — BISACODYL 10 MG
10 SUPPOSITORY, RECTAL RECTAL ONCE
Status: COMPLETED | OUTPATIENT
Start: 2024-03-26 | End: 2024-03-26

## 2024-03-26 RX ORDER — METOCLOPRAMIDE HYDROCHLORIDE 5 MG/ML
10 INJECTION INTRAMUSCULAR; INTRAVENOUS EVERY 6 HOURS
Status: COMPLETED | OUTPATIENT
Start: 2024-03-26 | End: 2024-03-28

## 2024-03-26 RX ORDER — PROPOFOL 10 MG/ML
INJECTION, EMULSION INTRAVENOUS PRN
Status: DISCONTINUED | OUTPATIENT
Start: 2024-03-26 | End: 2024-03-26 | Stop reason: SDUPTHER

## 2024-03-26 RX ORDER — ROCURONIUM BROMIDE 10 MG/ML
INJECTION, SOLUTION INTRAVENOUS PRN
Status: DISCONTINUED | OUTPATIENT
Start: 2024-03-26 | End: 2024-03-26 | Stop reason: SDUPTHER

## 2024-03-26 RX ORDER — ONDANSETRON 2 MG/ML
INJECTION INTRAMUSCULAR; INTRAVENOUS PRN
Status: DISCONTINUED | OUTPATIENT
Start: 2024-03-26 | End: 2024-03-26 | Stop reason: SDUPTHER

## 2024-03-26 RX ORDER — DEXAMETHASONE SODIUM PHOSPHATE 4 MG/ML
INJECTION, SOLUTION INTRA-ARTICULAR; INTRALESIONAL; INTRAMUSCULAR; INTRAVENOUS; SOFT TISSUE PRN
Status: DISCONTINUED | OUTPATIENT
Start: 2024-03-26 | End: 2024-03-26 | Stop reason: SDUPTHER

## 2024-03-26 RX ADMIN — FUROSEMIDE 20 MG: 10 INJECTION, SOLUTION INTRAMUSCULAR; INTRAVENOUS at 11:47

## 2024-03-26 RX ADMIN — LIDOCAINE HYDROCHLORIDE 30 MG: 20 INJECTION, SOLUTION INFILTRATION; PERINEURAL at 09:57

## 2024-03-26 RX ADMIN — HYDROMORPHONE HYDROCHLORIDE 0.25 MG: 1 INJECTION, SOLUTION INTRAMUSCULAR; INTRAVENOUS; SUBCUTANEOUS at 02:00

## 2024-03-26 RX ADMIN — HYDROMORPHONE HYDROCHLORIDE 0.25 MG: 1 INJECTION, SOLUTION INTRAMUSCULAR; INTRAVENOUS; SUBCUTANEOUS at 05:39

## 2024-03-26 RX ADMIN — METOCLOPRAMIDE 10 MG: 5 INJECTION, SOLUTION INTRAMUSCULAR; INTRAVENOUS at 22:17

## 2024-03-26 RX ADMIN — INSULIN GLARGINE 18 UNITS: 100 INJECTION, SOLUTION SUBCUTANEOUS at 20:46

## 2024-03-26 RX ADMIN — SODIUM CHLORIDE: 9 INJECTION, SOLUTION INTRAVENOUS at 11:47

## 2024-03-26 RX ADMIN — DEXAMETHASONE SODIUM PHOSPHATE 8 MG: 4 INJECTION, SOLUTION INTRAMUSCULAR; INTRAVENOUS at 10:04

## 2024-03-26 RX ADMIN — ORPHENADRINE CITRATE 60 MG: 60 INJECTION INTRAMUSCULAR; INTRAVENOUS at 22:49

## 2024-03-26 RX ADMIN — METOCLOPRAMIDE 10 MG: 5 INJECTION, SOLUTION INTRAMUSCULAR; INTRAVENOUS at 17:43

## 2024-03-26 RX ADMIN — ONDANSETRON 4 MG: 2 INJECTION INTRAMUSCULAR; INTRAVENOUS at 05:39

## 2024-03-26 RX ADMIN — SODIUM CHLORIDE 3000 MG: 900 INJECTION INTRAVENOUS at 15:41

## 2024-03-26 RX ADMIN — SODIUM CHLORIDE 3000 MG: 900 INJECTION INTRAVENOUS at 03:13

## 2024-03-26 RX ADMIN — PROPOFOL 25 MG: 10 INJECTION, EMULSION INTRAVENOUS at 10:15

## 2024-03-26 RX ADMIN — METOCLOPRAMIDE 10 MG: 5 INJECTION, SOLUTION INTRAMUSCULAR; INTRAVENOUS at 05:09

## 2024-03-26 RX ADMIN — PROPOFOL 100 MG: 10 INJECTION, EMULSION INTRAVENOUS at 09:57

## 2024-03-26 RX ADMIN — PANTOPRAZOLE SODIUM 8 MG/HR: 40 INJECTION, POWDER, FOR SOLUTION INTRAVENOUS at 23:10

## 2024-03-26 RX ADMIN — BISACODYL 10 MG: 10 SUPPOSITORY RECTAL at 17:32

## 2024-03-26 RX ADMIN — Medication 2 PUFF: at 09:21

## 2024-03-26 RX ADMIN — SODIUM CHLORIDE 3000 MG: 900 INJECTION INTRAVENOUS at 20:44

## 2024-03-26 RX ADMIN — HYDROMORPHONE HYDROCHLORIDE 0.25 MG: 1 INJECTION, SOLUTION INTRAMUSCULAR; INTRAVENOUS; SUBCUTANEOUS at 20:56

## 2024-03-26 RX ADMIN — POTASSIUM CHLORIDE 20 MEQ: 29.8 INJECTION, SOLUTION INTRAVENOUS at 17:48

## 2024-03-26 RX ADMIN — ROCURONIUM BROMIDE 10 MG: 10 INJECTION, SOLUTION INTRAVENOUS at 09:57

## 2024-03-26 RX ADMIN — Medication 140 MG: at 09:57

## 2024-03-26 RX ADMIN — ONDANSETRON 4 MG: 2 INJECTION INTRAMUSCULAR; INTRAVENOUS at 10:04

## 2024-03-26 RX ADMIN — PROPOFOL 25 MG: 10 INJECTION, EMULSION INTRAVENOUS at 10:08

## 2024-03-26 RX ADMIN — LEUCINE, PHENYLALANINE, LYSINE, METHIONINE, ISOLEUCINE, VALINE, HISTIDINE, THREONINE, TRYPTOPHAN, ALANINE, GLYCINE, ARGININE, PROLINE, SERINE, TYROSINE, SODIUM ACETATE, DIBASIC POTASSIUM PHOSPHATE, MAGNESIUM CHLORIDE, SODIUM CHLORIDE, CALCIUM CHLORIDE, DEXTROSE
365; 280; 290; 200; 300; 290; 240; 210; 90; 1035; 515; 575; 340; 250; 20; 340; 261; 51; 59; 33; 20 INJECTION INTRAVENOUS at 18:58

## 2024-03-26 RX ADMIN — METOCLOPRAMIDE 10 MG: 5 INJECTION, SOLUTION INTRAMUSCULAR; INTRAVENOUS at 11:47

## 2024-03-26 RX ADMIN — Medication 2 PUFF: at 12:19

## 2024-03-26 RX ADMIN — INSULIN LISPRO 2 UNITS: 100 INJECTION, SOLUTION INTRAVENOUS; SUBCUTANEOUS at 17:46

## 2024-03-26 RX ADMIN — SODIUM CHLORIDE 3000 MG: 900 INJECTION INTRAVENOUS at 11:52

## 2024-03-26 RX ADMIN — PANTOPRAZOLE SODIUM 8 MG/HR: 40 INJECTION, POWDER, FOR SOLUTION INTRAVENOUS at 11:58

## 2024-03-26 RX ADMIN — Medication 2 PUFF: at 21:24

## 2024-03-26 ASSESSMENT — PAIN DESCRIPTION - ORIENTATION
ORIENTATION: LEFT
ORIENTATION: LEFT
ORIENTATION: MID

## 2024-03-26 ASSESSMENT — PAIN - FUNCTIONAL ASSESSMENT
PAIN_FUNCTIONAL_ASSESSMENT: NONE - DENIES PAIN
PAIN_FUNCTIONAL_ASSESSMENT: ACTIVITIES ARE NOT PREVENTED
PAIN_FUNCTIONAL_ASSESSMENT: NONE - DENIES PAIN
PAIN_FUNCTIONAL_ASSESSMENT: 0-10
PAIN_FUNCTIONAL_ASSESSMENT: NONE - DENIES PAIN

## 2024-03-26 ASSESSMENT — PAIN DESCRIPTION - LOCATION
LOCATION: ABDOMEN
LOCATION: NECK

## 2024-03-26 ASSESSMENT — PAIN DESCRIPTION - DESCRIPTORS
DESCRIPTORS: THROBBING
DESCRIPTORS: THROBBING
DESCRIPTORS: BURNING

## 2024-03-26 ASSESSMENT — PAIN SCALES - GENERAL
PAINLEVEL_OUTOF10: 2
PAINLEVEL_OUTOF10: 8
PAINLEVEL_OUTOF10: 8
PAINLEVEL_OUTOF10: 2
PAINLEVEL_OUTOF10: 8
PAINLEVEL_OUTOF10: 8

## 2024-03-26 ASSESSMENT — PAIN DESCRIPTION - PAIN TYPE: TYPE: ACUTE PAIN

## 2024-03-26 NOTE — PROGRESS NOTES
Clinical Pharmacy Note    Pharmacy consulted by Aria Carter to manage TPN    Goal TPN rate: 80 ml/hr    Access: CVC  Indication: SBO    Labs:  General Labs:  BMP:    Lab Results   Component Value Date/Time     03/26/2024 06:30 AM    K 3.4 03/26/2024 06:30 AM    K 3.4 03/15/2024 02:15 AM     03/26/2024 06:30 AM    CO2 23 03/26/2024 06:30 AM    BUN 25 03/26/2024 06:30 AM    LABALBU 3.6 03/25/2024 06:00 AM    CREATININE <0.5 03/26/2024 06:30 AM    CALCIUM 9.3 03/26/2024 06:30 AM    GFRAA >60 11/24/2020 06:29 PM    GFRAA >60 01/13/2013 05:24 AM    LABGLOM >90 03/26/2024 06:30 AM    GLUCOSE 161 03/26/2024 06:30 AM       Electrolyte replacement as follows:   K dropped from 3.7 ? 3.4. Ordered 20 meq for replacement.     Blood sugars over past 24 hours: 139 -219    Blood sugar management:  Continue Humalog q4 hour sliding scale at medium dose.  Continue Lantus 12 units QHS       Will continue TPN at 80 ml/hr.     Thank you for allowing pharmacy to participate in the care of this patient.    Nicole Norton, PharmD Candidate 2024  Pharmacy Student

## 2024-03-26 NOTE — PROGRESS NOTES
Adm to pacu bay # 10 from endo per bed awakening. NG patent rt nare to low intermittent suction draining tan fluid. Respirations easy & symmetrical. Abdomen distended. Incision clean & well approximated with surgical glue. Denies pain. VSS. Report received from endo staff.
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
## 2024-03-26 NOTE — PROGRESS NOTES
[facility] - Inpatient Rehabilitation Department   Phone: [phone]    Occupational Therapy    [] Initial Evaluation            [x] Daily Treatment Note         [] Discharge Summary      Patient: Ana Gonzales   : 1955   MRN: [id number]   Date of Service:  3/26/2024    Admitting Diagnosis:  SBO (small bowel obstruction) ([facility])    Current Admission Summary: Admitted for bradycardia with slurred speech. Concern for TIA. CT head non acute, but CTA showed severe narrowing of distal left P1 segment of left PCA, but otherwise imaging was non acute. MRI brain was non acute.   However, patient had low hemoglobin on admission and required 2U PRBC.  GI consulted and patient underwent an EGD on 3/13 with GI and did not find any active bleeding, but did find blood clots and a large amount of liquid in the stomach. Placed on PPI gtt.   She had a repeat EGD on 3/14 and did not show any bleed, but 2 clips were placed on small healing ulcer. Still showed blood clots in the stomach.CT abd/pel on 3/15 showed dilated stomach and small loops reflecting a partial SBO.   CT neck shows left sided neck edema. Started on decadron and unasyn. ENT consulted.      Past Medical History:  has a past medical history of Arthritis, Asthma, Brain injury ([facility]), Bronchitis, CHF (congestive heart failure) ([facility]), Depression, Diabetes mellitus ([facility]), DM (diabetes mellitus screen), Elevated cholesterol with high triglycerides, Fall, Fibromyalgia, GERD (gastroesophageal reflux disease), HIGH CHOLESTEROL, Hypertension, Microvascular angina, Migraine, Neuropathy, Panic attacks, PONV (postoperative nausea and vomiting), Post traumatic stress disorder, Short-term memory loss, Skin cancer, Sleep apnea, and Vertigo.  Past Surgical History:  has a past surgical history that includes Hysterectomy; eye surgery (cataract B); Carpal tunnel release; Finger trigger release; Gallbladder surgery; bladder suspension; Skin cancer excision;    Normotonic    ROM:   (B) UE AROM WFL  Strength:   (B) UE strength grossly WFL    Therapist Clinical Decision Making (Complexity): medium complexity  Clinical Presentation: evolving      Subjective: Patient in semi-fowlers in bed, IJ and NG tube on suction, TPN running, ice chips only. Patient pleasant and cooperative with therapy. Family present at beginning and end of session.       General: Pt with extended time for activity and vcs for ADLs.  Pain: 5/10 Location: Abdomen end of session, c/o sore neck @ 8/10.   Pain Interventions: Pain medication in place upon arrival; therapy activities modified, RN notified     Activities of Daily Living    Basic Activities of Daily Living  Feeding Comments: ice chips only   Grooming: setup assistance  Grooming Comments: setup to brush teeth sitting in chair, therapist bruised patient's hair  Toileting: dependent.    Toileting Equipment: none  Toileting Comments: ambulated into bathroom, urinated in commode, bm smear on paper only.   General Comments: ambulated into bathroom with RW, slow and steady gait.        Instrumental Activities of Daily Living  No IADL completed on this date.   Other Therapeutic Interventions    Functional Mobility    Bed Mobility  Supine to Sit: minimal assistance  Scooting: minimal assistance  Comments: supine to sit EOB with use of bed rail      Transfers   Sit to stand transfer:contact guard assistance  Stand to sit transfer: contact guard assistance  Bed / Chair transfer: contact guard assistance.    Toilet transfer: contact guard assistance  Toilet transfer equipment: standard toilet  Toilet transfer comments: vcs for hand placement and assist with rw , slow and steady gait pattern with RW  Comments:  Functional Mobility:  Functional Mobility Activity: to/from bathroom  Device Use: rolling walker  Required Assistance: contact guard assistance  Comment: RW x 15 feet x 2 (from bed into bathroom and then back to recliner chair)    After rest from

## 2024-03-26 NOTE — PROGRESS NOTES
Ana VASQUEZ Christian  3/25/2024  [id number]    Chief Complaint: SBO (small bowel obstruction) (HCC)    Subjective   NGT replaced last evening for emesis. Having some epistaxis post-placement. XR confirmed ileus. IV Reglan added per Gen Surg. GI planning for possible EGD.     Patient reports that she is feeling better after NGT replaced. Denies any nausea, abdominal pain.          Objective   Objective:  Patient Vitals for the past 24 hrs:   BP Temp Temp src Pulse Resp SpO2 Height Weight   03/25/24 2012 (!) 160/99 97.3 °F (36.3 °C) Axillary 97 18 96 % -- --   03/25/24 1754 130/80 97.7 °F (36.5 °C) Oral (!) 105 17 94 % -- --   03/25/24 1139 (!) 111/59 97.6 °F (36.4 °C) Axillary 93 17 93 % -- --   03/25/24 1004 -- -- -- -- 16 -- -- --   03/25/24 0827 -- -- -- -- -- -- 1.6 m (5' 2.99\") --   03/25/24 0815 133/75 97.5 °F (36.4 °C) Axillary 100 18 92 % -- --   03/25/24 0600 -- -- -- -- -- -- -- 96.6 kg (213 lb)   03/25/24 0515 -- -- -- -- -- -- -- 96.6 kg (213 lb)   03/25/24 0413 -- -- -- -- 18 -- -- --   03/25/24 0346 135/77 -- -- -- -- -- -- --   03/25/24 0343 -- -- -- -- 18 -- -- --   03/25/24 0012 -- -- -- -- 18 -- -- --   03/24/24 2342 -- -- -- -- 18 -- -- --     Gen: No distress, pleasant.   HEENT: NGT in place to suction.   CV: No audible murmurs, well perfused extremities  Resp: No respiratory distress. No increased WOB  Abd: Soft, nontender nondistended  Ext: No edema.   Neuro: Alert, oriented, appropriately interactive. 4/5 strength throughout.     Laboratory data: Available via EMR. Hypokalemic, improved post-repletion.     Therapy progress:    PT    Rolling: Level of difficulty - A Lot   Sit to Stand from a Chair: Level of difficulty - A Little  Supine to Sit: Level of difficulty - A Lot     Bed to Chair: Physical Assistance Required - A Little  Ambulate Across Room: Physical Assistance Required - A Little  Ascend 3-5 Steps With HR: Physical Assistance Required - A Little    OT    Eating: Physical Assistance

## 2024-03-26 NOTE — PROGRESS NOTES
2012: Shift assessment completed. VSS. Medications given per MAR. Bedside table and call light within reach. The care plan and education has been reviewed and mutually agreed upon with the patient.     .Linette Roldan RN

## 2024-03-26 NOTE — ANESTHESIA POSTPROCEDURE EVALUATION
Department of Anesthesiology  Postprocedure Note    Patient: Ana Gonzales  MRN: [id number]  YOB: 1955  Date of evaluation: 3/26/2024    Procedure Summary       Date: 03/26/24 Room / Location: [place] [address] / [facility]    Anesthesia Start: 0953 Anesthesia Stop: 1027    Procedure: ESOPHAGOGASTRODUODENOSCOPY JEJUNOSTOMY TUBE PLACEMENT (Abdomen) Diagnosis:       Coffee ground emesis      (Coffee ground emesis [K92.0])    Surgeons: Vinay Verdin MD Responsible Provider:     Anesthesia Type: general ASA Status: 3            Anesthesia Type: No value filed.    Hortensia Phase I: Hortensia Score: 10    Hortensia Phase II:      Anesthesia Post Evaluation    Patient location during evaluation: PACU  Patient participation: complete - patient participated  Level of consciousness: awake  Airway patency: patent  Nausea & Vomiting: no vomiting  Cardiovascular status: hemodynamically stable  Respiratory status: acceptable  Hydration status: euvolemic  There was medical reason for not using a multimodal analgesia pain management approach.Pain management: adequate    No notable events documented.
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
## 2024-03-26 NOTE — PROGRESS NOTES
Awake alert & oriented. Respirations easy & symmetrical. NG patent to rt nare & draining tan fluid. Abdomen distended. Denies pain. VSS. Transferred to floor per Leroy transporter.

## 2024-03-26 NOTE — PROGRESS NOTES
Hospitalist Progress Note    Name:  Ana Gonzales    /Age/Sex: 1955  (68 y.o. female)  MRN & CSN:  [id number] & [id number]    PCP: Mellisa Houston MD    Date of Admission: 3/12/2024    Patient Status:  Inpatient     Chief Complaint:   Chief Complaint   Patient presents with    Bradycardia     Pt coming in with  pt has been bradycardic, weakness, slurred speech for a few days.  Vomiting yesterday with black emesis        Hospital Course:   Admitted for bradycardia with slurred speech. Concern for TIA. CT head non acute, but CTA showed severe narrowing of distal left P1 segment of left PCA, but otherwise imaging was non acute. MRI brain was non acute.    However, patient had low hemoglobin on admission and required 2U PRBC.  GI consulted and patient underwent an EGD on 3/13 with GI and did not find any active bleeding, but did find blood clots and a large amount of liquid in the stomach. Placed on PPI gtt.    She had a repeat EGD on 3/14 and did not show any bleed, but 2 clips were placed on small healing ulcer. Still showed blood clots in the stomach.    CT abd/pel on 3/15 showed dilated stomach and small loops reflecting a partial SBO.    CT neck shows left sided neck edema. Started on decadron and unasyn. ENT consulted.    Developed some delirium the night of 3/21.   NG removed over the weekend. The replace the evening of 3/24 for vomiting. Had some hematemesis morning of 3/25.  EGD 3/26  Postoperative Diagnosis: #1 blood in the posterior pharynx possibly from trauma from previous NG to  #2 small ulcer in the mid esophagus from NG tube trauma  #3 chronic inflammation at the gastroesophageal  #4 ulcers that were clipped previously showed no evidence or signs of bleeding  #5 multiple suction marks in the fundus of the stomach secondary to  #6 lots of fluid in the duodenum consistent with ileus    Subjective:  Today is:  Hospital Day: 15.  Patient seen and examined in [id number].   Had  abnormality.      Minimal chronic microvascular disease.         XR CHEST PORTABLE   Final Result   Interval placement a right IJ central venous catheter without discrete   pneumothorax seen.         CTA HEAD NECK W CONTRAST   Final Result   1. No acute intracranial abnormality within constraints of CT acquisition.   2. Severe narrowing of the distal left P1 segment with otherwise fetal origin   of the left PCA.   3. Otherwise, no significant stenosis elsewhere within the head or neck.  No   large vessel occlusion.         CT HEAD WO CONTRAST   Final Result   1. No acute intracranial abnormality within constraints of CT acquisition.   2. Severe narrowing of the distal left P1 segment with otherwise fetal origin   of the left PCA.   3. Otherwise, no significant stenosis elsewhere within the head or neck.  No   large vessel occlusion.         XR CHEST PORTABLE   Final Result   No significant findings in the chest.                 Assessment/Plan:    Active Hospital Problems    Diagnosis     Mild malnutrition ([facility]) [E44.1]     Supraglottitis without airway obstruction [J04.30]     Acute parotitis [K11.21]     Laryngopharyngeal reflux (LPR) [K21.9]     Ventricular bigeminy [I49.8]     HANS (acute kidney injury) ([facility]) [N17.9]     SBO (small bowel obstruction) ([facility]) [K56.609]     Upper GI bleed [K92.2]     Slurred speech [R47.81]     Dysarthria [R47.1]     PVC's (premature ventricular contractions) [I49.3]     Acute prerenal azotemia [N19]     History of heart artery stent [Z95.5]     (HFpEF) heart failure with preserved ejection fraction ([facility]) [I50.30]     Coronary artery disease involving native coronary artery of native heart without angina pectoris [I25.10]     TIA (transient ischemic attack) [G45.9]     Type 2 diabetes mellitus, without long-term current use of insulin ([facility]) [E11.9]     HTN (hypertension), benign [I10]     Hyperlipidemia [E78.5]          Hospital Day: 15    This is a 68 y.o. female who presented to

## 2024-03-26 NOTE — PROGRESS NOTES
repositioned , and therapy activities modified       Functional Mobility    Bed Mobility  Supine to Sit: minimal assistance  Rolling Left: minimal assistance  Scooting: minimal assistance  Comments: HOB elevated, SBA sitting balance EOB.  Transfers  Sit to stand transfer: contact guard assistance  Stand to sit transfer: contact guard assistance  Toilet transfer: contact guard assistance  Comments: Pt coming to stand from EOB and immediately passing flatus. Pt reporting feeling as if she had BM. Pt reaching restroom and completing toileting with OT (see OT note)  Ambulation  Surface:level surface  Assistive Device: rolling walker  Assistance: contact guard assistance  Distance: ~15 ft x2, ~25 ft  Gait Mechanics: scoliosis lean to R side, mild flexed trunk, decreased step length and height. Pt slow yumiko throughout gait.  Comments:     Stair Mobility  Stair mobility not completed on this date.  Comments:  Wheelchair Mobility:  No w/c mobility completed on this date.  Comments:  Balance  Static Sitting Balance: fair (+): maintains balance at SBA/supervision without use of UE support  Dynamic Sitting Balance: fair (+): maintains balance at SBA/supervision without use of UE support  Static Standing Balance: fair (-): maintains balance at CGA with use of UE support  Dynamic Standing Balance: fair (-): maintains balance at CGA with use of UE support  Comments: RW in standing and walking.    Other Therapeutic Interventions      Functional Outcomes  AM-PAC Inpatient Mobility Raw Score : 15              Cognition  WFL  Orientation:    alert and oriented x 4  Command Following:   WFL    Education  Barriers To Learning: none  Patient Education: patient educated on goals, PT role and benefits, plan of care, general safety, functional mobility training, proper use of assistive device/equipment, family education, transfer training, discharge recommendations  Learning Assessment:  patient verbalizes and demonstrates  goals reviewed on 3/26/2024.  All goals are ongoing at this time unless indicated above.      Therapy Session Time      Individual Group Co-treatment   Time In      1412   Time Out      1450   Minutes      38     Timed Code Treatment Minutes:   38  Total Treatment Minutes:  38 minutes        Electronically Signed By: Joel Ferris, PT, DPT, [id number]

## 2024-03-26 NOTE — PROGRESS NOTES
[facility]        Progress Note    Patient Name: Ana Gonzales  MRN: [id number]  YOB: 1955  Date of Evaluation: 3/26/2024    Postoperative Day #7    Subjective:  No acute events overnight  Pain controlled, only complains of neck pain  No nausea or vomiting, NGT in place with high output  Passing flatus and small stool, denies bloating  Resting in bed at this time, tearful and frustrated with slow progress      Vital Signs:  Patient Vitals for the past 24 hrs:   BP Temp Temp src Pulse Resp SpO2 Weight   24 1221 -- -- -- 86 16 95 % --   24 1102 131/72 97.4 °F (36.3 °C) Axillary 88 16 94 % --   24 1045 (!) 129/47 98.3 °F (36.8 °C) Temporal 86 14 99 % --   24 1035 (!) 130/47 -- -- 87 14 100 % --   24 1030 (!) 140/48 -- -- 88 16 100 % --   24 1025 (!) 128/56 -- -- 88 16 96 % --   24 1023 (!) 132/55 98.4 °F (36.9 °C) Temporal 88 16 96 % --   24 0921 -- -- -- 78 16 98 % --   24 0845 (!) 136/56 96.9 °F (36.1 °C) Temporal 88 16 97 % --   24 0820 137/83 97.2 °F (36.2 °C) Axillary 88 18 94 % --   24 0600 -- -- -- -- -- -- 95.7 kg (211 lb)   24 0539 -- -- -- -- 18 -- --   24 0230 -- -- -- -- 18 -- --   24 0200 -- -- -- -- 18 -- --   24 2306 128/62 97.1 °F (36.2 °C) Axillary 96 16 97 % --   24 (!) 160/99 97.3 °F (36.3 °C) Axillary 97 18 96 % --   24 1754 130/80 97.7 °F (36.5 °C) Oral (!) 105 17 94 % --        TEMPERATURE HISTORY 24H: Temp (24hrs), Av.5 °F (36.4 °C), Min:96.9 °F (36.1 °C), Max:98.4 °F (36.9 °C)    BLOOD PRESSURE HISTORY: Systolic (36hrs), Av , Min:111 , Max:160    Diastolic (36hrs), Av, Min:47, Max:99      Intake/Output:  I/O last 3 completed shifts:  In: 4049.3 [I.V.:292.5; IV Piggyback:549.7]  Out: 5050 [Urine:400; Emesis/NG output:4650]  No intake/output data recorded.  Drain/tube Output:       Physical Exam:  General: awake, alert,  oriented to person, place, time  Lungs: unlabored respirations  Abdomen: soft, non-distended, incisional tenderness only, bowel sounds hypoactive  Skin/Wound: healing well, no drainage, no erythema, well approximated    Labs:  CBC:    Recent Labs     03/24/24  0500 03/25/24  0600 03/25/24  1255 03/26/24  0410 03/26/24  0630   WBC 11.3* 16.6*  --   --  13.4*   HGB 8.7* 9.9* 10.1* 9.5* 9.7*   HCT 26.2* 30.5* 31.5* 29.5* 30.4*    510*  --   --  573*       BMP:    Recent Labs     03/24/24  0500 03/25/24  0600 03/26/24  0630    134* 138   K 4.2 3.7 3.4*    99 103   CO2 23 21 23   BUN 18 25* 25*   CREATININE 0.5* 0.6 <0.5*   GLUCOSE 153* 201* 161*       Hepatic:    Recent Labs     03/25/24  0600   AST 12*   ALT 9*   BILITOT <0.2   ALKPHOS 91       Amylase:  No results found for: \"AMYLASE\"  Lipase:    Lab Results   Component Value Date/Time    LIPASE 6.0 07/22/2019 04:22 PM    LIPASE 21 09/21/2010 12:44 PM      Mag:    Lab Results   Component Value Date/Time    MG 2.00 03/26/2024 06:30 AM    MG 2.10 03/25/2024 06:00 AM     Phos:     Lab Results   Component Value Date/Time    PHOS 3.8 03/26/2024 06:30 AM    PHOS 4.4 03/25/2024 06:00 AM      Coags:   Lab Results   Component Value Date/Time    PROTIME 17.3 03/14/2024 04:52 AM    INR 1.42 03/14/2024 04:52 AM    APTT 30.7 03/14/2024 04:52 AM       Cultures:  Anaerobic culture  No results found for: \"LABANAE\"  Fungus stain  No results found for requested labs within last 30 days.     Gram stain  No results found for requested labs within last 30 days.     Organism  Lab Results   Component Value Date/Time    ORG Escherichia coli (A) 07/22/2019 06:29 PM     Surgical culture  No results found for: \"CXSURG\"  Blood culture 1  No results found for requested labs within last 30 days.     Blood culture 2  No results found for requested labs within last 30 days.     Fecal occult  Results in Past 30 Days  Result Component Current Result Ref Range Previous Result Ref

## 2024-03-26 NOTE — PROGRESS NOTES
Occupational and Physical Therapy  Ana Gonzales    Patient currently off the floor to endoscopy-- for EGD.  Will attempt again as schedule allows.    Thank you,  Josephine Sebastian, OTR/L 2038

## 2024-03-26 NOTE — PROCEDURES
Endoscopy Note    Patient: Ana Gonzales  : 1955  CSN: [id number]    Procedure: Esophagogastroduodenoscopy with placement of an NG tube    Date:  3/26/2024     Surgeon:  Vinay Verdin MD     Referring Physician:  Mellisa Houston MD    Preoperative Diagnosis:  Coffee ground emesis [K92.0]    Postoperative Diagnosis: #1 blood in the posterior pharynx possibly from trauma from previous NG to  #2 small ulcer in the mid esophagus from NG tube trauma  #3 chronic inflammation at the gastroesophageal  #4 ulcers that were clipped previously showed no evidence or signs of bleeding  #5 multiple suction marks in the fundus of the stomach secondary to  #6 lots of fluid in the duodenum consistent with ileus    Anesthesia:  MAC    EBL: minimal to none.    Indications: This is a 68 y.o. year old female who comes in today she is an inpatient at [facility] she has been having some issues with coffee-ground emesis and large volume output after a operation for small bowel obstruction    .    Description of Procedure:  Informed consent was obtained from the patient after explanation of indications, benefits and possible risks and complications of the procedure.  The patient was then taken to the endoscopy suite, placed in the left lateral decubitus position and the above IV sedation was administrered.    The Olympus video endoscope was passed through the hypopharynx I    Posterior pharynx there was lots of old blood and some blood clots this most likely came from previous placement of an NG tube    Esophagus there was an ulcer in the middle esophagus this may have been due to a NG tube friction because there is some outside pressure here there so there is an external compression  Distal esophagus had some chronic inflammation  Hiatal hernia someone sent a  Stomach there were multiple red marks in the stomach specially in the fundus from where the NG tube was then most likely from suction  Previous clips

## 2024-03-27 ENCOUNTER — APPOINTMENT (OUTPATIENT)
Dept: CT IMAGING | Age: 69
DRG: 330 | End: 2024-03-27

## 2024-03-27 LAB
ALBUMIN SERPL-MCNC: 3.3 G/DL (ref 3.4–5)
ALP SERPL-CCNC: 93 U/L (ref 40–129)
ALT SERPL-CCNC: 8 U/L (ref 10–40)
ANION GAP SERPL CALCULATED.3IONS-SCNC: 13 MMOL/L (ref 3–16)
AST SERPL-CCNC: 12 U/L (ref 15–37)
BASOPHILS # BLD: 0.1 K/UL (ref 0–0.2)
BASOPHILS NFR BLD: 0.7 %
BILIRUB DIRECT SERPL-MCNC: <0.2 MG/DL (ref 0–0.3)
BILIRUB INDIRECT SERPL-MCNC: ABNORMAL MG/DL (ref 0–1)
BILIRUB SERPL-MCNC: <0.2 MG/DL (ref 0–1)
BUN SERPL-MCNC: 26 MG/DL (ref 7–20)
CALCIUM SERPL-MCNC: 9.1 MG/DL (ref 8.3–10.6)
CHLORIDE SERPL-SCNC: 101 MMOL/L (ref 99–110)
CO2 SERPL-SCNC: 22 MMOL/L (ref 21–32)
CREAT SERPL-MCNC: <0.5 MG/DL (ref 0.6–1.2)
DEPRECATED RDW RBC AUTO: 16.4 % (ref 12.4–15.4)
EOSINOPHIL # BLD: 0.1 K/UL (ref 0–0.6)
EOSINOPHIL NFR BLD: 1.1 %
GFR SERPLBLD CREATININE-BSD FMLA CKD-EPI: >90 ML/MIN/{1.73_M2}
GLUCOSE BLD-MCNC: 143 MG/DL (ref 70–99)
GLUCOSE BLD-MCNC: 146 MG/DL (ref 70–99)
GLUCOSE BLD-MCNC: 150 MG/DL (ref 70–99)
GLUCOSE BLD-MCNC: 162 MG/DL (ref 70–99)
GLUCOSE BLD-MCNC: 164 MG/DL (ref 70–99)
GLUCOSE BLD-MCNC: 189 MG/DL (ref 70–99)
GLUCOSE BLD-MCNC: 196 MG/DL (ref 70–99)
GLUCOSE SERPL-MCNC: 164 MG/DL (ref 70–99)
HCT VFR BLD AUTO: 28 % (ref 36–48)
HGB BLD-MCNC: 9.1 G/DL (ref 12–16)
LYMPHOCYTES # BLD: 1.5 K/UL (ref 1–5.1)
LYMPHOCYTES NFR BLD: 12.2 %
MAGNESIUM SERPL-MCNC: 1.9 MG/DL (ref 1.8–2.4)
MCH RBC QN AUTO: 28.6 PG (ref 26–34)
MCHC RBC AUTO-ENTMCNC: 32.4 G/DL (ref 31–36)
MCV RBC AUTO: 88.4 FL (ref 80–100)
MONOCYTES # BLD: 1.1 K/UL (ref 0–1.3)
MONOCYTES NFR BLD: 8.8 %
NEUTROPHILS # BLD: 9.7 K/UL (ref 1.7–7.7)
NEUTROPHILS NFR BLD: 77.2 %
PERFORMED ON: ABNORMAL
PHOSPHATE SERPL-MCNC: 2.8 MG/DL (ref 2.5–4.9)
PLATELET # BLD AUTO: 567 K/UL (ref 135–450)
PMV BLD AUTO: 8.8 FL (ref 5–10.5)
POTASSIUM SERPL-SCNC: 3.1 MMOL/L (ref 3.5–5.1)
PROT SERPL-MCNC: 6.3 G/DL (ref 6.4–8.2)
RBC # BLD AUTO: 3.17 M/UL (ref 4–5.2)
SODIUM SERPL-SCNC: 136 MMOL/L (ref 136–145)
WBC # BLD AUTO: 12.6 K/UL (ref 4–11)

## 2024-03-27 PROCEDURE — 6360000004 HC RX CONTRAST MEDICATION: Performed by: SURGERY

## 2024-03-27 PROCEDURE — 6360000002 HC RX W HCPCS: Performed by: NURSE PRACTITIONER

## 2024-03-27 PROCEDURE — 6370000000 HC RX 637 (ALT 250 FOR IP): Performed by: PHYSICIAN ASSISTANT

## 2024-03-27 PROCEDURE — APPSS15 APP SPLIT SHARED TIME 0-15 MINUTES: Performed by: NURSE PRACTITIONER

## 2024-03-27 PROCEDURE — 6370000000 HC RX 637 (ALT 250 FOR IP): Performed by: NURSE PRACTITIONER

## 2024-03-27 PROCEDURE — 74177 CT ABD & PELVIS W/CONTRAST: CPT

## 2024-03-27 PROCEDURE — 94761 N-INVAS EAR/PLS OXIMETRY MLT: CPT

## 2024-03-27 PROCEDURE — 6360000002 HC RX W HCPCS: Performed by: PHYSICIAN ASSISTANT

## 2024-03-27 PROCEDURE — 6360000002 HC RX W HCPCS: Performed by: SURGERY

## 2024-03-27 PROCEDURE — 83735 ASSAY OF MAGNESIUM: CPT

## 2024-03-27 PROCEDURE — 97530 THERAPEUTIC ACTIVITIES: CPT

## 2024-03-27 PROCEDURE — 94640 AIRWAY INHALATION TREATMENT: CPT

## 2024-03-27 PROCEDURE — APPNB30 APP NON BILLABLE TIME 0-30 MINS: Performed by: NURSE PRACTITIONER

## 2024-03-27 PROCEDURE — 1200000000 HC SEMI PRIVATE

## 2024-03-27 PROCEDURE — 6360000004 HC RX CONTRAST MEDICATION: Performed by: NURSE PRACTITIONER

## 2024-03-27 PROCEDURE — 99232 SBSQ HOSP IP/OBS MODERATE 35: CPT | Performed by: STUDENT IN AN ORGANIZED HEALTH CARE EDUCATION/TRAINING PROGRAM

## 2024-03-27 PROCEDURE — 99024 POSTOP FOLLOW-UP VISIT: CPT | Performed by: SURGERY

## 2024-03-27 PROCEDURE — 97116 GAIT TRAINING THERAPY: CPT

## 2024-03-27 PROCEDURE — 84100 ASSAY OF PHOSPHORUS: CPT

## 2024-03-27 PROCEDURE — 85025 COMPLETE CBC W/AUTO DIFF WBC: CPT

## 2024-03-27 PROCEDURE — 80048 BASIC METABOLIC PNL TOTAL CA: CPT

## 2024-03-27 PROCEDURE — 6360000002 HC RX W HCPCS: Performed by: INTERNAL MEDICINE

## 2024-03-27 PROCEDURE — 97535 SELF CARE MNGMENT TRAINING: CPT

## 2024-03-27 PROCEDURE — 2580000003 HC RX 258: Performed by: PHYSICIAN ASSISTANT

## 2024-03-27 PROCEDURE — 2500000003 HC RX 250 WO HCPCS: Performed by: SURGERY

## 2024-03-27 PROCEDURE — 2580000003 HC RX 258: Performed by: SURGERY

## 2024-03-27 PROCEDURE — 80076 HEPATIC FUNCTION PANEL: CPT

## 2024-03-27 PROCEDURE — C9113 INJ PANTOPRAZOLE SODIUM, VIA: HCPCS | Performed by: PHYSICIAN ASSISTANT

## 2024-03-27 PROCEDURE — 97110 THERAPEUTIC EXERCISES: CPT

## 2024-03-27 RX ORDER — POTASSIUM CHLORIDE 29.8 MG/ML
20 INJECTION INTRAVENOUS
Status: DISPENSED | OUTPATIENT
Start: 2024-03-27 | End: 2024-03-27

## 2024-03-27 RX ORDER — POTASSIUM CHLORIDE 29.8 MG/ML
20 INJECTION INTRAVENOUS
Status: DISCONTINUED | OUTPATIENT
Start: 2024-03-27 | End: 2024-03-27

## 2024-03-27 RX ORDER — PANTOPRAZOLE SODIUM 40 MG/10ML
40 INJECTION, POWDER, LYOPHILIZED, FOR SOLUTION INTRAVENOUS 2 TIMES DAILY
Status: DISCONTINUED | OUTPATIENT
Start: 2024-03-27 | End: 2024-04-10 | Stop reason: HOSPADM

## 2024-03-27 RX ORDER — DIPHENHYDRAMINE HYDROCHLORIDE 50 MG/ML
12.5 INJECTION INTRAMUSCULAR; INTRAVENOUS ONCE
Status: COMPLETED | OUTPATIENT
Start: 2024-03-27 | End: 2024-03-27

## 2024-03-27 RX ADMIN — Medication 2 PUFF: at 08:39

## 2024-03-27 RX ADMIN — METOCLOPRAMIDE 10 MG: 5 INJECTION, SOLUTION INTRAMUSCULAR; INTRAVENOUS at 05:07

## 2024-03-27 RX ADMIN — SODIUM CHLORIDE 3000 MG: 900 INJECTION INTRAVENOUS at 20:54

## 2024-03-27 RX ADMIN — HYDROMORPHONE HYDROCHLORIDE 0.25 MG: 1 INJECTION, SOLUTION INTRAMUSCULAR; INTRAVENOUS; SUBCUTANEOUS at 19:40

## 2024-03-27 RX ADMIN — SODIUM CHLORIDE 3000 MG: 900 INJECTION INTRAVENOUS at 08:31

## 2024-03-27 RX ADMIN — SODIUM CHLORIDE 3000 MG: 900 INJECTION INTRAVENOUS at 15:53

## 2024-03-27 RX ADMIN — DIATRIZOATE MEGLUMINE AND DIATRIZOATE SODIUM 5 ML: 660; 100 LIQUID ORAL; RECTAL at 12:38

## 2024-03-27 RX ADMIN — DIPHENHYDRAMINE HYDROCHLORIDE 12.5 MG: 50 INJECTION INTRAMUSCULAR; INTRAVENOUS at 02:25

## 2024-03-27 RX ADMIN — PANTOPRAZOLE SODIUM 8 MG/HR: 40 INJECTION, POWDER, FOR SOLUTION INTRAVENOUS at 08:21

## 2024-03-27 RX ADMIN — ASCORBIC ACID, VITAMIN A PALMITATE, CHOLECALCIFEROL, THIAMINE HYDROCHLORIDE, RIBOFLAVIN-5 PHOSPHATE SODIUM, PYRIDOXINE HYDROCHLORIDE, NIACINAMIDE, DEXPANTHENOL, ALPHA-TOCOPHEROL ACETATE, VITAMIN K1, FOLIC ACID, BIOTIN, CYANOCOBALAMIN: 200; 3300; 200; 6; 3.6; 6; 40; 15; 10; 150; 600; 60; 5 INJECTION, SOLUTION INTRAVENOUS at 17:25

## 2024-03-27 RX ADMIN — POTASSIUM CHLORIDE 20 MEQ: 29.8 INJECTION, SOLUTION INTRAVENOUS at 17:16

## 2024-03-27 RX ADMIN — METOCLOPRAMIDE 10 MG: 5 INJECTION, SOLUTION INTRAMUSCULAR; INTRAVENOUS at 22:35

## 2024-03-27 RX ADMIN — SODIUM CHLORIDE 3000 MG: 900 INJECTION INTRAVENOUS at 02:30

## 2024-03-27 RX ADMIN — HYDROMORPHONE HYDROCHLORIDE 0.25 MG: 1 INJECTION, SOLUTION INTRAMUSCULAR; INTRAVENOUS; SUBCUTANEOUS at 22:34

## 2024-03-27 RX ADMIN — PANTOPRAZOLE SODIUM 40 MG: 40 INJECTION, POWDER, FOR SOLUTION INTRAVENOUS at 08:38

## 2024-03-27 RX ADMIN — FUROSEMIDE 20 MG: 10 INJECTION, SOLUTION INTRAMUSCULAR; INTRAVENOUS at 08:23

## 2024-03-27 RX ADMIN — METOCLOPRAMIDE 10 MG: 5 INJECTION, SOLUTION INTRAMUSCULAR; INTRAVENOUS at 17:10

## 2024-03-27 RX ADMIN — ENOXAPARIN SODIUM 40 MG: 100 INJECTION SUBCUTANEOUS at 20:42

## 2024-03-27 RX ADMIN — Medication 10 ML: at 12:43

## 2024-03-27 RX ADMIN — METOCLOPRAMIDE 10 MG: 5 INJECTION, SOLUTION INTRAMUSCULAR; INTRAVENOUS at 12:32

## 2024-03-27 RX ADMIN — IOPAMIDOL 75 ML: 755 INJECTION, SOLUTION INTRAVENOUS at 14:30

## 2024-03-27 RX ADMIN — PANTOPRAZOLE SODIUM 40 MG: 40 INJECTION, POWDER, FOR SOLUTION INTRAVENOUS at 20:43

## 2024-03-27 RX ADMIN — HYDROMORPHONE HYDROCHLORIDE 0.25 MG: 1 INJECTION, SOLUTION INTRAMUSCULAR; INTRAVENOUS; SUBCUTANEOUS at 08:23

## 2024-03-27 RX ADMIN — POTASSIUM CHLORIDE 20 MEQ: 400 INJECTION, SOLUTION INTRAVENOUS at 15:45

## 2024-03-27 RX ADMIN — INSULIN GLARGINE 18 UNITS: 100 INJECTION, SOLUTION SUBCUTANEOUS at 20:42

## 2024-03-27 RX ADMIN — POTASSIUM CHLORIDE 20 MEQ: 29.8 INJECTION, SOLUTION INTRAVENOUS at 12:37

## 2024-03-27 ASSESSMENT — PAIN SCALES - GENERAL
PAINLEVEL_OUTOF10: 6
PAINLEVEL_OUTOF10: 9
PAINLEVEL_OUTOF10: 8
PAINLEVEL_OUTOF10: 6
PAINLEVEL_OUTOF10: 9

## 2024-03-27 ASSESSMENT — PAIN DESCRIPTION - FREQUENCY: FREQUENCY: INTERMITTENT

## 2024-03-27 ASSESSMENT — PAIN DESCRIPTION - LOCATION
LOCATION: NECK

## 2024-03-27 ASSESSMENT — PAIN DESCRIPTION - DESCRIPTORS
DESCRIPTORS: THROBBING
DESCRIPTORS: ACHING
DESCRIPTORS: ACHING

## 2024-03-27 ASSESSMENT — PAIN DESCRIPTION - PAIN TYPE: TYPE: ACUTE PAIN

## 2024-03-27 ASSESSMENT — PAIN DESCRIPTION - ONSET: ONSET: ON-GOING

## 2024-03-27 ASSESSMENT — PAIN DESCRIPTION - ORIENTATION
ORIENTATION: LEFT
ORIENTATION: LEFT

## 2024-03-27 NOTE — PROGRESS NOTES
[facility]        Progress Note    Patient Name: Ana Gonzales  MRN: [id number]  YOB: 1955  Date of Evaluation: 3/27/2024    Postoperative Day #8    Subjective:  No acute events overnight  Pain controlled  No nausea or vomiting, NGT in place with high output but taking in several cups of ice throughout day  Passing flatus and small stool, denies bloating  Resting in bed at this time, did well working with PT/OT      Vital Signs:  Patient Vitals for the past 24 hrs:   BP Temp Temp src Pulse Resp SpO2 Weight   24 1102 130/83 97.3 °F (36.3 °C) Axillary 94 18 93 % --   24 0845 -- -- -- -- -- 93 % --   24 0823 127/76 97.6 °F (36.4 °C) -- 92 18 93 % --   24 0600 -- -- -- -- -- -- 96.2 kg (212 lb)   24 2354 (!) 159/104 97.6 °F (36.4 °C) Axillary 87 16 95 % --   24 2126 -- -- -- -- 16 -- --   24 2039 (!) 142/84 97.5 °F (36.4 °C) Axillary 93 16 96 % --   24 1623 117/77 97.3 °F (36.3 °C) Axillary 95 16 96 % --        TEMPERATURE HISTORY 24H: Temp (24hrs), Av.5 °F (36.4 °C), Min:97.3 °F (36.3 °C), Max:97.6 °F (36.4 °C)    BLOOD PRESSURE HISTORY: Systolic (36hrs), Av , Min:117 , Max:159    Diastolic (36hrs), Av, Min:47, Max:104      Intake/Output:  I/O last 3 completed shifts:  In: 3927.3 [I.V.:416.7; IV Piggyback:626.6]  Out: 4450 [Urine:700; Emesis/NG output:3750]  No intake/output data recorded.  Drain/tube Output:       Physical Exam:  General: awake, alert, oriented to person, place, time  Lungs: unlabored respirations  Abdomen: soft, non-distended, incisional tenderness only, bowel sounds present  Skin/Wound: healing well, no drainage, no erythema, well approximated    Labs:  CBC:    Recent Labs     24  0600 24  1255 24  0630 24  1845 24  0515   WBC 16.6*  --  13.4*  --  12.6*   HGB 9.9*   < > 9.7* 9.5* 9.1*   HCT 30.5*   < > 30.4* 28.5* 28.0*   *  --  573*  --  567*        Pathology:  OR 3/19/2024--FINAL DIAGNOSIS:     Small intestine and appendix, resection:      - Small intestine with changes compatible with Meckel's diverticulum, and associated mucosal erosion.      - Appendix with dilated lumen.     Imaging:  I have personally reviewed the following films:    No results found.    Scheduled Meds:   pantoprazole  40 mg IntraVENous BID    potassium chloride  20 mEq IntraVENous Q1H    metoclopramide  10 mg IntraVENous Q6H    oxymetazoline  2 spray Each Nostril Once    furosemide  20 mg IntraVENous Daily    insulin glargine  18 Units SubCUTAneous Nightly    mometasone-formoterol  2 puff Inhalation BID RT    lidocaine  1 patch TransDERmal Daily    enoxaparin  40 mg SubCUTAneous Nightly    fat emulsion  250 mL IntraVENous Once per day on Mon Thu    ampicillin-sulbactam  3,000 mg IntraVENous Q6H    [Held by provider] clopidogrel  75 mg Oral Daily    sodium chloride flush  5-40 mL IntraVENous 2 times per day    insulin lispro  0-8 Units SubCUTAneous Q4H    dilTIAZem  120 mg Oral Daily    [Held by provider] aspirin  81 mg Oral Daily    isosorbide mononitrate  120 mg Oral Daily    ranolazine  1,000 mg Oral BID    ARIPiprazole  2 mg Oral Nightly    buPROPion  300 mg Oral QAM    [Held by provider] furosemide  40 mg Oral BID    gabapentin  600 mg Oral 4x Daily    meclizine  25 mg Oral 4x Daily    nortriptyline  75 mg Oral Nightly    tiZANidine  2 mg Oral Nightly    topiramate  100 mg Oral QAM    topiramate  150 mg Oral QPM    trospium  20 mg Oral BID AC    carvedilol  3.125 mg Oral BID WC    methenamine  1 g Oral BID WC    aclidinium  1 puff Inhalation BID RT    Evolocumab  1 Adjustable Dose Pre-filled Pen Syringe SubCUTAneous Q14 Days    insulin lispro  0-4 Units SubCUTAneous Nightly     Continuous Infusions:   PN-Adult Premix 5/20 - Standard Electrolytes - Central Line      PN-Adult Premix 5/20 - Standard Electrolytes - Central Line 80 mL/hr at 03/27/24 0433    sodium chloride 10 mL/hr

## 2024-03-27 NOTE — PROGRESS NOTES
Clinical Pharmacy Note    Pharmacy consulted by Aria Carter to manage TPN    Goal TPN rate: 80 ml/hr    Access: CVC  Indication: SBO    Labs:  General Labs:  BMP:    Lab Results   Component Value Date/Time     03/27/2024 05:15 AM    K 3.1 03/27/2024 05:15 AM    K 3.4 03/15/2024 02:15 AM     03/27/2024 05:15 AM    CO2 22 03/27/2024 05:15 AM    BUN 26 03/27/2024 05:15 AM    LABALBU 3.3 03/27/2024 05:15 AM    CREATININE <0.5 03/27/2024 05:15 AM    CALCIUM 9.1 03/27/2024 05:15 AM    GFRAA >60 11/24/2020 06:29 PM    GFRAA >60 01/13/2013 05:24 AM    LABGLOM >90 03/27/2024 05:15 AM    GLUCOSE 164 03/27/2024 05:15 AM       Electrolyte replacement as follows:   K dropped from 3.7 ? 3.4 ? 3.1. Ordered 60 meq for replacement.     Blood sugars over past 24 hours: 143-196    Blood sugar management:  Continue Humalog q4 hour sliding scale at medium dose.  Continue Lantus 12 units QHS       Will continue TPN at 80 ml/hr.     Thank you for allowing pharmacy to participate in the care of this patient.    Leo Resendez, PharmD  PGY1 Pharmacy Resident  3/27/2024 1:47 PM

## 2024-03-27 NOTE — PROGRESS NOTES
[facility]    3/28/2024 9:08 AM        Name: Ana Gonzales .              Admitted: 3/12/2024  Primary Care Provider: Mellisa Houston MD (Tel: [phone])      Hospital course: 69 yo female initially presented with slurred speech and admitted with concern for TIA. CTA head/neck showed severe narrowing of distal left P1 segment of left PCA, CT head and MRI brain no acute findings. Neurology evaluated, recommend supportive care with asa when medically stable, intolerant statins.     Hospital course complicated by low Hgb on admission, requiring 2 units PRBCs. EGD (3/13) did not find any active bleeding, but did find blood clots and a large amount of liquid in the stomach. Placed on PPI gtt. Repeat EGD (3/14) did not show any bleed, but 2 clips were placed on small healing ulcer.     Patient subsequently developed partial SBO and had NGT placed (3/15). She underwent exp lap with lysis of adhesions (3/19). NGT removed and started on clear liquid diet (3/21) but again developed n/v and NG was replaced. Noted to have hematemesis post Ng placement and 3rd EGD 3/26 consistent with trauma from NGT placement.     Noted to have left sided neck edema. CT soft tissue neck (3/17) concerning for airway narrowing and acute left parotiditis. She was seen by ENT and received decadron and Unasyn.        3/19/2024: Exploratory laparotomy with lysis of adhesions, Meckel diverticulectomy, and appendectomy.       Subjective:  Dozing in bedside chair,  visiting. Patient awakens easily. NGT in place with green drainage, denies nausea, abdominal pain. Last BM was 2 days ago, says she is passing lots of gas. Denies nausea, chest pain. Continues to report intermittent left sided neck pain, relieved with pain meds.     Reviewed interval ancillary notes    Current Medications  pantoprazole (PROTONIX) injection 40 mg,  BID  PN-Adult Premix 5/20 - Standard Electrolytes - Central Line, Continuous TPN  diatrizoate meglumine-sodium (GASTROGRAFIN) 66-10 % solution 5 mL, ONCE PRN  metoclopramide (REGLAN) injection 10 mg, Q6H  phenol 1.4 % mouth spray 1 spray, Q2H PRN  furosemide (LASIX) injection 20 mg, Daily  insulin glargine (LANTUS) injection vial 18 Units, Nightly  HYDROmorphone HCl PF (DILAUDID) injection 0.25 mg, Q3H PRN  mometasone-formoterol (DULERA) 200-5 MCG/ACT inhaler 2 puff, BID RT  albuterol sulfate HFA (PROVENTIL;VENTOLIN;PROAIR) 108 (90 Base) MCG/ACT inhaler 2 puff, Q6H PRN  ipratropium (ATROVENT HFA) 17 MCG/ACT inhaler 2 puff, Q6H PRN  lidocaine 4 % external patch 1 patch, Daily  enoxaparin (LOVENOX) injection 40 mg, Nightly  fat emulsion (INTRALIPID/NUTRILIPID) 20 % infusion 250 mL, Once per day on Mon Thu  ampicillin-sulbactam (UNASYN) 3,000 mg in sodium chloride 0.9 % 100 mL IVPB (mini-bag), Q6H  potassium chloride 20 mEq/50 mL IVPB (Central Line), PRN  sodium phosphate 15.63 mmol in sodium chloride 0.9 % 250 mL IVPB, PRN  [Held by provider] clopidogrel (PLAVIX) tablet 75 mg, Daily  sodium chloride flush 0.9 % injection 5-40 mL, 2 times per day  sodium chloride flush 0.9 % injection 5-40 mL, PRN  0.9 % sodium chloride infusion, PRN  ondansetron (ZOFRAN) injection 4 mg, Q6H PRN  acetaminophen (TYLENOL) suppository 650 mg, Q6H PRN  ondansetron (ZOFRAN-ODT) disintegrating tablet 8 mg, Q8H PRN  insulin lispro (HUMALOG) injection vial 0-8 Units, Q4H  dilTIAZem (CARDIZEM CD) extended release capsule 120 mg, Daily  [Held by provider] aspirin EC tablet 81 mg, Daily  isosorbide mononitrate (IMDUR) extended release tablet 120 mg, Daily  ranolazine (RANEXA) extended release tablet 1,000 mg, BID  perflutren lipid microspheres (DEFINITY) injection 1.5 mL, ONCE PRN  ARIPiprazole (ABILIFY) tablet 2 mg, Nightly  buPROPion (WELLBUTRIN XL) extended release tablet 300 mg, QAM  [Held by provider] furosemide (LASIX) tablet 40 mg,

## 2024-03-27 NOTE — PROGRESS NOTES
[facility] - Inpatient Rehabilitation Department   Phone: [phone]    Physical Therapy    [] Re-Evaluation            [x] Daily Treatment Note         [] Discharge Summary      Patient: Ana Gonzales   : 1955   MRN: [id number]   Date of Service:  3/27/2024  Admitting Diagnosis: SBO (small bowel obstruction) ([facility])    Current Admission Summary: This is a 68 y.o. female who was brought in by self for generalized weakness and slurred speech over the past couple days.  Patient was also found to be bradycardic.  Her  at bedside gives most the history.  He states that she has been following with Dr. Bates and the cardiology group.  He is not sure if he has seen an EP physician.  She has had difficulty with palpitations and PVCs.  She has had a heart monitor placed but recently they placed a second heart monitor and this time will be for a 2-week period.  Patient has intermittent slurred speech.  Patient does have a history of a subdural hemorrhage.  She has had intracranial surgery according to her  states that 1 side of her body is affected chronically.     3/15/2024:  Partial SBO found, being treated conservatively with NG tube placed today.  3/20/24: Exploratory laparotomy with lysis of adhesions, Meckel diverticulectomy and appendectomy     Past Medical History:  has a past medical history of Arthritis, Asthma, Brain injury ([facility]), Bronchitis, CHF (congestive heart failure) ([facility]), Depression, Diabetes mellitus ([facility]), DM (diabetes mellitus screen), Elevated cholesterol with high triglycerides, Fall, Fibromyalgia, GERD (gastroesophageal reflux disease), HIGH CHOLESTEROL, Hypertension, Microvascular angina, Migraine, Neuropathy, Panic attacks, PONV (postoperative nausea and vomiting), Post traumatic stress disorder, Short-term memory loss, Skin cancer, Sleep apnea, and Vertigo.    Past Surgical History:  has a past surgical history that includes Hysterectomy; eye surgery (cataract

## 2024-03-27 NOTE — PROGRESS NOTES
[facility]  HEAD AND NECK - ENT  PROGRESS  NOTE    Date of Service: 3/27/2024    ASSESSMENT: Ana Gonzales is a 68 y.o. female consulted to the ENT service for:    PLAN/RECOMMENDATIONS:     Acute left parotitis  -Slowly improving, can take weeks for full recovery. Left face and neck induration significantly improved. Now only with left intermittent face and neck pain. On Unasyn, would continue until complete resolution, can consider transitioning to Augmentin if she gets discharged.   -Likely secondary to oral dryness.  Currently n.p.o. for her small bowel obstruction.  Consider ice chips for comfort to help moisturize the oral cavity.    -Recommend sialagogues such as sugar-free sour candies or lemon drops to help stimulate salivary flow if possible, warm compresses, posterior to anterior left parotid massage.  -Recommend follow-up in the ENT clinic to ensure resolution.  Please call or reconsult if left neck swelling worsens.    Supraglottitis without airway obstruction  Laryngopharyngeal reflux  - Denies voice changes or SOB. Last FFL demonstarted resolution   -Continue Protonix   -No further ENT interventions. Follow-up in ENT clinic outpatient.      SUBJECTIVE:   Significant overnight events: none. Has some pain along the left neck and left face yesterday but overall is better.     OBJECTIVE:  Physical Exam:   Temp (24hrs), Av.5 °F (36.4 °C), Min:97.3 °F (36.3 °C), Max:97.6 °F (36.4 °C)    Vitals:    24 0600 24 0823 24 0845 24 1102   BP:  127/76  130/83   Pulse:  92  94   Resp:  18  18   Temp:  97.6 °F (36.4 °C)  97.3 °F (36.3 °C)   TempSrc:    Axillary   SpO2:  93% 93% 93%   Weight: 96.2 kg (212 lb)      Height:         I/O last 3 completed shifts:  In: 3927.3 [I.V.:416.7; IV Piggyback:626.6]  Out: 4450 [Urine:700; Emesis/NG output:3750]    GENERAL: No Acute Distress, Alert and Oriented  EYES: EOMI, Anti-icteric  NOSE: On anterior rhinoscopy there is no epistaxis, nasal mucosa

## 2024-03-27 NOTE — PROGRESS NOTES
2039: Shift assessment completed. VSS. Medications given per MAR. Bedside table and call light within reach. The care plan and education has been reviewed and mutually agreed upon with the patient.     0145: IJ dressing changed     Linette Roldan RN

## 2024-03-27 NOTE — PROGRESS NOTES
[facility] - Inpatient Rehabilitation Department   Phone: [phone]    Occupational Therapy    [] Initial Evaluation            [x] Daily Treatment Note         [] Discharge Summary      Patient: Ana Gonzales   : 1955   MRN: [id number]   Date of Service:  3/27/2024    Admitting Diagnosis:  SBO (small bowel obstruction) ([facility])    Current Admission Summary: Admitted for bradycardia with slurred speech. Concern for TIA. CT head non acute, but CTA showed severe narrowing of distal left P1 segment of left PCA, but otherwise imaging was non acute. MRI brain was non acute.   However, patient had low hemoglobin on admission and required 2U PRBC.  GI consulted and patient underwent an EGD on 3/13 with GI and did not find any active bleeding, but did find blood clots and a large amount of liquid in the stomach. Placed on PPI gtt.   She had a repeat EGD on 3/14 and did not show any bleed, but 2 clips were placed on small healing ulcer. Still showed blood clots in the stomach.CT abd/pel on 3/15 showed dilated stomach and small loops reflecting a partial SBO.   CT neck shows left sided neck edema. Started on decadron and unasyn. ENT consulted.      Past Medical History:  has a past medical history of Arthritis, Asthma, Brain injury ([facility]), Bronchitis, CHF (congestive heart failure) ([facility]), Depression, Diabetes mellitus ([facility]), DM (diabetes mellitus screen), Elevated cholesterol with high triglycerides, Fall, Fibromyalgia, GERD (gastroesophageal reflux disease), HIGH CHOLESTEROL, Hypertension, Microvascular angina, Migraine, Neuropathy, Panic attacks, PONV (postoperative nausea and vomiting), Post traumatic stress disorder, Short-term memory loss, Skin cancer, Sleep apnea, and Vertigo.  Past Surgical History:  has a past surgical history that includes Hysterectomy; eye surgery (cataract B); Carpal tunnel release; Finger trigger release; Gallbladder surgery; bladder suspension; Skin cancer excision;    Normotonic    ROM:   (B) UE AROM WFL  Strength:   (B) UE strength grossly WFL    Therapist Clinical Decision Making (Complexity): medium complexity  Clinical Presentation: evolving      Subjective: Pt seated in recliner upon entry, RN capped off NG from suction at start of therapy session and was contacted to reconnect following therapy session. Pt pleasant and agreeable to therapy session.  General: Pt's  present and active in pt's care  Pain: 8/10 Location: lateral left neck   Pain Interventions: Pain medication in place upon arrival; therapy activities modified, RN notified     Activities of Daily Living    Basic Activities of Daily Living  Feeding Comments: pt given popsicle EOS with setup  Grooming: setup assistance supervision  Grooming Comments: setup to complete oral care seated at sink along with hand washing seated  Lower Extremity Dressing: minimal assistance  Dressing Equipment: reacher  Dressing Comments: with extended time/vcs and 3 attempts pt able to thread BLEs into brief, pt required assist to fully pull up brief in stance  Toileting: dependent.    Toileting Equipment: none  Toileting Comments: assist with brief mgmt, pt voided toilet and assist for rear hygiene care due to stool smear in brief.        Instrumental Activities of Daily Living  No IADL completed on this date.   Other Therapeutic Interventions    Functional Mobility    Bed Mobility  Sit to Supine: minimal assistance  Rolling Left: minimal assistance  Rolling Right: minimal assistance  Scooting: minimal assistance  Comments: bed flat  Transfers   Sit to stand transfer:contact guard assistance, minimal assistance  Stand to sit transfer: contact guard assistance, minimal assistance  Toilet transfer: minimal assistance  Toilet transfer equipment: standard toilet  Toilet transfer comments: vcs for hand placement and assist with rw    Functional Mobility:  Functional Mobility Activity: to/from bathroom, ~18ft (to bathroom from

## 2024-03-27 NOTE — CARE COORDINATION
[facility] will continue to follow patient.  Updated from payor precertification is in progress.  [facility] will await payor notification and medical stability.    Dr. Shelley updated.    [facility] will continue to follow progress and update discharge plan as needed.    Felix Paula, VANDANAN, [phone]

## 2024-03-27 NOTE — PROGRESS NOTES
Hospitalist Progress Note    Name:  Ana Gonzales    /Age/Sex: 1955  (68 y.o. female)  MRN & CSN:  [id number] & [id number]    PCP: Mellisa Houston MD    Date of Admission: 3/12/2024    Patient Status:  Inpatient     Chief Complaint:   Chief Complaint   Patient presents with    Bradycardia     Pt coming in with  pt has been bradycardic, weakness, slurred speech for a few days.  Vomiting yesterday with black emesis        Hospital Course:   Admitted for bradycardia with slurred speech. Concern for TIA. CT head non acute, but CTA showed severe narrowing of distal left P1 segment of left PCA, but otherwise imaging was non acute. MRI brain was non acute.    However, patient had low hemoglobin on admission and required 2U PRBC.  GI consulted and patient underwent an EGD on 3/13 with GI and did not find any active bleeding, but did find blood clots and a large amount of liquid in the stomach. Placed on PPI gtt.    She had a repeat EGD on 3/14 and did not show any bleed, but 2 clips were placed on small healing ulcer. Still showed blood clots in the stomach.    CT abd/pel on 3/15 showed dilated stomach and small loops reflecting a partial SBO.    CT neck shows left sided neck edema. Started on decadron and unasyn. ENT consulted.    Developed some delirium the night of 3/21.   NG removed over the weekend. The replace the evening of 3/24 for vomiting. Had some hematemesis morning of 3/25.  EGD 3/26  Postoperative Diagnosis: #1 blood in the posterior pharynx possibly from trauma from previous NG to  #2 small ulcer in the mid esophagus from NG tube trauma  #3 chronic inflammation at the gastroesophageal  #4 ulcers that were clipped previously showed no evidence or signs of bleeding  #5 multiple suction marks in the fundus of the stomach secondary to  #6 lots of fluid in the duodenum consistent with ileus    Subjective:  Today is:  Hospital Day: 16.  Patient seen and examined in [id number].  9.7* 9.5* 9.1*   HCT 30.5*   < > 30.4* 28.5* 28.0*   *  --  573*  --  567*    < > = values in this interval not displayed.     Recent Labs     03/25/24  0600 03/26/24  0630 03/27/24  0515   * 138 136   K 3.7 3.4* 3.1*   CL 99 103 101   CO2 21 23 22   BUN 25* 25* 26*   CREATININE 0.6 <0.5* <0.5*   CALCIUM 9.5 9.3 9.1   PHOS 4.4 3.8 2.8     Recent Labs     03/25/24  0600 03/27/24  0515   AST 12* 12*   ALT 9* 8*   BILIDIR <0.2 <0.2   BILITOT <0.2 <0.2   ALKPHOS 91 93     No results for input(s): \"INR\" in the last 72 hours.    No results for input(s): \"CKTOTAL\", \"TROPONINI\" in the last 72 hours.    Urinalysis:      Lab Results   Component Value Date/Time    NITRU Negative 03/25/2024 11:30 AM    WBCUA 1 03/25/2024 11:30 AM    BACTERIA None Seen 03/25/2024 11:30 AM    RBCUA 5 03/25/2024 11:30 AM    BLOODU Negative 03/25/2024 11:30 AM    SPECGRAV 1.025 03/25/2024 11:30 AM    GLUCOSEU Negative 03/25/2024 11:30 AM    GLUCOSEU NEGATIVE 09/21/2010 12:58 PM       Radiology:  XR CHEST (2 VW)   Final Result   No acute process in the lungs.         XR ABDOMEN (2 VIEWS)   Final Result   1. No significant change in appearance of the bowel gas pattern, most   consistent with a persistent ileus.   2. No evidence of free air.         XR ABDOMEN (KUB) (SINGLE AP VIEW)   Final Result   1. Multiple gas-filled loops of distended small bowel and stool in the colon.   Pattern is suggestive of an ileus.         XR ABDOMEN FOR NG/OG/NE TUBE PLACEMENT   Final Result   Nasogastric tube is in place with distal tip located in the region of the   gastric fundus as described above.         XR ABDOMEN (2 VIEWS)   Final Result   Continued slow transit of contrast more distally into the small bowel since   the small-bowel follow-through study.  No definite contrast is seen within   the colon.  Again this may represent a small bowel obstruction, however,   ileus cannot be excluded         FL SMALL BOWEL FOLLOW THROUGH ONLY   Final Result

## 2024-03-27 NOTE — PROGRESS NOTES
Gastroenterology Progress Note      Ana Gonzales is a 68 y.o. female patient.  1. Slurred speech    2. Ventricular bigeminy    3. Elevated troponin    4. Upper GI bleed    5. Hematemesis, unspecified whether nausea present        SUBJECTIVE:  no abdominal pain. Had 2 small BMs with a suppository last night. NG tube with 1900 ml output yesterday and 650 ml overnight.         Physical    VITALS:  BP (!) 159/104   Pulse 87   Temp 97.6 °F (36.4 °C) (Axillary)   Resp 16   Ht 1.6 m (5' 2.99\")   Wt 96.2 kg (212 lb)   SpO2 95%   BMI 37.56 kg/m²   TEMPERATURE:  Current - Temp: 97.6 °F (36.4 °C); Max - Temp  Av.6 °F (36.4 °C)  Min: 96.9 °F (36.1 °C)  Max: 98.4 °F (36.9 °C)    NAD  RRR   Lungs CTA Bilaterally, normal effort  Abdomen soft, ND, NT, no HSM, Bowel sounds hypoactive  AAOx3     Data    Data Review:    Recent Labs     24  0624  1255 24  0630 24  1845 24  0515   WBC 16.6*  --  13.4*  --  12.6*   HGB 9.9*   < > 9.7* 9.5* 9.1*   HCT 30.5*   < > 30.4* 28.5* 28.0*   MCV 88.3  --  88.5  --  88.4   *  --  573*  --  567*    < > = values in this interval not displayed.     Recent Labs     24  0624  0630 24  0515   * 138 136   K 3.7 3.4* 3.1*   CL 99 103 101   CO2 21 23 22   PHOS 4.4 3.8 2.8   BUN 25* 25* 26*   CREATININE 0.6 <0.5* <0.5*     Recent Labs     24  0624  0515   AST 12* 12*   ALT 9* 8*   BILIDIR <0.2 <0.2   BILITOT <0.2 <0.2   ALKPHOS 91 93     No results for input(s): \"LIPASE\", \"AMYLASE\" in the last 72 hours.  No results for input(s): \"PROTIME\", \"INR\" in the last 72 hours.  No results for input(s): \"PTT\" in the last 72 hours.           ASSESSMENT / PLAN      Ileus - Still with high NG output  -NG tube decompression  - on reglan and dulcolax supp  - encourage mobility  - replace potassium      SBO - s/p ex lap 3/20/2024 with SBO due to a band related to a Meckel's diverticulum, band was cut and diverticulum

## 2024-03-28 LAB
ANION GAP SERPL CALCULATED.3IONS-SCNC: 10 MMOL/L (ref 3–16)
BASOPHILS # BLD: 0.1 K/UL (ref 0–0.2)
BASOPHILS NFR BLD: 0.8 %
BUN SERPL-MCNC: 23 MG/DL (ref 7–20)
CALCIUM SERPL-MCNC: 9.1 MG/DL (ref 8.3–10.6)
CHLORIDE SERPL-SCNC: 103 MMOL/L (ref 99–110)
CO2 SERPL-SCNC: 24 MMOL/L (ref 21–32)
CREAT SERPL-MCNC: <0.5 MG/DL (ref 0.6–1.2)
DEPRECATED RDW RBC AUTO: 16.5 % (ref 12.4–15.4)
EOSINOPHIL # BLD: 0.2 K/UL (ref 0–0.6)
EOSINOPHIL NFR BLD: 2.2 %
GFR SERPLBLD CREATININE-BSD FMLA CKD-EPI: >90 ML/MIN/{1.73_M2}
GLUCOSE BLD-MCNC: 148 MG/DL (ref 70–99)
GLUCOSE BLD-MCNC: 149 MG/DL (ref 70–99)
GLUCOSE BLD-MCNC: 150 MG/DL (ref 70–99)
GLUCOSE BLD-MCNC: 172 MG/DL (ref 70–99)
GLUCOSE BLD-MCNC: 194 MG/DL (ref 70–99)
GLUCOSE SERPL-MCNC: 170 MG/DL (ref 70–99)
HCT VFR BLD AUTO: 26.7 % (ref 36–48)
HGB BLD-MCNC: 9 G/DL (ref 12–16)
LYMPHOCYTES # BLD: 1.2 K/UL (ref 1–5.1)
LYMPHOCYTES NFR BLD: 12.7 %
MCH RBC QN AUTO: 29.6 PG (ref 26–34)
MCHC RBC AUTO-ENTMCNC: 33.6 G/DL (ref 31–36)
MCV RBC AUTO: 88 FL (ref 80–100)
MONOCYTES # BLD: 0.8 K/UL (ref 0–1.3)
MONOCYTES NFR BLD: 8.7 %
NEUTROPHILS # BLD: 7.4 K/UL (ref 1.7–7.7)
NEUTROPHILS NFR BLD: 75.6 %
PERFORMED ON: ABNORMAL
PLATELET # BLD AUTO: 609 K/UL (ref 135–450)
PMV BLD AUTO: 8.2 FL (ref 5–10.5)
POTASSIUM SERPL-SCNC: 3.7 MMOL/L (ref 3.5–5.1)
POTASSIUM SERPL-SCNC: 3.7 MMOL/L (ref 3.5–5.1)
RBC # BLD AUTO: 3.04 M/UL (ref 4–5.2)
SODIUM SERPL-SCNC: 137 MMOL/L (ref 136–145)
WBC # BLD AUTO: 9.8 K/UL (ref 4–11)

## 2024-03-28 PROCEDURE — C9113 INJ PANTOPRAZOLE SODIUM, VIA: HCPCS | Performed by: PHYSICIAN ASSISTANT

## 2024-03-28 PROCEDURE — 97535 SELF CARE MNGMENT TRAINING: CPT

## 2024-03-28 PROCEDURE — 99024 POSTOP FOLLOW-UP VISIT: CPT | Performed by: SURGERY

## 2024-03-28 PROCEDURE — 2580000003 HC RX 258: Performed by: SURGERY

## 2024-03-28 PROCEDURE — 97110 THERAPEUTIC EXERCISES: CPT

## 2024-03-28 PROCEDURE — 80048 BASIC METABOLIC PNL TOTAL CA: CPT

## 2024-03-28 PROCEDURE — 6360000002 HC RX W HCPCS: Performed by: NURSE PRACTITIONER

## 2024-03-28 PROCEDURE — 6360000002 HC RX W HCPCS: Performed by: PHYSICIAN ASSISTANT

## 2024-03-28 PROCEDURE — 94640 AIRWAY INHALATION TREATMENT: CPT

## 2024-03-28 PROCEDURE — 2500000003 HC RX 250 WO HCPCS: Performed by: SURGERY

## 2024-03-28 PROCEDURE — 6370000000 HC RX 637 (ALT 250 FOR IP): Performed by: NURSE PRACTITIONER

## 2024-03-28 PROCEDURE — 97116 GAIT TRAINING THERAPY: CPT

## 2024-03-28 PROCEDURE — 94761 N-INVAS EAR/PLS OXIMETRY MLT: CPT

## 2024-03-28 PROCEDURE — 1200000000 HC SEMI PRIVATE

## 2024-03-28 PROCEDURE — 6360000002 HC RX W HCPCS: Performed by: SURGERY

## 2024-03-28 PROCEDURE — 85025 COMPLETE CBC W/AUTO DIFF WBC: CPT

## 2024-03-28 PROCEDURE — 84132 ASSAY OF SERUM POTASSIUM: CPT

## 2024-03-28 PROCEDURE — 6370000000 HC RX 637 (ALT 250 FOR IP): Performed by: PHYSICIAN ASSISTANT

## 2024-03-28 RX ADMIN — I.V. FAT EMULSION 250 ML: 20 EMULSION INTRAVENOUS at 18:12

## 2024-03-28 RX ADMIN — PANTOPRAZOLE SODIUM 40 MG: 40 INJECTION, POWDER, FOR SOLUTION INTRAVENOUS at 21:53

## 2024-03-28 RX ADMIN — ONDANSETRON 4 MG: 2 INJECTION INTRAMUSCULAR; INTRAVENOUS at 15:00

## 2024-03-28 RX ADMIN — SODIUM CHLORIDE 3000 MG: 900 INJECTION INTRAVENOUS at 08:41

## 2024-03-28 RX ADMIN — HYDROMORPHONE HYDROCHLORIDE 0.25 MG: 1 INJECTION, SOLUTION INTRAMUSCULAR; INTRAVENOUS; SUBCUTANEOUS at 02:03

## 2024-03-28 RX ADMIN — HYDROMORPHONE HYDROCHLORIDE 0.25 MG: 1 INJECTION, SOLUTION INTRAMUSCULAR; INTRAVENOUS; SUBCUTANEOUS at 05:01

## 2024-03-28 RX ADMIN — Medication 10 ML: at 08:50

## 2024-03-28 RX ADMIN — INSULIN GLARGINE 18 UNITS: 100 INJECTION, SOLUTION SUBCUTANEOUS at 21:52

## 2024-03-28 RX ADMIN — Medication 2 PUFF: at 19:50

## 2024-03-28 RX ADMIN — SODIUM CHLORIDE 3000 MG: 900 INJECTION INTRAVENOUS at 15:02

## 2024-03-28 RX ADMIN — LEUCINE, PHENYLALANINE, LYSINE, METHIONINE, ISOLEUCINE, VALINE, HISTIDINE, THREONINE, TRYPTOPHAN, ALANINE, GLYCINE, ARGININE, PROLINE, SERINE, TYROSINE, SODIUM ACETATE, DIBASIC POTASSIUM PHOSPHATE, MAGNESIUM CHLORIDE, SODIUM CHLORIDE, CALCIUM CHLORIDE, DEXTROSE
365; 280; 290; 200; 300; 290; 240; 210; 90; 1035; 515; 575; 340; 250; 20; 340; 261; 51; 59; 33; 20 INJECTION INTRAVENOUS at 18:12

## 2024-03-28 RX ADMIN — SODIUM CHLORIDE 3000 MG: 900 INJECTION INTRAVENOUS at 03:37

## 2024-03-28 RX ADMIN — SODIUM CHLORIDE 3000 MG: 900 INJECTION INTRAVENOUS at 22:06

## 2024-03-28 RX ADMIN — METOCLOPRAMIDE 10 MG: 5 INJECTION, SOLUTION INTRAMUSCULAR; INTRAVENOUS at 05:01

## 2024-03-28 RX ADMIN — PANTOPRAZOLE SODIUM 40 MG: 40 INJECTION, POWDER, FOR SOLUTION INTRAVENOUS at 08:40

## 2024-03-28 RX ADMIN — METOCLOPRAMIDE 10 MG: 5 INJECTION, SOLUTION INTRAMUSCULAR; INTRAVENOUS at 09:23

## 2024-03-28 RX ADMIN — HYDROMORPHONE HYDROCHLORIDE 0.25 MG: 1 INJECTION, SOLUTION INTRAMUSCULAR; INTRAVENOUS; SUBCUTANEOUS at 22:26

## 2024-03-28 RX ADMIN — HYDROMORPHONE HYDROCHLORIDE 0.25 MG: 1 INJECTION, SOLUTION INTRAMUSCULAR; INTRAVENOUS; SUBCUTANEOUS at 12:53

## 2024-03-28 RX ADMIN — Medication 2 PUFF: at 09:56

## 2024-03-28 RX ADMIN — ENOXAPARIN SODIUM 40 MG: 100 INJECTION SUBCUTANEOUS at 21:53

## 2024-03-28 RX ADMIN — HYDROMORPHONE HYDROCHLORIDE 0.25 MG: 1 INJECTION, SOLUTION INTRAMUSCULAR; INTRAVENOUS; SUBCUTANEOUS at 08:40

## 2024-03-28 RX ADMIN — FUROSEMIDE 20 MG: 10 INJECTION, SOLUTION INTRAMUSCULAR; INTRAVENOUS at 09:23

## 2024-03-28 ASSESSMENT — PAIN SCALES - GENERAL
PAINLEVEL_OUTOF10: 0
PAINLEVEL_OUTOF10: 7
PAINLEVEL_OUTOF10: 5
PAINLEVEL_OUTOF10: 9
PAINLEVEL_OUTOF10: 4
PAINLEVEL_OUTOF10: 9
PAINLEVEL_OUTOF10: 9
PAINLEVEL_OUTOF10: 4
PAINLEVEL_OUTOF10: 0
PAINLEVEL_OUTOF10: 7

## 2024-03-28 ASSESSMENT — PAIN DESCRIPTION - LOCATION
LOCATION: ABDOMEN
LOCATION: BACK;NECK
LOCATION: NECK

## 2024-03-28 ASSESSMENT — PAIN DESCRIPTION - DESCRIPTORS
DESCRIPTORS: ACHING

## 2024-03-28 ASSESSMENT — PAIN - FUNCTIONAL ASSESSMENT: PAIN_FUNCTIONAL_ASSESSMENT: ACTIVITIES ARE NOT PREVENTED

## 2024-03-28 ASSESSMENT — PAIN DESCRIPTION - ONSET: ONSET: ON-GOING

## 2024-03-28 ASSESSMENT — PAIN DESCRIPTION - FREQUENCY: FREQUENCY: INTERMITTENT

## 2024-03-28 ASSESSMENT — PAIN DESCRIPTION - ORIENTATION
ORIENTATION: LEFT
ORIENTATION: LEFT;LOWER

## 2024-03-28 ASSESSMENT — PAIN DESCRIPTION - PAIN TYPE: TYPE: CHRONIC PAIN

## 2024-03-28 NOTE — PROGRESS NOTES
Shift assessment completed. Neuro WNL. Reports pain 7/10 to Left neck at this time. AM meds administered per MAR. NGT remains in place;draining well. The care plan and education has been reviewed and mutually agreed upon with the patient. Standard safety measures in place.

## 2024-03-28 NOTE — CARE COORDINATION
Chart reviewed for discharge planning    CM/SW has continued to follow patient progress to anticipate potential discharge needs. At this time, patient is not ready for discharge.     Barrier(s) to discharge-patient-       Other -  Trial NG Tube clamping       Tentative discharge plan- ARU - Pre-cert pending. [employer]    Tentative discharge date- TBD    Case management will continue to follow progress and update discharge plan as needed.     Electronically signed by Radha Melgar on 3/28/2024 at 8:09 AM

## 2024-03-28 NOTE — PROGRESS NOTES
-  She was on aspirin, Plavix and an NSAID, nabumetone at home. EGD 3/14 revealed gastric body ulcer with pigmented spot s/p 2 clips. Scars in antrum, likely from healed ulcers.  Gastric biopsies negative for H. Pylori.  - PPI BID    Epistaxis, hematemesis - hematemesis was due to epistaxis during NG tube placement. EGD 3/26 with blood in posterior pharynx, small ulcer in mid esophagus from NG trauma.  The ulcers that were noted previously showed no signs of bleeding and clips were in place.        Discussed with Dr. Lambert Dominguez PA-C  [facility]    This a very pleasant 68-year-old female who is seen today at the bedside with our certified physicians assistant    We have been following this patient all week she has had small bowel obstruction related to a Meckel's diverticulum  She has had a gastric ulcer  She had a second EGD done which revealed that there is no evidence of rebleeding from the gastric ulcer    She now is starting to show improvement CT scan last night shows improvement today the patient has her NG tube no suction  And her belly is soft and is not distended    So #1 hemoglobin stable #2 mild continued elevation of the BUN #3 no evidence of an current GI bleed    Vinay Verdin MD

## 2024-03-28 NOTE — PROGRESS NOTES
[facility] - Inpatient Rehabilitation Department   Phone: [phone]    Occupational Therapy    [] Initial Evaluation            [x] Daily Treatment Note         [] Discharge Summary      Patient: Ana Gonzales   : 1955   MRN: [id number]   Date of Service:  3/28/2024    Admitting Diagnosis:  SBO (small bowel obstruction) ([facility])    Current Admission Summary: Admitted for bradycardia with slurred speech. Concern for TIA. CT head non acute, but CTA showed severe narrowing of distal left P1 segment of left PCA, but otherwise imaging was non acute. MRI brain was non acute.   However, patient had low hemoglobin on admission and required 2U PRBC.  GI consulted and patient underwent an EGD on 3/13 with GI and did not find any active bleeding, but did find blood clots and a large amount of liquid in the stomach. Placed on PPI gtt.   She had a repeat EGD on 3/14 and did not show any bleed, but 2 clips were placed on small healing ulcer. Still showed blood clots in the stomach.CT abd/pel on 3/15 showed dilated stomach and small loops reflecting a partial SBO.   CT neck shows left sided neck edema. Started on decadron and unasyn. ENT consulted.      Past Medical History:  has a past medical history of Arthritis, Asthma, Brain injury ([facility]), Bronchitis, CHF (congestive heart failure) ([facility]), Depression, Diabetes mellitus ([facility]), DM (diabetes mellitus screen), Elevated cholesterol with high triglycerides, Fall, Fibromyalgia, GERD (gastroesophageal reflux disease), HIGH CHOLESTEROL, Hypertension, Microvascular angina, Migraine, Neuropathy, Panic attacks, PONV (postoperative nausea and vomiting), Post traumatic stress disorder, Short-term memory loss, Skin cancer, Sleep apnea, and Vertigo.  Past Surgical History:  has a past surgical history that includes Hysterectomy; eye surgery (cataract B); Carpal tunnel release; Finger trigger release; Gallbladder surgery; bladder suspension; Skin cancer excision;

## 2024-03-28 NOTE — PROGRESS NOTES
B); Carpal tunnel release; Finger trigger release; Gallbladder surgery; bladder suspension; Skin cancer excision; blepharoplasty; laryngoscopy (08/03/2017); Coronary stent placement (06/13/2023); joint replacement (Right); back surgery; Upper gastrointestinal endoscopy (N/A, 10/2/2023); Upper gastrointestinal endoscopy (N/A, 3/13/2024); Upper gastrointestinal endoscopy (N/A, 3/14/2024); Upper gastrointestinal endoscopy (N/A, 3/14/2024); Upper gastrointestinal endoscopy (N/A, 3/14/2024); laparotomy (N/A, 3/19/2024); and Upper gastrointestinal endoscopy (N/A, 3/26/2024).    Discharge Recommendations: Ana Gonzales scored a 17/24 on the AM-PAC short mobility form. Current research shows that an AM-PAC score of 17 or less is typically not associated with a discharge to the patient's home setting. Based on the patient's AM-PAC score and their current functional mobility deficits, it is recommended that the patient have 5-7 sessions per week of Physical Therapy at d/c to increase the patient's independence.  At this time, this patient demonstrates complex nursing, medical, and rehabilitative needs, and would benefit from intensive rehabilitation services upon discharge from the Inpatient setting.  This patient demonstrates the ability to participate in and benefit from an intensive therapy program with a coordinated interdisciplinary team approach to foster frequent, structured, and documented communication among disciplines, who will work together to establish, prioritize, and achieve treatment goals. Please see assessment section for further patient specific details. If patient discharges prior to next session this note will serve as a discharge summary.  Please see below for the latest assessment towards goals.      DME Required For Discharge: rolling walker  Precautions/Restrictions: high fall risk  Weight Bearing Restrictions: weight bearing as tolerated  [] Right Upper Extremity  [] Left Upper Extremity [] Right  time unless indicated above.      Therapy Session Time      Individual Group Co-treatment   Time In 1515       Time Out 1538       Minutes 23         Timed Code Treatment Minutes:   23 minutes  Total Treatment Minutes:  23 minutes        Electronically Signed By: Ashley Whiting PT, DPT [id number]

## 2024-03-28 NOTE — PROGRESS NOTES
Ana Gonzales is a 68 y.o. female patient.    Current Facility-Administered Medications   Medication Dose Route Frequency Provider Last Rate Last Admin    PN-Adult Premix 5/20 - Standard Electrolytes - Central Line   IntraVENous Continuous TPN Srinivasa Morales MD        pantoprazole (PROTONIX) injection 40 mg  40 mg IntraVENous BID Shalonda Dominguez PA-C   40 mg at 03/28/24 0840    PN-Adult Premix 5/20 - Standard Electrolytes - Central Line   IntraVENous Continuous TPN Srinivasa Morales MD 80 mL/hr at 03/27/24 1725 New Bag at 03/27/24 1725    diatrizoate meglumine-sodium (GASTROGRAFIN) 66-10 % solution 5 mL  5 mL Oral ONCE PRN Srinivasa Morales MD   5 mL at 03/27/24 1238    phenol 1.4 % mouth spray 1 spray  1 spray Mouth/Throat Q2H PRN Aria Carter APRN - CNP        furosemide (LASIX) injection 20 mg  20 mg IntraVENous Daily Lynsey Garza PA-C   20 mg at 03/28/24 0923    insulin glargine (LANTUS) injection vial 18 Units  18 Units SubCUTAneous Nightly Lynsey Garza PA-C   18 Units at 03/27/24 2042    HYDROmorphone HCl PF (DILAUDID) injection 0.25 mg  0.25 mg IntraVENous Q3H PRN Aria Carter APRN - CNP   0.25 mg at 03/28/24 0840    mometasone-formoterol (DULERA) 200-5 MCG/ACT inhaler 2 puff  2 puff Inhalation BID RT Lynsey Garza PA-C   2 puff at 03/27/24 0839    albuterol sulfate HFA (PROVENTIL;VENTOLIN;PROAIR) 108 (90 Base) MCG/ACT inhaler 2 puff  2 puff Inhalation Q6H PRN Lavern Son MD   2 puff at 03/26/24 0921    ipratropium (ATROVENT HFA) 17 MCG/ACT inhaler 2 puff  2 puff Inhalation Q6H PRN Lavern Son MD        lidocaine 4 % external patch 1 patch  1 patch TransDERmal Daily Aria Carter APRN - CNP   1 patch at 03/28/24 0840    enoxaparin (LOVENOX) injection 40 mg  40 mg SubCUTAneous Nightly Srinivaas Morales MD   40 mg at 03/27/24 2042    fat emulsion (INTRALIPID/NUTRILIPID) 20 % infusion 250 mL  250 mL IntraVENous Once per day on Mon Thu Srinivasa Morales  Srinivasa Morales MD   650 mg at 03/14/24 2106    insulin lispro (HUMALOG) injection vial 0-4 Units  0-4 Units SubCUTAneous Nightly Srinivasa Morales MD   4 Units at 03/12/24 2335    dextrose bolus 10% 125 mL  125 mL IntraVENous PRN Srinivasa Morales MD   Stopped at 03/25/24 2251    Or    dextrose bolus 10% 250 mL  250 mL IntraVENous PRN Srinivasa Morales MD        glucagon injection 1 mg  1 mg SubCUTAneous PRN Srinivasa Morales MD        dextrose 10 % infusion   IntraVENous Continuous PRN Srinivasa Morales MD         Allergies   Allergen Reactions    Cymbalta [Duloxetine Hcl] Anxiety     Migraine, panic attack    Ketamine Nausea And Vomiting    Prochlorperazine Nausea And Vomiting and Other (See Comments)     Dystonic reaction    Promethazine Anxiety and Other (See Comments)     screams  Feels like in a hole    Atenolol      Bradycardia    Demerol Other (See Comments)     MAKES ME FEEL VERY BIZARRE    Ezetimibe      Burning feet    Glucosamine-Chondroitin Other (See Comments)    Keflex [Cephalexin]     Ketorolac      Pt denies this allergy    Lisinopril      Photosensitive    Meperidine Nausea Only and Other (See Comments)     Dilaudid  MAKES ME FEEL VERY BIZARRE    Phenergan [Promethazine Hcl]     Prochlorperazine Edisylate     Statins Other (See Comments)    Codeine Nausea And Vomiting    Morphine Nausea And Vomiting and Other (See Comments)     MAKES ME FEEL VERY BIZARRE     Principal Problem:    SBO (small bowel obstruction) ([facility])  Active Problems:    Hyperlipidemia    HTN (hypertension), benign    Type 2 diabetes mellitus, without long-term current use of insulin ([facility])    TIA (transient ischemic attack)    Coronary artery disease involving native coronary artery of native heart without angina pectoris    (HFpEF) heart failure with preserved ejection fraction ([facility])    Dysarthria    PVC's (premature ventricular contractions)    Acute prerenal azotemia    History of heart artery stent

## 2024-03-28 NOTE — PLAN OF CARE
Problem: Skin/Tissue Integrity - Adult  Goal: Skin integrity remains intact  Outcome: Progressing  Goal: Incisions, wounds, or drain sites healing without S/S of infection  Outcome: Progressing

## 2024-03-28 NOTE — PROGRESS NOTES
Clinical Pharmacy Note    Pharmacy consulted by Aria Carter to manage TPN    Goal TPN rate: 80 ml/hr    Access: CVC  Indication: SBO    Labs:  General Labs:  BMP:    Lab Results   Component Value Date/Time     03/28/2024 06:00 AM    K 3.7 03/28/2024 06:00 AM    K 3.4 03/15/2024 02:15 AM     03/28/2024 06:00 AM    CO2 24 03/28/2024 06:00 AM    BUN 23 03/28/2024 06:00 AM    LABALBU 3.3 03/27/2024 05:15 AM    CREATININE <0.5 03/28/2024 06:00 AM    CALCIUM 9.1 03/28/2024 06:00 AM    GFRAA >60 11/24/2020 06:29 PM    GFRAA >60 01/13/2013 05:24 AM    LABGLOM >90 03/28/2024 06:00 AM    GLUCOSE 170 03/28/2024 06:00 AM       Electrolyte replacement as follows:   K normalized.  No replacement needed.     Blood sugars over past 24 hours: 146 - 189    Blood sugar management:  Continue Humalog q4 hour sliding scale at medium dose.  Continue Lantus 12 units QHS       Will continue TPN at 80 ml/hr.     Thank you for allowing pharmacy to participate in the care of this patient.    Nicole Norton, PharmD Candidate 2024  Pharmacy Student  3/28/2024 10:48 AM

## 2024-03-29 ENCOUNTER — APPOINTMENT (OUTPATIENT)
Dept: GENERAL RADIOLOGY | Age: 69
DRG: 330 | End: 2024-03-29

## 2024-03-29 LAB
ALBUMIN SERPL-MCNC: 3.2 G/DL (ref 3.4–5)
ALP SERPL-CCNC: 95 U/L (ref 40–129)
ALT SERPL-CCNC: 7 U/L (ref 10–40)
ANION GAP SERPL CALCULATED.3IONS-SCNC: 11 MMOL/L (ref 3–16)
AST SERPL-CCNC: 14 U/L (ref 15–37)
BASOPHILS # BLD: 0.1 K/UL (ref 0–0.2)
BASOPHILS NFR BLD: 0.9 %
BILIRUB DIRECT SERPL-MCNC: <0.2 MG/DL (ref 0–0.3)
BILIRUB INDIRECT SERPL-MCNC: ABNORMAL MG/DL (ref 0–1)
BILIRUB SERPL-MCNC: <0.2 MG/DL (ref 0–1)
BUN SERPL-MCNC: 17 MG/DL (ref 7–20)
CALCIUM SERPL-MCNC: 8.8 MG/DL (ref 8.3–10.6)
CHLORIDE SERPL-SCNC: 101 MMOL/L (ref 99–110)
CO2 SERPL-SCNC: 25 MMOL/L (ref 21–32)
CREAT SERPL-MCNC: <0.5 MG/DL (ref 0.6–1.2)
DEPRECATED RDW RBC AUTO: 16.4 % (ref 12.4–15.4)
EOSINOPHIL # BLD: 0.3 K/UL (ref 0–0.6)
EOSINOPHIL NFR BLD: 2.9 %
GFR SERPLBLD CREATININE-BSD FMLA CKD-EPI: >90 ML/MIN/{1.73_M2}
GLUCOSE BLD-MCNC: 132 MG/DL (ref 70–99)
GLUCOSE BLD-MCNC: 143 MG/DL (ref 70–99)
GLUCOSE BLD-MCNC: 147 MG/DL (ref 70–99)
GLUCOSE BLD-MCNC: 149 MG/DL (ref 70–99)
GLUCOSE BLD-MCNC: 161 MG/DL (ref 70–99)
GLUCOSE BLD-MCNC: 167 MG/DL (ref 70–99)
GLUCOSE SERPL-MCNC: 115 MG/DL (ref 70–99)
HCT VFR BLD AUTO: 26.3 % (ref 36–48)
HGB BLD-MCNC: 8.8 G/DL (ref 12–16)
LYMPHOCYTES # BLD: 1.2 K/UL (ref 1–5.1)
LYMPHOCYTES NFR BLD: 13.8 %
MAGNESIUM SERPL-MCNC: 1.8 MG/DL (ref 1.8–2.4)
MCH RBC QN AUTO: 29.6 PG (ref 26–34)
MCHC RBC AUTO-ENTMCNC: 33.6 G/DL (ref 31–36)
MCV RBC AUTO: 88.1 FL (ref 80–100)
MONOCYTES # BLD: 0.8 K/UL (ref 0–1.3)
MONOCYTES NFR BLD: 9.5 %
NEUTROPHILS # BLD: 6.4 K/UL (ref 1.7–7.7)
NEUTROPHILS NFR BLD: 72.9 %
PERFORMED ON: ABNORMAL
PHOSPHATE SERPL-MCNC: 3.4 MG/DL (ref 2.5–4.9)
PLATELET # BLD AUTO: 621 K/UL (ref 135–450)
PMV BLD AUTO: 8.2 FL (ref 5–10.5)
POTASSIUM SERPL-SCNC: 3.9 MMOL/L (ref 3.5–5.1)
PROT SERPL-MCNC: 6 G/DL (ref 6.4–8.2)
RBC # BLD AUTO: 2.98 M/UL (ref 4–5.2)
SODIUM SERPL-SCNC: 137 MMOL/L (ref 136–145)
WBC # BLD AUTO: 8.7 K/UL (ref 4–11)

## 2024-03-29 PROCEDURE — 1200000000 HC SEMI PRIVATE

## 2024-03-29 PROCEDURE — 85025 COMPLETE CBC W/AUTO DIFF WBC: CPT

## 2024-03-29 PROCEDURE — 6360000002 HC RX W HCPCS: Performed by: PHYSICIAN ASSISTANT

## 2024-03-29 PROCEDURE — 6370000000 HC RX 637 (ALT 250 FOR IP): Performed by: PHYSICIAN ASSISTANT

## 2024-03-29 PROCEDURE — 2500000003 HC RX 250 WO HCPCS: Performed by: SURGERY

## 2024-03-29 PROCEDURE — 80076 HEPATIC FUNCTION PANEL: CPT

## 2024-03-29 PROCEDURE — 6370000000 HC RX 637 (ALT 250 FOR IP): Performed by: SURGERY

## 2024-03-29 PROCEDURE — 6360000002 HC RX W HCPCS: Performed by: NURSE PRACTITIONER

## 2024-03-29 PROCEDURE — 6370000000 HC RX 637 (ALT 250 FOR IP): Performed by: NURSE PRACTITIONER

## 2024-03-29 PROCEDURE — 2580000003 HC RX 258: Performed by: SURGERY

## 2024-03-29 PROCEDURE — 99024 POSTOP FOLLOW-UP VISIT: CPT | Performed by: SURGERY

## 2024-03-29 PROCEDURE — 6360000002 HC RX W HCPCS: Performed by: SURGERY

## 2024-03-29 PROCEDURE — APPSS15 APP SPLIT SHARED TIME 0-15 MINUTES: Performed by: NURSE PRACTITIONER

## 2024-03-29 PROCEDURE — 80048 BASIC METABOLIC PNL TOTAL CA: CPT

## 2024-03-29 PROCEDURE — C9113 INJ PANTOPRAZOLE SODIUM, VIA: HCPCS | Performed by: PHYSICIAN ASSISTANT

## 2024-03-29 PROCEDURE — 74019 RADEX ABDOMEN 2 VIEWS: CPT

## 2024-03-29 PROCEDURE — 94640 AIRWAY INHALATION TREATMENT: CPT

## 2024-03-29 PROCEDURE — APPNB30 APP NON BILLABLE TIME 0-30 MINS: Performed by: NURSE PRACTITIONER

## 2024-03-29 PROCEDURE — 84100 ASSAY OF PHOSPHORUS: CPT

## 2024-03-29 PROCEDURE — 83735 ASSAY OF MAGNESIUM: CPT

## 2024-03-29 RX ORDER — METOCLOPRAMIDE HYDROCHLORIDE 5 MG/ML
10 INJECTION INTRAMUSCULAR; INTRAVENOUS EVERY 6 HOURS
Status: DISCONTINUED | OUTPATIENT
Start: 2024-03-29 | End: 2024-04-10 | Stop reason: HOSPADM

## 2024-03-29 RX ADMIN — HYDROMORPHONE HYDROCHLORIDE 0.25 MG: 1 INJECTION, SOLUTION INTRAMUSCULAR; INTRAVENOUS; SUBCUTANEOUS at 11:07

## 2024-03-29 RX ADMIN — HYDROMORPHONE HYDROCHLORIDE 0.25 MG: 1 INJECTION, SOLUTION INTRAMUSCULAR; INTRAVENOUS; SUBCUTANEOUS at 15:30

## 2024-03-29 RX ADMIN — NORTRIPTYLINE HYDROCHLORIDE 75 MG: 25 CAPSULE ORAL at 20:53

## 2024-03-29 RX ADMIN — ENOXAPARIN SODIUM 40 MG: 100 INJECTION SUBCUTANEOUS at 21:00

## 2024-03-29 RX ADMIN — SODIUM CHLORIDE 3000 MG: 900 INJECTION INTRAVENOUS at 20:59

## 2024-03-29 RX ADMIN — RANOLAZINE 1000 MG: 500 TABLET, EXTENDED RELEASE ORAL at 20:53

## 2024-03-29 RX ADMIN — PANTOPRAZOLE SODIUM 40 MG: 40 INJECTION, POWDER, FOR SOLUTION INTRAVENOUS at 20:52

## 2024-03-29 RX ADMIN — HYDROMORPHONE HYDROCHLORIDE 0.25 MG: 1 INJECTION, SOLUTION INTRAMUSCULAR; INTRAVENOUS; SUBCUTANEOUS at 01:27

## 2024-03-29 RX ADMIN — MECLIZINE HYDROCHLORIDE 25 MG: 12.5 TABLET ORAL at 21:55

## 2024-03-29 RX ADMIN — Medication 10 ML: at 21:04

## 2024-03-29 RX ADMIN — SODIUM CHLORIDE 3000 MG: 900 INJECTION INTRAVENOUS at 02:58

## 2024-03-29 RX ADMIN — ASCORBIC ACID, VITAMIN A PALMITATE, CHOLECALCIFEROL, THIAMINE HYDROCHLORIDE, RIBOFLAVIN-5 PHOSPHATE SODIUM, PYRIDOXINE HYDROCHLORIDE, NIACINAMIDE, DEXPANTHENOL, ALPHA-TOCOPHEROL ACETATE, VITAMIN K1, FOLIC ACID, BIOTIN, CYANOCOBALAMIN: 200; 3300; 200; 6; 3.6; 6; 40; 15; 10; 150; 600; 60; 5 INJECTION, SOLUTION INTRAVENOUS at 18:47

## 2024-03-29 RX ADMIN — GABAPENTIN 600 MG: 300 CAPSULE ORAL at 20:53

## 2024-03-29 RX ADMIN — ONDANSETRON 4 MG: 2 INJECTION INTRAMUSCULAR; INTRAVENOUS at 03:37

## 2024-03-29 RX ADMIN — ARIPIPRAZOLE 2 MG: 2 TABLET ORAL at 20:53

## 2024-03-29 RX ADMIN — HYDROMORPHONE HYDROCHLORIDE 0.25 MG: 1 INJECTION, SOLUTION INTRAMUSCULAR; INTRAVENOUS; SUBCUTANEOUS at 20:52

## 2024-03-29 RX ADMIN — FUROSEMIDE 20 MG: 10 INJECTION, SOLUTION INTRAMUSCULAR; INTRAVENOUS at 08:02

## 2024-03-29 RX ADMIN — TIZANIDINE 2 MG: 4 TABLET ORAL at 20:53

## 2024-03-29 RX ADMIN — METOCLOPRAMIDE 10 MG: 5 INJECTION, SOLUTION INTRAMUSCULAR; INTRAVENOUS at 13:05

## 2024-03-29 RX ADMIN — INSULIN GLARGINE 18 UNITS: 100 INJECTION, SOLUTION SUBCUTANEOUS at 20:58

## 2024-03-29 RX ADMIN — PANTOPRAZOLE SODIUM 40 MG: 40 INJECTION, POWDER, FOR SOLUTION INTRAVENOUS at 08:02

## 2024-03-29 RX ADMIN — SODIUM CHLORIDE 3000 MG: 900 INJECTION INTRAVENOUS at 08:14

## 2024-03-29 RX ADMIN — METOCLOPRAMIDE 10 MG: 5 INJECTION, SOLUTION INTRAMUSCULAR; INTRAVENOUS at 20:52

## 2024-03-29 RX ADMIN — ONDANSETRON 4 MG: 2 INJECTION INTRAMUSCULAR; INTRAVENOUS at 10:08

## 2024-03-29 RX ADMIN — SODIUM CHLORIDE 3000 MG: 900 INJECTION INTRAVENOUS at 15:18

## 2024-03-29 RX ADMIN — Medication 2 PUFF: at 20:21

## 2024-03-29 RX ADMIN — Medication 2 PUFF: at 10:12

## 2024-03-29 ASSESSMENT — PAIN DESCRIPTION - DESCRIPTORS
DESCRIPTORS: THROBBING
DESCRIPTORS: ACHING
DESCRIPTORS: THROBBING
DESCRIPTORS: THROBBING

## 2024-03-29 ASSESSMENT — PAIN DESCRIPTION - LOCATION
LOCATION: NECK

## 2024-03-29 ASSESSMENT — PAIN DESCRIPTION - ORIENTATION
ORIENTATION: LEFT

## 2024-03-29 ASSESSMENT — PAIN SCALES - GENERAL
PAINLEVEL_OUTOF10: 9
PAINLEVEL_OUTOF10: 9
PAINLEVEL_OUTOF10: 8
PAINLEVEL_OUTOF10: 9

## 2024-03-29 NOTE — PLAN OF CARE
Problem: Pain  Goal: Verbalizes/displays adequate comfort level or baseline comfort level  Outcome: Progressing     Problem: Discharge Planning  Goal: Discharge to home or other facility with appropriate resources  Outcome: Progressing     Problem: Safety - Adult  Goal: Free from fall injury  Outcome: Progressing     Problem: Chronic Conditions and Co-morbidities  Goal: Patient's chronic conditions and co-morbidity symptoms are monitored and maintained or improved  Outcome: Progressing     Problem: ABCDS Injury Assessment  Goal: Absence of physical injury  Outcome: Progressing     Problem: Skin/Tissue Integrity  Goal: Absence of new skin breakdown  Description: 1.  Monitor for areas of redness and/or skin breakdown  2.  Assess vascular access sites hourly  3.  Every 4-6 hours minimum:  Change oxygen saturation probe site  4.  Every 4-6 hours:  If on nasal continuous positive airway pressure, respiratory therapy assess nares and determine need for appliance change or resting period.  Outcome: Progressing     Problem: Nutrition Deficit:  Goal: Optimize nutritional status  Outcome: Progressing     Problem: Musculoskeletal - Adult  Goal: Return mobility to safest level of function  Outcome: Progressing  Goal: Maintain proper alignment of affected body part  Outcome: Progressing     Problem: Gastrointestinal - Adult  Goal: Minimal or absence of nausea and vomiting  Outcome: Progressing  Goal: Maintains or returns to baseline bowel function  Outcome: Progressing     Problem: Genitourinary - Adult  Goal: Absence of urinary retention  Outcome: Progressing     Problem: Metabolic/Fluid and Electrolytes - Adult  Goal: Electrolytes maintained within normal limits  Outcome: Progressing  Goal: Hemodynamic stability and optimal renal function maintained  Outcome: Progressing  Goal: Glucose maintained within prescribed range  Outcome: Progressing     Problem: Skin/Tissue Integrity - Adult  Goal: Skin integrity remains

## 2024-03-29 NOTE — PROGRESS NOTES
[facility]        Progress Note    Patient Name: Ana Gonzales  MRN: [id number]  YOB: 1955  Date of Evaluation: 3/29/2024    Postoperative Day #10    Subjective:  No acute events overnight  Pain controlled  Complaints of nausea (more than baseline) this morning, no vomiting, NGT clamped--no residual output documented  Passing flatus, last stool was 2-3 days ago, denies bloating  Up to chair at this time      Vital Signs:  Patient Vitals for the past 24 hrs:   BP Temp Temp src Pulse Resp SpO2 Weight   24 1137 -- -- -- -- 16 -- --   24 1110 119/72 97.8 °F (36.6 °C) Oral 83 16 95 % --   24 1107 -- -- -- -- 16 -- --   24 1055 -- -- -- -- -- -- 92.3 kg (203 lb 7.8 oz)   24 0745 119/72 97.8 °F (36.6 °C) Oral 88 18 100 % --   24 0306 131/75 97.4 °F (36.3 °C) Axillary 80 18 98 % --   24 0157 -- -- -- -- 18 -- --   24 0127 -- -- -- -- 18 -- --   24 0038 133/62 97.6 °F (36.4 °C) Oral -- 16 100 % --   24 2256 -- -- -- -- 18 -- --   24 -- -- -- -- 18 -- --   24 125/81 97.4 °F (36.3 °C) Oral 91 18 99 % --   24 1615 125/75 97.4 °F (36.3 °C) Oral 82 16 100 % --   24 1323 -- -- -- -- 16 -- --        TEMPERATURE HISTORY 24H: Temp (24hrs), Av.6 °F (36.4 °C), Min:97.4 °F (36.3 °C), Max:97.8 °F (36.6 °C)    BLOOD PRESSURE HISTORY: Systolic (36hrs), Av , Min:119 , Max:150    Diastolic (36hrs), Av, Min:62, Max:81      Intake/Output:  I/O last 3 completed shifts:  In: 3738.9 [P.O.:160; I.V.:103.3; IV Piggyback:744.1]  Out: 1800 [Urine:800; Emesis/NG output:1000]  No intake/output data recorded.  Drain/tube Output:       Physical Exam:  General: awake, alert, oriented to person, place, time  Lungs: unlabored respirations  Abdomen: soft, non-distended, incisional tenderness only, bowel sounds present  Skin/Wound: healing well, no drainage, no erythema, well

## 2024-03-29 NOTE — PLAN OF CARE
Problem: Pain  Goal: Verbalizes/displays adequate comfort level or baseline comfort level  3/29/2024 1157 by Arline Tello RN  Outcome: Progressing     Problem: Discharge Planning  Goal: Discharge to home or other facility with appropriate resources  3/29/2024 1157 by Arline Tello RN  Outcome: Progressing     Problem: Safety - Adult  Goal: Free from fall injury  3/29/2024 1157 by Arline Tello RN  Outcome: Progressing     Problem: Chronic Conditions and Co-morbidities  Goal: Patient's chronic conditions and co-morbidity symptoms are monitored and maintained or improved  3/29/2024 1157 by Arline Tello RN  Outcome: Progressing     Problem: ABCDS Injury Assessment  Goal: Absence of physical injury  3/29/2024 1157 by Arline Tello RN  Outcome: Progressing     Problem: Skin/Tissue Integrity  Goal: Absence of new skin breakdown  Description: 1.  Monitor for areas of redness and/or skin breakdown  2.  Assess vascular access sites hourly  3.  Every 4-6 hours minimum:  Change oxygen saturation probe site  4.  Every 4-6 hours:  If on nasal continuous positive airway pressure, respiratory therapy assess nares and determine need for appliance change or resting period.  3/29/2024 1157 by Arline Tello RN  Outcome: Progressing     Problem: Nutrition Deficit:  Goal: Optimize nutritional status  3/29/2024 1157 by Arline Tello RN  Outcome: Progressing  Flowsheets (Taken 3/29/2024 0947 by Abigail Gutierrez, RD, LD)  Nutrient intake appropriate for improving, restoring, or maintaining nutritional needs:   Recommend appropriate diets, oral nutritional supplements, and vitamin/mineral supplements   Recommend, monitor, and adjust tube feedings and TPN/PPN based on assessed needs   Assess nutritional status and recommend course of action     Problem: Musculoskeletal - Adult  Goal: Return mobility to safest level of function  3/29/2024 1157 by Arline Tello RN  Outcome: Progressing     Problem:  Musculoskeletal - Adult  Goal: Maintain proper alignment of affected body part  3/29/2024 1157 by Arline Tello RN  Outcome: Progressing     Problem: Gastrointestinal - Adult  Goal: Minimal or absence of nausea and vomiting  3/29/2024 1157 by Arline Tello RN  Outcome: Progressing     Problem: Gastrointestinal - Adult  Goal: Maintains or returns to baseline bowel function  3/29/2024 1157 by Arline Tello RN  Outcome: Progressing     Problem: Genitourinary - Adult  Goal: Absence of urinary retention  3/29/2024 1157 by Arline Tello RN  Outcome: Progressing     Problem: Metabolic/Fluid and Electrolytes - Adult  Goal: Electrolytes maintained within normal limits  3/29/2024 1157 by Arline Tello RN  Outcome: Progressing     Problem: Metabolic/Fluid and Electrolytes - Adult  Goal: Hemodynamic stability and optimal renal function maintained  3/29/2024 1157 by Arline Tello RN  Outcome: Progressing     Problem: Metabolic/Fluid and Electrolytes - Adult  Goal: Glucose maintained within prescribed range  3/29/2024 1157 by Arline Tello RN  Outcome: Progressing     Problem: Skin/Tissue Integrity - Adult  Goal: Skin integrity remains intact  3/29/2024 1157 by Arline Tello RN  Outcome: Progressing     Problem: Skin/Tissue Integrity - Adult  Goal: Incisions, wounds, or drain sites healing without S/S of infection  3/29/2024 1157 by Arline Tello RN  Outcome: Progressing

## 2024-03-29 NOTE — PROGRESS NOTES
Clinical Pharmacy Note    Pharmacy consulted by Aria Carter to manage TPN    Goal TPN rate: 80 ml/hr    Access: CVC  Indication: SBO    Labs:  General Labs:  BMP:    Lab Results   Component Value Date/Time     03/29/2024 06:21 AM    K 3.9 03/29/2024 06:21 AM    K 3.4 03/15/2024 02:15 AM     03/29/2024 06:21 AM    CO2 25 03/29/2024 06:21 AM    BUN 17 03/29/2024 06:21 AM    LABALBU 3.2 03/29/2024 06:21 AM    CREATININE <0.5 03/29/2024 06:21 AM    CALCIUM 8.8 03/29/2024 06:21 AM    GFRAA >60 11/24/2020 06:29 PM    GFRAA >60 01/13/2013 05:24 AM    LABGLOM >90 03/29/2024 06:21 AM    GLUCOSE 115 03/29/2024 06:21 AM       Electrolyte replacement as follows:   None needed    Blood sugars over past 24 hours: 115-194    Blood sugar management:  Continue Humalog q4 hour sliding scale at medium dose.  Continue Lantus 12 units QHS       Will continue TPN at 80 ml/hr.     Thank you for allowing pharmacy to participate in the care of this patient.    Queta Marina  PharmD Candidate 2024  3/29/2024 8:18 AM

## 2024-03-29 NOTE — PROGRESS NOTES
Ana VASQUEZ Christian  3/28/2024  [id number]    Chief Complaint: SBO (small bowel obstruction) ([facility])    Subjective   CT A/P showed interval decompression of small bowel, and improvement in mesenteric edema. Plan for NGT clamping today. Continues on TPN.     Patient denies any new complaints today. Denies abdominal pain, nausea.          Objective   Objective:  Patient Vitals for the past 24 hrs:   BP Temp Temp src Pulse Resp SpO2 Weight   03/28/24 2015 125/81 97.4 °F (36.3 °C) Oral 91 18 99 % --   03/28/24 1615 125/75 97.4 °F (36.3 °C) Oral 82 16 100 % --   03/28/24 1323 -- -- -- -- 16 -- --   03/28/24 1253 -- -- -- -- 16 -- --   03/28/24 1102 122/75 97.3 °F (36.3 °C) Oral 85 18 100 % --   03/28/24 0956 -- -- -- 84 18 96 % --   03/28/24 0910 -- -- -- -- 18 -- --   03/28/24 0849 -- -- -- -- -- -- 91.3 kg (201 lb 4.8 oz)   03/28/24 0833 126/80 97.4 °F (36.3 °C) Oral 84 18 96 % --   03/28/24 0531 -- -- -- -- 18 -- --   03/28/24 0501 (!) 150/74 98 °F (36.7 °C) Oral 82 18 95 % --   03/28/24 0233 -- -- -- -- 18 -- --   03/28/24 0203 -- -- -- -- 18 -- --   03/27/24 2304 -- -- -- -- 16 -- --   03/27/24 2301 (!) 119/52 97.3 °F (36.3 °C) Axillary 81 16 92 % --   03/27/24 2234 -- -- -- -- 18 -- --     Gen: No distress, pleasant.   HEENT: NGT in place to suction.   CV: No audible murmurs, well perfused extremities  Resp: No respiratory distress. No increased WOB  Abd: Soft, nontender nondistended  Ext: No edema.   Neuro: Alert, oriented, appropriately interactive.     Laboratory data:   Lab Results   Component Value Date/Time     03/28/2024 06:00 AM    K 3.7 03/28/2024 06:00 AM    K 3.4 03/15/2024 02:15 AM     03/28/2024 06:00 AM    CO2 24 03/28/2024 06:00 AM    BUN 23 03/28/2024 06:00 AM    CREATININE <0.5 03/28/2024 06:00 AM    GLUCOSE 170 03/28/2024 06:00 AM    CALCIUM 9.1 03/28/2024 06:00 AM    LABGLOM >90 03/28/2024 06:00 AM      Lab Results   Component Value Date    WBC 9.8 03/28/2024    HGB 9.0 (L) 03/28/2024

## 2024-03-29 NOTE — PROGRESS NOTES
Gastroenterology Progress Note      Ana Gonzales is a 68 y.o. female patient.  Principal Problem:    SBO (small bowel obstruction) ([facility])  Active Problems:    Hyperlipidemia    HTN (hypertension), benign    Type 2 diabetes mellitus, without long-term current use of insulin (HCC)    TIA (transient ischemic attack)    Coronary artery disease involving native coronary artery of native heart without angina pectoris    (HFpEF) heart failure with preserved ejection fraction (HCC)    Dysarthria    PVC's (premature ventricular contractions)    Acute prerenal azotemia    History of heart artery stent    Upper GI bleed    Slurred speech    Ventricular bigeminy    HANS (acute kidney injury) ([facility])    Supraglottitis without airway obstruction    Acute parotitis    Laryngopharyngeal reflux (LPR)    Mild malnutrition ([facility])  Resolved Problems:    * No resolved hospital problems. *      SUBJECTIVE: Patient is doing well this morning I talked with her and her  at the bedside her NG tube is    ROS:  No fever, chills  No chest pain, palpitations  No SOB, cough  Gastrointestinal ROS: no abdominal pain, nausea, vomiting     Physical    VITALS:  /72   Pulse 83   Temp 97.8 °F (36.6 °C) (Oral)   Resp 16   Ht 1.6 m (5' 2.99\")   Wt 92.3 kg (203 lb 7.8 oz)   SpO2 95%   BMI 36.05 kg/m²   TEMPERATURE:  Current - Temp: 97.8 °F (36.6 °C); Max - Temp  Av.6 °F (36.4 °C)  Min: 97.4 °F (36.3 °C)  Max: 97.8 °F (36.6 °C)    NAD  RRR, Nl s1s2  Lungs CTA Bilaterally, normal effort  Abdomen soft, ND, NT, no HSM, Bowel sounds normal.    Data    Data Review:    Recent Labs     24  0515 24  0600 24  0621   WBC 12.6* 9.8 8.7   HGB 9.1* 9.0* 8.8*   HCT 28.0* 26.7* 26.3*   MCV 88.4 88.0 88.1   * 609* 621*     Recent Labs     24  0515 24  0110 24  0600 24  0621     --  137 137   K 3.1* 3.7 3.7 3.9     --  103 101   CO2 22  --  24 25   PHOS 2.8  --   --  3.4   BUN 26*  --

## 2024-03-29 NOTE — CARE COORDINATION
IMM Letter       03/29/24 0838   IMM Letter   IMM Letter given to Patient/Family/Significant other/Guardian/POA/by: Charli Gonzales, spouse   IMM Letter date given: 03/29/24   IMM Letter time given: 0820     VANDANA EscobarN RN    [facility]  Phone: [phone]

## 2024-03-29 NOTE — CARE COORDINATION
Patient's pre-authorization denied after failed P2P d/t patient current status too acute for ARU.  Current Gen. Surg plan is to clamp NGT and see is patient tolerate trial.  If patient does well, Dr. Shelley will have pre-authorization resubmitted to payor per P2P.    Dr. Shelley Updated.    ARU will continue to follow progress and update discharge plan as needed.    VANDANA BettsN, .[phone]

## 2024-03-29 NOTE — PROGRESS NOTES
Nutrition Note    RECOMMENDATIONS  PO Diet: NPO. When initiated recommend clears adv to low fiber  ONS: ensure clear / glucerna TID  Nutrition Support: continue PN at 80mL/hr with 250 mL of 20% lipids twice weekly     ASSESSMENT   Parenteral nutrition continues at 80 mL/hr.  NG clamped since 3/28, pt with hypoactive BS.  When diet initiated recommend clear liquids with advancement to low fiber as tolerated and oral nutrition supplement TID.       Malnutrition Status: Mild malnutrition  Acute Illness  Findings of the 6 clinical characteristics of malnutrition:  Energy Intake:  50% or less of estimated energy requirements for 5 or more days (as determined by initial RD assessment on 3/18; however, TPN will now provide adequate nutrition)  Weight Loss:  No significant weight loss     Body Fat Loss:  No significant body fat loss     Muscle Mass Loss:  No significant muscle mass loss    Fluid Accumulation:  No significant fluid accumulation Extremities, Generalized   Strength:  Not Performed      NUTRITION DIAGNOSIS   Inadequate oral intake related to altered GI function as evidenced by NPO or clear liquid status due to medical condition    Goals: Tolerate nutrition support at goal rate, by next RD assessment, Initiate PO diet     NUTRITION RELATED FINDINGS  Objective: hypoactive BS; +flatus; NG clamped  Wounds: Surgical Incision    CURRENT NUTRITION THERAPIES  Diet NPO Exceptions are: Ice Chips, Popsicles  PN-Adult Premix 5/20 - Standard Electrolytes - Central Line  PN-Adult Premix 5/20 - Standard Electrolytes - Central Line     PO Intake: NPO   PO Supplement Intake:NPO    Current Parenteral Nutrition Orders:  Type and Formula: Premix Central   Lipids: 250ml, Two times weekly  Duration: Continuous  Current PN Order Provides: Clinimix 5/20 at 80 mL/hr provides 1920 mL total volume, 1690 kcal, 96 g pro & dex load 2.9 mg/kg/min  Goal PN Orders Provides: as above    ANTHROPOMETRICS  Current Height: 160 cm (5'

## 2024-03-29 NOTE — PROGRESS NOTES
Occupational Therapy  Ana Gonzales    Patient sleeping soundly in bed, awoke but stated did not want to work with therapy today due to severe nausea.  in room and stated patient needs to rest due to having difficulty with NG tube clamped off.     Nurse aware  Will continue to monitor and treat patient when able.    Thank you,  Josepihne Sebastian, OTR/L

## 2024-03-29 NOTE — PROGRESS NOTES
2015: shift assessment done, VSS, PRN medication given as per MAR, tube remains clamped, call light within reach, plan of care ongoing. The care plan and education has been reviewed and mutually agreed upon with the patient.     2200: transferred to a specialty bed as her back and buttocks are feeling sore, patient felt better after getting a new bed.    0400: moved back to chair as requested.

## 2024-03-29 NOTE — PROGRESS NOTES
[facility]    3/29/2024 11:38 AM        Name: Ana Gonzales .              Admitted: 3/12/2024  Primary Care Provider: Mellisa Houston MD (Tel: [phone])      Hospital course: 69 yo female initially presented with slurred speech and admitted with concern for TIA. CTA head/neck showed severe narrowing of distal left P1 segment of left PCA, CT head and MRI brain no acute findings. Neurology evaluated, recommend supportive care with asa when medically stable, intolerant statins.     Hospital course complicated by low Hgb on admission, requiring 2 units PRBCs. EGD (3/13) did not find any active bleeding, but did find blood clots and a large amount of liquid in the stomach. Placed on PPI gtt. Repeat EGD (3/14) did not show any bleed, but 2 clips were placed on small healing ulcer.     Patient subsequently developed partial SBO and had NGT placed (3/15). She underwent exp lap with lysis of adhesions (3/19). NGT removed and started on clear liquid diet (3/21) but again developed n/v and NG was replaced. Noted to have hematemesis post Ng placement and 3rd EGD 3/26 consistent with trauma from NGT placement. NGT clamped 3/28.     Noted to have left sided neck edema. CT soft tissue neck (3/17) concerning for airway narrowing and acute left parotiditis. She was seen by ENT and received decadron and Unasyn.        3/19/2024: Exploratory laparotomy with lysis of adhesions, Meckel diverticulectomy, and appendectomy.       Subjective:  Asleep in bed,  visiting.  Tells me she has been awake since 4 AM and recently returned to bed for nap. NGT was clamped yesterday, reported increased nausea this am, NG to be reconnected to suction. Last BM 3 days ago.     Reviewed interval ancillary notes    Current Medications  PN-Adult Premix 5/20 - Standard Electrolytes - Central Line, Continuous TPN  PN-Adult Premix 5/20 -

## 2024-03-30 LAB
ANION GAP SERPL CALCULATED.3IONS-SCNC: 10 MMOL/L (ref 3–16)
BASOPHILS # BLD: 0.1 K/UL (ref 0–0.2)
BASOPHILS NFR BLD: 1.1 %
BUN SERPL-MCNC: 18 MG/DL (ref 7–20)
CALCIUM SERPL-MCNC: 9.2 MG/DL (ref 8.3–10.6)
CHLORIDE SERPL-SCNC: 101 MMOL/L (ref 99–110)
CO2 SERPL-SCNC: 27 MMOL/L (ref 21–32)
CREAT SERPL-MCNC: <0.5 MG/DL (ref 0.6–1.2)
DEPRECATED RDW RBC AUTO: 16.7 % (ref 12.4–15.4)
EOSINOPHIL # BLD: 0.2 K/UL (ref 0–0.6)
EOSINOPHIL NFR BLD: 3.5 %
GFR SERPLBLD CREATININE-BSD FMLA CKD-EPI: >90 ML/MIN/{1.73_M2}
GLUCOSE BLD-MCNC: 113 MG/DL (ref 70–99)
GLUCOSE BLD-MCNC: 137 MG/DL (ref 70–99)
GLUCOSE BLD-MCNC: 138 MG/DL (ref 70–99)
GLUCOSE BLD-MCNC: 150 MG/DL (ref 70–99)
GLUCOSE BLD-MCNC: 170 MG/DL (ref 70–99)
GLUCOSE SERPL-MCNC: 146 MG/DL (ref 70–99)
HCT VFR BLD AUTO: 27.9 % (ref 36–48)
HGB BLD-MCNC: 8.9 G/DL (ref 12–16)
LYMPHOCYTES # BLD: 1.2 K/UL (ref 1–5.1)
LYMPHOCYTES NFR BLD: 18.4 %
MAGNESIUM SERPL-MCNC: 1.8 MG/DL (ref 1.8–2.4)
MCH RBC QN AUTO: 28.4 PG (ref 26–34)
MCHC RBC AUTO-ENTMCNC: 31.9 G/DL (ref 31–36)
MCV RBC AUTO: 88.9 FL (ref 80–100)
MONOCYTES # BLD: 0.7 K/UL (ref 0–1.3)
MONOCYTES NFR BLD: 10.5 %
NEUTROPHILS # BLD: 4.4 K/UL (ref 1.7–7.7)
NEUTROPHILS NFR BLD: 66.5 %
PERFORMED ON: ABNORMAL
PHOSPHATE SERPL-MCNC: 3.7 MG/DL (ref 2.5–4.9)
PLATELET # BLD AUTO: 632 K/UL (ref 135–450)
PMV BLD AUTO: 8.2 FL (ref 5–10.5)
POTASSIUM SERPL-SCNC: 3.4 MMOL/L (ref 3.5–5.1)
POTASSIUM SERPL-SCNC: 4.1 MMOL/L (ref 3.5–5.1)
RBC # BLD AUTO: 3.14 M/UL (ref 4–5.2)
SODIUM SERPL-SCNC: 138 MMOL/L (ref 136–145)
WBC # BLD AUTO: 6.7 K/UL (ref 4–11)

## 2024-03-30 PROCEDURE — 99024 POSTOP FOLLOW-UP VISIT: CPT | Performed by: SURGERY

## 2024-03-30 PROCEDURE — 84132 ASSAY OF SERUM POTASSIUM: CPT

## 2024-03-30 PROCEDURE — C9113 INJ PANTOPRAZOLE SODIUM, VIA: HCPCS | Performed by: PHYSICIAN ASSISTANT

## 2024-03-30 PROCEDURE — 2580000003 HC RX 258: Performed by: SURGERY

## 2024-03-30 PROCEDURE — 6360000002 HC RX W HCPCS: Performed by: SURGERY

## 2024-03-30 PROCEDURE — 6370000000 HC RX 637 (ALT 250 FOR IP): Performed by: SURGERY

## 2024-03-30 PROCEDURE — 6360000002 HC RX W HCPCS: Performed by: PHYSICIAN ASSISTANT

## 2024-03-30 PROCEDURE — 6360000002 HC RX W HCPCS: Performed by: NURSE PRACTITIONER

## 2024-03-30 PROCEDURE — 6370000000 HC RX 637 (ALT 250 FOR IP): Performed by: NURSE PRACTITIONER

## 2024-03-30 PROCEDURE — 83735 ASSAY OF MAGNESIUM: CPT

## 2024-03-30 PROCEDURE — 85025 COMPLETE CBC W/AUTO DIFF WBC: CPT

## 2024-03-30 PROCEDURE — 1200000000 HC SEMI PRIVATE

## 2024-03-30 PROCEDURE — 84100 ASSAY OF PHOSPHORUS: CPT

## 2024-03-30 PROCEDURE — 2500000003 HC RX 250 WO HCPCS: Performed by: NURSE PRACTITIONER

## 2024-03-30 PROCEDURE — 80048 BASIC METABOLIC PNL TOTAL CA: CPT

## 2024-03-30 PROCEDURE — 6370000000 HC RX 637 (ALT 250 FOR IP): Performed by: PHYSICIAN ASSISTANT

## 2024-03-30 RX ORDER — BISACODYL 10 MG
10 SUPPOSITORY, RECTAL RECTAL ONCE
Status: DISCONTINUED | OUTPATIENT
Start: 2024-03-30 | End: 2024-04-01

## 2024-03-30 RX ADMIN — CARVEDILOL 3.12 MG: 3.12 TABLET, FILM COATED ORAL at 17:27

## 2024-03-30 RX ADMIN — POTASSIUM CHLORIDE 20 MEQ: 400 INJECTION, SOLUTION INTRAVENOUS at 06:26

## 2024-03-30 RX ADMIN — SODIUM CHLORIDE 3000 MG: 900 INJECTION INTRAVENOUS at 15:33

## 2024-03-30 RX ADMIN — PANTOPRAZOLE SODIUM 40 MG: 40 INJECTION, POWDER, FOR SOLUTION INTRAVENOUS at 22:07

## 2024-03-30 RX ADMIN — MECLIZINE HYDROCHLORIDE 25 MG: 12.5 TABLET ORAL at 17:27

## 2024-03-30 RX ADMIN — ISOSORBIDE MONONITRATE 120 MG: 60 TABLET, EXTENDED RELEASE ORAL at 09:00

## 2024-03-30 RX ADMIN — TROSPIUM CHLORIDE 20 MG: 20 TABLET, FILM COATED ORAL at 06:19

## 2024-03-30 RX ADMIN — INSULIN GLARGINE 18 UNITS: 100 INJECTION, SOLUTION SUBCUTANEOUS at 22:14

## 2024-03-30 RX ADMIN — METOCLOPRAMIDE 10 MG: 5 INJECTION, SOLUTION INTRAMUSCULAR; INTRAVENOUS at 00:15

## 2024-03-30 RX ADMIN — SODIUM CHLORIDE 3000 MG: 900 INJECTION INTRAVENOUS at 08:56

## 2024-03-30 RX ADMIN — FUROSEMIDE 20 MG: 10 INJECTION, SOLUTION INTRAMUSCULAR; INTRAVENOUS at 08:39

## 2024-03-30 RX ADMIN — LEUCINE, PHENYLALANINE, LYSINE, METHIONINE, ISOLEUCINE, VALINE, HISTIDINE, THREONINE, TRYPTOPHAN, ALANINE, GLYCINE, ARGININE, PROLINE, SERINE, TYROSINE, SODIUM ACETATE, DIBASIC POTASSIUM PHOSPHATE, MAGNESIUM CHLORIDE, SODIUM CHLORIDE, CALCIUM CHLORIDE, DEXTROSE
365; 280; 290; 200; 300; 290; 240; 210; 90; 1035; 515; 575; 340; 250; 20; 340; 261; 51; 59; 33; 20 INJECTION INTRAVENOUS at 18:52

## 2024-03-30 RX ADMIN — PANTOPRAZOLE SODIUM 40 MG: 40 INJECTION, POWDER, FOR SOLUTION INTRAVENOUS at 08:39

## 2024-03-30 RX ADMIN — METOCLOPRAMIDE 10 MG: 5 INJECTION, SOLUTION INTRAMUSCULAR; INTRAVENOUS at 12:35

## 2024-03-30 RX ADMIN — METHENAMINE HIPPURATE 1 G: 1 TABLET ORAL at 17:26

## 2024-03-30 RX ADMIN — TROSPIUM CHLORIDE 20 MG: 20 TABLET, FILM COATED ORAL at 17:27

## 2024-03-30 RX ADMIN — Medication 10 ML: at 08:39

## 2024-03-30 RX ADMIN — SODIUM CHLORIDE 3000 MG: 900 INJECTION INTRAVENOUS at 22:13

## 2024-03-30 RX ADMIN — POTASSIUM CHLORIDE 20 MEQ: 400 INJECTION, SOLUTION INTRAVENOUS at 08:52

## 2024-03-30 RX ADMIN — ENOXAPARIN SODIUM 40 MG: 100 INJECTION SUBCUTANEOUS at 22:13

## 2024-03-30 RX ADMIN — CARVEDILOL 3.12 MG: 3.12 TABLET, FILM COATED ORAL at 09:01

## 2024-03-30 RX ADMIN — MECLIZINE HYDROCHLORIDE 25 MG: 12.5 TABLET ORAL at 08:58

## 2024-03-30 RX ADMIN — METOCLOPRAMIDE 10 MG: 5 INJECTION, SOLUTION INTRAMUSCULAR; INTRAVENOUS at 17:27

## 2024-03-30 RX ADMIN — GABAPENTIN 600 MG: 300 CAPSULE ORAL at 17:27

## 2024-03-30 RX ADMIN — RANOLAZINE 1000 MG: 500 TABLET, EXTENDED RELEASE ORAL at 09:01

## 2024-03-30 RX ADMIN — BUPROPION HYDROCHLORIDE 300 MG: 300 TABLET, EXTENDED RELEASE ORAL at 08:58

## 2024-03-30 RX ADMIN — GABAPENTIN 600 MG: 300 CAPSULE ORAL at 12:35

## 2024-03-30 RX ADMIN — DILTIAZEM HYDROCHLORIDE 120 MG: 120 CAPSULE, COATED, EXTENDED RELEASE ORAL at 08:58

## 2024-03-30 RX ADMIN — METOCLOPRAMIDE 10 MG: 5 INJECTION, SOLUTION INTRAMUSCULAR; INTRAVENOUS at 06:18

## 2024-03-30 RX ADMIN — GABAPENTIN 600 MG: 300 CAPSULE ORAL at 09:01

## 2024-03-30 RX ADMIN — MECLIZINE HYDROCHLORIDE 25 MG: 12.5 TABLET ORAL at 12:34

## 2024-03-30 RX ADMIN — TOPIRAMATE 100 MG: 100 TABLET, FILM COATED ORAL at 08:58

## 2024-03-30 RX ADMIN — SODIUM CHLORIDE 3000 MG: 900 INJECTION INTRAVENOUS at 04:19

## 2024-03-30 RX ADMIN — METHENAMINE HIPPURATE 1 G: 1 TABLET ORAL at 08:58

## 2024-03-30 RX ADMIN — TOPIRAMATE 150 MG: 100 TABLET, FILM COATED ORAL at 17:26

## 2024-03-30 ASSESSMENT — PAIN DESCRIPTION - DESCRIPTORS: DESCRIPTORS: DISCOMFORT

## 2024-03-30 ASSESSMENT — PAIN DESCRIPTION - PAIN TYPE: TYPE: ACUTE PAIN

## 2024-03-30 ASSESSMENT — PAIN DESCRIPTION - FREQUENCY: FREQUENCY: CONTINUOUS

## 2024-03-30 ASSESSMENT — PAIN DESCRIPTION - LOCATION: LOCATION: BACK

## 2024-03-30 ASSESSMENT — PAIN DESCRIPTION - ORIENTATION: ORIENTATION: MID

## 2024-03-30 ASSESSMENT — PAIN DESCRIPTION - ONSET: ONSET: ON-GOING

## 2024-03-30 ASSESSMENT — PAIN SCALES - GENERAL: PAINLEVEL_OUTOF10: 8

## 2024-03-30 NOTE — PROGRESS NOTES
Gastroenterology Progress Note      Ana Gonzales is a 68 y.o. female patient.  Principal Problem:    SBO (small bowel obstruction) ([facility])  Active Problems:    Hyperlipidemia    HTN (hypertension), benign    Type 2 diabetes mellitus, without long-term current use of insulin (HCC)    TIA (transient ischemic attack)    Coronary artery disease involving native coronary artery of native heart without angina pectoris    (HFpEF) heart failure with preserved ejection fraction (HCC)    Dysarthria    PVC's (premature ventricular contractions)    Acute prerenal azotemia    History of heart artery stent    Upper GI bleed    Slurred speech    Ventricular bigeminy    HANS (acute kidney injury) ([facility])    Supraglottitis without airway obstruction    Acute parotitis    Laryngopharyngeal reflux (LPR)    Mild malnutrition ([facility])  Resolved Problems:    * No resolved hospital problems. *      SUBJECTIVE: Patient has not done well overnight she had to have her NG tube replaced      ROS:  No fever, chills  No chest pain, palpitations  No SOB, cough  Gastrointestinal ROS: no abdominal pain, nausea, vomiting     Physical    VITALS:  BP (!) 129/59   Pulse 91   Temp 97.1 °F (36.2 °C) (Axillary)   Resp 16   Ht 1.6 m (5' 3\")   Wt 88.8 kg (195 lb 12.8 oz)   SpO2 97%   BMI 34.68 kg/m²   TEMPERATURE:  Current - Temp: 97.1 °F (36.2 °C); Max - Temp  Av.5 °F (36.4 °C)  Min: 97.1 °F (36.2 °C)  Max: 97.8 °F (36.6 °C)    NAD  RRR, Nl s1s2  Lungs CTA Bilaterally, normal effort  Abdomen soft, ND, NT, no HSM, Bowel sounds normal.    Data    Data Review:    Recent Labs     24  0624  0624  0439   WBC 9.8 8.7 6.7   HGB 9.0* 8.8* 8.9*   HCT 26.7* 26.3* 27.9*   MCV 88.0 88.1 88.9   * 621* 632*     Recent Labs     24  0624  0621 24  0439    137 138   K 3.7 3.9 3.4*    101 101   CO2 24 25 27   PHOS  --  3.4 3.7   BUN 23* 17 18   CREATININE <0.5* <0.5* <0.5*     Recent Labs      Final Anesthesia Post-op Assessment    Patient: Enriqueta Florence  Procedure(s) Performed: LAPAROSCOPIC SLEEVE GASTRECTOMY  Anesthesia type: General    Vital Last Value   Temperature 36.6 °C (97.9 °F) (01/31/17 1655)   Pulse 80 (01/31/17 1750)   Respiratory Rate 15 (01/31/17 1750)   Non-Invasive   Blood Pressure 144/83 (01/31/17 1750)   Arterial  Blood Pressure     Pulse Oximetry 97 % (01/31/17 1750)     Last 24 I/O:   Intake/Output Summary (Last 24 hours) at 01/31/17 1800  Last data filed at 01/31/17 1637   Gross per 24 hour   Intake             1150 ml   Output              450 ml   Net              700 ml       PATIENT LOCATION: PACU Phase 1  POST-OP VITAL SIGNS: stable  LEVEL OF CONSCIOUSNESS: participates in exam, answers questions appropriately, awake, alert and oriented  RESPIRATORY STATUS: spontaneous ventilation  CARDIOVASCULAR: blood pressure returned to baseline and stable  HYDRATION: euvolemic    PAIN MANAGEMENT: adequately controlled  NAUSEA: None  AIRWAY PATENCY: patent  POST-OP ASSESSMENT: no complications, patient tolerated procedure well with no complications, no evidence of recall and sufficiently recovered from acute administration of anesthesia effects and able to participate in evaluation  COMPLICATIONS: none       03/29/24  0621   AST 14*   ALT 7*   BILIDIR <0.2   BILITOT <0.2   ALKPHOS 95     No results for input(s): \"LIPASE\", \"AMYLASE\" in the last 72 hours.  No results for input(s): \"PROTIME\", \"INR\" in the last 72 hours.  No results for input(s): \"PTT\" in the last 72 hours.    Radiology Review:        ASSESSMENT status post small bowel obstruction  -Patient with continued prolonged postop ileus  -NG tube hopefully will come out sometime today    Vinay Verdin MD

## 2024-03-30 NOTE — PROGRESS NOTES
ileus versus partial small bowel obstruction    Small bowel follow through 3/28/2024:  IMPRESSION:  Very slow transit of contrast from the stomach to the proximal portion of the ileum after almost 6 hours.  This is abnormal and may reflect an underlying small bowel obstruction.  Ileus cannot be excluded.  Recommend delayed abdominal radiograph at approximately 24 hours (3 p.m.).    Xray abdomen 3/19/2024:  IMPRESSION:  Continued slow transit of contrast more distally into the small bowel since the small-bowel follow-through study.  No definite contrast is seen within the colon.  Again this may represent a small bowel obstruction, however, ileus cannot be excluded    KUB 3/25/2024:  IMPRESSION:  1. Multiple gas-filled loops of distended small bowel and stool in the colon. Pattern is suggestive of an ileus.    CXR 3/25/2024:  IMPRESSION:  No acute process in the lungs.    Xray abd 3/25/2024:  IMPRESSION:  1. No significant change in appearance of the bowel gas pattern, most  consistent with a persistent ileus.  2. No evidence of free air.    EGD 3/26/2024:  Postoperative Diagnosis: #1 blood in the posterior pharynx possibly from trauma from previous NG to  #2 small ulcer in the mid esophagus from NG tube trauma  #3 chronic inflammation at the gastroesophageal  #4 ulcers that were clipped previously showed no evidence or signs of bleeding  #5 multiple suction marks in the fundus of the stomach secondary to  #6 lots of fluid in the duodenum consistent with ileus    CT abd/pelvis 3/27/2024:  IMPRESSION:  Interval decompression of the small bowel.  Interval resolution of the  previously noted mesenteric edema.     The abdomen pelvis is otherwise unremarkable and moved change, compared to the prior study.    Xray abd 3/29/2024:  IMPRESSION:  Persistent but slightly decreased small bowel distention compared to prior     Moderate to large stool load in colon      Problem List  Principal Problem:    SBO (small bowel  Slowly improving, mild tenderness and edema on exam  - ENT recommends continue Unasyn until complete resolution, consider transitioning to Augmentin if discharged. Recommends ice chips to help moisturize oral cavity and sugar free sour candies or lemon drops to help stimulate salivary flow, warm compresses and left parotid massage  - Will need outpatient ENT clinic follow up to ensure resolution     TIA  - Initially presented with speech difficulties and admitted with concern for TIA  - CT head with no acute findings  - CTA head/neck showed severe narrowing of distal left P1 segment of left PCA  - MRI brain with no acute abnormality  - Echo 3/13 showed LVEF 55-60%, no RWMA  - Neuro evaluated, suspect possible acute encephalopathy vs TIA. Recommend asa when cleared by GI and surgery, intolerant statins  - Neurology has signed off     Chronic dCHF  - Not in an acute exacerbation  - Lasix held on admission  - Weight at home around 209, currently 195     Bigeminy  - Noted on telemetry  - Seen by EP, continue beta blocker and CCB. Recommends outpatient stress test as well as cardiac MRI when acute issues resolved  - Remains in sinus rhythm  - Cardiology has signed off    Type 2 diabetes  - Being covered with SSI  - BG values controlled  - Continue medium dose correction  - Monitor POCT glucose    Disposition: PT/OT evaluated and recommend ARU on DC. ARU following progress.       Diet: Diet NPO Exceptions are: Ice Chips, Popsicles  PN-Adult Premix 5/20 - Standard Electrolytes - Central Line  Code:Full Code  DVT PPX: enoxaparin      VALE Mckay - CNP   3/30/2024 9:33 AM    3/30/2024 3:15 PM: Received [employer] message that patient had run NSVT this afternoon. Echo (3/13) with EF 55%. Potassium level 3.4 and was replaced, magnesium 1.80. Will recheck potassium level, goal is 4.0. Continue telemetry.  VALE King-CNP

## 2024-03-30 NOTE — PROGRESS NOTES
[facility]        Progress Note    Patient Name: Ana Gonzales  MRN: [id number]  YOB: 1955  Date of Evaluation: 3/30/2024    Postoperative Day #11    Subjective:  No acute events overnight  Mild nausea with NG  Pain controlled  Small flatus but no stool     Vital Signs:  Patient Vitals for the past 24 hrs:   BP Temp Temp src Pulse Resp SpO2 Height Weight   24 0815 (!) 129/59 97.1 °F (36.2 °C) Axillary 91 16 97 % -- --   24 0630 -- -- -- -- -- -- 1.6 m (5' 3\") 88.8 kg (195 lb 12.8 oz)   24 0411 119/72 97.5 °F (36.4 °C) Oral -- -- 99 % -- --   24 0023 (!) 105/55 97.3 °F (36.3 °C) Oral 84 15 99 % -- --   24 108/73 97.4 °F (36.3 °C) Oral 88 19 100 % -- --   24 1608 (!) 124/47 97.8 °F (36.6 °C) Oral 83 16 100 % -- --   24 1600 -- -- -- -- 16 -- -- --   24 1530 -- -- -- -- 16 -- -- --   24 1137 -- -- -- -- 16 -- -- --      TEMPERATURE HISTORY 24H: Temp (24hrs), Av.4 °F (36.3 °C), Min:97.1 °F (36.2 °C), Max:97.8 °F (36.6 °C)    BLOOD PRESSURE HISTORY: Systolic (36hrs), Av , Min:105 , Max:133    Diastolic (36hrs), Av, Min:47, Max:75      Intake/Output:  I/O last 3 completed shifts:  In: -   Out: 2500 [Urine:2000; Emesis/NG output:500]  No intake/output data recorded.  Drain/tube Output:       Physical Exam:  General: awake, alert, oriented to person, place, time  Lungs: unlabored respirations  Abdomen: soft, non-distended, appropriate incisional tenderness, incision clean dry and intact    Labs:  CBC:    Recent Labs     24  0600 24  0621 24  0439   WBC 9.8 8.7 6.7   HGB 9.0* 8.8* 8.9*   HCT 26.7* 26.3* 27.9*   * 621* 632*     BMP:    Recent Labs     24  0600 24  0624  0439    137 138   K 3.7 3.9 3.4*    101 101   CO2 24 25 27   BUN 23* 17 18   CREATININE <0.5* <0.5* <0.5*   GLUCOSE 170* 115* 146*     Hepatic:    Recent Labs      OF THE ABDOMEN     3/29/2024 1:42 pm     COMPARISON:  None.     HISTORY:  ORDERING SYSTEM PROVIDED HISTORY: Nausea with NGT clamped  TECHNOLOGIST PROVIDED HISTORY:  Reason for exam:->Nausea with NGT clamped  Reason for Exam: nausea with NGT clamped     FINDINGS:  Tip of NG tube projects in Mariangel pyloric region     Scattered gaseous distention is seen throughout the abdomen involving both  and large bowel.  Small-bowel distention is slightly decreased compared to  prior     Moderate to large stool load seen in the colon     Degenerative changes are seen in the spine and hips.  Postfusion changes are  seen in the spine.     IMPRESSION:  Persistent but slightly decreased small bowel distention compared to prior     Moderate to large stool load in colon  CT ABDOMEN PELVIS W IV CONTRAST Additional Contrast? Oral    Result Date: 3/27/2024  EXAMINATION: CT OF THE ABDOMEN AND PELVIS WITH CONTRAST 3/27/2024 2:20 pm TECHNIQUE: CT of the abdomen and pelvis was performed with the administration of intravenous contrast. Multiplanar reformatted images are provided for review. Automated exposure control, iterative reconstruction, and/or weight based adjustment of the mA/kV was utilized to reduce the radiation dose to as low as reasonably achievable. COMPARISON: March 15, 2024 HISTORY: ORDERING SYSTEM PROVIDED HISTORY: Follow up ileus, post-op TECHNOLOGIST PROVIDED HISTORY: Reason for exam:->Follow up ileus, post-op Additional Contrast?->Oral FINDINGS: Lower Chest: The lungs demonstrate normal bronchovascular markings.  The heart is grossly normal in size. Organs: Cholecystectomy.  The liver and spleen are normal.  Both adrenal glands are normal the kidneys are unremarkable.  The pancreas is normal. GI/Bowel: NG tube in the stomach which is decompressed.  The small bowel is decompressed specially compared to the prior study.  The colon is decompressed.  There is scattered diverticula but no wall thickening or diverticulitis. Pelvis:

## 2024-03-30 NOTE — PROGRESS NOTES
Clinical Pharmacy Note    Pharmacy consulted by Dr. Aria Carter to manage TPN    Current TPN rate: 80 ml/hr  Goal TPN rate: 80 ml/hr    Access: CVC  Indication: SBO    Labs:  General Labs:  BMP:    Lab Results   Component Value Date/Time     03/30/2024 04:39 AM    K 3.4 03/30/2024 04:39 AM    K 3.4 03/15/2024 02:15 AM     03/30/2024 04:39 AM    CO2 27 03/30/2024 04:39 AM    BUN 18 03/30/2024 04:39 AM    LABALBU 3.2 03/29/2024 06:21 AM    CREATININE <0.5 03/30/2024 04:39 AM    CALCIUM 9.2 03/30/2024 04:39 AM    GFRAA >60 11/24/2020 06:29 PM    GFRAA >60 01/13/2013 05:24 AM    LABGLOM >90 03/30/2024 04:39 AM    GLUCOSE 146 03/30/2024 04:39 AM     Magnesium:    Lab Results   Component Value Date/Time    MG 1.80 03/30/2024 04:39 AM     Phosphorus:    Lab Results   Component Value Date/Time    PHOS 3.7 03/30/2024 04:39 AM       Electrolyte replacement as follows:   Replace potassium with 20 mEq of potassium chloride administered IV over 2 hours    Blood sugars over past 24 hours: 132-170    Blood sugar management:  Plan to Continue Humalog q4 hour sliding scale at medium dose.    Plan to continue TPN at 80 ml/hr.    Thank you for allowing pharmacy to participate in the care of this patient.    Nicolasa Franco [facility], PharmD 3/30/2024

## 2024-03-30 NOTE — PROGRESS NOTES
Shift assessment and AM vitals complete, VSS. AM meds given, Lissy NP says okay to give PO, patient is tolerating it well. Abdominal incision is CDI. Patient having some back pain, Lidocaine patch applied. The care plan and education has been reviewed and mutually agreed upon with the patient.     Patient remains free from falls or accidental injury. Room free from clutter. All fall precautions in place. Bed in lowest position with wheels locked and alarm on, call light and belongings within reach, and door open.

## 2024-03-31 LAB
ANION GAP SERPL CALCULATED.3IONS-SCNC: 10 MMOL/L (ref 3–16)
BASOPHILS # BLD: 0.1 K/UL (ref 0–0.2)
BASOPHILS NFR BLD: 1.5 %
BUN SERPL-MCNC: 19 MG/DL (ref 7–20)
CALCIUM SERPL-MCNC: 9.1 MG/DL (ref 8.3–10.6)
CHLORIDE SERPL-SCNC: 102 MMOL/L (ref 99–110)
CO2 SERPL-SCNC: 24 MMOL/L (ref 21–32)
CREAT SERPL-MCNC: 0.6 MG/DL (ref 0.6–1.2)
DEPRECATED RDW RBC AUTO: 17.3 % (ref 12.4–15.4)
EOSINOPHIL # BLD: 0.4 K/UL (ref 0–0.6)
EOSINOPHIL NFR BLD: 4.7 %
GFR SERPLBLD CREATININE-BSD FMLA CKD-EPI: >90 ML/MIN/{1.73_M2}
GLUCOSE BLD-MCNC: 135 MG/DL (ref 70–99)
GLUCOSE BLD-MCNC: 140 MG/DL (ref 70–99)
GLUCOSE BLD-MCNC: 146 MG/DL (ref 70–99)
GLUCOSE BLD-MCNC: 147 MG/DL (ref 70–99)
GLUCOSE BLD-MCNC: 155 MG/DL (ref 70–99)
GLUCOSE BLD-MCNC: 161 MG/DL (ref 70–99)
GLUCOSE SERPL-MCNC: 114 MG/DL (ref 70–99)
HCT VFR BLD AUTO: 30 % (ref 36–48)
HGB BLD-MCNC: 9.5 G/DL (ref 12–16)
LYMPHOCYTES # BLD: 1.7 K/UL (ref 1–5.1)
LYMPHOCYTES NFR BLD: 20.9 %
MAGNESIUM SERPL-MCNC: 1.9 MG/DL (ref 1.8–2.4)
MCH RBC QN AUTO: 28.4 PG (ref 26–34)
MCHC RBC AUTO-ENTMCNC: 31.7 G/DL (ref 31–36)
MCV RBC AUTO: 89.7 FL (ref 80–100)
MONOCYTES # BLD: 0.8 K/UL (ref 0–1.3)
MONOCYTES NFR BLD: 9.7 %
NEUTROPHILS # BLD: 5.2 K/UL (ref 1.7–7.7)
NEUTROPHILS NFR BLD: 63.2 %
PERFORMED ON: ABNORMAL
PHOSPHATE SERPL-MCNC: 4.2 MG/DL (ref 2.5–4.9)
PLATELET # BLD AUTO: 654 K/UL (ref 135–450)
PMV BLD AUTO: 8.2 FL (ref 5–10.5)
POTASSIUM SERPL-SCNC: 4.1 MMOL/L (ref 3.5–5.1)
RBC # BLD AUTO: 3.34 M/UL (ref 4–5.2)
SODIUM SERPL-SCNC: 136 MMOL/L (ref 136–145)
WBC # BLD AUTO: 8.2 K/UL (ref 4–11)

## 2024-03-31 PROCEDURE — 1200000000 HC SEMI PRIVATE

## 2024-03-31 PROCEDURE — 6370000000 HC RX 637 (ALT 250 FOR IP): Performed by: SURGERY

## 2024-03-31 PROCEDURE — 6360000002 HC RX W HCPCS: Performed by: NURSE PRACTITIONER

## 2024-03-31 PROCEDURE — C9113 INJ PANTOPRAZOLE SODIUM, VIA: HCPCS | Performed by: PHYSICIAN ASSISTANT

## 2024-03-31 PROCEDURE — 6370000000 HC RX 637 (ALT 250 FOR IP): Performed by: PHYSICIAN ASSISTANT

## 2024-03-31 PROCEDURE — 85025 COMPLETE CBC W/AUTO DIFF WBC: CPT

## 2024-03-31 PROCEDURE — 94640 AIRWAY INHALATION TREATMENT: CPT

## 2024-03-31 PROCEDURE — 6370000000 HC RX 637 (ALT 250 FOR IP): Performed by: NURSE PRACTITIONER

## 2024-03-31 PROCEDURE — 2580000003 HC RX 258: Performed by: SURGERY

## 2024-03-31 PROCEDURE — 2500000003 HC RX 250 WO HCPCS: Performed by: NURSE PRACTITIONER

## 2024-03-31 PROCEDURE — 97530 THERAPEUTIC ACTIVITIES: CPT

## 2024-03-31 PROCEDURE — 6360000002 HC RX W HCPCS: Performed by: SURGERY

## 2024-03-31 PROCEDURE — 99024 POSTOP FOLLOW-UP VISIT: CPT | Performed by: SURGERY

## 2024-03-31 PROCEDURE — 84100 ASSAY OF PHOSPHORUS: CPT

## 2024-03-31 PROCEDURE — 6360000002 HC RX W HCPCS: Performed by: PHYSICIAN ASSISTANT

## 2024-03-31 PROCEDURE — 97535 SELF CARE MNGMENT TRAINING: CPT

## 2024-03-31 PROCEDURE — 80048 BASIC METABOLIC PNL TOTAL CA: CPT

## 2024-03-31 PROCEDURE — 83735 ASSAY OF MAGNESIUM: CPT

## 2024-03-31 PROCEDURE — 94761 N-INVAS EAR/PLS OXIMETRY MLT: CPT

## 2024-03-31 RX ADMIN — TIZANIDINE 2 MG: 4 TABLET ORAL at 21:56

## 2024-03-31 RX ADMIN — ENOXAPARIN SODIUM 40 MG: 100 INJECTION SUBCUTANEOUS at 21:30

## 2024-03-31 RX ADMIN — Medication 2 PUFF: at 13:15

## 2024-03-31 RX ADMIN — RANOLAZINE 1000 MG: 500 TABLET, EXTENDED RELEASE ORAL at 08:32

## 2024-03-31 RX ADMIN — METHENAMINE HIPPURATE 1 G: 1 TABLET ORAL at 08:32

## 2024-03-31 RX ADMIN — RANOLAZINE 1000 MG: 500 TABLET, EXTENDED RELEASE ORAL at 21:56

## 2024-03-31 RX ADMIN — NORTRIPTYLINE HYDROCHLORIDE 75 MG: 25 CAPSULE ORAL at 21:56

## 2024-03-31 RX ADMIN — DILTIAZEM HYDROCHLORIDE 120 MG: 120 CAPSULE, COATED, EXTENDED RELEASE ORAL at 08:33

## 2024-03-31 RX ADMIN — LEUCINE, PHENYLALANINE, LYSINE, METHIONINE, ISOLEUCINE, VALINE, HISTIDINE, THREONINE, TRYPTOPHAN, ALANINE, GLYCINE, ARGININE, PROLINE, SERINE, TYROSINE, SODIUM ACETATE, DIBASIC POTASSIUM PHOSPHATE, MAGNESIUM CHLORIDE, SODIUM CHLORIDE, CALCIUM CHLORIDE, DEXTROSE
365; 280; 290; 200; 300; 290; 240; 210; 90; 1035; 515; 575; 340; 250; 20; 340; 261; 51; 59; 33; 20 INJECTION INTRAVENOUS at 18:16

## 2024-03-31 RX ADMIN — METOCLOPRAMIDE 10 MG: 5 INJECTION, SOLUTION INTRAMUSCULAR; INTRAVENOUS at 12:35

## 2024-03-31 RX ADMIN — TROSPIUM CHLORIDE 20 MG: 20 TABLET, FILM COATED ORAL at 18:03

## 2024-03-31 RX ADMIN — FUROSEMIDE 20 MG: 10 INJECTION, SOLUTION INTRAMUSCULAR; INTRAVENOUS at 08:08

## 2024-03-31 RX ADMIN — Medication 10 ML: at 08:12

## 2024-03-31 RX ADMIN — METHENAMINE HIPPURATE 1 G: 1 TABLET ORAL at 18:02

## 2024-03-31 RX ADMIN — SODIUM CHLORIDE 50 ML: 9 INJECTION, SOLUTION INTRAVENOUS at 14:40

## 2024-03-31 RX ADMIN — ARIPIPRAZOLE 2 MG: 2 TABLET ORAL at 21:56

## 2024-03-31 RX ADMIN — METOCLOPRAMIDE 10 MG: 5 INJECTION, SOLUTION INTRAMUSCULAR; INTRAVENOUS at 00:19

## 2024-03-31 RX ADMIN — ISOSORBIDE MONONITRATE 120 MG: 60 TABLET, EXTENDED RELEASE ORAL at 08:32

## 2024-03-31 RX ADMIN — GABAPENTIN 600 MG: 300 CAPSULE ORAL at 18:03

## 2024-03-31 RX ADMIN — TOPIRAMATE 150 MG: 100 TABLET, FILM COATED ORAL at 18:03

## 2024-03-31 RX ADMIN — SODIUM CHLORIDE 3000 MG: 900 INJECTION INTRAVENOUS at 08:23

## 2024-03-31 RX ADMIN — MECLIZINE HYDROCHLORIDE 25 MG: 12.5 TABLET ORAL at 22:30

## 2024-03-31 RX ADMIN — Medication 2 PUFF: at 21:33

## 2024-03-31 RX ADMIN — MECLIZINE HYDROCHLORIDE 25 MG: 12.5 TABLET ORAL at 18:03

## 2024-03-31 RX ADMIN — CARVEDILOL 3.12 MG: 3.12 TABLET, FILM COATED ORAL at 18:04

## 2024-03-31 RX ADMIN — TOPIRAMATE 100 MG: 100 TABLET, FILM COATED ORAL at 08:32

## 2024-03-31 RX ADMIN — Medication 10 ML: at 08:15

## 2024-03-31 RX ADMIN — INSULIN GLARGINE 18 UNITS: 100 INJECTION, SOLUTION SUBCUTANEOUS at 21:58

## 2024-03-31 RX ADMIN — ONDANSETRON 8 MG: 4 TABLET, ORALLY DISINTEGRATING ORAL at 18:05

## 2024-03-31 RX ADMIN — GABAPENTIN 600 MG: 300 CAPSULE ORAL at 21:56

## 2024-03-31 RX ADMIN — SODIUM CHLORIDE 3000 MG: 900 INJECTION INTRAVENOUS at 22:13

## 2024-03-31 RX ADMIN — GABAPENTIN 600 MG: 300 CAPSULE ORAL at 08:32

## 2024-03-31 RX ADMIN — CARVEDILOL 3.12 MG: 3.12 TABLET, FILM COATED ORAL at 08:33

## 2024-03-31 RX ADMIN — HYDROMORPHONE HYDROCHLORIDE 0.25 MG: 1 INJECTION, SOLUTION INTRAMUSCULAR; INTRAVENOUS; SUBCUTANEOUS at 00:24

## 2024-03-31 RX ADMIN — SODIUM CHLORIDE 3000 MG: 900 INJECTION INTRAVENOUS at 14:42

## 2024-03-31 RX ADMIN — PANTOPRAZOLE SODIUM 40 MG: 40 INJECTION, POWDER, FOR SOLUTION INTRAVENOUS at 22:07

## 2024-03-31 RX ADMIN — BUPROPION HYDROCHLORIDE 300 MG: 300 TABLET, EXTENDED RELEASE ORAL at 08:33

## 2024-03-31 RX ADMIN — ONDANSETRON 8 MG: 4 TABLET, ORALLY DISINTEGRATING ORAL at 08:33

## 2024-03-31 RX ADMIN — SODIUM CHLORIDE 3000 MG: 900 INJECTION INTRAVENOUS at 03:39

## 2024-03-31 RX ADMIN — PANTOPRAZOLE SODIUM 40 MG: 40 INJECTION, POWDER, FOR SOLUTION INTRAVENOUS at 08:08

## 2024-03-31 RX ADMIN — METOCLOPRAMIDE 10 MG: 5 INJECTION, SOLUTION INTRAMUSCULAR; INTRAVENOUS at 18:54

## 2024-03-31 RX ADMIN — METOCLOPRAMIDE 10 MG: 5 INJECTION, SOLUTION INTRAMUSCULAR; INTRAVENOUS at 06:23

## 2024-03-31 ASSESSMENT — PAIN SCALES - GENERAL
PAINLEVEL_OUTOF10: 9
PAINLEVEL_OUTOF10: 5
PAINLEVEL_OUTOF10: 7

## 2024-03-31 ASSESSMENT — PAIN DESCRIPTION - LOCATION: LOCATION: BACK;NECK

## 2024-03-31 ASSESSMENT — PAIN DESCRIPTION - PAIN TYPE: TYPE: CHRONIC PAIN

## 2024-03-31 ASSESSMENT — PAIN DESCRIPTION - DESCRIPTORS: DESCRIPTORS: ACHING

## 2024-03-31 ASSESSMENT — PAIN DESCRIPTION - ORIENTATION: ORIENTATION: LEFT

## 2024-03-31 NOTE — PROGRESS NOTES
Gastroenterology Progress Note      Ana Gonzales is a 68 y.o. female patient.  Principal Problem:    SBO (small bowel obstruction) ([facility])  Active Problems:    Hyperlipidemia    HTN (hypertension), benign    Type 2 diabetes mellitus, without long-term current use of insulin (HCC)    TIA (transient ischemic attack)    Coronary artery disease involving native coronary artery of native heart without angina pectoris    (HFpEF) heart failure with preserved ejection fraction (HCC)    Dysarthria    PVC's (premature ventricular contractions)    Acute prerenal azotemia    History of heart artery stent    Upper GI bleed    Slurred speech    Ventricular bigeminy    HANS (acute kidney injury) ([facility])    Supraglottitis without airway obstruction    Acute parotitis    Laryngopharyngeal reflux (LPR)    Mild malnutrition ([facility])  Resolved Problems:    * No resolved hospital problems. *      SUBJECTIVE: Patient is doing much better today she had a liquidy bowel movement yesterday with the assistance of an enema    Still with NG tube    Status post operation for a obstruction secondary to a Meckel's diverticulum    ROS:  No fever, chills  No chest pain, palpitations  No SOB, cough  Gastrointestinal ROS: no abdominal pain, nausea, vomiting     Physical    VITALS:  /74   Pulse 83   Temp 98 °F (36.7 °C) (Oral)   Resp 17   Ht 1.6 m (5' 3\")   Wt 90.4 kg (199 lb 3.2 oz)   SpO2 100%   BMI 35.29 kg/m²   TEMPERATURE:  Current - Temp: 98 °F (36.7 °C); Max - Temp  Av.6 °F (36.4 °C)  Min: 97.1 °F (36.2 °C)  Max: 98 °F (36.7 °C)    NAD  RRR, Nl s1s2  Lungs CTA Bilaterally, normal effort  Abdomen soft, ND, NT, no HSM, Bowel sounds normal.    Data    Data Review:    Recent Labs     24  0621 24  0439 24  0553   WBC 8.7 6.7 8.2   HGB 8.8* 8.9* 9.5*   HCT 26.3* 27.9* 30.0*   MCV 88.1 88.9 89.7   * 632* 654*     Recent Labs     24  0621 24  0439 24  1600 24  0553    138  --  136

## 2024-03-31 NOTE — PROGRESS NOTES
Shift assessment completed. Routine vitals stable. Scheduled medications given, ordered to only administer critical medications per Dr. Knight. Patient is awake, alert and oriented. Respirations are easy and unlabored. Patient does not appear to be in distress, resting comfortably at this time. Lidocaine patch applied for back pain. No other needs expressed. Abd dressing CDI. NGT in place, low int suction held. Triple lumen IJ flushed, blood return confirmed, TPN and Unasyn infusing. Pt denies passing flatus. IS encouraged. Purewick draining yellow urine.  at bedside. Call light within reach.      1230: Pt ambulated to elevator with walker, returned to chair, tolerated well. Mariangel-care provided, new purewick applied. Hair care given by student nurse. No further needs expressed. Call light in reach.  at bedside.

## 2024-03-31 NOTE — PROGRESS NOTES
[facility]    3/31/2024 12:17 PM        Name: Ana Gonzales .              Admitted: 3/12/2024  Primary Care Provider: Mellisa Houston MD (Tel: [phone])      Hospital course: 67 yo female initially presented with slurred speech and admitted with concern for TIA. CTA head/neck showed severe narrowing of distal left P1 segment of left PCA, CT head and MRI brain no acute findings. Neurology evaluated, recommend supportive care with asa when medically stable, intolerant statins.     Hospital course complicated by low Hgb on admission, requiring 2 units PRBCs. EGD (3/13) did not find any active bleeding, but did find blood clots and a large amount of liquid in the stomach. Placed on PPI gtt. Repeat EGD (3/14) did not show any bleed, but 2 clips were placed on small healing ulcer.     Patient subsequently developed partial SBO and had NGT placed (3/15). She underwent exp lap with lysis of adhesions (3/19). NGT removed and started on clear liquid diet (3/21) but again developed n/v and NG was replaced. Noted to have hematemesis post Ng placement and 3rd EGD 3/26 consistent with trauma from NGT placement. Failed NGT clamp trial 3/28.     Noted to have left sided neck edema. CT soft tissue neck (3/17) concerning for airway narrowing and acute left parotiditis. She was seen by ENT and received decadron and Unasyn.        3/19/2024: Exploratory laparotomy with lysis of adhesions, Meckel diverticulectomy, and appendectomy.       Subjective:  Up in bedside chair,  visiting. Patient reports she is feeling better today. Denies abdominal pain, nausea. Had \"liquid BM\" following enema yesterday. NGT remains to suction.     Reviewed interval ancillary notes    Current Medications  bisacodyl (DULCOLAX) suppository 10 mg, Once  PN-Adult Premix 5/20 - Standard Electrolytes - Central Line, Continuous TPN  metoclopramide

## 2024-03-31 NOTE — PROGRESS NOTES
[facility] - Inpatient Rehabilitation Department   Phone: [phone]    Occupational Therapy    [] Initial Evaluation            [x] Daily Treatment Note         [] Discharge Summary      Patient: Ana Gonzales   : 1955   MRN: [id number]   Date of Service:  3/31/2024    Admitting Diagnosis:  SBO (small bowel obstruction) (HCC)    Current Admission Summary: 67 yo female initially presented with slurred speech and admitted with concern for TIA. CTA head/neck showed severe narrowing of distal left P1 segment of left PCA, CT head and MRI brain no acute findings. Neurology evaluated, recommend supportive care with asa when medically stable, intolerant statins.      Hospital course complicated by low Hgb on admission, requiring 2 units PRBCs. EGD (3/13) did not find any active bleeding, but did find blood clots and a large amount of liquid in the stomach. Placed on PPI gtt. Repeat EGD (3/14) did not show any bleed, but 2 clips were placed on small healing ulcer.      Patient subsequently developed partial SBO and had NGT placed (3/15). She underwent exp lap with lysis of adhesions (3/19). NGT removed and started on clear liquid diet (3/21) but again developed n/v and NG was replaced. Noted to have hematemesis post Ng placement and 3rd EGD 3/26 consistent with trauma from NGT placement. NGT clamped 3/28.      Noted to have left sided neck edema. CT soft tissue neck (3/17) concerning for airway narrowing and acute left parotiditis. She was seen by ENT and received decadron and Unasyn.         3/19/2024: Exploratory laparotomy with lysis of adhesions, Meckel diverticulectomy, and appendectomy.     3/23: NGT removed and did not tolerate well- has been having some issues with coffee-ground emesis and large volume output after a operation for small bowel obstruction- had NGT replaced 3/25    3/26: EGD - findings consistent with ileus      Past Medical History:  has a past medical history of Arthritis,

## 2024-03-31 NOTE — PROGRESS NOTES
Clinical Pharmacy Note    Pharmacy consulted by Dr. Aria Carter to manage TPN    Current TPN rate: 80 ml/hr  Goal TPN rate: 80 ml/hr    Access: CVC  Indication: SBO    Labs:  General Labs:  BMP:    Lab Results   Component Value Date/Time     03/31/2024 05:53 AM    K 4.1 03/31/2024 05:53 AM    K 4.1 03/30/2024 04:00 PM     03/31/2024 05:53 AM    CO2 24 03/31/2024 05:53 AM    BUN 19 03/31/2024 05:53 AM    LABALBU 3.2 03/29/2024 06:21 AM    CREATININE 0.6 03/31/2024 05:53 AM    CALCIUM 9.1 03/31/2024 05:53 AM    GFRAA >60 11/24/2020 06:29 PM    GFRAA >60 01/13/2013 05:24 AM    LABGLOM >90 03/31/2024 05:53 AM    GLUCOSE 114 03/31/2024 05:53 AM     Magnesium:    Lab Results   Component Value Date/Time    MG 1.90 03/31/2024 05:53 AM     Phosphorus:    Lab Results   Component Value Date/Time    PHOS 4.2 03/31/2024 05:53 AM       Blood sugars over past 24 hours: 113-161    Blood sugar management:  Plan to Continue Humalog q4 hour sliding scale at medium dose.    Plan to continue TPN at 80 ml/hr.    Thank you for allowing pharmacy to participate in the care of this patient.    Nicolasa Franco [facility], PharmD 3/30/2024

## 2024-03-31 NOTE — PROGRESS NOTES
1945: shift assessment done, patient in recliner sleeping, chair alarm on, call light within reach, plan of care ongoing. The care plan and education has been reviewed and mutually agreed upon with the patient.     2050: NGT connected to low wall intermittent suction, hold PO meds as ordered.    2213: patient is still sleeping at the recliner, very sleepy but easily arousable, declined to get back to her specialty bed.    0354 : IJ dressing done

## 2024-03-31 NOTE — PROGRESS NOTES
[facility]        Progress Note    Patient Name: Ana Gonzales  MRN: [id number]  YOB: 1955  Date of Evaluation: 3/30/2024    Postoperative Day #12    Subjective:  No acute events overnight  Minimal nausea with NG  Pain controlled  Passed stool with enema yesterday    Vital Signs:  Patient Vitals for the past 24 hrs:   BP Temp Temp src Pulse Resp SpO2 Height Weight   24 0815 (!) 129/59 97.1 °F (36.2 °C) Axillary 91 16 97 % -- --   24 0630 -- -- -- -- -- -- 1.6 m (5' 3\") 88.8 kg (195 lb 12.8 oz)   24 0411 119/72 97.5 °F (36.4 °C) Oral -- -- 99 % -- --   24 0023 (!) 105/55 97.3 °F (36.3 °C) Oral 84 15 99 % -- --   24 108/73 97.4 °F (36.3 °C) Oral 88 19 100 % -- --   24 1608 (!) 124/47 97.8 °F (36.6 °C) Oral 83 16 100 % -- --   24 1600 -- -- -- -- 16 -- -- --   24 1530 -- -- -- -- 16 -- -- --   24 1137 -- -- -- -- 16 -- -- --      TEMPERATURE HISTORY 24H: Temp (24hrs), Av.4 °F (36.3 °C), Min:97.1 °F (36.2 °C), Max:97.8 °F (36.6 °C)    BLOOD PRESSURE HISTORY: Systolic (36hrs), Av , Min:105 , Max:133    Diastolic (36hrs), Av, Min:47, Max:75      Intake/Output:  I/O last 3 completed shifts:  In: -   Out: 2500 [Urine:2000; Emesis/NG output:500]  No intake/output data recorded.  Drain/tube Output:       Physical Exam:  General: awake, alert, oriented to person, place, time  Cardiac: regular rate and rhythm   Pulmonary: clear to auscultation bilaterally   Abdomen: soft, non-distended, appropriate incisional tenderness, incision clean dry and intact    Labs:  CBC:    Recent Labs     24  0600 24  0621 24  0439   WBC 9.8 8.7 6.7   HGB 9.0* 8.8* 8.9*   HCT 26.7* 26.3* 27.9*   * 621* 632*     BMP:    Recent Labs     24  0600 24  0621 24  0439    137 138   K 3.7 3.9 3.4*    101 101   CO2 24 25 27   BUN 23* 17 18   CREATININE <0.5* <0.5* <0.5*  Pen Syringe SubCUTAneous Q14 Days    insulin lispro  0-4 Units SubCUTAneous Nightly     Continuous Infusions:   PN-Adult Premix 5/20 - Standard Electrolytes - Central Line 80 mL/hr at 03/29/24 1847    sodium chloride Stopped (03/27/24 1936)    dextrose       PRN Meds:.diatrizoate meglumine-sodium, phenol, HYDROmorphone **OR** [DISCONTINUED] HYDROmorphone, albuterol sulfate HFA, ipratropium, potassium chloride, sodium phosphate 15.63 mmol in sodium chloride 0.9 % 250 mL IVPB, sodium chloride flush, sodium chloride, [DISCONTINUED] ondansetron **OR** ondansetron, [DISCONTINUED] acetaminophen **OR** acetaminophen, ondansetron, perflutren lipid microspheres, acetaminophen, dextrose bolus **OR** dextrose bolus, glucagon (rDNA), dextrose      Assessment:  68 y.o. female admitted with   1. Slurred speech    2. Ventricular bigeminy    3. Elevated troponin    4. Upper GI bleed    5. Hematemesis, unspecified whether nausea present      OR Date 3/19/2024, exploratory laparotomy with lysis of adhesions, Meckel diverticulectomy, and appendectomy for small bowel obstruction  Acute blood loss anemia, gastric ulcer  TIA, acute aphasia  Hypertension  CHF  CAD, on Plavix--last dose was 3/12/2024  Palpitation and PVC, bigeminy  Diabetes  Supraglottitis without airway obstruction, laryngopharyngeal reflux, acute left parotitis  Ileus    Plan:  1. Continue bowel rest, NG decompression for another day  2. Monitor bowel function, passed stool with enema  3. IVF, monitor and replace electrolytes, continue TPN  4. Increase activity as able  5. Pain control, minimize narcotics  6. Hold plavix, trend hemoglobin  7. Defer management of remainder of medical comorbidities to primary an consulting teams  8. Discharge planning, ARU at discharge    Lb Knight MD, FACS  3/30/2024  11:35 AM

## 2024-04-01 ENCOUNTER — APPOINTMENT (OUTPATIENT)
Dept: GENERAL RADIOLOGY | Age: 69
DRG: 330 | End: 2024-04-01

## 2024-04-01 LAB
ALBUMIN SERPL-MCNC: 3.3 G/DL (ref 3.4–5)
ALP SERPL-CCNC: 94 U/L (ref 40–129)
ALT SERPL-CCNC: <5 U/L (ref 10–40)
ANION GAP SERPL CALCULATED.3IONS-SCNC: 11 MMOL/L (ref 3–16)
AST SERPL-CCNC: 10 U/L (ref 15–37)
BASOPHILS # BLD: 0 K/UL (ref 0–0.2)
BASOPHILS NFR BLD: 0.3 %
BILIRUB DIRECT SERPL-MCNC: <0.2 MG/DL (ref 0–0.3)
BILIRUB INDIRECT SERPL-MCNC: ABNORMAL MG/DL (ref 0–1)
BILIRUB SERPL-MCNC: <0.2 MG/DL (ref 0–1)
BUN SERPL-MCNC: 17 MG/DL (ref 7–20)
CALCIUM SERPL-MCNC: 9 MG/DL (ref 8.3–10.6)
CHLORIDE SERPL-SCNC: 104 MMOL/L (ref 99–110)
CO2 SERPL-SCNC: 25 MMOL/L (ref 21–32)
CREAT SERPL-MCNC: 0.6 MG/DL (ref 0.6–1.2)
DEPRECATED RDW RBC AUTO: 17.5 % (ref 12.4–15.4)
EOSINOPHIL # BLD: 0.4 K/UL (ref 0–0.6)
EOSINOPHIL NFR BLD: 6.3 %
GFR SERPLBLD CREATININE-BSD FMLA CKD-EPI: >90 ML/MIN/{1.73_M2}
GLUCOSE BLD-MCNC: 121 MG/DL (ref 70–99)
GLUCOSE BLD-MCNC: 123 MG/DL (ref 70–99)
GLUCOSE BLD-MCNC: 144 MG/DL (ref 70–99)
GLUCOSE BLD-MCNC: 144 MG/DL (ref 70–99)
GLUCOSE BLD-MCNC: 156 MG/DL (ref 70–99)
GLUCOSE BLD-MCNC: 169 MG/DL (ref 70–99)
GLUCOSE SERPL-MCNC: 131 MG/DL (ref 70–99)
HCT VFR BLD AUTO: 28.1 % (ref 36–48)
HGB BLD-MCNC: 8.9 G/DL (ref 12–16)
LYMPHOCYTES # BLD: 1.2 K/UL (ref 1–5.1)
LYMPHOCYTES NFR BLD: 20.1 %
MAGNESIUM SERPL-MCNC: 2 MG/DL (ref 1.8–2.4)
MCH RBC QN AUTO: 28.6 PG (ref 26–34)
MCHC RBC AUTO-ENTMCNC: 31.9 G/DL (ref 31–36)
MCV RBC AUTO: 89.7 FL (ref 80–100)
MONOCYTES # BLD: 0.6 K/UL (ref 0–1.3)
MONOCYTES NFR BLD: 10.4 %
NEUTROPHILS # BLD: 3.8 K/UL (ref 1.7–7.7)
NEUTROPHILS NFR BLD: 62.9 %
PERFORMED ON: ABNORMAL
PHOSPHATE SERPL-MCNC: 3.9 MG/DL (ref 2.5–4.9)
PLATELET # BLD AUTO: 534 K/UL (ref 135–450)
PMV BLD AUTO: 8.3 FL (ref 5–10.5)
POTASSIUM SERPL-SCNC: 3.4 MMOL/L (ref 3.5–5.1)
PROT SERPL-MCNC: 6.2 G/DL (ref 6.4–8.2)
RBC # BLD AUTO: 3.13 M/UL (ref 4–5.2)
SODIUM SERPL-SCNC: 140 MMOL/L (ref 136–145)
WBC # BLD AUTO: 6 K/UL (ref 4–11)

## 2024-04-01 PROCEDURE — 2580000003 HC RX 258: Performed by: SURGERY

## 2024-04-01 PROCEDURE — 6360000002 HC RX W HCPCS: Performed by: INTERNAL MEDICINE

## 2024-04-01 PROCEDURE — 6360000002 HC RX W HCPCS: Performed by: SURGERY

## 2024-04-01 PROCEDURE — 97116 GAIT TRAINING THERAPY: CPT

## 2024-04-01 PROCEDURE — 97110 THERAPEUTIC EXERCISES: CPT

## 2024-04-01 PROCEDURE — 2500000003 HC RX 250 WO HCPCS: Performed by: INTERNAL MEDICINE

## 2024-04-01 PROCEDURE — 6360000002 HC RX W HCPCS: Performed by: PHYSICIAN ASSISTANT

## 2024-04-01 PROCEDURE — 6370000000 HC RX 637 (ALT 250 FOR IP): Performed by: PHYSICIAN ASSISTANT

## 2024-04-01 PROCEDURE — 6370000000 HC RX 637 (ALT 250 FOR IP): Performed by: SURGERY

## 2024-04-01 PROCEDURE — 74019 RADEX ABDOMEN 2 VIEWS: CPT

## 2024-04-01 PROCEDURE — 83735 ASSAY OF MAGNESIUM: CPT

## 2024-04-01 PROCEDURE — 94761 N-INVAS EAR/PLS OXIMETRY MLT: CPT

## 2024-04-01 PROCEDURE — 94640 AIRWAY INHALATION TREATMENT: CPT

## 2024-04-01 PROCEDURE — 80076 HEPATIC FUNCTION PANEL: CPT

## 2024-04-01 PROCEDURE — 6360000002 HC RX W HCPCS: Performed by: NURSE PRACTITIONER

## 2024-04-01 PROCEDURE — 84100 ASSAY OF PHOSPHORUS: CPT

## 2024-04-01 PROCEDURE — C9113 INJ PANTOPRAZOLE SODIUM, VIA: HCPCS | Performed by: PHYSICIAN ASSISTANT

## 2024-04-01 PROCEDURE — 80048 BASIC METABOLIC PNL TOTAL CA: CPT

## 2024-04-01 PROCEDURE — 1200000000 HC SEMI PRIVATE

## 2024-04-01 PROCEDURE — 6370000000 HC RX 637 (ALT 250 FOR IP): Performed by: NURSE PRACTITIONER

## 2024-04-01 PROCEDURE — 97535 SELF CARE MNGMENT TRAINING: CPT

## 2024-04-01 PROCEDURE — 2500000003 HC RX 250 WO HCPCS: Performed by: SURGERY

## 2024-04-01 PROCEDURE — 85025 COMPLETE CBC W/AUTO DIFF WBC: CPT

## 2024-04-01 PROCEDURE — 99024 POSTOP FOLLOW-UP VISIT: CPT | Performed by: SURGERY

## 2024-04-01 RX ORDER — POTASSIUM CHLORIDE 29.8 MG/ML
20 INJECTION INTRAVENOUS ONCE
Status: COMPLETED | OUTPATIENT
Start: 2024-04-01 | End: 2024-04-01

## 2024-04-01 RX ORDER — BISACODYL 10 MG
10 SUPPOSITORY, RECTAL RECTAL ONCE
Status: COMPLETED | OUTPATIENT
Start: 2024-04-01 | End: 2024-04-01

## 2024-04-01 RX ADMIN — PANTOPRAZOLE SODIUM 40 MG: 40 INJECTION, POWDER, FOR SOLUTION INTRAVENOUS at 08:22

## 2024-04-01 RX ADMIN — SODIUM CHLORIDE 3000 MG: 900 INJECTION INTRAVENOUS at 03:53

## 2024-04-01 RX ADMIN — Medication 2 PUFF: at 10:10

## 2024-04-01 RX ADMIN — METOCLOPRAMIDE 10 MG: 5 INJECTION, SOLUTION INTRAMUSCULAR; INTRAVENOUS at 17:53

## 2024-04-01 RX ADMIN — METOCLOPRAMIDE 10 MG: 5 INJECTION, SOLUTION INTRAMUSCULAR; INTRAVENOUS at 00:19

## 2024-04-01 RX ADMIN — GABAPENTIN 600 MG: 300 CAPSULE ORAL at 21:11

## 2024-04-01 RX ADMIN — POTASSIUM CHLORIDE 20 MEQ: 29.8 INJECTION, SOLUTION INTRAVENOUS at 08:22

## 2024-04-01 RX ADMIN — HYDROMORPHONE HYDROCHLORIDE 0.25 MG: 1 INJECTION, SOLUTION INTRAMUSCULAR; INTRAVENOUS; SUBCUTANEOUS at 00:33

## 2024-04-01 RX ADMIN — TROSPIUM CHLORIDE 20 MG: 20 TABLET, FILM COATED ORAL at 15:52

## 2024-04-01 RX ADMIN — CARVEDILOL 3.12 MG: 3.12 TABLET, FILM COATED ORAL at 08:22

## 2024-04-01 RX ADMIN — BISACODYL 10 MG: 10 SUPPOSITORY RECTAL at 21:14

## 2024-04-01 RX ADMIN — MECLIZINE HYDROCHLORIDE 25 MG: 12.5 TABLET ORAL at 21:13

## 2024-04-01 RX ADMIN — ARIPIPRAZOLE 2 MG: 2 TABLET ORAL at 21:13

## 2024-04-01 RX ADMIN — METHENAMINE HIPPURATE 1 G: 1 TABLET ORAL at 08:22

## 2024-04-01 RX ADMIN — Medication 10 ML: at 21:22

## 2024-04-01 RX ADMIN — CARVEDILOL 3.12 MG: 3.12 TABLET, FILM COATED ORAL at 15:52

## 2024-04-01 RX ADMIN — METHENAMINE HIPPURATE 1 G: 1 TABLET ORAL at 15:52

## 2024-04-01 RX ADMIN — TOPIRAMATE 100 MG: 100 TABLET, FILM COATED ORAL at 08:22

## 2024-04-01 RX ADMIN — TOPIRAMATE 150 MG: 100 TABLET, FILM COATED ORAL at 17:53

## 2024-04-01 RX ADMIN — FUROSEMIDE 20 MG: 10 INJECTION, SOLUTION INTRAMUSCULAR; INTRAVENOUS at 08:22

## 2024-04-01 RX ADMIN — INSULIN GLARGINE 18 UNITS: 100 INJECTION, SOLUTION SUBCUTANEOUS at 21:14

## 2024-04-01 RX ADMIN — POTASSIUM CHLORIDE 20 MEQ: 29.8 INJECTION, SOLUTION INTRAVENOUS at 15:48

## 2024-04-01 RX ADMIN — MECLIZINE HYDROCHLORIDE 25 MG: 12.5 TABLET ORAL at 15:52

## 2024-04-01 RX ADMIN — ASCORBIC ACID, VITAMIN A PALMITATE, CHOLECALCIFEROL, THIAMINE HYDROCHLORIDE, RIBOFLAVIN-5 PHOSPHATE SODIUM, PYRIDOXINE HYDROCHLORIDE, NIACINAMIDE, DEXPANTHENOL, ALPHA-TOCOPHEROL ACETATE, VITAMIN K1, FOLIC ACID, BIOTIN, CYANOCOBALAMIN: 200; 3300; 200; 6; 3.6; 6; 40; 15; 10; 150; 600; 60; 5 INJECTION, SOLUTION INTRAVENOUS at 17:57

## 2024-04-01 RX ADMIN — TROSPIUM CHLORIDE 20 MG: 20 TABLET, FILM COATED ORAL at 05:42

## 2024-04-01 RX ADMIN — Medication 2 PUFF: at 21:27

## 2024-04-01 RX ADMIN — METOCLOPRAMIDE 10 MG: 5 INJECTION, SOLUTION INTRAMUSCULAR; INTRAVENOUS at 15:52

## 2024-04-01 RX ADMIN — GABAPENTIN 600 MG: 300 CAPSULE ORAL at 15:51

## 2024-04-01 RX ADMIN — ENOXAPARIN SODIUM 40 MG: 100 INJECTION SUBCUTANEOUS at 21:13

## 2024-04-01 RX ADMIN — NORTRIPTYLINE HYDROCHLORIDE 75 MG: 25 CAPSULE ORAL at 21:12

## 2024-04-01 RX ADMIN — METOCLOPRAMIDE 10 MG: 5 INJECTION, SOLUTION INTRAMUSCULAR; INTRAVENOUS at 05:42

## 2024-04-01 RX ADMIN — GABAPENTIN 600 MG: 300 CAPSULE ORAL at 08:23

## 2024-04-01 RX ADMIN — SODIUM CHLORIDE 3000 MG: 900 INJECTION INTRAVENOUS at 08:20

## 2024-04-01 RX ADMIN — I.V. FAT EMULSION 250 ML: 20 EMULSION INTRAVENOUS at 17:59

## 2024-04-01 RX ADMIN — TIZANIDINE 2 MG: 4 TABLET ORAL at 21:12

## 2024-04-01 RX ADMIN — PANTOPRAZOLE SODIUM 40 MG: 40 INJECTION, POWDER, FOR SOLUTION INTRAVENOUS at 21:13

## 2024-04-01 RX ADMIN — MECLIZINE HYDROCHLORIDE 25 MG: 12.5 TABLET ORAL at 08:23

## 2024-04-01 ASSESSMENT — PAIN DESCRIPTION - ORIENTATION
ORIENTATION: MID
ORIENTATION: MID

## 2024-04-01 ASSESSMENT — PAIN DESCRIPTION - DESCRIPTORS
DESCRIPTORS: ACHING
DESCRIPTORS: ACHING

## 2024-04-01 ASSESSMENT — PAIN DESCRIPTION - LOCATION
LOCATION: BACK
LOCATION: BACK

## 2024-04-01 ASSESSMENT — PAIN DESCRIPTION - PAIN TYPE: TYPE: CHRONIC PAIN

## 2024-04-01 ASSESSMENT — PAIN SCALES - GENERAL
PAINLEVEL_OUTOF10: 0
PAINLEVEL_OUTOF10: 6
PAINLEVEL_OUTOF10: 8

## 2024-04-01 ASSESSMENT — PAIN DESCRIPTION - FREQUENCY: FREQUENCY: CONTINUOUS

## 2024-04-01 ASSESSMENT — PAIN DESCRIPTION - ONSET: ONSET: ON-GOING

## 2024-04-01 NOTE — PLAN OF CARE
Problem: Pain  Goal: Verbalizes/displays adequate comfort level or baseline comfort level  Outcome: Progressing     Problem: Discharge Planning  Goal: Discharge to home or other facility with appropriate resources  Outcome: Progressing     Problem: Safety - Adult  Goal: Free from fall injury  Outcome: Progressing  Flowsheets (Taken 4/1/2024 0056)  Free From Fall Injury: Instruct family/caregiver on patient safety     Problem: Chronic Conditions and Co-morbidities  Goal: Patient's chronic conditions and co-morbidity symptoms are monitored and maintained or improved  Outcome: Progressing     Problem: ABCDS Injury Assessment  Goal: Absence of physical injury  Outcome: Progressing     Problem: Skin/Tissue Integrity  Goal: Absence of new skin breakdown  Description: 1.  Monitor for areas of redness and/or skin breakdown  2.  Assess vascular access sites hourly  3.  Every 4-6 hours minimum:  Change oxygen saturation probe site  4.  Every 4-6 hours:  If on nasal continuous positive airway pressure, respiratory therapy assess nares and determine need for appliance change or resting period.  Outcome: Progressing     Problem: Nutrition Deficit:  Goal: Optimize nutritional status  Outcome: Progressing     Problem: Musculoskeletal - Adult  Goal: Return mobility to safest level of function  Outcome: Progressing  Goal: Maintain proper alignment of affected body part  Outcome: Progressing     Problem: Gastrointestinal - Adult  Goal: Minimal or absence of nausea and vomiting  Outcome: Progressing  Goal: Maintains or returns to baseline bowel function  Outcome: Progressing     Problem: Genitourinary - Adult  Goal: Absence of urinary retention  Outcome: Progressing     Problem: Metabolic/Fluid and Electrolytes - Adult  Goal: Electrolytes maintained within normal limits  Outcome: Progressing  Goal: Hemodynamic stability and optimal renal function maintained  Outcome: Progressing  Goal: Glucose maintained within prescribed  range  Outcome: Progressing     Problem: Skin/Tissue Integrity - Adult  Goal: Skin integrity remains intact  Outcome: Progressing  Flowsheets (Taken 4/1/2024 0056)  Skin Integrity Remains Intact: Monitor for areas of redness and/or skin breakdown  Goal: Incisions, wounds, or drain sites healing without S/S of infection  Outcome: Progressing

## 2024-04-01 NOTE — PLAN OF CARE
Problem: Pain  Goal: Verbalizes/displays adequate comfort level or baseline comfort level  4/1/2024 1053 by Laya Steele RN  Outcome: Progressing  4/1/2024 0155 by Grace Steinberg RN  Outcome: Progressing     Problem: Discharge Planning  Goal: Discharge to home or other facility with appropriate resources  4/1/2024 1053 by Laya Steele RN  Outcome: Progressing  4/1/2024 0155 by Grace Steinberg RN  Outcome: Progressing     Problem: Safety - Adult  Goal: Free from fall injury  4/1/2024 1053 by Laya Steele RN  Outcome: Progressing  4/1/2024 0155 by Grace Steinberg RN  Outcome: Progressing  Flowsheets (Taken 4/1/2024 0056)  Free From Fall Injury: Instruct family/caregiver on patient safety     Problem: Chronic Conditions and Co-morbidities  Goal: Patient's chronic conditions and co-morbidity symptoms are monitored and maintained or improved  4/1/2024 1053 by Laya Steele RN  Outcome: Progressing  4/1/2024 0155 by Grace Steinberg RN  Outcome: Progressing     Problem: ABCDS Injury Assessment  Goal: Absence of physical injury  4/1/2024 1053 by Laya Steele RN  Outcome: Progressing  4/1/2024 0155 by Grace Steinberg RN  Outcome: Progressing     Problem: Skin/Tissue Integrity  Goal: Absence of new skin breakdown  Description: 1.  Monitor for areas of redness and/or skin breakdown  2.  Assess vascular access sites hourly  3.  Every 4-6 hours minimum:  Change oxygen saturation probe site  4.  Every 4-6 hours:  If on nasal continuous positive airway pressure, respiratory therapy assess nares and determine need for appliance change or resting period.  4/1/2024 1053 by Laya Steele RN  Outcome: Progressing  4/1/2024 0155 by Grace Steinberg RN  Outcome: Progressing     Problem: Nutrition Deficit:  Goal: Optimize nutritional status  4/1/2024 1053 by Laya Steele RN  Outcome: Progressing  4/1/2024 0155 by Grace Steinberg RN  Outcome: Progressing     Problem:

## 2024-04-01 NOTE — PROGRESS NOTES
[facility]    4/1/2024 12:39 PM        Name: Ana Gonzales .              Admitted: 3/12/2024  Primary Care Provider: Mellisa Houston MD (Tel: [phone])      Hospital course: 69 yo female initially presented with slurred speech and admitted with concern for TIA. CTA head/neck showed severe narrowing of distal left P1 segment of left PCA, CT head and MRI brain no acute findings. Neurology evaluated, recommend supportive care with asa when medically stable, intolerant statins.     Hospital course complicated by low Hgb on admission, requiring 2 units PRBCs. EGD (3/13) did not find any active bleeding, but did find blood clots and a large amount of liquid in the stomach. Placed on PPI gtt. Repeat EGD (3/14) did not show any bleed, but 2 clips were placed on small healing ulcer.     Patient subsequently developed partial SBO and had NGT placed (3/15). She underwent exp lap with lysis of adhesions (3/19). NGT removed and started on clear liquid diet (3/21) but again developed n/v and NG was replaced. Noted to have hematemesis post Ng placement and 3rd EGD 3/26 consistent with trauma from NGT placement. Failed NGT clamp trial 3/28.     Noted to have left sided neck edema. CT soft tissue neck (3/17) concerning for airway narrowing and acute left parotiditis. She was seen by ENT and received short course steroid and Unasyn. Symptoms resolved, Unasyn discontinued 4/1        3/19/2024: Exploratory laparotomy with lysis of adhesions, Meckel diverticulectomy, and appendectomy.       Subjective:  Up in bedside chair, son visiting. Patient reports she feels good today. NGT was clamped about about an hour ago. No nausea, abdominal pain. Last BM 2 days ago, passing flatus.        Reviewed interval ancillary notes    Current Medications  mometasone-formoterol (DULERA) 200-5 MCG/ACT inhaler,   PN-Adult Premix 5/20 -  small bowel distention compared to prior     Moderate to large stool load in colon      Problem List  Principal Problem:    SBO (small bowel obstruction) ([facility])  Active Problems:    Hyperlipidemia    HTN (hypertension), benign    Type 2 diabetes mellitus, without long-term current use of insulin ([facility])    TIA (transient ischemic attack)    Coronary artery disease involving native coronary artery of native heart without angina pectoris    (HFpEF) heart failure with preserved ejection fraction ([facility])    Dysarthria    PVC's (premature ventricular contractions)    Acute prerenal azotemia    History of heart artery stent    Upper GI bleed    Slurred speech    Ventricular bigeminy    HANS (acute kidney injury) ([facility])    Supraglottitis without airway obstruction    Acute parotitis    Laryngopharyngeal reflux (LPR)    Mild malnutrition ([facility])  Resolved Problems:    * No resolved hospital problems. *       Assessment & Plan:     UGI bleed  - Hgb 6.6 on presentation  - Received 2 units 3/13, 1 unit 3/15  - Found to have gastric ulcer on EGD (3/14) with 2 clips placed  - Gastric biopsies negative for H.pylori  - GI recommends PPI BID  - Repeat EGD (3/26) for hematemesis revealed NG trauma. No active bleeding from previously noted ulcers  - Iron levels low on labs (3/14), and she received IV Venofer 200 mg x 5 doses  - Hgb 8.9 today   - Continue to monitor    SBO  - Underwent exp lap with lysis of adhesions, appendectomy and diverticulectomy 3/19 by Dr Morales  - Had NGT removed (3/23) and was stated on clears but subsequently replaced 3/24 for vomiting, repeat xray revealed ileus  - On TPN  - NGT clamp trial 3/28 with worsening nausea, NGT back to suction 3/29. Repeat abd xray (3/29) showed slightly decreased small bowel distention and moderate to large stool load in colon, given enema  - NGT tube clamped today for trial  - Repeat abd xray pending    Hypokalemia  - On replacement protocols  - Continue to follow    Acute

## 2024-04-01 NOTE — PROGRESS NOTES
Ana Gonzales is a 68 y.o. female patient.    Current Facility-Administered Medications   Medication Dose Route Frequency Provider Last Rate Last Admin    mometasone-formoterol (DULERA) 200-5 MCG/ACT inhaler             PN-Adult Premix 5/20 - Standard Electrolytes - Central Line   IntraVENous Continuous TPN Mariaa Velasquez MD 80 mL/hr at 04/01/24 1757 New Bag at 04/01/24 1757    metoclopramide (REGLAN) injection 10 mg  10 mg IntraVENous Q6H Aria Carter APRN - CNP   10 mg at 04/01/24 1753    pantoprazole (PROTONIX) injection 40 mg  40 mg IntraVENous BID Shalonda Dominguez PA-C   40 mg at 04/01/24 0822    diatrizoate meglumine-sodium (GASTROGRAFIN) 66-10 % solution 5 mL  5 mL Oral ONCE PRN Srinivasa Morales MD   5 mL at 03/27/24 1238    phenol 1.4 % mouth spray 1 spray  1 spray Mouth/Throat Q2H PRN Aria Carter APRN - CNP        furosemide (LASIX) injection 20 mg  20 mg IntraVENous Daily Lynsey Garza PA-C   20 mg at 04/01/24 0822    insulin glargine (LANTUS) injection vial 18 Units  18 Units SubCUTAneous Nightly Lynsey Garza PA-C   18 Units at 03/31/24 2158    HYDROmorphone HCl PF (DILAUDID) injection 0.25 mg  0.25 mg IntraVENous Q3H PRN Aria Carter APRN - CNP   0.25 mg at 04/01/24 0033    mometasone-formoterol (DULERA) 200-5 MCG/ACT inhaler 2 puff  2 puff Inhalation BID RT Lynsey Garza PA-C   2 puff at 04/01/24 1010    albuterol sulfate HFA (PROVENTIL;VENTOLIN;PROAIR) 108 (90 Base) MCG/ACT inhaler 2 puff  2 puff Inhalation Q6H PRN Lavern Son MD   2 puff at 03/26/24 0921    ipratropium (ATROVENT HFA) 17 MCG/ACT inhaler 2 puff  2 puff Inhalation Q6H PRN Lavern Son MD        lidocaine 4 % external patch 1 patch  1 patch TransDERmal Daily Aria Carter, VALE - CNP   1 patch at 04/01/24 0824    enoxaparin (LOVENOX) injection 40 mg  40 mg SubCUTAneous Nightly Srinivasa Morales MD   40 mg at 03/31/24 2130    fat emulsion (INTRALIPID/NUTRILIPID) 20 % infusion 250 mL   Srinivasa VALENTE MD   1,000 mg at 03/31/24 2156    perflutren lipid microspheres (DEFINITY) injection 1.5 mL  1.5 mL IntraVENous ONCE PRN Srinivasa Morales MD        ARIPiprazole (ABILIFY) tablet 2 mg  2 mg Oral Nightly Srinivasa Morales MD   2 mg at 03/31/24 2156    [Held by provider] buPROPion (WELLBUTRIN XL) extended release tablet 300 mg  300 mg Oral Srinivasa Garces MD   300 mg at 03/31/24 0833    [Held by provider] furosemide (LASIX) tablet 40 mg  40 mg Oral BID Hakeem Zaldivar MD   40 mg at 03/14/24 1746    gabapentin (NEURONTIN) capsule 600 mg  600 mg Oral 4x Daily Srinivasa Morales MD   600 mg at 04/01/24 1551    meclizine (ANTIVERT) tablet 25 mg  25 mg Oral 4x Daily Srinivasa Morales MD   25 mg at 04/01/24 1552    nortriptyline (PAMELOR) capsule 75 mg  75 mg Oral Nightly Srinivasa Morales MD   75 mg at 03/31/24 2156    tiZANidine (ZANAFLEX) tablet 2 mg  2 mg Oral Nightly Srinivasa Morales MD   2 mg at 03/31/24 2156    topiramate (TOPAMAX) tablet 100 mg  100 mg Oral Srinivasa Garces MD   100 mg at 04/01/24 0822    topiramate (TOPAMAX) tablet 150 mg  150 mg Oral QPM Srinivasa Morales MD   150 mg at 04/01/24 1753    trospium (SANCTURA) tablet 20 mg  20 mg Oral BID AC Srinivasa Morales MD   20 mg at 04/01/24 1552    carvedilol (COREG) tablet 3.125 mg  3.125 mg Oral BID Srinivasa Owens MD   3.125 mg at 04/01/24 1552    methenamine (HIPREX) tablet 1 g  1 g Oral BID Srinivasa Owens MD   1 g at 04/01/24 1552    aclidinium (TUDORZA PRESSAIR) 400 MCG/ACT inhaler 1 puff (Patient Supplied)  1 puff Inhalation BID RT Srinivasa Morales MD   1 puff at 04/01/24 1010    Evolocumab SOAJ 1 Adjustable Dose Pre-filled Pen Syringe (Patient Supplied)  1 Adjustable Dose Pre-filled Pen Syringe SubCUTAneous Q14 Days Srinivasa Morales MD   1 Adjustable Dose Pre-filled Pen Syringe at 03/12/24 2228    acetaminophen (TYLENOL) tablet 650 mg  650 mg Oral Q4H PRN Carmen

## 2024-04-01 NOTE — PROGRESS NOTES
[facility] - Inpatient Rehabilitation Department   Phone: [phone]    Occupational Therapy    [] Initial Evaluation            [x] Daily Treatment Note         [] Discharge Summary      Patient: Ana Gonzales   : 1955   MRN: [id number]   Date of Service:  2024    Admitting Diagnosis:  SBO (small bowel obstruction) (HCC)    Current Admission Summary: 69 yo female initially presented with slurred speech and admitted with concern for TIA. CTA head/neck showed severe narrowing of distal left P1 segment of left PCA, CT head and MRI brain no acute findings. Neurology evaluated, recommend supportive care with asa when medically stable, intolerant statins.      Hospital course complicated by low Hgb on admission, requiring 2 units PRBCs. EGD (3/13) did not find any active bleeding, but did find blood clots and a large amount of liquid in the stomach. Placed on PPI gtt. Repeat EGD (3/14) did not show any bleed, but 2 clips were placed on small healing ulcer.      Patient subsequently developed partial SBO and had NGT placed (3/15). She underwent exp lap with lysis of adhesions (3/19). NGT removed and started on clear liquid diet (3/21) but again developed n/v and NG was replaced. Noted to have hematemesis post Ng placement and 3rd EGD 3/26 consistent with trauma from NGT placement. NGT clamped 3/28.      Noted to have left sided neck edema. CT soft tissue neck (3/17) concerning for airway narrowing and acute left parotiditis. She was seen by ENT and received decadron and Unasyn.         3/19/2024: Exploratory laparotomy with lysis of adhesions, Meckel diverticulectomy, and appendectomy.     3/23: NGT removed and did not tolerate well- has been having some issues with coffee-ground emesis and large volume output after a operation for small bowel obstruction- had NGT replaced 3/25    3/26: EGD - findings consistent with ileus    Past Medical History:  has a past medical history of Arthritis,

## 2024-04-01 NOTE — PROGRESS NOTES
0815: Shift assessment completed, morning medications given per MAR. VSS, alert and oriented. NG tube remains in place. Call light within reach. The care plan and education has been reviewed and mutually agreed upon with the patient.

## 2024-04-01 NOTE — PROGRESS NOTES
21:37 Assessment Complete. VSS. Dilaudid given for pain.  The care plan and education has been reviewed and mutually agreed upon with the patient.   Grace Steinberg RN
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
## 2024-04-02 LAB
ANION GAP SERPL CALCULATED.3IONS-SCNC: 12 MMOL/L (ref 3–16)
BASOPHILS # BLD: 0.1 K/UL (ref 0–0.2)
BASOPHILS NFR BLD: 1.4 %
BUN SERPL-MCNC: 18 MG/DL (ref 7–20)
CALCIUM SERPL-MCNC: 9 MG/DL (ref 8.3–10.6)
CHLORIDE SERPL-SCNC: 107 MMOL/L (ref 99–110)
CO2 SERPL-SCNC: 20 MMOL/L (ref 21–32)
CREAT SERPL-MCNC: 0.6 MG/DL (ref 0.6–1.2)
DEPRECATED RDW RBC AUTO: 17.7 % (ref 12.4–15.4)
EOSINOPHIL # BLD: 0.4 K/UL (ref 0–0.6)
EOSINOPHIL NFR BLD: 7.8 %
GFR SERPLBLD CREATININE-BSD FMLA CKD-EPI: >90 ML/MIN/{1.73_M2}
GLUCOSE BLD-MCNC: 116 MG/DL (ref 70–99)
GLUCOSE BLD-MCNC: 148 MG/DL (ref 70–99)
GLUCOSE BLD-MCNC: 157 MG/DL (ref 70–99)
GLUCOSE BLD-MCNC: 158 MG/DL (ref 70–99)
GLUCOSE BLD-MCNC: 160 MG/DL (ref 70–99)
GLUCOSE BLD-MCNC: 168 MG/DL (ref 70–99)
GLUCOSE SERPL-MCNC: 165 MG/DL (ref 70–99)
HCT VFR BLD AUTO: 28.2 % (ref 36–48)
HGB BLD-MCNC: 9.4 G/DL (ref 12–16)
LYMPHOCYTES # BLD: 1.2 K/UL (ref 1–5.1)
LYMPHOCYTES NFR BLD: 21.8 %
MAGNESIUM SERPL-MCNC: 2.1 MG/DL (ref 1.8–2.4)
MCH RBC QN AUTO: 29.9 PG (ref 26–34)
MCHC RBC AUTO-ENTMCNC: 33.3 G/DL (ref 31–36)
MCV RBC AUTO: 89.6 FL (ref 80–100)
MONOCYTES # BLD: 0.5 K/UL (ref 0–1.3)
MONOCYTES NFR BLD: 8.6 %
NEUTROPHILS # BLD: 3.3 K/UL (ref 1.7–7.7)
NEUTROPHILS NFR BLD: 60.4 %
PERFORMED ON: ABNORMAL
PHOSPHATE SERPL-MCNC: 3.6 MG/DL (ref 2.5–4.9)
PLATELET # BLD AUTO: 548 K/UL (ref 135–450)
PMV BLD AUTO: 8 FL (ref 5–10.5)
POTASSIUM SERPL-SCNC: 3.5 MMOL/L (ref 3.5–5.1)
RBC # BLD AUTO: 3.15 M/UL (ref 4–5.2)
SODIUM SERPL-SCNC: 139 MMOL/L (ref 136–145)
WBC # BLD AUTO: 5.5 K/UL (ref 4–11)

## 2024-04-02 PROCEDURE — 80048 BASIC METABOLIC PNL TOTAL CA: CPT

## 2024-04-02 PROCEDURE — 6370000000 HC RX 637 (ALT 250 FOR IP): Performed by: SURGERY

## 2024-04-02 PROCEDURE — 83735 ASSAY OF MAGNESIUM: CPT

## 2024-04-02 PROCEDURE — 94761 N-INVAS EAR/PLS OXIMETRY MLT: CPT

## 2024-04-02 PROCEDURE — APPSS15 APP SPLIT SHARED TIME 0-15 MINUTES: Performed by: NURSE PRACTITIONER

## 2024-04-02 PROCEDURE — 2580000003 HC RX 258: Performed by: SURGERY

## 2024-04-02 PROCEDURE — 97530 THERAPEUTIC ACTIVITIES: CPT

## 2024-04-02 PROCEDURE — 36415 COLL VENOUS BLD VENIPUNCTURE: CPT

## 2024-04-02 PROCEDURE — 6360000002 HC RX W HCPCS: Performed by: NURSE PRACTITIONER

## 2024-04-02 PROCEDURE — 6360000002 HC RX W HCPCS

## 2024-04-02 PROCEDURE — 36556 INSERT NON-TUNNEL CV CATH: CPT

## 2024-04-02 PROCEDURE — 97535 SELF CARE MNGMENT TRAINING: CPT

## 2024-04-02 PROCEDURE — 36592 COLLECT BLOOD FROM PICC: CPT

## 2024-04-02 PROCEDURE — 6360000002 HC RX W HCPCS: Performed by: SURGERY

## 2024-04-02 PROCEDURE — 99024 POSTOP FOLLOW-UP VISIT: CPT | Performed by: SURGERY

## 2024-04-02 PROCEDURE — 84100 ASSAY OF PHOSPHORUS: CPT

## 2024-04-02 PROCEDURE — 6360000002 HC RX W HCPCS: Performed by: PHYSICIAN ASSISTANT

## 2024-04-02 PROCEDURE — 94640 AIRWAY INHALATION TREATMENT: CPT

## 2024-04-02 PROCEDURE — APPNB30 APP NON BILLABLE TIME 0-30 MINS: Performed by: NURSE PRACTITIONER

## 2024-04-02 PROCEDURE — 2500000003 HC RX 250 WO HCPCS: Performed by: INTERNAL MEDICINE

## 2024-04-02 PROCEDURE — C9113 INJ PANTOPRAZOLE SODIUM, VIA: HCPCS | Performed by: PHYSICIAN ASSISTANT

## 2024-04-02 PROCEDURE — 97110 THERAPEUTIC EXERCISES: CPT

## 2024-04-02 PROCEDURE — 1200000000 HC SEMI PRIVATE

## 2024-04-02 PROCEDURE — 6370000000 HC RX 637 (ALT 250 FOR IP): Performed by: NURSE PRACTITIONER

## 2024-04-02 PROCEDURE — 6370000000 HC RX 637 (ALT 250 FOR IP): Performed by: PHYSICIAN ASSISTANT

## 2024-04-02 PROCEDURE — 85025 COMPLETE CBC W/AUTO DIFF WBC: CPT

## 2024-04-02 RX ORDER — POTASSIUM CHLORIDE 29.8 MG/ML
20 INJECTION INTRAVENOUS ONCE
Status: DISCONTINUED | OUTPATIENT
Start: 2024-04-02 | End: 2024-04-05

## 2024-04-02 RX ORDER — POTASSIUM CHLORIDE 7.45 MG/ML
INJECTION INTRAVENOUS
Status: COMPLETED
Start: 2024-04-02 | End: 2024-04-02

## 2024-04-02 RX ORDER — INSULIN LISPRO 100 [IU]/ML
0-8 INJECTION, SOLUTION INTRAVENOUS; SUBCUTANEOUS EVERY 6 HOURS
Status: DISCONTINUED | OUTPATIENT
Start: 2024-04-02 | End: 2024-04-06

## 2024-04-02 RX ORDER — BISACODYL 10 MG
10 SUPPOSITORY, RECTAL RECTAL ONCE
Status: COMPLETED | OUTPATIENT
Start: 2024-04-02 | End: 2024-04-02

## 2024-04-02 RX ADMIN — POTASSIUM CHLORIDE 10 MEQ: 7.46 INJECTION, SOLUTION INTRAVENOUS at 12:30

## 2024-04-02 RX ADMIN — PANTOPRAZOLE SODIUM 40 MG: 40 INJECTION, POWDER, FOR SOLUTION INTRAVENOUS at 09:36

## 2024-04-02 RX ADMIN — METOCLOPRAMIDE 10 MG: 5 INJECTION, SOLUTION INTRAMUSCULAR; INTRAVENOUS at 18:29

## 2024-04-02 RX ADMIN — FUROSEMIDE 20 MG: 10 INJECTION, SOLUTION INTRAMUSCULAR; INTRAVENOUS at 09:36

## 2024-04-02 RX ADMIN — Medication 10 ML: at 09:36

## 2024-04-02 RX ADMIN — METOCLOPRAMIDE 10 MG: 5 INJECTION, SOLUTION INTRAMUSCULAR; INTRAVENOUS at 00:10

## 2024-04-02 RX ADMIN — MECLIZINE HYDROCHLORIDE 25 MG: 12.5 TABLET ORAL at 16:48

## 2024-04-02 RX ADMIN — MECLIZINE HYDROCHLORIDE 25 MG: 12.5 TABLET ORAL at 12:26

## 2024-04-02 RX ADMIN — METOCLOPRAMIDE 10 MG: 5 INJECTION, SOLUTION INTRAMUSCULAR; INTRAVENOUS at 12:26

## 2024-04-02 RX ADMIN — METHENAMINE HIPPURATE 1 G: 1 TABLET ORAL at 09:36

## 2024-04-02 RX ADMIN — PANTOPRAZOLE SODIUM 40 MG: 40 INJECTION, POWDER, FOR SOLUTION INTRAVENOUS at 20:36

## 2024-04-02 RX ADMIN — GABAPENTIN 600 MG: 300 CAPSULE ORAL at 16:47

## 2024-04-02 RX ADMIN — INSULIN GLARGINE 18 UNITS: 100 INJECTION, SOLUTION SUBCUTANEOUS at 20:36

## 2024-04-02 RX ADMIN — LEUCINE, PHENYLALANINE, LYSINE, METHIONINE, ISOLEUCINE, VALINE, HISTIDINE, THREONINE, TRYPTOPHAN, ALANINE, GLYCINE, ARGININE, PROLINE, SERINE, TYROSINE, SODIUM ACETATE, DIBASIC POTASSIUM PHOSPHATE, MAGNESIUM CHLORIDE, SODIUM CHLORIDE, CALCIUM CHLORIDE, DEXTROSE
365; 280; 290; 200; 300; 290; 240; 210; 90; 1035; 515; 575; 340; 250; 20; 340; 261; 51; 59; 33; 20 INJECTION INTRAVENOUS at 18:28

## 2024-04-02 RX ADMIN — CARVEDILOL 3.12 MG: 3.12 TABLET, FILM COATED ORAL at 16:48

## 2024-04-02 RX ADMIN — Medication 2 PUFF: at 13:45

## 2024-04-02 RX ADMIN — TROSPIUM CHLORIDE 20 MG: 20 TABLET, FILM COATED ORAL at 05:43

## 2024-04-02 RX ADMIN — TOPIRAMATE 150 MG: 100 TABLET, FILM COATED ORAL at 16:47

## 2024-04-02 RX ADMIN — MECLIZINE HYDROCHLORIDE 25 MG: 12.5 TABLET ORAL at 09:37

## 2024-04-02 RX ADMIN — METOCLOPRAMIDE 10 MG: 5 INJECTION, SOLUTION INTRAMUSCULAR; INTRAVENOUS at 05:41

## 2024-04-02 RX ADMIN — BISACODYL 10 MG: 10 SUPPOSITORY RECTAL at 16:49

## 2024-04-02 RX ADMIN — GABAPENTIN 600 MG: 300 CAPSULE ORAL at 12:26

## 2024-04-02 RX ADMIN — ENOXAPARIN SODIUM 40 MG: 100 INJECTION SUBCUTANEOUS at 20:36

## 2024-04-02 RX ADMIN — TOPIRAMATE 100 MG: 100 TABLET, FILM COATED ORAL at 09:37

## 2024-04-02 RX ADMIN — GABAPENTIN 600 MG: 300 CAPSULE ORAL at 09:36

## 2024-04-02 RX ADMIN — Medication 10 ML: at 20:41

## 2024-04-02 RX ADMIN — METHENAMINE HIPPURATE 1 G: 1 TABLET ORAL at 16:47

## 2024-04-02 RX ADMIN — TROSPIUM CHLORIDE 20 MG: 20 TABLET, FILM COATED ORAL at 16:47

## 2024-04-02 RX ADMIN — CARVEDILOL 3.12 MG: 3.12 TABLET, FILM COATED ORAL at 09:37

## 2024-04-02 ASSESSMENT — PAIN DESCRIPTION - LOCATION: LOCATION: BACK

## 2024-04-02 ASSESSMENT — PAIN SCALES - WONG BAKER
WONGBAKER_NUMERICALRESPONSE: NO HURT

## 2024-04-02 ASSESSMENT — PAIN DESCRIPTION - PAIN TYPE: TYPE: CHRONIC PAIN

## 2024-04-02 ASSESSMENT — PAIN DESCRIPTION - FREQUENCY: FREQUENCY: INTERMITTENT

## 2024-04-02 ASSESSMENT — PAIN DESCRIPTION - ONSET: ONSET: ON-GOING

## 2024-04-02 ASSESSMENT — PAIN SCALES - GENERAL
PAINLEVEL_OUTOF10: 0

## 2024-04-02 ASSESSMENT — PAIN DESCRIPTION - DESCRIPTORS: DESCRIPTORS: ACHING

## 2024-04-02 ASSESSMENT — PAIN - FUNCTIONAL ASSESSMENT: PAIN_FUNCTIONAL_ASSESSMENT: ACTIVITIES ARE NOT PREVENTED

## 2024-04-02 ASSESSMENT — PAIN DESCRIPTION - ORIENTATION: ORIENTATION: MID

## 2024-04-02 NOTE — PLAN OF CARE
Problem: Pain  Goal: Verbalizes/displays adequate comfort level or baseline comfort level  4/2/2024 0939 by Mojgan Yun RN  Outcome: Progressing  Flowsheets (Taken 4/2/2024 0915)  Verbalizes/displays adequate comfort level or baseline comfort level: Encourage patient to monitor pain and request assistance  4/2/2024 0225 by Grace Steinberg RN  Outcome: Progressing     Problem: Discharge Planning  Goal: Discharge to home or other facility with appropriate resources  4/2/2024 0939 by Mojgan Yun RN  Outcome: Progressing  4/2/2024 0225 by Grace Steinberg RN  Outcome: Progressing     Problem: Safety - Adult  Goal: Free from fall injury  4/2/2024 0939 by Mojgan Yun RN  Outcome: Progressing  4/2/2024 0225 by Grace Steinberg RN  Outcome: Progressing     Problem: Chronic Conditions and Co-morbidities  Goal: Patient's chronic conditions and co-morbidity symptoms are monitored and maintained or improved  4/2/2024 0939 by Mojgan Yun RN  Outcome: Progressing  4/2/2024 0225 by Grace Steinberg RN  Outcome: Progressing     Problem: ABCDS Injury Assessment  Goal: Absence of physical injury  4/2/2024 0939 by Mojgan Yun RN  Outcome: Progressing  4/2/2024 0225 by Grace Steinberg RN  Outcome: Progressing     Problem: Skin/Tissue Integrity  Goal: Absence of new skin breakdown  Description: 1.  Monitor for areas of redness and/or skin breakdown  2.  Assess vascular access sites hourly  3.  Every 4-6 hours minimum:  Change oxygen saturation probe site  4.  Every 4-6 hours:  If on nasal continuous positive airway pressure, respiratory therapy assess nares and determine need for appliance change or resting period.  4/2/2024 0939 by Mojgan Yun RN  Outcome: Progressing  4/2/2024 0225 by Grace Steinberg RN  Outcome: Progressing     Problem: Nutrition Deficit:  Goal: Optimize nutritional status  4/2/2024 0939 by Mojgan Yun RN  Outcome:

## 2024-04-02 NOTE — PROGRESS NOTES
[facility] - Inpatient Rehabilitation Department   Phone: [phone]    Occupational Therapy    [] Initial Evaluation            [x] Daily Treatment Note         [] Discharge Summary      Patient: Ana Gonzales   : 1955   MRN: [id number]   Date of Service:  2024    Admitting Diagnosis:  SBO (small bowel obstruction) ([facility])    Current Admission Summary: 67 yo female initially presented with slurred speech and admitted with concern for TIA. CTA head/neck showed severe narrowing of distal left P1 segment of left PCA, CT head and MRI brain no acute findings. Neurology evaluated, recommend supportive care with asa when medically stable, intolerant statins.      Hospital course complicated by low Hgb on admission, requiring 2 units PRBCs. EGD (3/13) did not find any active bleeding, but did find blood clots and a large amount of liquid in the stomach. Placed on PPI gtt. Repeat EGD (3/14) did not show any bleed, but 2 clips were placed on small healing ulcer.      Patient subsequently developed partial SBO and had NGT placed (3/15). She underwent exp lap with lysis of adhesions (3/19). NGT removed and started on clear liquid diet (3/21) but again developed n/v and NG was replaced. Noted to have hematemesis post Ng placement and 3rd EGD 3/26 consistent with trauma from NGT placement. NGT clamped 3/28.      Noted to have left sided neck edema. CT soft tissue neck (3/17) concerning for airway narrowing and acute left parotiditis. She was seen by ENT and received decadron and Unasyn.         3/19/2024: Exploratory laparotomy with lysis of adhesions, Meckel diverticulectomy, and appendectomy.     3/23: NGT removed and did not tolerate well- has been having some issues with coffee-ground emesis and large volume output after a operation for small bowel obstruction- had NGT replaced 3/25    3/26: EGD - findings consistent with ileus    Past Medical History:  has a past medical history of Arthritis,  hallway  Device Use: rolling walker  Required Assistance: contact guard assistance  Comment: wc follow provided by       Functional Outcomes  AM-PAC Inpatient Daily Activity Raw Score: 17        Cognition  WFL  Orientation:    alert and oriented x 4  Command Following:   accurately follows one step commands      Education  Barriers To Learning: none  Patient Education: patient educated on goals, OT role and benefits, plan of care, proper use of assistive device/equipment, family education, pressure relief, transfer training, discharge recommendations  Learning Assessment:  patient verbalizes and demonstrates understanding    Assessment  Activity Tolerance: Good with rest breaks  Impairments Requiring Therapeutic Intervention: decreased functional mobility, decreased ROM, decreased strength, decreased endurance, decreased balance, increased pain  Prognosis: good  Clinical Assessment: Pt educated on HEP and performed with therapy band this date to increase strength and endurance. Pt with ambulation of ~40ft x 4 trials at [facility]. Pt required Mod A to don brief with reacher. Recommend intensive inpt therapy to maximize functional independence, safety and ease caregiver burden. Continue with POC.  Safety Interventions: patient left in chair, call light within reach, patient at risk for falls, nurse notified, and family/caregiver present    Plan  Frequency: 3-5 x/per week  Current Treatment Recommendations: strengthening, balance training, functional mobility training, transfer training, gait training, endurance training, patient/caregiver education, ADL/self-care training, safety education, equipment evaluation/education, and positioning    Goals  Patient Goals: to return home    Short Term Goals:  Time Frame: prior to D/C  Patient will complete upper body ADL at moderate assistance   Patient will complete lower body ADL at moderate assistance   Patient will complete toileting at minimal assistance - goal met

## 2024-04-02 NOTE — PROGRESS NOTES
21:00 Assessment complete. VSS. The care plan and education has been reviewed and mutually agreed upon with the patient.   Grace Steinberg RN

## 2024-04-02 NOTE — CARE COORDINATION
Prior Authorization submitted for ARU approval with a reference number: [id number] .    ARU will continue to follow progress and update discharge plan as needed.    VANDANA BettsN, .[phone]

## 2024-04-02 NOTE — PROGRESS NOTES
Hospitalist Progress Note      Name:  Ana Gonzales /Age/Sex: 1955  (68 y.o. female)   MRN & CSN:  [id number] & [id number] Encounter Date/Time: 2024 9:30 AM EDT   Location:  [facility]4457/4457-02 PCP: Mellisa Houston MD     Attending:Chucho Lam MD       Hospital Day:     Subjective:   Chief Complaint:   Chief Complaint   Patient presents with    Bradycardia     Pt coming in with  pt has been bradycardic, weakness, slurred speech for a few days.  Vomiting yesterday with black emesis      Ana Gonzales is a 68 y.o. female with a past medical history of hypertension, hyperlipidemia, SERVANDO, vertigo, CHF, fibromyalgia, traumatic brain injury , microvascular angina, GERD, DM2  who presented with slurred speech and admitted with concern for TIA. CTA head/neck showed severe narrowing of distal left P1 segment of left PCA, CT head and MRI brain no acute findings. Neurology evaluated, recommend supportive care with asa when medically stable, intolerant statins.      Hospital course complicated by low Hgb on admission, requiring 2 units PRBCs. EGD (3/13) did not find any active bleeding, but did find blood clots and a large amount of liquid in the stomach. Placed on PPI gtt. Repeat EGD (3/14) did not show any bleed, but 2 clips were placed on small healing ulcer.      Patient subsequently developed partial SBO and had NGT placed (3/15). 3/19/2024: Exploratory laparotomy with lysis of adhesions, Meckel diverticulectomy, and appendectomy. NGT removed and started on clear liquid diet (3/21) but again developed n/v and NG was replaced. Noted to have hematemesis post Ng placement and 3rd EGD 3/26 consistent with trauma from NGT placement. Failed NGT clamp trial 3/28.      Noted to have left sided neck edema. CT soft tissue neck (3/17) concerning for airway narrowing and acute left parotiditis. She was seen by ENT and received short course steroid and Unasyn. Symptoms resolved, Unasyn  oozing  HEENT: Symmetric and normocephalic. Conjunctiva pink with clear sclera. Mucus membranes pink and moist.   Cardiovascular: regular rate and rhythm. S1 & S2, negative for murmurs. Peripheral pulses 2+, capillary refill < 3 seconds. negative elevation of JVP.  No significant peripheral edema  Respiratory: Respirations symmetric and unlabored. Lungs clear to auscultation bilaterally, no wheezing, crackles, or rhonchi  Gastrointestinal: Abdomen soft and round. Bowel sounds normoactive in all quadrants +NGT  Musculoskeletal: No focal weakness.  Neurologic/Psych: Awake and orientated to person, place and time. Calm affect, appropriate mood    Medications:   Medications:    insulin lispro  0-8 Units SubCUTAneous Q6H    metoclopramide  10 mg IntraVENous Q6H    pantoprazole  40 mg IntraVENous BID    furosemide  20 mg IntraVENous Daily    insulin glargine  18 Units SubCUTAneous Nightly    mometasone-formoterol  2 puff Inhalation BID RT    lidocaine  1 patch TransDERmal Daily    enoxaparin  40 mg SubCUTAneous Nightly    fat emulsion  250 mL IntraVENous Once per day on Mon Thu    [Held by provider] clopidogrel  75 mg Oral Daily    sodium chloride flush  5-40 mL IntraVENous 2 times per day    [Held by provider] dilTIAZem  120 mg Oral Daily    [Held by provider] aspirin  81 mg Oral Daily    [Held by provider] isosorbide mononitrate  120 mg Oral Daily    [Held by provider] ranolazine  1,000 mg Oral BID    ARIPiprazole  2 mg Oral Nightly    [Held by provider] buPROPion  300 mg Oral QAM    [Held by provider] furosemide  40 mg Oral BID    gabapentin  600 mg Oral 4x Daily    meclizine  25 mg Oral 4x Daily    nortriptyline  75 mg Oral Nightly    tiZANidine  2 mg Oral Nightly    topiramate  100 mg Oral QAM    topiramate  150 mg Oral QPM    trospium  20 mg Oral BID AC    carvedilol  3.125 mg Oral BID WC    methenamine  1 g Oral BID WC    aclidinium  1 puff Inhalation BID RT    Evolocumab  1 Adjustable Dose Pre-filled Pen Syringe  No

## 2024-04-02 NOTE — PROGRESS NOTES
[facility]        Progress Note    Patient Name: Ana Gonzales  MRN: [id number]  YOB: 1955  Date of Evaluation: 2024    Postoperative Day #14    Subjective:  No acute events overnight  Pain controlled  No nausea or vomiting with NGT clamped--low residual output  Passing flatus and stool, denies bloating  Up to chair at this time      Vital Signs:  Patient Vitals for the past 24 hrs:   BP Temp Temp src Pulse Resp SpO2 Weight   24 1350 -- -- -- -- -- 95 % --   24 1132 116/72 97.3 °F (36.3 °C) Oral 76 16 95 % --   24 0915 130/82 97.2 °F (36.2 °C) Oral 78 18 97 % --   24 0559 -- -- -- -- -- -- 90 kg (198 lb 6.4 oz)   24 0547 102/65 97 °F (36.1 °C) Oral 82 16 -- --   24 0012 (!) 111/55 -- -- -- -- -- --   24 2127 -- -- -- -- -- 98 % --   24 2103 (!) 116/59 97.7 °F (36.5 °C) Oral 79 16 95 % --   24 1545 125/80 -- -- 84 16 95 % --        TEMPERATURE HISTORY 24H: Temp (24hrs), Av.3 °F (36.3 °C), Min:97 °F (36.1 °C), Max:97.7 °F (36.5 °C)    BLOOD PRESSURE HISTORY: Systolic (36hrs), Av , Min:102 , Max:130    Diastolic (36hrs), Av, Min:55, Max:82      Intake/Output:  I/O last 3 completed shifts:  In: -   Out: 5200 [Urine:4300; Emesis/NG output:900]  I/O this shift:  In: -   Out: 900 [Urine:900]  Drain/tube Output:       Physical Exam:  General: awake, alert, oriented to person, place, time  Lungs: unlabored respirations  Abdomen: soft, non-distended, incisional tenderness only, bowel sounds present  Skin/Wound: healing well, no drainage, no erythema, well approximated    Labs:  CBC:    Recent Labs     24  0553 24  0557 24  0532   WBC 8.2 6.0 5.5   HGB 9.5* 8.9* 9.4*   HCT 30.0* 28.1* 28.2*   * 534* 548*       BMP:    Recent Labs     24  0553 24  0557 24  0532    140 139   K 4.1 3.4* 3.5    104 107   CO2 24 25 20*   BUN 19 17 18   CREATININE

## 2024-04-03 ENCOUNTER — APPOINTMENT (OUTPATIENT)
Dept: GENERAL RADIOLOGY | Age: 69
DRG: 330 | End: 2024-04-03

## 2024-04-03 LAB
ALBUMIN SERPL-MCNC: 3.4 G/DL (ref 3.4–5)
ALP SERPL-CCNC: 94 U/L (ref 40–129)
ALT SERPL-CCNC: <5 U/L (ref 10–40)
ANION GAP SERPL CALCULATED.3IONS-SCNC: 10 MMOL/L (ref 3–16)
AST SERPL-CCNC: 11 U/L (ref 15–37)
BASOPHILS # BLD: 0 K/UL (ref 0–0.2)
BASOPHILS NFR BLD: 0.2 %
BILIRUB DIRECT SERPL-MCNC: <0.2 MG/DL (ref 0–0.3)
BILIRUB INDIRECT SERPL-MCNC: ABNORMAL MG/DL (ref 0–1)
BILIRUB SERPL-MCNC: <0.2 MG/DL (ref 0–1)
BUN SERPL-MCNC: 19 MG/DL (ref 7–20)
CALCIUM SERPL-MCNC: 9.1 MG/DL (ref 8.3–10.6)
CHLORIDE SERPL-SCNC: 105 MMOL/L (ref 99–110)
CO2 SERPL-SCNC: 22 MMOL/L (ref 21–32)
CREAT SERPL-MCNC: 0.7 MG/DL (ref 0.6–1.2)
DEPRECATED RDW RBC AUTO: 17.6 % (ref 12.4–15.4)
EOSINOPHIL # BLD: 0.4 K/UL (ref 0–0.6)
EOSINOPHIL NFR BLD: 8.1 %
GFR SERPLBLD CREATININE-BSD FMLA CKD-EPI: >90 ML/MIN/{1.73_M2}
GLUCOSE BLD-MCNC: 130 MG/DL (ref 70–99)
GLUCOSE BLD-MCNC: 131 MG/DL (ref 70–99)
GLUCOSE BLD-MCNC: 142 MG/DL (ref 70–99)
GLUCOSE BLD-MCNC: 143 MG/DL (ref 70–99)
GLUCOSE BLD-MCNC: 143 MG/DL (ref 70–99)
GLUCOSE BLD-MCNC: 153 MG/DL (ref 70–99)
GLUCOSE SERPL-MCNC: 139 MG/DL (ref 70–99)
HCT VFR BLD AUTO: 30.1 % (ref 36–48)
HGB BLD-MCNC: 10.1 G/DL (ref 12–16)
LYMPHOCYTES # BLD: 1.1 K/UL (ref 1–5.1)
LYMPHOCYTES NFR BLD: 20.4 %
MAGNESIUM SERPL-MCNC: 2 MG/DL (ref 1.8–2.4)
MCH RBC QN AUTO: 30.1 PG (ref 26–34)
MCHC RBC AUTO-ENTMCNC: 33.6 G/DL (ref 31–36)
MCV RBC AUTO: 89.5 FL (ref 80–100)
MONOCYTES # BLD: 0.5 K/UL (ref 0–1.3)
MONOCYTES NFR BLD: 9.3 %
NEUTROPHILS # BLD: 3.2 K/UL (ref 1.7–7.7)
NEUTROPHILS NFR BLD: 62 %
PERFORMED ON: ABNORMAL
PHOSPHATE SERPL-MCNC: 4 MG/DL (ref 2.5–4.9)
PLATELET # BLD AUTO: 487 K/UL (ref 135–450)
PMV BLD AUTO: 8.2 FL (ref 5–10.5)
POTASSIUM SERPL-SCNC: 3.4 MMOL/L (ref 3.5–5.1)
POTASSIUM SERPL-SCNC: 3.8 MMOL/L (ref 3.5–5.1)
PROT SERPL-MCNC: 6.6 G/DL (ref 6.4–8.2)
RBC # BLD AUTO: 3.37 M/UL (ref 4–5.2)
SODIUM SERPL-SCNC: 137 MMOL/L (ref 136–145)
WBC # BLD AUTO: 5.2 K/UL (ref 4–11)

## 2024-04-03 PROCEDURE — 6360000002 HC RX W HCPCS: Performed by: PHYSICIAN ASSISTANT

## 2024-04-03 PROCEDURE — 2500000003 HC RX 250 WO HCPCS: Performed by: INTERNAL MEDICINE

## 2024-04-03 PROCEDURE — 6360000002 HC RX W HCPCS: Performed by: SURGERY

## 2024-04-03 PROCEDURE — 6360000002 HC RX W HCPCS: Performed by: NURSE PRACTITIONER

## 2024-04-03 PROCEDURE — 2580000003 HC RX 258: Performed by: SURGERY

## 2024-04-03 PROCEDURE — 36569 INSJ PICC 5 YR+ W/O IMAGING: CPT

## 2024-04-03 PROCEDURE — 6370000000 HC RX 637 (ALT 250 FOR IP): Performed by: SURGERY

## 2024-04-03 PROCEDURE — 2580000003 HC RX 258: Performed by: STUDENT IN AN ORGANIZED HEALTH CARE EDUCATION/TRAINING PROGRAM

## 2024-04-03 PROCEDURE — 84132 ASSAY OF SERUM POTASSIUM: CPT

## 2024-04-03 PROCEDURE — 84100 ASSAY OF PHOSPHORUS: CPT

## 2024-04-03 PROCEDURE — APPNB30 APP NON BILLABLE TIME 0-30 MINS: Performed by: NURSE PRACTITIONER

## 2024-04-03 PROCEDURE — 1200000000 HC SEMI PRIVATE

## 2024-04-03 PROCEDURE — 94761 N-INVAS EAR/PLS OXIMETRY MLT: CPT

## 2024-04-03 PROCEDURE — 85025 COMPLETE CBC W/AUTO DIFF WBC: CPT

## 2024-04-03 PROCEDURE — 83735 ASSAY OF MAGNESIUM: CPT

## 2024-04-03 PROCEDURE — 02HV33Z INSERTION OF INFUSION DEVICE INTO SUPERIOR VENA CAVA, PERCUTANEOUS APPROACH: ICD-10-PCS | Performed by: SURGERY

## 2024-04-03 PROCEDURE — 74250 X-RAY XM SM INT 1CNTRST STD: CPT

## 2024-04-03 PROCEDURE — C9113 INJ PANTOPRAZOLE SODIUM, VIA: HCPCS | Performed by: PHYSICIAN ASSISTANT

## 2024-04-03 PROCEDURE — APPSS15 APP SPLIT SHARED TIME 0-15 MINUTES: Performed by: NURSE PRACTITIONER

## 2024-04-03 PROCEDURE — 6370000000 HC RX 637 (ALT 250 FOR IP): Performed by: NURSE PRACTITIONER

## 2024-04-03 PROCEDURE — 80076 HEPATIC FUNCTION PANEL: CPT

## 2024-04-03 PROCEDURE — 6360000004 HC RX CONTRAST MEDICATION

## 2024-04-03 PROCEDURE — C1751 CATH, INF, PER/CENT/MIDLINE: HCPCS

## 2024-04-03 PROCEDURE — 94640 AIRWAY INHALATION TREATMENT: CPT

## 2024-04-03 PROCEDURE — 80048 BASIC METABOLIC PNL TOTAL CA: CPT

## 2024-04-03 PROCEDURE — 36415 COLL VENOUS BLD VENIPUNCTURE: CPT

## 2024-04-03 PROCEDURE — 74019 RADEX ABDOMEN 2 VIEWS: CPT

## 2024-04-03 PROCEDURE — 6370000000 HC RX 637 (ALT 250 FOR IP): Performed by: PHYSICIAN ASSISTANT

## 2024-04-03 PROCEDURE — 99024 POSTOP FOLLOW-UP VISIT: CPT | Performed by: SURGERY

## 2024-04-03 RX ORDER — SODIUM CHLORIDE 9 MG/ML
25 INJECTION, SOLUTION INTRAVENOUS PRN
Status: DISCONTINUED | OUTPATIENT
Start: 2024-04-03 | End: 2024-04-10 | Stop reason: HOSPADM

## 2024-04-03 RX ORDER — SODIUM CHLORIDE 0.9 % (FLUSH) 0.9 %
5-40 SYRINGE (ML) INJECTION EVERY 12 HOURS SCHEDULED
Status: DISCONTINUED | OUTPATIENT
Start: 2024-04-03 | End: 2024-04-10 | Stop reason: HOSPADM

## 2024-04-03 RX ORDER — SODIUM CHLORIDE 0.9 % (FLUSH) 0.9 %
5-40 SYRINGE (ML) INJECTION PRN
Status: DISCONTINUED | OUTPATIENT
Start: 2024-04-03 | End: 2024-04-10 | Stop reason: HOSPADM

## 2024-04-03 RX ORDER — BISACODYL 10 MG
10 SUPPOSITORY, RECTAL RECTAL ONCE
Status: COMPLETED | OUTPATIENT
Start: 2024-04-03 | End: 2024-04-03

## 2024-04-03 RX ORDER — LIDOCAINE HYDROCHLORIDE 10 MG/ML
5 INJECTION, SOLUTION EPIDURAL; INFILTRATION; INTRACAUDAL; PERINEURAL ONCE
Status: DISCONTINUED | OUTPATIENT
Start: 2024-04-03 | End: 2024-04-09

## 2024-04-03 RX ORDER — POTASSIUM CHLORIDE 20 MEQ/1
20 TABLET, EXTENDED RELEASE ORAL ONCE
Status: COMPLETED | OUTPATIENT
Start: 2024-04-03 | End: 2024-04-03

## 2024-04-03 RX ADMIN — GABAPENTIN 600 MG: 300 CAPSULE ORAL at 21:24

## 2024-04-03 RX ADMIN — TOPIRAMATE 100 MG: 100 TABLET, FILM COATED ORAL at 09:39

## 2024-04-03 RX ADMIN — METOCLOPRAMIDE 10 MG: 5 INJECTION, SOLUTION INTRAMUSCULAR; INTRAVENOUS at 01:27

## 2024-04-03 RX ADMIN — ASCORBIC ACID, VITAMIN A PALMITATE, CHOLECALCIFEROL, THIAMINE HYDROCHLORIDE, RIBOFLAVIN-5 PHOSPHATE SODIUM, PYRIDOXINE HYDROCHLORIDE, NIACINAMIDE, DEXPANTHENOL, ALPHA-TOCOPHEROL ACETATE, VITAMIN K1, FOLIC ACID, BIOTIN, CYANOCOBALAMIN: 200; 3300; 200; 6; 3.6; 6; 40; 15; 10; 150; 600; 60; 5 INJECTION, SOLUTION INTRAVENOUS at 18:38

## 2024-04-03 RX ADMIN — GABAPENTIN 600 MG: 300 CAPSULE ORAL at 14:15

## 2024-04-03 RX ADMIN — Medication 10 ML: at 21:26

## 2024-04-03 RX ADMIN — GABAPENTIN 600 MG: 300 CAPSULE ORAL at 09:38

## 2024-04-03 RX ADMIN — IOHEXOL 250 ML: 350 INJECTION, SOLUTION INTRAVENOUS at 16:09

## 2024-04-03 RX ADMIN — PANTOPRAZOLE SODIUM 40 MG: 40 INJECTION, POWDER, FOR SOLUTION INTRAVENOUS at 09:40

## 2024-04-03 RX ADMIN — FUROSEMIDE 20 MG: 10 INJECTION, SOLUTION INTRAMUSCULAR; INTRAVENOUS at 09:40

## 2024-04-03 RX ADMIN — SODIUM CHLORIDE, PRESERVATIVE FREE 10 ML: 5 INJECTION INTRAVENOUS at 21:25

## 2024-04-03 RX ADMIN — TIZANIDINE 2 MG: 4 TABLET ORAL at 21:23

## 2024-04-03 RX ADMIN — INSULIN GLARGINE 18 UNITS: 100 INJECTION, SOLUTION SUBCUTANEOUS at 21:24

## 2024-04-03 RX ADMIN — ARIPIPRAZOLE 2 MG: 2 TABLET ORAL at 21:24

## 2024-04-03 RX ADMIN — MECLIZINE HYDROCHLORIDE 25 MG: 12.5 TABLET ORAL at 14:15

## 2024-04-03 RX ADMIN — BUPROPION HYDROCHLORIDE 300 MG: 300 TABLET, EXTENDED RELEASE ORAL at 09:39

## 2024-04-03 RX ADMIN — Medication 10 ML: at 09:40

## 2024-04-03 RX ADMIN — NORTRIPTYLINE HYDROCHLORIDE 75 MG: 25 CAPSULE ORAL at 21:24

## 2024-04-03 RX ADMIN — TOPIRAMATE 150 MG: 100 TABLET, FILM COATED ORAL at 18:20

## 2024-04-03 RX ADMIN — Medication 2 PUFF: at 08:14

## 2024-04-03 RX ADMIN — CARVEDILOL 3.12 MG: 3.12 TABLET, FILM COATED ORAL at 09:39

## 2024-04-03 RX ADMIN — ACETAMINOPHEN 650 MG: 325 TABLET ORAL at 18:47

## 2024-04-03 RX ADMIN — METOCLOPRAMIDE 10 MG: 5 INJECTION, SOLUTION INTRAMUSCULAR; INTRAVENOUS at 05:51

## 2024-04-03 RX ADMIN — ENOXAPARIN SODIUM 40 MG: 100 INJECTION SUBCUTANEOUS at 21:25

## 2024-04-03 RX ADMIN — CARVEDILOL 3.12 MG: 3.12 TABLET, FILM COATED ORAL at 18:47

## 2024-04-03 RX ADMIN — METOCLOPRAMIDE 10 MG: 5 INJECTION, SOLUTION INTRAMUSCULAR; INTRAVENOUS at 18:21

## 2024-04-03 RX ADMIN — POTASSIUM CHLORIDE 20 MEQ: 1500 TABLET, EXTENDED RELEASE ORAL at 09:39

## 2024-04-03 RX ADMIN — PANTOPRAZOLE SODIUM 40 MG: 40 INJECTION, POWDER, FOR SOLUTION INTRAVENOUS at 21:25

## 2024-04-03 RX ADMIN — MECLIZINE HYDROCHLORIDE 25 MG: 12.5 TABLET ORAL at 21:24

## 2024-04-03 RX ADMIN — METOCLOPRAMIDE 10 MG: 5 INJECTION, SOLUTION INTRAMUSCULAR; INTRAVENOUS at 14:15

## 2024-04-03 RX ADMIN — MECLIZINE HYDROCHLORIDE 25 MG: 12.5 TABLET ORAL at 09:39

## 2024-04-03 RX ADMIN — TROSPIUM CHLORIDE 20 MG: 20 TABLET, FILM COATED ORAL at 14:15

## 2024-04-03 RX ADMIN — GABAPENTIN 600 MG: 300 CAPSULE ORAL at 18:21

## 2024-04-03 RX ADMIN — MECLIZINE HYDROCHLORIDE 25 MG: 12.5 TABLET ORAL at 18:21

## 2024-04-03 RX ADMIN — BISACODYL 10 MG: 10 SUPPOSITORY RECTAL at 12:00

## 2024-04-03 RX ADMIN — METHENAMINE HIPPURATE 1 G: 1 TABLET ORAL at 18:20

## 2024-04-03 RX ADMIN — METHENAMINE HIPPURATE 1 G: 1 TABLET ORAL at 09:38

## 2024-04-03 ASSESSMENT — PAIN SCALES - WONG BAKER
WONGBAKER_NUMERICALRESPONSE: NO HURT
WONGBAKER_NUMERICALRESPONSE: NO HURT
WONGBAKER_NUMERICALRESPONSE: HURTS WHOLE LOT
WONGBAKER_NUMERICALRESPONSE: NO HURT
WONGBAKER_NUMERICALRESPONSE: HURTS WHOLE LOT
WONGBAKER_NUMERICALRESPONSE: NO HURT

## 2024-04-03 ASSESSMENT — PAIN DESCRIPTION - ONSET
ONSET: ON-GOING
ONSET: ON-GOING

## 2024-04-03 ASSESSMENT — PAIN DESCRIPTION - LOCATION
LOCATION: BACK
LOCATION: BACK
LOCATION: HEAD

## 2024-04-03 ASSESSMENT — PAIN SCALES - GENERAL
PAINLEVEL_OUTOF10: 8
PAINLEVEL_OUTOF10: 8
PAINLEVEL_OUTOF10: 0
PAINLEVEL_OUTOF10: 7
PAINLEVEL_OUTOF10: 0

## 2024-04-03 ASSESSMENT — PAIN DESCRIPTION - PAIN TYPE
TYPE: CHRONIC PAIN
TYPE: CHRONIC PAIN

## 2024-04-03 ASSESSMENT — PAIN DESCRIPTION - FREQUENCY
FREQUENCY: INTERMITTENT
FREQUENCY: INTERMITTENT

## 2024-04-03 ASSESSMENT — PAIN DESCRIPTION - ORIENTATION
ORIENTATION: MID
ORIENTATION: LOWER
ORIENTATION: MID

## 2024-04-03 ASSESSMENT — PAIN DESCRIPTION - DESCRIPTORS
DESCRIPTORS: ACHING

## 2024-04-03 ASSESSMENT — PAIN - FUNCTIONAL ASSESSMENT
PAIN_FUNCTIONAL_ASSESSMENT: ACTIVITIES ARE NOT PREVENTED

## 2024-04-03 NOTE — PROGRESS NOTES
Nutrition Note    RECOMMENDATIONS  PO Diet: clear liquid diet; as advanced recommend low fiber / CCC-4 / CARLY  ONS: Ensure Clear BID; modify to Ensure High Protein BID when diet advanced  Nutrition Support:   Clinimix 5/20 at 80 mL/hr  Continue 250 mL 20% lipids 2 times per week.  Check TG level.      NUTRITION ASSESSMENT   Pt remains on PN at goal rate of 80 mL/hr.  Clear liquid diet was started 4/2 and pt reports tolerating.  Ensure Clear ordered TID, unopened carton at bedside. After encouragement pt agreed to try.  Recommend to continue PN until pt shows good tolerance on low fiber / CCC-4 / CARLY diet with adequate intake.     Nutrition Related Findings: 4/2 LBM, +flatus; K+ 3.4; edema: generalized, BUE, BLE  Wounds: Surgical Incision  Nutrition Education:  No recommendation at this time   Nutrition Goals: PO intake 50% or greater, Tolerate nutrition support at goal rate, by next RD assessment     MALNUTRITION ASSESSMENT   Acute Illness  Malnutrition Status: Mild malnutrition  Findings of the 6 clinical characteristics of malnutrition:  Energy Intake:  50% or less of estimated energy requirements for 5 or more days (as determined by initial RD assessment on 3/18; however, TPN will now provide adequate nutrition)  Weight Loss:  No significant weight loss     Body Fat Loss:  No significant body fat loss     Muscle Mass Loss:  No significant muscle mass loss    Fluid Accumulation:  No significant fluid accumulation Extremities, Generalized   Strength:  Not Performed      NUTRITION DIAGNOSIS   Inadequate oral intake related to altered GI function as evidenced by NPO or clear liquid status due to medical condition      CURRENT NUTRITION THERAPIES  PN-Adult Premix 5/20 - Standard Electrolytes - Central [place]  ADULT DIET; Clear Liquid  ADULT ORAL NUTRITION SUPPLEMENT; Breakfast, Lunch, Dinner; Clear Liquid Oral Supplement  PN-Adult Premix 5/20 - Standard Electrolytes - Central Line     PO Intake: 1-25%   PO Supplement
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
## 2024-04-03 NOTE — PROGRESS NOTES
Hospitalist Progress Note      Name:  Ana Gonzales /Age/Sex: 1955  (68 y.o. female)   MRN & CSN:  [id number] & [id number] Encounter Date/Time: 4/3/2024 9:30 AM EDT   Location:  [id number]/4457-02 PCP: Mellisa Houston MD     Attending:Chucho Lam MD       Hospital Day:     Subjective:   Chief Complaint:   Chief Complaint   Patient presents with    Bradycardia     Pt coming in with  pt has been bradycardic, weakness, slurred speech for a few days.  Vomiting yesterday with black emesis      Ana Gonzales is a 68 y.o. female with a past medical history of hypertension, hyperlipidemia, SERVANDO, vertigo, CHF, fibromyalgia, traumatic brain injury , microvascular angina, GERD, DM2  who presented with slurred speech and admitted with concern for TIA. CTA head/neck showed severe narrowing of distal left P1 segment of left PCA, CT head and MRI brain no acute findings. Neurology evaluated, recommend supportive care with asa when medically stable, intolerant statins.      Hospital course complicated by low Hgb on admission, requiring 2 units PRBCs. EGD (3/13) did not find any active bleeding, but did find blood clots and a large amount of liquid in the stomach. Placed on PPI gtt. Repeat EGD (3/14) did not show any bleed, but 2 clips were placed on small healing ulcer.      Patient subsequently developed partial SBO and had NGT placed (3/15). 3/19/2024: Exploratory laparotomy with lysis of adhesions, Meckel diverticulectomy, and appendectomy. NGT removed and started on clear liquid diet (3/21) but again developed n/v and NG was replaced. Noted to have hematemesis post Ng placement and 3rd EGD 3/26 consistent with trauma from NGT placement. Failed NGT clamp trial 3/28.      Noted to have left sided neck edema. CT soft tissue neck (3/17) concerning for airway narrowing and acute left parotiditis. She was seen by ENT and received short course steroid and Unasyn. Symptoms resolved, Unasyn  nortriptyline  75 mg Oral Nightly    tiZANidine  2 mg Oral Nightly    topiramate  100 mg Oral QAM    topiramate  150 mg Oral QPM    trospium  20 mg Oral BID AC    carvedilol  3.125 mg Oral BID WC    methenamine  1 g Oral BID WC    aclidinium  1 puff Inhalation BID RT      Infusions:    PN-Adult Premix 5/20 - Standard Electrolytes - Central Line      PN-Adult Premix 5/20 - Standard Electrolytes - Central Line 80 mL/hr at 04/02/24 1828    sodium chloride 50 mL (03/31/24 1440)    dextrose       PRN Meds: diatrizoate meglumine-sodium, 5 mL, ONCE PRN  phenol, 1 spray, Q2H PRN  albuterol sulfate HFA, 2 puff, Q6H PRN  ipratropium, 2 puff, Q6H PRN  potassium chloride, 20 mEq, PRN  sodium phosphate 15.63 mmol in sodium chloride 0.9 % 250 mL IVPB, 0.16 mmol/kg, PRN  sodium chloride flush, 5-40 mL, PRN  sodium chloride, , PRN  ondansetron, 4 mg, Q6H PRN  acetaminophen, 650 mg, Q6H PRN  ondansetron, 8 mg, Q8H PRN  perflutren lipid microspheres, 1.5 mL, ONCE PRN  acetaminophen, 650 mg, Q4H PRN  dextrose bolus, 125 mL, PRN   Or  dextrose bolus, 250 mL, PRN  glucagon (rDNA), 1 mg, PRN  dextrose, , Continuous PRN        Labs and Imaging   Results this Admission:  CXR 3/12/2024:  IMPRESSION:  No significant findings in the chest.     CT head / CTA head/neck 3/12/2024:  IMPRESSION:  1. No acute intracranial abnormality within constraints of CT acquisition.  2. Severe narrowing of the distal left P1 segment with otherwise fetal origin of the left PCA.  3. Otherwise, no significant stenosis elsewhere within the head or neck.  No large vessel occlusion.     MRI brain 3/13/2024:  IMPRESSION:  No acute intracranial abnormality.   Minimal chronic microvascular disease.     Echo 3/13/2024:  Summary   Technically difficult study due to poor acoustical windows.   Off-axis images.   Normal left ventricle size, wall thickness, and systolic function with an   estimated ejection fraction of 55-60%. No regional wall motion abnormalities are

## 2024-04-03 NOTE — PLAN OF CARE
Problem: Pain  Goal: Verbalizes/displays adequate comfort level or baseline comfort level  4/2/2024 2153 by Grace Steinberg RN  Outcome: Progressing  4/2/2024 0939 by Mojgan Yun RN  Outcome: Progressing  Flowsheets (Taken 4/2/2024 0915)  Verbalizes/displays adequate comfort level or baseline comfort level: Encourage patient to monitor pain and request assistance     Problem: Discharge Planning  Goal: Discharge to home or other facility with appropriate resources  4/2/2024 2153 by Grace Steinberg RN  Outcome: Progressing  4/2/2024 0939 by Mojgan Yun RN  Outcome: Progressing  Flowsheets (Taken 4/2/2024 0900)  Discharge to home or other facility with appropriate resources: Identify barriers to discharge with patient and caregiver     Problem: Safety - Adult  Goal: Free from fall injury  4/2/2024 2153 by Grace Steinberg RN  Outcome: Progressing  4/2/2024 0939 by Mojgan Yun RN  Outcome: Progressing     Problem: Chronic Conditions and Co-morbidities  Goal: Patient's chronic conditions and co-morbidity symptoms are monitored and maintained or improved  4/2/2024 2153 by Grace Steinberg RN  Outcome: Progressing  4/2/2024 0939 by Mojgan Yun RN  Outcome: Progressing  Flowsheets (Taken 4/2/2024 0900)  Care Plan - Patient's Chronic Conditions and Co-Morbidity Symptoms are Monitored and Maintained or Improved: Monitor and assess patient's chronic conditions and comorbid symptoms for stability, deterioration, or improvement     Problem: ABCDS Injury Assessment  Goal: Absence of physical injury  4/2/2024 2153 by Grace Steinberg RN  Outcome: Progressing  4/2/2024 0939 by Mojgan Yun RN  Outcome: Progressing     Problem: Skin/Tissue Integrity  Goal: Absence of new skin breakdown  Description: 1.  Monitor for areas of redness and/or skin breakdown  2.  Assess vascular access sites hourly  3.  Every 4-6 hours minimum:  Change oxygen saturation probe site  4.  Every

## 2024-04-03 NOTE — PROGRESS NOTES
Physical Therapy Attempt  Ana Gonzales    PT attempted follow-up treatment. Pt politely declining therapy at this time as she is awaiting transport for repeat abdominal CTs with contrast. Pt educated on importance of ambulation to promote bowel function. Pt continues to decline therapy. Educated on cross body reaches and forward flex posture in chair. Pt verbalized understanding. Will follow-up as pt status and schedule allow.   No charge.   Alida Cooper, PT, DPT #[id number]

## 2024-04-03 NOTE — PLAN OF CARE
Mojgan CRENSHAW RN  Outcome: Progressing  4/2/2024 2153 by Grace Steinberg RN  Outcome: Progressing     Problem: Skin/Tissue Integrity - Adult  Goal: Skin integrity remains intact  4/3/2024 1100 by Mojgan Yun RN  Outcome: Progressing  4/2/2024 2153 by Grace Steinberg RN  Outcome: Progressing  Flowsheets  Taken 4/2/2024 1348 by Mojgan Yun RN  Skin Integrity Remains Intact: Monitor for areas of redness and/or skin breakdown  Taken 4/2/2024 0940 by Mojgan Yun RN  Skin Integrity Remains Intact: Monitor for areas of redness and/or skin breakdown  Goal: Incisions, wounds, or drain sites healing without S/S of infection  4/3/2024 1100 by Mojgan Yun RN  Outcome: Progressing  4/2/2024 2153 by Grace Steinberg RN  Outcome: Progressing

## 2024-04-03 NOTE — PROGRESS NOTES
[facility]        Progress Note    Patient Name: Ana Gonzales  MRN: [id number]  YOB: 1955  Date of Evaluation: 4/3/2024    Postoperative Day #15    Subjective:  No acute events overnight  Pain controlled  No nausea or vomiting since NGT removed, tolerating clear liquid diet but appetite is only fair  Passing flatus, no further stool, denies bloating  Up to chair at this time      Vital Signs:  Patient Vitals for the past 24 hrs:   BP Temp Temp src Pulse Resp SpO2 Height Weight   24 1107 117/75 97.8 °F (36.6 °C) Oral 79 16 94 % -- --   24 1046 -- -- -- -- -- -- 1.6 m (5' 2.99\") --   24 0930 105/68 97.4 °F (36.3 °C) Axillary 86 18 100 % -- --   24 0817 -- -- -- -- -- 95 % -- --   24 0455 105/68 97.8 °F (36.6 °C) Axillary 73 16 95 % -- 90.4 kg (199 lb 3.2 oz)   24 2253 133/67 98.3 °F (36.8 °C) Axillary 76 16 98 % -- --   24 2030 (!) 100/52 97 °F (36.1 °C) Oral 76 16 95 % -- --   24 1648 114/71 -- -- 77 -- -- -- --   24 1552 104/68 97.2 °F (36.2 °C) Oral 79 17 96 % -- --        TEMPERATURE HISTORY 24H: Temp (24hrs), Av.6 °F (36.4 °C), Min:97 °F (36.1 °C), Max:98.3 °F (36.8 °C)    BLOOD PRESSURE HISTORY: Systolic (36hrs), Av , Min:100 , Max:133    Diastolic (36hrs), Av, Min:52, Max:82      Intake/Output:  I/O last 3 completed shifts:  In: -   Out: 3000 [Urine:3000]  I/O this shift:  In: -   Out: 1200 [Urine:1200]  Drain/tube Output:       Physical Exam:  General: awake, alert, oriented to person, place, time  Lungs: unlabored respirations  Abdomen: soft, mildly distended, incisional tenderness only, bowel sounds present  Skin/Wound: healing well, no drainage, no erythema, well approximated    Labs:  CBC:    Recent Labs     24  0557 24  0532 24  0603   WBC 6.0 5.5 5.2   HGB 8.9* 9.4* 10.1*   HCT 28.1* 28.2* 30.1*   * 548* 487*       BMP:    Recent Labs     24  0557  04/02/24  0532 04/03/24  0603    139 137   K 3.4* 3.5 3.4*    107 105   CO2 25 20* 22   BUN 17 18 19   CREATININE 0.6 0.6 0.7   GLUCOSE 131* 165* 139*       Hepatic:    Recent Labs     04/01/24  0557 04/03/24  0603   AST 10* 11*   ALT <5* <5*   BILITOT <0.2 <0.2   ALKPHOS 94 94       Amylase:  No results found for: \"AMYLASE\"  Lipase:    Lab Results   Component Value Date/Time    LIPASE 6.0 07/22/2019 04:22 PM    LIPASE 21 09/21/2010 12:44 PM      Mag:    Lab Results   Component Value Date/Time    MG 2.00 04/03/2024 06:03 AM    MG 2.10 04/02/2024 05:32 AM     Phos:     Lab Results   Component Value Date/Time    PHOS 4.0 04/03/2024 06:03 AM    PHOS 3.6 04/02/2024 05:32 AM      Coags:   Lab Results   Component Value Date/Time    PROTIME 17.3 03/14/2024 04:52 AM    INR 1.42 03/14/2024 04:52 AM    APTT 30.7 03/14/2024 04:52 AM       Cultures:  Anaerobic culture  No results found for: \"LABANAE\"  Fungus stain  No results found for requested labs within last 30 days.     Gram stain  No results found for requested labs within last 30 days.     Organism  Lab Results   Component Value Date/Time    ORG Escherichia coli (A) 07/22/2019 06:29 PM     Surgical culture  No results found for: \"CXSURG\"  Blood culture 1  No results found for requested labs within last 30 days.     Blood culture 2  No results found for requested labs within last 30 days.     Fecal occult  Results in Past 30 Days  Result Component Current Result Ref Range Previous Result Ref Range   Occult Blood Diagnostic Result: Negative  Normal range: Negative   (3/12/2024)  Not in Time Range      GI bacterial pathogens by PCR  No results found for requested labs within last 30 days.     C. difficile  No results found for requested labs within last 30 days.     Urine culture  Lab Results   Component Value Date/Time    LABURIN >100,000 CFU/ml 07/22/2019 06:29 PM       Pathology:  OR 3/19/2024--FINAL DIAGNOSIS:     Small intestine and appendix, resection:

## 2024-04-03 NOTE — PROGRESS NOTES
20:34 Assessment Complete. VSS. Evening meds not given due to drowsiness. The care plan and education has been reviewed and mutually agreed upon with the patient.   Grace Steinberg RN

## 2024-04-04 ENCOUNTER — APPOINTMENT (OUTPATIENT)
Dept: GENERAL RADIOLOGY | Age: 69
DRG: 330 | End: 2024-04-04

## 2024-04-04 PROBLEM — K56.7 ILEUS (HCC): Status: ACTIVE | Noted: 2024-04-04

## 2024-04-04 LAB
ANION GAP SERPL CALCULATED.3IONS-SCNC: 13 MMOL/L (ref 3–16)
BASOPHILS # BLD: 0.1 K/UL (ref 0–0.2)
BASOPHILS NFR BLD: 0.9 %
BUN SERPL-MCNC: 23 MG/DL (ref 7–20)
CALCIUM SERPL-MCNC: 9.1 MG/DL (ref 8.3–10.6)
CHLORIDE SERPL-SCNC: 104 MMOL/L (ref 99–110)
CO2 SERPL-SCNC: 21 MMOL/L (ref 21–32)
CREAT SERPL-MCNC: 0.6 MG/DL (ref 0.6–1.2)
DEPRECATED RDW RBC AUTO: 17.3 % (ref 12.4–15.4)
EOSINOPHIL # BLD: 0.4 K/UL (ref 0–0.6)
EOSINOPHIL NFR BLD: 4.9 %
GFR SERPLBLD CREATININE-BSD FMLA CKD-EPI: >90 ML/MIN/{1.73_M2}
GLUCOSE BLD-MCNC: 123 MG/DL (ref 70–99)
GLUCOSE BLD-MCNC: 125 MG/DL (ref 70–99)
GLUCOSE BLD-MCNC: 143 MG/DL (ref 70–99)
GLUCOSE BLD-MCNC: 144 MG/DL (ref 70–99)
GLUCOSE BLD-MCNC: 180 MG/DL (ref 70–99)
GLUCOSE SERPL-MCNC: 133 MG/DL (ref 70–99)
HCT VFR BLD AUTO: 33.8 % (ref 36–48)
HGB BLD-MCNC: 10.9 G/DL (ref 12–16)
LYMPHOCYTES # BLD: 1.2 K/UL (ref 1–5.1)
LYMPHOCYTES NFR BLD: 14.7 %
MAGNESIUM SERPL-MCNC: 2 MG/DL (ref 1.8–2.4)
MCH RBC QN AUTO: 28.6 PG (ref 26–34)
MCHC RBC AUTO-ENTMCNC: 32.1 G/DL (ref 31–36)
MCV RBC AUTO: 88.9 FL (ref 80–100)
MONOCYTES # BLD: 0.9 K/UL (ref 0–1.3)
MONOCYTES NFR BLD: 11 %
NEUTROPHILS # BLD: 5.7 K/UL (ref 1.7–7.7)
NEUTROPHILS NFR BLD: 68.5 %
PERFORMED ON: ABNORMAL
PHOSPHATE SERPL-MCNC: 4.1 MG/DL (ref 2.5–4.9)
PLATELET # BLD AUTO: 442 K/UL (ref 135–450)
PMV BLD AUTO: 8.6 FL (ref 5–10.5)
POTASSIUM SERPL-SCNC: 3.4 MMOL/L (ref 3.5–5.1)
RBC # BLD AUTO: 3.8 M/UL (ref 4–5.2)
SODIUM SERPL-SCNC: 138 MMOL/L (ref 136–145)
WBC # BLD AUTO: 8.3 K/UL (ref 4–11)

## 2024-04-04 PROCEDURE — C9113 INJ PANTOPRAZOLE SODIUM, VIA: HCPCS | Performed by: PHYSICIAN ASSISTANT

## 2024-04-04 PROCEDURE — APPNB30 APP NON BILLABLE TIME 0-30 MINS: Performed by: NURSE PRACTITIONER

## 2024-04-04 PROCEDURE — 71045 X-RAY EXAM CHEST 1 VIEW: CPT

## 2024-04-04 PROCEDURE — 1200000000 HC SEMI PRIVATE

## 2024-04-04 PROCEDURE — 94761 N-INVAS EAR/PLS OXIMETRY MLT: CPT

## 2024-04-04 PROCEDURE — 2580000003 HC RX 258: Performed by: STUDENT IN AN ORGANIZED HEALTH CARE EDUCATION/TRAINING PROGRAM

## 2024-04-04 PROCEDURE — 6370000000 HC RX 637 (ALT 250 FOR IP): Performed by: SURGERY

## 2024-04-04 PROCEDURE — 6360000002 HC RX W HCPCS: Performed by: PHYSICIAN ASSISTANT

## 2024-04-04 PROCEDURE — 2500000003 HC RX 250 WO HCPCS: Performed by: SURGERY

## 2024-04-04 PROCEDURE — 6370000000 HC RX 637 (ALT 250 FOR IP): Performed by: PHYSICIAN ASSISTANT

## 2024-04-04 PROCEDURE — 83735 ASSAY OF MAGNESIUM: CPT

## 2024-04-04 PROCEDURE — 6370000000 HC RX 637 (ALT 250 FOR IP): Performed by: NURSE PRACTITIONER

## 2024-04-04 PROCEDURE — 85025 COMPLETE CBC W/AUTO DIFF WBC: CPT

## 2024-04-04 PROCEDURE — 97535 SELF CARE MNGMENT TRAINING: CPT

## 2024-04-04 PROCEDURE — 94640 AIRWAY INHALATION TREATMENT: CPT

## 2024-04-04 PROCEDURE — 97530 THERAPEUTIC ACTIVITIES: CPT

## 2024-04-04 PROCEDURE — 6360000002 HC RX W HCPCS: Performed by: SURGERY

## 2024-04-04 PROCEDURE — 97110 THERAPEUTIC EXERCISES: CPT

## 2024-04-04 PROCEDURE — 84100 ASSAY OF PHOSPHORUS: CPT

## 2024-04-04 PROCEDURE — 80048 BASIC METABOLIC PNL TOTAL CA: CPT

## 2024-04-04 PROCEDURE — 2500000003 HC RX 250 WO HCPCS: Performed by: INTERNAL MEDICINE

## 2024-04-04 PROCEDURE — 6360000002 HC RX W HCPCS: Performed by: NURSE PRACTITIONER

## 2024-04-04 PROCEDURE — 99024 POSTOP FOLLOW-UP VISIT: CPT | Performed by: SURGERY

## 2024-04-04 RX ORDER — SIMETHICONE 80 MG
80 TABLET,CHEWABLE ORAL EVERY 6 HOURS PRN
Status: DISCONTINUED | OUTPATIENT
Start: 2024-04-04 | End: 2024-04-10 | Stop reason: HOSPADM

## 2024-04-04 RX ORDER — POLYETHYLENE GLYCOL 3350 17 G/17G
17 POWDER, FOR SOLUTION ORAL DAILY
Status: DISCONTINUED | OUTPATIENT
Start: 2024-04-04 | End: 2024-04-10 | Stop reason: HOSPADM

## 2024-04-04 RX ORDER — HYDROMORPHONE HYDROCHLORIDE 1 MG/ML
0.25 INJECTION, SOLUTION INTRAMUSCULAR; INTRAVENOUS; SUBCUTANEOUS EVERY 4 HOURS PRN
Status: DISCONTINUED | OUTPATIENT
Start: 2024-04-04 | End: 2024-04-10

## 2024-04-04 RX ORDER — POTASSIUM CHLORIDE 29.8 MG/ML
20 INJECTION INTRAVENOUS ONCE
Status: COMPLETED | OUTPATIENT
Start: 2024-04-04 | End: 2024-04-04

## 2024-04-04 RX ADMIN — SODIUM CHLORIDE, PRESERVATIVE FREE 10 ML: 5 INJECTION INTRAVENOUS at 08:30

## 2024-04-04 RX ADMIN — HYDROMORPHONE HYDROCHLORIDE 0.25 MG: 1 INJECTION, SOLUTION INTRAMUSCULAR; INTRAVENOUS; SUBCUTANEOUS at 12:34

## 2024-04-04 RX ADMIN — MECLIZINE HYDROCHLORIDE 25 MG: 12.5 TABLET ORAL at 12:35

## 2024-04-04 RX ADMIN — Medication 2 PUFF: at 08:09

## 2024-04-04 RX ADMIN — METOCLOPRAMIDE 10 MG: 5 INJECTION, SOLUTION INTRAMUSCULAR; INTRAVENOUS at 06:14

## 2024-04-04 RX ADMIN — MECLIZINE HYDROCHLORIDE 25 MG: 12.5 TABLET ORAL at 20:17

## 2024-04-04 RX ADMIN — BUPROPION HYDROCHLORIDE 300 MG: 300 TABLET, EXTENDED RELEASE ORAL at 08:29

## 2024-04-04 RX ADMIN — METHENAMINE HIPPURATE 1 G: 1 TABLET ORAL at 17:35

## 2024-04-04 RX ADMIN — MECLIZINE HYDROCHLORIDE 25 MG: 12.5 TABLET ORAL at 08:29

## 2024-04-04 RX ADMIN — METOCLOPRAMIDE 10 MG: 5 INJECTION, SOLUTION INTRAMUSCULAR; INTRAVENOUS at 00:33

## 2024-04-04 RX ADMIN — PANTOPRAZOLE SODIUM 40 MG: 40 INJECTION, POWDER, FOR SOLUTION INTRAVENOUS at 08:34

## 2024-04-04 RX ADMIN — METOCLOPRAMIDE 10 MG: 5 INJECTION, SOLUTION INTRAMUSCULAR; INTRAVENOUS at 20:18

## 2024-04-04 RX ADMIN — GABAPENTIN 600 MG: 300 CAPSULE ORAL at 08:29

## 2024-04-04 RX ADMIN — TOPIRAMATE 100 MG: 100 TABLET, FILM COATED ORAL at 08:29

## 2024-04-04 RX ADMIN — METOCLOPRAMIDE 10 MG: 5 INJECTION, SOLUTION INTRAMUSCULAR; INTRAVENOUS at 12:34

## 2024-04-04 RX ADMIN — MECLIZINE HYDROCHLORIDE 25 MG: 12.5 TABLET ORAL at 17:35

## 2024-04-04 RX ADMIN — INSULIN GLARGINE 18 UNITS: 100 INJECTION, SOLUTION SUBCUTANEOUS at 20:18

## 2024-04-04 RX ADMIN — TOPIRAMATE 150 MG: 100 TABLET, FILM COATED ORAL at 17:34

## 2024-04-04 RX ADMIN — I.V. FAT EMULSION 250 ML: 20 EMULSION INTRAVENOUS at 17:33

## 2024-04-04 RX ADMIN — CARVEDILOL 3.12 MG: 3.12 TABLET, FILM COATED ORAL at 17:35

## 2024-04-04 RX ADMIN — LEUCINE, PHENYLALANINE, LYSINE, METHIONINE, ISOLEUCINE, VALINE, HISTIDINE, THREONINE, TRYPTOPHAN, ALANINE, GLYCINE, ARGININE, PROLINE, SERINE, TYROSINE, SODIUM ACETATE, DIBASIC POTASSIUM PHOSPHATE, MAGNESIUM CHLORIDE, SODIUM CHLORIDE, CALCIUM CHLORIDE, DEXTROSE
365; 280; 290; 200; 300; 290; 240; 210; 90; 1035; 515; 575; 340; 250; 20; 340; 261; 51; 59; 33; 20 INJECTION INTRAVENOUS at 17:33

## 2024-04-04 RX ADMIN — TROSPIUM CHLORIDE 20 MG: 20 TABLET, FILM COATED ORAL at 06:14

## 2024-04-04 RX ADMIN — TIZANIDINE 2 MG: 4 TABLET ORAL at 20:17

## 2024-04-04 RX ADMIN — PANTOPRAZOLE SODIUM 40 MG: 40 INJECTION, POWDER, FOR SOLUTION INTRAVENOUS at 20:16

## 2024-04-04 RX ADMIN — FUROSEMIDE 20 MG: 10 INJECTION, SOLUTION INTRAMUSCULAR; INTRAVENOUS at 08:34

## 2024-04-04 RX ADMIN — GABAPENTIN 600 MG: 300 CAPSULE ORAL at 20:17

## 2024-04-04 RX ADMIN — GABAPENTIN 600 MG: 300 CAPSULE ORAL at 12:34

## 2024-04-04 RX ADMIN — NORTRIPTYLINE HYDROCHLORIDE 75 MG: 25 CAPSULE ORAL at 20:17

## 2024-04-04 RX ADMIN — CARVEDILOL 3.12 MG: 3.12 TABLET, FILM COATED ORAL at 08:29

## 2024-04-04 RX ADMIN — ENOXAPARIN SODIUM 40 MG: 100 INJECTION SUBCUTANEOUS at 20:18

## 2024-04-04 RX ADMIN — GABAPENTIN 600 MG: 300 CAPSULE ORAL at 17:35

## 2024-04-04 RX ADMIN — ONDANSETRON 4 MG: 2 INJECTION INTRAMUSCULAR; INTRAVENOUS at 00:39

## 2024-04-04 RX ADMIN — TROSPIUM CHLORIDE 20 MG: 20 TABLET, FILM COATED ORAL at 17:35

## 2024-04-04 RX ADMIN — POTASSIUM CHLORIDE 20 MEQ: 29.8 INJECTION, SOLUTION INTRAVENOUS at 12:44

## 2024-04-04 RX ADMIN — ARIPIPRAZOLE 2 MG: 2 TABLET ORAL at 20:17

## 2024-04-04 RX ADMIN — METHENAMINE HIPPURATE 1 G: 1 TABLET ORAL at 08:29

## 2024-04-04 ASSESSMENT — PAIN DESCRIPTION - LOCATION
LOCATION: ABDOMEN
LOCATION: ABDOMEN

## 2024-04-04 ASSESSMENT — PAIN DESCRIPTION - ONSET: ONSET: ON-GOING

## 2024-04-04 ASSESSMENT — PAIN DESCRIPTION - FREQUENCY: FREQUENCY: CONTINUOUS

## 2024-04-04 ASSESSMENT — PAIN SCALES - GENERAL
PAINLEVEL_OUTOF10: 8
PAINLEVEL_OUTOF10: 3
PAINLEVEL_OUTOF10: 1

## 2024-04-04 ASSESSMENT — PAIN - FUNCTIONAL ASSESSMENT: PAIN_FUNCTIONAL_ASSESSMENT: ACTIVITIES ARE NOT PREVENTED

## 2024-04-04 ASSESSMENT — PAIN DESCRIPTION - DESCRIPTORS: DESCRIPTORS: OTHER (COMMENT)

## 2024-04-04 ASSESSMENT — PAIN DESCRIPTION - ORIENTATION: ORIENTATION: MID

## 2024-04-04 NOTE — PLAN OF CARE
Problem: Pain  Goal: Verbalizes/displays adequate comfort level or baseline comfort level  Flowsheets  Taken 4/4/2024 0644 by Shabnam Pearson, RN  Verbalizes/displays adequate comfort level or baseline comfort level:   Encourage patient to monitor pain and request assistance   Assess pain using appropriate pain scale   Administer analgesics based on type and severity of pain and evaluate response   Implement non-pharmacological measures as appropriate and evaluate response   Notify Licensed Independent Practitioner if interventions unsuccessful or patient reports new pain  Taken 4/3/2024 1830 by Mojgan Yun RN  Verbalizes/displays adequate comfort level or baseline comfort level: Assess pain using appropriate pain scale     Problem: Safety - Adult  Goal: Free from fall injury  Outcome: Progressing  Flowsheets (Taken 4/4/2024 0644)  Free From Fall Injury:   Instruct family/caregiver on patient safety   Based on caregiver fall risk screen, instruct family/caregiver to ask for assistance with transferring infant if caregiver noted to have fall risk factors

## 2024-04-04 NOTE — PROGRESS NOTES
[facility] - Inpatient Rehabilitation Department   Phone: [phone]    Occupational Therapy    [] Initial Evaluation            [x] Daily Treatment Note         [] Discharge Summary      Patient: Ana Gonzales   : 1955   MRN: [id number]   Date of Service:  2024    Admitting Diagnosis:  SBO (small bowel obstruction) (HCC)    Current Admission Summary: 67 yo female initially presented with slurred speech and admitted with concern for TIA. CTA head/neck showed severe narrowing of distal left P1 segment of left PCA, CT head and MRI brain no acute findings. Neurology evaluated, recommend supportive care with asa when medically stable, intolerant statins.      Hospital course complicated by low Hgb on admission, requiring 2 units PRBCs. EGD (3/13) did not find any active bleeding, but did find blood clots and a large amount of liquid in the stomach. Placed on PPI gtt. Repeat EGD (3/14) did not show any bleed, but 2 clips were placed on small healing ulcer.      Patient subsequently developed partial SBO and had NGT placed (3/15). She underwent exp lap with lysis of adhesions (3/19). NGT removed and started on clear liquid diet (3/21) but again developed n/v and NG was replaced. Noted to have hematemesis post Ng placement and 3rd EGD 3/26 consistent with trauma from NGT placement. NGT clamped 3/28.      Noted to have left sided neck edema. CT soft tissue neck (3/17) concerning for airway narrowing and acute left parotiditis. She was seen by ENT and received decadron and Unasyn.         3/19/2024: Exploratory laparotomy with lysis of adhesions, Meckel diverticulectomy, and appendectomy.     3/23: NGT removed and did not tolerate well- has been having some issues with coffee-ground emesis and large volume output after a operation for small bowel obstruction- had NGT replaced 3/25    3/26: EGD - findings consistent with ileus    Past Medical History:  has a past medical history of Arthritis,    Patient will complete lower body ADL at moderate assistance   Patient will complete toileting at minimal assistance - goal met 3/28  Patient will complete grooming at stand by assistance - goal met 4/4 UPDATE GOAL - pt will complete grooming Independent  Patient will complete functional transfers at minimal assistance -- met on 3/26 - Patient will complete functional mobility at minimal assistance -- met on 3/26   New goals:  Patient will complete toileting at stand by assistance   Patient will complete functional transfers at stand by assistance - goal met 4/4 UPDATE GOAL - pt will complete functional tranfers Mod I  Patient will complete functional mobility at stand by assistance - goal met 4/4 UPDATE GOAL - pt will complete functional mobility Mod I    Above goals reviewed on 4/4/2024.  All goals are ongoing at this time unless indicated above.       Therapy Session Time     Individual Group Co-treatment   Time In 1015     Time Out 1053     Minutes 38     Timed Code Treatment Minutes: 38 minutes  Total Treatment Minutes: 38 minutes     Electronically Signed By: VINNIE Mckeon [id number]

## 2024-04-04 NOTE — PROGRESS NOTES
--  139* 133*       Hepatic:   Recent Labs     04/03/24  0603   AST 11*   ALT <5*   BILITOT <0.2   ALKPHOS 94       Lipids:   Lab Results   Component Value Date/Time    CHOL 68 03/15/2024 03:55 AM    HDL 44 03/15/2024 03:55 AM    HDL 47 04/16/2012 08:58 AM    TRIG 132 03/19/2024 04:30 AM     Hemoglobin A1C:   Lab Results   Component Value Date/Time    LABA1C 5.4 03/15/2024 03:55 AM     TSH:   Lab Results   Component Value Date/Time    TSH 2.19 04/16/2012 08:58 AM     Troponin: No results found for: \"TROPONINT\"  Lactic Acid: No results for input(s): \"LACTA\" in the last 72 hours.  BNP: No results for input(s): \"PROBNP\" in the last 72 hours.  UA:  Lab Results   Component Value Date/Time    NITRU Negative 03/25/2024 11:30 AM    COLORU DARK YELLOW 03/25/2024 11:30 AM    PHUR 5.5 03/25/2024 11:30 AM    WBCUA 1 03/25/2024 11:30 AM    RBCUA 5 03/25/2024 11:30 AM    MUCUS Trace 09/14/2012 06:30 PM    BACTERIA None Seen 03/25/2024 11:30 AM    CLARITYU Clear 03/25/2024 11:30 AM    SPECGRAV 1.025 03/25/2024 11:30 AM    LEUKOCYTESUR TRACE 03/25/2024 11:30 AM    UROBILINOGEN 1.0 03/25/2024 11:30 AM    BILIRUBINUR Negative 03/25/2024 11:30 AM    BILIRUBINUR NEGATIVE 09/21/2010 12:58 PM    BLOODU Negative 03/25/2024 11:30 AM    GLUCOSEU Negative 03/25/2024 11:30 AM    GLUCOSEU NEGATIVE 09/21/2010 12:58 PM    KETUA Negative 03/25/2024 11:30 AM     Urine Cultures:   Lab Results   Component Value Date/Time    LABURIN >100,000 CFU/ml 07/22/2019 06:29 PM     Blood Cultures:   Lab Results   Component Value Date/Time    BC No Growth after 4 days of incubation. 11/24/2020 09:51 PM     Lab Results   Component Value Date/Time    BLOODCULT2 No Growth after 4 days of incubation. 11/24/2020 09:51 PM     Organism:   Lab Results   Component Value Date/Time    ORG Escherichia coli 07/22/2019 06:29 PM       Personally reviewed labs, diagnostic, device, and imaging results reviewed as a part of this visit    Electronically signed by Arline Turpin

## 2024-04-04 NOTE — CARE COORDINATION
IMM Letter       04/04/24 0818   IMM Letter   IMM Letter given to Patient/Family/Significant other/Guardian/POA/by: Charli Gonzales, spouse   IMM Letter date given: 04/04/24   IMM Letter time given: 0750     VANDANA EscobarN RN    [facility]  Phone: [phone]

## 2024-04-04 NOTE — PROGRESS NOTES
Inhalation BID RT Srinivasa Morales MD   1 puff at 04/04/24 0811    acetaminophen (TYLENOL) tablet 650 mg  650 mg Oral Q4H PRN Srinivasa Morales MD   650 mg at 04/03/24 1847    dextrose bolus 10% 125 mL  125 mL IntraVENous PRN Srinivasa Morales MD   Stopped at 03/25/24 2251    Or    dextrose bolus 10% 250 mL  250 mL IntraVENous PRN Srinivasa Morales MD        glucagon injection 1 mg  1 mg SubCUTAneous PRN Srinivasa Morales MD        dextrose 10 % infusion   IntraVENous Continuous PRN Srinivasa Morales MD         Allergies   Allergen Reactions    Cymbalta [Duloxetine Hcl] Anxiety     Migraine, panic attack    Ketamine Nausea And Vomiting    Prochlorperazine Nausea And Vomiting and Other (See Comments)     Dystonic reaction    Promethazine Anxiety and Other (See Comments)     screams  Feels like in a hole    Atenolol      Bradycardia    Demerol Other (See Comments)     MAKES ME FEEL VERY BIZARRE    Ezetimibe      Burning feet    Glucosamine-Chondroitin Other (See Comments)    Keflex [Cephalexin]     Ketorolac      Pt denies this allergy    Lisinopril      Photosensitive    Meperidine Nausea Only and Other (See Comments)     Dilaudid  MAKES ME FEEL VERY BIZARRE    Phenergan [Promethazine Hcl]     Prochlorperazine Edisylate     Statins Other (See Comments)    Codeine Nausea And Vomiting    Morphine Nausea And Vomiting and Other (See Comments)     MAKES ME FEEL VERY BIZARRE     Principal Problem:    SBO (small bowel obstruction) ([facility])  Active Problems:    Hyperlipidemia    HTN (hypertension), benign    Type 2 diabetes mellitus, without long-term current use of insulin ([facility])    TIA (transient ischemic attack)    Coronary artery disease involving native coronary artery of native heart without angina pectoris    (HFpEF) heart failure with preserved ejection fraction ([facility])    Dysarthria    PVC's (premature ventricular contractions)    Acute prerenal azotemia    History of heart artery stent    Upper  GI bleed    Slurred speech    Ventricular bigeminy    HANS (acute kidney injury) (HCC)    Supraglottitis without airway obstruction    Acute parotitis    Laryngopharyngeal reflux (LPR)    Mild malnutrition (HCC)  Resolved Problems:    * No resolved hospital problems. *    Blood pressure 122/72, pulse 86, temperature 97.1 °F (36.2 °C), temperature source Axillary, resp. rate 16, height 1.6 m (5' 2.99\"), weight 91.4 kg (201 lb 9.6 oz), SpO2 93 %, not currently breastfeeding.    Subjective:  Symptoms:  Worsening.    Diet:  NPO.    Activity level: Impaired due to weakness.    Pain:  She reports no pain.      Objective:  General Appearance:  Comfortable.    Vital signs: (most recent): Blood pressure 122/72, pulse 86, temperature 97.1 °F (36.2 °C), temperature source Axillary, resp. rate 16, height 1.6 m (5' 2.99\"), weight 91.4 kg (201 lb 9.6 oz), SpO2 93 %, not currently breastfeeding.    Output: Producing urine and no stool output.    Lungs:  Normal effort and normal respiratory rate.    Abdomen: Abdomen is soft and distended.  (Incision approximated without evidence of infection).    Neurological: Patient is alert and oriented to person, place and time.    Skin:  Warm and dry.      Assessment & Plan 68-year-old female who is postop day #16 status post lysis of adhesions for small bowel obstruction.  Contrast did reach the colon at 7 hours on small bowel follow-through yesterday.  The overall findings were consistent with possible pseudo obstruction related to severe constipation.  May need NG tube replaced today if she develops nausea and vomiting.  Will ask GI to see patient regarding severe constipation (possible Relastor or Neostigmine).  Continue TPN and supportive measures.    Srinivasa Morales MD  4/4/2024

## 2024-04-04 NOTE — PROGRESS NOTES
Ana VASQUEZ Christian  4/4/2024  [id number]    Chief Complaint: SBO (small bowel obstruction) (HCC)    Subjective   Small bowel follow-through with delayed transit (7 hrs), constipation w/ pseudoobstruction vs severe ileus. GI recommending Movantik.     Patient not feeling well today. Reports increased abdominal pain and nausea. Denies any new weakness.          Objective   Objective:  Patient Vitals for the past 24 hrs:   BP Temp Temp src Pulse Resp SpO2 Weight   04/04/24 1655 116/71 97.3 °F (36.3 °C) Axillary 83 16 97 % --   04/04/24 1145 119/62 97.2 °F (36.2 °C) Axillary 87 20 95 % --   04/04/24 0811 -- -- -- -- -- 93 % --   04/04/24 0745 122/72 97.1 °F (36.2 °C) Axillary 86 16 96 % --   04/04/24 0525 -- -- -- -- -- -- 91.4 kg (201 lb 9.6 oz)   04/04/24 0441 128/68 97.7 °F (36.5 °C) Oral 85 16 94 % --   04/04/24 0004 135/77 97.2 °F (36.2 °C) Oral 83 16 97 % --   04/03/24 2015 129/76 97.3 °F (36.3 °C) Oral 81 16 96 % --   04/03/24 1830 124/83 97.3 °F (36.3 °C) Oral 78 18 98 % --     Gen: Uncomfortable appearing.   HEENT: NGT in place to suction.   CV: No audible murmurs, well perfused extremities  Resp: No respiratory distress. No increased WOB  Abd: Moderately distended.   Ext: No edema.   Neuro: Alert, oriented, appropriately interactive.     Laboratory data:   Lab Results   Component Value Date/Time     04/04/2024 06:00 AM    K 3.4 04/04/2024 06:00 AM    K 4.1 03/30/2024 04:00 PM     04/04/2024 06:00 AM    CO2 21 04/04/2024 06:00 AM    BUN 23 04/04/2024 06:00 AM    CREATININE 0.6 04/04/2024 06:00 AM    GLUCOSE 133 04/04/2024 06:00 AM    CALCIUM 9.1 04/04/2024 06:00 AM    LABGLOM >90 04/04/2024 06:00 AM      Lab Results   Component Value Date    WBC 8.3 04/04/2024    HGB 10.9 (L) 04/04/2024    HCT 33.8 (L) 04/04/2024    MCV 88.9 04/04/2024     04/04/2024         Therapy progress:    PT    Rolling: Level of difficulty - A Little   Sit to Stand from a Chair: Level of difficulty - A Little  Supine to  of both feet    PONV (postoperative nausea and vomiting)    Spinal stenosis of lumbar region    TIA (transient ischemic attack)    Word finding difficulty    Laryngitis, acute    Chronic sinusitis    Frontal headache    Chronic cough    Palpitations    Anginal equivalent (HCC)    Abnormal stress test    Obesity due to excess calories with serious comorbidity    Coronary artery disease involving native coronary artery of native heart without angina pectoris    (HFpEF) heart failure with preserved ejection fraction (HCC)    TIA involving left internal carotid artery    Dysarthria    Upper GI hemorrhage    PVC's (premature ventricular contractions)    Acute prerenal azotemia    History of heart artery stent    Upper GI bleed    Slurred speech    Ventricular bigeminy    HANS (acute kidney injury) (HCC)    SBO (small bowel obstruction) ([facility])    Supraglottitis without airway obstruction    Acute parotitis    Laryngopharyngeal reflux (LPR)    Mild malnutrition ([facility])       Plan:   Patient remains a good candidate for acute inpatient rehabilitation, pending resolution of ileus. She is not yet medically ready for rehab.     Lester Shelley MD 4/4/2024, 5:41 PM    * This document was created using dictation software.  While all precautions were taken to ensure accuracy, errors may have occurred.  Please disregard any typographical errors.

## 2024-04-04 NOTE — PROGRESS NOTES
[facility]    4/5/2024 10:19 AM        Name: Ana Gonzales .              Admitted: 3/12/2024  Primary Care Provider: Mellisa Houston MD (Tel: [phone])      Hospital course: 67 yo female initially presented with slurred speech and admitted with concern for TIA. CTA head/neck showed severe narrowing of distal left P1 segment of left PCA, CT head and MRI brain no acute findings. Neurology evaluated, recommend supportive care with asa when medically stable, intolerant statins.     Hospital course complicated by low Hgb on admission, requiring 2 units PRBCs. EGD (3/13) did not find any active bleeding, but did find blood clots and a large amount of liquid in the stomach. Placed on PPI gtt. Repeat EGD (3/14) did not show any bleed, but 2 clips were placed on small healing ulcer.     Patient subsequently developed partial SBO and had NGT placed (3/15). She underwent exp lap with lysis of adhesions (3/19). NGT removed and started on clear liquid diet (3/21) but again developed n/v and NG was replaced. Noted to have hematemesis post Ng placement and 3rd EGD 3/26 consistent with trauma from NGT placement. NGT subsequently removed 4/2.    Noted to have left sided neck edema. CT soft tissue neck (3/17) concerning for airway narrowing and acute left parotiditis. She was seen by ENT and received short course steroid and Unasyn. Symptoms resolved, Unasyn discontinued 4/1        3/19/2024: Exploratory laparotomy with lysis of adhesions, Meckel diverticulectomy, and appendectomy.       Subjective:   Up in bedside chair, working with PT/OT,  visiting. Patient reports she is feeling much better today. Denies nausea, abdominal pian, has had 5 BMs since yesterday. Denies shortness of breath, chest pain.     Reviewed interval ancillary notes    Current Medications  PN-Adult Premix 5/20 - Standard Electrolytes -

## 2024-04-04 NOTE — PROGRESS NOTES
Gastroenterology Progress Note      Ana Gonzales is a 68 y.o. female patient.  1. Slurred speech    2. Ventricular bigeminy    3. Elevated troponin    4. Upper GI bleed    5. Hematemesis, unspecified whether nausea present        SUBJECTIVE:      Pt seen previously this admission for coffee ground emesis. EGD 3/14 revealed gastric body ulcer with pigmented spot s/p 2 clips.  She was later found to have SBO and underwent surgery 3/20. She improved. NG tube was removed.  Then had nausea and bilious emesis.  NG tube was replaced 3/20/2024.  She had epistaxis and blood per NG tube so GI reconsulted.  EGD 3/26/2024 showed ulcers that were clipped previously showed no evidence or signs of bleeding.  There was blood in the posterior pharynx likely from NG trauma.  She has been treated with NG tube decompression for ileus.      NG tube has since been removed.  She had gradual abdominal distention thereafter.  Small bowel follow-through yesterday showed contrast reaching TI after 7 hours.  She is passing flatus and did have 1 BM today.  Reports burning abdominal pain with drinking the barium yesterday.    She has chronic constipation and follows with Dr. Encinas. has 1 bowel movement every 2 weeks at baseline.  Failed Linzess.  Tried multiple over-the-counter laxatives without any relief.  She was recommended to start Motegrity but does not recall ever trying this.  She also had an abnormal gastric emptying study with 77% emptying at 4 hours.  Last colonoscopy was in .  Had a recent negative Cologuard.      Physical    VITALS:  /62   Pulse 87   Temp 97.2 °F (36.2 °C) (Axillary)   Resp 20   Ht 1.6 m (5' 2.99\")   Wt 91.4 kg (201 lb 9.6 oz)   SpO2 95%   BMI 35.72 kg/m²   TEMPERATURE:  Current - Temp: 97.2 °F (36.2 °C); Max - Temp  Av.3 °F (36.3 °C)  Min: 97.1 °F (36.2 °C)  Max: 97.7 °F (36.5 °C)    NAD  RRR   Lungs CTA Bilaterally, normal effort  Abdomen soft, ND, NT, no HSM, Few bowel  sounds. Midline scar       Data    Data Review:    Recent Labs     04/02/24  0532 04/03/24  0603 04/04/24  0600   WBC 5.5 5.2 8.3   HGB 9.4* 10.1* 10.9*   HCT 28.2* 30.1* 33.8*   MCV 89.6 89.5 88.9   * 487* 442     Recent Labs     04/02/24  0532 04/03/24  0602 04/03/24  0603 04/04/24  0600     --  137 138   K 3.5 3.8 3.4* 3.4*     --  105 104   CO2 20*  --  22 21   PHOS 3.6  --  4.0 4.1   BUN 18  --  19 23*   CREATININE 0.6  --  0.7 0.6     Recent Labs     04/03/24  0603   AST 11*   ALT <5*   BILIDIR <0.2   BILITOT <0.2   ALKPHOS 94     No results for input(s): \"LIPASE\", \"AMYLASE\" in the last 72 hours.  No results for input(s): \"PROTIME\", \"INR\" in the last 72 hours.  No results for input(s): \"PTT\" in the last 72 hours.    Radiology Review:  SBFT 4/4/24  FINDINGS:  Preliminary overhead  view of the abdomen demonstrates a large amount of  stool throughout the colon.  There is mild to moderate gaseous distention of  numerous small bowel loops within the central abdomen and pelvis.    Oral contrast was administered to the patient demonstrating opacification of  the stomach in numerous dilated small bowel loops.  There is delayed transit  of contrast through the small bowel with contrast eventually reaching the  terminal ileum on the 7 hour film.   Impression:     Constipation with pseudo-obstruction versus severe ileus.         ASSESSMENT / PLAN      Ileus -small bowel follow-through with p.o. contrast reaching TI after 7 hours.  Noted to have large volume stool in the colon as well.  She has had enemas here.    Chronic constipation -goes 2 weeks without bowel movements at baseline.  Failed Linzess and other over-the-counter laxatives at home.  Last colonoscopy 2008.  -Start Movantik as she did have some narcotics here.        SBO - s/p ex lap 3/20/2024 with SBO due to a band related to a Meckel's diverticulum, band was cut and diverticulum resected, s/p appendectomy.  - post op care per

## 2024-04-04 NOTE — PROGRESS NOTES
Arrived to place PICC line with bedside RN Hernandez. Pre-procedure and timeout done with RN, discussed limitations of placement and allergies. Consent confirmed. Vital signs stable. Labs, allergies, medications, and code status reviewed. No contraindications noted.     Procedure explained to pt, including the risk and benefits of the procedure. All questions answered. Pt verbalizes understanding of the procedure and states no more questions.       Pt's basilic, brachial, and cephalic are all easily collapsible with no indication for a clot. Vein selected is large enough for catheter. Pt tolerated sterile procedure well, with no difficulty accessing brachial vein, when accessed - blood was free flowing and non-pulsatile. Guidewire, introducer, and catheter went in smoothly. PICC line verified with 3CG technology with peaked P-waves (please see image below).      Nurses:  OK to use PICC.    Please replace all existing IV tubing with new IV tubing prior to using the PICC for current IV infusions.  Please remove any PIVs from PICC arm.  All of the above may be sources of infection or an increase chance of a clot.     When TPN has been transferred to PICC and IJ has been removed please order stat chest xray to confirm placement of PICC line. Sometimes after IJ removal, it can pull the picc with the IJ and the PICC tip will now be in the pt's IJ instead of the SVC.    Please call ICU charge RN to help remove IJ CVC.     Post procedure - reorganized pt table, placed pt in lowest position, with call light and educated on line care. Instructed pt/RN not to use arm for at least 30min to avoid bleeding. Reported off to bedside RN.      If you have any questions please call number below and dispatch will direct you to the PICC RN that is on call.    [phone]

## 2024-04-05 LAB
ALBUMIN SERPL-MCNC: 3.3 G/DL (ref 3.4–5)
ALP SERPL-CCNC: 102 U/L (ref 40–129)
ALT SERPL-CCNC: 6 U/L (ref 10–40)
ANION GAP SERPL CALCULATED.3IONS-SCNC: 12 MMOL/L (ref 3–16)
AST SERPL-CCNC: 10 U/L (ref 15–37)
BASOPHILS # BLD: 0.1 K/UL (ref 0–0.2)
BASOPHILS NFR BLD: 1.1 %
BILIRUB DIRECT SERPL-MCNC: <0.2 MG/DL (ref 0–0.3)
BILIRUB INDIRECT SERPL-MCNC: ABNORMAL MG/DL (ref 0–1)
BILIRUB SERPL-MCNC: <0.2 MG/DL (ref 0–1)
BUN SERPL-MCNC: 19 MG/DL (ref 7–20)
CALCIUM SERPL-MCNC: 8.9 MG/DL (ref 8.3–10.6)
CHLORIDE SERPL-SCNC: 106 MMOL/L (ref 99–110)
CO2 SERPL-SCNC: 20 MMOL/L (ref 21–32)
CREAT SERPL-MCNC: 0.6 MG/DL (ref 0.6–1.2)
DEPRECATED RDW RBC AUTO: 17.2 % (ref 12.4–15.4)
EOSINOPHIL # BLD: 0.5 K/UL (ref 0–0.6)
EOSINOPHIL NFR BLD: 10.2 %
GFR SERPLBLD CREATININE-BSD FMLA CKD-EPI: >90 ML/MIN/{1.73_M2}
GLUCOSE BLD-MCNC: 144 MG/DL (ref 70–99)
GLUCOSE BLD-MCNC: 159 MG/DL (ref 70–99)
GLUCOSE BLD-MCNC: 159 MG/DL (ref 70–99)
GLUCOSE BLD-MCNC: 214 MG/DL (ref 70–99)
GLUCOSE SERPL-MCNC: 163 MG/DL (ref 70–99)
HCT VFR BLD AUTO: 30.2 % (ref 36–48)
HGB BLD-MCNC: 10.2 G/DL (ref 12–16)
LYMPHOCYTES # BLD: 1.2 K/UL (ref 1–5.1)
LYMPHOCYTES NFR BLD: 24.9 %
MAGNESIUM SERPL-MCNC: 1.8 MG/DL (ref 1.8–2.4)
MCH RBC QN AUTO: 29.8 PG (ref 26–34)
MCHC RBC AUTO-ENTMCNC: 33.6 G/DL (ref 31–36)
MCV RBC AUTO: 88.7 FL (ref 80–100)
MONOCYTES # BLD: 0.4 K/UL (ref 0–1.3)
MONOCYTES NFR BLD: 8.7 %
NEUTROPHILS # BLD: 2.7 K/UL (ref 1.7–7.7)
NEUTROPHILS NFR BLD: 55.1 %
PERFORMED ON: ABNORMAL
PHOSPHATE SERPL-MCNC: 3.5 MG/DL (ref 2.5–4.9)
PLATELET # BLD AUTO: 338 K/UL (ref 135–450)
PMV BLD AUTO: 8.3 FL (ref 5–10.5)
POTASSIUM SERPL-SCNC: 3.5 MMOL/L (ref 3.5–5.1)
PROT SERPL-MCNC: 6.4 G/DL (ref 6.4–8.2)
RBC # BLD AUTO: 3.41 M/UL (ref 4–5.2)
SODIUM SERPL-SCNC: 138 MMOL/L (ref 136–145)
WBC # BLD AUTO: 4.9 K/UL (ref 4–11)

## 2024-04-05 PROCEDURE — 2580000003 HC RX 258: Performed by: STUDENT IN AN ORGANIZED HEALTH CARE EDUCATION/TRAINING PROGRAM

## 2024-04-05 PROCEDURE — 84100 ASSAY OF PHOSPHORUS: CPT

## 2024-04-05 PROCEDURE — 6360000002 HC RX W HCPCS: Performed by: NURSE PRACTITIONER

## 2024-04-05 PROCEDURE — 80048 BASIC METABOLIC PNL TOTAL CA: CPT

## 2024-04-05 PROCEDURE — 97110 THERAPEUTIC EXERCISES: CPT

## 2024-04-05 PROCEDURE — 97530 THERAPEUTIC ACTIVITIES: CPT

## 2024-04-05 PROCEDURE — 2500000003 HC RX 250 WO HCPCS: Performed by: INTERNAL MEDICINE

## 2024-04-05 PROCEDURE — 97535 SELF CARE MNGMENT TRAINING: CPT

## 2024-04-05 PROCEDURE — 6360000002 HC RX W HCPCS: Performed by: PHYSICIAN ASSISTANT

## 2024-04-05 PROCEDURE — 1200000000 HC SEMI PRIVATE

## 2024-04-05 PROCEDURE — 99024 POSTOP FOLLOW-UP VISIT: CPT | Performed by: SURGERY

## 2024-04-05 PROCEDURE — 80076 HEPATIC FUNCTION PANEL: CPT

## 2024-04-05 PROCEDURE — 6360000002 HC RX W HCPCS: Performed by: SURGERY

## 2024-04-05 PROCEDURE — 6370000000 HC RX 637 (ALT 250 FOR IP): Performed by: PHYSICIAN ASSISTANT

## 2024-04-05 PROCEDURE — 6370000000 HC RX 637 (ALT 250 FOR IP): Performed by: SURGERY

## 2024-04-05 PROCEDURE — 83735 ASSAY OF MAGNESIUM: CPT

## 2024-04-05 PROCEDURE — 36415 COLL VENOUS BLD VENIPUNCTURE: CPT

## 2024-04-05 PROCEDURE — 6370000000 HC RX 637 (ALT 250 FOR IP): Performed by: INTERNAL MEDICINE

## 2024-04-05 PROCEDURE — 85025 COMPLETE CBC W/AUTO DIFF WBC: CPT

## 2024-04-05 PROCEDURE — 6370000000 HC RX 637 (ALT 250 FOR IP): Performed by: NURSE PRACTITIONER

## 2024-04-05 PROCEDURE — C9113 INJ PANTOPRAZOLE SODIUM, VIA: HCPCS | Performed by: PHYSICIAN ASSISTANT

## 2024-04-05 PROCEDURE — 94640 AIRWAY INHALATION TREATMENT: CPT

## 2024-04-05 PROCEDURE — 94761 N-INVAS EAR/PLS OXIMETRY MLT: CPT

## 2024-04-05 RX ADMIN — GABAPENTIN 600 MG: 300 CAPSULE ORAL at 13:56

## 2024-04-05 RX ADMIN — ASCORBIC ACID, VITAMIN A PALMITATE, CHOLECALCIFEROL, THIAMINE HYDROCHLORIDE, RIBOFLAVIN-5 PHOSPHATE SODIUM, PYRIDOXINE HYDROCHLORIDE, NIACINAMIDE, DEXPANTHENOL, ALPHA-TOCOPHEROL ACETATE, VITAMIN K1, FOLIC ACID, BIOTIN, CYANOCOBALAMIN: 200; 3300; 200; 6; 3.6; 6; 40; 15; 10; 150; 600; 60; 5 INJECTION, SOLUTION INTRAVENOUS at 18:10

## 2024-04-05 RX ADMIN — TROSPIUM CHLORIDE 20 MG: 20 TABLET, FILM COATED ORAL at 18:11

## 2024-04-05 RX ADMIN — MECLIZINE HYDROCHLORIDE 25 MG: 12.5 TABLET ORAL at 18:11

## 2024-04-05 RX ADMIN — TOPIRAMATE 100 MG: 100 TABLET, FILM COATED ORAL at 08:56

## 2024-04-05 RX ADMIN — NORTRIPTYLINE HYDROCHLORIDE 75 MG: 25 CAPSULE ORAL at 19:48

## 2024-04-05 RX ADMIN — TIZANIDINE 2 MG: 4 TABLET ORAL at 19:48

## 2024-04-05 RX ADMIN — METHENAMINE HIPPURATE 1 G: 1 TABLET ORAL at 18:11

## 2024-04-05 RX ADMIN — Medication 2 PUFF: at 21:09

## 2024-04-05 RX ADMIN — METHENAMINE HIPPURATE 1 G: 1 TABLET ORAL at 08:56

## 2024-04-05 RX ADMIN — CARVEDILOL 3.12 MG: 3.12 TABLET, FILM COATED ORAL at 18:12

## 2024-04-05 RX ADMIN — Medication 2 PUFF: at 09:30

## 2024-04-05 RX ADMIN — FUROSEMIDE 20 MG: 10 INJECTION, SOLUTION INTRAMUSCULAR; INTRAVENOUS at 08:57

## 2024-04-05 RX ADMIN — SODIUM CHLORIDE, PRESERVATIVE FREE 10 ML: 5 INJECTION INTRAVENOUS at 08:57

## 2024-04-05 RX ADMIN — POLYETHYLENE GLYCOL 3350 17 G: 17 POWDER, FOR SOLUTION ORAL at 08:57

## 2024-04-05 RX ADMIN — METOCLOPRAMIDE 10 MG: 5 INJECTION, SOLUTION INTRAMUSCULAR; INTRAVENOUS at 01:37

## 2024-04-05 RX ADMIN — INSULIN GLARGINE 18 UNITS: 100 INJECTION, SOLUTION SUBCUTANEOUS at 19:49

## 2024-04-05 RX ADMIN — MECLIZINE HYDROCHLORIDE 25 MG: 12.5 TABLET ORAL at 13:56

## 2024-04-05 RX ADMIN — PANTOPRAZOLE SODIUM 40 MG: 40 INJECTION, POWDER, FOR SOLUTION INTRAVENOUS at 08:57

## 2024-04-05 RX ADMIN — GABAPENTIN 600 MG: 300 CAPSULE ORAL at 19:48

## 2024-04-05 RX ADMIN — METOCLOPRAMIDE 10 MG: 5 INJECTION, SOLUTION INTRAMUSCULAR; INTRAVENOUS at 18:12

## 2024-04-05 RX ADMIN — METOCLOPRAMIDE 10 MG: 5 INJECTION, SOLUTION INTRAMUSCULAR; INTRAVENOUS at 06:03

## 2024-04-05 RX ADMIN — BUPROPION HYDROCHLORIDE 300 MG: 300 TABLET, EXTENDED RELEASE ORAL at 08:57

## 2024-04-05 RX ADMIN — GABAPENTIN 600 MG: 300 CAPSULE ORAL at 18:12

## 2024-04-05 RX ADMIN — MECLIZINE HYDROCHLORIDE 25 MG: 12.5 TABLET ORAL at 19:49

## 2024-04-05 RX ADMIN — ENOXAPARIN SODIUM 40 MG: 100 INJECTION SUBCUTANEOUS at 19:48

## 2024-04-05 RX ADMIN — GABAPENTIN 600 MG: 300 CAPSULE ORAL at 08:56

## 2024-04-05 RX ADMIN — MECLIZINE HYDROCHLORIDE 25 MG: 12.5 TABLET ORAL at 08:57

## 2024-04-05 RX ADMIN — TOPIRAMATE 150 MG: 100 TABLET, FILM COATED ORAL at 18:12

## 2024-04-05 RX ADMIN — TROSPIUM CHLORIDE 20 MG: 20 TABLET, FILM COATED ORAL at 06:03

## 2024-04-05 RX ADMIN — ARIPIPRAZOLE 2 MG: 2 TABLET ORAL at 19:48

## 2024-04-05 RX ADMIN — PANTOPRAZOLE SODIUM 40 MG: 40 INJECTION, POWDER, FOR SOLUTION INTRAVENOUS at 19:50

## 2024-04-05 RX ADMIN — NALOXEGOL OXALATE 25 MG: 25 TABLET, FILM COATED ORAL at 06:03

## 2024-04-05 RX ADMIN — METOCLOPRAMIDE 10 MG: 5 INJECTION, SOLUTION INTRAMUSCULAR; INTRAVENOUS at 13:56

## 2024-04-05 RX ADMIN — CARVEDILOL 3.12 MG: 3.12 TABLET, FILM COATED ORAL at 08:56

## 2024-04-05 NOTE — PROGRESS NOTES
Ana Gonzales is a 68 y.o. female patient.    Current Facility-Administered Medications   Medication Dose Route Frequency Provider Last Rate Last Admin    PN-Adult Premix 5/20 - Standard Electrolytes - Central Line   IntraVENous Continuous TPN Mariaa Velasquez MD        HYDROmorphone HCl PF (DILAUDID) injection 0.25 mg  0.25 mg IntraVENous Q4H PRN Aria Carter APRN - CNP   0.25 mg at 04/04/24 1234    PN-Adult Premix 5/20 - Standard Electrolytes - Central Line   IntraVENous Continuous TPN Chucho Lam MD 80 mL/hr at 04/04/24 1733 New Bag at 04/04/24 1733    simethicone (MYLICON) chewable tablet 80 mg  80 mg Oral Q6H PRN Aria Carter APRN - CNP        polyethylene glycol (GLYCOLAX) packet 17 g  17 g Oral Daily Pete Montalvo MD   17 g at 04/05/24 0857    lidocaine PF 1 % injection 5 mL  5 mL IntraDERmal Once Tc Shearer DO        sodium chloride flush 0.9 % injection 5-40 mL  5-40 mL IntraVENous 2 times per day Tc Shearer DO   10 mL at 04/05/24 0857    sodium chloride flush 0.9 % injection 5-40 mL  5-40 mL IntraVENous PRN Tc Shearer DO        0.9 % sodium chloride infusion  25 mL IntraVENous PRN Tc Shearer DO        insulin lispro (HUMALOG) injection vial 0-8 Units  0-8 Units SubCUTAneous Q6H Chucho Lam MD        Evolocumab SOAJ 140 mg  (Patient Supplied)  140 mg SubCUTAneous Q14 Days Arline Turpin APRN - CNP   140 mg at 04/02/24 1647    metoclopramide (REGLAN) injection 10 mg  10 mg IntraVENous Q6H Aria Carter APRN - CNP   10 mg at 04/05/24 1356    pantoprazole (PROTONIX) injection 40 mg  40 mg IntraVENous BID Shalonda Dominguez PA-C   40 mg at 04/05/24 0857    diatrizoate meglumine-sodium (GASTROGRAFIN) 66-10 % solution 5 mL  5 mL Oral ONCE PRN Srinivasa Morales MD   5 mL at 03/27/24 1238    phenol 1.4 % mouth spray 1 spray  1 spray Mouth/Throat Q2H PRN Aria Carter, APRN - CNP        furosemide (LASIX) injection 20 mg  20 mg IntraVENous Daily Salzano,

## 2024-04-05 NOTE — PLAN OF CARE
Problem: Pain  Goal: Verbalizes/displays adequate comfort level or baseline comfort level  Outcome: Progressing     Problem: Discharge Planning  Goal: Discharge to home or other facility with appropriate resources  Outcome: Progressing     Problem: Safety - Adult  Goal: Free from fall injury  Outcome: Progressing     Problem: Chronic Conditions and Co-morbidities  Goal: Patient's chronic conditions and co-morbidity symptoms are monitored and maintained or improved  Outcome: Progressing     Problem: ABCDS Injury Assessment  Goal: Absence of physical injury  Outcome: Progressing     Problem: Skin/Tissue Integrity  Goal: Absence of new skin breakdown  Description: 1.  Monitor for areas of redness and/or skin breakdown  2.  Assess vascular access sites hourly  3.  Every 4-6 hours minimum:  Change oxygen saturation probe site  4.  Every 4-6 hours:  If on nasal continuous positive airway pressure, respiratory therapy assess nares and determine need for appliance change or resting period.  Outcome: Progressing     Problem: Nutrition Deficit:  Goal: Optimize nutritional status  Outcome: Progressing     Problem: Musculoskeletal - Adult  Goal: Return mobility to safest level of function  Outcome: Progressing  Goal: Maintain proper alignment of affected body part  Outcome: Progressing     Problem: Genitourinary - Adult  Goal: Absence of urinary retention  Outcome: Progressing     Problem: Metabolic/Fluid and Electrolytes - Adult  Goal: Electrolytes maintained within normal limits  Outcome: Progressing  Goal: Hemodynamic stability and optimal renal function maintained  Outcome: Progressing  Goal: Glucose maintained within prescribed range  Outcome: Progressing     Problem: Skin/Tissue Integrity - Adult  Goal: Skin integrity remains intact  Outcome: Progressing  Goal: Incisions, wounds, or drain sites healing without S/S of infection  Outcome: Progressing

## 2024-04-05 NOTE — PROGRESS NOTES
Shift assessment complete, alert and oriented, VSS,  at bedside, BM X1 large , dark color. All night meds administered.  The care plan and education has been reviewed and mutually agreed upon with the patient.   Electronically signed by Afshan Joy RN on 4/5/2024 at 4:36 AM

## 2024-04-05 NOTE — PROGRESS NOTES
Patient ate half a bowl of potato soup for lunch. Tolerating well. Denies having any nausea or pain.

## 2024-04-05 NOTE — PROGRESS NOTES
Clinical Pharmacy Note    Pharmacy consulted by Aria STEPHENSto manage TPN    Current TPN rate: 80ml/hr  Goal TPN rate: 80ml/hr    Access: CVC  Indication: SBO    Labs:  General Labs:  BMP:    Lab Results   Component Value Date/Time     04/05/2024 05:02 AM    K 3.5 04/05/2024 05:02 AM    K 4.1 03/30/2024 04:00 PM     04/05/2024 05:02 AM    CO2 20 04/05/2024 05:02 AM    BUN 19 04/05/2024 05:02 AM    LABALBU 3.3 04/05/2024 05:02 AM    CREATININE 0.6 04/05/2024 05:02 AM    CALCIUM 8.9 04/05/2024 05:02 AM    GFRAA >60 11/24/2020 06:29 PM    GFRAA >60 01/13/2013 05:24 AM    LABGLOM >90 04/05/2024 05:02 AM    GLUCOSE 163 04/05/2024 05:02 AM           Blood sugars over past 24 hours: 133-163    Blood sugar management:  Plan to Continue Humalog q4 hour sliding scale at medium dose.    Plan to continue TPN at 80 ml/hr.    Thank you for allowing pharmacy to participate in the care of this patient.    Srinivasa Fountain [facility], PharmD 4/5/2024
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
## 2024-04-05 NOTE — PROGRESS NOTES
Gastroenterology Progress Note      Ana Gonzales is a 68 y.o. female patient.  1. Slurred speech    2. Ventricular bigeminy    3. Elevated troponin    4. Upper GI bleed    5. Hematemesis, unspecified whether nausea present        SUBJECTIVE:  She had 5 BMs since yesterday. Stools were black but last BM was less black per her . Denies abdominal bloating.       Physical    VITALS:  /72   Pulse (!) 102   Temp 98.1 °F (36.7 °C) (Oral)   Resp 16   Ht 1.6 m (5' 2.99\")   Wt 88.1 kg (194 lb 3.2 oz)   SpO2 98%   BMI 34.41 kg/m²   TEMPERATURE:  Current - Temp: 98.1 °F (36.7 °C); Max - Temp  Av.7 °F (36.5 °C)  Min: 97.2 °F (36.2 °C)  Max: 98.2 °F (36.8 °C)    NAD  RRR   Lungs CTA Bilaterally, normal effort  Abdomen soft, ND, NT, no HSM, Few bowel sounds. Midline scar       Data    Data Review:    Recent Labs     24  0603 24  0624  0502   WBC 5.2 8.3 4.9   HGB 10.1* 10.9* 10.2*   HCT 30.1* 33.8* 30.2*   MCV 89.5 88.9 88.7   * 442 338       Recent Labs     24  0603 24  0600 24  0502    138 138   K 3.4* 3.4* 3.5    104 106   CO2 22 21 20*   PHOS 4.0 4.1 3.5   BUN 19 23* 19   CREATININE 0.7 0.6 0.6       Recent Labs     24  0603 24  0502   AST 11* 10*   ALT <5* 6*   BILIDIR <0.2 <0.2   BILITOT <0.2 <0.2   ALKPHOS 94 102       No results for input(s): \"LIPASE\", \"AMYLASE\" in the last 72 hours.  No results for input(s): \"PROTIME\", \"INR\" in the last 72 hours.  No results for input(s): \"PTT\" in the last 72 hours.          ASSESSMENT / PLAN      Ileus -small bowel follow-through 4/3 with p.o. contrast reaching TI after 7 hours.  Noted to have large volume stool in the colon as well.  Now passing BMs.  - full liquid diet  - chewing gum TID    Chronic constipation -goes 2 weeks without bowel movements at baseline.  Failed Linzess and other over-the-counter laxatives at home.  Last colonoscopy .  -miralax   -Movantik as she did  have some narcotics here.     Gastroparesis  - Reglan     SBO - s/p ex lap 3/20/2024 with SBO due to a band related to a Meckel's diverticulum, band was cut and diverticulum resected, s/p appendectomy.     Gastric ulcer -  She was on aspirin, Plavix and an NSAID, nabumetone at home. EGD 3/14 revealed gastric body ulcer with pigmented spot s/p 2 clips. Scars in antrum, likely from healed ulcers.  Gastric biopsies negative for H. Pylori.  - PPI BID    Addendum: Patient states does not need pain medicine anymore so we will DC Movantik.    Discussed with Dr. Katia Dominguez, PA-C  [facility]

## 2024-04-06 LAB
ANION GAP SERPL CALCULATED.3IONS-SCNC: 9 MMOL/L (ref 3–16)
BASOPHILS # BLD: 0 K/UL (ref 0–0.2)
BASOPHILS NFR BLD: 0.4 %
BUN SERPL-MCNC: 17 MG/DL (ref 7–20)
CALCIUM SERPL-MCNC: 8.7 MG/DL (ref 8.3–10.6)
CHLORIDE SERPL-SCNC: 108 MMOL/L (ref 99–110)
CO2 SERPL-SCNC: 23 MMOL/L (ref 21–32)
CREAT SERPL-MCNC: 0.5 MG/DL (ref 0.6–1.2)
DEPRECATED RDW RBC AUTO: 17.6 % (ref 12.4–15.4)
EOSINOPHIL # BLD: 0.3 K/UL (ref 0–0.6)
EOSINOPHIL NFR BLD: 8.3 %
GFR SERPLBLD CREATININE-BSD FMLA CKD-EPI: >90 ML/MIN/{1.73_M2}
GLUCOSE BLD-MCNC: 131 MG/DL (ref 70–99)
GLUCOSE BLD-MCNC: 153 MG/DL (ref 70–99)
GLUCOSE BLD-MCNC: 169 MG/DL (ref 70–99)
GLUCOSE SERPL-MCNC: 156 MG/DL (ref 70–99)
HCT VFR BLD AUTO: 28.5 % (ref 36–48)
HGB BLD-MCNC: 9.1 G/DL (ref 12–16)
LYMPHOCYTES # BLD: 1 K/UL (ref 1–5.1)
LYMPHOCYTES NFR BLD: 26.3 %
MAGNESIUM SERPL-MCNC: 1.9 MG/DL (ref 1.8–2.4)
MCH RBC QN AUTO: 28.4 PG (ref 26–34)
MCHC RBC AUTO-ENTMCNC: 31.9 G/DL (ref 31–36)
MCV RBC AUTO: 89 FL (ref 80–100)
MONOCYTES # BLD: 0.4 K/UL (ref 0–1.3)
MONOCYTES NFR BLD: 11.3 %
NEUTROPHILS # BLD: 2.1 K/UL (ref 1.7–7.7)
NEUTROPHILS NFR BLD: 53.7 %
PERFORMED ON: ABNORMAL
PHOSPHATE SERPL-MCNC: 3.6 MG/DL (ref 2.5–4.9)
PLATELET # BLD AUTO: 265 K/UL (ref 135–450)
PMV BLD AUTO: 8.6 FL (ref 5–10.5)
POTASSIUM SERPL-SCNC: 3.6 MMOL/L (ref 3.5–5.1)
RBC # BLD AUTO: 3.2 M/UL (ref 4–5.2)
SODIUM SERPL-SCNC: 140 MMOL/L (ref 136–145)
WBC # BLD AUTO: 4 K/UL (ref 4–11)

## 2024-04-06 PROCEDURE — 99231 SBSQ HOSP IP/OBS SF/LOW 25: CPT | Performed by: STUDENT IN AN ORGANIZED HEALTH CARE EDUCATION/TRAINING PROGRAM

## 2024-04-06 PROCEDURE — 6370000000 HC RX 637 (ALT 250 FOR IP): Performed by: SURGERY

## 2024-04-06 PROCEDURE — 94761 N-INVAS EAR/PLS OXIMETRY MLT: CPT

## 2024-04-06 PROCEDURE — C9113 INJ PANTOPRAZOLE SODIUM, VIA: HCPCS | Performed by: PHYSICIAN ASSISTANT

## 2024-04-06 PROCEDURE — 83735 ASSAY OF MAGNESIUM: CPT

## 2024-04-06 PROCEDURE — 2580000003 HC RX 258: Performed by: SURGERY

## 2024-04-06 PROCEDURE — 6360000002 HC RX W HCPCS: Performed by: SURGERY

## 2024-04-06 PROCEDURE — 6360000002 HC RX W HCPCS: Performed by: NURSE PRACTITIONER

## 2024-04-06 PROCEDURE — 6360000002 HC RX W HCPCS: Performed by: PHYSICIAN ASSISTANT

## 2024-04-06 PROCEDURE — 85025 COMPLETE CBC W/AUTO DIFF WBC: CPT

## 2024-04-06 PROCEDURE — 6370000000 HC RX 637 (ALT 250 FOR IP): Performed by: INTERNAL MEDICINE

## 2024-04-06 PROCEDURE — 1200000000 HC SEMI PRIVATE

## 2024-04-06 PROCEDURE — 99024 POSTOP FOLLOW-UP VISIT: CPT | Performed by: SURGERY

## 2024-04-06 PROCEDURE — 6370000000 HC RX 637 (ALT 250 FOR IP): Performed by: NURSE PRACTITIONER

## 2024-04-06 PROCEDURE — 84100 ASSAY OF PHOSPHORUS: CPT

## 2024-04-06 PROCEDURE — 94640 AIRWAY INHALATION TREATMENT: CPT

## 2024-04-06 PROCEDURE — 80048 BASIC METABOLIC PNL TOTAL CA: CPT

## 2024-04-06 PROCEDURE — 2580000003 HC RX 258: Performed by: STUDENT IN AN ORGANIZED HEALTH CARE EDUCATION/TRAINING PROGRAM

## 2024-04-06 RX ADMIN — BUPROPION HYDROCHLORIDE 300 MG: 300 TABLET, EXTENDED RELEASE ORAL at 08:26

## 2024-04-06 RX ADMIN — TROSPIUM CHLORIDE 20 MG: 20 TABLET, FILM COATED ORAL at 18:10

## 2024-04-06 RX ADMIN — GABAPENTIN 600 MG: 300 CAPSULE ORAL at 08:26

## 2024-04-06 RX ADMIN — PANTOPRAZOLE SODIUM 40 MG: 40 INJECTION, POWDER, FOR SOLUTION INTRAVENOUS at 20:08

## 2024-04-06 RX ADMIN — GABAPENTIN 600 MG: 300 CAPSULE ORAL at 18:10

## 2024-04-06 RX ADMIN — METOCLOPRAMIDE 10 MG: 5 INJECTION, SOLUTION INTRAMUSCULAR; INTRAVENOUS at 06:34

## 2024-04-06 RX ADMIN — POLYETHYLENE GLYCOL 3350 17 G: 17 POWDER, FOR SOLUTION ORAL at 08:27

## 2024-04-06 RX ADMIN — GABAPENTIN 600 MG: 300 CAPSULE ORAL at 12:44

## 2024-04-06 RX ADMIN — SIMETHICONE 80 MG: 80 TABLET, CHEWABLE ORAL at 20:15

## 2024-04-06 RX ADMIN — CARVEDILOL 3.12 MG: 3.12 TABLET, FILM COATED ORAL at 08:26

## 2024-04-06 RX ADMIN — METOCLOPRAMIDE 10 MG: 5 INJECTION, SOLUTION INTRAMUSCULAR; INTRAVENOUS at 18:10

## 2024-04-06 RX ADMIN — DILTIAZEM HYDROCHLORIDE 120 MG: 120 CAPSULE, COATED, EXTENDED RELEASE ORAL at 08:26

## 2024-04-06 RX ADMIN — TOPIRAMATE 100 MG: 100 TABLET, FILM COATED ORAL at 08:26

## 2024-04-06 RX ADMIN — SODIUM CHLORIDE, PRESERVATIVE FREE 10 ML: 5 INJECTION INTRAVENOUS at 08:27

## 2024-04-06 RX ADMIN — TOPIRAMATE 150 MG: 100 TABLET, FILM COATED ORAL at 18:10

## 2024-04-06 RX ADMIN — Medication 10 ML: at 20:09

## 2024-04-06 RX ADMIN — METHENAMINE HIPPURATE 1 G: 1 TABLET ORAL at 18:10

## 2024-04-06 RX ADMIN — FUROSEMIDE 20 MG: 10 INJECTION, SOLUTION INTRAMUSCULAR; INTRAVENOUS at 08:27

## 2024-04-06 RX ADMIN — PANTOPRAZOLE SODIUM 40 MG: 40 INJECTION, POWDER, FOR SOLUTION INTRAVENOUS at 08:26

## 2024-04-06 RX ADMIN — MECLIZINE HYDROCHLORIDE 25 MG: 12.5 TABLET ORAL at 08:26

## 2024-04-06 RX ADMIN — Medication 2 PUFF: at 10:31

## 2024-04-06 RX ADMIN — METOCLOPRAMIDE 10 MG: 5 INJECTION, SOLUTION INTRAMUSCULAR; INTRAVENOUS at 12:44

## 2024-04-06 RX ADMIN — Medication 2 PUFF: at 20:30

## 2024-04-06 RX ADMIN — HYDROMORPHONE HYDROCHLORIDE 0.25 MG: 1 INJECTION, SOLUTION INTRAMUSCULAR; INTRAVENOUS; SUBCUTANEOUS at 21:51

## 2024-04-06 RX ADMIN — TROSPIUM CHLORIDE 20 MG: 20 TABLET, FILM COATED ORAL at 06:34

## 2024-04-06 RX ADMIN — MECLIZINE HYDROCHLORIDE 25 MG: 12.5 TABLET ORAL at 12:44

## 2024-04-06 RX ADMIN — SODIUM CHLORIDE, PRESERVATIVE FREE 10 ML: 5 INJECTION INTRAVENOUS at 20:09

## 2024-04-06 RX ADMIN — ENOXAPARIN SODIUM 40 MG: 100 INJECTION SUBCUTANEOUS at 20:08

## 2024-04-06 RX ADMIN — CARVEDILOL 3.12 MG: 3.12 TABLET, FILM COATED ORAL at 18:10

## 2024-04-06 RX ADMIN — METOCLOPRAMIDE 10 MG: 5 INJECTION, SOLUTION INTRAMUSCULAR; INTRAVENOUS at 00:21

## 2024-04-06 RX ADMIN — METHENAMINE HIPPURATE 1 G: 1 TABLET ORAL at 08:26

## 2024-04-06 RX ADMIN — MECLIZINE HYDROCHLORIDE 25 MG: 12.5 TABLET ORAL at 18:10

## 2024-04-06 ASSESSMENT — PAIN DESCRIPTION - ORIENTATION
ORIENTATION: LEFT
ORIENTATION: LOWER

## 2024-04-06 ASSESSMENT — PAIN DESCRIPTION - DESCRIPTORS
DESCRIPTORS: OTHER (COMMENT);DISCOMFORT
DESCRIPTORS: THROBBING

## 2024-04-06 ASSESSMENT — PAIN DESCRIPTION - LOCATION
LOCATION: ABDOMEN;RIB CAGE
LOCATION: BACK

## 2024-04-06 ASSESSMENT — PAIN SCALES - GENERAL
PAINLEVEL_OUTOF10: 8
PAINLEVEL_OUTOF10: 6
PAINLEVEL_OUTOF10: 10

## 2024-04-06 ASSESSMENT — PAIN DESCRIPTION - PAIN TYPE: TYPE: ACUTE PAIN

## 2024-04-06 ASSESSMENT — PAIN - FUNCTIONAL ASSESSMENT: PAIN_FUNCTIONAL_ASSESSMENT: ACTIVITIES ARE NOT PREVENTED

## 2024-04-06 NOTE — PROGRESS NOTES
Clinical Pharmacy Note    Pharmacy consulted by Dr. Aria Carter CNP to manage TPN    Current TPN rate: 80ml/hr  Goal TPN rate: 80ml/hr    Access: PICC  Indication: SBO s/p ex lap with BRANDON    Labs:  General Labs:  BMP:    Lab Results   Component Value Date/Time     04/06/2024 05:27 AM    K 3.6 04/06/2024 05:27 AM    K 4.1 03/30/2024 04:00 PM     04/06/2024 05:27 AM    CO2 23 04/06/2024 05:27 AM    BUN 17 04/06/2024 05:27 AM    LABALBU 3.3 04/05/2024 05:02 AM    CREATININE 0.5 04/06/2024 05:27 AM    CALCIUM 8.7 04/06/2024 05:27 AM    GFRAA >60 11/24/2020 06:29 PM    GFRAA >60 01/13/2013 05:24 AM    LABGLOM >90 04/06/2024 05:27 AM    GLUCOSE 156 04/06/2024 05:27 AM     Magnesium:    Lab Results   Component Value Date/Time    MG 1.90 04/06/2024 05:27 AM     Phosphorus:    Lab Results   Component Value Date/Time    PHOS 3.6 04/06/2024 05:27 AM       Electrolyte replacement as follows:   No replacement required today    Blood sugars over past 24 hours: 131-214    Blood sugar management:  Plan to Continue Humalog q6 hour sliding scale at medium dose.    Plan to continue TPN at 80 ml/hr.    Thank you for allowing pharmacy to participate in the care of this patient.    Nano Desai [facility], PharmD 4/6/2024

## 2024-04-06 NOTE — PROGRESS NOTES
Abdomen remains soft and appropriately tender  Incision healing well  Tolerating full liquids but p.o. intake fair  Advance to general diet  Discontinue TPN  Consider discharge home tomorrow if tolerating general diet

## 2024-04-06 NOTE — DISCHARGE INSTR - COC
Chronic diarrhea K52.9    Constipation K59.00    Dizziness and giddiness R42    Other long term (current) drug therapy Z79.899    Fibromyalgia M79.7    Gastro-esophageal reflux disease without esophagitis K21.9    History of colonic polyps Z86.010    Irritable bowel syndrome K58.9    Gonalgia M25.569    Menopausal and perimenopausal disorder N95.9    Microcytic anemia D50.9    Muscle ache M79.10    Nonspecific mesenteric adenitis I88.0    Osteoarthrosis M19.90    Arthralgia M25.50    Extremity pain M79.609    Other specified personal risk factors, not elsewhere classified Z91.89    Hypostatic pulmonary congestion J81.1    Frequent UTI N39.0    Subdural hemorrhage (HCC) I62.00    Type 1 diabetes mellitus (HCC) E10.9    Type 2 diabetes mellitus, without long-term current use of insulin (HCC) E11.9    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    Subjective tinnitus of both ears H93.13    Bilateral high frequency sensorineural hearing loss H90.3    Encounter for post surgical wound check Z48.89    Lumbar spine pain M54.50    Mild persistent asthma without complication J45.30    Numbness and tingling of both feet R20.0, R20.2    PONV (postoperative nausea and vomiting) R11.2, Z98.890    Spinal stenosis of lumbar region M48.061    TIA (transient ischemic attack) G45.9    Word finding difficulty R47.89    Laryngitis, acute J04.0    Chronic sinusitis J32.9    Frontal headache R51.9    Chronic cough R05.3    Palpitations R00.2    Anginal equivalent (HCC) I20.89    Abnormal stress test R94.39    Obesity due to excess calories with serious comorbidity E66.09    Coronary artery disease involving native coronary artery of native heart without angina pectoris I25.10    (HFpEF) heart failure with preserved ejection fraction (HCC) I50.30    TIA involving left internal carotid artery G45.1    Dysarthria R47.1    Upper GI hemorrhage K92.2    PVC's (premature ventricular contractions) I49.3    Acute prerenal azotemia N19     History of heart artery stent Z95.5    Upper GI bleed K92.2    Slurred speech R47.81    Ventricular bigeminy I49.8    HANS (acute kidney injury) ([facility]) N17.9    SBO (small bowel obstruction) ([facility]) K56.609    Supraglottitis without airway obstruction J04.30    Acute parotitis K11.21    Laryngopharyngeal reflux (LPR) K21.9    Mild malnutrition ([facility]) E44.1    Ileus ([facility]) K56.7       Isolation/Infection:   Isolation            No Isolation          Patient Infection Status       None to display            Nurse Assessment:  Last Vital Signs: /68   Pulse 84   Temp 97.7 °F (36.5 °C) (Oral)   Resp 17   Ht 1.6 m (5' 2.99\")   Wt 88.1 kg (194 lb 3.1 oz)   SpO2 95%   BMI 34.41 kg/m²     Last documented pain score (0-10 scale): Pain Level: 3  Last Weight:   Wt Readings from Last 1 Encounters:   04/06/24 88.1 kg (194 lb 3.1 oz)     Mental Status:  {IP PT MENTAL STATUS:20030}    IV Access:  { YESSENIA IV ACCESS:983763803}    Nursing Mobility/ADLs:  Walking   {CHP DME ADLs:980312128}  Transfer  {CHP DME ADLs:119132275}  Bathing  {CHP DME ADLs:492938451}  Dressing  {CHP DME ADLs:962509648}  Toileting  {CHP DME ADLs:137875560}  Feeding  {CHP DME ADLs:112131306}  Med Admin  {P DME ADLs:133692844}  Med Delivery   {[facility] MED Delivery:303904755}    Wound Care Documentation and Therapy:  Incision 03/19/24 Abdomen Anterior (Active)   Dressing Status Clean;Dry;Intact 04/06/24 0820   Dressing/Treatment Surgical glue 04/06/24 0820   Closure Surgical glue 04/06/24 0820   Margins Approximated 04/06/24 0820   Incision Assessment Dry 04/06/24 0820   Drainage Amount None (dry) 04/06/24 0820   Odor None 04/06/24 0820   Number of days: 18        Elimination:  Continence:   Bowel: {YES / NO:19727}  Bladder: {YES / NO:19727}  Urinary Catheter: {Urinary Catheter:011805102}   Colostomy/Ileostomy/Ileal Conduit: {YES / NO:19727}       Date of Last BM: ***    Intake/Output Summary (Last 24 hours) at 4/6/2024 1628  Last data filed at 4/6/2024

## 2024-04-06 NOTE — PROGRESS NOTES
1942: Shift assessment completed. VSS. Medications given per MAR. Bedside table and call light at bedside. The care plan and education has been reviewed and mutually agreed upon with the patient.     Linette Roldan RN

## 2024-04-06 NOTE — PROGRESS NOTES
clopidogrel  75 mg Oral Daily    sodium chloride flush  5-40 mL IntraVENous 2 times per day    dilTIAZem  120 mg Oral Daily    [Held by provider] aspirin  81 mg Oral Daily    [Held by provider] isosorbide mononitrate  120 mg Oral Daily    [Held by provider] ranolazine  1,000 mg Oral BID    ARIPiprazole  2 mg Oral Nightly    buPROPion  300 mg Oral QAM    [Held by provider] furosemide  40 mg Oral BID    gabapentin  600 mg Oral 4x Daily    meclizine  25 mg Oral 4x Daily    nortriptyline  75 mg Oral Nightly    tiZANidine  2 mg Oral Nightly    topiramate  100 mg Oral QAM    topiramate  150 mg Oral QPM    trospium  20 mg Oral BID AC    carvedilol  3.125 mg Oral BID WC    methenamine  1 g Oral BID WC    aclidinium  1 puff Inhalation BID RT     Continuous Infusions:   PN-Adult Premix 5/20 - Standard Electrolytes - Central Line      PN-Adult Premix 5/20 - Standard Electrolytes - Central Line 80 mL/hr at 04/05/24 1810    sodium chloride      sodium chloride 50 mL (03/31/24 1440)    dextrose         Vitals:  BP (!) 108/57   Pulse 85   Temp 97.9 °F (36.6 °C) (Oral)   Resp 17   Ht 1.6 m (5' 2.99\")   Wt 88.1 kg (194 lb 3.1 oz)   SpO2 99%   BMI 34.41 kg/m²     Exam:  General:  comfortable  Heent: There is no scleral icterus.   Cardiovascular: The heart is regular rate and rhythm.  Respiratory:  The patient's breathing is non-labored with normal chest wall excursion and normal muscle movement.    Abdomen:  The abdomen is nondistended, soft.  Mild abdominal tenderness to deep palpation  Rectal:  deferred   Neurological:  Gross memory appears intact.  Patient is alert and oriented.    Labs and Imaging:  I reviewed the labs and imaging results from last 24 hours.     Recent Labs     04/04/24  0600 04/05/24  0502 04/06/24  0527   HGB 10.9* 10.2* 9.1*   WBC 8.3 4.9 4.0   PROT  --  6.4  --    LABALBU  --  3.3*  --    ALKPHOS  --  102  --    ALT  --  6*  --    AST  --  10*  --    BILITOT  --  <0.2  --    BILIDIR  --  <0.2  --     ROBBIE  --  see below  --         HELEN STRONG MD  April 6, 2024

## 2024-04-06 NOTE — PROGRESS NOTES
[facility]  HEAD AND NECK - ENT  PROGRESS  NOTE    Date of Service: 2024    ASSESSMENT: Ana Gonzales is a 68 y.o. female consulted to the ENT service for:    PLAN/RECOMMENDATIONS:     Acute left parotitis  -Resolved. Oral dryness better now she is able to drink which is helping. Continue sialogogues.   -Recommend follow-up in the ENT clinic if facial swelling retirns    Supraglottitis without airway obstruction  Laryngopharyngeal reflux  - Denies voice changes or SOB. Last FFL demonstarted resolution   -Continue Protonix for 1 month, follow-up with PCP regarding long term PPI management  -No further ENT interventions. Follow-up in ENT clinic outpatient.      SUBJECTIVE:   Significant overnight events: none. Has some pain along the left neck and left face yesterday but overall is better.     OBJECTIVE:  Physical Exam:   Temp (24hrs), Av.5 °F (36.4 °C), Min:97.2 °F (36.2 °C), Max:97.9 °F (36.6 °C)    Vitals:    24 0010 24 0454 24 0820 24 1034   BP: 121/69  (!) 108/57    Pulse: 75  85    Resp: 17  17    Temp: 97.4 °F (36.3 °C)  97.9 °F (36.6 °C)    TempSrc: Oral  Oral    SpO2: 97%  97% 99%   Weight:  88.1 kg (194 lb 3.1 oz)     Height:         I/O last 3 completed shifts:  In: 480 [P.O.:480]  Out: 2300 [Urine:2300]    GENERAL: No Acute Distress, Alert and Oriented  EYES: EOMI, Anti-icteric  NOSE: On anterior rhinoscopy there is no epistaxis, nasal mucosa within normal limits, no purulent drainage  EARS: Normal external appearance; no otorrhea  FACE: 1/6 House-Brackmann Scale, symmetric. Left facial and neck soft, edema has fully resolved.  No overlying skin erythema or warmth.   ORAL CAVITY: No masses or lesions palpated, uvula is midline, moist mucous membranes, no oropharyngeal masses or obstruction  NECK: Normal range of motion, no thyromegaly, trachea is midline, no lymphadenopathy, no neck masses, no crepitus  CHEST: Normal respiratory effort, no retractions, breathing  comfortably.   SKIN: No rashes, normal appearing skin, no evidence of skin lesions/tumors  NEURO: CN 2, 3, 4, 5, 6, 7, 11, 12 grossly intact bilaterally     Lab Studies:  Lab Results   Component Value Date    WBC 4.0 04/06/2024    HGB 9.1 (L) 04/06/2024    HCT 28.5 (L) 04/06/2024    MCV 89.0 04/06/2024     04/06/2024     Lab Results   Component Value Date    GLUCOSE 156 (H) 04/06/2024    BUN 17 04/06/2024    CREATININE 0.5 (L) 04/06/2024    K 3.6 04/06/2024     04/06/2024     04/06/2024    CALCIUM 8.7 04/06/2024     Lab Results   Component Value Date    MG 1.90 04/06/2024     Lab Results   Component Value Date    PHOS 3.6 04/06/2024     Lab Results   Component Value Date    ALKPHOS 102 04/05/2024    ALT 6 (L) 04/05/2024    AST 10 (L) 04/05/2024    BILITOT <0.2 04/05/2024    PROT 6.4 04/05/2024

## 2024-04-06 NOTE — PROGRESS NOTES
[facility]    4/6/2024 11:32 AM        Name: Ana Gonzales .              Admitted: 3/12/2024  Primary Care Provider: Mellisa Houston MD (Tel: [phone])      Hospital course: 69 yo female initially presented with slurred speech and admitted with concern for TIA. CTA head/neck showed severe narrowing of distal left P1 segment of left PCA, CT head and MRI brain no acute findings. Neurology evaluated, recommend supportive care with asa when medically stable, intolerant statins.     Hospital course complicated by low Hgb on admission, requiring 2 units PRBCs. EGD (3/13) did not find any active bleeding, but did find blood clots and a large amount of liquid in the stomach. Placed on PPI gtt. Repeat EGD (3/14) did not show any bleed, but 2 clips were placed on small healing ulcer.     Patient subsequently developed partial SBO and had NGT placed (3/15). She underwent exp lap with lysis of adhesions (3/19). NGT removed and started on clear liquid diet (3/21) but again developed n/v and NG was replaced. Noted to have hematemesis post Ng placement and 3rd EGD 3/26 consistent with trauma from NGT placement. NGT subsequently removed 4/2.    Noted to have left sided neck edema. CT soft tissue neck (3/17) concerning for airway narrowing and acute left parotiditis. She was seen by ENT and received short course steroid and Unasyn. Symptoms resolved, Unasyn discontinued 4/1        3/19/2024: Exploratory laparotomy with lysis of adhesions, Meckel diverticulectomy, and appendectomy.       Subjective:   Up in bedside chair, seen with surgery. Patient tolerating full liquid diet but not eating much, she does not like the taste of the food. Denies nausea, last BM was last night, denies abdominal pain. Hopes to go home today.     Reviewed interval ancillary notes    Current Medications  PN-Adult Premix 5/20 - Standard      BMP:    Recent Labs     04/04/24  0600 04/05/24  0502 04/06/24  0527    138 140   K 3.4* 3.5 3.6    106 108   CO2 21 20* 23   BUN 23* 19 17   CREATININE 0.6 0.6 0.5*   GLUCOSE 133* 163* 156*     Hepatic:   Recent Labs     04/05/24  0502   AST 10*   ALT 6*   BILITOT <0.2   ALKPHOS 102      Latest Reference Range & Units 04/05/24 07:19 04/05/24 13:51 04/05/24 19:24 04/06/24 00:20 04/06/24 05:27 04/06/24 07:09 04/06/24 11:39   Glucose, Random 70 - 99 mg/dL     156 (H)     POC Glucose 70 - 99 mg/dl 159 (H) 144 (H) 214 (H) 131 (H)  153 (H) 169 (H)   (H): Data is abnormally high      CXR 3/12/2024:  IMPRESSION:  No significant findings in the chest.    CT head / CTA head/neck 3/12/2024:  IMPRESSION:  1. No acute intracranial abnormality within constraints of CT acquisition.  2. Severe narrowing of the distal left P1 segment with otherwise fetal origin of the left PCA.  3. Otherwise, no significant stenosis elsewhere within the head or neck.  No large vessel occlusion.    MRI brain 3/13/2024:  IMPRESSION:  No acute intracranial abnormality.   Minimal chronic microvascular disease.    Echo 3/13/2024:  Summary   Technically difficult study due to poor acoustical windows.   Off-axis images.   Normal left ventricle size, wall thickness, and systolic function with an  estimated ejection fraction of 55-60%. No regional wall motion abnormalities are seen.   The right ventricle is normal in size and function.   A bubble study was performed and fails to show evidence of shunting.    CT abd/pelvis 3/15/2024:  IMPRESSION:  Dilated stomach and small loops with multiple air-fluid levels.  Relative  changes of bowel loop caliber in the right lower abdomen reflecting at least partial small bowel obstruction.  Consider NG tube placement.     Thickening of the wall of the small bowel loops in the right lower abdomen, which may reflect underlying infectious/inflammatory changes.     Small amount of free mesenteric fluid may

## 2024-04-06 NOTE — PROGRESS NOTES
0820: Shift assessment completed, morning medications given per MAR. VSS, alert and oriented. Call light within reach. The care plan and education has been reviewed and mutually agreed upon with the patient.

## 2024-04-06 NOTE — PLAN OF CARE
by Linette Roldan RN  Outcome: Progressing  4/5/2024 1437 by Nuha Mullins RN  Outcome: Progressing  Goal: Maintain proper alignment of affected body part  4/5/2024 2307 by Linette Roldan RN  Outcome: Progressing  4/5/2024 1437 by Nuha Mullins RN  Outcome: Progressing     Problem: Gastrointestinal - Adult  Goal: Minimal or absence of nausea and vomiting  4/5/2024 2307 by Linette Roldan RN  Outcome: Progressing  4/5/2024 1437 by Nuha Mullins RN  Outcome: Progressing  Goal: Maintains or returns to baseline bowel function  4/5/2024 2307 by Linette Roldan RN  Outcome: Progressing  4/5/2024 1437 by Nuha Mullins RN  Outcome: Progressing     Problem: Genitourinary - Adult  Goal: Absence of urinary retention  4/5/2024 2307 by Linette Roldan RN  Outcome: Progressing  4/5/2024 1437 by Nuha Mullins RN  Outcome: Progressing     Problem: Metabolic/Fluid and Electrolytes - Adult  Goal: Electrolytes maintained within normal limits  4/5/2024 2307 by Linette Roldan RN  Outcome: Progressing  4/5/2024 1437 by Nuha Mullins RN  Outcome: Progressing  Goal: Hemodynamic stability and optimal renal function maintained  4/5/2024 2307 by Linette Roldan RN  Outcome: Progressing  4/5/2024 1437 by Nuha Mullins RN  Outcome: Progressing  Goal: Glucose maintained within prescribed range  4/5/2024 2307 by Linette Roldan RN  Outcome: Progressing  4/5/2024 1437 by Nuha Mullins RN  Outcome: Progressing     Problem: Skin/Tissue Integrity - Adult  Goal: Skin integrity remains intact  4/5/2024 2307 by Linette Roldan RN  Outcome: Progressing  4/5/2024 1437 by Nuha Mullnis RN  Outcome: Progressing  Goal: Incisions, wounds, or drain sites healing without S/S of infection  4/5/2024 2307 by Linette Roldan RN  Outcome: Progressing  4/5/2024 1437 by Nuha Mullins RN  Outcome: Progressing

## 2024-04-06 NOTE — PLAN OF CARE
Problem: Pain  Goal: Verbalizes/displays adequate comfort level or baseline comfort level  4/6/2024 0946 by Laya Steele RN  Outcome: Progressing  4/5/2024 2307 by Linette Roldan RN  Outcome: Progressing     Problem: Discharge Planning  Goal: Discharge to home or other facility with appropriate resources  4/6/2024 0946 by Laya Steele RN  Outcome: Progressing  4/5/2024 2307 by Linette Roldan RN  Outcome: Progressing     Problem: Safety - Adult  Goal: Free from fall injury  4/6/2024 0946 by Laya Steele RN  Outcome: Progressing  4/5/2024 2307 by Linette Roldan RN  Outcome: Progressing     Problem: Chronic Conditions and Co-morbidities  Goal: Patient's chronic conditions and co-morbidity symptoms are monitored and maintained or improved  4/6/2024 0946 by Laya Steele RN  Outcome: Progressing  4/5/2024 2307 by Linette Roldan RN  Outcome: Progressing     Problem: ABCDS Injury Assessment  Goal: Absence of physical injury  4/6/2024 0946 by Laya Steele RN  Outcome: Progressing  4/5/2024 2307 by Linette Roldan RN  Outcome: Progressing     Problem: Skin/Tissue Integrity  Goal: Absence of new skin breakdown  Description: 1.  Monitor for areas of redness and/or skin breakdown  2.  Assess vascular access sites hourly  3.  Every 4-6 hours minimum:  Change oxygen saturation probe site  4.  Every 4-6 hours:  If on nasal continuous positive airway pressure, respiratory therapy assess nares and determine need for appliance change or resting period.  4/6/2024 0946 by Laya Steele RN  Outcome: Progressing  4/5/2024 2307 by Linette Roldan RN  Outcome: Progressing     Problem: Nutrition Deficit:  Goal: Optimize nutritional status  4/6/2024 0946 by Laya Steele RN  Outcome: Progressing  4/5/2024 2307 by Linette Roldan RN  Outcome: Progressing     Problem: Musculoskeletal - Adult  Goal: Return mobility to safest level of function  4/6/2024 0946 by

## 2024-04-07 ENCOUNTER — APPOINTMENT (OUTPATIENT)
Dept: GENERAL RADIOLOGY | Age: 69
DRG: 330 | End: 2024-04-07

## 2024-04-07 LAB
ANION GAP SERPL CALCULATED.3IONS-SCNC: 15 MMOL/L (ref 3–16)
BASOPHILS # BLD: 0.1 K/UL (ref 0–0.2)
BASOPHILS NFR BLD: 0.6 %
BUN SERPL-MCNC: 19 MG/DL (ref 7–20)
CALCIUM SERPL-MCNC: 10.1 MG/DL (ref 8.3–10.6)
CHLORIDE SERPL-SCNC: 99 MMOL/L (ref 99–110)
CO2 SERPL-SCNC: 21 MMOL/L (ref 21–32)
CREAT SERPL-MCNC: 0.7 MG/DL (ref 0.6–1.2)
DEPRECATED RDW RBC AUTO: 16.8 % (ref 12.4–15.4)
EOSINOPHIL # BLD: 0.2 K/UL (ref 0–0.6)
EOSINOPHIL NFR BLD: 1.7 %
GFR SERPLBLD CREATININE-BSD FMLA CKD-EPI: >90 ML/MIN/{1.73_M2}
GLUCOSE SERPL-MCNC: 155 MG/DL (ref 70–99)
HCT VFR BLD AUTO: 36.2 % (ref 36–48)
HGB BLD-MCNC: 11.5 G/DL (ref 12–16)
LYMPHOCYTES # BLD: 1.1 K/UL (ref 1–5.1)
LYMPHOCYTES NFR BLD: 10.2 %
MAGNESIUM SERPL-MCNC: 2 MG/DL (ref 1.8–2.4)
MCH RBC QN AUTO: 28.4 PG (ref 26–34)
MCHC RBC AUTO-ENTMCNC: 31.9 G/DL (ref 31–36)
MCV RBC AUTO: 89 FL (ref 80–100)
MONOCYTES # BLD: 0.8 K/UL (ref 0–1.3)
MONOCYTES NFR BLD: 8 %
NEUTROPHILS # BLD: 8.3 K/UL (ref 1.7–7.7)
NEUTROPHILS NFR BLD: 79.5 %
PHOSPHATE SERPL-MCNC: 5.1 MG/DL (ref 2.5–4.9)
PLATELET # BLD AUTO: 347 K/UL (ref 135–450)
PMV BLD AUTO: 9.1 FL (ref 5–10.5)
POTASSIUM SERPL-SCNC: 3.7 MMOL/L (ref 3.5–5.1)
RBC # BLD AUTO: 4.07 M/UL (ref 4–5.2)
SODIUM SERPL-SCNC: 135 MMOL/L (ref 136–145)
WBC # BLD AUTO: 10.4 K/UL (ref 4–11)

## 2024-04-07 PROCEDURE — 6360000002 HC RX W HCPCS: Performed by: NURSE PRACTITIONER

## 2024-04-07 PROCEDURE — 6370000000 HC RX 637 (ALT 250 FOR IP): Performed by: NURSE PRACTITIONER

## 2024-04-07 PROCEDURE — 6360000002 HC RX W HCPCS: Performed by: SURGERY

## 2024-04-07 PROCEDURE — 83735 ASSAY OF MAGNESIUM: CPT

## 2024-04-07 PROCEDURE — 74019 RADEX ABDOMEN 2 VIEWS: CPT

## 2024-04-07 PROCEDURE — 80048 BASIC METABOLIC PNL TOTAL CA: CPT

## 2024-04-07 PROCEDURE — 99024 POSTOP FOLLOW-UP VISIT: CPT | Performed by: SURGERY

## 2024-04-07 PROCEDURE — 2580000003 HC RX 258: Performed by: STUDENT IN AN ORGANIZED HEALTH CARE EDUCATION/TRAINING PROGRAM

## 2024-04-07 PROCEDURE — 1200000000 HC SEMI PRIVATE

## 2024-04-07 PROCEDURE — 85025 COMPLETE CBC W/AUTO DIFF WBC: CPT

## 2024-04-07 PROCEDURE — C9113 INJ PANTOPRAZOLE SODIUM, VIA: HCPCS | Performed by: PHYSICIAN ASSISTANT

## 2024-04-07 PROCEDURE — 6360000002 HC RX W HCPCS: Performed by: PHYSICIAN ASSISTANT

## 2024-04-07 PROCEDURE — 6370000000 HC RX 637 (ALT 250 FOR IP): Performed by: SURGERY

## 2024-04-07 PROCEDURE — 2580000003 HC RX 258: Performed by: SURGERY

## 2024-04-07 PROCEDURE — 84100 ASSAY OF PHOSPHORUS: CPT

## 2024-04-07 RX ORDER — SODIUM CHLORIDE 9 MG/ML
INJECTION, SOLUTION INTRAVENOUS CONTINUOUS
Status: DISCONTINUED | OUTPATIENT
Start: 2024-04-07 | End: 2024-04-10 | Stop reason: HOSPADM

## 2024-04-07 RX ADMIN — METOCLOPRAMIDE 10 MG: 5 INJECTION, SOLUTION INTRAMUSCULAR; INTRAVENOUS at 00:42

## 2024-04-07 RX ADMIN — MECLIZINE HYDROCHLORIDE 25 MG: 12.5 TABLET ORAL at 21:17

## 2024-04-07 RX ADMIN — PANTOPRAZOLE SODIUM 40 MG: 40 INJECTION, POWDER, FOR SOLUTION INTRAVENOUS at 07:54

## 2024-04-07 RX ADMIN — HYDROMORPHONE HYDROCHLORIDE 0.25 MG: 1 INJECTION, SOLUTION INTRAMUSCULAR; INTRAVENOUS; SUBCUTANEOUS at 02:23

## 2024-04-07 RX ADMIN — GABAPENTIN 600 MG: 300 CAPSULE ORAL at 21:17

## 2024-04-07 RX ADMIN — ARIPIPRAZOLE 2 MG: 2 TABLET ORAL at 21:18

## 2024-04-07 RX ADMIN — FUROSEMIDE 20 MG: 10 INJECTION, SOLUTION INTRAMUSCULAR; INTRAVENOUS at 07:54

## 2024-04-07 RX ADMIN — METOCLOPRAMIDE 10 MG: 5 INJECTION, SOLUTION INTRAMUSCULAR; INTRAVENOUS at 12:10

## 2024-04-07 RX ADMIN — SODIUM CHLORIDE: 9 INJECTION, SOLUTION INTRAVENOUS at 11:06

## 2024-04-07 RX ADMIN — NORTRIPTYLINE HYDROCHLORIDE 75 MG: 25 CAPSULE ORAL at 21:17

## 2024-04-07 RX ADMIN — ONDANSETRON 4 MG: 2 INJECTION INTRAMUSCULAR; INTRAVENOUS at 14:35

## 2024-04-07 RX ADMIN — SODIUM CHLORIDE, PRESERVATIVE FREE 10 ML: 5 INJECTION INTRAVENOUS at 21:18

## 2024-04-07 RX ADMIN — HYDROMORPHONE HYDROCHLORIDE 0.25 MG: 1 INJECTION, SOLUTION INTRAMUSCULAR; INTRAVENOUS; SUBCUTANEOUS at 09:49

## 2024-04-07 RX ADMIN — ONDANSETRON 4 MG: 2 INJECTION INTRAMUSCULAR; INTRAVENOUS at 08:10

## 2024-04-07 RX ADMIN — HYDROMORPHONE HYDROCHLORIDE 0.25 MG: 1 INJECTION, SOLUTION INTRAMUSCULAR; INTRAVENOUS; SUBCUTANEOUS at 14:35

## 2024-04-07 RX ADMIN — PANTOPRAZOLE SODIUM 40 MG: 40 INJECTION, POWDER, FOR SOLUTION INTRAVENOUS at 21:17

## 2024-04-07 RX ADMIN — SODIUM CHLORIDE, PRESERVATIVE FREE 10 ML: 5 INJECTION INTRAVENOUS at 07:54

## 2024-04-07 RX ADMIN — SIMETHICONE 80 MG: 80 TABLET, CHEWABLE ORAL at 04:07

## 2024-04-07 RX ADMIN — ENOXAPARIN SODIUM 40 MG: 100 INJECTION SUBCUTANEOUS at 21:17

## 2024-04-07 RX ADMIN — Medication 10 ML: at 21:18

## 2024-04-07 RX ADMIN — Medication 10 ML: at 07:55

## 2024-04-07 RX ADMIN — METOCLOPRAMIDE 10 MG: 5 INJECTION, SOLUTION INTRAMUSCULAR; INTRAVENOUS at 06:04

## 2024-04-07 RX ADMIN — METOCLOPRAMIDE 10 MG: 5 INJECTION, SOLUTION INTRAMUSCULAR; INTRAVENOUS at 18:34

## 2024-04-07 RX ADMIN — TIZANIDINE 2 MG: 4 TABLET ORAL at 21:17

## 2024-04-07 ASSESSMENT — PAIN DESCRIPTION - LOCATION
LOCATION: ABDOMEN
LOCATION: ABDOMEN;BACK
LOCATION: ABDOMEN
LOCATION: ABDOMEN

## 2024-04-07 ASSESSMENT — PAIN DESCRIPTION - ORIENTATION
ORIENTATION: MID
ORIENTATION: MID
ORIENTATION: MID;LOWER

## 2024-04-07 ASSESSMENT — PAIN DESCRIPTION - DESCRIPTORS
DESCRIPTORS: ACHING
DESCRIPTORS: BURNING
DESCRIPTORS: ACHING;BURNING
DESCRIPTORS: ACHING

## 2024-04-07 ASSESSMENT — PAIN SCALES - GENERAL
PAINLEVEL_OUTOF10: 9
PAINLEVEL_OUTOF10: 5
PAINLEVEL_OUTOF10: 10
PAINLEVEL_OUTOF10: 10

## 2024-04-07 ASSESSMENT — PAIN DESCRIPTION - PAIN TYPE: TYPE: SURGICAL PAIN

## 2024-04-07 ASSESSMENT — PAIN DESCRIPTION - FREQUENCY: FREQUENCY: CONTINUOUS

## 2024-04-07 ASSESSMENT — PAIN DESCRIPTION - ONSET: ONSET: ON-GOING

## 2024-04-07 NOTE — PROGRESS NOTES
2000: Shift assessment completed. VSS. Medications given per MAR. Bedside table and call light within reach. Pt complaining of gas pains around L side underneath rib cage. Mylicon administered. Pt able to belch but denies flautus. Up in chair. The care plan and education has been reviewed and mutually agreed upon with the patient.     Pt belching gastic juices. Abdomen bloated. Still complaining of trapped gas in abdomen. Pt asked for pain medication. Educated on the side effects. Pt not tolerating Regular diet.     Linette Roldan RN

## 2024-04-07 NOTE — PROGRESS NOTES
Assessment  Ileus of small bowel.  Chronic constipation, likely constipation prominent IBS  Gastroparesis  Gastric ulcer without bleeding or perforation, noted recently on EGD 2 weeks ago.     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      Plan:  Dr Fishman ordered KUB, but still pending.  Consider CT scan again.    May need NG tube back in.    Consider changing reglan to e mycin   Miralax on hold    Subjective:  We are following for ileus/gastroparesis/constipation.  Last BM was 2 days ago.  She had very little diet last night and very little this morning.  Abd pain, distention, and nausea are back. She has NOT had any narcotics for several days.      Objective:    Review of Systems:    Constitutional: Negative for fever, chills, and unexpected weight change.   HENT: Negative for trouble swallowing.    Respiratory: Negative for cough, chest tightness and shortness of breath.    Cardiovascular: Negative for chest pain  Gastrointestinal: see HPI  Musculoskeletal: Negative for unusual arthralgias.   Skin: Negative for rash.     Scheduled Meds:   polyethylene glycol  17 g Oral Daily    lidocaine 1 % injection  5 mL IntraDERmal Once    sodium chloride flush  5-40 mL IntraVENous 2 times per day    Evolocumab  140 mg SubCUTAneous Q14 Days    metoclopramide  10 mg IntraVENous Q6H    pantoprazole  40 mg IntraVENous BID    furosemide  20 mg IntraVENous Daily    mometasone-formoterol  2 puff Inhalation BID RT    lidocaine  1 patch TransDERmal Daily    enoxaparin  40 mg SubCUTAneous Nightly    fat emulsion  250 mL IntraVENous Once per day on Mon Thu    [Held by provider] clopidogrel  75 mg Oral Daily    sodium chloride flush  5-40 mL IntraVENous 2 times per day    dilTIAZem  120 mg Oral Daily    [Held by provider] aspirin  81 mg Oral Daily    [Held by provider]

## 2024-04-07 NOTE — PROGRESS NOTES
bolus 10% 125 mL  125 mL IntraVENous PRN Srinivasa Morales MD   Stopped at 03/25/24 9021    Or    dextrose bolus 10% 250 mL  250 mL IntraVENous PRN Srinivasa Morales MD        glucagon injection 1 mg  1 mg SubCUTAneous PRN Srinivasa Morales MD        dextrose 10 % infusion   IntraVENous Continuous PRN Srinivasa Morales MD         Allergies   Allergen Reactions    Cymbalta [Duloxetine Hcl] Anxiety     Migraine, panic attack    Ketamine Nausea And Vomiting    Prochlorperazine Nausea And Vomiting and Other (See Comments)     Dystonic reaction    Promethazine Anxiety and Other (See Comments)     screams  Feels like in a hole    Atenolol      Bradycardia    Demerol Other (See Comments)     MAKES ME FEEL VERY BIZARRE    Ezetimibe      Burning feet    Glucosamine-Chondroitin Other (See Comments)    Keflex [Cephalexin]     Ketorolac      Pt denies this allergy    Lisinopril      Photosensitive    Meperidine Nausea Only and Other (See Comments)     Dilaudid  MAKES ME FEEL VERY BIZARRE    Phenergan [Promethazine Hcl]     Prochlorperazine Edisylate     Statins Other (See Comments)    Codeine Nausea And Vomiting    Morphine Nausea And Vomiting and Other (See Comments)     MAKES ME FEEL VERY BIZARRE     Principal Problem:    SBO (small bowel obstruction) ([facility])  Active Problems:    Hyperlipidemia    HTN (hypertension), benign    Type 2 diabetes mellitus, without long-term current use of insulin ([facility])    TIA (transient ischemic attack)    Coronary artery disease involving native coronary artery of native heart without angina pectoris    (HFpEF) heart failure with preserved ejection fraction ([facility])    Dysarthria    PVC's (premature ventricular contractions)    Acute prerenal azotemia    History of heart artery stent    Upper GI bleed    Slurred speech    Ventricular bigeminy    HANS (acute kidney injury) ([facility])    Supraglottitis without airway obstruction    Acute parotitis    Laryngopharyngeal reflux (LPR)    Mild

## 2024-04-07 NOTE — PROGRESS NOTES
[facility]    4/7/2024 9:36 AM        Name: Ana Gonzales .              Admitted: 3/12/2024  Primary Care Provider: Mellisa Houston MD (Tel: [phone])      Hospital course: 69 yo female initially presented with slurred speech and admitted with concern for TIA. CTA head/neck showed severe narrowing of distal left P1 segment of left PCA, CT head and MRI brain no acute findings. Neurology evaluated, recommend supportive care with asa when medically stable, intolerant statins.     Hospital course complicated by low Hgb on admission, requiring 2 units PRBCs. EGD (3/13) did not find any active bleeding, but did find blood clots and a large amount of liquid in the stomach. Placed on PPI gtt. Repeat EGD (3/14) did not show any bleed, but 2 clips were placed on small healing ulcer.     Patient subsequently developed partial SBO and had NGT placed (3/15). She underwent exp lap with lysis of adhesions (3/19). NGT removed and started on clear liquid diet (3/21) but again developed n/v and NG was replaced. Noted to have hematemesis post Ng placement and 3rd EGD 3/26 consistent with trauma from NGT placement. NGT removed 4/2, started on liquid diet 4/5, regular diet 4/6 with worsening symptoms.      Noted to have left sided neck edema. CT soft tissue neck (3/17) concerning for airway narrowing and acute left parotiditis. She was seen by ENT and received short course steroid and Unasyn. Symptoms resolved, Unasyn discontinued 4/1        3/19/2024: Exploratory laparotomy with lysis of adhesions, Meckel diverticulectomy, and appendectomy.       Subjective:  Up in bedside chair,  visiting. Patient looks uncomfortable, says she feels \"miserable\" today. Had spaghetti for supper last night and says after eating, she was bloated, nauseated and in pain. Finally eased off this am. She ate some oats this morning and now in  parotiditis.  3. No abscess identified.    Xray abd 3/18/2024:  IMPRESSION:  Improving ileus versus partial small bowel obstruction    Small bowel follow through 3/28/2024:  IMPRESSION:  Very slow transit of contrast from the stomach to the proximal portion of the ileum after almost 6 hours.  This is abnormal and may reflect an underlying small bowel obstruction.  Ileus cannot be excluded.  Recommend delayed abdominal radiograph at approximately 24 hours (3 p.m.).    Xray abdomen 3/19/2024:  IMPRESSION:  Continued slow transit of contrast more distally into the small bowel since the small-bowel follow-through study.  No definite contrast is seen within the colon.  Again this may represent a small bowel obstruction, however, ileus cannot be excluded    KUB 3/25/2024:  IMPRESSION:  1. Multiple gas-filled loops of distended small bowel and stool in the colon. Pattern is suggestive of an ileus.    CXR 3/25/2024:  IMPRESSION:  No acute process in the lungs.    Xray abd 3/25/2024:  IMPRESSION:  1. No significant change in appearance of the bowel gas pattern, most  consistent with a persistent ileus.  2. No evidence of free air.    EGD 3/26/2024:  Postoperative Diagnosis: #1 blood in the posterior pharynx possibly from trauma from previous NG to  #2 small ulcer in the mid esophagus from NG tube trauma  #3 chronic inflammation at the gastroesophageal  #4 ulcers that were clipped previously showed no evidence or signs of bleeding  #5 multiple suction marks in the fundus of the stomach secondary to  #6 lots of fluid in the duodenum consistent with ileus    CT abd/pelvis 3/27/2024:  IMPRESSION:  Interval decompression of the small bowel.  Interval resolution of the  previously noted mesenteric edema.     The abdomen pelvis is otherwise unremarkable and moved change, compared to the prior study.    Xray abd 3/29/2024:  IMPRESSION:  Persistent but slightly decreased small bowel distention compared to prior  Moderate to large

## 2024-04-07 NOTE — PROGRESS NOTES
0748: Shift assessment completed, morning medications given per MAR. VSS, alert and oriented. Nausea present, PRN Zofran administered. Call light within reach. The care plan and education has been reviewed and mutually agreed upon with the patient.

## 2024-04-07 NOTE — PROGRESS NOTES
5:43 PM  Spoke with Surgery about abdominal x-ray. Patient is denies any nausea or pain at the present time. Patient wants to hold off on NG tube insertion. She is currently sleeping in her recliner. Updated patient on NPO status. Patient denies any current gas pain. Will continue to monitor.

## 2024-04-07 NOTE — PLAN OF CARE
Problem: Pain  Goal: Verbalizes/displays adequate comfort level or baseline comfort level  4/7/2024 1047 by Laya Steele RN  Outcome: Progressing  4/7/2024 0100 by Linette Roldan RN  Outcome: Progressing     Problem: Discharge Planning  Goal: Discharge to home or other facility with appropriate resources  4/7/2024 1047 by Laya Steele RN  Outcome: Progressing  4/7/2024 0100 by Linette Roldan RN  Outcome: Progressing     Problem: Safety - Adult  Goal: Free from fall injury  4/7/2024 1047 by Laya Steele RN  Outcome: Progressing  4/7/2024 0100 by Linette Roldan RN  Outcome: Progressing     Problem: Chronic Conditions and Co-morbidities  Goal: Patient's chronic conditions and co-morbidity symptoms are monitored and maintained or improved  4/7/2024 1047 by Laya Steele RN  Outcome: Progressing  4/7/2024 0100 by Linette Roldan RN  Outcome: Progressing     Problem: ABCDS Injury Assessment  Goal: Absence of physical injury  4/7/2024 1047 by Laya Steele RN  Outcome: Progressing  4/7/2024 0100 by Linette Roldan RN  Outcome: Progressing     Problem: Skin/Tissue Integrity  Goal: Absence of new skin breakdown  Description: 1.  Monitor for areas of redness and/or skin breakdown  2.  Assess vascular access sites hourly  3.  Every 4-6 hours minimum:  Change oxygen saturation probe site  4.  Every 4-6 hours:  If on nasal continuous positive airway pressure, respiratory therapy assess nares and determine need for appliance change or resting period.  4/7/2024 1047 by Laya Steele RN  Outcome: Progressing  4/7/2024 0100 by Linette Roldan RN  Outcome: Progressing     Problem: Nutrition Deficit:  Goal: Optimize nutritional status  4/7/2024 1047 by Laya Steele RN  Outcome: Progressing  4/7/2024 0100 by Linette Roldan RN  Outcome: Progressing     Problem: Musculoskeletal - Adult  Goal: Return mobility to safest level of function  4/7/2024 1047 by  Laya Steele RN  Outcome: Progressing  4/7/2024 0100 by Linette Roldan RN  Outcome: Progressing  Goal: Maintain proper alignment of affected body part  4/7/2024 1047 by Laya Steele RN  Outcome: Progressing  4/7/2024 0100 by Linette Roldan RN  Outcome: Progressing     Problem: Gastrointestinal - Adult  Goal: Minimal or absence of nausea and vomiting  4/7/2024 1047 by Laya Steele RN  Outcome: Progressing  4/7/2024 0100 by Linette Roldan RN  Outcome: Progressing  Goal: Maintains or returns to baseline bowel function  4/7/2024 1047 by Laya Steele RN  Outcome: Progressing  4/7/2024 0100 by Linette Roldan RN  Outcome: Progressing     Problem: Genitourinary - Adult  Goal: Absence of urinary retention  4/7/2024 1047 by Laya Steele RN  Outcome: Progressing  4/7/2024 0100 by Linette Roldan RN  Outcome: Progressing     Problem: Metabolic/Fluid and Electrolytes - Adult  Goal: Electrolytes maintained within normal limits  4/7/2024 1047 by Laya Steele RN  Outcome: Progressing  4/7/2024 0100 by Linette Roldan RN  Outcome: Progressing  Goal: Hemodynamic stability and optimal renal function maintained  4/7/2024 1047 by Laya Steele RN  Outcome: Progressing  4/7/2024 0100 by Linette Roldan RN  Outcome: Progressing  Goal: Glucose maintained within prescribed range  4/7/2024 1047 by Laya Steele RN  Outcome: Progressing  4/7/2024 0100 by Linette Roldan RN  Outcome: Progressing     Problem: Skin/Tissue Integrity - Adult  Goal: Skin integrity remains intact  4/7/2024 1047 by Laya Steele RN  Outcome: Progressing  4/7/2024 0100 by Linette Roldan RN  Outcome: Progressing  Goal: Incisions, wounds, or drain sites healing without S/S of infection  4/7/2024 1047 by Laya Steele RN  Outcome: Progressing  4/7/2024 0100 by Linette Roldan RN  Outcome: Progressing

## 2024-04-08 LAB
ALBUMIN SERPL-MCNC: 2.9 G/DL (ref 3.4–5)
ALP SERPL-CCNC: 89 U/L (ref 40–129)
ALT SERPL-CCNC: <5 U/L (ref 10–40)
ANION GAP SERPL CALCULATED.3IONS-SCNC: 11 MMOL/L (ref 3–16)
AST SERPL-CCNC: 11 U/L (ref 15–37)
BASOPHILS # BLD: 0 K/UL (ref 0–0.2)
BASOPHILS NFR BLD: 0.6 %
BILIRUB DIRECT SERPL-MCNC: <0.2 MG/DL (ref 0–0.3)
BILIRUB INDIRECT SERPL-MCNC: ABNORMAL MG/DL (ref 0–1)
BILIRUB SERPL-MCNC: <0.2 MG/DL (ref 0–1)
BUN SERPL-MCNC: 19 MG/DL (ref 7–20)
CALCIUM SERPL-MCNC: 7.8 MG/DL (ref 8.3–10.6)
CHLORIDE SERPL-SCNC: 107 MMOL/L (ref 99–110)
CO2 SERPL-SCNC: 18 MMOL/L (ref 21–32)
CREAT SERPL-MCNC: 0.6 MG/DL (ref 0.6–1.2)
DEPRECATED RDW RBC AUTO: 17.4 % (ref 12.4–15.4)
EOSINOPHIL # BLD: 0.2 K/UL (ref 0–0.6)
EOSINOPHIL NFR BLD: 4.3 %
GFR SERPLBLD CREATININE-BSD FMLA CKD-EPI: >90 ML/MIN/{1.73_M2}
GLUCOSE SERPL-MCNC: 108 MG/DL (ref 70–99)
HCT VFR BLD AUTO: 27 % (ref 36–48)
HGB BLD-MCNC: 9 G/DL (ref 12–16)
LYMPHOCYTES # BLD: 1.1 K/UL (ref 1–5.1)
LYMPHOCYTES NFR BLD: 23.4 %
MAGNESIUM SERPL-MCNC: 1.7 MG/DL (ref 1.8–2.4)
MAGNESIUM SERPL-MCNC: 2 MG/DL (ref 1.8–2.4)
MCH RBC QN AUTO: 29.5 PG (ref 26–34)
MCHC RBC AUTO-ENTMCNC: 33.4 G/DL (ref 31–36)
MCV RBC AUTO: 88.3 FL (ref 80–100)
MONOCYTES # BLD: 0.5 K/UL (ref 0–1.3)
MONOCYTES NFR BLD: 10.8 %
NEUTROPHILS # BLD: 2.9 K/UL (ref 1.7–7.7)
NEUTROPHILS NFR BLD: 60.9 %
PHOSPHATE SERPL-MCNC: 3.6 MG/DL (ref 2.5–4.9)
PLATELET # BLD AUTO: 215 K/UL (ref 135–450)
PMV BLD AUTO: 8.4 FL (ref 5–10.5)
POTASSIUM SERPL-SCNC: 3.2 MMOL/L (ref 3.5–5.1)
POTASSIUM SERPL-SCNC: 3.3 MMOL/L (ref 3.5–5.1)
POTASSIUM SERPL-SCNC: 3.5 MMOL/L (ref 3.5–5.1)
PROT SERPL-MCNC: 5.5 G/DL (ref 6.4–8.2)
RBC # BLD AUTO: 3.06 M/UL (ref 4–5.2)
SODIUM SERPL-SCNC: 136 MMOL/L (ref 136–145)
WBC # BLD AUTO: 4.7 K/UL (ref 4–11)

## 2024-04-08 PROCEDURE — 6370000000 HC RX 637 (ALT 250 FOR IP): Performed by: SURGERY

## 2024-04-08 PROCEDURE — 6360000002 HC RX W HCPCS: Performed by: INTERNAL MEDICINE

## 2024-04-08 PROCEDURE — C9113 INJ PANTOPRAZOLE SODIUM, VIA: HCPCS | Performed by: PHYSICIAN ASSISTANT

## 2024-04-08 PROCEDURE — 97530 THERAPEUTIC ACTIVITIES: CPT

## 2024-04-08 PROCEDURE — 83735 ASSAY OF MAGNESIUM: CPT

## 2024-04-08 PROCEDURE — 6360000002 HC RX W HCPCS: Performed by: PHYSICIAN ASSISTANT

## 2024-04-08 PROCEDURE — 80048 BASIC METABOLIC PNL TOTAL CA: CPT

## 2024-04-08 PROCEDURE — 2580000003 HC RX 258: Performed by: SURGERY

## 2024-04-08 PROCEDURE — 97116 GAIT TRAINING THERAPY: CPT

## 2024-04-08 PROCEDURE — 97110 THERAPEUTIC EXERCISES: CPT

## 2024-04-08 PROCEDURE — 6370000000 HC RX 637 (ALT 250 FOR IP): Performed by: INTERNAL MEDICINE

## 2024-04-08 PROCEDURE — 85025 COMPLETE CBC W/AUTO DIFF WBC: CPT

## 2024-04-08 PROCEDURE — 6360000002 HC RX W HCPCS: Performed by: SURGERY

## 2024-04-08 PROCEDURE — 84100 ASSAY OF PHOSPHORUS: CPT

## 2024-04-08 PROCEDURE — 1200000000 HC SEMI PRIVATE

## 2024-04-08 PROCEDURE — 97535 SELF CARE MNGMENT TRAINING: CPT

## 2024-04-08 PROCEDURE — 99024 POSTOP FOLLOW-UP VISIT: CPT | Performed by: SURGERY

## 2024-04-08 PROCEDURE — 94761 N-INVAS EAR/PLS OXIMETRY MLT: CPT

## 2024-04-08 PROCEDURE — 94640 AIRWAY INHALATION TREATMENT: CPT

## 2024-04-08 PROCEDURE — 84132 ASSAY OF SERUM POTASSIUM: CPT

## 2024-04-08 PROCEDURE — 6370000000 HC RX 637 (ALT 250 FOR IP): Performed by: NURSE PRACTITIONER

## 2024-04-08 PROCEDURE — 6360000002 HC RX W HCPCS: Performed by: NURSE PRACTITIONER

## 2024-04-08 PROCEDURE — 2580000003 HC RX 258: Performed by: STUDENT IN AN ORGANIZED HEALTH CARE EDUCATION/TRAINING PROGRAM

## 2024-04-08 PROCEDURE — 80076 HEPATIC FUNCTION PANEL: CPT

## 2024-04-08 RX ORDER — MAGNESIUM SULFATE IN WATER 40 MG/ML
2000 INJECTION, SOLUTION INTRAVENOUS ONCE
Status: COMPLETED | OUTPATIENT
Start: 2024-04-08 | End: 2024-04-08

## 2024-04-08 RX ADMIN — METHENAMINE HIPPURATE 1 G: 1 TABLET ORAL at 08:30

## 2024-04-08 RX ADMIN — Medication 2 PUFF: at 09:33

## 2024-04-08 RX ADMIN — SODIUM CHLORIDE: 9 INJECTION, SOLUTION INTRAVENOUS at 23:57

## 2024-04-08 RX ADMIN — POTASSIUM CHLORIDE 20 MEQ: 400 INJECTION, SOLUTION INTRAVENOUS at 19:14

## 2024-04-08 RX ADMIN — TROSPIUM CHLORIDE 20 MG: 20 TABLET, FILM COATED ORAL at 17:27

## 2024-04-08 RX ADMIN — TIZANIDINE 2 MG: 4 TABLET ORAL at 21:41

## 2024-04-08 RX ADMIN — CARVEDILOL 3.12 MG: 3.12 TABLET, FILM COATED ORAL at 17:26

## 2024-04-08 RX ADMIN — GABAPENTIN 600 MG: 300 CAPSULE ORAL at 13:56

## 2024-04-08 RX ADMIN — POLYETHYLENE GLYCOL 3350 17 G: 17 POWDER, FOR SOLUTION ORAL at 08:30

## 2024-04-08 RX ADMIN — DILTIAZEM HYDROCHLORIDE 120 MG: 120 CAPSULE, COATED, EXTENDED RELEASE ORAL at 08:30

## 2024-04-08 RX ADMIN — LINACLOTIDE 290 MCG: 145 CAPSULE, GELATIN COATED ORAL at 08:57

## 2024-04-08 RX ADMIN — FUROSEMIDE 20 MG: 10 INJECTION, SOLUTION INTRAMUSCULAR; INTRAVENOUS at 08:30

## 2024-04-08 RX ADMIN — ENOXAPARIN SODIUM 40 MG: 100 INJECTION SUBCUTANEOUS at 21:32

## 2024-04-08 RX ADMIN — SODIUM CHLORIDE: 9 INJECTION, SOLUTION INTRAVENOUS at 08:42

## 2024-04-08 RX ADMIN — CARVEDILOL 3.12 MG: 3.12 TABLET, FILM COATED ORAL at 08:32

## 2024-04-08 RX ADMIN — BUPROPION HYDROCHLORIDE 300 MG: 300 TABLET, EXTENDED RELEASE ORAL at 08:32

## 2024-04-08 RX ADMIN — GABAPENTIN 600 MG: 300 CAPSULE ORAL at 08:31

## 2024-04-08 RX ADMIN — PANTOPRAZOLE SODIUM 40 MG: 40 INJECTION, POWDER, FOR SOLUTION INTRAVENOUS at 21:33

## 2024-04-08 RX ADMIN — HYDROMORPHONE HYDROCHLORIDE 0.25 MG: 1 INJECTION, SOLUTION INTRAMUSCULAR; INTRAVENOUS; SUBCUTANEOUS at 01:49

## 2024-04-08 RX ADMIN — Medication 10 ML: at 21:44

## 2024-04-08 RX ADMIN — ARIPIPRAZOLE 2 MG: 2 TABLET ORAL at 21:40

## 2024-04-08 RX ADMIN — METOCLOPRAMIDE 10 MG: 5 INJECTION, SOLUTION INTRAMUSCULAR; INTRAVENOUS at 01:45

## 2024-04-08 RX ADMIN — METOCLOPRAMIDE 10 MG: 5 INJECTION, SOLUTION INTRAMUSCULAR; INTRAVENOUS at 13:56

## 2024-04-08 RX ADMIN — PANTOPRAZOLE SODIUM 40 MG: 40 INJECTION, POWDER, FOR SOLUTION INTRAVENOUS at 08:32

## 2024-04-08 RX ADMIN — POTASSIUM CHLORIDE 20 MEQ: 400 INJECTION, SOLUTION INTRAVENOUS at 18:05

## 2024-04-08 RX ADMIN — METOCLOPRAMIDE 10 MG: 5 INJECTION, SOLUTION INTRAMUSCULAR; INTRAVENOUS at 17:28

## 2024-04-08 RX ADMIN — TOPIRAMATE 150 MG: 100 TABLET, FILM COATED ORAL at 17:30

## 2024-04-08 RX ADMIN — MECLIZINE HYDROCHLORIDE 25 MG: 12.5 TABLET ORAL at 21:40

## 2024-04-08 RX ADMIN — MECLIZINE HYDROCHLORIDE 25 MG: 12.5 TABLET ORAL at 17:27

## 2024-04-08 RX ADMIN — POTASSIUM CHLORIDE 20 MEQ: 400 INJECTION, SOLUTION INTRAVENOUS at 11:13

## 2024-04-08 RX ADMIN — SODIUM CHLORIDE, PRESERVATIVE FREE 10 ML: 5 INJECTION INTRAVENOUS at 21:44

## 2024-04-08 RX ADMIN — MECLIZINE HYDROCHLORIDE 25 MG: 12.5 TABLET ORAL at 13:56

## 2024-04-08 RX ADMIN — TOPIRAMATE 100 MG: 100 TABLET, FILM COATED ORAL at 08:30

## 2024-04-08 RX ADMIN — TROSPIUM CHLORIDE 20 MG: 20 TABLET, FILM COATED ORAL at 06:08

## 2024-04-08 RX ADMIN — MECLIZINE HYDROCHLORIDE 25 MG: 12.5 TABLET ORAL at 08:31

## 2024-04-08 RX ADMIN — METHENAMINE HIPPURATE 1 G: 1 TABLET ORAL at 17:28

## 2024-04-08 RX ADMIN — GABAPENTIN 600 MG: 300 CAPSULE ORAL at 21:39

## 2024-04-08 RX ADMIN — MAGNESIUM SULFATE HEPTAHYDRATE 2000 MG: 40 INJECTION, SOLUTION INTRAVENOUS at 08:44

## 2024-04-08 RX ADMIN — METOCLOPRAMIDE 10 MG: 5 INJECTION, SOLUTION INTRAMUSCULAR; INTRAVENOUS at 06:08

## 2024-04-08 RX ADMIN — Medication 2 PUFF: at 20:05

## 2024-04-08 RX ADMIN — POTASSIUM CHLORIDE 20 MEQ: 400 INJECTION, SOLUTION INTRAVENOUS at 10:24

## 2024-04-08 RX ADMIN — GABAPENTIN 600 MG: 300 CAPSULE ORAL at 17:28

## 2024-04-08 ASSESSMENT — PAIN SCALES - GENERAL
PAINLEVEL_OUTOF10: 0
PAINLEVEL_OUTOF10: 0

## 2024-04-08 NOTE — PROGRESS NOTES
Ana Gonzales is a 68 y.o. female patient.    Current Facility-Administered Medications   Medication Dose Route Frequency Provider Last Rate Last Admin    linaclotide (LINZESS) capsule 290 mcg  290 mcg Oral QAM  Felix Pendleton MD   290 mcg at 04/08/24 0857    0.9 % sodium chloride infusion   IntraVENous Continuous Srinivasa Morales MD 80 mL/hr at 04/08/24 0842 New Bag at 04/08/24 0842    HYDROmorphone HCl PF (DILAUDID) injection 0.25 mg  0.25 mg IntraVENous Q4H PRN Aria Carter APRN - CNP   0.25 mg at 04/08/24 0149    simethicone (MYLICON) chewable tablet 80 mg  80 mg Oral Q6H PRN Aria Carter APRN - CNP   80 mg at 04/07/24 0407    polyethylene glycol (GLYCOLAX) packet 17 g  17 g Oral Daily Pete Montalvo MD   17 g at 04/08/24 0830    lidocaine PF 1 % injection 5 mL  5 mL IntraDERmal Once Tc Shearer DO        sodium chloride flush 0.9 % injection 5-40 mL  5-40 mL IntraVENous 2 times per day Tc Shearer DO   10 mL at 04/07/24 2118    sodium chloride flush 0.9 % injection 5-40 mL  5-40 mL IntraVENous PRN Tc Shearer DO        0.9 % sodium chloride infusion  25 mL IntraVENous PRN Tc Shearer DO        Evolocumab SOAJ 140 mg  (Patient Supplied)  140 mg SubCUTAneous Q14 Days Arlnie Turpin APRN - CNP   140 mg at 04/02/24 1647    metoclopramide (REGLAN) injection 10 mg  10 mg IntraVENous Q6H Aria Carter APRN - CNP   10 mg at 04/08/24 0608    pantoprazole (PROTONIX) injection 40 mg  40 mg IntraVENous BID Shalonda Dominguez PA-C   40 mg at 04/08/24 0832    diatrizoate meglumine-sodium (GASTROGRAFIN) 66-10 % solution 5 mL  5 mL Oral ONCE PRN Srinivasa Morales MD   5 mL at 03/27/24 1238    phenol 1.4 % mouth spray 1 spray  1 spray Mouth/Throat Q2H PRN Aria Carter C, APRN - CNP        furosemide (LASIX) injection 20 mg  20 mg IntraVENous Daily Lynsey Garza PA-C   20 mg at 04/08/24 0830    mometasone-formoterol (DULERA) 200-5 MCG/ACT inhaler 2 puff  2 puff Inhalation BID RT

## 2024-04-08 NOTE — PROGRESS NOTES
Gastroenterology Progress Note            Ana Gonzales is a 68 y.o. female patient.  Principal Problem:    SBO (small bowel obstruction) ([facility])  Active Problems:    Hyperlipidemia    HTN (hypertension), benign    Type 2 diabetes mellitus, without long-term current use of insulin (HCC)    TIA (transient ischemic attack)    Coronary artery disease involving native coronary artery of native heart without angina pectoris    (HFpEF) heart failure with preserved ejection fraction (HCC)    Dysarthria    PVC's (premature ventricular contractions)    Acute prerenal azotemia    History of heart artery stent    Upper GI bleed    Slurred speech    Ventricular bigeminy    HANS (acute kidney injury) (HCC)    Supraglottitis without airway obstruction    Acute parotitis    Laryngopharyngeal reflux (LPR)    Mild malnutrition (HCC)    Ileus (HCC)  Resolved Problems:    * No resolved hospital problems. *      SUBJECTIVE:  Feels better -- had 2 BM's.    ROS:    Gastrointestinal ROS: positive for - abdominal pain, constipation, and nausea/vomiting  negative for - melena, stool incontinence, or swallowing difficulty/pain.  Cardiovascular ROS: negative  Respiratory ROS: negative    Physical    VITALS:  BP 98/64   Pulse 86   Temp 98 °F (36.7 °C) (Oral)   Resp 16   Ht 1.6 m (5' 2.99\")   Wt 90.9 kg (200 lb 6.4 oz)   SpO2 92%   BMI 35.51 kg/m²   TEMPERATURE:  Current - Temp: 98 °F (36.7 °C); Max - Temp  Av.6 °F (36.4 °C)  Min: 97.1 °F (36.2 °C)  Max: 98 °F (36.7 °C)    NAD  RRR, Nl s1s2  Lungs CTA Bilaterally, normal effort  Abdomen Obese, soft, ND, NT, no HSM, Bowel sounds normal.    Data    Data Review:    Recent Labs     24  0524  0600 24  0610   WBC 4.0 10.4 4.7   HGB 9.1* 11.5* 9.0*   HCT 28.5* 36.2 27.0*   MCV 89.0 89.0 88.3    347 215     Recent Labs     24  0527 24  0600 24  0610    135* 136   K 3.6 3.7 3.3*    99 107   CO2 23 21 18*   PHOS 3.6 5.1*  3.6   BUN 17 19 19   CREATININE 0.5* 0.7 0.6     Recent Labs     04/08/24  0610   AST 11*   ALT <5*   BILIDIR <0.2   BILITOT <0.2   ALKPHOS 89     No results for input(s): \"LIPASE\", \"AMYLASE\" in the last 72 hours.  No results for input(s): \"PROTIME\", \"INR\" in the last 72 hours.  No results for input(s): \"PTT\" in the last 72 hours.    Radiology Review:  AXR 4/7/2024:  FINDINGS:  There is air distention of the stomach as well as small and to lesser extent  large bowel loops.  Enteric contrast has passed through the GI tract into the  colon.  There is posterior fusion L5-S1.     IMPRESSION:  Findings could represent ileus with air distention of the stomach and small  and to lesser extent large bowel loops.  Air distention of the stomach has  progressed from prior exam.         ASSESSMENT :  Prolonged post op Ileus of small bowel.  Chronic constipation, likely constipation prominent IBS  Gastroparesis  Gastric ulcer without bleeding or perforation, noted recently on EGD 2 weeks ago.     She is better this AM after having 2 large BM's.  Suspect constipation contributing to her symptomatology.  Will add Linzess to regimen.        PLAN  :  Trial of full liquids today  Start Linzess for constipation  Consider advancing diet in AM if continues to do well.  Repeat Abd xray today    Felix Pendleton MD  [facility]  4/8/2024

## 2024-04-08 NOTE — PROGRESS NOTES
2114: Shift assessment completed. VSS. Medications given per MAR. Bedside table and call light within reach. The care plan and education has been reviewed and mutually agreed upon with the patient.     Linette Roldan RN

## 2024-04-08 NOTE — PROGRESS NOTES
[facility]    4/8/2024 8:22 AM        Name: Ana Gonzales .              Admitted: 3/12/2024  Primary Care Provider: Mellisa Houston MD (Tel: [phone])      Hospital course: 67 yo female initially presented with slurred speech and admitted with concern for TIA. CTA head/neck showed severe narrowing of distal left P1 segment of left PCA, CT head and MRI brain no acute findings. Neurology evaluated, recommend supportive care with asa when medically stable, intolerant statins.     Hospital course complicated by low Hgb on admission, requiring 2 units PRBCs. EGD (3/13) did not find any active bleeding, but did find blood clots and a large amount of liquid in the stomach. Placed on PPI gtt. Repeat EGD (3/14) did not show any bleed, but 2 clips were placed on small healing ulcer.     Patient subsequently developed partial SBO and had NGT placed (3/15). She underwent exp lap with lysis of adhesions (3/19). NGT removed and started on clear liquid diet (3/21) but again developed n/v and NG was replaced. Noted to have hematemesis post NG placement and 3rd EGD 3/26 consistent with trauma from NGT placement. NGT removed 4/2, started on liquid diet 4/5, regular diet 4/6 with worsening symptoms, NPO 4/7.  TPN discontinued 4/6.    Noted to have left sided neck edema. CT soft tissue neck (3/17) concerning for airway narrowing and acute left parotiditis. She was seen by ENT and received short course steroid and Unasyn. Symptoms resolved, Unasyn discontinued 4/1        3/19/2024: Exploratory laparotomy with lysis of adhesions, Meckel diverticulectomy, and appendectomy.       Subjective:  Up in bedside chair,  visiting. Patient says she is feeling better today, had 2 large BMs overnight. Presently denies abdominal pain or nausea. Has been seen by GI this morning and full liquid diet to resume. Patient expressing

## 2024-04-08 NOTE — PROGRESS NOTES
[facility] - Inpatient Rehabilitation Department   Phone: [phone]    Occupational Therapy    [] Initial Evaluation            [x] Daily Treatment Note         [] Discharge Summary      Patient: Ana Gonzales   : 1955   MRN: [id number]   Date of Service:  2024    Admitting Diagnosis:  SBO (small bowel obstruction) (HCC)    Current Admission Summary: 67 yo female initially presented with slurred speech and admitted with concern for TIA. CTA head/neck showed severe narrowing of distal left P1 segment of left PCA, CT head and MRI brain no acute findings. Neurology evaluated, recommend supportive care with asa when medically stable, intolerant statins.      Hospital course complicated by low Hgb on admission, requiring 2 units PRBCs. EGD (3/13) did not find any active bleeding, but did find blood clots and a large amount of liquid in the stomach. Placed on PPI gtt. Repeat EGD (3/14) did not show any bleed, but 2 clips were placed on small healing ulcer.      Patient subsequently developed partial SBO and had NGT placed (3/15). She underwent exp lap with lysis of adhesions (3/19). NGT removed and started on clear liquid diet (3/21) but again developed n/v and NG was replaced. Noted to have hematemesis post Ng placement and 3rd EGD 3/26 consistent with trauma from NGT placement. NGT clamped 3/28.      Noted to have left sided neck edema. CT soft tissue neck (3/17) concerning for airway narrowing and acute left parotiditis. She was seen by ENT and received decadron and Unasyn.         3/19/2024: Exploratory laparotomy with lysis of adhesions, Meckel diverticulectomy, and appendectomy.     3/23: NGT removed and did not tolerate well- has been having some issues with coffee-ground emesis and large volume output after a operation for small bowel obstruction- had NGT replaced 3/25    3/26: EGD - findings consistent with ileus    Past Medical History:  has a past medical history of Arthritis,  equipment evaluation/education, and positioning    Goals  Patient Goals: to return home    Short Term Goals:  Time Frame: prior to D/C  Patient will complete upper body ADL at moderate assistance   Patient will complete lower body ADL at moderate assistance   Patient will complete toileting at minimal assistance - goal met 3/28  Patient will complete grooming at stand by assistance - goal met 4/4 UPDATE GOAL - pt will complete grooming Independent  Patient will complete functional transfers at minimal assistance -- met on 3/26 - Patient will complete functional mobility at minimal assistance -- met on 3/26   New goals:  Patient will complete toileting at stand by assistance   Patient will complete functional transfers at stand by assistance - goal met 4/4 UPDATE GOAL - pt will complete functional tranfers Mod I  Patient will complete functional mobility at stand by assistance - goal met 4/4 UPDATE GOAL - pt will complete functional mobility Mod I    Above goals reviewed on 4/8/2024.  All goals are ongoing at this time unless indicated above.       Therapy Session Time     Individual Group Co-treatment   Time In 1109     Time Out 1220     Minutes 71     Timed Code Treatment Minutes: 71 minutes  Total Treatment Minutes: 71 minutes     Electronically Signed By: MOISÉS Mckeon/GUILLERMO [id number]

## 2024-04-08 NOTE — PLAN OF CARE
Problem: Pain  Goal: Verbalizes/displays adequate comfort level or baseline comfort level  4/7/2024 2259 by Linette Roldan RN  Outcome: Progressing  4/7/2024 1047 by Laya Steele RN  Outcome: Progressing     Problem: Discharge Planning  Goal: Discharge to home or other facility with appropriate resources  4/7/2024 2259 by Linette Roldan RN  Outcome: Progressing  4/7/2024 1047 by Laya Steele RN  Outcome: Progressing     Problem: Safety - Adult  Goal: Free from fall injury  4/7/2024 2259 by Linette Roldan RN  Outcome: Progressing  4/7/2024 1047 by Laya Steele RN  Outcome: Progressing     Problem: Chronic Conditions and Co-morbidities  Goal: Patient's chronic conditions and co-morbidity symptoms are monitored and maintained or improved  4/7/2024 2259 by Linette Roldan RN  Outcome: Progressing  4/7/2024 1047 by Laya Steele RN  Outcome: Progressing     Problem: ABCDS Injury Assessment  Goal: Absence of physical injury  4/7/2024 2259 by Linette Roldan RN  Outcome: Progressing  4/7/2024 1047 by Laya Steele RN  Outcome: Progressing     Problem: Skin/Tissue Integrity  Goal: Absence of new skin breakdown  Description: 1.  Monitor for areas of redness and/or skin breakdown  2.  Assess vascular access sites hourly  3.  Every 4-6 hours minimum:  Change oxygen saturation probe site  4.  Every 4-6 hours:  If on nasal continuous positive airway pressure, respiratory therapy assess nares and determine need for appliance change or resting period.  4/7/2024 2259 by Linette Roldan RN  Outcome: Progressing  4/7/2024 1047 by Laya Steele RN  Outcome: Progressing     Problem: Nutrition Deficit:  Goal: Optimize nutritional status  4/7/2024 2259 by Linette Roldan RN  Outcome: Progressing  4/7/2024 1047 by Laya Steele RN  Outcome: Progressing     Problem: Musculoskeletal - Adult  Goal: Return mobility to safest level of function  4/7/2024 2259 by

## 2024-04-08 NOTE — PROGRESS NOTES
Shift assessment complete. VSS. AM medications administered as ordered. Potassium and Magnesium replaced this shift, blood draw sent for recheck. Pt worked w/ PT/OT and tolerated well. 2 BM noted, loose black/brown. IVF continuous. Pt denies N/V so far this shift. Diet advanced to Full Liquid, pt has tolerated well.

## 2024-04-08 NOTE — PROGRESS NOTES
with use of UE support  Dynamic Standing Balance: fair (-): maintains balance at CGA with use of UE support  Comments: RW in standing     Other Therapeutic Interventions  Pt utilized bathroom during session. Needed assistance with eugenie care following bowel movement.     Functional Outcomes  AM-PAC Inpatient Mobility Raw Score : 17              Cognition  WFL  Orientation:    alert and oriented x 4  Command Following:   [facility]    Education  Barriers To Learning: none  Patient Education: patient educated on goals, PT role and benefits, plan of care, general safety, functional mobility training, proper use of assistive device/equipment, family education, transfer training, discharge recommendations  Learning Assessment:  patient verbalizes and demonstrates understanding    Assessment  Activity Tolerance: Fair activity tolerance. Pt limited by GI discomfort, headache and overall decreased endurance. Did not utilize any standing breaks during ambulation. Ranked difficulty of ambulation a 9/10.  Impairments Requiring Therapeutic Intervention: decreased functional mobility, decreased ADL status, decreased strength, decreased endurance, decreased balance, increased pain  Prognosis: good  Clinical Assessment:  Patient admitted with hematemesis, slurred speech, s/p EGD 3/13/24. Patient now s/p exploratory laparotomy with lysis of adhesions. Meckel diverticulectomy and appendectomy. Failed NG tube removal and replace 3/25. At the time pt has decreased functional mobility and activity status due to deficits in strength, endurance, balance and increased pain. Pt ambulated around 70ft with no standing rest breaks requiring some cues to increase comfort while ambulating. Consider attempting stairs during next session. Patient requires physical therapy to continue to address the deficits that are limiting her from returning to prior baseline function. After discharge patient would benefit from intensive physical therapy in a  rehabilitative setting.   Safety Interventions: patient left in chair, chair alarm in place, call light within reach, gait belt, patient at risk for falls, nurse notified, and family/caregiver present      Plan  Frequency: 3-5 x/per week  Current Treatment Recommendations: strengthening, balance training, functional mobility training, transfer training, gait training, stair training, endurance training, patient/caregiver education, safety education, and equipment evaluation/education    Goals  Patient Goals:  Return home.  Short Term Goals:  Time Frame: Discharge.  Patient will complete bed mobility at minimal assistance   Patient will complete transfers at stand by assistance   Patient will ambulate 50 ft with use of LRAD at minimal assistance -- goal met 3/28  Patient will ascend/descend 4 stairs with (R) ascending handrail at minimal assistance    Goal added 3/28 -- Patient will ambulate 150 ft with use of LRAD at stand by assistance     No goals met 4/8    Above goals reviewed on 4/8/2024.  All goals are ongoing at this time unless indicated above.      Therapy Session Time      Individual Group Co-treatment   Time In 0840       Time Out 0911       Minutes 31         Timed Code Treatment Minutes:  31 Minutes  Total Treatment Minutes:  31 minutes        Electronically Signed By: Kary Madrigal, SPT  PT providing direct supervision during session and assisting in making skilled judgements throughout session.  Joel Ferris, PT, DPT, [id number]

## 2024-04-08 NOTE — PROGRESS NOTES
Nutrition Note    RECOMMENDATIONS  PO Diet: full liquids; adv to low fiber as tolerated  ONS: trial magic cup TID  Nutrition Support: Resume TPN if appropriate     ASSESSMENT   PN d/c'd on 4/6.  Pt receives a full liquid diet, & took a few bites /sips @ [facility].  Does not like Ensure or alternative liquid nutrition supplements.  Agrees to try chocolate magic cups.  Encouraged small, frequent meals along with ONS to promote strength & healing.  Will monitor for acceptance of supplements & tolerance of po diet. .       Malnutrition Status: Mild malnutrition  Acute Illness    NUTRITION DIAGNOSIS   Inadequate oral intake related to altered GI function as evidenced by intake 0-25% (prolonged NPO/CL status)    Goals: PO intake 50% or greater     NUTRITION RELATED FINDINGS  Objective: NS @ 80 ml/hr. k+ 3.3, Mg 1.7, phos WNL.  LBM 4/7, trace edema of extremities.  Wounds: Surgical Incision    CURRENT NUTRITION THERAPIES  ADULT DIET; Full Liquid     PO Intake: 1-25%   PO Supplement Intake:None Ordered    ANTHROPOMETRICS  Current Height: 157.5 cm (5' 2\")  Current Weight - Scale: 90.9 kg (200 lb 6.4 oz)    Admission weight: 93 kg (205 lb)  Ideal Body Weight (IBW): 110 lbs  (50 kg)        BMI: 36.6      COMPARATIVE STANDARDS  Total Energy Requirements (kcals/day): 1370 - 1643     Protein (g):  62 - 104       Fluid (mL/day):  1643    EDUCATION  Education not indicated     The patient will be monitored per nutrition standards of care. Consult dietitian if additional nutrition interventions are needed prior to RD reassessment.     Tomasa Robles RD, LD    Contact: [phone]

## 2024-04-09 ENCOUNTER — APPOINTMENT (OUTPATIENT)
Dept: GENERAL RADIOLOGY | Age: 69
DRG: 330 | End: 2024-04-09

## 2024-04-09 LAB
DEPRECATED RDW RBC AUTO: 17.1 % (ref 12.4–15.4)
GLUCOSE BLD-MCNC: 121 MG/DL (ref 70–99)
GLUCOSE BLD-MCNC: 79 MG/DL (ref 70–99)
GLUCOSE BLD-MCNC: 90 MG/DL (ref 70–99)
GLUCOSE BLD-MCNC: 90 MG/DL (ref 70–99)
HCT VFR BLD AUTO: 29.3 % (ref 36–48)
HGB BLD-MCNC: 9.2 G/DL (ref 12–16)
MAGNESIUM SERPL-MCNC: 1.9 MG/DL (ref 1.8–2.4)
MCH RBC QN AUTO: 28.1 PG (ref 26–34)
MCHC RBC AUTO-ENTMCNC: 31.4 G/DL (ref 31–36)
MCV RBC AUTO: 89.7 FL (ref 80–100)
PERFORMED ON: ABNORMAL
PERFORMED ON: NORMAL
PHOSPHATE SERPL-MCNC: 2.6 MG/DL (ref 2.5–4.9)
PLATELET # BLD AUTO: 186 K/UL (ref 135–450)
PMV BLD AUTO: 9.2 FL (ref 5–10.5)
RBC # BLD AUTO: 3.26 M/UL (ref 4–5.2)
WBC # BLD AUTO: 2.8 K/UL (ref 4–11)

## 2024-04-09 PROCEDURE — 2580000003 HC RX 258: Performed by: STUDENT IN AN ORGANIZED HEALTH CARE EDUCATION/TRAINING PROGRAM

## 2024-04-09 PROCEDURE — 6370000000 HC RX 637 (ALT 250 FOR IP): Performed by: SURGERY

## 2024-04-09 PROCEDURE — 6360000002 HC RX W HCPCS: Performed by: NURSE PRACTITIONER

## 2024-04-09 PROCEDURE — 6360000002 HC RX W HCPCS: Performed by: SURGERY

## 2024-04-09 PROCEDURE — 1200000000 HC SEMI PRIVATE

## 2024-04-09 PROCEDURE — 94640 AIRWAY INHALATION TREATMENT: CPT

## 2024-04-09 PROCEDURE — 99024 POSTOP FOLLOW-UP VISIT: CPT | Performed by: SURGERY

## 2024-04-09 PROCEDURE — 97530 THERAPEUTIC ACTIVITIES: CPT

## 2024-04-09 PROCEDURE — 74019 RADEX ABDOMEN 2 VIEWS: CPT

## 2024-04-09 PROCEDURE — 85027 COMPLETE CBC AUTOMATED: CPT

## 2024-04-09 PROCEDURE — C9113 INJ PANTOPRAZOLE SODIUM, VIA: HCPCS | Performed by: PHYSICIAN ASSISTANT

## 2024-04-09 PROCEDURE — 97110 THERAPEUTIC EXERCISES: CPT

## 2024-04-09 PROCEDURE — 6360000002 HC RX W HCPCS: Performed by: PHYSICIAN ASSISTANT

## 2024-04-09 PROCEDURE — 83735 ASSAY OF MAGNESIUM: CPT

## 2024-04-09 PROCEDURE — 6370000000 HC RX 637 (ALT 250 FOR IP): Performed by: NURSE PRACTITIONER

## 2024-04-09 PROCEDURE — 6370000000 HC RX 637 (ALT 250 FOR IP): Performed by: INTERNAL MEDICINE

## 2024-04-09 PROCEDURE — 84100 ASSAY OF PHOSPHORUS: CPT

## 2024-04-09 RX ADMIN — SODIUM CHLORIDE, PRESERVATIVE FREE 10 ML: 5 INJECTION INTRAVENOUS at 20:41

## 2024-04-09 RX ADMIN — TOPIRAMATE 150 MG: 100 TABLET, FILM COATED ORAL at 17:23

## 2024-04-09 RX ADMIN — SODIUM CHLORIDE, PRESERVATIVE FREE 10 ML: 5 INJECTION INTRAVENOUS at 08:28

## 2024-04-09 RX ADMIN — LINACLOTIDE 290 MCG: 145 CAPSULE, GELATIN COATED ORAL at 06:26

## 2024-04-09 RX ADMIN — GABAPENTIN 600 MG: 300 CAPSULE ORAL at 12:44

## 2024-04-09 RX ADMIN — CARVEDILOL 3.12 MG: 3.12 TABLET, FILM COATED ORAL at 17:23

## 2024-04-09 RX ADMIN — BUPROPION HYDROCHLORIDE 300 MG: 300 TABLET, EXTENDED RELEASE ORAL at 08:27

## 2024-04-09 RX ADMIN — ARIPIPRAZOLE 2 MG: 2 TABLET ORAL at 20:48

## 2024-04-09 RX ADMIN — MECLIZINE HYDROCHLORIDE 25 MG: 12.5 TABLET ORAL at 12:44

## 2024-04-09 RX ADMIN — PANTOPRAZOLE SODIUM 40 MG: 40 INJECTION, POWDER, FOR SOLUTION INTRAVENOUS at 20:40

## 2024-04-09 RX ADMIN — FUROSEMIDE 20 MG: 10 INJECTION, SOLUTION INTRAMUSCULAR; INTRAVENOUS at 08:27

## 2024-04-09 RX ADMIN — DILTIAZEM HYDROCHLORIDE 120 MG: 120 CAPSULE, COATED, EXTENDED RELEASE ORAL at 08:27

## 2024-04-09 RX ADMIN — NORTRIPTYLINE HYDROCHLORIDE 75 MG: 25 CAPSULE ORAL at 20:40

## 2024-04-09 RX ADMIN — METOCLOPRAMIDE 10 MG: 5 INJECTION, SOLUTION INTRAMUSCULAR; INTRAVENOUS at 06:26

## 2024-04-09 RX ADMIN — METOCLOPRAMIDE 10 MG: 5 INJECTION, SOLUTION INTRAMUSCULAR; INTRAVENOUS at 17:23

## 2024-04-09 RX ADMIN — ENOXAPARIN SODIUM 40 MG: 100 INJECTION SUBCUTANEOUS at 20:40

## 2024-04-09 RX ADMIN — GABAPENTIN 600 MG: 300 CAPSULE ORAL at 08:27

## 2024-04-09 RX ADMIN — METHENAMINE HIPPURATE 1 G: 1 TABLET ORAL at 08:27

## 2024-04-09 RX ADMIN — MECLIZINE HYDROCHLORIDE 25 MG: 12.5 TABLET ORAL at 20:41

## 2024-04-09 RX ADMIN — Medication 2 PUFF: at 19:56

## 2024-04-09 RX ADMIN — TROSPIUM CHLORIDE 20 MG: 20 TABLET, FILM COATED ORAL at 17:23

## 2024-04-09 RX ADMIN — TIZANIDINE 2 MG: 4 TABLET ORAL at 20:40

## 2024-04-09 RX ADMIN — TROSPIUM CHLORIDE 20 MG: 20 TABLET, FILM COATED ORAL at 06:26

## 2024-04-09 RX ADMIN — METHENAMINE HIPPURATE 1 G: 1 TABLET ORAL at 17:23

## 2024-04-09 RX ADMIN — METOCLOPRAMIDE 10 MG: 5 INJECTION, SOLUTION INTRAMUSCULAR; INTRAVENOUS at 01:36

## 2024-04-09 RX ADMIN — CARVEDILOL 3.12 MG: 3.12 TABLET, FILM COATED ORAL at 08:27

## 2024-04-09 RX ADMIN — GABAPENTIN 600 MG: 300 CAPSULE ORAL at 17:22

## 2024-04-09 RX ADMIN — MECLIZINE HYDROCHLORIDE 25 MG: 12.5 TABLET ORAL at 08:27

## 2024-04-09 RX ADMIN — MECLIZINE HYDROCHLORIDE 25 MG: 12.5 TABLET ORAL at 17:23

## 2024-04-09 RX ADMIN — TOPIRAMATE 100 MG: 100 TABLET, FILM COATED ORAL at 08:27

## 2024-04-09 RX ADMIN — GABAPENTIN 600 MG: 300 CAPSULE ORAL at 20:41

## 2024-04-09 RX ADMIN — PANTOPRAZOLE SODIUM 40 MG: 40 INJECTION, POWDER, FOR SOLUTION INTRAVENOUS at 08:26

## 2024-04-09 RX ADMIN — METOCLOPRAMIDE 10 MG: 5 INJECTION, SOLUTION INTRAMUSCULAR; INTRAVENOUS at 12:45

## 2024-04-09 RX ADMIN — Medication 2 PUFF: at 10:21

## 2024-04-09 ASSESSMENT — PAIN DESCRIPTION - FREQUENCY: FREQUENCY: CONTINUOUS

## 2024-04-09 ASSESSMENT — PAIN DESCRIPTION - ONSET: ONSET: ON-GOING

## 2024-04-09 ASSESSMENT — PAIN DESCRIPTION - LOCATION: LOCATION: ABDOMEN

## 2024-04-09 ASSESSMENT — PAIN SCALES - GENERAL: PAINLEVEL_OUTOF10: 3

## 2024-04-09 ASSESSMENT — PAIN DESCRIPTION - DESCRIPTORS: DESCRIPTORS: ACHING

## 2024-04-09 ASSESSMENT — PAIN DESCRIPTION - PAIN TYPE: TYPE: ACUTE PAIN

## 2024-04-09 ASSESSMENT — PAIN DESCRIPTION - ORIENTATION: ORIENTATION: LOWER

## 2024-04-09 NOTE — PLAN OF CARE
RN  Outcome: Progressing  4/9/2024 0140 by Lucia Ellis RN  Outcome: Not Progressing     Problem: Pain  Goal: Verbalizes/displays adequate comfort level or baseline comfort level  4/9/2024 0825 by Laya Steele RN  Outcome: Progressing  4/9/2024 0140 by Lucia Ellis RN  Outcome: Not Progressing     Problem: Discharge Planning  Goal: Discharge to home or other facility with appropriate resources  4/9/2024 0825 by Laya Steele RN  Outcome: Progressing  4/9/2024 0140 by Lucia Ellis RN  Outcome: Not Progressing     Problem: Safety - Adult  Goal: Free from fall injury  4/9/2024 0825 by Laya Steele RN  Outcome: Progressing  4/9/2024 0140 by Lucia Ellis RN  Outcome: Not Progressing     Problem: Chronic Conditions and Co-morbidities  Goal: Patient's chronic conditions and co-morbidity symptoms are monitored and maintained or improved  4/9/2024 0825 by Laya Steele RN  Outcome: Progressing  4/9/2024 0140 by Lucia Ellis RN  Outcome: Not Progressing     Problem: ABCDS Injury Assessment  Goal: Absence of physical injury  4/9/2024 0825 by Laya Steele RN  Outcome: Progressing  4/9/2024 0140 by Lucia Ellis RN  Outcome: Not Progressing     Problem: Skin/Tissue Integrity  Goal: Absence of new skin breakdown  Description: 1.  Monitor for areas of redness and/or skin breakdown  2.  Assess vascular access sites hourly  3.  Every 4-6 hours minimum:  Change oxygen saturation probe site  4.  Every 4-6 hours:  If on nasal continuous positive airway pressure, respiratory therapy assess nares and determine need for appliance change or resting period.  4/9/2024 0825 by Laya Steele RN  Outcome: Progressing  4/9/2024 0140 by Lucia Ellis RN  Outcome: Not Progressing     Problem: Nutrition Deficit:  Goal: Optimize nutritional status  4/9/2024 0825 by Laya Steele RN  Outcome: Progressing  4/9/2024 0140 by Lucia Ellis RN  Outcome: Not Progressing    wounds, or drain sites healing without S/S of infection  4/9/2024 0825 by Laya Steele, RN  Outcome: Progressing  4/9/2024 0140 by Lucia Ellis, RN  Outcome: Not Progressing

## 2024-04-09 NOTE — PROGRESS NOTES
0820: Shift assessment completed, morning medications given per MAR. VSS, alert and oriented. Patient reports no nausea or abdominal pain at this time. Call light within reach. The care plan and education has been reviewed and mutually agreed upon with the patient.

## 2024-04-09 NOTE — PROGRESS NOTES
Physical Therapy  Ana Gonzales  PT reviewed patient's chart. Upon arrival, patient semi reclined in chair with visitor present. Patient reports completing ambulation and stair mobility with OT this AM. Not interested in additional mobility this date. Will re-attempt on 4/10.  Thank you.  Arleen Mckeon PT, DPT, [id number]

## 2024-04-09 NOTE — FLOWSHEET NOTE
04/08/24 2131   Vital Signs   Temp 98.3 °F (36.8 °C)   Temp Source Oral   Pulse 79   Heart Rate Source Monitor   Respirations 16   BP 94/62   MAP (Calculated) 73   Pain Assessment   Pain Assessment None - Denies Pain   Oxygen Therapy   SpO2 92 %   O2 Device None (Room air)     PM assessment complete. Medications given as ordered. Vital signs stable. Pt had 2 episodes of bowel incontinence while en route to bathroom today. Large amount of loose stool x2. Bath wipes to lower half of body, new gown, socks, linens. Pt and spouse educated on fall risk. Asked for staff to assist for walks to bathroom, commode ok. PICC line intact to RUE. Plan of care reviewed. No other needs identified at this time.

## 2024-04-09 NOTE — PROGRESS NOTES
[facility] - Inpatient Rehabilitation Department   Phone: [phone]    Occupational Therapy    [] Initial Evaluation            [x] Daily Treatment Note         [] Discharge Summary      Patient: Ana Gonzales   : 1955   MRN: [id number]   Date of Service:  2024    Admitting Diagnosis:  SBO (small bowel obstruction) (HCC)    Current Admission Summary: 69 yo female initially presented with slurred speech and admitted with concern for TIA. CTA head/neck showed severe narrowing of distal left P1 segment of left PCA, CT head and MRI brain no acute findings. Neurology evaluated, recommend supportive care with asa when medically stable, intolerant statins.      Hospital course complicated by low Hgb on admission, requiring 2 units PRBCs. EGD (3/13) did not find any active bleeding, but did find blood clots and a large amount of liquid in the stomach. Placed on PPI gtt. Repeat EGD (3/14) did not show any bleed, but 2 clips were placed on small healing ulcer.      Patient subsequently developed partial SBO and had NGT placed (3/15). She underwent exp lap with lysis of adhesions (3/19). NGT removed and started on clear liquid diet (3/21) but again developed n/v and NG was replaced. Noted to have hematemesis post Ng placement and 3rd EGD 3/26 consistent with trauma from NGT placement. NGT clamped 3/28.      Noted to have left sided neck edema. CT soft tissue neck (3/17) concerning for airway narrowing and acute left parotiditis. She was seen by ENT and received decadron and Unasyn.         3/19/2024: Exploratory laparotomy with lysis of adhesions, Meckel diverticulectomy, and appendectomy.     3/23: NGT removed and did not tolerate well- has been having some issues with coffee-ground emesis and large volume output after a operation for small bowel obstruction- had NGT replaced 3/25    3/26: EGD - findings consistent with ileus    Past Medical History:  has a past medical history of Arthritis,

## 2024-04-09 NOTE — PROGRESS NOTES
Ana Gonzales is a 68 y.o. female patient.    Current Facility-Administered Medications   Medication Dose Route Frequency Provider Last Rate Last Admin    linaclotide (LINZESS) capsule 290 mcg  290 mcg Oral QAM  Felix Pendleton MD   290 mcg at 04/09/24 0626    0.9 % sodium chloride infusion   IntraVENous Continuous Srinivasa Morales MD 80 mL/hr at 04/09/24 1333 Rate Verify at 04/09/24 1333    HYDROmorphone HCl PF (DILAUDID) injection 0.25 mg  0.25 mg IntraVENous Q4H PRN Aria Carter APRN - CNP   0.25 mg at 04/08/24 0149    simethicone (MYLICON) chewable tablet 80 mg  80 mg Oral Q6H PRN Aria Carter APRN - CNP   80 mg at 04/07/24 0407    polyethylene glycol (GLYCOLAX) packet 17 g  17 g Oral Daily Pete Montalvo MD   17 g at 04/08/24 0830    sodium chloride flush 0.9 % injection 5-40 mL  5-40 mL IntraVENous 2 times per day Tc Shearer DO   10 mL at 04/09/24 0828    sodium chloride flush 0.9 % injection 5-40 mL  5-40 mL IntraVENous PRN Tc Shearer DO        0.9 % sodium chloride infusion  25 mL IntraVENous PRN Tc Shearer DO        Evolocumab SOAJ 140 mg  (Patient Supplied)  140 mg SubCUTAneous Q14 Days Arline Turpin APRN - CNP   140 mg at 04/02/24 1647    metoclopramide (REGLAN) injection 10 mg  10 mg IntraVENous Q6H Aria Carter APRN - CNP   10 mg at 04/09/24 1245    pantoprazole (PROTONIX) injection 40 mg  40 mg IntraVENous BID Shalonda Dominguez PA-C   40 mg at 04/09/24 0826    diatrizoate meglumine-sodium (GASTROGRAFIN) 66-10 % solution 5 mL  5 mL Oral ONCE PRN Srinivasa Morales MD   5 mL at 03/27/24 1238    phenol 1.4 % mouth spray 1 spray  1 spray Mouth/Throat Q2H PRN Aria Carter APRN - CNP        furosemide (LASIX) injection 20 mg  20 mg IntraVENous Daily SalLynsey sherwood PA-C   20 mg at 04/09/24 0827    mometasone-formoterol (DULERA) 200-5 MCG/ACT inhaler 2 puff  2 puff Inhalation BID RT Lynsey Garza PA-C   2 puff at 04/09/24 1021    albuterol sulfate HFA  (PROVENTIL;VENTOLIN;PROAIR) 108 (90 Base) MCG/ACT inhaler 2 puff  2 puff Inhalation Q6H PRN Lavern Son MD   2 puff at 03/26/24 0921    ipratropium (ATROVENT HFA) 17 MCG/ACT inhaler 2 puff  2 puff Inhalation Q6H PRN Laevrn Son MD        lidocaine 4 % external patch 1 patch  1 patch TransDERmal Daily Aria Carter, APRN - CNP   1 patch at 04/09/24 0827    enoxaparin (LOVENOX) injection 40 mg  40 mg SubCUTAneous Nightly Srinivasa Morales MD   40 mg at 04/08/24 2132    potassium chloride 20 mEq/50 mL IVPB (Central Line)  20 mEq IntraVENous PRN Srinivasa Morales MD   Stopped at 04/08/24 2015    sodium phosphate 15.63 mmol in sodium chloride 0.9 % 250 mL IVPB  0.16 mmol/kg IntraVENous PRN Srinivasa Morales MD   Stopped at 03/16/24 1252    [Held by provider] clopidogrel (PLAVIX) tablet 75 mg  75 mg Oral Daily Srinivasa Morales MD   75 mg at 03/24/24 0822    sodium chloride flush 0.9 % injection 5-40 mL  5-40 mL IntraVENous 2 times per day Srinivasa Morales MD   10 mL at 04/08/24 2144    sodium chloride flush 0.9 % injection 5-40 mL  5-40 mL IntraVENous PRN Srinivasa Morales MD   10 mL at 03/18/24 0903    0.9 % sodium chloride infusion   IntraVENous PRN Srinivasa Morales MD 10 mL/hr at 03/31/24 1440 50 mL at 03/31/24 1440    ondansetron (ZOFRAN) injection 4 mg  4 mg IntraVENous Q6H PRN Srinivasa Morales MD   4 mg at 04/07/24 1435    acetaminophen (TYLENOL) suppository 650 mg  650 mg Rectal Q6H PRN Srinivasa Morales MD   650 mg at 03/14/24 0337    ondansetron (ZOFRAN-ODT) disintegrating tablet 8 mg  8 mg Oral Q8H PRN Srinivasa Morales MD   8 mg at 03/31/24 1805    dilTIAZem (CARDIZEM CD) extended release capsule 120 mg  120 mg Oral Daily Srinivasa Morales MD   120 mg at 04/09/24 0827    [Held by provider] aspirin EC tablet 81 mg  81 mg Oral Daily Srinivasa Morales MD   81 mg at 03/24/24 0822    [Held by provider] isosorbide mononitrate (IMDUR) extended release

## 2024-04-09 NOTE — PLAN OF CARE
Problem: Pain  Goal: Verbalizes/displays adequate comfort level or baseline comfort level  Outcome: Not Progressing     Problem: Discharge Planning  Goal: Discharge to home or other facility with appropriate resources  Outcome: Not Progressing     Problem: Safety - Adult  Goal: Free from fall injury  Outcome: Not Progressing     Problem: Chronic Conditions and Co-morbidities  Goal: Patient's chronic conditions and co-morbidity symptoms are monitored and maintained or improved  Outcome: Not Progressing     Problem: ABCDS Injury Assessment  Goal: Absence of physical injury  Outcome: Not Progressing     Problem: Skin/Tissue Integrity  Goal: Absence of new skin breakdown  Description: 1.  Monitor for areas of redness and/or skin breakdown  2.  Assess vascular access sites hourly  3.  Every 4-6 hours minimum:  Change oxygen saturation probe site  4.  Every 4-6 hours:  If on nasal continuous positive airway pressure, respiratory therapy assess nares and determine need for appliance change or resting period.  Outcome: Not Progressing     Problem: Nutrition Deficit:  Goal: Optimize nutritional status  Outcome: Not Progressing     Problem: Musculoskeletal - Adult  Goal: Return mobility to safest level of function  Outcome: Not Progressing  Goal: Maintain proper alignment of affected body part  Outcome: Not Progressing     Problem: Gastrointestinal - Adult  Goal: Minimal or absence of nausea and vomiting  Outcome: Not Progressing  Goal: Maintains or returns to baseline bowel function  Outcome: Not Progressing     Problem: Genitourinary - Adult  Goal: Absence of urinary retention  Outcome: Not Progressing     Problem: Metabolic/Fluid and Electrolytes - Adult  Goal: Electrolytes maintained within normal limits  Outcome: Not Progressing  Goal: Hemodynamic stability and optimal renal function maintained  Outcome: Not Progressing  Goal: Glucose maintained within prescribed range  Outcome: Not Progressing     Problem: Skin/Tissue

## 2024-04-09 NOTE — PROGRESS NOTES
[facility]    4/9/2024 8:02 AM        Name: Ana Gonzales .              Admitted: 3/12/2024  Primary Care Provider: Mellisa Houston MD (Tel: [phone])      Hospital course: 67 yo female initially presented with slurred speech and admitted with concern for TIA. CTA head/neck showed severe narrowing of distal left P1 segment of left PCA, CT head and MRI brain no acute findings. Neurology evaluated, recommend supportive care with asa when medically stable, intolerant statins.     Hospital course complicated by low Hgb on admission, requiring 2 units PRBCs. EGD (3/13) did not find any active bleeding, but did find blood clots and a large amount of liquid in the stomach. Placed on PPI gtt. Repeat EGD (3/14) did not show any bleed, but 2 clips were placed on small healing ulcer.     Patient subsequently developed partial SBO and had NGT placed (3/15). She underwent exp lap with lysis of adhesions (3/19). NGT removed and started on clear liquid diet (3/21) but again developed n/v and NG was replaced. Noted to have hematemesis post NG placement and 3rd EGD 3/26 consistent with trauma from NGT placement. NGT removed 4/2, started on liquid diet 4/5, regular diet 4/6 with worsening symptoms, NPO 4/7.  TPN discontinued 4/6.    Noted to have left sided neck edema. CT soft tissue neck (3/17) concerning for airway narrowing and acute left parotiditis. She was seen by ENT and received short course steroid and Unasyn. Symptoms resolved, Unasyn discontinued 4/1        3/19/2024: Exploratory laparotomy with lysis of adhesions, Meckel diverticulectomy, and appendectomy.       Subjective:   Pt seen this am with  at the bedside. She is alert and in good spirits.  No current reports of pain.  Tolerated full liquids for breakfast and is anxious for \"real food\"   Having loose stools.      Reviewed interval ancillary

## 2024-04-09 NOTE — PROGRESS NOTES
BP 90/48. Pt asymptomatic. HR 79. Provider on call notified. Order to recheck in 1 hr. 1hr recheck normotensive at 105/70, HR 86.

## 2024-04-09 NOTE — PROGRESS NOTES
Gastroenterology Progress Note            Ana Gonzales is a 68 y.o. female patient.  Principal Problem:    SBO (small bowel obstruction) ([facility])  Active Problems:    Hyperlipidemia    HTN (hypertension), benign    Type 2 diabetes mellitus, without long-term current use of insulin (HCC)    TIA (transient ischemic attack)    Coronary artery disease involving native coronary artery of native heart without angina pectoris    (HFpEF) heart failure with preserved ejection fraction (HCC)    Dysarthria    PVC's (premature ventricular contractions)    Acute prerenal azotemia    History of heart artery stent    Upper GI bleed    Slurred speech    Ventricular bigeminy    HANS (acute kidney injury) (HCC)    Supraglottitis without airway obstruction    Acute parotitis    Laryngopharyngeal reflux (LPR)    Mild malnutrition (HCC)    Ileus (HCC)  Resolved Problems:    * No resolved hospital problems. *      SUBJECTIVE:  Feels better -- had 3 BM's yesterday. No abdominal pain or bloating. No N/V.       Physical    VITALS:  /72   Pulse 78   Temp 97.8 °F (36.6 °C) (Oral)   Resp 18   Ht 1.575 m (5' 2\")   Wt 91.7 kg (202 lb 3.2 oz)   SpO2 95%   BMI 36.98 kg/m²   TEMPERATURE:  Current - Temp: 97.8 °F (36.6 °C); Max - Temp  Av.9 °F (36.6 °C)  Min: 97.5 °F (36.4 °C)  Max: 98.3 °F (36.8 °C)    NAD  RRR, Nl s1s2  Lungs CTA Bilaterally, normal effort  Abdomen Obese, soft, ND, NT, no HSM, Bowel sounds normal.    Data    Data Review:    Recent Labs     24  0624  0610 24  0445   WBC 10.4 4.7 2.8*   HGB 11.5* 9.0* 9.2*   HCT 36.2 27.0* 29.3*   MCV 89.0 88.3 89.7    215 186       Recent Labs     24  0600 24  0610 24  1420 24  2153 24  0445   * 136  --   --   --    K 3.7 3.3* 3.2* 3.5  --    CL 99 107  --   --   --    CO2 21 18*  --   --   --    PHOS 5.1* 3.6  --   --  2.6   BUN 19 19  --   --   --    CREATININE 0.7 0.6  --   --   --        Recent Labs

## 2024-04-10 VITALS
SYSTOLIC BLOOD PRESSURE: 118 MMHG | HEIGHT: 62 IN | TEMPERATURE: 97.7 F | OXYGEN SATURATION: 94 % | HEART RATE: 80 BPM | BODY MASS INDEX: 37.39 KG/M2 | RESPIRATION RATE: 16 BRPM | WEIGHT: 203.2 LBS | DIASTOLIC BLOOD PRESSURE: 73 MMHG

## 2024-04-10 LAB
ALBUMIN SERPL-MCNC: 3 G/DL (ref 3.4–5)
ALP SERPL-CCNC: 90 U/L (ref 40–129)
ALT SERPL-CCNC: <5 U/L (ref 10–40)
ANION GAP SERPL CALCULATED.3IONS-SCNC: 11 MMOL/L (ref 3–16)
AST SERPL-CCNC: 10 U/L (ref 15–37)
BASOPHILS # BLD: 0 K/UL (ref 0–0.2)
BASOPHILS NFR BLD: 1.3 %
BILIRUB DIRECT SERPL-MCNC: <0.2 MG/DL (ref 0–0.3)
BILIRUB INDIRECT SERPL-MCNC: ABNORMAL MG/DL (ref 0–1)
BILIRUB SERPL-MCNC: <0.2 MG/DL (ref 0–1)
BUN SERPL-MCNC: 5 MG/DL (ref 7–20)
CALCIUM SERPL-MCNC: 8.5 MG/DL (ref 8.3–10.6)
CHLORIDE SERPL-SCNC: 108 MMOL/L (ref 99–110)
CO2 SERPL-SCNC: 20 MMOL/L (ref 21–32)
CREAT SERPL-MCNC: 0.6 MG/DL (ref 0.6–1.2)
DEPRECATED RDW RBC AUTO: 16.8 % (ref 12.4–15.4)
EOSINOPHIL # BLD: 0.1 K/UL (ref 0–0.6)
EOSINOPHIL NFR BLD: 3.1 %
GFR SERPLBLD CREATININE-BSD FMLA CKD-EPI: >90 ML/MIN/{1.73_M2}
GLUCOSE BLD-MCNC: 115 MG/DL (ref 70–99)
GLUCOSE SERPL-MCNC: 118 MG/DL (ref 70–99)
HCT VFR BLD AUTO: 29.3 % (ref 36–48)
HGB BLD-MCNC: 9.4 G/DL (ref 12–16)
LYMPHOCYTES # BLD: 0.9 K/UL (ref 1–5.1)
LYMPHOCYTES NFR BLD: 28.8 %
MAGNESIUM SERPL-MCNC: 1.7 MG/DL (ref 1.8–2.4)
MCH RBC QN AUTO: 28.3 PG (ref 26–34)
MCHC RBC AUTO-ENTMCNC: 32.1 G/DL (ref 31–36)
MCV RBC AUTO: 88.1 FL (ref 80–100)
MONOCYTES # BLD: 0.4 K/UL (ref 0–1.3)
MONOCYTES NFR BLD: 12.4 %
NEUTROPHILS # BLD: 1.7 K/UL (ref 1.7–7.7)
NEUTROPHILS NFR BLD: 54.4 %
PERFORMED ON: ABNORMAL
PHOSPHATE SERPL-MCNC: 3.1 MG/DL (ref 2.5–4.9)
PLATELET # BLD AUTO: 201 K/UL (ref 135–450)
PMV BLD AUTO: 9 FL (ref 5–10.5)
POTASSIUM SERPL-SCNC: 3.5 MMOL/L (ref 3.5–5.1)
PROT SERPL-MCNC: 5.7 G/DL (ref 6.4–8.2)
RBC # BLD AUTO: 3.33 M/UL (ref 4–5.2)
SODIUM SERPL-SCNC: 139 MMOL/L (ref 136–145)
WBC # BLD AUTO: 3.1 K/UL (ref 4–11)

## 2024-04-10 PROCEDURE — 6370000000 HC RX 637 (ALT 250 FOR IP): Performed by: SURGERY

## 2024-04-10 PROCEDURE — 6360000002 HC RX W HCPCS: Performed by: NURSE PRACTITIONER

## 2024-04-10 PROCEDURE — 99024 POSTOP FOLLOW-UP VISIT: CPT | Performed by: SURGERY

## 2024-04-10 PROCEDURE — 6360000002 HC RX W HCPCS: Performed by: PHYSICIAN ASSISTANT

## 2024-04-10 PROCEDURE — 2580000003 HC RX 258: Performed by: STUDENT IN AN ORGANIZED HEALTH CARE EDUCATION/TRAINING PROGRAM

## 2024-04-10 PROCEDURE — 80069 RENAL FUNCTION PANEL: CPT

## 2024-04-10 PROCEDURE — 80076 HEPATIC FUNCTION PANEL: CPT

## 2024-04-10 PROCEDURE — 85025 COMPLETE CBC W/AUTO DIFF WBC: CPT

## 2024-04-10 PROCEDURE — 6370000000 HC RX 637 (ALT 250 FOR IP): Performed by: NURSE PRACTITIONER

## 2024-04-10 PROCEDURE — 97535 SELF CARE MNGMENT TRAINING: CPT

## 2024-04-10 PROCEDURE — C9113 INJ PANTOPRAZOLE SODIUM, VIA: HCPCS | Performed by: PHYSICIAN ASSISTANT

## 2024-04-10 PROCEDURE — 6370000000 HC RX 637 (ALT 250 FOR IP): Performed by: INTERNAL MEDICINE

## 2024-04-10 PROCEDURE — 83735 ASSAY OF MAGNESIUM: CPT

## 2024-04-10 RX ORDER — DILTIAZEM HYDROCHLORIDE 120 MG/1
120 CAPSULE, COATED, EXTENDED RELEASE ORAL DAILY
Qty: 30 CAPSULE | Refills: 3 | Status: ON HOLD | OUTPATIENT
Start: 2024-04-11

## 2024-04-10 RX ORDER — CARVEDILOL 3.12 MG/1
3.12 TABLET ORAL 2 TIMES DAILY WITH MEALS
Qty: 60 TABLET | Refills: 3 | Status: ON HOLD | OUTPATIENT
Start: 2024-04-10

## 2024-04-10 RX ORDER — LANOLIN ALCOHOL/MO/W.PET/CERES
3 CREAM (GRAM) TOPICAL NIGHTLY PRN
Status: DISCONTINUED | OUTPATIENT
Start: 2024-04-10 | End: 2024-04-10 | Stop reason: HOSPADM

## 2024-04-10 RX ORDER — PANTOPRAZOLE SODIUM 40 MG/1
40 TABLET, DELAYED RELEASE ORAL
Qty: 60 TABLET | Refills: 2 | Status: ON HOLD | OUTPATIENT
Start: 2024-04-10

## 2024-04-10 RX ORDER — MAGNESIUM SULFATE IN WATER 40 MG/ML
2000 INJECTION, SOLUTION INTRAVENOUS ONCE
Status: DISCONTINUED | OUTPATIENT
Start: 2024-04-10 | End: 2024-04-10 | Stop reason: HOSPADM

## 2024-04-10 RX ORDER — MAGNESIUM SULFATE IN WATER 40 MG/ML
2000 INJECTION, SOLUTION INTRAVENOUS ONCE
Status: COMPLETED | OUTPATIENT
Start: 2024-04-10 | End: 2024-04-10

## 2024-04-10 RX ORDER — FUROSEMIDE 40 MG/1
40 TABLET ORAL DAILY
Qty: 180 TABLET | Refills: 3 | Status: ON HOLD
Start: 2024-04-10

## 2024-04-10 RX ADMIN — Medication 2 PUFF: at 08:08

## 2024-04-10 RX ADMIN — MECLIZINE HYDROCHLORIDE 25 MG: 12.5 TABLET ORAL at 08:23

## 2024-04-10 RX ADMIN — LINACLOTIDE 290 MCG: 145 CAPSULE, GELATIN COATED ORAL at 05:16

## 2024-04-10 RX ADMIN — FUROSEMIDE 20 MG: 10 INJECTION, SOLUTION INTRAMUSCULAR; INTRAVENOUS at 08:23

## 2024-04-10 RX ADMIN — SODIUM CHLORIDE, PRESERVATIVE FREE 10 ML: 5 INJECTION INTRAVENOUS at 08:23

## 2024-04-10 RX ADMIN — METHENAMINE HIPPURATE 1 G: 1 TABLET ORAL at 08:22

## 2024-04-10 RX ADMIN — GABAPENTIN 600 MG: 300 CAPSULE ORAL at 08:22

## 2024-04-10 RX ADMIN — PANTOPRAZOLE SODIUM 40 MG: 40 INJECTION, POWDER, FOR SOLUTION INTRAVENOUS at 08:23

## 2024-04-10 RX ADMIN — MAGNESIUM SULFATE HEPTAHYDRATE 2000 MG: 40 INJECTION, SOLUTION INTRAVENOUS at 08:36

## 2024-04-10 RX ADMIN — GABAPENTIN 600 MG: 300 CAPSULE ORAL at 14:17

## 2024-04-10 RX ADMIN — METOCLOPRAMIDE 10 MG: 5 INJECTION, SOLUTION INTRAMUSCULAR; INTRAVENOUS at 01:06

## 2024-04-10 RX ADMIN — BUPROPION HYDROCHLORIDE 300 MG: 300 TABLET, EXTENDED RELEASE ORAL at 08:22

## 2024-04-10 RX ADMIN — MECLIZINE HYDROCHLORIDE 25 MG: 12.5 TABLET ORAL at 14:17

## 2024-04-10 RX ADMIN — CARVEDILOL 3.12 MG: 3.12 TABLET, FILM COATED ORAL at 08:22

## 2024-04-10 RX ADMIN — MELATONIN TAB 3 MG 3 MG: 3 TAB at 01:17

## 2024-04-10 RX ADMIN — DILTIAZEM HYDROCHLORIDE 120 MG: 120 CAPSULE, COATED, EXTENDED RELEASE ORAL at 08:22

## 2024-04-10 RX ADMIN — TROSPIUM CHLORIDE 20 MG: 20 TABLET, FILM COATED ORAL at 05:16

## 2024-04-10 RX ADMIN — METOCLOPRAMIDE 10 MG: 5 INJECTION, SOLUTION INTRAMUSCULAR; INTRAVENOUS at 05:16

## 2024-04-10 RX ADMIN — TOPIRAMATE 100 MG: 100 TABLET, FILM COATED ORAL at 08:22
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
## 2024-04-10 NOTE — PLAN OF CARE
Problem: Pain  Goal: Verbalizes/displays adequate comfort level or baseline comfort level  4/9/2024 2115 by Roseline Meyer RN  Outcome: Progressing  4/9/2024 0825 by Laya Steele RN  Outcome: Progressing     Problem: Discharge Planning  Goal: Discharge to home or other facility with appropriate resources  4/9/2024 2115 by Roseline Meyer RN  Outcome: Progressing  4/9/2024 0825 by Laya Steele RN  Outcome: Progressing     Problem: Safety - Adult  Goal: Free from fall injury  4/9/2024 2115 by Roseline Meyer RN  Outcome: Progressing  4/9/2024 0825 by Laya Steele RN  Outcome: Progressing     Problem: Chronic Conditions and Co-morbidities  Goal: Patient's chronic conditions and co-morbidity symptoms are monitored and maintained or improved  4/9/2024 2115 by Roseline Meyer RN  Outcome: Progressing  4/9/2024 0825 by Laya Steele RN  Outcome: Progressing     Problem: ABCDS Injury Assessment  Goal: Absence of physical injury  4/9/2024 2115 by Roseline Meyer RN  Outcome: Progressing  4/9/2024 0825 by Laya Steele RN  Outcome: Progressing     Problem: Skin/Tissue Integrity  Goal: Absence of new skin breakdown  Description: 1.  Monitor for areas of redness and/or skin breakdown  2.  Assess vascular access sites hourly  3.  Every 4-6 hours minimum:  Change oxygen saturation probe site  4.  Every 4-6 hours:  If on nasal continuous positive airway pressure, respiratory therapy assess nares and determine need for appliance change or resting period.  4/9/2024 2115 by Roseline Meyer RN  Outcome: Progressing  4/9/2024 0825 by Laya Steele RN  Outcome: Progressing     Problem: Nutrition Deficit:  Goal: Optimize nutritional status  4/9/2024 2115 by Roseline Meyer RN  Outcome: Progressing  4/9/2024 0825 by Laya Steele RN  Outcome: Progressing     Problem: Musculoskeletal - Adult  Goal: Return mobility to safest level of function  4/9/2024 2115 by Roseline Meyer RN  Outcome:

## 2024-04-10 NOTE — CARE COORDINATION
Discharge Planning Note:    - PT/OT recommends HHC    Met with the patient. HHC options were reviewed and the following referral was placed at the request of the patient. The patient has used them in the past.    - [facility] -    Will continue to follow.    DEYANIRA Escobar RN    [facility]  Phone: [phone]

## 2024-04-10 NOTE — PROGRESS NOTES
CLINICAL PHARMACY NOTE: MEDS TO BEDS    Total # of Prescriptions Filled: 3   The following medications were delivered to the patient:  PANTOPRAZOLE SODIUM 40MG TBEC  DILTIAZEM HCL ER COATED  CP24  CARVEDILOL 3.125MG TABS    Additional Documentation: Patient  picked up=signed  Katherine Gomezls Pharmacy Tech

## 2024-04-10 NOTE — PROGRESS NOTES
Interval placement a right IJ central venous catheter without discrete pneumothorax seen.     CTA HEAD NECK W CONTRAST    Result Date: 3/12/2024  EXAMINATION: CTA OF THE HEAD AND NECK WITH CONTRAST; CT OF THE HEAD WITHOUT CONTRAST 3/12/2024 8:52 am: TECHNIQUE: CTA of the head and neck was performed with the administration of intravenous contrast. Multiplanar reformatted images are provided for review.  MIP images are provided for review. Stenosis of the internal carotid arteries measured using NASCET criteria. Automated exposure control, iterative reconstruction, and/or weight based adjustment of the mA/kV was utilized to reduce the radiation dose to as low as reasonably achievable.; CT of the head was performed without the administration of intravenous contrast. Automated exposure control, iterative reconstruction, and/or weight based adjustment of the mA/kV was utilized to reduce the radiation dose to as low as reasonably achievable. Noncontrast CT of the head with reconstructed 2-D images are also provided for review. COMPARISON: None. HISTORY: ORDERING SYSTEM PROVIDED HISTORY: slurred speech, H/O intranial hemorrhage FINDINGS: CT HEAD: BRAIN/VENTRICLES:  No acute intracranial hemorrhage or extraaxial fluid collection. Grey-white differentiation is maintained.  No evidence of mass, mass effect or midline shift.  No evidence of hydrocephalus. ORBITS: The visualized portion of the orbits demonstrate no acute abnormality. Bilateral pseudophakia. SINUSES:  The visualized paranasal sinuses and mastoid air cells demonstrate no acute abnormality. SOFT TISSUES/SKULL: No acute abnormality of the visualized skull or soft tissues. CTA NECK: AORTIC ARCH/ARCH VESSELS: No dissection or arterial injury.  No significant stenosis of the brachiocephalic or subclavian arteries. CAROTID ARTERIES: No dissection, arterial injury, or hemodynamically significant stenosis by NASCET criteria. VERTEBRAL ARTERIES: No dissection,  nausea and vomiting)    Spinal stenosis of lumbar region    TIA (transient ischemic attack)    Word finding difficulty    Laryngitis, acute    Chronic sinusitis    Frontal headache    Chronic cough    Palpitations    Anginal equivalent (HCC)    Abnormal stress test    Obesity due to excess calories with serious comorbidity    Coronary artery disease involving native coronary artery of native heart without angina pectoris    (HFpEF) heart failure with preserved ejection fraction (HCC)    TIA involving left internal carotid artery    Dysarthria    Upper GI hemorrhage    PVC's (premature ventricular contractions)    Acute prerenal azotemia    History of heart artery stent    Upper GI bleed    Slurred speech    Ventricular bigeminy    HANS (acute kidney injury) (HCC)    SBO (small bowel obstruction) (HCC)    Supraglottitis without airway obstruction    Acute parotitis    Laryngopharyngeal reflux (LPR)    Mild malnutrition (HCC)    Ileus ([facility])     OR Date 3/19/24, Exploratory laparotomy with lysis of adhesions, Meckel diverticulectomy, and appendectom     Plan:  1. Tolerating diet  2. Pain controlled  3. Passing stool  4. Ambulating  5. Defer management of remainder of medical comorbidities to primary and consulting teams  6. Ready for discharge from surgical standpoint, follow with Dr. Morales office 2 weeks    Lb Knight MD, FACS  4/10/2024  8:25 PM

## 2024-04-10 NOTE — PLAN OF CARE
Problem: Pain  Goal: Verbalizes/displays adequate comfort level or baseline comfort level  4/10/2024 0911 by Madhavi Galvez, RN  Outcome: Progressing     Problem: Discharge Planning  Goal: Discharge to home or other facility with appropriate resources  4/10/2024 0911 by Madhavi Galvez, RN  Outcome: Progressing  Flowsheets (Taken 4/10/2024 0815)  Discharge to home or other facility with appropriate resources: Identify barriers to discharge with patient and caregiver     Problem: Gastrointestinal - Adult  Goal: Minimal or absence of nausea and vomiting  4/10/2024 0911 by Madhavi Galvez, RN  Outcome: Progressing     Problem: Gastrointestinal - Adult  Goal: Maintains or returns to baseline bowel function  4/10/2024 0911 by Madhavi Galvez, RN  Outcome: Progressing

## 2024-04-10 NOTE — PROGRESS NOTES
Pt. A&O x 4. All meds and oral intake tolerated well. No s/s of acute distress noted. Up in chair with  at bedside. Care plan and education reviewed with patient. Call light and safety needs in place at this time.

## 2024-04-10 NOTE — DISCHARGE SUMMARY
[facility]    Patient Demographics    Patient. Aan Gonzales  Date of Birth. 1955  MRN. [id number]     Primary care provider. Mellisa Houston MD  (Tel: [phone])    Admit date: 3/12/2024    Discharge date 4/10/2024  Note Date: 4/10/2024     Reason for Hospitalization.   Chief Complaint   Patient presents with    Bradycardia     Pt coming in with  pt has been bradycardic, weakness, slurred speech for a few days.  Vomiting yesterday with black emesis          Significant Findings.   Principal Problem:    SBO (small bowel obstruction) ([facility])  Active Problems:    Hyperlipidemia    HTN (hypertension), benign    Type 2 diabetes mellitus, without long-term current use of insulin ([facility])    TIA (transient ischemic attack)    Coronary artery disease involving native coronary artery of native heart without angina pectoris    (HFpEF) heart failure with preserved ejection fraction ([facility])    Dysarthria    PVC's (premature ventricular contractions)    Acute prerenal azotemia    History of heart artery stent    Upper GI bleed    Slurred speech    Ventricular bigeminy    HANS (acute kidney injury) ([facility])    Supraglottitis without airway obstruction    Acute parotitis    Laryngopharyngeal reflux (LPR)    Mild malnutrition ([facility])    Ileus ([facility])  Resolved Problems:    * No resolved hospital problems. *       Problems and results from this hospitalization that need follow up.  Check cbc and renal panel with magnesium q Monday , results to PCP  Follow up with cardiology,  needs outpatient stress test vs MRI      Significant test results and incidental findings.  CXR 3/12/2024:  IMPRESSION:  No significant findings in the chest.     CT head / CTA head/neck 3/12/2024:  IMPRESSION:  1. No acute intracranial abnormality within constraints of CT acquisition.  2. Severe narrowing of the distal left P1

## 2024-04-10 NOTE — PROGRESS NOTES
Department of Physical Medicine & Rehabilitation  Dr. Sanchez Progress Note  4/10/2024  10:49 AM    Patient Name:   Ana Gonzales  YOB: 1955    Diagnosis: SBO (small bowel obstruction) ([facility])        Subjective: Patient seen this morning for follow-up about rehab unit treatment.  Patient also discussed with her nurse practitioner today.  Patient has improved enough with her therapies that she feels she can return home with the help of her  and family.  Her home is set up for the handicapped, and both  and patient feel that she could return home safely when she is medically ready.  She is not interested in staying in our hospital for further treatment on our rehabilitation unit.    /73   Pulse 80   Temp 97.7 °F (36.5 °C) (Oral)   Resp 16   Ht 1.575 m (5' 2\")   Wt 92.2 kg (203 lb 3.2 oz)   SpO2 94%   BMI 37.17 kg/m²     Last 24 hour lab  Recent Results (from the past 24 hour(s))   POCT Glucose    Collection Time: 04/09/24 11:01 AM   Result Value Ref Range    POC Glucose 121 (H) 70 - 99 mg/dl    Performed on ACCU-CHEK    POCT Glucose    Collection Time: 04/09/24  4:01 PM   Result Value Ref Range    POC Glucose 90 70 - 99 mg/dl    Performed on ACCU-CHEK    POCT Glucose    Collection Time: 04/09/24  9:05 PM   Result Value Ref Range    POC Glucose 79 70 - 99 mg/dl    Performed on ACCU-CHEK    Magnesium    Collection Time: 04/10/24  5:30 AM   Result Value Ref Range    Magnesium 1.70 (L) 1.80 - 2.40 mg/dL   Renal Function Panel    Collection Time: 04/10/24  5:30 AM   Result Value Ref Range    Sodium 139 136 - 145 mmol/L    Potassium 3.5 3.5 - 5.1 mmol/L    Chloride 108 99 - 110 mmol/L    CO2 20 (L) 21 - 32 mmol/L    Anion Gap 11 3 - 16    Glucose 118 (H) 70 - 99 mg/dL    BUN 5 (L) 7 - 20 mg/dL    Creatinine 0.6 0.6 - 1.2 mg/dL    Est, Glom Filt Rate >90 >60    Calcium 8.5 8.3 - 10.6 mg/dL    Phosphorus 3.1 2.5 - 4.9 mg/dL    Albumin 3.0 (L) 3.4 - 5.0 g/dL   CBC with Auto Differential

## 2024-04-10 NOTE — PROGRESS NOTES
[facility]    4/10/2024 7:35 AM        Name: Ana Gonzales .              Admitted: 3/12/2024  Primary Care Provider: Mellisa Houston MD (Tel: [phone])      Hospital course: 69 yo female initially presented with slurred speech and admitted with concern for TIA. CTA head/neck showed severe narrowing of distal left P1 segment of left PCA, CT head and MRI brain no acute findings. Neurology evaluated, recommend supportive care with asa when medically stable, intolerant statins.     Hospital course complicated by low Hgb on admission, requiring 2 units PRBCs. EGD (3/13) did not find any active bleeding, but did find blood clots and a large amount of liquid in the stomach. Placed on PPI gtt. Repeat EGD (3/14) did not show any bleed, but 2 clips were placed on small healing ulcer.     Patient subsequently developed partial SBO and had NGT placed (3/15). She underwent exp lap with lysis of adhesions (3/19). NGT removed and started on clear liquid diet (3/21) but again developed n/v and NG was replaced. Noted to have hematemesis post NG placement and 3rd EGD 3/26 consistent with trauma from NGT placement. NGT removed 4/2, started on liquid diet 4/5, regular diet 4/6 with worsening symptoms, NPO 4/7.  TPN discontinued 4/6.    Noted to have left sided neck edema. CT soft tissue neck (3/17) concerning for airway narrowing and acute left parotiditis. She was seen by ENT and received short course steroid and Unasyn. Symptoms resolved, Unasyn discontinued 4/1        3/19/2024: Exploratory laparotomy with lysis of adhesions, Meckel diverticulectomy, and appendectomy.       Subjective:   Pt seen this am with  at the bedside. She is alert and in good spirits.  No current reports of pain.   Looks bloated today but denies.  Tolerated mashed potatoes , toast etc.   Has not required any pain meds.       Needs to  4/3/2024:  IMPRESSION:  Gas-filled pathologically dilated loops of small bowel, worsened from  comparison consistent with ongoing small bowel obstruction.    Small bowel follow through 4/4/2024:  IMPRESSION:  Constipation with pseudo-obstruction versus severe ileus.    CXR 4/4/2024:  IMPRESSION:  No acute cardiopulmonary disease, no change compared to the prior study.      Problem List  Principal Problem:    SBO (small bowel obstruction) ([facility])  Active Problems:    Hyperlipidemia    HTN (hypertension), benign    Type 2 diabetes mellitus, without long-term current use of insulin ([facility])    TIA (transient ischemic attack)    Coronary artery disease involving native coronary artery of native heart without angina pectoris    (HFpEF) heart failure with preserved ejection fraction ([facility])    Dysarthria    PVC's (premature ventricular contractions)    Acute prerenal azotemia    History of heart artery stent    Upper GI bleed    Slurred speech    Ventricular bigeminy    HANS (acute kidney injury) ([facility])    Supraglottitis without airway obstruction    Acute parotitis    Laryngopharyngeal reflux (LPR)    Mild malnutrition ([facility])    Ileus ([facility])  Resolved Problems:    * No resolved hospital problems. *       Assessment & Plan:     UGI bleed  - Hgb 6.6 on presentation  - Received 2 units 3/13, 1 unit 3/15  - Found to have gastric ulcer on EGD (3/14) with 2 clips placed  - Gastric biopsies negative for H.pylori  - Repeat EGD (3/26) for hematemesis revealed NG trauma. No active bleeding from previously noted ulcers  - Iron levels low on labs (3/14), and she received IV Venofer 200 mg x 5 doses  - Hgb 9.2 today  - Continue IV BID Protonix  - Continue to follow    SBO / persistent ileus  - Underwent exp lap with lysis of adhesions, appendectomy and diverticulectomy 3/19 by Dr Morales  - Had NGT removed (3/23) and was stated on clears but replaced 3/24 for vomiting and repeat xray showing ileus  - GI suspects underlying chronic

## 2024-04-12 ENCOUNTER — APPOINTMENT (OUTPATIENT)
Dept: GENERAL RADIOLOGY | Age: 69
End: 2024-04-12
Payer: MEDICARE

## 2024-04-12 ENCOUNTER — APPOINTMENT (OUTPATIENT)
Dept: CT IMAGING | Age: 69
End: 2024-04-12
Payer: MEDICARE

## 2024-04-12 ENCOUNTER — TELEPHONE (OUTPATIENT)
Dept: SURGERY | Age: 69
End: 2024-04-12

## 2024-04-12 ENCOUNTER — HOSPITAL ENCOUNTER (INPATIENT)
Age: 69
LOS: 20 days | Discharge: NURSING, REHAB OR LONG-TERM CARE | End: 2024-05-02
Attending: EMERGENCY MEDICINE | Admitting: STUDENT IN AN ORGANIZED HEALTH CARE EDUCATION/TRAINING PROGRAM
Payer: MEDICARE

## 2024-04-12 DIAGNOSIS — K56.600 PARTIAL SMALL BOWEL OBSTRUCTION (HCC): Primary | ICD-10-CM

## 2024-04-12 DIAGNOSIS — R09.02 HYPOXIA: ICD-10-CM

## 2024-04-12 LAB
ALBUMIN SERPL-MCNC: 3.9 G/DL (ref 3.4–5)
ALBUMIN/GLOB SERPL: 1.1 {RATIO} (ref 1.1–2.2)
ALP SERPL-CCNC: 111 U/L (ref 40–129)
ALT SERPL-CCNC: <5 U/L (ref 10–40)
ANION GAP SERPL CALCULATED.3IONS-SCNC: 20 MMOL/L (ref 3–16)
AST SERPL-CCNC: 14 U/L (ref 15–37)
BASOPHILS # BLD: 0 K/UL (ref 0–0.2)
BASOPHILS NFR BLD: 0.4 %
BILIRUB SERPL-MCNC: <0.2 MG/DL (ref 0–1)
BUN SERPL-MCNC: 10 MG/DL (ref 7–20)
CALCIUM SERPL-MCNC: 9.9 MG/DL (ref 8.3–10.6)
CHLORIDE SERPL-SCNC: 99 MMOL/L (ref 99–110)
CO2 SERPL-SCNC: 18 MMOL/L (ref 21–32)
CREAT SERPL-MCNC: 1.1 MG/DL (ref 0.6–1.2)
DEPRECATED RDW RBC AUTO: 17.2 % (ref 12.4–15.4)
EOSINOPHIL # BLD: 0 K/UL (ref 0–0.6)
EOSINOPHIL NFR BLD: 0.2 %
GFR SERPLBLD CREATININE-BSD FMLA CKD-EPI: 54 ML/MIN/{1.73_M2}
GLUCOSE SERPL-MCNC: 126 MG/DL (ref 70–99)
HCT VFR BLD AUTO: 35.1 % (ref 36–48)
HGB BLD-MCNC: 11.2 G/DL (ref 12–16)
LACTATE BLDV-SCNC: 0.9 MMOL/L (ref 0.4–1.9)
LYMPHOCYTES # BLD: 1 K/UL (ref 1–5.1)
LYMPHOCYTES NFR BLD: 11.1 %
MAGNESIUM SERPL-MCNC: 1.9 MG/DL (ref 1.8–2.4)
MCH RBC QN AUTO: 27.9 PG (ref 26–34)
MCHC RBC AUTO-ENTMCNC: 31.9 G/DL (ref 31–36)
MCV RBC AUTO: 87.7 FL (ref 80–100)
MONOCYTES # BLD: 0.5 K/UL (ref 0–1.3)
MONOCYTES NFR BLD: 5 %
NEUTROPHILS # BLD: 7.8 K/UL (ref 1.7–7.7)
NEUTROPHILS NFR BLD: 83.3 %
NT-PROBNP SERPL-MCNC: 319 PG/ML (ref 0–124)
PLATELET # BLD AUTO: 337 K/UL (ref 135–450)
PMV BLD AUTO: 9 FL (ref 5–10.5)
POTASSIUM SERPL-SCNC: 3.4 MMOL/L (ref 3.5–5.1)
PROT SERPL-MCNC: 7.4 G/DL (ref 6.4–8.2)
RBC # BLD AUTO: 4 M/UL (ref 4–5.2)
SODIUM SERPL-SCNC: 137 MMOL/L (ref 136–145)
TROPONIN, HIGH SENSITIVITY: 10 NG/L (ref 0–14)
TROPONIN, HIGH SENSITIVITY: 13 NG/L (ref 0–14)
WBC # BLD AUTO: 9.4 K/UL (ref 4–11)

## 2024-04-12 PROCEDURE — 82270 OCCULT BLOOD FECES: CPT

## 2024-04-12 PROCEDURE — 36415 COLL VENOUS BLD VENIPUNCTURE: CPT

## 2024-04-12 PROCEDURE — 87449 NOS EACH ORGANISM AG IA: CPT

## 2024-04-12 PROCEDURE — 71045 X-RAY EXAM CHEST 1 VIEW: CPT

## 2024-04-12 PROCEDURE — 70450 CT HEAD/BRAIN W/O DYE: CPT

## 2024-04-12 PROCEDURE — 99285 EMERGENCY DEPT VISIT HI MDM: CPT

## 2024-04-12 PROCEDURE — 1200000000 HC SEMI PRIVATE

## 2024-04-12 PROCEDURE — 80053 COMPREHEN METABOLIC PANEL: CPT

## 2024-04-12 PROCEDURE — 87324 CLOSTRIDIUM AG IA: CPT

## 2024-04-12 PROCEDURE — 84484 ASSAY OF TROPONIN QUANT: CPT

## 2024-04-12 PROCEDURE — 83735 ASSAY OF MAGNESIUM: CPT

## 2024-04-12 PROCEDURE — 6360000004 HC RX CONTRAST MEDICATION: Performed by: PHYSICIAN ASSISTANT

## 2024-04-12 PROCEDURE — 71260 CT THORAX DX C+: CPT

## 2024-04-12 PROCEDURE — 87040 BLOOD CULTURE FOR BACTERIA: CPT

## 2024-04-12 PROCEDURE — 83880 ASSAY OF NATRIURETIC PEPTIDE: CPT

## 2024-04-12 PROCEDURE — 85025 COMPLETE CBC W/AUTO DIFF WBC: CPT

## 2024-04-12 PROCEDURE — 87506 IADNA-DNA/RNA PROBE TQ 6-11: CPT

## 2024-04-12 PROCEDURE — 6360000002 HC RX W HCPCS: Performed by: PHYSICIAN ASSISTANT

## 2024-04-12 PROCEDURE — 93005 ELECTROCARDIOGRAM TRACING: CPT | Performed by: EMERGENCY MEDICINE

## 2024-04-12 PROCEDURE — 74177 CT ABD & PELVIS W/CONTRAST: CPT

## 2024-04-12 PROCEDURE — 96374 THER/PROPH/DIAG INJ IV PUSH: CPT

## 2024-04-12 PROCEDURE — 83605 ASSAY OF LACTIC ACID: CPT

## 2024-04-12 RX ORDER — POLYETHYLENE GLYCOL 3350 17 G/17G
17 POWDER, FOR SOLUTION ORAL DAILY PRN
Status: DISCONTINUED | OUTPATIENT
Start: 2024-04-12 | End: 2024-05-02 | Stop reason: HOSPADM

## 2024-04-12 RX ORDER — ACETAMINOPHEN 650 MG/1
650 SUPPOSITORY RECTAL EVERY 6 HOURS PRN
Status: DISCONTINUED | OUTPATIENT
Start: 2024-04-12 | End: 2024-05-02 | Stop reason: HOSPADM

## 2024-04-12 RX ORDER — MAGNESIUM SULFATE IN WATER 40 MG/ML
2000 INJECTION, SOLUTION INTRAVENOUS PRN
Status: DISCONTINUED | OUTPATIENT
Start: 2024-04-12 | End: 2024-05-02 | Stop reason: HOSPADM

## 2024-04-12 RX ORDER — ONDANSETRON 2 MG/ML
4 INJECTION INTRAMUSCULAR; INTRAVENOUS EVERY 6 HOURS PRN
Status: DISCONTINUED | OUTPATIENT
Start: 2024-04-12 | End: 2024-05-02 | Stop reason: HOSPADM

## 2024-04-12 RX ORDER — ONDANSETRON 4 MG/1
4 TABLET, ORALLY DISINTEGRATING ORAL EVERY 8 HOURS PRN
Status: DISCONTINUED | OUTPATIENT
Start: 2024-04-12 | End: 2024-05-02 | Stop reason: HOSPADM

## 2024-04-12 RX ORDER — ONDANSETRON 2 MG/ML
4 INJECTION INTRAMUSCULAR; INTRAVENOUS EVERY 6 HOURS PRN
Status: DISCONTINUED | OUTPATIENT
Start: 2024-04-12 | End: 2024-04-12 | Stop reason: ALTCHOICE

## 2024-04-12 RX ORDER — POTASSIUM CHLORIDE 20 MEQ/1
40 TABLET, EXTENDED RELEASE ORAL PRN
Status: DISCONTINUED | OUTPATIENT
Start: 2024-04-12 | End: 2024-04-14

## 2024-04-12 RX ORDER — MORPHINE SULFATE 2 MG/ML
2 INJECTION, SOLUTION INTRAMUSCULAR; INTRAVENOUS EVERY 4 HOURS PRN
Status: DISCONTINUED | OUTPATIENT
Start: 2024-04-12 | End: 2024-04-17

## 2024-04-12 RX ORDER — SODIUM CHLORIDE 9 MG/ML
INJECTION, SOLUTION INTRAVENOUS CONTINUOUS
Status: DISCONTINUED | OUTPATIENT
Start: 2024-04-13 | End: 2024-04-13

## 2024-04-12 RX ORDER — ACETAMINOPHEN 325 MG/1
650 TABLET ORAL EVERY 6 HOURS PRN
Status: DISCONTINUED | OUTPATIENT
Start: 2024-04-12 | End: 2024-05-02 | Stop reason: HOSPADM

## 2024-04-12 RX ORDER — ENOXAPARIN SODIUM 100 MG/ML
40 INJECTION SUBCUTANEOUS DAILY
Status: DISCONTINUED | OUTPATIENT
Start: 2024-04-13 | End: 2024-04-17

## 2024-04-12 RX ORDER — SODIUM CHLORIDE 0.9 % (FLUSH) 0.9 %
5-40 SYRINGE (ML) INJECTION EVERY 12 HOURS SCHEDULED
Status: DISCONTINUED | OUTPATIENT
Start: 2024-04-13 | End: 2024-05-02 | Stop reason: HOSPADM

## 2024-04-12 RX ORDER — SODIUM CHLORIDE 0.9 % (FLUSH) 0.9 %
5-40 SYRINGE (ML) INJECTION PRN
Status: DISCONTINUED | OUTPATIENT
Start: 2024-04-12 | End: 2024-05-02 | Stop reason: HOSPADM

## 2024-04-12 RX ORDER — SODIUM CHLORIDE 9 MG/ML
INJECTION, SOLUTION INTRAVENOUS PRN
Status: DISCONTINUED | OUTPATIENT
Start: 2024-04-12 | End: 2024-05-02 | Stop reason: HOSPADM

## 2024-04-12 RX ORDER — POTASSIUM CHLORIDE 7.45 MG/ML
10 INJECTION INTRAVENOUS PRN
Status: DISCONTINUED | OUTPATIENT
Start: 2024-04-12 | End: 2024-04-14

## 2024-04-12 RX ORDER — ARIPIPRAZOLE 2 MG/1
2 TABLET ORAL NIGHTLY
Status: DISCONTINUED | OUTPATIENT
Start: 2024-04-13 | End: 2024-05-02 | Stop reason: HOSPADM

## 2024-04-12 RX ORDER — IPRATROPIUM BROMIDE AND ALBUTEROL SULFATE 2.5; .5 MG/3ML; MG/3ML
1 SOLUTION RESPIRATORY (INHALATION) EVERY 6 HOURS PRN
Status: DISCONTINUED | OUTPATIENT
Start: 2024-04-12 | End: 2024-05-02 | Stop reason: HOSPADM

## 2024-04-12 RX ADMIN — IOPAMIDOL 75 ML: 755 INJECTION, SOLUTION INTRAVENOUS at 20:05

## 2024-04-12 RX ADMIN — ONDANSETRON 4 MG: 2 INJECTION INTRAMUSCULAR; INTRAVENOUS at 18:19

## 2024-04-12 RX ADMIN — IOPAMIDOL 75 ML: 755 INJECTION, SOLUTION INTRAVENOUS at 16:22

## 2024-04-12 NOTE — TELEPHONE ENCOUNTER
Pt released 4/10/24 from hospital. Starting vomiting this morning. Pt is coughing up green mucous. Home health nurse said her pulse was high. BP was low.  nurse just left and is concerned about hydration. Please call and advise [phone]

## 2024-04-12 NOTE — TELEPHONE ENCOUNTER
Pt states she has been vomiting all day.  Advised pt to go to ED to be evaluated due to recent surgery.

## 2024-04-12 NOTE — ED PROVIDER NOTES
In addition to the advanced practice provider, I personally saw Ana Gonzales and performed a substantive portion of the visit including all aspects of the medical decision making.    Medical Decision Making  Patient presented to the emergency department today with generalized weakness, nausea, and vomiting.  She was recently discharged after admission for small bowel obstruction for which she had surgery on March 19 for lysis of adhesions, Meckel diverticulectomy, and appendectomy.  She was feeling well until she developed her generalized weakness, nausea, and vomiting suddenly earlier today, which is consistent with the development of a new small bowel obstruction.  CT of her abdomen and pelvis confirmed this with evidence consistent with such diagnosis.  Despite her diagnosis and presentation, patient denied having abdominal pain, but she was very tender on exam.  I ordered an NG tube to decompress her intestines and stomach.  However, patient refused this having had it several times previously.  We discussed the reasons for this and its importance, which patient acknowledged, but maintained her refusal.      Patient was also hypoxic on arrival, with unknown etiology.  This stabilized after being placed on 2L nasal cannula and has not developed again.  We performed a broad workup to determine the cause, but chest x-ray was negative for as was a CT of her chest for pulmonary embolism.    We were able to obtain a stool sample in the emergency department.  Given her recurrent issues, I decided to send this for testing despite having no major complaint of diarrhea.    We performed a broad workup for other etiologies of her generalized weakness, all of which was unremarkable.  Although patient was slightly anemic, her hemoglobin and hematocrit were much improved comparison to previous labs from 2 days ago.  Serial troponins are normal and EKG showed no evidence of acute ischemia.  She was slightly acidotic, but had  normal lactic acid as well.      EKG  The Ekg interpreted by me in the absence of a cardiologist shows.  normal sinus rhythm with a rate of 87 and PACs  Axis is   Normal  QTc is   prolonged  Intervals and Durations are unremarkable.      No specific ST-T wave changes appreciated.  No evidence of acute ischemia.   No significant change from prior EKG dated 3/12/2024      SEP-1  Is this patient to be included in the SEP-1 Core Measure due to severe sepsis or septic shock?   No   Exclusion criteria - the patient is NOT to be included for SEP-1 Core Measure due to:  2+ SIRS criteria are not met    FINAL IMPRESSION  1. Partial small bowel obstruction (HCC)    2. Hypoxia        Blood pressure 133/65, pulse 78, temperature (!) 96.7 °F (35.9 °C), temperature source Oral, resp. rate 18, SpO2 99 %, not currently breastfeeding.     For further details of Ana Gonzales's emergency department encounter, please see documentation by advanced practice provider, MONA Woo.        Randal Pena MD  04/12/24 8739

## 2024-04-12 NOTE — PROGRESS NOTES
Pharmacy Home Medication Reconciliation Note    A medication reconciliation has been completed for Ana Gonzales 1955    Pharmacy: [facility] [address] [place] [address]. [place], [place]    Information provided by: Patient and     The patient's home medication list is as follows:  No current facility-administered medications on file prior to encounter.     Current Outpatient Medications on File Prior to Encounter   Medication Sig Dispense Refill    carvedilol (COREG) 3.125 MG tablet Take 1 tablet by mouth 2 times daily (with meals) 60 tablet 3    dilTIAZem (CARDIZEM CD) 120 MG extended release capsule Take 1 capsule by mouth daily 30 capsule 3    furosemide (LASIX) 40 MG tablet Take 1 tablet by mouth daily 180 tablet 3    linaclotide (LINZESS) 290 MCG CAPS capsule Take 1 capsule by mouth every morning (before breakfast) for 14 days 30 capsule 0    pantoprazole (PROTONIX) 40 MG tablet Take 1 tablet by mouth 2 times daily (before meals) 60 tablet 2    ranolazine (RANEXA) 1000 MG extended release tablet Take 1 tablet by mouth 2 times daily 90 tablet 3    Evolocumab 140 MG/ML SOAJ Inject 1 Adjustable Dose Pre-filled Pen Syringe into the skin every 14 days 6 Adjustable Dose Pre-filled Pen Syringe 3    budesonide-formoterol (SYMBICORT) 160-4.5 MCG/ACT AERO Inhale 2 puffs into the lungs 2 times daily      isosorbide mononitrate (IMDUR) 120 MG extended release tablet Take 1 tablet by mouth daily 90 tablet 3    aspirin 81 MG EC tablet Take 1 tablet by mouth daily 90 tablet 3    clopidogrel (PLAVIX) 75 MG tablet Take 1 tablet by mouth daily 90 tablet 3    ondansetron (ZOFRAN) 4 MG tablet Take 1 tablet by mouth nightly      topiramate (TOPAMAX) 100 MG tablet Take 1.5 tablets by mouth every evening 5 pm      Rimegepant Sulfate (NURTEC) 75 MG TBDP Take 1 tablet by mouth as needed      methenamine (HIPREX) 1 g tablet Take 1 tablet by mouth 2 times daily (with meals)      trospium (SANCTURA) 20 MG tablet Take 1 tablet

## 2024-04-12 NOTE — ED PROVIDER NOTES
ESOPHAGOGASTRODUODENOSCOPY JEJUNOSTOMY TUBE PLACEMENT performed by Vinay Verdin MD at [facility] ENDOSCOPY       CURRENTMEDICATIONS       Previous Medications    ACLIDINIUM (TUDORZA PRESSAIR) 400 MCG/ACT AEPB INHALER    Inhale 1 puff into the lungs in the morning and 1 puff in the evening.    ALBUTEROL-IPRATROPIUM (COMBIVENT)  MCG/ACT INHALER    Inhale 2 puffs into the lungs 4 times daily as needed for Wheezing.    ARIPIPRAZOLE (ABILIFY) 2 MG TABLET    Take 1 tablet by mouth nightly    ASPIRIN 81 MG EC TABLET    Take 1 tablet by mouth daily    BUDESONIDE-FORMOTEROL (SYMBICORT) 160-4.5 MCG/ACT AERO    Inhale 2 puffs into the lungs 2 times daily    BUPROPION (WELLBUTRIN XL) 300 MG EXTENDED RELEASE TABLET    Take 1 tablet by mouth every morning Takes at 9:00 am.    CARVEDILOL (COREG) 3.125 MG TABLET    Take 1 tablet by mouth 2 times daily (with meals)    CLOPIDOGREL (PLAVIX) 75 MG TABLET    Take 1 tablet by mouth daily    DILTIAZEM (CARDIZEM CD) 120 MG EXTENDED RELEASE CAPSULE    Take 1 capsule by mouth daily    EVOLOCUMAB 140 MG/ML SOAJ    Inject 1 Adjustable Dose Pre-filled Pen Syringe into the skin every 14 days    FUROSEMIDE (LASIX) 40 MG TABLET    Take 1 tablet by mouth daily    GABAPENTIN (NEURONTIN) 300 MG CAPSULE    Take 2 capsules by mouth 4 times daily.    ISOSORBIDE MONONITRATE (IMDUR) 120 MG EXTENDED RELEASE TABLET    Take 1 tablet by mouth daily    LINACLOTIDE (LINZESS) 290 MCG CAPS CAPSULE    Take 1 capsule by mouth every morning (before breakfast) for 14 days    MECLIZINE (ANTIVERT) 25 MG TABLET    Take 1 tablet by mouth 4 times daily    METHENAMINE (HIPREX) 1 G TABLET    Take 1 tablet by mouth 2 times daily (with meals)    NORTRIPTYLINE (PAMELOR) 75 MG CAPSULE    Take 1 capsule by mouth nightly    ONDANSETRON (ZOFRAN) 4 MG TABLET    Take 1 tablet by mouth nightly    PANTOPRAZOLE (PROTONIX) 40 MG TABLET    Take 1 tablet by mouth 2 times daily (before meals)    RANOLAZINE (RANEXA) 1000 MG EXTENDED  hypoxia.  This is pending.    This has exceeded the length of my shift and patient care is transferred to attending, Dr. Pena.  Please see his note for final management and disposition.     Disposition Considerations (tests considered but not done, Admit vs D/C, Shared Decision Making, Pt Expectation of Test or Tx.):        I am the Primary Clinician of Record.  FINAL IMPRESSION      1. Hypoxia    2. Partial small bowel obstruction (HCC)          DISPOSITION/PLAN     DISPOSITION        PATIENT REFERRED TO:  No follow-up provider specified.    DISCHARGE MEDICATIONS:  New Prescriptions    No medications on file       DISCONTINUED MEDICATIONS:  Discontinued Medications    No medications on file              (Please note that portions of this note were completed with a voice recognition program.  Efforts were made to edit the dictations but occasionally words are mis-transcribed.)    MONA Woo (electronically signed)            Karen Corcoran PA  04/12/24 0283

## 2024-04-13 LAB
ANION GAP SERPL CALCULATED.3IONS-SCNC: 19 MMOL/L (ref 3–16)
BACTERIA URNS QL MICRO: ABNORMAL /HPF
BASOPHILS # BLD: 0.1 K/UL (ref 0–0.2)
BASOPHILS NFR BLD: 0.8 %
BILIRUB UR QL STRIP.AUTO: ABNORMAL
BUN SERPL-MCNC: 10 MG/DL (ref 7–20)
C DIFF TOX A+B STL QL IA: NORMAL
CALCIUM SERPL-MCNC: 8.6 MG/DL (ref 8.3–10.6)
CHLORIDE SERPL-SCNC: 105 MMOL/L (ref 99–110)
CLARITY UR: ABNORMAL
CO2 SERPL-SCNC: 16 MMOL/L (ref 21–32)
COLOR UR: ABNORMAL
CREAT SERPL-MCNC: 0.7 MG/DL (ref 0.6–1.2)
DEPRECATED RDW RBC AUTO: 16.9 % (ref 12.4–15.4)
EKG ATRIAL RATE: 92 BPM
EKG DIAGNOSIS: NORMAL
EKG P-R INTERVAL: 142 MS
EKG Q-T INTERVAL: 396 MS
EKG QRS DURATION: 100 MS
EKG QTC CALCULATION (BAZETT): 477 MS
EKG R AXIS: 45 DEGREES
EKG T AXIS: 71 DEGREES
EKG VENTRICULAR RATE: 87 BPM
EOSINOPHIL # BLD: 0.1 K/UL (ref 0–0.6)
EOSINOPHIL NFR BLD: 1.4 %
EPI CELLS #/AREA URNS AUTO: 1 /HPF (ref 0–5)
GFR SERPLBLD CREATININE-BSD FMLA CKD-EPI: >90 ML/MIN/{1.73_M2}
GLUCOSE SERPL-MCNC: 88 MG/DL (ref 70–99)
GLUCOSE UR STRIP.AUTO-MCNC: NEGATIVE MG/DL
HCT VFR BLD AUTO: 30.2 % (ref 36–48)
HEMOCCULT STL QL: ABNORMAL
HGB BLD-MCNC: 10 G/DL (ref 12–16)
HGB UR QL STRIP.AUTO: ABNORMAL
HYALINE CASTS #/AREA URNS AUTO: 1 /LPF (ref 0–8)
KETONES UR STRIP.AUTO-MCNC: ABNORMAL MG/DL
LEUKOCYTE ESTERASE UR QL STRIP.AUTO: ABNORMAL
LYMPHOCYTES # BLD: 1.5 K/UL (ref 1–5.1)
LYMPHOCYTES NFR BLD: 21.7 %
MAGNESIUM SERPL-MCNC: 1.7 MG/DL (ref 1.8–2.4)
MCH RBC QN AUTO: 29.1 PG (ref 26–34)
MCHC RBC AUTO-ENTMCNC: 33.2 G/DL (ref 31–36)
MCV RBC AUTO: 87.6 FL (ref 80–100)
MONOCYTES # BLD: 0.5 K/UL (ref 0–1.3)
MONOCYTES NFR BLD: 7.8 %
NEUTROPHILS # BLD: 4.7 K/UL (ref 1.7–7.7)
NEUTROPHILS NFR BLD: 68.3 %
NITRITE UR QL STRIP.AUTO: NEGATIVE
PH UR STRIP.AUTO: 6.5 [PH] (ref 5–8)
PLATELET # BLD AUTO: 259 K/UL (ref 135–450)
PMV BLD AUTO: 8.5 FL (ref 5–10.5)
POTASSIUM SERPL-SCNC: 2.7 MMOL/L (ref 3.5–5.1)
POTASSIUM SERPL-SCNC: 3.2 MMOL/L (ref 3.5–5.1)
PROT UR STRIP.AUTO-MCNC: ABNORMAL MG/DL
RBC # BLD AUTO: 3.45 M/UL (ref 4–5.2)
RBC CLUMPS #/AREA URNS AUTO: 53 /HPF (ref 0–4)
SODIUM SERPL-SCNC: 140 MMOL/L (ref 136–145)
SP GR UR STRIP.AUTO: >=1.03 (ref 1–1.03)
UA COMPLETE W REFLEX CULTURE PNL UR: YES
UA DIPSTICK W REFLEX MICRO PNL UR: YES
URN SPEC COLLECT METH UR: ABNORMAL
UROBILINOGEN UR STRIP-ACNC: 1 E.U./DL
WBC # BLD AUTO: 6.9 K/UL (ref 4–11)
WBC #/AREA URNS AUTO: 38 /HPF (ref 0–5)

## 2024-04-13 PROCEDURE — 87086 URINE CULTURE/COLONY COUNT: CPT

## 2024-04-13 PROCEDURE — 81001 URINALYSIS AUTO W/SCOPE: CPT

## 2024-04-13 PROCEDURE — 02HV33Z INSERTION OF INFUSION DEVICE INTO SUPERIOR VENA CAVA, PERCUTANEOUS APPROACH: ICD-10-PCS | Performed by: STUDENT IN AN ORGANIZED HEALTH CARE EDUCATION/TRAINING PROGRAM

## 2024-04-13 PROCEDURE — 80048 BASIC METABOLIC PNL TOTAL CA: CPT

## 2024-04-13 PROCEDURE — 2580000003 HC RX 258: Performed by: STUDENT IN AN ORGANIZED HEALTH CARE EDUCATION/TRAINING PROGRAM

## 2024-04-13 PROCEDURE — 84132 ASSAY OF SERUM POTASSIUM: CPT

## 2024-04-13 PROCEDURE — 93010 ELECTROCARDIOGRAM REPORT: CPT | Performed by: INTERNAL MEDICINE

## 2024-04-13 PROCEDURE — 6370000000 HC RX 637 (ALT 250 FOR IP): Performed by: INTERNAL MEDICINE

## 2024-04-13 PROCEDURE — 85025 COMPLETE CBC W/AUTO DIFF WBC: CPT

## 2024-04-13 PROCEDURE — 83735 ASSAY OF MAGNESIUM: CPT

## 2024-04-13 PROCEDURE — 1200000000 HC SEMI PRIVATE

## 2024-04-13 PROCEDURE — 6370000000 HC RX 637 (ALT 250 FOR IP): Performed by: STUDENT IN AN ORGANIZED HEALTH CARE EDUCATION/TRAINING PROGRAM

## 2024-04-13 PROCEDURE — C1751 CATH, INF, PER/CENT/MIDLINE: HCPCS

## 2024-04-13 PROCEDURE — 94761 N-INVAS EAR/PLS OXIMETRY MLT: CPT

## 2024-04-13 PROCEDURE — 94640 AIRWAY INHALATION TREATMENT: CPT

## 2024-04-13 PROCEDURE — 6360000002 HC RX W HCPCS: Performed by: STUDENT IN AN ORGANIZED HEALTH CARE EDUCATION/TRAINING PROGRAM

## 2024-04-13 PROCEDURE — 36415 COLL VENOUS BLD VENIPUNCTURE: CPT

## 2024-04-13 PROCEDURE — 36569 INSJ PICC 5 YR+ W/O IMAGING: CPT

## 2024-04-13 PROCEDURE — 2580000003 HC RX 258: Performed by: NURSE PRACTITIONER

## 2024-04-13 PROCEDURE — 99024 POSTOP FOLLOW-UP VISIT: CPT | Performed by: SURGERY

## 2024-04-13 RX ORDER — SODIUM CHLORIDE 0.9 % (FLUSH) 0.9 %
5-40 SYRINGE (ML) INJECTION EVERY 12 HOURS SCHEDULED
Status: DISCONTINUED | OUTPATIENT
Start: 2024-04-13 | End: 2024-04-27

## 2024-04-13 RX ORDER — RANOLAZINE 500 MG/1
1000 TABLET, EXTENDED RELEASE ORAL 2 TIMES DAILY
Status: DISCONTINUED | OUTPATIENT
Start: 2024-04-13 | End: 2024-05-02 | Stop reason: HOSPADM

## 2024-04-13 RX ORDER — LEVOFLOXACIN 5 MG/ML
750 INJECTION, SOLUTION INTRAVENOUS EVERY 24 HOURS
Status: DISCONTINUED | OUTPATIENT
Start: 2024-04-13 | End: 2024-04-13

## 2024-04-13 RX ORDER — SODIUM CHLORIDE 9 MG/ML
25 INJECTION, SOLUTION INTRAVENOUS PRN
Status: DISCONTINUED | OUTPATIENT
Start: 2024-04-13 | End: 2024-05-02 | Stop reason: HOSPADM

## 2024-04-13 RX ORDER — LIDOCAINE HYDROCHLORIDE 10 MG/ML
5 INJECTION, SOLUTION EPIDURAL; INFILTRATION; INTRACAUDAL; PERINEURAL ONCE
Status: DISCONTINUED | OUTPATIENT
Start: 2024-04-13 | End: 2024-04-26

## 2024-04-13 RX ORDER — PANTOPRAZOLE SODIUM 40 MG/1
40 TABLET, DELAYED RELEASE ORAL
Status: DISCONTINUED | OUTPATIENT
Start: 2024-04-13 | End: 2024-04-17

## 2024-04-13 RX ORDER — CARVEDILOL 3.12 MG/1
3.12 TABLET ORAL 2 TIMES DAILY WITH MEALS
Status: DISCONTINUED | OUTPATIENT
Start: 2024-04-13 | End: 2024-05-02 | Stop reason: HOSPADM

## 2024-04-13 RX ORDER — GABAPENTIN 300 MG/1
600 CAPSULE ORAL 4 TIMES DAILY
Status: DISCONTINUED | OUTPATIENT
Start: 2024-04-13 | End: 2024-05-02 | Stop reason: HOSPADM

## 2024-04-13 RX ORDER — BUPROPION HYDROCHLORIDE 150 MG/1
300 TABLET ORAL EVERY MORNING
Status: DISCONTINUED | OUTPATIENT
Start: 2024-04-13 | End: 2024-05-02 | Stop reason: HOSPADM

## 2024-04-13 RX ORDER — SODIUM CHLORIDE 0.9 % (FLUSH) 0.9 %
5-40 SYRINGE (ML) INJECTION PRN
Status: DISCONTINUED | OUTPATIENT
Start: 2024-04-13 | End: 2024-04-27

## 2024-04-13 RX ORDER — TROSPIUM CHLORIDE 20 MG/1
20 TABLET, FILM COATED ORAL NIGHTLY
Status: DISCONTINUED | OUTPATIENT
Start: 2024-04-13 | End: 2024-05-02 | Stop reason: HOSPADM

## 2024-04-13 RX ORDER — DILTIAZEM HYDROCHLORIDE 120 MG/1
120 CAPSULE, COATED, EXTENDED RELEASE ORAL DAILY
Status: DISCONTINUED | OUTPATIENT
Start: 2024-04-13 | End: 2024-05-02 | Stop reason: HOSPADM

## 2024-04-13 RX ORDER — ISOSORBIDE MONONITRATE 30 MG/1
120 TABLET, EXTENDED RELEASE ORAL DAILY
Status: DISCONTINUED | OUTPATIENT
Start: 2024-04-13 | End: 2024-05-02 | Stop reason: HOSPADM

## 2024-04-13 RX ORDER — NORTRIPTYLINE HYDROCHLORIDE 25 MG/1
75 CAPSULE ORAL NIGHTLY
Status: DISCONTINUED | OUTPATIENT
Start: 2024-04-13 | End: 2024-05-02 | Stop reason: HOSPADM

## 2024-04-13 RX ORDER — SODIUM CHLORIDE, SODIUM LACTATE, POTASSIUM CHLORIDE, CALCIUM CHLORIDE 600; 310; 30; 20 MG/100ML; MG/100ML; MG/100ML; MG/100ML
INJECTION, SOLUTION INTRAVENOUS CONTINUOUS
Status: DISCONTINUED | OUTPATIENT
Start: 2024-04-13 | End: 2024-04-16

## 2024-04-13 RX ORDER — LEVOFLOXACIN 5 MG/ML
750 INJECTION, SOLUTION INTRAVENOUS EVERY 24 HOURS
Status: DISCONTINUED | OUTPATIENT
Start: 2024-04-14 | End: 2024-04-14

## 2024-04-13 RX ORDER — TOPIRAMATE 100 MG/1
100 TABLET, FILM COATED ORAL EVERY MORNING
Status: DISCONTINUED | OUTPATIENT
Start: 2024-04-13 | End: 2024-05-02 | Stop reason: HOSPADM

## 2024-04-13 RX ORDER — TOPIRAMATE 100 MG/1
150 TABLET, FILM COATED ORAL EVERY EVENING
Status: DISCONTINUED | OUTPATIENT
Start: 2024-04-13 | End: 2024-05-02 | Stop reason: HOSPADM

## 2024-04-13 RX ADMIN — SODIUM CHLORIDE: 9 INJECTION, SOLUTION INTRAVENOUS at 00:22

## 2024-04-13 RX ADMIN — NORTRIPTYLINE HYDROCHLORIDE 75 MG: 25 CAPSULE ORAL at 20:06

## 2024-04-13 RX ADMIN — PANTOPRAZOLE SODIUM 40 MG: 40 TABLET, DELAYED RELEASE ORAL at 08:16

## 2024-04-13 RX ADMIN — Medication 2 PUFF: at 09:01

## 2024-04-13 RX ADMIN — CARVEDILOL 3.12 MG: 3.12 TABLET, FILM COATED ORAL at 16:47

## 2024-04-13 RX ADMIN — POTASSIUM CHLORIDE 10 MEQ: 7.46 INJECTION, SOLUTION INTRAVENOUS at 12:46

## 2024-04-13 RX ADMIN — GABAPENTIN 600 MG: 300 CAPSULE ORAL at 08:15

## 2024-04-13 RX ADMIN — ACETAMINOPHEN 650 MG: 325 TABLET ORAL at 18:00

## 2024-04-13 RX ADMIN — POTASSIUM CHLORIDE 10 MEQ: 7.46 INJECTION, SOLUTION INTRAVENOUS at 11:12

## 2024-04-13 RX ADMIN — PANTOPRAZOLE SODIUM 40 MG: 40 TABLET, DELAYED RELEASE ORAL at 16:46

## 2024-04-13 RX ADMIN — Medication 10 ML: at 20:10

## 2024-04-13 RX ADMIN — POTASSIUM CHLORIDE 10 MEQ: 7.46 INJECTION, SOLUTION INTRAVENOUS at 06:56

## 2024-04-13 RX ADMIN — POTASSIUM CHLORIDE 10 MEQ: 7.46 INJECTION, SOLUTION INTRAVENOUS at 08:00

## 2024-04-13 RX ADMIN — RANOLAZINE 1000 MG: 500 TABLET, EXTENDED RELEASE ORAL at 08:16

## 2024-04-13 RX ADMIN — SODIUM CHLORIDE, POTASSIUM CHLORIDE, SODIUM LACTATE AND CALCIUM CHLORIDE: 600; 310; 30; 20 INJECTION, SOLUTION INTRAVENOUS at 06:15

## 2024-04-13 RX ADMIN — ISOSORBIDE MONONITRATE 120 MG: 30 TABLET, EXTENDED RELEASE ORAL at 08:17

## 2024-04-13 RX ADMIN — Medication 2 PUFF: at 21:20

## 2024-04-13 RX ADMIN — ARIPIPRAZOLE 2 MG: 2 TABLET ORAL at 20:07

## 2024-04-13 RX ADMIN — POTASSIUM CHLORIDE 10 MEQ: 7.46 INJECTION, SOLUTION INTRAVENOUS at 09:59

## 2024-04-13 RX ADMIN — SODIUM CHLORIDE: 9 INJECTION, SOLUTION INTRAVENOUS at 08:14

## 2024-04-13 RX ADMIN — SODIUM CHLORIDE, PRESERVATIVE FREE 10 ML: 5 INJECTION INTRAVENOUS at 20:09

## 2024-04-13 RX ADMIN — POTASSIUM CHLORIDE 10 MEQ: 7.46 INJECTION, SOLUTION INTRAVENOUS at 06:30

## 2024-04-13 RX ADMIN — LEVOFLOXACIN 750 MG: 5 INJECTION, SOLUTION INTRAVENOUS at 06:16

## 2024-04-13 RX ADMIN — GABAPENTIN 600 MG: 300 CAPSULE ORAL at 14:16

## 2024-04-13 RX ADMIN — GABAPENTIN 600 MG: 300 CAPSULE ORAL at 20:06

## 2024-04-13 RX ADMIN — TROSPIUM CHLORIDE 20 MG: 20 TABLET, FILM COATED ORAL at 20:06

## 2024-04-13 RX ADMIN — BUPROPION HYDROCHLORIDE 300 MG: 150 TABLET, EXTENDED RELEASE ORAL at 08:19

## 2024-04-13 RX ADMIN — RANOLAZINE 1000 MG: 500 TABLET, EXTENDED RELEASE ORAL at 20:06

## 2024-04-13 RX ADMIN — DILTIAZEM HYDROCHLORIDE 120 MG: 120 CAPSULE, EXTENDED RELEASE ORAL at 08:17

## 2024-04-13 RX ADMIN — GABAPENTIN 600 MG: 300 CAPSULE ORAL at 16:46

## 2024-04-13 RX ADMIN — TOPIRAMATE 100 MG: 100 TABLET, FILM COATED ORAL at 09:28

## 2024-04-13 RX ADMIN — TOPIRAMATE 150 MG: 100 TABLET, FILM COATED ORAL at 17:31

## 2024-04-13 RX ADMIN — CARVEDILOL 3.12 MG: 3.12 TABLET, FILM COATED ORAL at 09:28

## 2024-04-13 RX ADMIN — TIOTROPIUM BROMIDE INHALATION SPRAY 2 PUFF: 3.12 SPRAY, METERED RESPIRATORY (INHALATION) at 09:01

## 2024-04-13 RX ADMIN — SODIUM CHLORIDE: 9 INJECTION, SOLUTION INTRAVENOUS at 09:00

## 2024-04-13 RX ADMIN — POTASSIUM CHLORIDE 40 MEQ: 1500 TABLET, EXTENDED RELEASE ORAL at 16:47

## 2024-04-13 RX ADMIN — POTASSIUM CHLORIDE 10 MEQ: 7.46 INJECTION, SOLUTION INTRAVENOUS at 14:19

## 2024-04-13 ASSESSMENT — PAIN SCALES - GENERAL
PAINLEVEL_OUTOF10: 7
PAINLEVEL_OUTOF10: 6
PAINLEVEL_OUTOF10: 4
PAINLEVEL_OUTOF10: 0

## 2024-04-13 ASSESSMENT — PAIN SCALES - WONG BAKER
WONGBAKER_NUMERICALRESPONSE: NO HURT

## 2024-04-13 ASSESSMENT — PAIN DESCRIPTION - DESCRIPTORS: DESCRIPTORS: ACHING;SORE;DULL

## 2024-04-13 ASSESSMENT — PAIN DESCRIPTION - LOCATION: LOCATION: HEAD

## 2024-04-13 ASSESSMENT — PAIN - FUNCTIONAL ASSESSMENT: PAIN_FUNCTIONAL_ASSESSMENT: ACTIVITIES ARE NOT PREVENTED

## 2024-04-13 NOTE — CONSULTS
[facility]        PATIENT NAME: Ana Gonzales     TODAY'S DATE: 4/13/2024    CC: abd pain  SUBJECTIVE:    Pt readmitted following long hosp stay. Severe weakness and felt dehydration at home. Not eating, emesis. Fair BM.  Admitted with concern of possible UTI.     Current Medications:   Current Facility-Administered Medications: levoFLOXacin (LEVAQUIN) 750 MG/150ML infusion 750 mg, 750 mg, IntraVENous, Q24H  lactated ringers IV soln infusion, , IntraVENous, Continuous  buPROPion (WELLBUTRIN XL) extended release tablet 300 mg, 300 mg, Oral, QAM  carvedilol (COREG) tablet 3.125 mg, 3.125 mg, Oral, BID WC  dilTIAZem (CARDIZEM CD) extended release capsule 120 mg, 120 mg, Oral, Daily  gabapentin (NEURONTIN) capsule 600 mg, 600 mg, Oral, 4x Daily  isosorbide mononitrate (IMDUR) extended release tablet 120 mg, 120 mg, Oral, Daily  nortriptyline (PAMELOR) capsule 75 mg, 75 mg, Oral, Nightly  pantoprazole (PROTONIX) tablet 40 mg, 40 mg, Oral, BID AC  ranolazine (RANEXA) extended release tablet 1,000 mg, 1,000 mg, Oral, BID  topiramate (TOPAMAX) tablet 100 mg, 100 mg, Oral, QAM  topiramate (TOPAMAX) tablet 150 mg, 150 mg, Oral, QPM  trospium (SANCTURA) tablet 20 mg, 20 mg, Oral, Nightly  lidocaine PF 1 % injection 5 mL, 5 mL, IntraDERmal, Once  sodium chloride flush 0.9 % injection 5-40 mL, 5-40 mL, IntraVENous, 2 times per day  sodium chloride flush 0.9 % injection 5-40 mL, 5-40 mL, IntraVENous, PRN  0.9 % sodium chloride infusion, 25 mL, IntraVENous, PRN  tiotropium (SPIRIVA RESPIMAT) 2.5 MCG/ACT inhaler 2 puff, 2 puff, Inhalation, Daily RT  ARIPiprazole (ABILIFY) tablet 2 mg, 2 mg, Oral, Nightly  mometasone-formoterol (DULERA) 100-5 MCG/ACT inhaler 2 puff, 2 puff, Inhalation, BID RT  sodium chloride flush 0.9 % injection 5-40 mL, 5-40 mL, IntraVENous, 2 times per day  sodium chloride flush 0.9 % injection 5-40 mL, 5-40 mL, IntraVENous, PRN  0.9 % sodium chloride infusion, ,

## 2024-04-13 NOTE — ED NOTES
RN assisted pt onto bedpan. Pt was able to have small, very loose bowel movement.   Mariangel care, new depends applied, linens changed.   Will continue to monitor.

## 2024-04-13 NOTE — H&P
V2.0  History and Physical      Name:  Ana Gonzales /Age/Sex: 1955  (68 y.o. female)   MRN & CSN:  [id number] & [id number] Encounter Date/Time: 2024 10:15 PM EDT   Location:  ED- PCP: Mellisa Houston MD       Hospital Day: 1    Assessment and Plan:   Ana Gonzales is a 68 y.o. female with a pmh of upper GI bleed, SBO/persistent ileus, TIA, CHF, CAD who presents with SBO (small bowel obstruction) (Formerly [facility])    Hospital Problems             Last Modified POA    * (Principal) SBO (small bowel obstruction) (Formerly [facility]) 2024 Yes       Plan:  # Small bowel obstruction:  -Patient presented with chief complaint of abdominal distention, nausea, and vomiting.  Patient endorsed constipation.  -In the ED, patient was hypothermic with temperature 96.7 °F.   -CT abdomen and pelvis was done and showed Gas and fluid-filled dilated stomach and small bowel with gradual transition to normal caliber bowel distally at the level of the ileum.  Findings may represent partial small bowel obstruction or less likely generalized ileus.  -IV fluid  -N.p.o.  -NG tube was ordered.  However, patient declined it since she has been having bowel movements  -Pain control.  -General surgery consulted      # UTI:  -UA was positive for moderate leukocyte esterase.  -Urine culture  -Levaquin     # History of upper GI bleed:  -Continue home Protonix 40 mg twice daily carlos Reynolds is Dr. Oly madsen you have [id number]    # History of TIA:  # Chronic CHFpEF:  # CAD:  -Continue home meds        Disposition:   Current Living situation: Home  Expected Disposition: Home  Estimated D/C: 2 to 3 days    Diet Diet NPO   DVT Prophylaxis [x] Lovenox, []  Heparin, [] SCDs, [] Ambulation,  [] Eliquis, [] Xarelto, [] Coumadin   Code Status Prior   Surrogate Decision Maker/ POA      Personally reviewed Lab Studies and Imaging     Discussed management of the case with ED attending who recommended to admit patient for SBO.    EKG interpreted

## 2024-04-13 NOTE — PROGRESS NOTES
04/13/24 0528   RT Protocol   History Pulmonary Disease 0   Respiratory pattern 0   Breath sounds 2   Cough 0   Indications for Bronchodilator Therapy Decreased or absent breath sounds;On home bronchodilators   Bronchodilator Assessment Score 2

## 2024-04-13 NOTE — CARE COORDINATION
04/13/24 3475   Readmission Assessment   Number of Days since last admission? 1-7 days   Previous Disposition Home with Home Health   Who is being Interviewed Unable to Complete  (chart review)   What was the patient's/caregiver's perception as to why they think they needed to return back to the hospital? Other (Comment)  (Patient states her chief concerns are the generalized weakness/fatigue and nausea/vomiting - recent bowel surgery)   Did you visit your Primary Care Physician after you left the hospital, before you returned this time? No   Why weren't you able to visit your PCP? Did not have an appointment   Did you see a specialist, such as Cardiac, Pulmonary, Orthopedic Physician, etc. after you left the hospital? No   Who advised the patient to return to the hospital? Other Nurse (Navigator, CTN)  (home care nurse and pt decided together)   Does the patient report anything that got in the way of taking their medications? No   In our efforts to provide the best possible care to you and others like you, can you think of anything that we could have done to help you after you left the hospital the first time, so that you might not have needed to return so soon? Identify patient's health literacy needs;Arrange for more help when leaving the hospital;Teaching during hospitalization regarding your illness

## 2024-04-13 NOTE — PROGRESS NOTES
[facility]    4/13/2024 7:56 AM        Name: Ana Gonzales .              Admitted: 4/12/2024  Primary Care Provider: Mellisa Houston MD (Tel: [phone])      Subjective:  .    Has had 7 liquid stools since admission   Last vomited when squad came on Friday afternoon  Refused NG has tympanic bowel sounds   at bedside  Pt sought care due to weakness and loose stools     C diff is negative     Reviewed interval ancillary notes    Current Medications  levoFLOXacin (LEVAQUIN) 750 MG/150ML infusion 750 mg, Q24H  lactated ringers IV soln infusion, Continuous  buPROPion (WELLBUTRIN XL) extended release tablet 300 mg, QAM  carvedilol (COREG) tablet 3.125 mg, BID WC  dilTIAZem (CARDIZEM CD) extended release capsule 120 mg, Daily  gabapentin (NEURONTIN) capsule 600 mg, 4x Daily  isosorbide mononitrate (IMDUR) extended release tablet 120 mg, Daily  nortriptyline (PAMELOR) capsule 75 mg, Nightly  pantoprazole (PROTONIX) tablet 40 mg, BID AC  ranolazine (RANEXA) extended release tablet 1,000 mg, BID  topiramate (TOPAMAX) tablet 100 mg, QAM  topiramate (TOPAMAX) tablet 150 mg, QPM  trospium (SANCTURA) tablet 20 mg, Nightly  ARIPiprazole (ABILIFY) tablet 2 mg, Nightly  mometasone-formoterol (DULERA) 100-5 MCG/ACT inhaler 2 puff, BID RT  sodium chloride flush 0.9 % injection 5-40 mL, 2 times per day  sodium chloride flush 0.9 % injection 5-40 mL, PRN  0.9 % sodium chloride infusion, PRN  potassium chloride (KLOR-CON M) extended release tablet 40 mEq, PRN   Or  potassium bicarb-citric acid (EFFER-K) effervescent tablet 40 mEq, PRN   Or  potassium chloride 10 mEq/100 mL IVPB (Peripheral Line), PRN  magnesium sulfate 2000 mg in 50 mL IVPB premix, PRN  enoxaparin (LOVENOX) injection 40 mg, Daily  ondansetron (ZOFRAN-ODT) disintegrating tablet 4 mg, Q8H PRN   Or  ondansetron (ZOFRAN) injection 4 mg, Q6H PRN  polyethylene glycol (GLYCOLAX) packet 17 g, Daily PRN  acetaminophen

## 2024-04-13 NOTE — ED NOTES
Pt had large, loose BM. RN and tech helped to change pt.  Mariangel care provided, new depends placed, linens changed.

## 2024-04-13 NOTE — RT PROTOCOL NOTE
RT Nebulizer Bronchodilator Protocol Note    There is a bronchodilator order in the chart from a provider indicating to follow the RT Bronchodilator Protocol and there is an “Initiate RT Bronchodilator Protocol” order as well (see protocol at bottom of note).    CXR Findings:  XR CHEST PORTABLE    Result Date: 4/12/2024  No radiographic evidence of acute pulmonary disease.       The findings from the last RT Protocol Assessment were as follows:  Smoking: None or smoker <15 pack years  Respiratory Pattern: Regular pattern and RR 12-20 bpm  Breath Sounds: Slightly diminished and/or crackles  Cough: Strong, spontaneous, non-productive  Indication for Bronchodilator Therapy: Decreased or absent breath sounds, On home bronchodilators  Bronchodilator Assessment Score: 2    Aerosolized bronchodilator medication orders have been revised according to the RT Nebulizer Bronchodilator Protocol below.    Respiratory Therapist to perform RT Therapy Protocol Assessment initially then follow the protocol.  Repeat RT Therapy Protocol Assessment PRN for score 0-3 or on second treatment, BID, and PRN for scores above 3.    No Indications - adjust the frequency to every 6 hours PRN wheezing or bronchospasm, if no treatments needed after 48 hours then discontinue using Per Protocol order mode.     If indication present, adjust the RT bronchodilator orders based on the Bronchodilator Assessment Score as indicated below.  If a patient is on this medication at home then do not decrease Frequency below that used at home.    0-3 - enter or revise RT bronchodilator order(s) to equivalent RT Bronchodilator order with Frequency of every 4 hours PRN for wheezing or increased work of breathing using Per Protocol order mode.       4-6 - enter or revise RT Bronchodilator order(s) to two equivalent RT bronchodilator orders with one order with BID Frequency and one order with Frequency of every 4 hours PRN wheezing or increased work of breathing

## 2024-04-13 NOTE — ED NOTES
ED TO INPATIENT SBAR HANDOFF    Patient Name: Ana Gonzales   :  1955  68 y.o.   MRN:  [id number]  Preferred Name  Ana  ED Room #:  ED-0027/27  Family/Caregiver Present yes.  at bedside.   Restraints no   Sitter no   Sepsis Risk Score Sepsis Risk Score: 5.4    Situation  Code Status: Prior No additional code details.    Allergies: Cymbalta [duloxetine hcl], Ketamine, Prochlorperazine, Promethazine, Atenolol, Demerol, Ezetimibe, Glucosamine-chondroitin, Keflex [cephalexin], Ketorolac, Lisinopril, Meperidine, Phenergan [promethazine hcl], Prochlorperazine edisylate, Statins, Codeine, and Morphine  Weight: No data found.  Arrived from: home  Chief Complaint:   Chief Complaint   Patient presents with    Extremity Weakness     Pt arrives via [facility] from home. Pt recently discharged after bowel surgeries, home nurse reports generalized weakness today, pt stating she is too weak to walk.      Hospital Problem/Diagnosis:  Principal Problem:    SBO (small bowel obstruction) (HCC)  Resolved Problems:    * No resolved hospital problems. *    Imaging:   CT CHEST PULMONARY EMBOLISM W CONTRAST   Preliminary Result   1. No evidence of pulmonary embolism.   2. No evidence of acute airspace consolidation to suggest pneumonia.   3. Chronic parenchymal scarring within the right lung, as above.   4. The stomach and visualized small bowel loops are fluid-filled and   distended, suggestive of a developing small-bowel obstruction as seen on the   abdominal CT earlier today.   5. Coronary atherosclerosis.         CT ABDOMEN PELVIS W IV CONTRAST Additional Contrast? None   Final Result   Gas and fluid-filled dilated stomach and small bowel with gradual transition   to normal caliber bowel distally at the level of the ileum.  Findings may   represent partial small bowel obstruction or less likely generalized ileus.         CT HEAD WO CONTRAST   Final Result   No acute intracranial abnormality.         XR CHEST  133/65   Pulse: 78 78 78 78   Resp: 17 21 22 18   Temp:       TempSrc:       SpO2: 97% 99%       FiO2 (%): n/a  O2 Flow Rate: O2 Device: None (Room air)    Cardiac Rhythm:    Pain Assessment:  [x] Verbal [] Xiong Delacruz Scale  Pain Scale:    Last documented pain score (0-10 scale)    Last documented pain medication administered: none  Mental Status: oriented, alert, coherent, logical, thought processes intact, and able to concentrate and follow conversation  Orientation Level:    NIH Score:    C-SSRS: Risk of Suicide: No Risk  Bedside swallow:    Edith Coma Scale (GCS): Edith Coma Scale  Eye Opening: Spontaneous  Best Verbal Response: Oriented  Best Motor Response: Obeys commands  Fisher Coma Scale Score: 15  Active LDA's:   Peripheral IV 04/12/24 Distal;Left Forearm (Active)     PO Status: Nothing by Mouth  Pertinent or High Risk Medications/Drips: no   If Yes, please provide details: n/a  Pending Blood Product Administration: no       You may also review the ED PT Care Timeline found under the Summary Nursing Index tab.    Recommendation    Pending orders All ED orders complete  Plan for Discharge (if known):   Additional Comments: Pt is a&o x4. Has not ambulated for this RN d/t coming to ED for weakness. Was unable to walk at home this morning.    If any further questions, please call Sending RN at [phone]    Electronically signed by: Electronically signed by Shellie Lino RN on 4/12/2024 at 10:30 PM

## 2024-04-13 NOTE — CONSULTS
PICC line education:    -Risks  -Benefits  -Alternatives  -Procedure    Discussed the above with patient, verbalized understanding, answered all questions. Provided with information on PICC care to review. PICC tip verified via 3CG (Ok to use). Reported off to patient's  Nurse Montserrat

## 2024-04-13 NOTE — ED NOTES
Pt had large, loose BM. RN assisted pt to change.  Mariangel care, new depends applied, new bedsheets.  Will continue to monitor.

## 2024-04-14 ENCOUNTER — APPOINTMENT (OUTPATIENT)
Dept: GENERAL RADIOLOGY | Age: 69
End: 2024-04-14
Payer: MEDICARE

## 2024-04-14 LAB
ANION GAP SERPL CALCULATED.3IONS-SCNC: 14 MMOL/L (ref 3–16)
BACTERIA UR CULT: NORMAL
BASOPHILS # BLD: 0 K/UL (ref 0–0.2)
BASOPHILS NFR BLD: 0.7 %
BUN SERPL-MCNC: 5 MG/DL (ref 7–20)
CALCIUM SERPL-MCNC: 8.4 MG/DL (ref 8.3–10.6)
CHLORIDE SERPL-SCNC: 107 MMOL/L (ref 99–110)
CO2 SERPL-SCNC: 18 MMOL/L (ref 21–32)
CREAT SERPL-MCNC: 0.6 MG/DL (ref 0.6–1.2)
DEPRECATED RDW RBC AUTO: 17.1 % (ref 12.4–15.4)
EOSINOPHIL # BLD: 0.2 K/UL (ref 0–0.6)
EOSINOPHIL NFR BLD: 5.9 %
GFR SERPLBLD CREATININE-BSD FMLA CKD-EPI: >90 ML/MIN/{1.73_M2}
GLUCOSE SERPL-MCNC: 67 MG/DL (ref 70–99)
HCT VFR BLD AUTO: 27 % (ref 36–48)
HGB BLD-MCNC: 8.8 G/DL (ref 12–16)
LYMPHOCYTES # BLD: 0.9 K/UL (ref 1–5.1)
LYMPHOCYTES NFR BLD: 22.4 %
MAGNESIUM SERPL-MCNC: 1.5 MG/DL (ref 1.8–2.4)
MCH RBC QN AUTO: 28.9 PG (ref 26–34)
MCHC RBC AUTO-ENTMCNC: 32.7 G/DL (ref 31–36)
MCV RBC AUTO: 88.3 FL (ref 80–100)
MONOCYTES # BLD: 0.4 K/UL (ref 0–1.3)
MONOCYTES NFR BLD: 8.4 %
NEUTROPHILS # BLD: 2.6 K/UL (ref 1.7–7.7)
NEUTROPHILS NFR BLD: 62.6 %
PLATELET # BLD AUTO: 221 K/UL (ref 135–450)
PMV BLD AUTO: 8.3 FL (ref 5–10.5)
POTASSIUM SERPL-SCNC: 3.2 MMOL/L (ref 3.5–5.1)
RBC # BLD AUTO: 3.06 M/UL (ref 4–5.2)
SODIUM SERPL-SCNC: 139 MMOL/L (ref 136–145)
WBC # BLD AUTO: 4.2 K/UL (ref 4–11)

## 2024-04-14 PROCEDURE — 2580000003 HC RX 258: Performed by: NURSE PRACTITIONER

## 2024-04-14 PROCEDURE — 85025 COMPLETE CBC W/AUTO DIFF WBC: CPT

## 2024-04-14 PROCEDURE — 94640 AIRWAY INHALATION TREATMENT: CPT

## 2024-04-14 PROCEDURE — 6360000002 HC RX W HCPCS: Performed by: NURSE PRACTITIONER

## 2024-04-14 PROCEDURE — 6370000000 HC RX 637 (ALT 250 FOR IP): Performed by: NURSE PRACTITIONER

## 2024-04-14 PROCEDURE — 80048 BASIC METABOLIC PNL TOTAL CA: CPT

## 2024-04-14 PROCEDURE — 2580000003 HC RX 258: Performed by: STUDENT IN AN ORGANIZED HEALTH CARE EDUCATION/TRAINING PROGRAM

## 2024-04-14 PROCEDURE — 6370000000 HC RX 637 (ALT 250 FOR IP): Performed by: STUDENT IN AN ORGANIZED HEALTH CARE EDUCATION/TRAINING PROGRAM

## 2024-04-14 PROCEDURE — 99024 POSTOP FOLLOW-UP VISIT: CPT | Performed by: SURGERY

## 2024-04-14 PROCEDURE — 6360000002 HC RX W HCPCS: Performed by: STUDENT IN AN ORGANIZED HEALTH CARE EDUCATION/TRAINING PROGRAM

## 2024-04-14 PROCEDURE — 74019 RADEX ABDOMEN 2 VIEWS: CPT

## 2024-04-14 PROCEDURE — 83735 ASSAY OF MAGNESIUM: CPT

## 2024-04-14 PROCEDURE — 1200000000 HC SEMI PRIVATE

## 2024-04-14 RX ORDER — MAGNESIUM SULFATE IN WATER 40 MG/ML
4000 INJECTION, SOLUTION INTRAVENOUS ONCE
Status: COMPLETED | OUTPATIENT
Start: 2024-04-14 | End: 2024-04-14

## 2024-04-14 RX ORDER — METOCLOPRAMIDE HYDROCHLORIDE 5 MG/ML
10 INJECTION INTRAMUSCULAR; INTRAVENOUS EVERY 6 HOURS
Status: COMPLETED | OUTPATIENT
Start: 2024-04-14 | End: 2024-04-15

## 2024-04-14 RX ORDER — POTASSIUM CHLORIDE 20 MEQ/1
40 TABLET, EXTENDED RELEASE ORAL ONCE
Status: COMPLETED | OUTPATIENT
Start: 2024-04-14 | End: 2024-04-14

## 2024-04-14 RX ADMIN — MAGNESIUM SULFATE HEPTAHYDRATE 4000 MG: 40 INJECTION, SOLUTION INTRAVENOUS at 09:26

## 2024-04-14 RX ADMIN — PANTOPRAZOLE SODIUM 40 MG: 40 TABLET, DELAYED RELEASE ORAL at 17:16

## 2024-04-14 RX ADMIN — Medication 2 PUFF: at 20:10

## 2024-04-14 RX ADMIN — POTASSIUM BICARBONATE 50 MEQ: 978 TABLET, EFFERVESCENT ORAL at 09:26

## 2024-04-14 RX ADMIN — POTASSIUM CHLORIDE 40 MEQ: 1500 TABLET, EXTENDED RELEASE ORAL at 17:16

## 2024-04-14 RX ADMIN — TROSPIUM CHLORIDE 20 MG: 20 TABLET, FILM COATED ORAL at 20:02

## 2024-04-14 RX ADMIN — SODIUM CHLORIDE, POTASSIUM CHLORIDE, SODIUM LACTATE AND CALCIUM CHLORIDE: 600; 310; 30; 20 INJECTION, SOLUTION INTRAVENOUS at 02:37

## 2024-04-14 RX ADMIN — SODIUM CHLORIDE: 9 INJECTION, SOLUTION INTRAVENOUS at 06:21

## 2024-04-14 RX ADMIN — GABAPENTIN 600 MG: 300 CAPSULE ORAL at 17:16

## 2024-04-14 RX ADMIN — TIOTROPIUM BROMIDE INHALATION SPRAY 2 PUFF: 3.12 SPRAY, METERED RESPIRATORY (INHALATION) at 09:37

## 2024-04-14 RX ADMIN — Medication 2 PUFF: at 09:37

## 2024-04-14 RX ADMIN — CARVEDILOL 3.12 MG: 3.12 TABLET, FILM COATED ORAL at 08:07

## 2024-04-14 RX ADMIN — SODIUM CHLORIDE, PRESERVATIVE FREE 10 ML: 5 INJECTION INTRAVENOUS at 20:01

## 2024-04-14 RX ADMIN — RANOLAZINE 1000 MG: 500 TABLET, EXTENDED RELEASE ORAL at 20:02

## 2024-04-14 RX ADMIN — DILTIAZEM HYDROCHLORIDE 120 MG: 120 CAPSULE, EXTENDED RELEASE ORAL at 08:07

## 2024-04-14 RX ADMIN — ENOXAPARIN SODIUM 40 MG: 100 INJECTION SUBCUTANEOUS at 08:06

## 2024-04-14 RX ADMIN — GABAPENTIN 600 MG: 300 CAPSULE ORAL at 20:02

## 2024-04-14 RX ADMIN — BUPROPION HYDROCHLORIDE 300 MG: 150 TABLET, EXTENDED RELEASE ORAL at 08:07

## 2024-04-14 RX ADMIN — MAGNESIUM SULFATE HEPTAHYDRATE 2000 MG: 40 INJECTION, SOLUTION INTRAVENOUS at 06:24

## 2024-04-14 RX ADMIN — GABAPENTIN 600 MG: 300 CAPSULE ORAL at 08:07

## 2024-04-14 RX ADMIN — TOPIRAMATE 100 MG: 100 TABLET, FILM COATED ORAL at 08:07

## 2024-04-14 RX ADMIN — RANOLAZINE 1000 MG: 500 TABLET, EXTENDED RELEASE ORAL at 09:26

## 2024-04-14 RX ADMIN — CARVEDILOL 3.12 MG: 3.12 TABLET, FILM COATED ORAL at 17:16

## 2024-04-14 RX ADMIN — METOCLOPRAMIDE 10 MG: 5 INJECTION, SOLUTION INTRAMUSCULAR; INTRAVENOUS at 17:20

## 2024-04-14 RX ADMIN — LEVOFLOXACIN 750 MG: 5 INJECTION, SOLUTION INTRAVENOUS at 06:02

## 2024-04-14 RX ADMIN — NORTRIPTYLINE HYDROCHLORIDE 75 MG: 25 CAPSULE ORAL at 20:02

## 2024-04-14 RX ADMIN — ARIPIPRAZOLE 2 MG: 2 TABLET ORAL at 20:02

## 2024-04-14 RX ADMIN — PANTOPRAZOLE SODIUM 40 MG: 40 TABLET, DELAYED RELEASE ORAL at 06:02

## 2024-04-14 RX ADMIN — METOCLOPRAMIDE 10 MG: 5 INJECTION, SOLUTION INTRAMUSCULAR; INTRAVENOUS at 20:02

## 2024-04-14 RX ADMIN — TOPIRAMATE 150 MG: 100 TABLET, FILM COATED ORAL at 17:16

## 2024-04-14 RX ADMIN — ISOSORBIDE MONONITRATE 120 MG: 30 TABLET, EXTENDED RELEASE ORAL at 08:06

## 2024-04-14 RX ADMIN — POTASSIUM CHLORIDE 40 MEQ: 1500 TABLET, EXTENDED RELEASE ORAL at 06:10

## 2024-04-14 NOTE — PROGRESS NOTES
[facility]    4/14/2024 7:38 AM        Name: Ana Gonzales .              Admitted: 4/12/2024  Primary Care Provider: Mellisa Houston MD (Tel: [phone])      Subjective:  .    Has not had any stools today.   Feels \"ok\"   No current reports of pain or nausea.    Labs and abd xray removed  I assisted her to bathroom to void qs. She later walked in the halls with stand by assist. She moves great       Pt sought care due to weakness and loose stools  Was home on 2 days after a 30 day hospital stay.      C diff is negative       Reviewed interval ancillary notes    Current Medications  magnesium sulfate 4000 mg in 100 mL IVPB premix, Once  potassium bicarbonate (EFFER-K/K-LYTE) disintegrating tablet 50 mEq, Q6H  lactated ringers IV soln infusion, Continuous  buPROPion (WELLBUTRIN XL) extended release tablet 300 mg, QAM  carvedilol (COREG) tablet 3.125 mg, BID WC  dilTIAZem (CARDIZEM CD) extended release capsule 120 mg, Daily  gabapentin (NEURONTIN) capsule 600 mg, 4x Daily  isosorbide mononitrate (IMDUR) extended release tablet 120 mg, Daily  nortriptyline (PAMELOR) capsule 75 mg, Nightly  pantoprazole (PROTONIX) tablet 40 mg, BID AC  ranolazine (RANEXA) extended release tablet 1,000 mg, BID  topiramate (TOPAMAX) tablet 100 mg, QAM  topiramate (TOPAMAX) tablet 150 mg, QPM  trospium (SANCTURA) tablet 20 mg, Nightly  lidocaine PF 1 % injection 5 mL, Once  sodium chloride flush 0.9 % injection 5-40 mL, 2 times per day  sodium chloride flush 0.9 % injection 5-40 mL, PRN  0.9 % sodium chloride infusion, PRN  tiotropium (SPIRIVA RESPIMAT) 2.5 MCG/ACT inhaler 2 puff, Daily RT  levoFLOXacin (LEVAQUIN) 750 MG/150ML infusion 750 mg, Q24H  ARIPiprazole (ABILIFY) tablet 2 mg, Nightly  mometasone-formoterol (DULERA) 100-5 MCG/ACT inhaler 2 puff, BID RT  sodium chloride flush 0.9 % injection 5-40 mL, 2 times per day  sodium chloride flush 0.9 % injection 5-40 mL, PRN  0.9 % sodium  walking... just needs staff to assist  Malnutrition:  will ask dietary to follow, offer supplements etc.       Diet: Diet NPO Exceptions are: Sips of Water with Meds, Ice Chips, Sips of Clear Liquids, Popsicles  Code:Full Code  DVT PPXlovenox       VALE Way - CNP   4/14/2024 7:38 AM

## 2024-04-14 NOTE — PROGRESS NOTES
[facility]        PATIENT NAME: Ana Gonzales     TODAY'S DATE: 4/14/2024    CC: abd pain  SUBJECTIVE:    Pt readmitted following long hosp stay. Severe weakness and felt dehydration at home. Not eating, emesis.  Feels a little better over 24 hours, no emesis, passed a little flatus and a BM.  Was able to get OOB with [facility].       Current Medications:   Current Facility-Administered Medications: magnesium sulfate 4000 mg in 100 mL IVPB premix, 4,000 mg, IntraVENous, Once  potassium chloride (KLOR-CON M) extended release tablet 40 mEq, 40 mEq, Oral, Once  lactated ringers IV soln infusion, , IntraVENous, Continuous  buPROPion (WELLBUTRIN XL) extended release tablet 300 mg, 300 mg, Oral, QAM  carvedilol (COREG) tablet 3.125 mg, 3.125 mg, Oral, BID WC  dilTIAZem (CARDIZEM CD) extended release capsule 120 mg, 120 mg, Oral, Daily  gabapentin (NEURONTIN) capsule 600 mg, 600 mg, Oral, 4x Daily  isosorbide mononitrate (IMDUR) extended release tablet 120 mg, 120 mg, Oral, Daily  nortriptyline (PAMELOR) capsule 75 mg, 75 mg, Oral, Nightly  pantoprazole (PROTONIX) tablet 40 mg, 40 mg, Oral, BID AC  ranolazine (RANEXA) extended release tablet 1,000 mg, 1,000 mg, Oral, BID  topiramate (TOPAMAX) tablet 100 mg, 100 mg, Oral, QAM  topiramate (TOPAMAX) tablet 150 mg, 150 mg, Oral, QPM  trospium (SANCTURA) tablet 20 mg, 20 mg, Oral, Nightly  lidocaine PF 1 % injection 5 mL, 5 mL, IntraDERmal, Once  sodium chloride flush 0.9 % injection 5-40 mL, 5-40 mL, IntraVENous, 2 times per day  sodium chloride flush 0.9 % injection 5-40 mL, 5-40 mL, IntraVENous, PRN  0.9 % sodium chloride infusion, 25 mL, IntraVENous, PRN  tiotropium (SPIRIVA RESPIMAT) 2.5 MCG/ACT inhaler 2 puff, 2 puff, Inhalation, Daily RT  levoFLOXacin (LEVAQUIN) 750 MG/150ML infusion 750 mg, 750 mg, IntraVENous, Q24H  ARIPiprazole (ABILIFY) tablet 2 mg, 2 mg, Oral, Nightly  mometasone-formoterol (DULERA) 100-5 MCG/ACT inhaler 2 puff,

## 2024-04-15 ENCOUNTER — APPOINTMENT (OUTPATIENT)
Dept: GENERAL RADIOLOGY | Age: 69
End: 2024-04-15
Payer: MEDICARE

## 2024-04-15 PROBLEM — K56.600 PARTIAL SMALL BOWEL OBSTRUCTION (HCC): Status: ACTIVE | Noted: 2024-03-15

## 2024-04-15 LAB
ANION GAP SERPL CALCULATED.3IONS-SCNC: 13 MMOL/L (ref 3–16)
BASOPHILS # BLD: 0 K/UL (ref 0–0.2)
BASOPHILS NFR BLD: 1 %
BUN SERPL-MCNC: 3 MG/DL (ref 7–20)
CALCIUM SERPL-MCNC: 8.3 MG/DL (ref 8.3–10.6)
CHLORIDE SERPL-SCNC: 106 MMOL/L (ref 99–110)
CO2 SERPL-SCNC: 20 MMOL/L (ref 21–32)
CREAT SERPL-MCNC: 0.6 MG/DL (ref 0.6–1.2)
DEPRECATED RDW RBC AUTO: 16.9 % (ref 12.4–15.4)
EOSINOPHIL # BLD: 0.2 K/UL (ref 0–0.6)
EOSINOPHIL NFR BLD: 4.5 %
GFR SERPLBLD CREATININE-BSD FMLA CKD-EPI: >90 ML/MIN/{1.73_M2}
GI PATHOGENS PNL STL NAA+PROBE: NORMAL
GLUCOSE SERPL-MCNC: 77 MG/DL (ref 70–99)
HCT VFR BLD AUTO: 27.7 % (ref 36–48)
HGB BLD-MCNC: 9.1 G/DL (ref 12–16)
LYMPHOCYTES # BLD: 0.9 K/UL (ref 1–5.1)
LYMPHOCYTES NFR BLD: 25.3 %
MAGNESIUM SERPL-MCNC: 2 MG/DL (ref 1.8–2.4)
MCH RBC QN AUTO: 28.9 PG (ref 26–34)
MCHC RBC AUTO-ENTMCNC: 32.9 G/DL (ref 31–36)
MCV RBC AUTO: 87.8 FL (ref 80–100)
MONOCYTES # BLD: 0.4 K/UL (ref 0–1.3)
MONOCYTES NFR BLD: 10.3 %
NEUTROPHILS # BLD: 2.1 K/UL (ref 1.7–7.7)
NEUTROPHILS NFR BLD: 58.9 %
PLATELET # BLD AUTO: 238 K/UL (ref 135–450)
PMV BLD AUTO: 8 FL (ref 5–10.5)
POTASSIUM SERPL-SCNC: 4.1 MMOL/L (ref 3.5–5.1)
RBC # BLD AUTO: 3.15 M/UL (ref 4–5.2)
SODIUM SERPL-SCNC: 139 MMOL/L (ref 136–145)
WBC # BLD AUTO: 3.6 K/UL (ref 4–11)

## 2024-04-15 PROCEDURE — 94640 AIRWAY INHALATION TREATMENT: CPT

## 2024-04-15 PROCEDURE — 97530 THERAPEUTIC ACTIVITIES: CPT

## 2024-04-15 PROCEDURE — 6360000002 HC RX W HCPCS: Performed by: STUDENT IN AN ORGANIZED HEALTH CARE EDUCATION/TRAINING PROGRAM

## 2024-04-15 PROCEDURE — 94761 N-INVAS EAR/PLS OXIMETRY MLT: CPT

## 2024-04-15 PROCEDURE — APPSS15 APP SPLIT SHARED TIME 0-15 MINUTES: Performed by: NURSE PRACTITIONER

## 2024-04-15 PROCEDURE — 80048 BASIC METABOLIC PNL TOTAL CA: CPT

## 2024-04-15 PROCEDURE — 97535 SELF CARE MNGMENT TRAINING: CPT

## 2024-04-15 PROCEDURE — 83735 ASSAY OF MAGNESIUM: CPT

## 2024-04-15 PROCEDURE — 2580000003 HC RX 258: Performed by: NURSE PRACTITIONER

## 2024-04-15 PROCEDURE — 6370000000 HC RX 637 (ALT 250 FOR IP): Performed by: STUDENT IN AN ORGANIZED HEALTH CARE EDUCATION/TRAINING PROGRAM

## 2024-04-15 PROCEDURE — 99024 POSTOP FOLLOW-UP VISIT: CPT | Performed by: SURGERY

## 2024-04-15 PROCEDURE — 97116 GAIT TRAINING THERAPY: CPT

## 2024-04-15 PROCEDURE — 1200000000 HC SEMI PRIVATE

## 2024-04-15 PROCEDURE — 97165 OT EVAL LOW COMPLEX 30 MIN: CPT

## 2024-04-15 PROCEDURE — 97161 PT EVAL LOW COMPLEX 20 MIN: CPT

## 2024-04-15 PROCEDURE — 2580000003 HC RX 258: Performed by: STUDENT IN AN ORGANIZED HEALTH CARE EDUCATION/TRAINING PROGRAM

## 2024-04-15 PROCEDURE — 85025 COMPLETE CBC W/AUTO DIFF WBC: CPT

## 2024-04-15 PROCEDURE — 2500000003 HC RX 250 WO HCPCS: Performed by: SURGERY

## 2024-04-15 PROCEDURE — 6360000002 HC RX W HCPCS: Performed by: NURSE PRACTITIONER

## 2024-04-15 PROCEDURE — APPNB30 APP NON BILLABLE TIME 0-30 MINS: Performed by: NURSE PRACTITIONER

## 2024-04-15 PROCEDURE — 74019 RADEX ABDOMEN 2 VIEWS: CPT

## 2024-04-15 RX ORDER — SODIUM CHLORIDE 0.9 % (FLUSH) 0.9 %
5-40 SYRINGE (ML) INJECTION EVERY 12 HOURS SCHEDULED
Status: DISCONTINUED | OUTPATIENT
Start: 2024-04-15 | End: 2024-04-27

## 2024-04-15 RX ORDER — LIDOCAINE HYDROCHLORIDE 10 MG/ML
5 INJECTION, SOLUTION EPIDURAL; INFILTRATION; INTRACAUDAL; PERINEURAL ONCE
Status: DISCONTINUED | OUTPATIENT
Start: 2024-04-15 | End: 2024-04-26

## 2024-04-15 RX ORDER — SODIUM CHLORIDE 0.9 % (FLUSH) 0.9 %
5-40 SYRINGE (ML) INJECTION PRN
Status: DISCONTINUED | OUTPATIENT
Start: 2024-04-15 | End: 2024-05-02 | Stop reason: HOSPADM

## 2024-04-15 RX ORDER — SODIUM CHLORIDE 9 MG/ML
25 INJECTION, SOLUTION INTRAVENOUS PRN
Status: DISCONTINUED | OUTPATIENT
Start: 2024-04-15 | End: 2024-05-02 | Stop reason: HOSPADM

## 2024-04-15 RX ADMIN — BUPROPION HYDROCHLORIDE 300 MG: 150 TABLET, EXTENDED RELEASE ORAL at 08:06

## 2024-04-15 RX ADMIN — TROSPIUM CHLORIDE 20 MG: 20 TABLET, FILM COATED ORAL at 20:54

## 2024-04-15 RX ADMIN — DILTIAZEM HYDROCHLORIDE 120 MG: 120 CAPSULE, EXTENDED RELEASE ORAL at 08:05

## 2024-04-15 RX ADMIN — METOCLOPRAMIDE 10 MG: 5 INJECTION, SOLUTION INTRAMUSCULAR; INTRAVENOUS at 01:51

## 2024-04-15 RX ADMIN — CARVEDILOL 3.12 MG: 3.12 TABLET, FILM COATED ORAL at 08:06

## 2024-04-15 RX ADMIN — GABAPENTIN 600 MG: 300 CAPSULE ORAL at 20:54

## 2024-04-15 RX ADMIN — CARVEDILOL 3.12 MG: 3.12 TABLET, FILM COATED ORAL at 17:24

## 2024-04-15 RX ADMIN — SODIUM CHLORIDE, PRESERVATIVE FREE 10 ML: 5 INJECTION INTRAVENOUS at 08:12

## 2024-04-15 RX ADMIN — Medication 2 PUFF: at 20:38

## 2024-04-15 RX ADMIN — ARIPIPRAZOLE 2 MG: 2 TABLET ORAL at 20:54

## 2024-04-15 RX ADMIN — Medication 2 PUFF: at 08:53

## 2024-04-15 RX ADMIN — SODIUM CHLORIDE, PRESERVATIVE FREE 10 ML: 5 INJECTION INTRAVENOUS at 20:56

## 2024-04-15 RX ADMIN — TIOTROPIUM BROMIDE INHALATION SPRAY 2 PUFF: 3.12 SPRAY, METERED RESPIRATORY (INHALATION) at 08:53

## 2024-04-15 RX ADMIN — PANTOPRAZOLE SODIUM 40 MG: 40 TABLET, DELAYED RELEASE ORAL at 05:46

## 2024-04-15 RX ADMIN — TOPIRAMATE 100 MG: 100 TABLET, FILM COATED ORAL at 08:05

## 2024-04-15 RX ADMIN — NORTRIPTYLINE HYDROCHLORIDE 75 MG: 25 CAPSULE ORAL at 20:54

## 2024-04-15 RX ADMIN — ISOSORBIDE MONONITRATE 120 MG: 30 TABLET, EXTENDED RELEASE ORAL at 08:06

## 2024-04-15 RX ADMIN — OLIVE OIL AND SOYBEAN OIL 250 ML: 16; 4 INJECTION, EMULSION INTRAVENOUS at 17:38

## 2024-04-15 RX ADMIN — TOPIRAMATE 150 MG: 100 TABLET, FILM COATED ORAL at 17:24

## 2024-04-15 RX ADMIN — RANOLAZINE 1000 MG: 500 TABLET, EXTENDED RELEASE ORAL at 20:54

## 2024-04-15 RX ADMIN — Medication 10 ML: at 01:55

## 2024-04-15 RX ADMIN — GABAPENTIN 600 MG: 300 CAPSULE ORAL at 08:05

## 2024-04-15 RX ADMIN — RANOLAZINE 1000 MG: 500 TABLET, EXTENDED RELEASE ORAL at 08:05

## 2024-04-15 RX ADMIN — GABAPENTIN 600 MG: 300 CAPSULE ORAL at 17:24

## 2024-04-15 RX ADMIN — GABAPENTIN 600 MG: 300 CAPSULE ORAL at 13:49

## 2024-04-15 RX ADMIN — PANTOPRAZOLE SODIUM 40 MG: 40 TABLET, DELAYED RELEASE ORAL at 17:24

## 2024-04-15 RX ADMIN — ASCORBIC ACID, VITAMIN A PALMITATE, CHOLECALCIFEROL, THIAMINE HYDROCHLORIDE, RIBOFLAVIN-5 PHOSPHATE SODIUM, PYRIDOXINE HYDROCHLORIDE, NIACINAMIDE, DEXPANTHENOL, ALPHA-TOCOPHEROL ACETATE, VITAMIN K1, FOLIC ACID, BIOTIN, CYANOCOBALAMIN: 200; 3300; 200; 6; 3.6; 6; 40; 15; 10; 150; 600; 60; 5 INJECTION, SOLUTION INTRAVENOUS at 17:38

## 2024-04-15 RX ADMIN — ENOXAPARIN SODIUM 40 MG: 100 INJECTION SUBCUTANEOUS at 08:04

## 2024-04-15 RX ADMIN — METOCLOPRAMIDE 10 MG: 5 INJECTION, SOLUTION INTRAMUSCULAR; INTRAVENOUS at 08:04

## 2024-04-15 NOTE — PROGRESS NOTES
[facility]        Progress Note    Patient Name: nAa Gonzales  MRN: [id number]  YOB: 1955  Date of Evaluation: 4/15/2024    Subjective:  No acute events overnight  Denies pain  No nausea or vomiting, poor appetite  Passing flatus and loose stool, denies bloating  Up to chair at this time    Post-Operative Day #27      Vital Signs:  Patient Vitals for the past 24 hrs:   BP Temp Temp src Pulse Resp SpO2 Weight   04/15/24 0855 -- -- -- 78 16 95 % --   04/15/24 0800 131/76 97.1 °F (36.2 °C) Oral 85 16 96 % --   04/15/24 0545 -- -- -- -- -- -- 90 kg (198 lb 6.6 oz)   04/15/24 0027 103/64 98.1 °F (36.7 °C) Oral 82 16 93 % --   24 2008 120/73 97.4 °F (36.3 °C) Axillary 84 16 95 % --   24 1240 113/71 97.7 °F (36.5 °C) Axillary 77 16 92 % --      TEMPERATURE HISTORY 24H: Temp (24hrs), Av.6 °F (36.4 °C), Min:97.1 °F (36.2 °C), Max:98.1 °F (36.7 °C)    BLOOD PRESSURE HISTORY: Systolic (36hrs), Av , Min:103 , Max:131    Diastolic (36hrs), Av, Min:64, Max:76      Intake/Output:  I/O last 3 completed shifts:  In:  [P.O.:100; I.V.:1237.1; IV Piggyback:731.9]  Out: 1800 [Urine:1800]  No intake/output data recorded.  Drain/tube Output:       Physical Exam:  General: awake, alert, oriented to person, place, time  Lungs: unlabored respirations  Abdomen: soft, non-distended, non-tender, bowel sounds present   Skin/Wound: healing well, no drainage, no erythema, well approximated    Labs:  CBC:    Recent Labs     24  0540 24  0520 04/15/24  0500   WBC 6.9 4.2 3.6*   HGB 10.0* 8.8* 9.1*   HCT 30.2* 27.0* 27.7*    221 238     BMP:    Recent Labs     24  0540 24  1558 24  0520 04/15/24  0500     --  139 139   K 2.7* 3.2* 3.2* 4.1     --  107 106   CO2 16*  --  18* 20*   BUN 10  --  5* 3*   CREATININE 0.7  --  0.6 0.6   GLUCOSE 88  --  67* 77     Hepatic:    Recent Labs     24  1554   AST 14*   ALT  Vibrio vulnificus/parahaemolyticus/cholerae DNA detected  No Plesiomonas shigelloides DNA detected  No Enterotoxigenic E. coli (ETEC) DNA detected  No Yersinia enterocolitica DNA detected  Normal Range:  None detected   (4/12/2024)  Not in Time Range      C. difficile  No results found for requested labs within last 30 days.     Urine culture  Lab Results   Component Value Date/Time    LABURIN  04/13/2024 03:59 AM     >50,000 CFU/ml Mixed pathogens  Multiple organisms isolated, no predominance. Culture  indicates probable contamination. Please review colony count  and clinical indications to determine if a repeat culture is  necessary. No further workup to be done.         Pathology:  \"Relevant pathology documented under results section     Imaging:  I have personally reviewed the following films:    XR ABDOMEN (2 VIEWS)    Result Date: 4/15/2024  EXAMINATION: TWO XRAY VIEWS OF THE ABDOMEN 4/15/2024 11:58 am COMPARISON: Yesterday HISTORY: ORDERING SYSTEM PROVIDED HISTORY: abdominal pain TECHNOLOGIST PROVIDED HISTORY: Reason for exam:->abdominal pain Reason for Exam: abdominal pain FINDINGS: No free intraperitoneal air.  Gaseous distension of the small and large bowel similar to the prior exam.  Large bowel measures up to 7.5 cm in diameter. Small bowel measures 5 cm in diameter. Lumbosacral postoperative changes.  No calculus is present in the region of the kidneys or urinary tract.     Stable bowel gas pattern favoring ileus or partial small bowel obstruction.     XR ABDOMEN (2 VIEWS)    Result Date: 4/14/2024  EXAMINATION: TWO XRAY VIEWS OF THE ABDOMEN 4/14/2024 8:24 am COMPARISON: 04/12/2024 HISTORY: ORDERING SYSTEM PROVIDED HISTORY: abdominal pain TECHNOLOGIST PROVIDED HISTORY: Reason for exam:->abdominal pain FINDINGS: There is slight worsening in the dilatation of the multiple dilated loops of small bowel throughout the abdomen.  There is no pneumoperitoneum.  Status post cholecystectomy.  The lung bases are

## 2024-04-15 NOTE — PROGRESS NOTES
Clinical Pharmacy Note    Pharmacy consulted by Aria Carter to manage TPN      Goal TPN rate: Awaiting dietician recommendations     Access: PICC  Indication: SBO    Labs:  General Labs:  BMP:    Lab Results   Component Value Date/Time     04/15/2024 05:00 AM    K 4.1 04/15/2024 05:00 AM     04/15/2024 05:00 AM    CO2 20 04/15/2024 05:00 AM    BUN 3 04/15/2024 05:00 AM    LABALBU 3.9 04/12/2024 03:54 PM    CREATININE 0.6 04/15/2024 05:00 AM    CALCIUM 8.3 04/15/2024 05:00 AM    GFRAA >60 11/24/2020 06:29 PM    GFRAA >60 01/13/2013 05:24 AM    LABGLOM >90 04/15/2024 05:00 AM    GLUCOSE 77 04/15/2024 05:00 AM       Electrolyte replacement as follows:   None today    Blood sugars over past 24 hours: 67-88    Blood sugar management:  Plan to initiate Humalog q4 hour sliding scale at low dose.    Plan to initiate TPN at 40 ml/hr.    Thank you for allowing pharmacy to participate in the care of this patient.    Kathleen Chung, PharmD  PGY-1 Pharmacy Resident  [id number]       Able to read and write English

## 2024-04-15 NOTE — PLAN OF CARE
Problem: Discharge Planning  Goal: Discharge to home or other facility with appropriate resources  4/15/2024 0956 by Nuha Mullins RN  Outcome: Progressing  4/15/2024 0434 by Nuha Cadet RN  Outcome: Progressing     Problem: Safety - Adult  Goal: Free from fall injury  4/15/2024 0956 by Nuha Mullins RN  Outcome: Progressing  4/15/2024 0434 by Nuha Cadet RN  Outcome: Progressing     Problem: Pain  Goal: Verbalizes/displays adequate comfort level or baseline comfort level  4/15/2024 0956 by Nuha Mullins RN  Outcome: Progressing  4/15/2024 0434 by Nuha Cadet RN  Outcome: Progressing     Problem: Skin/Tissue Integrity  Goal: Absence of new skin breakdown  Description: 1.  Monitor for areas of redness and/or skin breakdown  2.  Assess vascular access sites hourly  3.  Every 4-6 hours minimum:  Change oxygen saturation probe site  4.  Every 4-6 hours:  If on nasal continuous positive airway pressure, respiratory therapy assess nares and determine need for appliance change or resting period.  4/15/2024 0956 by Nuha Mullins RN  Outcome: Progressing  4/15/2024 0434 by Nuha Cadet RN  Outcome: Progressing     Problem: Chronic Conditions and Co-morbidities  Goal: Patient's chronic conditions and co-morbidity symptoms are monitored and maintained or improved  4/15/2024 0956 by Nuha Mullins RN  Outcome: Progressing  4/15/2024 0434 by Nuha Cadet RN  Outcome: Progressing     Problem: ABCDS Injury Assessment  Goal: Absence of physical injury  4/15/2024 0956 by Nuha Mullins RN  Outcome: Progressing  4/15/2024 0434 by Nuha Cadet RN  Outcome: Progressing

## 2024-04-15 NOTE — CARE COORDINATION
to RETURNING to current housing:   Potential Assistance needed at discharge: (P) Other (Comment) (TBD)            Potential DME: (P) Other (Comment) (TBD)  Patient expects to discharge to: (P) House  Plan for transportation at discharge: (P) Family (Spouse)    Financial    Payor: [employer] MEDICARE / Plan: [employer] MEDICARE-ADVANTAGE PPO / Product Type: Medicare /     Does insurance require precert for SNF: Yes    Potential assistance Purchasing Medications: (P) No  Meds-to-Beds request:        [id number] - [facility] [address] [phone] - F [phone]  [address] [address]  Phone: [phone] Fax: [phone]    [id number] - [place], [place] - [address] [phone] - F [phone]  [address] [address]  Phone: [phone] Fax: [phone]      Notes:    Factors facilitating achievement of predicted outcomes: Family support    Barriers to discharge: Pain    Additional Case Management Notes:     - Patient is currently ACTIVE with [facility]    The Plan for Transition of Care is related to the following treatment goals of SBO (small bowel obstruction) (HCC) [K56.609]  Hypoxia [R09.02]  Partial small bowel obstruction (HCC) [K56.600]    IF APPLICABLE: The Patient and/or patient representative Ana and her family were provided with a choice of provider and agrees with the discharge plan. Freedom of choice list with basic dialogue that supports the patient's individualized plan of care/goals and shares the quality data associated with the providers was provided to:     Patient Representative Name:       The Patient and/or Patient Representative Agree with the Discharge Plan?      Robert Peace Jr RN  Case Management Department  Ph: [phone] Fax: [phone]

## 2024-04-15 NOTE — PLAN OF CARE
Problem: Discharge Planning  Goal: Discharge to home or other facility with appropriate resources  Outcome: Progressing     Problem: Safety - Adult  Goal: Free from fall injury  Outcome: Progressing     Problem: Pain  Goal: Verbalizes/displays adequate comfort level or baseline comfort level  Outcome: Progressing     Problem: Skin/Tissue Integrity  Goal: Absence of new skin breakdown  Description: 1.  Monitor for areas of redness and/or skin breakdown  2.  Assess vascular access sites hourly  3.  Every 4-6 hours minimum:  Change oxygen saturation probe site  4.  Every 4-6 hours:  If on nasal continuous positive airway pressure, respiratory therapy assess nares and determine need for appliance change or resting period.  Outcome: Progressing     Problem: Chronic Conditions and Co-morbidities  Goal: Patient's chronic conditions and co-morbidity symptoms are monitored and maintained or improved  Outcome: Progressing     Problem: ABCDS Injury Assessment  Goal: Absence of physical injury  Outcome: Progressing

## 2024-04-15 NOTE — PROGRESS NOTES
[facility] - Inpatient Rehabilitation Department   Phone: [phone]    Occupational Therapy    [x] Initial Evaluation            [] Daily Treatment Note         [] Discharge Summary      Patient: Ana Gonzales   : 1955   MRN: [id number]   Date of Service:  4/15/2024    Admitting Diagnosis:  SBO (small bowel obstruction) (HCC)  Current Admission Summary: Per H&P: \"Ana Gonzales is a 68 y.o. female with a pmh of upper GI bleed, SBO/persistent ileus, TIA, CHF, CAD who presents with SBO (small bowel obstruction)\"   Past Medical History:  has a past medical history of Arthritis, Asthma, Brain injury (HCC), Bronchitis, CHF (congestive heart failure) ([facility]), Depression, Diabetes mellitus (HCC), DM (diabetes mellitus screen), Elevated cholesterol with high triglycerides, Fall, Fibromyalgia, GERD (gastroesophageal reflux disease), HIGH CHOLESTEROL, Hypertension, Microvascular angina, Migraine, Neuropathy, Panic attacks, PONV (postoperative nausea and vomiting), Post traumatic stress disorder, Short-term memory loss, Skin cancer, Sleep apnea, and Vertigo.  Past Surgical History:  has a past surgical history that includes Hysterectomy; eye surgery (cataract B); Carpal tunnel release; Finger trigger release; Gallbladder surgery; bladder suspension; Skin cancer excision; blepharoplasty; laryngoscopy (2017); Coronary stent placement (2023); joint replacement (Right); back surgery; Upper gastrointestinal endoscopy (N/A, 10/2/2023); Upper gastrointestinal endoscopy (N/A, 3/13/2024); Upper gastrointestinal endoscopy (N/A, 3/14/2024); Upper gastrointestinal endoscopy (N/A, 3/14/2024); Upper gastrointestinal endoscopy (N/A, 3/14/2024); laparotomy (N/A, 3/19/2024); and Upper gastrointestinal endoscopy (N/A, 3/26/2024).    Discharge Recommendations: Ana Gonzales scored a 18/24 on the AM-PAC ADL Inpatient form. Current research shows that an AM-PAC score of 18 or greater is typically associated

## 2024-04-15 NOTE — PROGRESS NOTES
[facility] - Inpatient Rehabilitation Department   Phone: [phone]    Physical Therapy    [x] Initial Evaluation            [] Daily Treatment Note         [] Discharge Summary      Patient: Ana Gonzales   : 1955   MRN: [id number]   Date of Service:  4/15/2024  Admitting Diagnosis: SBO (small bowel obstruction) ([facility])    Current Admission Summary: Ana Gonzales is a 68 y.o. female who presents to the ED status post exploratory laparotomy with lysis of adhesions, Meckel diverticulectomy and appendectomy 3/19/2024 by Dr. Morales with complaint of generalized weakness and nausea/vomiting.  She states she is too weak to ambulate, which is new.  She states the nausea/vomiting began last night.  She is denying abdominal pain, although abdomen is tender to palpation on exam.  Patient reports an episode of shortness of breath earlier today, but is not feeling short of breath at this moment.  Lower extremity edema is noted and patient is unsure of her baseline.  Patient states her chief concerns are the generalized weakness/fatigue and nausea/vomiting.     Past Medical History:  has a past medical history of Arthritis, Asthma, Brain injury ([facility]), Bronchitis, CHF (congestive heart failure) ([facility]), Depression, Diabetes mellitus ([facility]), DM (diabetes mellitus screen), Elevated cholesterol with high triglycerides, Fall, Fibromyalgia, GERD (gastroesophageal reflux disease), HIGH CHOLESTEROL, Hypertension, Microvascular angina, Migraine, Neuropathy, Panic attacks, PONV (postoperative nausea and vomiting), Post traumatic stress disorder, Short-term memory loss, Skin cancer, Sleep apnea, and Vertigo.  Past Surgical History:  has a past surgical history that includes Hysterectomy; eye surgery (cataract B); Carpal tunnel release; Finger trigger release; Gallbladder surgery; bladder suspension; Skin cancer excision; blepharoplasty; laryngoscopy (2017); Coronary stent placement (2023); joint  exercise program, safety education, and equipment evaluation/education    Goals  Patient Goals: to go home    Short Term Goals:  Time Frame: to be met by d/c  Patient will complete bed mobility at modified independent   Patient will complete transfers at [facility]   Patient will ambulate 150 ft with use of LRAD at [facility]  Patient will ascend/descend 4 stairs with (R) ascending handrail at modified independent  Patient will complete car transfer at modified independent    Above goals reviewed on 4/15/2024.  All goals are ongoing at this time unless indicated above.      Therapy Session Time     Session Therapy Time:   Individual Concurrent Group Co-treatment   Time In       0920   Time Out       1013   Minutes       53     Timed Code Treatment Minutes:  38 minutes    Total Treatment Minutes:  53 minutes      Electronically Signed By: Ashley Whiting, PT    Ashley Whiting PT, DPT [id number]

## 2024-04-15 NOTE — PROGRESS NOTES
[facility]    4/15/2024 7:42 AM        Name: Ana Gonzales .              Admitted: 4/12/2024  Primary Care Provider: Mellisa Houston MD (Tel: [phone])      Subjective:  .    Seen this am while up in the chair with  at bedside , she is depressed today with the possibility of lap surgery in the next 1 - 2 days if she doesn't improve.     No pain  Has been walking in the room  Walks in the halls with stand by assist     C diff is negative     Reviewed interval ancillary notes    Current Medications  metoclopramide (REGLAN) injection 10 mg, Q6H  lactated ringers IV soln infusion, Continuous  buPROPion (WELLBUTRIN XL) extended release tablet 300 mg, QAM  carvedilol (COREG) tablet 3.125 mg, BID WC  dilTIAZem (CARDIZEM CD) extended release capsule 120 mg, Daily  gabapentin (NEURONTIN) capsule 600 mg, 4x Daily  isosorbide mononitrate (IMDUR) extended release tablet 120 mg, Daily  nortriptyline (PAMELOR) capsule 75 mg, Nightly  pantoprazole (PROTONIX) tablet 40 mg, BID AC  ranolazine (RANEXA) extended release tablet 1,000 mg, BID  topiramate (TOPAMAX) tablet 100 mg, QAM  topiramate (TOPAMAX) tablet 150 mg, QPM  trospium (SANCTURA) tablet 20 mg, Nightly  lidocaine PF 1 % injection 5 mL, Once  sodium chloride flush 0.9 % injection 5-40 mL, 2 times per day  sodium chloride flush 0.9 % injection 5-40 mL, PRN  0.9 % sodium chloride infusion, PRN  tiotropium (SPIRIVA RESPIMAT) 2.5 MCG/ACT inhaler 2 puff, Daily RT  ARIPiprazole (ABILIFY) tablet 2 mg, Nightly  mometasone-formoterol (DULERA) 100-5 MCG/ACT inhaler 2 puff, BID RT  sodium chloride flush 0.9 % injection 5-40 mL, 2 times per day  sodium chloride flush 0.9 % injection 5-40 mL, PRN  0.9 % sodium chloride infusion, PRN  magnesium sulfate 2000 mg in 50 mL IVPB premix, PRN  enoxaparin (LOVENOX) injection 40 mg, Daily  ondansetron (ZOFRAN-ODT) disintegrating tablet 4 mg, Q8H PRN   Or  ondansetron (ZOFRAN) injection 4  mg, Q6H PRN  polyethylene glycol (GLYCOLAX) packet 17 g, Daily PRN  acetaminophen (TYLENOL) tablet 650 mg, Q6H PRN   Or  acetaminophen (TYLENOL) suppository 650 mg, Q6H PRN  morphine (PF) injection 2 mg, Q4H PRN  ipratropium 0.5 mg-albuterol 2.5 mg (DUONEB) nebulizer solution 1 Dose, Q6H PRN        Objective:  /64   Pulse 82   Temp 98.1 °F (36.7 °C) (Oral)   Resp 16   Ht 1.575 m (5' 2\")   Wt 90 kg (198 lb 6.6 oz)   SpO2 93%   BMI 36.29 kg/m²     Intake/Output Summary (Last 24 hours) at 4/15/2024 0742  Last data filed at 4/15/2024 0638  Gross per 24 hour   Intake 100 ml   Output 1400 ml   Net -1300 ml        Wt Readings from Last 3 Encounters:   04/15/24 90 kg (198 lb 6.6 oz)   04/10/24 92.2 kg (203 lb 3.2 oz)   03/06/24 95.7 kg (210 lb 14.4 oz)       General appearance:  Appears comfortable,  alert and pleasant   Eyes: Sclera clear. Pupils equal.  ENT: Moist oral mucosa. Trachea midline, no adenopathy.  Cardiovascular: Regular rhythm, normal S1, S2. No murmur. No edema in lower extremities  Respiratory: Not using accessory muscles. Good inspiratory effort. Clear to auscultation bilaterally, no wheeze or crackles.   GI: Abdomen softly distended with sluggish  bowel sounds. No tenderness,  operative incision unremarkable   Musculoskeletal: No cyanosis in digits, neck supple  Neurology: CN 2-12 grossly intact. No speech or motor deficits  Psych: Normal affect. Alert and oriented in time, place and person  Skin: Warm, dry, normal turgor    Labs and Tests:  CBC:   Recent Labs     04/13/24  0540 04/14/24  0520 04/15/24  0500   WBC 6.9 4.2 3.6*   HGB 10.0* 8.8* 9.1*    221 238       BMP:    Recent Labs     04/13/24  0540 04/13/24  1558 04/14/24  0520 04/15/24  0500     --  139 139   K 2.7* 3.2* 3.2* 4.1     --  107 106   CO2 16*  --  18* 20*   BUN 10  --  5* 3*   CREATININE 0.7  --  0.6 0.6   GLUCOSE 88  --  67* 77       Hepatic:   Recent Labs     04/12/24  1554   AST 14*   ALT <5*   BILITOT

## 2024-04-16 ENCOUNTER — ANESTHESIA EVENT (OUTPATIENT)
Dept: OPERATING ROOM | Age: 69
End: 2024-04-16
Payer: MEDICARE

## 2024-04-16 LAB
ALBUMIN SERPL-MCNC: 3 G/DL (ref 3.4–5)
ALP SERPL-CCNC: 75 U/L (ref 40–129)
ALT SERPL-CCNC: <5 U/L (ref 10–40)
ANION GAP SERPL CALCULATED.3IONS-SCNC: 11 MMOL/L (ref 3–16)
ANISOCYTOSIS BLD QL SMEAR: ABNORMAL
AST SERPL-CCNC: 8 U/L (ref 15–37)
BACTERIA BLD CULT ORG #2: NORMAL
BACTERIA BLD CULT: NORMAL
BASOPHILS # BLD: 0 K/UL (ref 0–0.2)
BASOPHILS NFR BLD: 0 %
BILIRUB DIRECT SERPL-MCNC: <0.2 MG/DL (ref 0–0.3)
BILIRUB INDIRECT SERPL-MCNC: ABNORMAL MG/DL (ref 0–1)
BILIRUB SERPL-MCNC: <0.2 MG/DL (ref 0–1)
BUN SERPL-MCNC: 4 MG/DL (ref 7–20)
CALCIUM SERPL-MCNC: 8.5 MG/DL (ref 8.3–10.6)
CHLORIDE SERPL-SCNC: 107 MMOL/L (ref 99–110)
CO2 SERPL-SCNC: 22 MMOL/L (ref 21–32)
CREAT SERPL-MCNC: <0.5 MG/DL (ref 0.6–1.2)
DEPRECATED RDW RBC AUTO: 17.2 % (ref 12.4–15.4)
EOSINOPHIL # BLD: 0.3 K/UL (ref 0–0.6)
EOSINOPHIL NFR BLD: 9 %
GFR SERPLBLD CREATININE-BSD FMLA CKD-EPI: >90 ML/MIN/{1.73_M2}
GLUCOSE BLD-MCNC: 133 MG/DL (ref 70–99)
GLUCOSE BLD-MCNC: 136 MG/DL (ref 70–99)
GLUCOSE BLD-MCNC: 138 MG/DL (ref 70–99)
GLUCOSE BLD-MCNC: 151 MG/DL (ref 70–99)
GLUCOSE SERPL-MCNC: 141 MG/DL (ref 70–99)
HCT VFR BLD AUTO: 29.1 % (ref 36–48)
HGB BLD-MCNC: 9.3 G/DL (ref 12–16)
LYMPHOCYTES # BLD: 0.5 K/UL (ref 1–5.1)
LYMPHOCYTES NFR BLD: 15 %
MAGNESIUM SERPL-MCNC: 1.7 MG/DL (ref 1.8–2.4)
MCH RBC QN AUTO: 27.9 PG (ref 26–34)
MCHC RBC AUTO-ENTMCNC: 31.8 G/DL (ref 31–36)
MCV RBC AUTO: 88 FL (ref 80–100)
MONOCYTES # BLD: 0.3 K/UL (ref 0–1.3)
MONOCYTES NFR BLD: 10 %
NEUTROPHILS # BLD: 2.1 K/UL (ref 1.7–7.7)
NEUTROPHILS NFR BLD: 63 %
NEUTS BAND NFR BLD MANUAL: 3 % (ref 0–7)
PERFORMED ON: ABNORMAL
PHOSPHATE SERPL-MCNC: 3.1 MG/DL (ref 2.5–4.9)
PLATELET # BLD AUTO: 239 K/UL (ref 135–450)
PMV BLD AUTO: 8.5 FL (ref 5–10.5)
POTASSIUM SERPL-SCNC: 3.5 MMOL/L (ref 3.5–5.1)
PROT SERPL-MCNC: 5.5 G/DL (ref 6.4–8.2)
RBC # BLD AUTO: 3.31 M/UL (ref 4–5.2)
SODIUM SERPL-SCNC: 140 MMOL/L (ref 136–145)
TRIGL SERPL-MCNC: 121 MG/DL (ref 0–150)
WBC # BLD AUTO: 3.2 K/UL (ref 4–11)

## 2024-04-16 PROCEDURE — APPNB30 APP NON BILLABLE TIME 0-30 MINS: Performed by: NURSE PRACTITIONER

## 2024-04-16 PROCEDURE — 99024 POSTOP FOLLOW-UP VISIT: CPT | Performed by: SURGERY

## 2024-04-16 PROCEDURE — 84478 ASSAY OF TRIGLYCERIDES: CPT

## 2024-04-16 PROCEDURE — 2500000003 HC RX 250 WO HCPCS: Performed by: SURGERY

## 2024-04-16 PROCEDURE — 1200000000 HC SEMI PRIVATE

## 2024-04-16 PROCEDURE — 80069 RENAL FUNCTION PANEL: CPT

## 2024-04-16 PROCEDURE — 2580000003 HC RX 258: Performed by: NURSE PRACTITIONER

## 2024-04-16 PROCEDURE — 97110 THERAPEUTIC EXERCISES: CPT

## 2024-04-16 PROCEDURE — APPSS15 APP SPLIT SHARED TIME 0-15 MINUTES: Performed by: NURSE PRACTITIONER

## 2024-04-16 PROCEDURE — 97530 THERAPEUTIC ACTIVITIES: CPT

## 2024-04-16 PROCEDURE — 6360000002 HC RX W HCPCS: Performed by: STUDENT IN AN ORGANIZED HEALTH CARE EDUCATION/TRAINING PROGRAM

## 2024-04-16 PROCEDURE — 94640 AIRWAY INHALATION TREATMENT: CPT

## 2024-04-16 PROCEDURE — 6370000000 HC RX 637 (ALT 250 FOR IP): Performed by: STUDENT IN AN ORGANIZED HEALTH CARE EDUCATION/TRAINING PROGRAM

## 2024-04-16 PROCEDURE — 2580000003 HC RX 258: Performed by: STUDENT IN AN ORGANIZED HEALTH CARE EDUCATION/TRAINING PROGRAM

## 2024-04-16 PROCEDURE — 85025 COMPLETE CBC W/AUTO DIFF WBC: CPT

## 2024-04-16 PROCEDURE — 83735 ASSAY OF MAGNESIUM: CPT

## 2024-04-16 PROCEDURE — 94761 N-INVAS EAR/PLS OXIMETRY MLT: CPT

## 2024-04-16 PROCEDURE — 80076 HEPATIC FUNCTION PANEL: CPT

## 2024-04-16 PROCEDURE — 97116 GAIT TRAINING THERAPY: CPT

## 2024-04-16 PROCEDURE — 97535 SELF CARE MNGMENT TRAINING: CPT

## 2024-04-16 PROCEDURE — 94760 N-INVAS EAR/PLS OXIMETRY 1: CPT

## 2024-04-16 PROCEDURE — 6360000002 HC RX W HCPCS: Performed by: SURGERY

## 2024-04-16 RX ORDER — MAGNESIUM SULFATE IN WATER 40 MG/ML
2000 INJECTION, SOLUTION INTRAVENOUS ONCE
Status: COMPLETED | OUTPATIENT
Start: 2024-04-16 | End: 2024-04-16

## 2024-04-16 RX ADMIN — SODIUM CHLORIDE, POTASSIUM CHLORIDE, SODIUM LACTATE AND CALCIUM CHLORIDE: 600; 310; 30; 20 INJECTION, SOLUTION INTRAVENOUS at 00:38

## 2024-04-16 RX ADMIN — PANTOPRAZOLE SODIUM 40 MG: 40 TABLET, DELAYED RELEASE ORAL at 17:23

## 2024-04-16 RX ADMIN — DILTIAZEM HYDROCHLORIDE 120 MG: 120 CAPSULE, EXTENDED RELEASE ORAL at 08:04

## 2024-04-16 RX ADMIN — Medication 2 PUFF: at 08:54

## 2024-04-16 RX ADMIN — ARIPIPRAZOLE 2 MG: 2 TABLET ORAL at 20:05

## 2024-04-16 RX ADMIN — ONDANSETRON 4 MG: 2 INJECTION INTRAMUSCULAR; INTRAVENOUS at 14:30

## 2024-04-16 RX ADMIN — TOPIRAMATE 150 MG: 100 TABLET, FILM COATED ORAL at 17:24

## 2024-04-16 RX ADMIN — GABAPENTIN 600 MG: 300 CAPSULE ORAL at 13:30

## 2024-04-16 RX ADMIN — GABAPENTIN 600 MG: 300 CAPSULE ORAL at 17:24

## 2024-04-16 RX ADMIN — GABAPENTIN 600 MG: 300 CAPSULE ORAL at 20:05

## 2024-04-16 RX ADMIN — SODIUM CHLORIDE, PRESERVATIVE FREE 10 ML: 5 INJECTION INTRAVENOUS at 20:07

## 2024-04-16 RX ADMIN — GABAPENTIN 600 MG: 300 CAPSULE ORAL at 08:04

## 2024-04-16 RX ADMIN — RANOLAZINE 1000 MG: 500 TABLET, EXTENDED RELEASE ORAL at 20:05

## 2024-04-16 RX ADMIN — BUPROPION HYDROCHLORIDE 300 MG: 150 TABLET, EXTENDED RELEASE ORAL at 08:04

## 2024-04-16 RX ADMIN — ISOSORBIDE MONONITRATE 120 MG: 30 TABLET, EXTENDED RELEASE ORAL at 08:04

## 2024-04-16 RX ADMIN — TROSPIUM CHLORIDE 20 MG: 20 TABLET, FILM COATED ORAL at 20:05

## 2024-04-16 RX ADMIN — CARVEDILOL 3.12 MG: 3.12 TABLET, FILM COATED ORAL at 08:04

## 2024-04-16 RX ADMIN — PANTOPRAZOLE SODIUM 40 MG: 40 TABLET, DELAYED RELEASE ORAL at 05:14

## 2024-04-16 RX ADMIN — ENOXAPARIN SODIUM 40 MG: 100 INJECTION SUBCUTANEOUS at 08:03

## 2024-04-16 RX ADMIN — MAGNESIUM SULFATE HEPTAHYDRATE 2000 MG: 40 INJECTION, SOLUTION INTRAVENOUS at 11:55

## 2024-04-16 RX ADMIN — TIOTROPIUM BROMIDE INHALATION SPRAY 2 PUFF: 3.12 SPRAY, METERED RESPIRATORY (INHALATION) at 08:54

## 2024-04-16 RX ADMIN — LEUCINE, PHENYLALANINE, LYSINE, METHIONINE, ISOLEUCINE, VALINE, HISTIDINE, THREONINE, TRYPTOPHAN, ALANINE, GLYCINE, ARGININE, PROLINE, SERINE, TYROSINE, SODIUM ACETATE, DIBASIC POTASSIUM PHOSPHATE, MAGNESIUM CHLORIDE, SODIUM CHLORIDE, CALCIUM CHLORIDE, DEXTROSE
365; 280; 290; 200; 300; 290; 240; 210; 90; 1035; 515; 575; 340; 250; 20; 340; 261; 51; 59; 33; 20 INJECTION INTRAVENOUS at 17:36

## 2024-04-16 RX ADMIN — RANOLAZINE 1000 MG: 500 TABLET, EXTENDED RELEASE ORAL at 08:04

## 2024-04-16 RX ADMIN — Medication 2 PUFF: at 21:53

## 2024-04-16 RX ADMIN — NORTRIPTYLINE HYDROCHLORIDE 75 MG: 25 CAPSULE ORAL at 20:05

## 2024-04-16 RX ADMIN — CARVEDILOL 3.12 MG: 3.12 TABLET, FILM COATED ORAL at 17:23

## 2024-04-16 RX ADMIN — TOPIRAMATE 100 MG: 100 TABLET, FILM COATED ORAL at 08:04

## 2024-04-16 NOTE — PLAN OF CARE
Problem: Discharge Planning  Goal: Discharge to home or other facility with appropriate resources  4/16/2024 0947 by Nuha Mullins RN  Outcome: Progressing  4/15/2024 2257 by Elbert Murrieta RN  Outcome: Progressing     Problem: Safety - Adult  Goal: Free from fall injury  4/16/2024 0947 by Nuha Mullins RN  Outcome: Progressing  4/15/2024 2257 by Elbert Murrieta RN  Outcome: Progressing     Problem: Pain  Goal: Verbalizes/displays adequate comfort level or baseline comfort level  4/16/2024 0947 by Nuha Mullins RN  Outcome: Progressing  4/15/2024 2257 by Elbert Murrieta RN  Outcome: Progressing     Problem: Skin/Tissue Integrity  Goal: Absence of new skin breakdown  Description: 1.  Monitor for areas of redness and/or skin breakdown  2.  Assess vascular access sites hourly  3.  Every 4-6 hours minimum:  Change oxygen saturation probe site  4.  Every 4-6 hours:  If on nasal continuous positive airway pressure, respiratory therapy assess nares and determine need for appliance change or resting period.  4/16/2024 0947 by Nuha Mullins RN  Outcome: Progressing  4/15/2024 2257 by Elbert Murrieta RN  Outcome: Progressing     Problem: Chronic Conditions and Co-morbidities  Goal: Patient's chronic conditions and co-morbidity symptoms are monitored and maintained or improved  4/16/2024 0947 by Nuha Mullins RN  Outcome: Progressing  4/15/2024 2257 by Elbert Murrieta RN  Outcome: Progressing     Problem: ABCDS Injury Assessment  Goal: Absence of physical injury  4/16/2024 0947 by Nuha Mullins RN  Outcome: Progressing  4/15/2024 2257 by Elbert Murrieta RN  Outcome: Progressing

## 2024-04-16 NOTE — PROGRESS NOTES
interventions     Activities of Daily Living  Basic Activities of Daily Living  Grooming: stand by assistance in stance for oral care  Grooming Comments: assist to brush/blow dry hair while pt seated at sink, pt also seated at sink washed face  Upper Extremity Bathing: supervision  Bathing Comments: seated at sink pt washed UB  Dressing Comments: gown change  Comments: this writer washed pt's hair (shampoo/conditioner) with pt seated on BSC and pt's back to shower  Instrumental Activities of Daily Living  No IADL completed on this date.    Functional Mobility  Bed Mobility:  Bed mobility not completed on this date.  Comments: Pt seated in recliner at beginning at EOS  Transfers:  Sit to stand transfer:stand by assistance  Stand to sit transfer: stand by assistance  Comments: from recliner (x2 trials) and BSC (x3 trials)  Functional Mobility  Functional Mobility Activity: to/from bathroom, ~100ft in hallway  Device Use: rolling walker  Required Assistance: stand by assistance, contact guard assistance  Comment: no LOB a bit fatigue EOS and mentioned soreness right knee - ice applied seated in recliner  Balance:  Static Sitting Balance: fair (+): maintains balance at SBA/supervision without use of UE support  Dynamic Sitting Balance: fair (+): maintains balance at SBA/supervision without use of UE support  Static Standing Balance: fair (+): maintains balance at SBA/supervision without use of UE support  Dynamic Standing Balance: fair (-): maintains balance at CGA with use of UE support  Comments:    Other Therapeutic Interventions    Functional Outcomes  AM-PAC Inpatient Daily Activity Raw Score: 18          Cognition  WFL  Orientation:    alert and oriented x 4  Command Following:   WFL     Education  Barriers To Learning: none  Patient Education: patient educated on goals, OT role and benefits, plan of care, ADL adaptive strategies, adaptive device training, energy conservation, orientation, family education,  independent   Patient will complete toileting at modified independent   Patient will complete functional mobility at modified independent   Patient will complete bed mobility at modified independent   Patient to maintain standing at modified independent for 12-15 minutes.  Patient to gather and transport IADL items at modified independent     Above goals reviewed on 4/16/2024.  All goals are ongoing at this time unless indicated above.       Therapy Session Time     Individual Group Co-treatment   Time In    1208   Time Out    1301   Minutes    53     Timed Code Treatment Minutes: 53 min  Total Treatment Minutes:  53 Minutes     Electronically Signed By: MOISÉS Mckeon/GUILLERMO [id number]

## 2024-04-16 NOTE — PROGRESS NOTES
Shift assessment completed. VSS. Alert and oriented x 4. Denies having any pain at this time. Reported passing small amount of gas this morning. Fall precautions in place. The care plan and education have been reviewed and mutually agreed upon with the patient. Call light in reach.

## 2024-04-16 NOTE — PROGRESS NOTES
[facility] - Inpatient Rehabilitation Department   Phone: [phone]    Physical Therapy    [] Initial Evaluation            [x] Daily Treatment Note         [] Discharge Summary      Patient: Ana Gonzales   : 1955   MRN: [id number]   Date of Service:  2024  Admitting Diagnosis: Partial small bowel obstruction (HCC)    Current Admission Summary: Ana Gonzales is a 68 y.o. female who presents to the ED status post exploratory laparotomy with lysis of adhesions, Meckel diverticulectomy and appendectomy 3/19/2024 by Dr. Morales with complaint of generalized weakness and nausea/vomiting.  She states she is too weak to ambulate, which is new.  She states the nausea/vomiting began last night.  She is denying abdominal pain, although abdomen is tender to palpation on exam.  Patient reports an episode of shortness of breath earlier today, but is not feeling short of breath at this moment.  Lower extremity edema is noted and patient is unsure of her baseline.  Patient states her chief concerns are the generalized weakness/fatigue and nausea/vomiting.     Past Medical History:  has a past medical history of Arthritis, Asthma, Brain injury (HCC), Bronchitis, CHF (congestive heart failure) (HCC), Depression, Diabetes mellitus (HCC), DM (diabetes mellitus screen), Elevated cholesterol with high triglycerides, Fall, Fibromyalgia, GERD (gastroesophageal reflux disease), HIGH CHOLESTEROL, Hypertension, Microvascular angina, Migraine, Neuropathy, Panic attacks, PONV (postoperative nausea and vomiting), Post traumatic stress disorder, Short-term memory loss, Skin cancer, Sleep apnea, and Vertigo.  Past Surgical History:  has a past surgical history that includes Hysterectomy; eye surgery (cataract B); Carpal tunnel release; Finger trigger release; Gallbladder surgery; bladder suspension; Skin cancer excision; blepharoplasty; laryngoscopy (2017); Coronary stent placement (2023); joint

## 2024-04-16 NOTE — PROGRESS NOTES
Nutrition Note    RECOMMENDATIONS  PO Diet: Clears, adv as tolerated  ONS: d/c Ensure clear as pt dislikes.    Nutrition Support:   Recommend check TG, Mg, Phos, CMP now if not done in last 24 hours.  Clinimix 5/20 at 60 mL/hr.  Physician/LIP to monitor closely and correct lytes (Phos,Mg,K+).  As long as electrolytes WNL, advance Clinimix 5/20 to goal rate of 83 mL/hr  Recommend 250 mL 20% lipids two times per week  Recommend FSBS, monitor glucose, need for insulin  Pharmacy to adjust MVI and Trace Elements as needed   When PN to be discontinued, cut rate by 50% and let current bag run out.        ASSESSMENT   Pt was recently d/c'd from hospital on regular diet.  Currently receives clear liquids with TPN initiated d/t SBO.  Pt has reported dislike for all liquid forms of ONS.  Will d/c Ensure clear at this time.  Willing to consider chocolate magic cups once diet advances beyond clears.  Will monitor for tolerance of TPN & po diet.       Malnutrition Status: Mild malnutrition  Acute Illness  Findings of the 6 clinical characteristics of malnutrition:  Energy Intake:  50% or less of estimated energy requirements for 5 or more days  Weight Loss:  No significant weight loss     Body Fat Loss:  No significant body fat loss     Muscle Mass Loss:  No significant muscle mass loss    Fluid Accumulation:  Moderate to Severe Extremities   Strength:  Not Performed      NUTRITION DIAGNOSIS   In context of acute illness or injury, Other (Comment) (mild malnutrition) related to altered GI function as evidenced by NPO or clear liquid status due to medical condition, nutrition support - parenteral nutrition, poor intake prior to admission    Goals: PO intake 50% or greater, Initiate nutrition support, Tolerate nutrition support at goal rate     NUTRITION RELATED FINDINGS  Objective: k+ 3.5, phos 3.1; mg 1.7; +2 edema BLE; LBM 4/13  Wounds: Surgical Incision    CURRENT NUTRITION THERAPIES  ADULT DIET; Clear Liquid  ADULT ORAL

## 2024-04-16 NOTE — ANESTHESIA PRE PROCEDURE
patient.    Use of blood products discussed with patient whom consented to blood products.    Plan discussed with CRNA.                  VALE Lee - BRANDON   4/16/2024

## 2024-04-16 NOTE — PROGRESS NOTES
Shift assessment completed, pt is alert and oriented, VSS, fall precautions in place, call light within reach, spouse at bedside, denies any pain or other needs at this time, see flowsheets and MAR, will monitor pt. The care plan and education has been reviewed and mutually agreed upon with the patient.

## 2024-04-16 NOTE — PLAN OF CARE
Problem: Discharge Planning  Goal: Discharge to home or other facility with appropriate resources  4/15/2024 2257 by Elbert Murrieta RN  Outcome: Progressing     Problem: Safety - Adult  Goal: Free from fall injury  4/15/2024 2257 by Elbert Murrieta RN  Outcome: Progressing     Problem: Pain  Goal: Verbalizes/displays adequate comfort level or baseline comfort level  4/15/2024 2257 by Elbert Murrieta RN  Outcome: Progressing     Problem: Skin/Tissue Integrity  Goal: Absence of new skin breakdown  Description: 1.  Monitor for areas of redness and/or skin breakdown  2.  Assess vascular access sites hourly  3.  Every 4-6 hours minimum:  Change oxygen saturation probe site  4.  Every 4-6 hours:  If on nasal continuous positive airway pressure, respiratory therapy assess nares and determine need for appliance change or resting period.  4/15/2024 2257 by Elbert Murrieta RN  Outcome: Progressing     Problem: Chronic Conditions and Co-morbidities  Goal: Patient's chronic conditions and co-morbidity symptoms are monitored and maintained or improved  4/15/2024 2257 by Elbert Murrieta RN  Outcome: Progressing     Problem: ABCDS Injury Assessment  Goal: Absence of physical injury  4/15/2024 2257 by Elbert Murrieta RN  Outcome: Progressing

## 2024-04-16 NOTE — PROGRESS NOTES
Clinical Pharmacy Note    Pharmacy consulted by Aria Carter to manage TPN      Goal TPN rate: Awaiting dietician recommendations     Access: PICC  Indication: SBO    Labs:  General Labs:  BMP:    Lab Results   Component Value Date/Time     04/15/2024 05:00 AM    K 4.1 04/15/2024 05:00 AM     04/15/2024 05:00 AM    CO2 20 04/15/2024 05:00 AM    BUN 3 04/15/2024 05:00 AM    LABALBU 3.9 04/12/2024 03:54 PM    CREATININE 0.6 04/15/2024 05:00 AM    CALCIUM 8.3 04/15/2024 05:00 AM    GFRAA >60 11/24/2020 06:29 PM    GFRAA >60 01/13/2013 05:24 AM    LABGLOM >90 04/15/2024 05:00 AM    GLUCOSE 77 04/15/2024 05:00 AM       Electrolyte replacement as follows:   Magnesium 2g over 2 hours    Blood sugars over past 24 hours: 67-88    Blood sugar management:  Plan to initiate Humalog q4 hour sliding scale at low dose.    Plan to increase TPN to 60 ml/hr. Electrolytes are stable, advance TPN. Still awaiting dietician recommendations for goal rate.     Thank you for allowing pharmacy to participate in the care of this patient.    Kathleen Chung, PharmD  PGY-1 Pharmacy Resident  [id number]

## 2024-04-16 NOTE — PROGRESS NOTES
[facility]    4/16/2024 2:33 PM        Name: Ana Gonzales .              Admitted: 4/12/2024  Primary Care Provider: Mellisa Houston MD (Tel: [phone])      Subjective:  .    Seen this am while up in the chair with  at bedside. She is sad about needing surgery. Denies abdominal pain. Has some R knee pain-known to have arthritis.   No Bm since Monday. Going to OR tomorrow    Reviewed interval ancillary notes    Current Medications  PN-Adult Premix 5/20 - Standard Electrolytes - Central Line, Continuous TPN  lidocaine PF 1 % injection 5 mL, Once  sodium chloride flush 0.9 % injection 5-40 mL, 2 times per day  sodium chloride flush 0.9 % injection 5-40 mL, PRN  0.9 % sodium chloride infusion, PRN  PN-Adult Premix 5/20 - Standard Electrolytes - Central Line, Continuous TPN  fat emulsion (INTRALIPID/NUTRILIPID) 20 % infusion 250 mL, Once per day on Mon Thu  buPROPion (WELLBUTRIN XL) extended release tablet 300 mg, QAM  carvedilol (COREG) tablet 3.125 mg, BID WC  dilTIAZem (CARDIZEM CD) extended release capsule 120 mg, Daily  gabapentin (NEURONTIN) capsule 600 mg, 4x Daily  isosorbide mononitrate (IMDUR) extended release tablet 120 mg, Daily  nortriptyline (PAMELOR) capsule 75 mg, Nightly  pantoprazole (PROTONIX) tablet 40 mg, BID AC  ranolazine (RANEXA) extended release tablet 1,000 mg, BID  topiramate (TOPAMAX) tablet 100 mg, QAM  topiramate (TOPAMAX) tablet 150 mg, QPM  trospium (SANCTURA) tablet 20 mg, Nightly  lidocaine PF 1 % injection 5 mL, Once  sodium chloride flush 0.9 % injection 5-40 mL, 2 times per day  sodium chloride flush 0.9 % injection 5-40 mL, PRN  0.9 % sodium chloride infusion, PRN  tiotropium (SPIRIVA RESPIMAT) 2.5 MCG/ACT inhaler 2 puff, Daily RT  ARIPiprazole (ABILIFY) tablet 2 mg, Nightly  mometasone-formoterol (DULERA) 100-5 MCG/ACT inhaler 2 puff, BID RT  sodium chloride flush 0.9 % injection 5-40 mL, 2 times per day  sodium chloride  flush 0.9 % injection 5-40 mL, PRN  0.9 % sodium chloride infusion, PRN  magnesium sulfate 2000 mg in 50 mL IVPB premix, PRN  [Held by provider] enoxaparin (LOVENOX) injection 40 mg, Daily  ondansetron (ZOFRAN-ODT) disintegrating tablet 4 mg, Q8H PRN   Or  ondansetron (ZOFRAN) injection 4 mg, Q6H PRN  polyethylene glycol (GLYCOLAX) packet 17 g, Daily PRN  acetaminophen (TYLENOL) tablet 650 mg, Q6H PRN   Or  acetaminophen (TYLENOL) suppository 650 mg, Q6H PRN  morphine (PF) injection 2 mg, Q4H PRN  ipratropium 0.5 mg-albuterol 2.5 mg (DUONEB) nebulizer solution 1 Dose, Q6H PRN        Objective:  /73   Pulse 73   Temp 97.4 °F (36.3 °C) (Oral)   Resp 17   Ht 1.575 m (5' 2\")   Wt 89.5 kg (197 lb 5 oz)   SpO2 95%   BMI 36.09 kg/m²     Intake/Output Summary (Last 24 hours) at 4/16/2024 1433  Last data filed at 4/16/2024 1151  Gross per 24 hour   Intake 2322.23 ml   Output 900 ml   Net 1422.23 ml        Wt Readings from Last 3 Encounters:   04/16/24 89.5 kg (197 lb 5 oz)   04/10/24 92.2 kg (203 lb 3.2 oz)   03/06/24 95.7 kg (210 lb 14.4 oz)       General appearance:  Appears comfortable,  alert and pleasant   Eyes: Sclera clear. Pupils equal.  ENT: Moist oral mucosa. Trachea midline, no adenopathy.  Cardiovascular: Regular rhythm, normal S1, S2. No murmur. No edema in lower extremities  Respiratory: Not using accessory muscles. Good inspiratory effort. Clear to auscultation bilaterally, no wheeze or crackles.   GI: Abdomen softly distended with sluggish  bowel sounds. No tenderness,  operative incision unremarkable   Musculoskeletal: No cyanosis in digits, neck supple  Neurology: CN 2-12 grossly intact. No speech or motor deficits  Psych: Normal affect. Alert and oriented in time, place and person  Skin: Warm, dry, normal turgor    Labs and Tests:  CBC:   Recent Labs     04/14/24  0520 04/15/24  0500 04/16/24  0510   WBC 4.2 3.6* 3.2*   HGB 8.8* 9.1* 9.3*    238 239       BMP:    Recent Labs

## 2024-04-16 NOTE — PROGRESS NOTES
[facility]        Progress Note    Patient Name: Ana Gonzales  MRN: [id number]  YOB: 1955  Date of Evaluation: 2024    Subjective:  No acute events overnight  Denies pain  No nausea or vomiting, poor appetite  No recent stool  Up to chair at this time    Post-Operative Day #28      Vital Signs:  Patient Vitals for the past 24 hrs:   BP Temp Temp src Pulse Resp SpO2 Height Weight   24 1208 -- -- -- -- -- -- 1.575 m (5' 2\") --   24 1135 109/73 97.4 °F (36.3 °C) Oral 73 17 95 % -- --   24 0852 -- -- -- 74 18 94 % -- --   24 0759 117/70 97.1 °F (36.2 °C) Oral 74 18 94 % -- --   24 0645 -- -- -- -- -- -- -- 89.5 kg (197 lb 5 oz)   24 0500 120/71 97.7 °F (36.5 °C) Oral 71 18 95 % -- --   24 0030 116/69 97.4 °F (36.3 °C) Oral 77 16 93 % -- --   04/15/24 2059 (!) 146/80 -- -- -- -- -- -- --   04/15/24 2041 -- -- -- -- -- 96 % -- --   04/15/24 2037 (!) 146/76 97.8 °F (36.6 °C) Oral 82 18 96 % -- --   04/15/24 1645 120/78 98.3 °F (36.8 °C) Oral 76 17 94 % -- --        TEMPERATURE HISTORY 24H: Temp (24hrs), Av.6 °F (36.4 °C), Min:97.1 °F (36.2 °C), Max:98.3 °F (36.8 °C)    BLOOD PRESSURE HISTORY: Systolic (36hrs), Av , Min:109 , Max:146    Diastolic (36hrs), Av, Min:69, Max:80      Intake/Output:  I/O last 3 completed shifts:  In: -   Out: 1400 [Urine:1400]  I/O this shift:  In: 2322.2 [I.V.:1400.7; IV Piggyback:35]  Out: 900 [Urine:900]  Drain/tube Output:       Physical Exam:  General: awake, alert, oriented to person, place, time  Lungs: unlabored respirations  Abdomen: soft, non-distended, non-tender, bowel sounds present   Skin/Wound: healing well, no drainage, no erythema, well approximated    Labs:  CBC:    Recent Labs     24  0520 04/15/24  0500 24  0510   WBC 4.2 3.6* 3.2*   HGB 8.8* 9.1* 9.3*   HCT 27.0* 27.7* 29.1*    238 239       BMP:    Recent Labs     24  0520  Thu    buPROPion  300 mg Oral QAM    carvedilol  3.125 mg Oral BID WC    dilTIAZem  120 mg Oral Daily    gabapentin  600 mg Oral 4x Daily    isosorbide mononitrate  120 mg Oral Daily    nortriptyline  75 mg Oral Nightly    pantoprazole  40 mg Oral BID AC    ranolazine  1,000 mg Oral BID    topiramate  100 mg Oral QAM    topiramate  150 mg Oral QPM    trospium  20 mg Oral Nightly    lidocaine 1 % injection  5 mL IntraDERmal Once    sodium chloride flush  5-40 mL IntraVENous 2 times per day    tiotropium  2 puff Inhalation Daily RT    ARIPiprazole  2 mg Oral Nightly    mometasone-formoterol  2 puff Inhalation BID RT    sodium chloride flush  5-40 mL IntraVENous 2 times per day    enoxaparin  40 mg SubCUTAneous Daily     Continuous Infusions:   PN-Adult Premix 5/20 - Standard Electrolytes - Central Line      sodium chloride      PN-Adult Premix 5/20 - Standard Electrolytes - Central Line 40 mL/hr at 04/16/24 0943    lactated ringers IV soln 75 mL/hr at 04/16/24 0943    sodium chloride      sodium chloride Stopped (04/14/24 0624)     PRN Meds:.sodium chloride flush, sodium chloride, sodium chloride flush, sodium chloride, sodium chloride flush, sodium chloride, magnesium sulfate, ondansetron **OR** ondansetron, polyethylene glycol, acetaminophen **OR** acetaminophen, morphine, ipratropium 0.5 mg-albuterol 2.5 mg      Assessment:  68 y.o. female admitted with   1. Partial small bowel obstruction (HCC)    2. Hypoxia        Ileus  OR Date 3/19/2024, exploratory laparotomy with lysis of adhesions, Meckel diverticulectomy, and appendectomy for small bowel obstruction  UTI  Hypertension  CHF  CAD, on Plavix--last dose prior to admission  Diabetes      Plan:  1. Denies pain, benign abdominal exam--incision healing well, no nausea or vomiting, poor appetite, no recent stool, vitals/labs stable; continued supportive care, planning for exploratory laparotomy, lysis of adhesions, possible bowel resection, and G-tube insertion

## 2024-04-17 ENCOUNTER — ANESTHESIA (OUTPATIENT)
Dept: OPERATING ROOM | Age: 69
End: 2024-04-17
Payer: MEDICARE

## 2024-04-17 LAB
ALBUMIN SERPL-MCNC: 3.2 G/DL (ref 3.4–5)
ALP SERPL-CCNC: 70 U/L (ref 40–129)
ALT SERPL-CCNC: <5 U/L (ref 10–40)
ANION GAP SERPL CALCULATED.3IONS-SCNC: 10 MMOL/L (ref 3–16)
AST SERPL-CCNC: 8 U/L (ref 15–37)
BASOPHILS # BLD: 0 K/UL (ref 0–0.2)
BASOPHILS NFR BLD: 1 %
BILIRUB DIRECT SERPL-MCNC: <0.2 MG/DL (ref 0–0.3)
BILIRUB INDIRECT SERPL-MCNC: ABNORMAL MG/DL (ref 0–1)
BILIRUB SERPL-MCNC: <0.2 MG/DL (ref 0–1)
BUN SERPL-MCNC: 8 MG/DL (ref 7–20)
CALCIUM SERPL-MCNC: 8.5 MG/DL (ref 8.3–10.6)
CHLORIDE SERPL-SCNC: 110 MMOL/L (ref 99–110)
CO2 SERPL-SCNC: 20 MMOL/L (ref 21–32)
CREAT SERPL-MCNC: <0.5 MG/DL (ref 0.6–1.2)
DEPRECATED RDW RBC AUTO: 17.1 % (ref 12.4–15.4)
EOSINOPHIL # BLD: 0.1 K/UL (ref 0–0.6)
EOSINOPHIL NFR BLD: 4.8 %
GFR SERPLBLD CREATININE-BSD FMLA CKD-EPI: >90 ML/MIN/{1.73_M2}
GLUCOSE BLD-MCNC: 143 MG/DL (ref 70–99)
GLUCOSE BLD-MCNC: 154 MG/DL (ref 70–99)
GLUCOSE BLD-MCNC: 256 MG/DL (ref 70–99)
GLUCOSE SERPL-MCNC: 143 MG/DL (ref 70–99)
HCT VFR BLD AUTO: 29.8 % (ref 36–48)
HGB BLD-MCNC: 9.3 G/DL (ref 12–16)
LYMPHOCYTES # BLD: 0.9 K/UL (ref 1–5.1)
LYMPHOCYTES NFR BLD: 28.3 %
MAGNESIUM SERPL-MCNC: 2 MG/DL (ref 1.8–2.4)
MCH RBC QN AUTO: 27.6 PG (ref 26–34)
MCHC RBC AUTO-ENTMCNC: 31.2 G/DL (ref 31–36)
MCV RBC AUTO: 88.3 FL (ref 80–100)
MONOCYTES # BLD: 0.3 K/UL (ref 0–1.3)
MONOCYTES NFR BLD: 9.3 %
NEUTROPHILS # BLD: 1.7 K/UL (ref 1.7–7.7)
NEUTROPHILS NFR BLD: 56.6 %
PERFORMED ON: ABNORMAL
PHOSPHATE SERPL-MCNC: 4.2 MG/DL (ref 2.5–4.9)
PLATELET # BLD AUTO: 252 K/UL (ref 135–450)
PMV BLD AUTO: 8.5 FL (ref 5–10.5)
POTASSIUM SERPL-SCNC: 3.6 MMOL/L (ref 3.5–5.1)
PROT SERPL-MCNC: 5.7 G/DL (ref 6.4–8.2)
RBC # BLD AUTO: 3.37 M/UL (ref 4–5.2)
SODIUM SERPL-SCNC: 140 MMOL/L (ref 136–145)
WBC # BLD AUTO: 3.1 K/UL (ref 4–11)

## 2024-04-17 PROCEDURE — 44005 FREEING OF BOWEL ADHESION: CPT | Performed by: SURGERY

## 2024-04-17 PROCEDURE — 6360000002 HC RX W HCPCS: Performed by: SURGERY

## 2024-04-17 PROCEDURE — 85025 COMPLETE CBC W/AUTO DIFF WBC: CPT

## 2024-04-17 PROCEDURE — 0DH67UZ INSERTION OF FEEDING DEVICE INTO STOMACH, VIA NATURAL OR ARTIFICIAL OPENING: ICD-10-PCS | Performed by: SURGERY

## 2024-04-17 PROCEDURE — 2580000003 HC RX 258: Performed by: NURSE ANESTHETIST, CERTIFIED REGISTERED

## 2024-04-17 PROCEDURE — 2500000003 HC RX 250 WO HCPCS: Performed by: NURSE ANESTHETIST, CERTIFIED REGISTERED

## 2024-04-17 PROCEDURE — 83735 ASSAY OF MAGNESIUM: CPT

## 2024-04-17 PROCEDURE — 6360000002 HC RX W HCPCS: Performed by: STUDENT IN AN ORGANIZED HEALTH CARE EDUCATION/TRAINING PROGRAM

## 2024-04-17 PROCEDURE — 2709999900 HC NON-CHARGEABLE SUPPLY: Performed by: SURGERY

## 2024-04-17 PROCEDURE — 2580000003 HC RX 258: Performed by: SURGERY

## 2024-04-17 PROCEDURE — 7100000001 HC PACU RECOVERY - ADDTL 15 MIN: Performed by: SURGERY

## 2024-04-17 PROCEDURE — 3E0G76Z INTRODUCTION OF NUTRITIONAL SUBSTANCE INTO UPPER GI, VIA NATURAL OR ARTIFICIAL OPENING: ICD-10-PCS | Performed by: SURGERY

## 2024-04-17 PROCEDURE — 1200000000 HC SEMI PRIVATE

## 2024-04-17 PROCEDURE — 3700000001 HC ADD 15 MINUTES (ANESTHESIA): Performed by: SURGERY

## 2024-04-17 PROCEDURE — 0JQ80ZZ REPAIR ABDOMEN SUBCUTANEOUS TISSUE AND FASCIA, OPEN APPROACH: ICD-10-PCS | Performed by: SURGERY

## 2024-04-17 PROCEDURE — C9113 INJ PANTOPRAZOLE SODIUM, VIA: HCPCS | Performed by: SURGERY

## 2024-04-17 PROCEDURE — 0DNU0ZZ RELEASE OMENTUM, OPEN APPROACH: ICD-10-PCS | Performed by: SURGERY

## 2024-04-17 PROCEDURE — 6360000002 HC RX W HCPCS: Performed by: NURSE ANESTHETIST, CERTIFIED REGISTERED

## 2024-04-17 PROCEDURE — 3600000004 HC SURGERY LEVEL 4 BASE: Performed by: SURGERY

## 2024-04-17 PROCEDURE — 2500000003 HC RX 250 WO HCPCS: Performed by: SURGERY

## 2024-04-17 PROCEDURE — A4217 STERILE WATER/SALINE, 500 ML: HCPCS | Performed by: SURGERY

## 2024-04-17 PROCEDURE — 80069 RENAL FUNCTION PANEL: CPT

## 2024-04-17 PROCEDURE — 80076 HEPATIC FUNCTION PANEL: CPT

## 2024-04-17 PROCEDURE — 6370000000 HC RX 637 (ALT 250 FOR IP): Performed by: STUDENT IN AN ORGANIZED HEALTH CARE EDUCATION/TRAINING PROGRAM

## 2024-04-17 PROCEDURE — 3700000000 HC ANESTHESIA ATTENDED CARE: Performed by: SURGERY

## 2024-04-17 PROCEDURE — 7100000000 HC PACU RECOVERY - FIRST 15 MIN: Performed by: SURGERY

## 2024-04-17 PROCEDURE — 3600000014 HC SURGERY LEVEL 4 ADDTL 15MIN: Performed by: SURGERY

## 2024-04-17 RX ORDER — ONDANSETRON 2 MG/ML
4 INJECTION INTRAMUSCULAR; INTRAVENOUS
Status: DISCONTINUED | OUTPATIENT
Start: 2024-04-17 | End: 2024-04-17 | Stop reason: HOSPADM

## 2024-04-17 RX ORDER — HYDROMORPHONE HYDROCHLORIDE 1 MG/ML
1 INJECTION, SOLUTION INTRAMUSCULAR; INTRAVENOUS; SUBCUTANEOUS
Status: DISCONTINUED | OUTPATIENT
Start: 2024-04-17 | End: 2024-05-02 | Stop reason: HOSPADM

## 2024-04-17 RX ORDER — ENOXAPARIN SODIUM 100 MG/ML
40 INJECTION SUBCUTANEOUS DAILY
Status: DISCONTINUED | OUTPATIENT
Start: 2024-04-18 | End: 2024-05-02 | Stop reason: HOSPADM

## 2024-04-17 RX ORDER — FAMOTIDINE 10 MG/ML
INJECTION, SOLUTION INTRAVENOUS PRN
Status: DISCONTINUED | OUTPATIENT
Start: 2024-04-17 | End: 2024-04-17 | Stop reason: SDUPTHER

## 2024-04-17 RX ORDER — HYDRALAZINE HYDROCHLORIDE 20 MG/ML
10 INJECTION INTRAMUSCULAR; INTRAVENOUS
Status: DISCONTINUED | OUTPATIENT
Start: 2024-04-17 | End: 2024-04-17 | Stop reason: HOSPADM

## 2024-04-17 RX ORDER — ROCURONIUM BROMIDE 10 MG/ML
INJECTION, SOLUTION INTRAVENOUS PRN
Status: DISCONTINUED | OUTPATIENT
Start: 2024-04-17 | End: 2024-04-17 | Stop reason: SDUPTHER

## 2024-04-17 RX ORDER — PROPOFOL 10 MG/ML
INJECTION, EMULSION INTRAVENOUS PRN
Status: DISCONTINUED | OUTPATIENT
Start: 2024-04-17 | End: 2024-04-17 | Stop reason: SDUPTHER

## 2024-04-17 RX ORDER — MEPERIDINE HYDROCHLORIDE 25 MG/ML
12.5 INJECTION INTRAMUSCULAR; INTRAVENOUS; SUBCUTANEOUS
Status: DISCONTINUED | OUTPATIENT
Start: 2024-04-17 | End: 2024-04-17 | Stop reason: HOSPADM

## 2024-04-17 RX ORDER — NALOXONE HYDROCHLORIDE 0.4 MG/ML
INJECTION, SOLUTION INTRAMUSCULAR; INTRAVENOUS; SUBCUTANEOUS PRN
Status: DISCONTINUED | OUTPATIENT
Start: 2024-04-17 | End: 2024-04-17 | Stop reason: HOSPADM

## 2024-04-17 RX ORDER — SODIUM CHLORIDE 9 MG/ML
INJECTION, SOLUTION INTRAVENOUS PRN
Status: DISCONTINUED | OUTPATIENT
Start: 2024-04-17 | End: 2024-04-17 | Stop reason: HOSPADM

## 2024-04-17 RX ORDER — PANTOPRAZOLE SODIUM 40 MG/10ML
40 INJECTION, POWDER, LYOPHILIZED, FOR SOLUTION INTRAVENOUS DAILY
Status: DISCONTINUED | OUTPATIENT
Start: 2024-04-17 | End: 2024-05-02 | Stop reason: HOSPADM

## 2024-04-17 RX ORDER — LIDOCAINE HYDROCHLORIDE 20 MG/ML
INJECTION, SOLUTION INFILTRATION; PERINEURAL PRN
Status: DISCONTINUED | OUTPATIENT
Start: 2024-04-17 | End: 2024-04-17 | Stop reason: SDUPTHER

## 2024-04-17 RX ORDER — LEVOFLOXACIN 5 MG/ML
500 INJECTION, SOLUTION INTRAVENOUS ONCE
Status: COMPLETED | OUTPATIENT
Start: 2024-04-17 | End: 2024-04-17

## 2024-04-17 RX ORDER — MAGNESIUM SULFATE HEPTAHYDRATE 500 MG/ML
INJECTION, SOLUTION INTRAMUSCULAR; INTRAVENOUS PRN
Status: DISCONTINUED | OUTPATIENT
Start: 2024-04-17 | End: 2024-04-17 | Stop reason: SDUPTHER

## 2024-04-17 RX ORDER — ONDANSETRON 2 MG/ML
INJECTION INTRAMUSCULAR; INTRAVENOUS PRN
Status: DISCONTINUED | OUTPATIENT
Start: 2024-04-17 | End: 2024-04-17 | Stop reason: SDUPTHER

## 2024-04-17 RX ORDER — OXYCODONE HYDROCHLORIDE 5 MG/1
5 TABLET ORAL
Status: DISCONTINUED | OUTPATIENT
Start: 2024-04-17 | End: 2024-04-17 | Stop reason: HOSPADM

## 2024-04-17 RX ORDER — HYDROMORPHONE HYDROCHLORIDE 2 MG/ML
0.5 INJECTION, SOLUTION INTRAMUSCULAR; INTRAVENOUS; SUBCUTANEOUS EVERY 5 MIN PRN
Status: DISCONTINUED | OUTPATIENT
Start: 2024-04-17 | End: 2024-04-17 | Stop reason: HOSPADM

## 2024-04-17 RX ORDER — FENTANYL CITRATE 50 UG/ML
25 INJECTION, SOLUTION INTRAMUSCULAR; INTRAVENOUS EVERY 5 MIN PRN
Status: DISCONTINUED | OUTPATIENT
Start: 2024-04-17 | End: 2024-04-17 | Stop reason: HOSPADM

## 2024-04-17 RX ORDER — LABETALOL HYDROCHLORIDE 5 MG/ML
10 INJECTION, SOLUTION INTRAVENOUS
Status: DISCONTINUED | OUTPATIENT
Start: 2024-04-17 | End: 2024-04-17 | Stop reason: HOSPADM

## 2024-04-17 RX ORDER — MAGNESIUM HYDROXIDE 1200 MG/15ML
LIQUID ORAL CONTINUOUS PRN
Status: COMPLETED | OUTPATIENT
Start: 2024-04-17 | End: 2024-04-17

## 2024-04-17 RX ORDER — GLYCOPYRROLATE 0.2 MG/ML
INJECTION INTRAMUSCULAR; INTRAVENOUS PRN
Status: DISCONTINUED | OUTPATIENT
Start: 2024-04-17 | End: 2024-04-17 | Stop reason: SDUPTHER

## 2024-04-17 RX ORDER — HYDROMORPHONE HYDROCHLORIDE 2 MG/ML
INJECTION, SOLUTION INTRAMUSCULAR; INTRAVENOUS; SUBCUTANEOUS PRN
Status: DISCONTINUED | OUTPATIENT
Start: 2024-04-17 | End: 2024-04-17 | Stop reason: SDUPTHER

## 2024-04-17 RX ORDER — SODIUM CHLORIDE 0.9 % (FLUSH) 0.9 %
5-40 SYRINGE (ML) INJECTION PRN
Status: DISCONTINUED | OUTPATIENT
Start: 2024-04-17 | End: 2024-04-17 | Stop reason: HOSPADM

## 2024-04-17 RX ORDER — HYDRALAZINE HYDROCHLORIDE 20 MG/ML
10 INJECTION INTRAMUSCULAR; INTRAVENOUS EVERY 6 HOURS PRN
Status: DISCONTINUED | OUTPATIENT
Start: 2024-04-17 | End: 2024-05-02 | Stop reason: HOSPADM

## 2024-04-17 RX ORDER — DEXAMETHASONE SODIUM PHOSPHATE 4 MG/ML
INJECTION, SOLUTION INTRA-ARTICULAR; INTRALESIONAL; INTRAMUSCULAR; INTRAVENOUS; SOFT TISSUE PRN
Status: DISCONTINUED | OUTPATIENT
Start: 2024-04-17 | End: 2024-04-17 | Stop reason: SDUPTHER

## 2024-04-17 RX ORDER — SODIUM CHLORIDE 9 MG/ML
INJECTION, SOLUTION INTRAVENOUS CONTINUOUS PRN
Status: DISCONTINUED | OUTPATIENT
Start: 2024-04-17 | End: 2024-04-17 | Stop reason: SDUPTHER

## 2024-04-17 RX ORDER — DIPHENHYDRAMINE HYDROCHLORIDE 50 MG/ML
12.5 INJECTION INTRAMUSCULAR; INTRAVENOUS
Status: DISCONTINUED | OUTPATIENT
Start: 2024-04-17 | End: 2024-04-17 | Stop reason: HOSPADM

## 2024-04-17 RX ORDER — SODIUM CHLORIDE 0.9 % (FLUSH) 0.9 %
5-40 SYRINGE (ML) INJECTION EVERY 12 HOURS SCHEDULED
Status: DISCONTINUED | OUTPATIENT
Start: 2024-04-17 | End: 2024-04-17 | Stop reason: HOSPADM

## 2024-04-17 RX ADMIN — DEXAMETHASONE SODIUM PHOSPHATE 4 MG: 4 INJECTION, SOLUTION INTRAMUSCULAR; INTRAVENOUS at 11:21

## 2024-04-17 RX ADMIN — LIDOCAINE HYDROCHLORIDE 100 MG: 20 INJECTION, SOLUTION INFILTRATION; PERINEURAL at 11:15

## 2024-04-17 RX ADMIN — PHENYLEPHRINE HYDROCHLORIDE 100 MCG: 10 INJECTION INTRAVENOUS at 11:25

## 2024-04-17 RX ADMIN — PHENYLEPHRINE HYDROCHLORIDE 100 MCG: 10 INJECTION INTRAVENOUS at 11:45

## 2024-04-17 RX ADMIN — SUGAMMADEX 200 MG: 100 INJECTION, SOLUTION INTRAVENOUS at 13:01

## 2024-04-17 RX ADMIN — PROPOFOL 100 MG: 10 INJECTION, EMULSION INTRAVENOUS at 11:15

## 2024-04-17 RX ADMIN — HYDROMORPHONE HYDROCHLORIDE 1 MG: 1 INJECTION, SOLUTION INTRAMUSCULAR; INTRAVENOUS; SUBCUTANEOUS at 20:21

## 2024-04-17 RX ADMIN — PHENYLEPHRINE HYDROCHLORIDE 100 MCG: 10 INJECTION INTRAVENOUS at 11:34

## 2024-04-17 RX ADMIN — ONDANSETRON 4 MG: 2 INJECTION INTRAMUSCULAR; INTRAVENOUS at 11:23

## 2024-04-17 RX ADMIN — PHENYLEPHRINE HYDROCHLORIDE 100 MCG: 10 INJECTION INTRAVENOUS at 11:20

## 2024-04-17 RX ADMIN — HYDROMORPHONE HYDROCHLORIDE 0.5 MG: 2 INJECTION, SOLUTION INTRAMUSCULAR; INTRAVENOUS; SUBCUTANEOUS at 12:00

## 2024-04-17 RX ADMIN — LEVOFLOXACIN 500 MG: 5 INJECTION, SOLUTION INTRAVENOUS at 10:59

## 2024-04-17 RX ADMIN — PANTOPRAZOLE SODIUM 40 MG: 40 INJECTION, POWDER, FOR SOLUTION INTRAVENOUS at 18:27

## 2024-04-17 RX ADMIN — HYDROMORPHONE HYDROCHLORIDE 0.5 MG: 2 INJECTION, SOLUTION INTRAMUSCULAR; INTRAVENOUS; SUBCUTANEOUS at 12:40

## 2024-04-17 RX ADMIN — ROCURONIUM BROMIDE 50 MG: 10 INJECTION, SOLUTION INTRAVENOUS at 11:15

## 2024-04-17 RX ADMIN — MAGNESIUM SULFATE HEPTAHYDRATE 1 G: 500 INJECTION, SOLUTION INTRAMUSCULAR; INTRAVENOUS at 11:10

## 2024-04-17 RX ADMIN — CARVEDILOL 3.12 MG: 3.12 TABLET, FILM COATED ORAL at 09:09

## 2024-04-17 RX ADMIN — HYDROMORPHONE HYDROCHLORIDE 1 MG: 2 INJECTION, SOLUTION INTRAMUSCULAR; INTRAVENOUS; SUBCUTANEOUS at 11:15

## 2024-04-17 RX ADMIN — ONDANSETRON 4 MG: 2 INJECTION INTRAMUSCULAR; INTRAVENOUS at 13:50

## 2024-04-17 RX ADMIN — SODIUM CHLORIDE: 9 INJECTION, SOLUTION INTRAVENOUS at 11:00

## 2024-04-17 RX ADMIN — GLYCOPYRROLATE 0.2 MG: 0.2 INJECTION, SOLUTION INTRAMUSCULAR; INTRAVENOUS at 11:12

## 2024-04-17 RX ADMIN — ROCURONIUM BROMIDE 20 MG: 10 INJECTION, SOLUTION INTRAVENOUS at 12:15

## 2024-04-17 RX ADMIN — ASCORBIC ACID, VITAMIN A PALMITATE, CHOLECALCIFEROL, THIAMINE HYDROCHLORIDE, RIBOFLAVIN-5 PHOSPHATE SODIUM, PYRIDOXINE HYDROCHLORIDE, NIACINAMIDE, DEXPANTHENOL, ALPHA-TOCOPHEROL ACETATE, VITAMIN K1, FOLIC ACID, BIOTIN, CYANOCOBALAMIN: 200; 3300; 200; 6; 3.6; 6; 40; 15; 10; 150; 600; 60; 5 INJECTION, SOLUTION INTRAVENOUS at 18:29

## 2024-04-17 RX ADMIN — SODIUM CHLORIDE: 9 INJECTION, SOLUTION INTRAVENOUS at 13:08

## 2024-04-17 RX ADMIN — FAMOTIDINE 20 MG: 10 INJECTION INTRAVENOUS at 11:03

## 2024-04-17 ASSESSMENT — PAIN DESCRIPTION - LOCATION: LOCATION: ABDOMEN

## 2024-04-17 ASSESSMENT — PAIN SCALES - GENERAL
PAINLEVEL_OUTOF10: 0
PAINLEVEL_OUTOF10: 8

## 2024-04-17 ASSESSMENT — PAIN DESCRIPTION - DESCRIPTORS: DESCRIPTORS: SORE

## 2024-04-17 ASSESSMENT — PAIN DESCRIPTION - ORIENTATION: ORIENTATION: MID

## 2024-04-17 NOTE — PROGRESS NOTES
Occupational Therapy  Ana Gonzales   Pt LULÚ in OR for surgery. Will hold this date.  Juju Johnson, CURIEL/L [id number]     HAL Zamora/OT, OTR/L- [id number]     I have read the above note and agree with the documentation. HAL Zamora/OT, OTR/L- [id number]

## 2024-04-17 NOTE — OP NOTE
[address] [address]                            OPERATIVE REPORT      PATIENT NAME: SCAR KU              : 1955  MED REC NO: [id number]                      ROOM: [address] [place] [address]  ACCOUNT NO: [id number]                       ADMIT DATE: 2024  PROVIDER: Srinivasa Morales MD      DATE OF PROCEDURE:      SURGEON:  Srinivasa Morales MD    PREOPERATIVE DIAGNOSIS:  Small bowel obstruction.    POSTOPERATIVE DIAGNOSIS:  Small bowel obstruction.    PROCEDURES:  Lysis of adhesions (1 hour), placement of 24-Lao gastrostomy tube.    ANESTHESIA:  General endotracheal.    ESTIMATED BLOOD LOSS:  Minimal.    COMPLICATIONS:  None.    SPECIMENS:  None.    OPERATIVE INDICATIONS AND CONSENT:  The patient is a 68-year-old female, who is status post lysis of adhesions, appendectomy, and resection of Meckel diverticulum on 2024.  The patient has not fully recovered from surgery and has demonstrated findings consistent with a persistent partial small bowel obstruction.  She is very weak and has not been able to eat a significant amount by mouth since surgery.  She has been on TPN the large amount of this time.  She was briefly discharged home, but returned within 2 days.  We discussed continued conservative management versus exploratory laparotomy.  We ultimately decided for exploratory laparotomy.  The patient and her  were explained the risks, benefits, and possible complications.    DETAILS OF PROCEDURE:  The patient was brought to the operative suite and placed in supine position on the operative table.  After general endotracheal anesthesia, she was prepped and draped in the usual sterile fashion.    We made a midline vertical incision, which included all of the original incision and about 2 to 3 cm additional in the superior and inferior locations.  Dissection was carried down to the subcutaneous tissue.  The fascia

## 2024-04-17 NOTE — PROGRESS NOTES
Call placed to Dr Morales with no return call.    The following message sent to Dr Guzman via perfect serve:    Pt had exploratory lap today and had PEG and NG tube placed. She also has a wound vac to her abdomen. There are no orders for wound vac manintenance and no nursing communication for how to administer meds (PO, per NG or just keep strict NPO). Rebeka tried paging surgery with no call back. Should I just keep pt NPO and place a consult to wound care nurse for wound vac mgmnt?

## 2024-04-17 NOTE — ANESTHESIA POSTPROCEDURE EVALUATION
Department of Anesthesiology  Postprocedure Note    Patient: Ana Gonzales  MRN: [id number]  YOB: 1955  Date of evaluation: 4/17/2024    Procedure Summary       Date: 04/17/24 Room / Location: [address]    Anesthesia Start: 1108 Anesthesia Stop: 1324    Procedures:       EXPLORATORY LAPAROTOMY; LYSIS OF ADHESIONS; (Abdomen)      PLACEMENT OF GASTROSTOMY TUBE (Abdomen) Diagnosis:       Small bowel obstruction (HCC)      (Small bowel obstruction (HCC) [K56.609])    Surgeons: Srinivasa Morales MD Responsible Provider: VALORIE Wood MD    Anesthesia Type: general ASA Status: 3            Anesthesia Type: No value filed.    Hortensia Phase I: Hortensia Score: 9    Hortensia Phase II:      Anesthesia Post Evaluation    Patient location during evaluation: PACU  Patient participation: complete - patient participated  Level of consciousness: awake and alert  Pain score: 1  Airway patency: patent  Nausea & Vomiting: no nausea  Cardiovascular status: blood pressure returned to baseline  Respiratory status: acceptable  Hydration status: euvolemic  Multimodal analgesia pain management approach  Pain management: adequate    No notable events documented.

## 2024-04-17 NOTE — PROGRESS NOTES
Patient to PACU. Patient resting comfortably in bed. Patient responding to commands and able to open eyes. VSS. G tube dressing w/4x4s CDI. Wound vac in place. Wound vac dressing CDI. Muniz is draining with output

## 2024-04-17 NOTE — PROGRESS NOTES
Clinical Pharmacy Note    Pharmacy consulted by Aria Carter to manage TPN    Current TPN rate: 60 mL/hr  Goal TPN rate: 83mL/hr     Access: PICC  Indication: SBO    Labs:  General Labs:  BMP:    Lab Results   Component Value Date/Time     04/15/2024 05:00 AM    K 4.1 04/15/2024 05:00 AM     04/15/2024 05:00 AM    CO2 20 04/15/2024 05:00 AM    BUN 3 04/15/2024 05:00 AM    LABALBU 3.9 04/12/2024 03:54 PM    CREATININE 0.6 04/15/2024 05:00 AM    CALCIUM 8.3 04/15/2024 05:00 AM    GFRAA >60 11/24/2020 06:29 PM    GFRAA >60 01/13/2013 05:24 AM    LABGLOM >90 04/15/2024 05:00 AM    GLUCOSE 77 04/15/2024 05:00 AM       Electrolyte replacement as follows:   None needed    Blood sugars over past 24 hours: 136-154    Blood sugar management:  Plan to initiate Humalog q4 hour sliding scale at low dose.    Plan to increase TPN to 60 ml/hr.     Thank you for allowing pharmacy to participate in the care of this patient.    Kathleen Chung, PharmD  PGY-1 Pharmacy Resident  [id number]

## 2024-04-17 NOTE — PROGRESS NOTES
Physical Therapy  Ana Gonzales PT today, pt having procedure. Will try back at later date when pt able to participate.   Brittaney Lopez [id number]

## 2024-04-17 NOTE — PROGRESS NOTES
Patient has met the requirement to transition out of PACU. VSS. Pain is minimum and tolerable. Patient is sleepy but is able to follow commands and open eyes. Wound vac/Gtbe  in place, phase 1 discharge criteria met

## 2024-04-17 NOTE — PLAN OF CARE
Problem: Discharge Planning  Goal: Discharge to home or other facility with appropriate resources  4/17/2024 1944 by Elbert Murrieta RN  Outcome: Progressing     Problem: Safety - Adult  Goal: Free from fall injury  4/17/2024 1944 by Elbert Murrieta RN  Outcome: Progressing     Problem: Pain  Goal: Verbalizes/displays adequate comfort level or baseline comfort level  4/17/2024 1944 by Elbert Murrieta RN  Outcome: Progressing     Problem: Skin/Tissue Integrity  Goal: Absence of new skin breakdown  Description: 1.  Monitor for areas of redness and/or skin breakdown  2.  Assess vascular access sites hourly  3.  Every 4-6 hours minimum:  Change oxygen saturation probe site  4.  Every 4-6 hours:  If on nasal continuous positive airway pressure, respiratory therapy assess nares and determine need for appliance change or resting period.  4/17/2024 1944 by Elbert Murrieta RN  Outcome: Progressing     Problem: Chronic Conditions and Co-morbidities  Goal: Patient's chronic conditions and co-morbidity symptoms are monitored and maintained or improved  4/17/2024 1944 by Elbert Murrieta RN  Outcome: Progressing     Problem: ABCDS Injury Assessment  Goal: Absence of physical injury  4/17/2024 1944 by Elbert Murrieta RN  Outcome: Progressing     Problem: Nutrition Deficit:  Goal: Optimize nutritional status  Outcome: Progressing

## 2024-04-17 NOTE — PLAN OF CARE
Problem: Discharge Planning  Goal: Discharge to home or other facility with appropriate resources  4/16/2024 2205 by Elbert Murrieta RN  Outcome: Progressing     Problem: Safety - Adult  Goal: Free from fall injury  4/16/2024 2205 by Elbert Murrieta RN  Outcome: Progressing     Problem: Pain  Goal: Verbalizes/displays adequate comfort level or baseline comfort level  4/16/2024 2205 by Elbert Murrieta RN  Outcome: Progressing     Problem: Skin/Tissue Integrity  Goal: Absence of new skin breakdown  Description: 1.  Monitor for areas of redness and/or skin breakdown  2.  Assess vascular access sites hourly  3.  Every 4-6 hours minimum:  Change oxygen saturation probe site  4.  Every 4-6 hours:  If on nasal continuous positive airway pressure, respiratory therapy assess nares and determine need for appliance change or resting period.  4/16/2024 2205 by Elbert Murrieta RN  Outcome: Progressing     Problem: Chronic Conditions and Co-morbidities  Goal: Patient's chronic conditions and co-morbidity symptoms are monitored and maintained or improved  4/16/2024 2205 by Elbert Murrieta RN  Outcome: Progressing     Problem: ABCDS Injury Assessment  Goal: Absence of physical injury  4/16/2024 2205 by Elbert Murrieta RN  Outcome: Progressing     Problem: Nutrition Deficit:  Goal: Optimize nutritional status  Outcome: Progressing

## 2024-04-17 NOTE — PROGRESS NOTES
Pt to unit from OR. Report done at bedside, all surgical dressings clean, dry and intact. VSS. This RN questioned Rosangela FARR RN from OR re: orders for wound vac as well as orders for med administration (by mouth or via NG). Per Rosangela PEOPLES , she will speak to Dr Morales and f/u with this RN.

## 2024-04-17 NOTE — PLAN OF CARE
Problem: Discharge Planning  Goal: Discharge to home or other facility with appropriate resources  4/17/2024 0912 by Arline Vásquez RN  Outcome: Progressing  4/16/2024 2205 by Elbert Murrieta RN  Outcome: Progressing     Problem: Safety - Adult  Goal: Free from fall injury  4/17/2024 0912 by Arline Vásquez RN  Outcome: Progressing  4/16/2024 2205 by Elbert Murrieta RN  Outcome: Progressing     Problem: Pain  Goal: Verbalizes/displays adequate comfort level or baseline comfort level  4/17/2024 0912 by Arline Vásquez RN  Outcome: Progressing  4/16/2024 2205 by Elbert Murrieta RN  Outcome: Progressing     Problem: Skin/Tissue Integrity  Goal: Absence of new skin breakdown  Description: 1.  Monitor for areas of redness and/or skin breakdown  2.  Assess vascular access sites hourly  3.  Every 4-6 hours minimum:  Change oxygen saturation probe site  4.  Every 4-6 hours:  If on nasal continuous positive airway pressure, respiratory therapy assess nares and determine need for appliance change or resting period.  4/17/2024 0912 by Arline Vásquez RN  Outcome: Progressing  4/16/2024 2205 by Elbert Murrieta RN  Outcome: Progressing     Problem: Chronic Conditions and Co-morbidities  Goal: Patient's chronic conditions and co-morbidity symptoms are monitored and maintained or improved  4/17/2024 0912 by Arline Vásquez RN  Outcome: Progressing  4/16/2024 2205 by Elbert Murrieta RN  Outcome: Progressing

## 2024-04-17 NOTE — PROGRESS NOTES
Shift assessment completed. Pt A&O, VSS. Denies any needs at this time. Pt w/NPO status, per OR staff, administer Coreg prior to procedure. Bed locked and in lowest position.  Call light within reach. Will continue to monitor.

## 2024-04-18 LAB
ALBUMIN SERPL-MCNC: 3 G/DL (ref 3.4–5)
ANION GAP SERPL CALCULATED.3IONS-SCNC: 11 MMOL/L (ref 3–16)
BASOPHILS # BLD: 0.1 K/UL (ref 0–0.2)
BASOPHILS NFR BLD: 0.5 %
BUN SERPL-MCNC: 16 MG/DL (ref 7–20)
CALCIUM SERPL-MCNC: 8.6 MG/DL (ref 8.3–10.6)
CHLORIDE SERPL-SCNC: 109 MMOL/L (ref 99–110)
CO2 SERPL-SCNC: 18 MMOL/L (ref 21–32)
CREAT SERPL-MCNC: 0.6 MG/DL (ref 0.6–1.2)
DEPRECATED RDW RBC AUTO: 17.4 % (ref 12.4–15.4)
EOSINOPHIL # BLD: 0.1 K/UL (ref 0–0.6)
EOSINOPHIL NFR BLD: 0.6 %
GFR SERPLBLD CREATININE-BSD FMLA CKD-EPI: >90 ML/MIN/{1.73_M2}
GLUCOSE BLD-MCNC: 157 MG/DL (ref 70–99)
GLUCOSE BLD-MCNC: 181 MG/DL (ref 70–99)
GLUCOSE BLD-MCNC: 182 MG/DL (ref 70–99)
GLUCOSE BLD-MCNC: 203 MG/DL (ref 70–99)
GLUCOSE SERPL-MCNC: 166 MG/DL (ref 70–99)
HCT VFR BLD AUTO: 37.3 % (ref 36–48)
HGB BLD-MCNC: 12.1 G/DL (ref 12–16)
LYMPHOCYTES # BLD: 1.3 K/UL (ref 1–5.1)
LYMPHOCYTES NFR BLD: 12.4 %
MAGNESIUM SERPL-MCNC: 2 MG/DL (ref 1.8–2.4)
MCH RBC QN AUTO: 28.5 PG (ref 26–34)
MCHC RBC AUTO-ENTMCNC: 32.4 G/DL (ref 31–36)
MCV RBC AUTO: 88 FL (ref 80–100)
MONOCYTES # BLD: 0.8 K/UL (ref 0–1.3)
MONOCYTES NFR BLD: 7.6 %
NEUTROPHILS # BLD: 8.5 K/UL (ref 1.7–7.7)
NEUTROPHILS NFR BLD: 78.9 %
PERFORMED ON: ABNORMAL
PHOSPHATE SERPL-MCNC: 3.6 MG/DL (ref 2.5–4.9)
PLATELET # BLD AUTO: 338 K/UL (ref 135–450)
PMV BLD AUTO: 8 FL (ref 5–10.5)
POTASSIUM SERPL-SCNC: 4.1 MMOL/L (ref 3.5–5.1)
RBC # BLD AUTO: 4.23 M/UL (ref 4–5.2)
SODIUM SERPL-SCNC: 138 MMOL/L (ref 136–145)
WBC # BLD AUTO: 10.7 K/UL (ref 4–11)

## 2024-04-18 PROCEDURE — 6370000000 HC RX 637 (ALT 250 FOR IP): Performed by: SURGERY

## 2024-04-18 PROCEDURE — C9113 INJ PANTOPRAZOLE SODIUM, VIA: HCPCS | Performed by: SURGERY

## 2024-04-18 PROCEDURE — 97530 THERAPEUTIC ACTIVITIES: CPT

## 2024-04-18 PROCEDURE — 83735 ASSAY OF MAGNESIUM: CPT

## 2024-04-18 PROCEDURE — APPNB30 APP NON BILLABLE TIME 0-30 MINS: Performed by: NURSE PRACTITIONER

## 2024-04-18 PROCEDURE — 99024 POSTOP FOLLOW-UP VISIT: CPT | Performed by: SURGERY

## 2024-04-18 PROCEDURE — 2500000003 HC RX 250 WO HCPCS: Performed by: SURGERY

## 2024-04-18 PROCEDURE — 97535 SELF CARE MNGMENT TRAINING: CPT

## 2024-04-18 PROCEDURE — 2580000003 HC RX 258: Performed by: SURGERY

## 2024-04-18 PROCEDURE — 94640 AIRWAY INHALATION TREATMENT: CPT

## 2024-04-18 PROCEDURE — 1200000000 HC SEMI PRIVATE

## 2024-04-18 PROCEDURE — APPSS15 APP SPLIT SHARED TIME 0-15 MINUTES: Performed by: NURSE PRACTITIONER

## 2024-04-18 PROCEDURE — 80069 RENAL FUNCTION PANEL: CPT

## 2024-04-18 PROCEDURE — 6360000002 HC RX W HCPCS: Performed by: SURGERY

## 2024-04-18 PROCEDURE — 85025 COMPLETE CBC W/AUTO DIFF WBC: CPT

## 2024-04-18 RX ADMIN — Medication 10 ML: at 09:22

## 2024-04-18 RX ADMIN — ENOXAPARIN SODIUM 40 MG: 100 INJECTION SUBCUTANEOUS at 09:25

## 2024-04-18 RX ADMIN — NORTRIPTYLINE HYDROCHLORIDE 75 MG: 25 CAPSULE ORAL at 21:03

## 2024-04-18 RX ADMIN — TROSPIUM CHLORIDE 20 MG: 20 TABLET, FILM COATED ORAL at 21:03

## 2024-04-18 RX ADMIN — OLIVE OIL AND SOYBEAN OIL 250 ML: 16; 4 INJECTION, EMULSION INTRAVENOUS at 18:21

## 2024-04-18 RX ADMIN — RANOLAZINE 1000 MG: 500 TABLET, EXTENDED RELEASE ORAL at 21:03

## 2024-04-18 RX ADMIN — Medication 2 PUFF: at 11:57

## 2024-04-18 RX ADMIN — HYDROMORPHONE HYDROCHLORIDE 1 MG: 1 INJECTION, SOLUTION INTRAMUSCULAR; INTRAVENOUS; SUBCUTANEOUS at 16:57

## 2024-04-18 RX ADMIN — HYDROMORPHONE HYDROCHLORIDE 1 MG: 1 INJECTION, SOLUTION INTRAMUSCULAR; INTRAVENOUS; SUBCUTANEOUS at 22:22

## 2024-04-18 RX ADMIN — HYDROMORPHONE HYDROCHLORIDE 1 MG: 1 INJECTION, SOLUTION INTRAMUSCULAR; INTRAVENOUS; SUBCUTANEOUS at 09:25

## 2024-04-18 RX ADMIN — SODIUM CHLORIDE, PRESERVATIVE FREE 10 ML: 5 INJECTION INTRAVENOUS at 21:05

## 2024-04-18 RX ADMIN — Medication 2 PUFF: at 20:24

## 2024-04-18 RX ADMIN — HYDROMORPHONE HYDROCHLORIDE 1 MG: 1 INJECTION, SOLUTION INTRAMUSCULAR; INTRAVENOUS; SUBCUTANEOUS at 02:49

## 2024-04-18 RX ADMIN — PANTOPRAZOLE SODIUM 40 MG: 40 INJECTION, POWDER, FOR SOLUTION INTRAVENOUS at 09:24

## 2024-04-18 RX ADMIN — TIOTROPIUM BROMIDE INHALATION SPRAY 2 PUFF: 3.12 SPRAY, METERED RESPIRATORY (INHALATION) at 11:57

## 2024-04-18 RX ADMIN — ARIPIPRAZOLE 2 MG: 2 TABLET ORAL at 21:03

## 2024-04-18 RX ADMIN — SODIUM CHLORIDE, PRESERVATIVE FREE 10 ML: 5 INJECTION INTRAVENOUS at 09:21

## 2024-04-18 RX ADMIN — Medication 10 ML: at 21:05

## 2024-04-18 RX ADMIN — GABAPENTIN 600 MG: 300 CAPSULE ORAL at 21:03

## 2024-04-18 RX ADMIN — LEUCINE, PHENYLALANINE, LYSINE, METHIONINE, ISOLEUCINE, VALINE, HISTIDINE, THREONINE, TRYPTOPHAN, ALANINE, GLYCINE, ARGININE, PROLINE, SERINE, TYROSINE, SODIUM ACETATE, DIBASIC POTASSIUM PHOSPHATE, MAGNESIUM CHLORIDE, SODIUM CHLORIDE, CALCIUM CHLORIDE, DEXTROSE
365; 280; 290; 200; 300; 290; 240; 210; 90; 1035; 515; 575; 340; 250; 20; 340; 261; 51; 59; 33; 20 INJECTION INTRAVENOUS at 18:21

## 2024-04-18 RX ADMIN — SODIUM CHLORIDE, PRESERVATIVE FREE 10 ML: 5 INJECTION INTRAVENOUS at 20:51

## 2024-04-18 ASSESSMENT — PAIN DESCRIPTION - DESCRIPTORS
DESCRIPTORS: DISCOMFORT
DESCRIPTORS: ACHING;SORE
DESCRIPTORS: ACHING;SORE
DESCRIPTORS: DISCOMFORT
DESCRIPTORS: ACHING;SORE

## 2024-04-18 ASSESSMENT — PAIN DESCRIPTION - LOCATION
LOCATION: ABDOMEN
LOCATION: ABDOMEN
LOCATION: ABDOMEN;NECK
LOCATION: ABDOMEN
LOCATION: ABDOMEN

## 2024-04-18 ASSESSMENT — PAIN DESCRIPTION - PAIN TYPE: TYPE: SURGICAL PAIN

## 2024-04-18 ASSESSMENT — PAIN SCALES - GENERAL
PAINLEVEL_OUTOF10: 7
PAINLEVEL_OUTOF10: 4
PAINLEVEL_OUTOF10: 2
PAINLEVEL_OUTOF10: 6
PAINLEVEL_OUTOF10: 7
PAINLEVEL_OUTOF10: 8
PAINLEVEL_OUTOF10: 7
PAINLEVEL_OUTOF10: 7
PAINLEVEL_OUTOF10: 2

## 2024-04-18 ASSESSMENT — PAIN DESCRIPTION - FREQUENCY: FREQUENCY: INTERMITTENT

## 2024-04-18 ASSESSMENT — PAIN DESCRIPTION - ORIENTATION
ORIENTATION: MID
ORIENTATION: MID
ORIENTATION: RIGHT;MID

## 2024-04-18 ASSESSMENT — PAIN - FUNCTIONAL ASSESSMENT: PAIN_FUNCTIONAL_ASSESSMENT: PREVENTS OR INTERFERES SOME ACTIVE ACTIVITIES AND ADLS

## 2024-04-18 ASSESSMENT — PAIN DESCRIPTION - ONSET: ONSET: ON-GOING

## 2024-04-18 NOTE — PROGRESS NOTES
[facility] - Inpatient Rehabilitation Department   Phone: [phone]    Occupational Therapy    [] Initial Evaluation            [x] Daily Treatment Note         [] Discharge Summary      Patient: Ana Gonzales   : 1955   MRN: [id number]   Date of Service:  2024    Admitting Diagnosis:  Partial small bowel obstruction (HCC)  Current Admission Summary: Per H&P: \"Ana Gonzales is a 68 y.o. female with a pmh of upper GI bleed, SBO/persistent ileus, TIA, CHF, CAD who presents with SBO (small bowel obstruction)\"   Past Medical History:  has a past medical history of Arthritis, Asthma, Brain injury (HCC), Bronchitis, CHF (congestive heart failure) (HCC), Depression, Diabetes mellitus (HCC), DM (diabetes mellitus screen), Elevated cholesterol with high triglycerides, Fall, Fibromyalgia, GERD (gastroesophageal reflux disease), HIGH CHOLESTEROL, Hypertension, Microvascular angina (HCC), Migraine, Neuropathy, Panic attacks, PONV (postoperative nausea and vomiting), Post traumatic stress disorder, Short-term memory loss, Skin cancer, Sleep apnea, and Vertigo.  Past Surgical History:  has a past surgical history that includes Hysterectomy; eye surgery (cataract B); Carpal tunnel release; Finger trigger release; Gallbladder surgery; bladder suspension; Skin cancer excision; blepharoplasty; laryngoscopy (2017); Coronary stent placement (2023); joint replacement (Right); back surgery; Upper gastrointestinal endoscopy (N/A, 10/2/2023); Upper gastrointestinal endoscopy (N/A, 3/13/2024); Upper gastrointestinal endoscopy (N/A, 3/14/2024); Upper gastrointestinal endoscopy (N/A, 3/14/2024); Upper gastrointestinal endoscopy (N/A, 3/14/2024); laparotomy (N/A, 3/19/2024); Upper gastrointestinal endoscopy (N/A, 3/26/2024); laparotomy (N/A, 2024); and Gastrostomy tube placement (N/A, 2024).    Discharge Recommendations: Ana Gonzales scored a 17/24 on the AM-PAC ADL Inpatient form.

## 2024-04-18 NOTE — PROGRESS NOTES
Shift assessment completed, pt is alert and oriented, VSS, fall precautions in place, call light within reach, Dilaudid for pain, pt is reminded of NPO status, see flowsheets and MAR, will monitor pt. The care plan and education has been reviewed and mutually agreed upon with the patient.
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
## 2024-04-18 NOTE — PROGRESS NOTES
Nutrition Note    RECOMMENDATIONS  PO Diet: NPO  Nutrition Support: con't TPN @ goal rate of 83 ml/hr with 250 ml of 20% lipids biweekly.     ASSESSMENT   Pt is s/p BRANDON surgery 4/17.  Remains NPO with TPN @ goal rate of 83 ml/hr. Wt stable @ this time.  Lytes WNL.  Will con't to monitor tolerance & ability to start po diet.       Malnutrition Status: Mild malnutrition  Acute Illness    NUTRITION DIAGNOSIS   Other (Comment), In context of acute illness or injury (mild malnutrition) related to altered GI function as evidenced by nutrition support - parenteral nutrition, NPO or clear liquid status due to medical condition, poor intake prior to admission    Goals: Tolerate nutrition support at goal rate     NUTRITION RELATED FINDINGS  Objective: POCG 182. Lytes WNL; LBM 4/15; hypoactive bowel sounds; no edema noted  Wounds: Surgical Incision, Wound Vac    CURRENT NUTRITION THERAPIES  PN-Adult Premix 5/20 - Standard Electrolytes - Central Line  PN-Adult Premix 5/20 - Standard Electrolytes - Central Line  Diet NPO Exceptions are: Sips of Water with Meds, Ice Chips, Popsicles     PO Intake: NPO   PO Supplement Intake:NPO    Current Parenteral Nutrition Orders:  Type and Formula: Premix Central   Lipids: 250ml, Two times weekly  Duration: Continuous  Current PN Order Provides: as below  Goal PN Orders Provides: Clinimix 5/20 @ goal rate of 83 ml/hr provides 1992 ml; 1752 kcal, 100g pro & 3.09 dex load    ANTHROPOMETRICS  Current Height: 157.5 cm (5' 2\")  Current Weight - Scale: 89.8 kg (198 lb)      Ideal Body Weight (IBW): 110 lbs  (50 kg)        BMI: 36.2      COMPARATIVE STANDARDS  Total Energy Requirements (kcals/day): 1379- 1931     Protein (g):  60- 100g       Fluid (mL/day):  1500- 1931    EDUCATION  Education not indicated     The patient will be monitored per nutrition standards of care. Consult dietitian if additional nutrition interventions are needed prior to RD reassessment.     Tomasa Robles, RD, LD     Contact: [phone]

## 2024-04-18 NOTE — PROGRESS NOTES
[facility]        Progress Note    Patient Name: Ana Gonzales  MRN: [id number]  YOB: 1955  Date of Evaluation: 2024    Subjective:  No acute events overnight  Pain controlled  No nausea or vomiting, NGT in place, G-tube to gravity drainage  Passing flatus, no stool  Resting in bed at this time    Post-Operative Day #30, 1      Vital Signs:  Patient Vitals for the past 24 hrs:   BP Temp Temp src Pulse Resp SpO2 Weight   24 1125 (!) 111/54 -- -- 87 17 94 % --   24 0955 -- -- -- -- 16 -- --   24 0900 110/61 97.1 °F (36.2 °C) Oral 89 16 94 % --   24 0630 -- -- -- -- -- -- 89.8 kg (198 lb)   24 0531 99/64 97.2 °F (36.2 °C) Oral 92 16 96 % --   24 2321 100/67 97 °F (36.1 °C) Oral 94 16 94 % --   24 2021 114/63 97.1 °F (36.2 °C) Oral 86 16 96 % --   24 1537 120/67 97.2 °F (36.2 °C) -- 82 16 93 % --   24 1504 107/67 97.2 °F (36.2 °C) -- 83 16 92 % --   24 1415 (!) 120/55 -- -- 80 10 94 % --   24 1400 (!) 121/52 97.2 °F (36.2 °C) Temporal 79 11 96 % --   24 1345 (!) 121/54 -- -- 80 13 96 % --   24 1335 (!) 127/50 -- -- 82 14 96 % --        TEMPERATURE HISTORY 24H: Temp (24hrs), Av.1 °F (36.2 °C), Min:97 °F (36.1 °C), Max:97.2 °F (36.2 °C)    BLOOD PRESSURE HISTORY: Systolic (36hrs), Av , Min:99 , Max:149    Diastolic (36hrs), Av, Min:49, Max:83      Intake/Output:  I/O last 3 completed shifts:  In: 82836 [I.V.:89232; NG/GT:30]  Out: 3145 [Urine:2625; Emesis/NG output:500; Blood:20]  I/O this shift:  In: -   Out: 500 [Urine:500]  Drain/tube Output:       Physical Exam:  General: awake, alert, oriented to person, place, time  Lungs: unlabored respirations  Abdomen: soft, non-distended, non-tender, bowel sounds present, G-tube to gravity drainage  Skin/Wound: open midline abdominal wound bed with wound VAC in place, seal/suction intact    Labs:  CBC:    Recent Labs

## 2024-04-18 NOTE — PROGRESS NOTES
[facility]    4/18/2024 1:22 PM        Name: Ana Gonzales .              Admitted: 4/12/2024  Primary Care Provider: Mellisa Houston MD (Tel: [phone])      Subjective:  .    Seen this am.  at bedside. Pain controlled. No flatus or BM. Has some R neck pain-had previous parotitis during last admission.     Reviewed interval ancillary notes    Current Medications  PN-Adult Premix 5/20 - Standard Electrolytes - Central Line, Continuous TPN  PN-Adult Premix 5/20 - Standard Electrolytes - Central Line, Continuous TPN  enoxaparin (LOVENOX) injection 40 mg, Daily  pantoprazole (PROTONIX) injection 40 mg, Daily  HYDROmorphone HCl PF (DILAUDID) injection 1 mg, Q2H PRN  hydrALAZINE (APRESOLINE) injection 10 mg, Q6H PRN  lidocaine PF 1 % injection 5 mL, Once  sodium chloride flush 0.9 % injection 5-40 mL, 2 times per day  sodium chloride flush 0.9 % injection 5-40 mL, PRN  0.9 % sodium chloride infusion, PRN  fat emulsion (INTRALIPID/NUTRILIPID) 20 % infusion 250 mL, Once per day on Mon Thu  buPROPion (WELLBUTRIN XL) extended release tablet 300 mg, QAM  carvedilol (COREG) tablet 3.125 mg, BID WC  dilTIAZem (CARDIZEM CD) extended release capsule 120 mg, Daily  gabapentin (NEURONTIN) capsule 600 mg, 4x Daily  isosorbide mononitrate (IMDUR) extended release tablet 120 mg, Daily  nortriptyline (PAMELOR) capsule 75 mg, Nightly  ranolazine (RANEXA) extended release tablet 1,000 mg, BID  topiramate (TOPAMAX) tablet 100 mg, QAM  topiramate (TOPAMAX) tablet 150 mg, QPM  trospium (SANCTURA) tablet 20 mg, Nightly  lidocaine PF 1 % injection 5 mL, Once  sodium chloride flush 0.9 % injection 5-40 mL, 2 times per day  sodium chloride flush 0.9 % injection 5-40 mL, PRN  0.9 % sodium chloride infusion, PRN  tiotropium (SPIRIVA RESPIMAT) 2.5 MCG/ACT inhaler 2 puff, Daily RT  ARIPiprazole (ABILIFY) tablet 2 mg, Nightly  mometasone-formoterol (DULERA) 100-5 MCG/ACT inhaler 2 puff, BID  4.1    110 109   CO2 22 20* 18*   BUN 4* 8 16   CREATININE <0.5* <0.5* 0.6   GLUCOSE 141* 143* 166*       Hepatic:   Recent Labs     04/16/24  0510 04/17/24  0520   AST 8* 8*   ALT <5* <5*   BILITOT <0.2 <0.2   ALKPHOS 75 70     URINE culture  50,000 CFU/ml Mixed pathogens  Multiple organisms isolated, no predominance. Culture  indicates probable contamination. Please review colony count  and clinical indications to determine if a repeat culture is  necessary. No further workup to be done.     CTPA:  MPRESSION:  1. No evidence of pulmonary embolism.  2. No evidence of acute airspace consolidation to suggest pneumonia.  3. Chronic parenchymal scarring within the right lung, as above.  4. The stomach and visualized small bowel loops are fluid-filled and  distended, suggestive of a developing small-bowel obstruction as seen on the  abdominal CT earlier today.  5. Coronary atherosclerosis.    CT head:  IMPRESSION:  No acute intracranial abnormality.    IMPRESSION:  CT abd pelvis   Gas and fluid-filled dilated stomach and small bowel with gradual transition  to normal caliber bowel distally at the level of the ileum.  Findings may  represent partial small bowel obstruction or less likely generalized ileus.    Problem List  Principal Problem:    Partial small bowel obstruction (HCC)  Active Problems:    Muscle ache    Mild persistent asthma without complication    Mild malnutrition (HCC)  Resolved Problems:    * No resolved hospital problems. *       Assessment & Plan:   SBO:  PO day 1 from exploratory lapartomy with BRANDON and placement of G tube. On TPN. Ok for ice chips. Discussed with surgery. Continue pain meds.  Asymptomatic bacteruria: levaquin stopped after 2nd dose.   Chronic debility due to recent prolonged ( 30 day ) hospital stay.  Ambulation encouraged       Diet: PN-Adult Premix 5/20 - Standard Electrolytes - Central Line  PN-Adult Premix 5/20 - Standard Electrolytes - Central Line  Diet NPO Exceptions

## 2024-04-19 LAB
ALBUMIN SERPL-MCNC: 2.7 G/DL (ref 3.4–5)
ALP SERPL-CCNC: 68 U/L (ref 40–129)
ALT SERPL-CCNC: <5 U/L (ref 10–40)
ANION GAP SERPL CALCULATED.3IONS-SCNC: 9 MMOL/L (ref 3–16)
AST SERPL-CCNC: 7 U/L (ref 15–37)
BILIRUB DIRECT SERPL-MCNC: <0.2 MG/DL (ref 0–0.3)
BILIRUB INDIRECT SERPL-MCNC: ABNORMAL MG/DL (ref 0–1)
BILIRUB SERPL-MCNC: <0.2 MG/DL (ref 0–1)
BUN SERPL-MCNC: 13 MG/DL (ref 7–20)
CALCIUM SERPL-MCNC: 8.1 MG/DL (ref 8.3–10.6)
CHLORIDE SERPL-SCNC: 108 MMOL/L (ref 99–110)
CO2 SERPL-SCNC: 21 MMOL/L (ref 21–32)
CREAT SERPL-MCNC: <0.5 MG/DL (ref 0.6–1.2)
DEPRECATED RDW RBC AUTO: 17.2 % (ref 12.4–15.4)
GFR SERPLBLD CREATININE-BSD FMLA CKD-EPI: >90 ML/MIN/{1.73_M2}
GLUCOSE BLD-MCNC: 144 MG/DL (ref 70–99)
GLUCOSE BLD-MCNC: 172 MG/DL (ref 70–99)
GLUCOSE BLD-MCNC: 180 MG/DL (ref 70–99)
GLUCOSE BLD-MCNC: 180 MG/DL (ref 70–99)
GLUCOSE SERPL-MCNC: 158 MG/DL (ref 70–99)
HCT VFR BLD AUTO: 31.3 % (ref 36–48)
HGB BLD-MCNC: 9.7 G/DL (ref 12–16)
MAGNESIUM SERPL-MCNC: 1.6 MG/DL (ref 1.8–2.4)
MCH RBC QN AUTO: 27.3 PG (ref 26–34)
MCHC RBC AUTO-ENTMCNC: 30.8 G/DL (ref 31–36)
MCV RBC AUTO: 88.4 FL (ref 80–100)
PERFORMED ON: ABNORMAL
PHOSPHATE SERPL-MCNC: 2.6 MG/DL (ref 2.5–4.9)
PLATELET # BLD AUTO: 255 K/UL (ref 135–450)
PMV BLD AUTO: 8.7 FL (ref 5–10.5)
POTASSIUM SERPL-SCNC: 3.3 MMOL/L (ref 3.5–5.1)
PROT SERPL-MCNC: 5 G/DL (ref 6.4–8.2)
RBC # BLD AUTO: 3.54 M/UL (ref 4–5.2)
REASON FOR REJECTION: NORMAL
REASON FOR REJECTION: NORMAL
REJECTED TEST: NORMAL
REJECTED TEST: NORMAL
SODIUM SERPL-SCNC: 138 MMOL/L (ref 136–145)
WBC # BLD AUTO: 8.8 K/UL (ref 4–11)

## 2024-04-19 PROCEDURE — 1200000000 HC SEMI PRIVATE

## 2024-04-19 PROCEDURE — 80048 BASIC METABOLIC PNL TOTAL CA: CPT

## 2024-04-19 PROCEDURE — 84100 ASSAY OF PHOSPHORUS: CPT

## 2024-04-19 PROCEDURE — 97116 GAIT TRAINING THERAPY: CPT

## 2024-04-19 PROCEDURE — 97110 THERAPEUTIC EXERCISES: CPT

## 2024-04-19 PROCEDURE — 2580000003 HC RX 258: Performed by: SURGERY

## 2024-04-19 PROCEDURE — 80076 HEPATIC FUNCTION PANEL: CPT

## 2024-04-19 PROCEDURE — 6360000002 HC RX W HCPCS: Performed by: SURGERY

## 2024-04-19 PROCEDURE — 94640 AIRWAY INHALATION TREATMENT: CPT

## 2024-04-19 PROCEDURE — 36415 COLL VENOUS BLD VENIPUNCTURE: CPT

## 2024-04-19 PROCEDURE — 2500000003 HC RX 250 WO HCPCS: Performed by: INTERNAL MEDICINE

## 2024-04-19 PROCEDURE — 6370000000 HC RX 637 (ALT 250 FOR IP): Performed by: SURGERY

## 2024-04-19 PROCEDURE — C9113 INJ PANTOPRAZOLE SODIUM, VIA: HCPCS | Performed by: SURGERY

## 2024-04-19 PROCEDURE — APPNB30 APP NON BILLABLE TIME 0-30 MINS: Performed by: NURSE PRACTITIONER

## 2024-04-19 PROCEDURE — 85027 COMPLETE CBC AUTOMATED: CPT

## 2024-04-19 PROCEDURE — 83735 ASSAY OF MAGNESIUM: CPT

## 2024-04-19 PROCEDURE — APPSS15 APP SPLIT SHARED TIME 0-15 MINUTES: Performed by: NURSE PRACTITIONER

## 2024-04-19 PROCEDURE — 97530 THERAPEUTIC ACTIVITIES: CPT

## 2024-04-19 PROCEDURE — 94761 N-INVAS EAR/PLS OXIMETRY MLT: CPT

## 2024-04-19 PROCEDURE — 99024 POSTOP FOLLOW-UP VISIT: CPT | Performed by: SURGERY

## 2024-04-19 PROCEDURE — 2580000003 HC RX 258: Performed by: INTERNAL MEDICINE

## 2024-04-19 RX ORDER — DEXTROSE MONOHYDRATE 100 MG/ML
INJECTION, SOLUTION INTRAVENOUS CONTINUOUS PRN
Status: DISCONTINUED | OUTPATIENT
Start: 2024-04-19 | End: 2024-05-02 | Stop reason: HOSPADM

## 2024-04-19 RX ORDER — MAGNESIUM SULFATE IN WATER 40 MG/ML
2000 INJECTION, SOLUTION INTRAVENOUS ONCE
Status: DISCONTINUED | OUTPATIENT
Start: 2024-04-19 | End: 2024-04-21 | Stop reason: ALTCHOICE

## 2024-04-19 RX ORDER — INSULIN LISPRO 100 [IU]/ML
0-4 INJECTION, SOLUTION INTRAVENOUS; SUBCUTANEOUS EVERY 4 HOURS
Status: DISCONTINUED | OUTPATIENT
Start: 2024-04-19 | End: 2024-04-20

## 2024-04-19 RX ORDER — GLUCAGON 1 MG/ML
1 KIT INJECTION PRN
Status: DISCONTINUED | OUTPATIENT
Start: 2024-04-19 | End: 2024-05-02 | Stop reason: HOSPADM

## 2024-04-19 RX ADMIN — ONDANSETRON 4 MG: 2 INJECTION INTRAMUSCULAR; INTRAVENOUS at 10:40

## 2024-04-19 RX ADMIN — BUPROPION HYDROCHLORIDE 300 MG: 150 TABLET, EXTENDED RELEASE ORAL at 08:38

## 2024-04-19 RX ADMIN — HYDROMORPHONE HYDROCHLORIDE 1 MG: 1 INJECTION, SOLUTION INTRAMUSCULAR; INTRAVENOUS; SUBCUTANEOUS at 10:19

## 2024-04-19 RX ADMIN — HYDROMORPHONE HYDROCHLORIDE 1 MG: 1 INJECTION, SOLUTION INTRAMUSCULAR; INTRAVENOUS; SUBCUTANEOUS at 05:15

## 2024-04-19 RX ADMIN — Medication 10 ML: at 08:28

## 2024-04-19 RX ADMIN — ISOSORBIDE MONONITRATE 120 MG: 30 TABLET, EXTENDED RELEASE ORAL at 08:26

## 2024-04-19 RX ADMIN — GABAPENTIN 600 MG: 300 CAPSULE ORAL at 14:43

## 2024-04-19 RX ADMIN — ENOXAPARIN SODIUM 40 MG: 100 INJECTION SUBCUTANEOUS at 08:27

## 2024-04-19 RX ADMIN — PANTOPRAZOLE SODIUM 40 MG: 40 INJECTION, POWDER, FOR SOLUTION INTRAVENOUS at 08:27

## 2024-04-19 RX ADMIN — MAGNESIUM SULFATE HEPTAHYDRATE 2000 MG: 40 INJECTION, SOLUTION INTRAVENOUS at 08:20

## 2024-04-19 RX ADMIN — GABAPENTIN 600 MG: 300 CAPSULE ORAL at 08:27

## 2024-04-19 RX ADMIN — TOPIRAMATE 100 MG: 100 TABLET, FILM COATED ORAL at 08:27

## 2024-04-19 RX ADMIN — GABAPENTIN 600 MG: 300 CAPSULE ORAL at 17:28

## 2024-04-19 RX ADMIN — Medication 2 PUFF: at 08:47

## 2024-04-19 RX ADMIN — HYDROMORPHONE HYDROCHLORIDE 1 MG: 1 INJECTION, SOLUTION INTRAMUSCULAR; INTRAVENOUS; SUBCUTANEOUS at 14:42

## 2024-04-19 RX ADMIN — TROSPIUM CHLORIDE 20 MG: 20 TABLET, FILM COATED ORAL at 20:28

## 2024-04-19 RX ADMIN — ASCORBIC ACID, VITAMIN A PALMITATE, CHOLECALCIFEROL, THIAMINE HYDROCHLORIDE, RIBOFLAVIN-5 PHOSPHATE SODIUM, PYRIDOXINE HYDROCHLORIDE, NIACINAMIDE, DEXPANTHENOL, ALPHA-TOCOPHEROL ACETATE, VITAMIN K1, FOLIC ACID, BIOTIN, CYANOCOBALAMIN: 200; 3300; 200; 6; 3.6; 6; 40; 15; 10; 150; 600; 60; 5 INJECTION, SOLUTION INTRAVENOUS at 18:20

## 2024-04-19 RX ADMIN — SODIUM CHLORIDE, PRESERVATIVE FREE 10 ML: 5 INJECTION INTRAVENOUS at 08:28

## 2024-04-19 RX ADMIN — GABAPENTIN 600 MG: 300 CAPSULE ORAL at 20:28

## 2024-04-19 RX ADMIN — TIOTROPIUM BROMIDE INHALATION SPRAY 2 PUFF: 3.12 SPRAY, METERED RESPIRATORY (INHALATION) at 08:47

## 2024-04-19 RX ADMIN — DILTIAZEM HYDROCHLORIDE 120 MG: 120 CAPSULE, EXTENDED RELEASE ORAL at 08:26

## 2024-04-19 RX ADMIN — HYDROMORPHONE HYDROCHLORIDE 1 MG: 1 INJECTION, SOLUTION INTRAMUSCULAR; INTRAVENOUS; SUBCUTANEOUS at 21:10

## 2024-04-19 RX ADMIN — ARIPIPRAZOLE 2 MG: 2 TABLET ORAL at 20:28

## 2024-04-19 RX ADMIN — CARVEDILOL 3.12 MG: 3.12 TABLET, FILM COATED ORAL at 08:27

## 2024-04-19 RX ADMIN — HYDROMORPHONE HYDROCHLORIDE 1 MG: 1 INJECTION, SOLUTION INTRAMUSCULAR; INTRAVENOUS; SUBCUTANEOUS at 08:26

## 2024-04-19 RX ADMIN — NORTRIPTYLINE HYDROCHLORIDE 75 MG: 25 CAPSULE ORAL at 20:28

## 2024-04-19 RX ADMIN — RANOLAZINE 1000 MG: 500 TABLET, EXTENDED RELEASE ORAL at 20:28

## 2024-04-19 RX ADMIN — CARVEDILOL 3.12 MG: 3.12 TABLET, FILM COATED ORAL at 17:28

## 2024-04-19 RX ADMIN — RANOLAZINE 1000 MG: 500 TABLET, EXTENDED RELEASE ORAL at 08:27

## 2024-04-19 RX ADMIN — TOPIRAMATE 150 MG: 100 TABLET, FILM COATED ORAL at 17:29

## 2024-04-19 RX ADMIN — POTASSIUM PHOSPHATES 31 MMOL: 236; 224 INJECTION, SOLUTION INTRAVENOUS at 11:51

## 2024-04-19 ASSESSMENT — PAIN DESCRIPTION - FREQUENCY
FREQUENCY: INTERMITTENT
FREQUENCY: INTERMITTENT

## 2024-04-19 ASSESSMENT — PAIN DESCRIPTION - ORIENTATION
ORIENTATION: MID

## 2024-04-19 ASSESSMENT — PAIN SCALES - GENERAL
PAINLEVEL_OUTOF10: 0
PAINLEVEL_OUTOF10: 2
PAINLEVEL_OUTOF10: 8
PAINLEVEL_OUTOF10: 7
PAINLEVEL_OUTOF10: 4
PAINLEVEL_OUTOF10: 8
PAINLEVEL_OUTOF10: 4
PAINLEVEL_OUTOF10: 8
PAINLEVEL_OUTOF10: 7

## 2024-04-19 ASSESSMENT — PAIN DESCRIPTION - DESCRIPTORS
DESCRIPTORS: ACHING
DESCRIPTORS: ACHING;CRAMPING;DISCOMFORT
DESCRIPTORS: ACHING;SORE
DESCRIPTORS: ACHING;SORE

## 2024-04-19 ASSESSMENT — PAIN DESCRIPTION - LOCATION
LOCATION: ABDOMEN

## 2024-04-19 ASSESSMENT — PAIN DESCRIPTION - ONSET
ONSET: PROGRESSIVE
ONSET: ON-GOING

## 2024-04-19 ASSESSMENT — PAIN - FUNCTIONAL ASSESSMENT: PAIN_FUNCTIONAL_ASSESSMENT: PREVENTS OR INTERFERES SOME ACTIVE ACTIVITIES AND ADLS

## 2024-04-19 ASSESSMENT — PAIN DESCRIPTION - PAIN TYPE: TYPE: SURGICAL PAIN

## 2024-04-19 NOTE — PROGRESS NOTES
[facility] - Inpatient Rehabilitation Department   Phone: [phone]    Occupational Therapy    [] Initial Evaluation            [x] Daily Treatment Note         [] Discharge Summary      Patient: Ana Gonzales   : 1955   MRN: [id number]   Date of Service:  2024    Admitting Diagnosis:  Partial small bowel obstruction (HCC)  Current Admission Summary: Per H&P: \"Ana Gonzales is a 68 y.o. female with a pmh of upper GI bleed, SBO/persistent ileus, TIA, CHF, CAD who presents with SBO (small bowel obstruction)\"   Past Medical History:  has a past medical history of Arthritis, Asthma, Brain injury (HCC), Bronchitis, CHF (congestive heart failure) (HCC), Depression, Diabetes mellitus (HCC), DM (diabetes mellitus screen), Elevated cholesterol with high triglycerides, Fall, Fibromyalgia, GERD (gastroesophageal reflux disease), HIGH CHOLESTEROL, Hypertension, Microvascular angina (HCC), Migraine, Neuropathy, Panic attacks, PONV (postoperative nausea and vomiting), Post traumatic stress disorder, Short-term memory loss, Skin cancer, Sleep apnea, and Vertigo.  Past Surgical History:  has a past surgical history that includes Hysterectomy; eye surgery (cataract B); Carpal tunnel release; Finger trigger release; Gallbladder surgery; bladder suspension; Skin cancer excision; blepharoplasty; laryngoscopy (2017); Coronary stent placement (2023); joint replacement (Right); back surgery; Upper gastrointestinal endoscopy (N/A, 10/2/2023); Upper gastrointestinal endoscopy (N/A, 3/13/2024); Upper gastrointestinal endoscopy (N/A, 3/14/2024); Upper gastrointestinal endoscopy (N/A, 3/14/2024); Upper gastrointestinal endoscopy (N/A, 3/14/2024); laparotomy (N/A, 3/19/2024); Upper gastrointestinal endoscopy (N/A, 3/26/2024); laparotomy (N/A, 2024); and Gastrostomy tube placement (N/A, 2024).    Discharge Recommendations: Ana Gonzales scored a 17/24 on the AM-PAC ADL Inpatient form.  ongoing at this time unless indicated above.       Therapy Session Time     Individual Group Co-treatment   Time In    1010   Time Out    1040   Minutes    30     Timed Code Treatment Minutes: 30 min  Total Treatment Minutes:  30 min     Electronically Signed By: MOISÉS Mckeon/GUILLERMO [id number]

## 2024-04-19 NOTE — PROGRESS NOTES
[facility]        Progress Note    Patient Name: Ana Gonzales  MRN: [id number]  YOB: 1955  Date of Evaluation: 2024    Subjective:  No acute events overnight  Pain controlled  No nausea or vomiting, NGT in place, G-tube to gravity drainage--both with high output  Denies flatus or stool  Voiding since Muniz removed  Up to chair at this time, working with therapy    Post-Operative Day #31, 2      Vital Signs:  Patient Vitals for the past 24 hrs:   BP Temp Temp src Pulse Resp SpO2 Weight   24 1231 108/64 97.3 °F (36.3 °C) Oral 85 17 95 % --   24 1049 -- -- -- -- 16 -- --   24 0856 -- -- -- -- 16 -- --   24 0847 -- -- -- -- 16 94 % --   24 0815 139/81 97.3 °F (36.3 °C) Oral 88 16 94 % --   24 0545 -- -- -- -- 18 -- --   24 0536 -- -- -- -- -- -- 96.1 kg (211 lb 14.2 oz)   24 0519 -- -- -- -- -- -- 96.1 kg (211 lb 14.4 oz)   24 0515 126/72 97.8 °F (36.6 °C) Axillary 90 20 93 % --   24 2252 -- -- -- -- 20 -- --   24 2222 (!) 142/83 98.1 °F (36.7 °C) Axillary 91 20 95 % --   24 2045 134/76 97.6 °F (36.4 °C) Axillary 88 18 96 % --   24 1727 -- -- -- -- 18 -- --   24 1657 -- -- -- -- 18 -- --   24 1609 126/83 97.2 °F (36.2 °C) Axillary 89 18 95 % --        TEMPERATURE HISTORY 24H: Temp (24hrs), Av.6 °F (36.4 °C), Min:97.2 °F (36.2 °C), Max:98.1 °F (36.7 °C)    BLOOD PRESSURE HISTORY: Systolic (36hrs), Av , Min:99 , Max:142    Diastolic (36hrs), Av, Min:54, Max:83      Intake/Output:  I/O last 3 completed shifts:  In: 60 [P.O.:30; NG/GT:30]  Out: 3450 [Urine:1500; Emesis/NG output:1900; Drains:50]  No intake/output data recorded.  Drain/tube Output:  Negative Pressure Wound Therapy Abdomen Anterior;Mid-Output (ml): 50 ml    Physical Exam:  General: awake, alert, oriented to person, place, time  Lungs: unlabored respirations  Abdomen: soft, non-distended,  gabapentin  600 mg Oral 4x Daily    isosorbide mononitrate  120 mg Oral Daily    nortriptyline  75 mg Oral Nightly    ranolazine  1,000 mg Oral BID    topiramate  100 mg Oral QAM    topiramate  150 mg Oral QPM    trospium  20 mg Oral Nightly    lidocaine 1 % injection  5 mL IntraDERmal Once    sodium chloride flush  5-40 mL IntraVENous 2 times per day    tiotropium  2 puff Inhalation Daily RT    ARIPiprazole  2 mg Oral Nightly    mometasone-formoterol  2 puff Inhalation BID RT    sodium chloride flush  5-40 mL IntraVENous 2 times per day     Continuous Infusions:   dextrose      PN-Adult Premix 5/20 - Standard Electrolytes - Central Line      PN-Adult Premix 5/20 - Standard Electrolytes - Central Line 83 mL/hr at 04/18/24 1821    sodium chloride      sodium chloride      sodium chloride Stopped (04/14/24 0624)     PRN Meds:.dextrose bolus **OR** dextrose bolus, glucagon (rDNA), dextrose, HYDROmorphone, hydrALAZINE, sodium chloride flush, sodium chloride, sodium chloride flush, sodium chloride, sodium chloride flush, sodium chloride, magnesium sulfate, ondansetron **OR** ondansetron, polyethylene glycol, acetaminophen **OR** acetaminophen, ipratropium 0.5 mg-albuterol 2.5 mg      Assessment:  68 y.o. female admitted with   1. Partial small bowel obstruction (HCC)    2. Hypoxia        OR Date 4/17/2024, open lysis of adhesions (1 hour), placement of 24-[place] G-tube for small bowel obstruction  OR Date 3/19/2024, exploratory laparotomy with lysis of adhesions, Meckel diverticulectomy, and appendectomy for small bowel obstruction  UTI  Hypertension  CHF  CAD, on Plavix--last dose prior to admission  Diabetes      Plan:  1. Pain controlled, incisional tenderness only on exam, no nausea or vomiting, NGT in place, G-tube to gravity drainage--both with high output, denies flatus or stool, voiding since Muniz removed, vitals/labs stable; continued supportive care, local wound care--continue wound VAC--consult placed to

## 2024-04-19 NOTE — PROGRESS NOTES
Shift complete. Meds given per eMAR with sips of water. BRENNEN into sainz bag draining. Wound vac dressing to be changed mahogany by surgery. TPN running with lipids at this time. Pt tolerating at this time. Currently on room air. Purwick in place. NG with minimal output to low suction. Call light within reach. Bed alarm on. Spouse at bedside.   Electronically signed by Tracy Alexander RN on 4/19/2024 at 3:10 AM
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
## 2024-04-19 NOTE — CARE COORDINATION
INR 1.42 03/14/2024 04:52 AM     HgBA1c:    Lab Results   Component Value Date/Time    LABA1C 5.4 03/15/2024 03:55 AM       Assessment   Etienne Risk Score: Etienne Scale Score: 18    Patient Active Problem List   Diagnosis Code    SERVANDO (obstructive sleep apnea) G47.33    Hyperlipidemia E78.5    HTN (hypertension), benign I10    PTSD (post-traumatic stress disorder) F43.10    Vitamin D deficiency E55.9    Acute bronchitis with bronchospasm J20.9    Chest pain R07.9    Oropharyngeal dysphagia R13.12    Choking T17.308A    Chronic laryngitis J37.0    Abdominal pain, epigastric R10.13    Allergic rhinitis J30.9    Asthma J45.909    Acute asthma exacerbation J45.901    Carpal tunnel syndrome G56.00    Chronic diarrhea K52.9    Constipation K59.00    Dizziness and giddiness R42    Other long term (current) drug therapy Z79.899    Fibromyalgia M79.7    Gastro-esophageal reflux disease without esophagitis K21.9    History of colonic polyps Z86.010    Irritable bowel syndrome K58.9    Gonalgia M25.569    Menopausal and perimenopausal disorder N95.9    Microcytic anemia D50.9    Muscle ache M79.10    Nonspecific mesenteric adenitis I88.0    Osteoarthrosis M19.90    Arthralgia M25.50    Extremity pain M79.609    Other specified personal risk factors, not elsewhere classified Z91.89    Hypostatic pulmonary congestion J81.1    Frequent UTI N39.0    Subdural hemorrhage (HCC) I62.00    Type 1 diabetes mellitus (HCC) E10.9    Type 2 diabetes mellitus, without long-term current use of insulin (HCC) E11.9    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    Subjective tinnitus of both ears H93.13    Bilateral high frequency sensorineural hearing loss H90.3    Encounter for post surgical wound check Z48.89    Lumbar spine pain M54.50    Mild persistent asthma without complication J45.30    Numbness and tingling of both feet R20.0, R20.2    PONV (postoperative nausea and vomiting) R11.2, Z98.890    Spinal stenosis of lumbar region  - Standard Electrolytes - Central Line  Dietician consult:  Yes    Discharge Plan:  Placement for patient upon discharge: home with support    Patient appropriate for Outpatient Wound Care Center: Yes    Referrals:  []   [] Home Health Care  [] Supplies  [] Other    Patient/Caregiver Teaching:  Level of patient/caregiver understanding able to:   [x] Indicates understanding       [x] Needs reinforcement  [] Unsuccessful      [] Verbal Understanding  [] Demonstrated understanding       [] No evidence of learning  [] Refused teaching         [] N/A       Electronically signed by DEYANIRA ZAMAN, RN, CWOCN  Inpatient  Wound/Ostomy Care  [phone] on 4/19/2024 at 3:16 PM

## 2024-04-19 NOTE — PLAN OF CARE
Problem: Discharge Planning  Goal: Discharge to home or other facility with appropriate resources  Outcome: Progressing  Flowsheets (Taken 4/18/2024 0900 by Praveena Hu, RN)  Discharge to home or other facility with appropriate resources: Identify barriers to discharge with patient and caregiver     Problem: Pain  Goal: Verbalizes/displays adequate comfort level or baseline comfort level  Outcome: Progressing  Flowsheets (Taken 4/18/2024 0900 by Praveena Hu, RN)  Verbalizes/displays adequate comfort level or baseline comfort level:   Encourage patient to monitor pain and request assistance   Assess pain using appropriate pain scale   Administer analgesics based on type and severity of pain and evaluate response     Problem: Skin/Tissue Integrity  Goal: Absence of new skin breakdown  Description: 1.  Monitor for areas of redness and/or skin breakdown  2.  Assess vascular access sites hourly  3.  Every 4-6 hours minimum:  Change oxygen saturation probe site  4.  Every 4-6 hours:  If on nasal continuous positive airway pressure, respiratory therapy assess nares and determine need for appliance change or resting period.  Outcome: Progressing     Problem: Chronic Conditions and Co-morbidities  Goal: Patient's chronic conditions and co-morbidity symptoms are monitored and maintained or improved  Outcome: Progressing  Flowsheets (Taken 4/18/2024 0900 by Praveena Hu, RN)  Care Plan - Patient's Chronic Conditions and Co-Morbidity Symptoms are Monitored and Maintained or Improved: Monitor and assess patient's chronic conditions and comorbid symptoms for stability, deterioration, or improvement     Problem: ABCDS Injury Assessment  Goal: Absence of physical injury  Outcome: Progressing     Problem: Nutrition Deficit:  Goal: Optimize nutritional status  Outcome: Progressing  Flowsheets (Taken 4/18/2024 1208 by Tomasa Robles, RD, LD)  Nutrient intake appropriate for improving, restoring, or maintaining nutritional

## 2024-04-19 NOTE — PROGRESS NOTES
Clinical Pharmacy Note    Pharmacy consulted by Dr. Greene to manage TPN    Current TPN rate: 83ml/hr  Goal TPN rate: 83 ml/hr    Access: PICC double lumen  Indication: SBO    Labs:  General Labs:  BMP:    Lab Results   Component Value Date/Time     04/19/2024 06:45 AM    K 3.3 04/19/2024 06:45 AM    K 4.1 04/15/2024 05:00 AM     04/19/2024 06:45 AM    CO2 21 04/19/2024 06:45 AM    BUN 13 04/19/2024 06:45 AM    CREATININE <0.5 04/19/2024 06:45 AM    CALCIUM 8.1 04/19/2024 06:45 AM    GFRAA >60 11/24/2020 06:29 PM    GFRAA >60 01/13/2013 05:24 AM    LABGLOM >90 04/19/2024 06:45 AM    GLUCOSE 158 04/19/2024 06:45 AM       Electrolyte replacement as follows:   Replace potassium with 45 mEq of potassium phosphate administered IV over 6 hours  Replace magnesium with 2 g of magnesium sulfate administered IV over 2 hours  Replace phosphorous with 31 mmol of potassium phosphate administered IV over 6 hours    Blood sugars over past 24 hours: 158-166    Blood sugar management:  Plan to initiate Humalog q4 hour sliding scale at low dose.    Plan to continue TPN at 83 ml/hr.    Thank you for allowing pharmacy to participate in the care of this patient.    Srinivasa Fountain [facility], PharmD 4/19/2024

## 2024-04-19 NOTE — PROGRESS NOTES
[facility]    4/19/2024 12:26 PM        Name: Ana Gonzales .              Admitted: 4/12/2024  Primary Care Provider: Mellisa Houston MD (Tel: [phone])      Subjective:  .    Seen this am.  at bedside. Pain controlled. No flatus or BM today. Neck pain improved. Seems tired  and weak today.     Reviewed interval ancillary notes    Current Medications  magnesium sulfate 2000 mg in 50 mL IVPB premix, Once  potassium phosphate 31 mmol in sodium chloride 0.9 % 500 mL IVPB, Once  insulin lispro (HUMALOG) injection vial 0-4 Units, Q4H  dextrose bolus 10% 125 mL, PRN   Or  dextrose bolus 10% 250 mL, PRN  glucagon injection 1 mg, PRN  dextrose 10 % infusion, Continuous PRN  PN-Adult Premix 5/20 - Standard Electrolytes - Central Line, Continuous TPN  PN-Adult Premix 5/20 - Standard Electrolytes - Central Line, Continuous TPN  enoxaparin (LOVENOX) injection 40 mg, Daily  pantoprazole (PROTONIX) injection 40 mg, Daily  HYDROmorphone HCl PF (DILAUDID) injection 1 mg, Q2H PRN  hydrALAZINE (APRESOLINE) injection 10 mg, Q6H PRN  lidocaine PF 1 % injection 5 mL, Once  sodium chloride flush 0.9 % injection 5-40 mL, 2 times per day  sodium chloride flush 0.9 % injection 5-40 mL, PRN  0.9 % sodium chloride infusion, PRN  fat emulsion (INTRALIPID/NUTRILIPID) 20 % infusion 250 mL, Once per day on Mon Thu  buPROPion (WELLBUTRIN XL) extended release tablet 300 mg, QAM  carvedilol (COREG) tablet 3.125 mg, BID WC  dilTIAZem (CARDIZEM CD) extended release capsule 120 mg, Daily  gabapentin (NEURONTIN) capsule 600 mg, 4x Daily  isosorbide mononitrate (IMDUR) extended release tablet 120 mg, Daily  nortriptyline (PAMELOR) capsule 75 mg, Nightly  ranolazine (RANEXA) extended release tablet 1,000 mg, BID  topiramate (TOPAMAX) tablet 100 mg, QAM  topiramate (TOPAMAX) tablet 150 mg, QPM  trospium (SANCTURA) tablet 20 mg, Nightly  lidocaine PF 1 % injection 5 mL, Once  sodium chloride flush  oriented in time, place and person  Skin: Warm, dry, normal turgor    Labs and Tests:  CBC:   Recent Labs     04/17/24  0520 04/18/24  0608 04/19/24  1147   WBC 3.1* 10.7 8.8   HGB 9.3* 12.1 9.7*    338 255       BMP:    Recent Labs     04/17/24  0520 04/18/24  0608 04/19/24  0645    138 138   K 3.6 4.1 3.3*    109 108   CO2 20* 18* 21   BUN 8 16 13   CREATININE <0.5* 0.6 <0.5*   GLUCOSE 143* 166* 158*       Hepatic:   Recent Labs     04/17/24  0520 04/19/24  0645   AST 8* 7*   ALT <5* <5*   BILITOT <0.2 <0.2   ALKPHOS 70 68     URINE culture  50,000 CFU/ml Mixed pathogens  Multiple organisms isolated, no predominance. Culture  indicates probable contamination. Please review colony count  and clinical indications to determine if a repeat culture is  necessary. No further workup to be done.     CTPA:  MPRESSION:  1. No evidence of pulmonary embolism.  2. No evidence of acute airspace consolidation to suggest pneumonia.  3. Chronic parenchymal scarring within the right lung, as above.  4. The stomach and visualized small bowel loops are fluid-filled and  distended, suggestive of a developing small-bowel obstruction as seen on the  abdominal CT earlier today.  5. Coronary atherosclerosis.    CT head:  IMPRESSION:  No acute intracranial abnormality.    IMPRESSION:  CT abd pelvis   Gas and fluid-filled dilated stomach and small bowel with gradual transition  to normal caliber bowel distally at the level of the ileum.  Findings may  represent partial small bowel obstruction or less likely generalized ileus.    Problem List  Principal Problem:    Partial small bowel obstruction (HCC)  Active Problems:    Muscle ache    Mild persistent asthma without complication    Mild malnutrition (HCC)  Resolved Problems:    * No resolved hospital problems. *       Assessment & Plan:   SBO:  PO day 2 from exploratory lapartomy with BRANDON and placement of G tube. On TPN, ice chips. Discussed with surgery. Continue pain

## 2024-04-19 NOTE — CARE COORDINATION
Discharge Planning  Note:    Update:    - patient is not medically ready for discharge.    - patient has the following:    NG tube  G-tube for drainage  Muniz  Wound vac    - Patient is active with [facility]     -current discharge plan is for the paitent to return home with [facility].    Will continue to follow.    VANDANA EscobarN RN    [facility]  Phone: [phone]

## 2024-04-19 NOTE — PROGRESS NOTES
Shift assessment completed. Neuro WNL. Reports pain 7/10 at this time. AM meds administered per MAR. NGT, J tube and wound vac remain in place.The care plan and education has been reviewed and mutually agreed upon with the patient. Standard safety measures in place.

## 2024-04-19 NOTE — PLAN OF CARE
Problem: Discharge Planning  Goal: Discharge to home or other facility with appropriate resources  Recent Flowsheet Documentation  Taken 4/19/2024 0815 by Praveena Hu RN  Discharge to home or other facility with appropriate resources: Identify barriers to discharge with patient and caregiver  4/18/2024 2219 by Tracy Alexander RN  Outcome: Progressing  Flowsheets (Taken 4/18/2024 0900 by Praveena Hu RN)  Discharge to home or other facility with appropriate resources: Identify barriers to discharge with patient and caregiver     Problem: Pain  Goal: Verbalizes/displays adequate comfort level or baseline comfort level  Recent Flowsheet Documentation  Taken 4/19/2024 0815 by Praveena Hu RN  Verbalizes/displays adequate comfort level or baseline comfort level:   Encourage patient to monitor pain and request assistance   Assess pain using appropriate pain scale   Administer analgesics based on type and severity of pain and evaluate response  4/18/2024 2219 by Tracy Alexander RN  Outcome: Progressing  Flowsheets (Taken 4/18/2024 0900 by Praveena Hu RN)  Verbalizes/displays adequate comfort level or baseline comfort level:   Encourage patient to monitor pain and request assistance   Assess pain using appropriate pain scale   Administer analgesics based on type and severity of pain and evaluate response     Problem: Skin/Tissue Integrity  Goal: Absence of new skin breakdown  Description: 1.  Monitor for areas of redness and/or skin breakdown  2.  Assess vascular access sites hourly  3.  Every 4-6 hours minimum:  Change oxygen saturation probe site  4.  Every 4-6 hours:  If on nasal continuous positive airway pressure, respiratory therapy assess nares and determine need for appliance change or resting period.  4/18/2024 2219 by Tracy Alexander RN  Outcome: Progressing     Problem: Chronic Conditions and Co-morbidities  Goal: Patient's chronic conditions and co-morbidity symptoms are monitored and maintained or  improved  4/18/2024 2219 by Tracy Alexander, RN  Outcome: Progressing  Flowsheets (Taken 4/18/2024 0900 by Praveena Hu, RN)  Care Plan - Patient's Chronic Conditions and Co-Morbidity Symptoms are Monitored and Maintained or Improved: Monitor and assess patient's chronic conditions and comorbid symptoms for stability, deterioration, or improvement     Problem: ABCDS Injury Assessment  Goal: Absence of physical injury  4/18/2024 2219 by Tracy Alexander, RN  Outcome: Progressing     Problem: Nutrition Deficit:  Goal: Optimize nutritional status  4/18/2024 2219 by Tracy Alexander, RN  Outcome: Progressing  Flowsheets (Taken 4/18/2024 1208 by Tomasa Robles, RD, LD)  Nutrient intake appropriate for improving, restoring, or maintaining nutritional needs: Recommend, monitor, and adjust tube feedings and TPN/PPN based on assessed needs     Problem: Respiratory - Adult  Goal: Achieves optimal ventilation and oxygenation  Outcome: Progressing     Problem: Cardiovascular - Adult  Goal: Maintains optimal cardiac output and hemodynamic stability  Outcome: Progressing  Goal: Absence of cardiac dysrhythmias or at baseline  Outcome: Progressing     Problem: Skin/Tissue Integrity - Adult  Goal: Skin integrity remains intact  Outcome: Progressing  Goal: Incisions, wounds, or drain sites healing without S/S of infection  Outcome: Progressing     Problem: Musculoskeletal - Adult  Goal: Return mobility to safest level of function  Outcome: Progressing  Goal: Maintain proper alignment of affected body part  Outcome: Progressing     Problem: Gastrointestinal - Adult  Goal: Minimal or absence of nausea and vomiting  Outcome: Progressing  Goal: Maintains or returns to baseline bowel function  Outcome: Progressing     Problem: Genitourinary - Adult  Goal: Absence of urinary retention  Outcome: Progressing

## 2024-04-20 ENCOUNTER — APPOINTMENT (OUTPATIENT)
Dept: GENERAL RADIOLOGY | Age: 69
End: 2024-04-20
Payer: MEDICARE

## 2024-04-20 LAB
ANION GAP SERPL CALCULATED.3IONS-SCNC: 10 MMOL/L (ref 3–16)
BUN SERPL-MCNC: 14 MG/DL (ref 7–20)
CALCIUM SERPL-MCNC: 8.4 MG/DL (ref 8.3–10.6)
CHLORIDE SERPL-SCNC: 105 MMOL/L (ref 99–110)
CO2 SERPL-SCNC: 23 MMOL/L (ref 21–32)
CREAT SERPL-MCNC: <0.5 MG/DL (ref 0.6–1.2)
GFR SERPLBLD CREATININE-BSD FMLA CKD-EPI: >90 ML/MIN/{1.73_M2}
GLUCOSE BLD-MCNC: 137 MG/DL (ref 70–99)
GLUCOSE BLD-MCNC: 147 MG/DL (ref 70–99)
GLUCOSE BLD-MCNC: 159 MG/DL (ref 70–99)
GLUCOSE BLD-MCNC: 181 MG/DL (ref 70–99)
GLUCOSE BLD-MCNC: 185 MG/DL (ref 70–99)
GLUCOSE BLD-MCNC: 190 MG/DL (ref 70–99)
GLUCOSE BLD-MCNC: 210 MG/DL (ref 70–99)
GLUCOSE SERPL-MCNC: 184 MG/DL (ref 70–99)
MAGNESIUM SERPL-MCNC: 1.7 MG/DL (ref 1.8–2.4)
PERFORMED ON: ABNORMAL
PHOSPHATE SERPL-MCNC: 3.9 MG/DL (ref 2.5–4.9)
POTASSIUM SERPL-SCNC: 3.6 MMOL/L (ref 3.5–5.1)
SODIUM SERPL-SCNC: 138 MMOL/L (ref 136–145)

## 2024-04-20 PROCEDURE — 2580000003 HC RX 258: Performed by: PHYSICIAN ASSISTANT

## 2024-04-20 PROCEDURE — 2580000003 HC RX 258: Performed by: SURGERY

## 2024-04-20 PROCEDURE — 6370000000 HC RX 637 (ALT 250 FOR IP): Performed by: SURGERY

## 2024-04-20 PROCEDURE — 2500000003 HC RX 250 WO HCPCS: Performed by: INTERNAL MEDICINE

## 2024-04-20 PROCEDURE — APPSS15 APP SPLIT SHARED TIME 0-15 MINUTES: Performed by: NURSE PRACTITIONER

## 2024-04-20 PROCEDURE — 84100 ASSAY OF PHOSPHORUS: CPT

## 2024-04-20 PROCEDURE — 6360000002 HC RX W HCPCS: Performed by: SURGERY

## 2024-04-20 PROCEDURE — 99024 POSTOP FOLLOW-UP VISIT: CPT | Performed by: SURGERY

## 2024-04-20 PROCEDURE — 1200000000 HC SEMI PRIVATE

## 2024-04-20 PROCEDURE — 71045 X-RAY EXAM CHEST 1 VIEW: CPT

## 2024-04-20 PROCEDURE — 83735 ASSAY OF MAGNESIUM: CPT

## 2024-04-20 PROCEDURE — 94761 N-INVAS EAR/PLS OXIMETRY MLT: CPT

## 2024-04-20 PROCEDURE — 80048 BASIC METABOLIC PNL TOTAL CA: CPT

## 2024-04-20 PROCEDURE — 94640 AIRWAY INHALATION TREATMENT: CPT

## 2024-04-20 PROCEDURE — APPNB30 APP NON BILLABLE TIME 0-30 MINS: Performed by: NURSE PRACTITIONER

## 2024-04-20 PROCEDURE — C9113 INJ PANTOPRAZOLE SODIUM, VIA: HCPCS | Performed by: SURGERY

## 2024-04-20 PROCEDURE — 6360000002 HC RX W HCPCS: Performed by: NURSE PRACTITIONER

## 2024-04-20 RX ORDER — SODIUM CHLORIDE 9 MG/ML
INJECTION, SOLUTION INTRAVENOUS CONTINUOUS
Status: DISCONTINUED | OUTPATIENT
Start: 2024-04-20 | End: 2024-04-23

## 2024-04-20 RX ORDER — 0.9 % SODIUM CHLORIDE 0.9 %
500 INTRAVENOUS SOLUTION INTRAVENOUS ONCE
Status: COMPLETED | OUTPATIENT
Start: 2024-04-20 | End: 2024-04-20

## 2024-04-20 RX ORDER — INSULIN LISPRO 100 [IU]/ML
0-8 INJECTION, SOLUTION INTRAVENOUS; SUBCUTANEOUS EVERY 4 HOURS
Status: DISCONTINUED | OUTPATIENT
Start: 2024-04-20 | End: 2024-04-27

## 2024-04-20 RX ORDER — MAGNESIUM SULFATE 1 G/100ML
1000 INJECTION INTRAVENOUS ONCE
Status: COMPLETED | OUTPATIENT
Start: 2024-04-20 | End: 2024-04-20

## 2024-04-20 RX ADMIN — RANOLAZINE 1000 MG: 500 TABLET, EXTENDED RELEASE ORAL at 21:29

## 2024-04-20 RX ADMIN — TOPIRAMATE 150 MG: 100 TABLET, FILM COATED ORAL at 17:25

## 2024-04-20 RX ADMIN — TOPIRAMATE 100 MG: 100 TABLET, FILM COATED ORAL at 09:18

## 2024-04-20 RX ADMIN — GABAPENTIN 600 MG: 300 CAPSULE ORAL at 12:59

## 2024-04-20 RX ADMIN — PANTOPRAZOLE SODIUM 40 MG: 40 INJECTION, POWDER, FOR SOLUTION INTRAVENOUS at 09:20

## 2024-04-20 RX ADMIN — GABAPENTIN 600 MG: 300 CAPSULE ORAL at 21:30

## 2024-04-20 RX ADMIN — SODIUM CHLORIDE: 9 INJECTION, SOLUTION INTRAVENOUS at 17:31

## 2024-04-20 RX ADMIN — ARIPIPRAZOLE 2 MG: 2 TABLET ORAL at 21:30

## 2024-04-20 RX ADMIN — ENOXAPARIN SODIUM 40 MG: 100 INJECTION SUBCUTANEOUS at 09:21

## 2024-04-20 RX ADMIN — Medication 2 PUFF: at 21:38

## 2024-04-20 RX ADMIN — ISOSORBIDE MONONITRATE 120 MG: 30 TABLET, EXTENDED RELEASE ORAL at 09:20

## 2024-04-20 RX ADMIN — LEUCINE, PHENYLALANINE, LYSINE, METHIONINE, ISOLEUCINE, VALINE, HISTIDINE, THREONINE, TRYPTOPHAN, ALANINE, GLYCINE, ARGININE, PROLINE, SERINE, TYROSINE, SODIUM ACETATE, DIBASIC POTASSIUM PHOSPHATE, MAGNESIUM CHLORIDE, SODIUM CHLORIDE, CALCIUM CHLORIDE, DEXTROSE
365; 280; 290; 200; 300; 290; 240; 210; 90; 1035; 515; 575; 340; 250; 20; 340; 261; 51; 59; 33; 20 INJECTION INTRAVENOUS at 17:34

## 2024-04-20 RX ADMIN — BUPROPION HYDROCHLORIDE 300 MG: 150 TABLET, EXTENDED RELEASE ORAL at 09:20

## 2024-04-20 RX ADMIN — HYDROMORPHONE HYDROCHLORIDE 1 MG: 1 INJECTION, SOLUTION INTRAMUSCULAR; INTRAVENOUS; SUBCUTANEOUS at 21:30

## 2024-04-20 RX ADMIN — MAGNESIUM SULFATE HEPTAHYDRATE 1000 MG: 1 INJECTION, SOLUTION INTRAVENOUS at 09:32

## 2024-04-20 RX ADMIN — GABAPENTIN 600 MG: 300 CAPSULE ORAL at 17:25

## 2024-04-20 RX ADMIN — HYDROMORPHONE HYDROCHLORIDE 1 MG: 1 INJECTION, SOLUTION INTRAMUSCULAR; INTRAVENOUS; SUBCUTANEOUS at 02:18

## 2024-04-20 RX ADMIN — TIOTROPIUM BROMIDE INHALATION SPRAY 2 PUFF: 3.12 SPRAY, METERED RESPIRATORY (INHALATION) at 09:45

## 2024-04-20 RX ADMIN — RANOLAZINE 1000 MG: 500 TABLET, EXTENDED RELEASE ORAL at 09:19

## 2024-04-20 RX ADMIN — CARVEDILOL 3.12 MG: 3.12 TABLET, FILM COATED ORAL at 09:20

## 2024-04-20 RX ADMIN — SODIUM CHLORIDE, PRESERVATIVE FREE 10 ML: 5 INJECTION INTRAVENOUS at 09:21

## 2024-04-20 RX ADMIN — Medication 2 PUFF: at 09:45

## 2024-04-20 RX ADMIN — HYDROMORPHONE HYDROCHLORIDE 1 MG: 1 INJECTION, SOLUTION INTRAMUSCULAR; INTRAVENOUS; SUBCUTANEOUS at 17:22

## 2024-04-20 RX ADMIN — HYDROMORPHONE HYDROCHLORIDE 1 MG: 1 INJECTION, SOLUTION INTRAMUSCULAR; INTRAVENOUS; SUBCUTANEOUS at 11:29

## 2024-04-20 RX ADMIN — SODIUM CHLORIDE 500 ML: 9 INJECTION, SOLUTION INTRAVENOUS at 15:25

## 2024-04-20 RX ADMIN — GABAPENTIN 600 MG: 300 CAPSULE ORAL at 09:19

## 2024-04-20 RX ADMIN — DILTIAZEM HYDROCHLORIDE 120 MG: 120 CAPSULE, EXTENDED RELEASE ORAL at 09:20

## 2024-04-20 RX ADMIN — CARVEDILOL 3.12 MG: 3.12 TABLET, FILM COATED ORAL at 17:26

## 2024-04-20 RX ADMIN — TROSPIUM CHLORIDE 20 MG: 20 TABLET, FILM COATED ORAL at 21:30

## 2024-04-20 RX ADMIN — NORTRIPTYLINE HYDROCHLORIDE 75 MG: 25 CAPSULE ORAL at 21:29

## 2024-04-20 ASSESSMENT — PAIN SCALES - GENERAL
PAINLEVEL_OUTOF10: 8
PAINLEVEL_OUTOF10: 4
PAINLEVEL_OUTOF10: 5
PAINLEVEL_OUTOF10: 8
PAINLEVEL_OUTOF10: 3
PAINLEVEL_OUTOF10: 8
PAINLEVEL_OUTOF10: 9

## 2024-04-20 ASSESSMENT — PAIN DESCRIPTION - LOCATION
LOCATION: ABDOMEN

## 2024-04-20 ASSESSMENT — PAIN DESCRIPTION - ORIENTATION
ORIENTATION: MID

## 2024-04-20 ASSESSMENT — PAIN DESCRIPTION - PAIN TYPE: TYPE: SURGICAL PAIN

## 2024-04-20 ASSESSMENT — PAIN DESCRIPTION - DESCRIPTORS
DESCRIPTORS: ACHING;DISCOMFORT
DESCRIPTORS: ACHING;DISCOMFORT
DESCRIPTORS: ACHING
DESCRIPTORS: ACHING

## 2024-04-20 NOTE — PROGRESS NOTES
[facility]        Progress Note    Patient Name: Ana Gonzales  MRN: [id number]  YOB: 1955  Date of Evaluation: 2024    Subjective:  No acute events overnight  Pain controlled  No nausea or vomiting, NGT in place, G-tube to gravity drainage--both with high output  Denies flatus or stool  Resting in bed at this time    Post-Operative Day #32, 3      Vital Signs:  Patient Vitals for the past 24 hrs:   BP Temp Temp src Pulse Resp SpO2 Weight   24 0945 -- -- -- 89 16 94 % --   24 0915 (!) 145/75 98.2 °F (36.8 °C) Oral 90 16 93 % --   24 0552 -- -- -- -- -- -- 90.3 kg (199 lb 1.6 oz)   24 0454 117/61 97.4 °F (36.3 °C) Axillary 86 16 92 % --   24 0248 -- -- -- -- 18 -- --   24 0218 -- -- -- -- 18 -- --   24 0021 119/74 97.5 °F (36.4 °C) Oral 88 17 94 % --   240 -- -- -- -- 18 -- --   240 -- -- -- -- 18 -- --   24 123/70 97.4 °F (36.3 °C) Oral 88 16 94 % --   24 1625 126/71 97.3 °F (36.3 °C) Oral 85 17 96 % --   24 1512 -- -- -- -- 16 -- --   24 1442 -- -- -- -- 16 -- --   24 1231 108/64 97.3 °F (36.3 °C) Oral 85 17 95 % --        TEMPERATURE HISTORY 24H: Temp (24hrs), Av.5 °F (36.4 °C), Min:97.3 °F (36.3 °C), Max:98.2 °F (36.8 °C)    BLOOD PRESSURE HISTORY: Systolic (36hrs), Av , Min:108 , Max:145    Diastolic (36hrs), Av, Min:61, Max:81      Intake/Output:  I/O last 3 completed shifts:  In: 5460.8 [P.O.:180; IV Piggyback:346.3]  Out: 4095 [Urine:1800; Emesis/NG output:2195; Drains:100]  No intake/output data recorded.  Drain/tube Output:  Negative Pressure Wound Therapy Abdomen Anterior;Mid-Output (ml): 50 ml    Physical Exam:  General: awake, alert, oriented to person, place, time  Lungs: unlabored respirations  Abdomen: soft, non-distended, non-tender, bowel sounds hypoactive, G-tube to gravity drainage--bilious  Skin/Wound: open midline abdominal  Diagnostic Result: POSITIVE  Normal range: Negative   (A) (4/12/2024)  Not in Time Range      GI bacterial pathogens by PCR  Results in Past 30 Days  Result Component Current Result Ref Range Previous Result Ref Range   GI Bacterial Pathogens By PCR No Shigella spp/EIEC DNA detected  No Shiga toxin-producing gene(s) detected  No Campylobacter spp. (jejuni and coli)DNA detected  No Salmonella spp. DNA detected  No Vibrio vulnificus/parahaemolyticus/cholerae DNA detected  No Plesiomonas shigelloides DNA detected  No Enterotoxigenic E. coli (ETEC) DNA detected  No Yersinia enterocolitica DNA detected  Normal Range:  None detected   (4/12/2024)  Not in Time Range      C. difficile  No results found for requested labs within last 30 days.     Urine culture  Lab Results   Component Value Date/Time    LABURIN  04/13/2024 03:59 AM     >50,000 CFU/ml Mixed pathogens  Multiple organisms isolated, no predominance. Culture  indicates probable contamination. Please review colony count  and clinical indications to determine if a repeat culture is  necessary. No further workup to be done.         Pathology:  \"Relevant pathology documented under results section     Imaging:  I have personally reviewed the following films:    No results found.    Scheduled Meds:   magnesium sulfate  2,000 mg IntraVENous Once    insulin lispro  0-4 Units SubCUTAneous Q4H    enoxaparin  40 mg SubCUTAneous Daily    pantoprazole  40 mg IntraVENous Daily    lidocaine 1 % injection  5 mL IntraDERmal Once    sodium chloride flush  5-40 mL IntraVENous 2 times per day    fat emulsion  250 mL IntraVENous Once per day on Mon Thu    buPROPion  300 mg Oral QAM    carvedilol  3.125 mg Oral BID WC    dilTIAZem  120 mg Oral Daily    gabapentin  600 mg Oral 4x Daily    isosorbide mononitrate  120 mg Oral Daily    nortriptyline  75 mg Oral Nightly    ranolazine  1,000 mg Oral BID    topiramate  100 mg Oral QAM    topiramate  150 mg Oral QPM    trospium  20 mg Oral

## 2024-04-20 NOTE — PROGRESS NOTES
[facility]    4/20/2024 2:29 PM        Name: Ana Gonzales .              Admitted: 4/12/2024  Primary Care Provider: Mellisa Houston MD (Tel: [phone])      Subjective:  .    Seen this am.  at bedside. Pain controlled. No flatus or BM today. Sat up in chair for 3 hours yesterday and walked a little around the room. Continue to be weak.     Reviewed interval ancillary notes    Current Medications  PN-Adult Premix 5/20 - Standard Electrolytes - Central Line, Continuous TPN  insulin lispro (HUMALOG) injection vial 0-8 Units, Q4H  magnesium sulfate 2000 mg in 50 mL IVPB premix, Once  dextrose bolus 10% 125 mL, PRN   Or  dextrose bolus 10% 250 mL, PRN  glucagon injection 1 mg, PRN  dextrose 10 % infusion, Continuous PRN  PN-Adult Premix 5/20 - Standard Electrolytes - Central Line, Continuous TPN  enoxaparin (LOVENOX) injection 40 mg, Daily  pantoprazole (PROTONIX) injection 40 mg, Daily  HYDROmorphone HCl PF (DILAUDID) injection 1 mg, Q2H PRN  hydrALAZINE (APRESOLINE) injection 10 mg, Q6H PRN  lidocaine PF 1 % injection 5 mL, Once  sodium chloride flush 0.9 % injection 5-40 mL, 2 times per day  sodium chloride flush 0.9 % injection 5-40 mL, PRN  0.9 % sodium chloride infusion, PRN  fat emulsion (INTRALIPID/NUTRILIPID) 20 % infusion 250 mL, Once per day on Mon Thu  buPROPion (WELLBUTRIN XL) extended release tablet 300 mg, QAM  carvedilol (COREG) tablet 3.125 mg, BID WC  dilTIAZem (CARDIZEM CD) extended release capsule 120 mg, Daily  gabapentin (NEURONTIN) capsule 600 mg, 4x Daily  isosorbide mononitrate (IMDUR) extended release tablet 120 mg, Daily  nortriptyline (PAMELOR) capsule 75 mg, Nightly  ranolazine (RANEXA) extended release tablet 1,000 mg, BID  topiramate (TOPAMAX) tablet 100 mg, QAM  topiramate (TOPAMAX) tablet 150 mg, QPM  trospium (SANCTURA) tablet 20 mg, Nightly  lidocaine PF 1 % injection 5 mL, Once  sodium chloride flush 0.9 % injection 5-40 mL, 2  times per day  sodium chloride flush 0.9 % injection 5-40 mL, PRN  0.9 % sodium chloride infusion, PRN  tiotropium (SPIRIVA RESPIMAT) 2.5 MCG/ACT inhaler 2 puff, Daily RT  ARIPiprazole (ABILIFY) tablet 2 mg, Nightly  mometasone-formoterol (DULERA) 100-5 MCG/ACT inhaler 2 puff, BID RT  sodium chloride flush 0.9 % injection 5-40 mL, 2 times per day  sodium chloride flush 0.9 % injection 5-40 mL, PRN  0.9 % sodium chloride infusion, PRN  magnesium sulfate 2000 mg in 50 mL IVPB premix, PRN  ondansetron (ZOFRAN-ODT) disintegrating tablet 4 mg, Q8H PRN   Or  ondansetron (ZOFRAN) injection 4 mg, Q6H PRN  polyethylene glycol (GLYCOLAX) packet 17 g, Daily PRN  acetaminophen (TYLENOL) tablet 650 mg, Q6H PRN   Or  acetaminophen (TYLENOL) suppository 650 mg, Q6H PRN  ipratropium 0.5 mg-albuterol 2.5 mg (DUONEB) nebulizer solution 1 Dose, Q6H PRN        Objective:  /71   Pulse 87   Temp 97 °F (36.1 °C) (Oral)   Resp 16   Ht 1.575 m (5' 2\")   Wt 90.3 kg (199 lb 1.6 oz)   SpO2 93%   BMI 36.42 kg/m²     Intake/Output Summary (Last 24 hours) at 4/20/2024 1429  Last data filed at 4/20/2024 0400  Gross per 24 hour   Intake 5430.8 ml   Output 2945 ml   Net 2485.8 ml        Wt Readings from Last 3 Encounters:   04/20/24 90.3 kg (199 lb 1.6 oz)   04/10/24 92.2 kg (203 lb 3.2 oz)   03/06/24 95.7 kg (210 lb 14.4 oz)       General appearance:  Appears comfortable,  alert and pleasant   Eyes: Sclera clear. Pupils equal.  ENT: Moist oral mucosa. Trachea midline, no adenopathy. NG tube in place  Cardiovascular: Regular rhythm, normal S1, S2. No murmur. No edema in lower extremities  Respiratory: Not using accessory muscles. Good inspiratory effort. Clear to auscultation bilaterally, no wheeze or crackles.   GI: wound vac in place, G tube to gravity in place.  Musculoskeletal: No cyanosis in digits, neck supple  Neurology: CN 2-12 grossly intact. No speech or motor deficits  Psych: Normal affect. Alert and oriented in time, place

## 2024-04-20 NOTE — PROGRESS NOTES
Clinical Pharmacy Note    Pharmacy consulted by Dr. Greene to manage TPN    Current TPN rate: 83ml/hr  Goal TPN rate: 83 ml/hr    Access: PICC double lumen  Indication: SBO    Labs:  General Labs:  BMP:    Lab Results   Component Value Date/Time     04/19/2024 06:45 AM    K 3.3 04/19/2024 06:45 AM    K 4.1 04/15/2024 05:00 AM     04/19/2024 06:45 AM    CO2 21 04/19/2024 06:45 AM    BUN 13 04/19/2024 06:45 AM    CREATININE <0.5 04/19/2024 06:45 AM    CALCIUM 8.1 04/19/2024 06:45 AM    GFRAA >60 11/24/2020 06:29 PM    GFRAA >60 01/13/2013 05:24 AM    LABGLOM >90 04/19/2024 06:45 AM    GLUCOSE 158 04/19/2024 06:45 AM       Electrolyte replacement as follows:   Replace magnesium with 2 g of magnesium sulfate administered IV over 2 hours    Blood sugars over past 24 hours: 185-210    Blood sugar management:  Plan to initiate Humalog q4 hour sliding scale at medium dose.    Plan to continue TPN at 83 ml/hr.    Thank you for allowing pharmacy to participate in the care of this patient.    Kathleen Chung, PharmD  PGY-1 Pharmacy Resident  [id number]

## 2024-04-20 NOTE — PLAN OF CARE
Problem: Discharge Planning  Goal: Discharge to home or other facility with appropriate resources  Outcome: Progressing     Problem: Safety - Adult  Goal: Free from fall injury  Outcome: Progressing     Problem: Pain  Goal: Verbalizes/displays adequate comfort level or baseline comfort level  Outcome: Progressing     Problem: Skin/Tissue Integrity  Goal: Absence of new skin breakdown  Description: 1.  Monitor for areas of redness and/or skin breakdown  2.  Assess vascular access sites hourly  3.  Every 4-6 hours minimum:  Change oxygen saturation probe site  4.  Every 4-6 hours:  If on nasal continuous positive airway pressure, respiratory therapy assess nares and determine need for appliance change or resting period.  Outcome: Progressing     Problem: Chronic Conditions and Co-morbidities  Goal: Patient's chronic conditions and co-morbidity symptoms are monitored and maintained or improved  Outcome: Progressing     Problem: ABCDS Injury Assessment  Goal: Absence of physical injury  Outcome: Progressing     Problem: Nutrition Deficit:  Goal: Optimize nutritional status  Outcome: Progressing     Problem: Respiratory - Adult  Goal: Achieves optimal ventilation and oxygenation  4/19/2024 2312 by Brandon rBody RN  Outcome: Progressing     Problem: Cardiovascular - Adult  Goal: Maintains optimal cardiac output and hemodynamic stability  4/19/2024 2312 by Brandon Brody RN  Outcome: Progressing  Goal: Absence of cardiac dysrhythmias or at baseline  4/19/2024 2312 by Brandon Brody RN  Outcome: Progressing     Problem: Skin/Tissue Integrity - Adult  Goal: Skin integrity remains intact  4/19/2024 2312 by Brandon Brody RN  Outcome: Progressing  Goal: Incisions, wounds, or drain sites healing without S/S of infection  4/19/2024 2312 by Brandon Brody RN  Outcome: Progressing     Problem: Musculoskeletal - Adult  Goal: Return mobility to safest level of function  4/19/2024 2312 by Brandon Brody RN  Outcome:  Progressing  Goal: Maintain proper alignment of affected body part  4/19/2024 2312 by Brandon Brody, RN  Outcome: Progressing     Problem: Gastrointestinal - Adult  Goal: Minimal or absence of nausea and vomiting  4/19/2024 2312 by Brandon Brody, RN  Outcome: Progressing  Goal: Maintains or returns to baseline bowel function  4/19/2024 2312 by Brandon Brody, RN  Outcome: Progressing     Problem: Genitourinary - Adult  Goal: Absence of urinary retention  4/19/2024 2312 by Brandon Brody, RN  Outcome: Progressing

## 2024-04-21 LAB
ANION GAP SERPL CALCULATED.3IONS-SCNC: 9 MMOL/L (ref 3–16)
BASOPHILS # BLD: 0 K/UL (ref 0–0.2)
BASOPHILS # BLD: ABNORMAL K/UL (ref 0–0.2)
BASOPHILS NFR BLD: 0.7 %
BASOPHILS NFR BLD: ABNORMAL %
BUN SERPL-MCNC: 12 MG/DL (ref 7–20)
CALCIUM SERPL-MCNC: 8.5 MG/DL (ref 8.3–10.6)
CHLORIDE SERPL-SCNC: 104 MMOL/L (ref 99–110)
CO2 SERPL-SCNC: 22 MMOL/L (ref 21–32)
CREAT SERPL-MCNC: <0.5 MG/DL (ref 0.6–1.2)
DEPRECATED RDW RBC AUTO: 16.4 % (ref 12.4–15.4)
DEPRECATED RDW RBC AUTO: ABNORMAL % (ref 12.4–15.4)
EOSINOPHIL # BLD: 0.5 K/UL (ref 0–0.6)
EOSINOPHIL # BLD: ABNORMAL K/UL (ref 0–0.6)
EOSINOPHIL NFR BLD: 7.3 %
EOSINOPHIL NFR BLD: ABNORMAL %
GFR SERPLBLD CREATININE-BSD FMLA CKD-EPI: >90 ML/MIN/{1.73_M2}
GLUCOSE BLD-MCNC: 112 MG/DL (ref 70–99)
GLUCOSE BLD-MCNC: 123 MG/DL (ref 70–99)
GLUCOSE BLD-MCNC: 132 MG/DL (ref 70–99)
GLUCOSE BLD-MCNC: 151 MG/DL (ref 70–99)
GLUCOSE BLD-MCNC: 160 MG/DL (ref 70–99)
GLUCOSE SERPL-MCNC: 184 MG/DL (ref 70–99)
HCT VFR BLD AUTO: 27.3 % (ref 36–48)
HCT VFR BLD AUTO: ABNORMAL % (ref 36–48)
HGB BLD-MCNC: 9 G/DL (ref 12–16)
HGB BLD-MCNC: ABNORMAL G/DL (ref 12–16)
LYMPHOCYTES # BLD: 1 K/UL (ref 1–5.1)
LYMPHOCYTES # BLD: ABNORMAL K/UL (ref 1–5.1)
LYMPHOCYTES NFR BLD: 16 %
LYMPHOCYTES NFR BLD: ABNORMAL %
MAGNESIUM SERPL-MCNC: 1.6 MG/DL (ref 1.8–2.4)
MCH RBC QN AUTO: 28.5 PG (ref 26–34)
MCH RBC QN AUTO: ABNORMAL PG (ref 26–34)
MCHC RBC AUTO-ENTMCNC: 33 G/DL (ref 31–36)
MCHC RBC AUTO-ENTMCNC: ABNORMAL G/DL (ref 31–36)
MCV RBC AUTO: 86.6 FL (ref 80–100)
MCV RBC AUTO: ABNORMAL FL (ref 80–100)
MONOCYTES # BLD: 0.6 K/UL (ref 0–1.3)
MONOCYTES # BLD: ABNORMAL K/UL (ref 0–1.3)
MONOCYTES NFR BLD: 9.5 %
MONOCYTES NFR BLD: ABNORMAL %
NEUTROPHILS # BLD: 4.3 K/UL (ref 1.7–7.7)
NEUTROPHILS # BLD: ABNORMAL K/UL (ref 1.7–7.7)
NEUTROPHILS NFR BLD: 66.5 %
NEUTROPHILS NFR BLD: ABNORMAL %
PERFORMED ON: ABNORMAL
PHOSPHATE SERPL-MCNC: 3.8 MG/DL (ref 2.5–4.9)
PLATELET # BLD AUTO: 276 K/UL (ref 135–450)
PLATELET # BLD AUTO: ABNORMAL K/UL (ref 135–450)
PMV BLD AUTO: 8.4 FL (ref 5–10.5)
PMV BLD AUTO: ABNORMAL FL (ref 5–10.5)
POTASSIUM SERPL-SCNC: 3.6 MMOL/L (ref 3.5–5.1)
PROCALCITONIN SERPL IA-MCNC: 0.09 NG/ML (ref 0–0.15)
RBC # BLD AUTO: 3.15 M/UL (ref 4–5.2)
RBC # BLD AUTO: ABNORMAL M/UL (ref 4–5.2)
REASON FOR REJECTION: NORMAL
REJECTED TEST: NORMAL
SODIUM SERPL-SCNC: 135 MMOL/L (ref 136–145)
WBC # BLD AUTO: 6.4 K/UL (ref 4–11)
WBC # BLD AUTO: ABNORMAL K/UL (ref 4–11)

## 2024-04-21 PROCEDURE — APPSS15 APP SPLIT SHARED TIME 0-15 MINUTES: Performed by: NURSE PRACTITIONER

## 2024-04-21 PROCEDURE — 99024 POSTOP FOLLOW-UP VISIT: CPT | Performed by: SURGERY

## 2024-04-21 PROCEDURE — 36415 COLL VENOUS BLD VENIPUNCTURE: CPT

## 2024-04-21 PROCEDURE — 94761 N-INVAS EAR/PLS OXIMETRY MLT: CPT

## 2024-04-21 PROCEDURE — 80048 BASIC METABOLIC PNL TOTAL CA: CPT

## 2024-04-21 PROCEDURE — 83735 ASSAY OF MAGNESIUM: CPT

## 2024-04-21 PROCEDURE — 6370000000 HC RX 637 (ALT 250 FOR IP): Performed by: SURGERY

## 2024-04-21 PROCEDURE — 84145 PROCALCITONIN (PCT): CPT

## 2024-04-21 PROCEDURE — 2580000003 HC RX 258: Performed by: PHYSICIAN ASSISTANT

## 2024-04-21 PROCEDURE — 84100 ASSAY OF PHOSPHORUS: CPT

## 2024-04-21 PROCEDURE — APPNB30 APP NON BILLABLE TIME 0-30 MINS: Performed by: NURSE PRACTITIONER

## 2024-04-21 PROCEDURE — 94640 AIRWAY INHALATION TREATMENT: CPT

## 2024-04-21 PROCEDURE — 6360000002 HC RX W HCPCS: Performed by: SURGERY

## 2024-04-21 PROCEDURE — 2580000003 HC RX 258: Performed by: SURGERY

## 2024-04-21 PROCEDURE — 85025 COMPLETE CBC W/AUTO DIFF WBC: CPT

## 2024-04-21 PROCEDURE — 1200000000 HC SEMI PRIVATE

## 2024-04-21 PROCEDURE — C9113 INJ PANTOPRAZOLE SODIUM, VIA: HCPCS | Performed by: SURGERY

## 2024-04-21 PROCEDURE — 6360000002 HC RX W HCPCS: Performed by: PHYSICIAN ASSISTANT

## 2024-04-21 PROCEDURE — 2500000003 HC RX 250 WO HCPCS: Performed by: SURGERY

## 2024-04-21 RX ORDER — MAGNESIUM SULFATE IN WATER 40 MG/ML
2000 INJECTION, SOLUTION INTRAVENOUS ONCE
Status: COMPLETED | OUTPATIENT
Start: 2024-04-21 | End: 2024-04-21

## 2024-04-21 RX ADMIN — SODIUM CHLORIDE: 9 INJECTION, SOLUTION INTRAVENOUS at 18:06

## 2024-04-21 RX ADMIN — GABAPENTIN 600 MG: 300 CAPSULE ORAL at 21:12

## 2024-04-21 RX ADMIN — ARIPIPRAZOLE 2 MG: 2 TABLET ORAL at 21:11

## 2024-04-21 RX ADMIN — HYDROMORPHONE HYDROCHLORIDE 1 MG: 1 INJECTION, SOLUTION INTRAMUSCULAR; INTRAVENOUS; SUBCUTANEOUS at 21:57

## 2024-04-21 RX ADMIN — RANOLAZINE 1000 MG: 500 TABLET, EXTENDED RELEASE ORAL at 21:12

## 2024-04-21 RX ADMIN — ENOXAPARIN SODIUM 40 MG: 100 INJECTION SUBCUTANEOUS at 09:46

## 2024-04-21 RX ADMIN — Medication 2 PUFF: at 10:03

## 2024-04-21 RX ADMIN — GABAPENTIN 600 MG: 300 CAPSULE ORAL at 09:45

## 2024-04-21 RX ADMIN — TOPIRAMATE 150 MG: 100 TABLET, FILM COATED ORAL at 18:14

## 2024-04-21 RX ADMIN — SODIUM CHLORIDE, PRESERVATIVE FREE 10 ML: 5 INJECTION INTRAVENOUS at 09:46

## 2024-04-21 RX ADMIN — GABAPENTIN 600 MG: 300 CAPSULE ORAL at 18:14

## 2024-04-21 RX ADMIN — DILTIAZEM HYDROCHLORIDE 120 MG: 120 CAPSULE, EXTENDED RELEASE ORAL at 09:45

## 2024-04-21 RX ADMIN — HYDROMORPHONE HYDROCHLORIDE 1 MG: 1 INJECTION, SOLUTION INTRAMUSCULAR; INTRAVENOUS; SUBCUTANEOUS at 02:55

## 2024-04-21 RX ADMIN — TROSPIUM CHLORIDE 20 MG: 20 TABLET, FILM COATED ORAL at 21:11

## 2024-04-21 RX ADMIN — RANOLAZINE 1000 MG: 500 TABLET, EXTENDED RELEASE ORAL at 09:45

## 2024-04-21 RX ADMIN — NORTRIPTYLINE HYDROCHLORIDE 75 MG: 25 CAPSULE ORAL at 21:12

## 2024-04-21 RX ADMIN — CARVEDILOL 3.12 MG: 3.12 TABLET, FILM COATED ORAL at 18:14

## 2024-04-21 RX ADMIN — PANTOPRAZOLE SODIUM 40 MG: 40 INJECTION, POWDER, FOR SOLUTION INTRAVENOUS at 09:46

## 2024-04-21 RX ADMIN — GABAPENTIN 600 MG: 300 CAPSULE ORAL at 13:11

## 2024-04-21 RX ADMIN — BUPROPION HYDROCHLORIDE 300 MG: 150 TABLET, EXTENDED RELEASE ORAL at 09:45

## 2024-04-21 RX ADMIN — HYDROMORPHONE HYDROCHLORIDE 1 MG: 1 INJECTION, SOLUTION INTRAMUSCULAR; INTRAVENOUS; SUBCUTANEOUS at 10:37

## 2024-04-21 RX ADMIN — ISOSORBIDE MONONITRATE 120 MG: 30 TABLET, EXTENDED RELEASE ORAL at 09:45

## 2024-04-21 RX ADMIN — CARVEDILOL 3.12 MG: 3.12 TABLET, FILM COATED ORAL at 09:45

## 2024-04-21 RX ADMIN — LEUCINE, PHENYLALANINE, LYSINE, METHIONINE, ISOLEUCINE, VALINE, HISTIDINE, THREONINE, TRYPTOPHAN, ALANINE, GLYCINE, ARGININE, PROLINE, SERINE, TYROSINE, SODIUM ACETATE, DIBASIC POTASSIUM PHOSPHATE, MAGNESIUM CHLORIDE, SODIUM CHLORIDE, CALCIUM CHLORIDE, DEXTROSE
365; 280; 290; 200; 300; 290; 240; 210; 90; 1035; 515; 575; 340; 250; 20; 340; 261; 51; 59; 33; 20 INJECTION INTRAVENOUS at 18:14

## 2024-04-21 RX ADMIN — MAGNESIUM SULFATE HEPTAHYDRATE 2000 MG: 40 INJECTION, SOLUTION INTRAVENOUS at 09:52

## 2024-04-21 RX ADMIN — Medication 2 PUFF: at 21:08

## 2024-04-21 RX ADMIN — HYDROMORPHONE HYDROCHLORIDE 1 MG: 1 INJECTION, SOLUTION INTRAMUSCULAR; INTRAVENOUS; SUBCUTANEOUS at 18:07

## 2024-04-21 RX ADMIN — TOPIRAMATE 100 MG: 100 TABLET, FILM COATED ORAL at 09:46

## 2024-04-21 RX ADMIN — TIOTROPIUM BROMIDE INHALATION SPRAY 2 PUFF: 3.12 SPRAY, METERED RESPIRATORY (INHALATION) at 10:03

## 2024-04-21 ASSESSMENT — PAIN DESCRIPTION - ORIENTATION
ORIENTATION: MID

## 2024-04-21 ASSESSMENT — PAIN DESCRIPTION - DESCRIPTORS
DESCRIPTORS: ACHING;DISCOMFORT
DESCRIPTORS: ACHING
DESCRIPTORS: ACHING;DISCOMFORT
DESCRIPTORS: ACHING;DISCOMFORT

## 2024-04-21 ASSESSMENT — PAIN DESCRIPTION - LOCATION
LOCATION: ABDOMEN

## 2024-04-21 ASSESSMENT — PAIN SCALES - GENERAL
PAINLEVEL_OUTOF10: 7
PAINLEVEL_OUTOF10: 9
PAINLEVEL_OUTOF10: 8
PAINLEVEL_OUTOF10: 3
PAINLEVEL_OUTOF10: 7
PAINLEVEL_OUTOF10: 2

## 2024-04-21 NOTE — PROGRESS NOTES
Clinical Pharmacy Note    Pharmacy consulted by Dr. Greene to manage TPN    Current TPN rate: 83ml/hr  Goal TPN rate: 83 ml/hr    Access: PICC double lumen  Indication: SBO    Labs:  General Labs:  BMP:    Lab Results   Component Value Date/Time     04/21/2024 06:23 AM    K 3.6 04/21/2024 06:23 AM    K 4.1 04/15/2024 05:00 AM     04/21/2024 06:23 AM    CO2 22 04/21/2024 06:23 AM    BUN 12 04/21/2024 06:23 AM    CREATININE <0.5 04/21/2024 06:23 AM    CALCIUM 8.5 04/21/2024 06:23 AM    GFRAA >60 11/24/2020 06:29 PM    GFRAA >60 01/13/2013 05:24 AM    LABGLOM >90 04/21/2024 06:23 AM    GLUCOSE 184 04/21/2024 06:23 AM       Electrolyte replacement as follows:   Replace magnesium with 2 g of magnesium sulfate administered IV over 2 hours    Blood sugars over past 24 hours: 123-151    Blood sugar management:  Plan to initiate Humalog q4 hour sliding scale at medium dose.    Plan to continue TPN at 83 ml/hr.    Thank you for allowing pharmacy to participate in the care of this patient.    Kathleen Chung, PharmD  PGY-1 Pharmacy Resident  [id number]

## 2024-04-21 NOTE — PROGRESS NOTES
[facility]    4/21/2024 12:01 PM        Name: Ana Gonzales .              Admitted: 4/12/2024  Primary Care Provider: Mellisa Houston MD (Tel: [phone])      Subjective:  .    Seen this am.  at bedside. Pain controlled. No flatus or BM today. Sat up in chair for 3 hours yesterday and walked a little around the room. Has coughed up some dark phlegm yesterday-one time. Nothing since then. No sob or fevers. NG removed today.    Reviewed interval ancillary notes    Current Medications  PN-Adult Premix 5/20 - Standard Electrolytes - Central Line, Continuous TPN  PN-Adult Premix 5/20 - Standard Electrolytes - Central Line, Continuous TPN  insulin lispro (HUMALOG) injection vial 0-8 Units, Q4H  0.9 % sodium chloride infusion, Continuous  dextrose bolus 10% 125 mL, PRN   Or  dextrose bolus 10% 250 mL, PRN  glucagon injection 1 mg, PRN  dextrose 10 % infusion, Continuous PRN  enoxaparin (LOVENOX) injection 40 mg, Daily  pantoprazole (PROTONIX) injection 40 mg, Daily  HYDROmorphone HCl PF (DILAUDID) injection 1 mg, Q2H PRN  hydrALAZINE (APRESOLINE) injection 10 mg, Q6H PRN  lidocaine PF 1 % injection 5 mL, Once  sodium chloride flush 0.9 % injection 5-40 mL, 2 times per day  sodium chloride flush 0.9 % injection 5-40 mL, PRN  0.9 % sodium chloride infusion, PRN  fat emulsion (INTRALIPID/NUTRILIPID) 20 % infusion 250 mL, Once per day on Mon Thu  buPROPion (WELLBUTRIN XL) extended release tablet 300 mg, QAM  carvedilol (COREG) tablet 3.125 mg, BID WC  dilTIAZem (CARDIZEM CD) extended release capsule 120 mg, Daily  gabapentin (NEURONTIN) capsule 600 mg, 4x Daily  isosorbide mononitrate (IMDUR) extended release tablet 120 mg, Daily  nortriptyline (PAMELOR) capsule 75 mg, Nightly  ranolazine (RANEXA) extended release tablet 1,000 mg, BID  topiramate (TOPAMAX) tablet 100 mg, QAM  topiramate (TOPAMAX) tablet 150 mg, QPM  trospium (SANCTURA) tablet 20 mg, Nightly  lidocaine

## 2024-04-21 NOTE — PLAN OF CARE
Problem: Discharge Planning  Goal: Discharge to home or other facility with appropriate resources  Outcome: Progressing     Problem: Safety - Adult  Goal: Free from fall injury  Outcome: Progressing     Problem: Pain  Goal: Verbalizes/displays adequate comfort level or baseline comfort level  Outcome: Progressing     Problem: Skin/Tissue Integrity  Goal: Absence of new skin breakdown  Description: 1.  Monitor for areas of redness and/or skin breakdown  2.  Assess vascular access sites hourly  3.  Every 4-6 hours minimum:  Change oxygen saturation probe site  4.  Every 4-6 hours:  If on nasal continuous positive airway pressure, respiratory therapy assess nares and determine need for appliance change or resting period.  Outcome: Progressing     Problem: Chronic Conditions and Co-morbidities  Goal: Patient's chronic conditions and co-morbidity symptoms are monitored and maintained or improved  Outcome: Progressing     Problem: ABCDS Injury Assessment  Goal: Absence of physical injury  Outcome: Progressing     Problem: Nutrition Deficit:  Goal: Optimize nutritional status  Outcome: Progressing     Problem: Respiratory - Adult  Goal: Achieves optimal ventilation and oxygenation  Outcome: Progressing     Problem: Cardiovascular - Adult  Goal: Maintains optimal cardiac output and hemodynamic stability  Outcome: Progressing  Goal: Absence of cardiac dysrhythmias or at baseline  Outcome: Progressing     Problem: Skin/Tissue Integrity - Adult  Goal: Skin integrity remains intact  Outcome: Progressing  Goal: Incisions, wounds, or drain sites healing without S/S of infection  Outcome: Progressing     Problem: Musculoskeletal - Adult  Goal: Return mobility to safest level of function  Outcome: Progressing  Goal: Maintain proper alignment of affected body part  Outcome: Progressing     Problem: Gastrointestinal - Adult  Goal: Minimal or absence of nausea and vomiting  Outcome: Progressing  Goal: Maintains or returns to

## 2024-04-21 NOTE — PROGRESS NOTES
[facility]        Progress Note    Patient Name: Ana Gonzales  MRN: [id number]  YOB: 1955  Date of Evaluation: 2024    Subjective:  No acute events overnight  Pain controlled  No nausea or vomiting with NGT clamped, G-tube to gravity drainage with high output  Denies flatus or stool  Resting in bed at this time    Post-Operative Day #33, 4      Vital Signs:  Patient Vitals for the past 24 hrs:   BP Temp Temp src Pulse Resp SpO2 Weight   24 0940 133/68 97.9 °F (36.6 °C) Oral 84 16 96 % --   24 0559 -- -- -- -- -- -- 90.7 kg (200 lb)   24 0523 120/75 97.6 °F (36.4 °C) Oral 81 16 94 % --   24 0325 -- -- -- -- 18 -- --   24 0158 111/68 98.2 °F (36.8 °C) Oral 85 18 94 % --   240 -- -- -- -- 18 -- --   24 2149 -- -- -- -- -- 94 % --   248 -- -- -- 83 16 94 % --   240 -- -- -- -- 18 -- --   24 2000 128/71 97.7 °F (36.5 °C) Oral 86 16 94 % --   24 1759 132/87 -- -- -- -- -- --   24 1312 111/71 97 °F (36.1 °C) Oral 87 16 93 % --        TEMPERATURE HISTORY 24H: Temp (24hrs), Av.7 °F (36.5 °C), Min:97 °F (36.1 °C), Max:98.2 °F (36.8 °C)    BLOOD PRESSURE HISTORY: Systolic (36hrs), Av , Min:111 , Max:145    Diastolic (36hrs), Av, Min:61, Max:87      Intake/Output:  I/O last 3 completed shifts:  In: 150 [P.O.:150]  Out: 3470 [Urine:1400; Emesis/NG output:]  I/O this shift:  In: -   Out: 900 [Urine:900]  Drain/tube Output:  Negative Pressure Wound Therapy Abdomen Anterior;Mid-Output (ml): 50 ml    Physical Exam:  General: awake, alert, oriented to person, place, time  Lungs: unlabored respirations  Abdomen: soft, non-distended, non-tender, bowel sounds hypoactive, G-tube to gravity drainage--bilious  Skin/Wound: open midline abdominal wound bed is clean--wound VAC changed, seal/suction intact, see image below:      Labs:  CBC:    Recent Labs     24  1147  admission  Diabetes      Plan:  1. Pain controlled, incisional tenderness only on exam, wound bed is clean, no nausea or vomiting with NGT clamped, G-tube to gravity drainage with high output, denies flatus or stool, vitals/labs stable; continued supportive care, local wound care--continue wound VAC, will be due again 2 days from now, on Tuesday 4/23/2024  2. Remove NGT, continue NPO and G-tube to gravity drainage, okay to have ice/popsicles; monitor bowel function  3. TPN; monitor and correct electrolytes  4. Activity as tolerated, ambulate TID--PT/OT following  5. Pulmonary toilet, incentive spirometry  6. PRN analgesics and antiemetics--minimizing narcotics as tolerated  7. DVT prophylaxis with  Lovenox and SCD's; continue to hold Plavix  8. Management of medical comorbid etiologies per primary team and consulting services    EDUCATION:  Educated patient on plan of care and disease process--all questions answered.    Plans discussed with patient and nursing.  Reviewed and discussed with Dr. Morales.      Signed:  Aria Carter, APRN - CNP  4/21/2024 12:40 PM    No bowel function yet  She tube giving good gastric decompression  Remove NG tube  Wound VAC changed  Continue TPN  Continue PT/OT

## 2024-04-21 NOTE — PLAN OF CARE
Problem: Discharge Planning  Goal: Discharge to home or other facility with appropriate resources  4/21/2024 1155 by Nuha Mullins RN  Outcome: Progressing  4/20/2024 2240 by Brandon Brody RN  Outcome: Progressing     Problem: Safety - Adult  Goal: Free from fall injury  4/21/2024 1155 by Nuha Mullins RN  Outcome: Progressing  4/20/2024 2240 by Brandon Brody RN  Outcome: Progressing     Problem: Pain  Goal: Verbalizes/displays adequate comfort level or baseline comfort level  4/21/2024 1155 by Nuha Mullins RN  Outcome: Progressing  4/20/2024 2240 by Brandon Brody RN  Outcome: Progressing     Problem: Skin/Tissue Integrity  Goal: Absence of new skin breakdown  Description: 1.  Monitor for areas of redness and/or skin breakdown  2.  Assess vascular access sites hourly  3.  Every 4-6 hours minimum:  Change oxygen saturation probe site  4.  Every 4-6 hours:  If on nasal continuous positive airway pressure, respiratory therapy assess nares and determine need for appliance change or resting period.  4/21/2024 1155 by Nuha Mullins RN  Outcome: Progressing  4/20/2024 2240 by Brandon Brody RN  Outcome: Progressing     Problem: Chronic Conditions and Co-morbidities  Goal: Patient's chronic conditions and co-morbidity symptoms are monitored and maintained or improved  4/21/2024 1155 by Nuha Mullins RN  Outcome: Progressing  4/20/2024 2240 by Brandon Brody RN  Outcome: Progressing     Problem: ABCDS Injury Assessment  Goal: Absence of physical injury  4/21/2024 1155 by Nuha Mullins RN  Outcome: Progressing  4/20/2024 2240 by Brandon Brody RN  Outcome: Progressing     Problem: Nutrition Deficit:  Goal: Optimize nutritional status  4/21/2024 1155 by Nuha Mullins RN  Outcome: Progressing  4/20/2024 2240 by Brandon Brody RN  Outcome: Progressing     Problem: Respiratory - Adult  Goal: Achieves optimal ventilation and oxygenation  4/21/2024 1155 by Nuha Mullins

## 2024-04-22 LAB
ALBUMIN SERPL-MCNC: 2.6 G/DL (ref 3.4–5)
ALP SERPL-CCNC: 78 U/L (ref 40–129)
ALT SERPL-CCNC: <5 U/L (ref 10–40)
ANION GAP SERPL CALCULATED.3IONS-SCNC: 13 MMOL/L (ref 3–16)
AST SERPL-CCNC: 13 U/L (ref 15–37)
BASOPHILS # BLD: 0 K/UL (ref 0–0.2)
BASOPHILS NFR BLD: 0.6 %
BILIRUB DIRECT SERPL-MCNC: <0.2 MG/DL (ref 0–0.3)
BILIRUB INDIRECT SERPL-MCNC: ABNORMAL MG/DL (ref 0–1)
BILIRUB SERPL-MCNC: <0.2 MG/DL (ref 0–1)
BUN SERPL-MCNC: 12 MG/DL (ref 7–20)
CALCIUM SERPL-MCNC: 8.6 MG/DL (ref 8.3–10.6)
CHLORIDE SERPL-SCNC: 102 MMOL/L (ref 99–110)
CO2 SERPL-SCNC: 20 MMOL/L (ref 21–32)
CREAT SERPL-MCNC: <0.5 MG/DL (ref 0.6–1.2)
DEPRECATED RDW RBC AUTO: 16.7 % (ref 12.4–15.4)
EOSINOPHIL # BLD: 0.4 K/UL (ref 0–0.6)
EOSINOPHIL NFR BLD: 7.6 %
GFR SERPLBLD CREATININE-BSD FMLA CKD-EPI: >90 ML/MIN/{1.73_M2}
GLUCOSE BLD-MCNC: 122 MG/DL (ref 70–99)
GLUCOSE BLD-MCNC: 138 MG/DL (ref 70–99)
GLUCOSE BLD-MCNC: 148 MG/DL (ref 70–99)
GLUCOSE BLD-MCNC: 155 MG/DL (ref 70–99)
GLUCOSE BLD-MCNC: 157 MG/DL (ref 70–99)
GLUCOSE SERPL-MCNC: 131 MG/DL (ref 70–99)
HCT VFR BLD AUTO: 26.8 % (ref 36–48)
HGB BLD-MCNC: 8.8 G/DL (ref 12–16)
LYMPHOCYTES # BLD: 1 K/UL (ref 1–5.1)
LYMPHOCYTES NFR BLD: 18.8 %
MCH RBC QN AUTO: 28.2 PG (ref 26–34)
MCHC RBC AUTO-ENTMCNC: 32.7 G/DL (ref 31–36)
MCV RBC AUTO: 86.1 FL (ref 80–100)
MONOCYTES # BLD: 0.6 K/UL (ref 0–1.3)
MONOCYTES NFR BLD: 11.3 %
NEUTROPHILS # BLD: 3.3 K/UL (ref 1.7–7.7)
NEUTROPHILS NFR BLD: 61.7 %
PERFORMED ON: ABNORMAL
PLATELET # BLD AUTO: 272 K/UL (ref 135–450)
PMV BLD AUTO: 7.9 FL (ref 5–10.5)
POTASSIUM SERPL-SCNC: 3.8 MMOL/L (ref 3.5–5.1)
PROT SERPL-MCNC: 5.4 G/DL (ref 6.4–8.2)
RBC # BLD AUTO: 3.11 M/UL (ref 4–5.2)
SODIUM SERPL-SCNC: 135 MMOL/L (ref 136–145)
WBC # BLD AUTO: 5.4 K/UL (ref 4–11)

## 2024-04-22 PROCEDURE — C9113 INJ PANTOPRAZOLE SODIUM, VIA: HCPCS | Performed by: SURGERY

## 2024-04-22 PROCEDURE — APPSS15 APP SPLIT SHARED TIME 0-15 MINUTES: Performed by: NURSE PRACTITIONER

## 2024-04-22 PROCEDURE — 97535 SELF CARE MNGMENT TRAINING: CPT

## 2024-04-22 PROCEDURE — 97110 THERAPEUTIC EXERCISES: CPT

## 2024-04-22 PROCEDURE — 97530 THERAPEUTIC ACTIVITIES: CPT

## 2024-04-22 PROCEDURE — 97116 GAIT TRAINING THERAPY: CPT

## 2024-04-22 PROCEDURE — 99024 POSTOP FOLLOW-UP VISIT: CPT | Performed by: SURGERY

## 2024-04-22 PROCEDURE — 80048 BASIC METABOLIC PNL TOTAL CA: CPT

## 2024-04-22 PROCEDURE — 94761 N-INVAS EAR/PLS OXIMETRY MLT: CPT

## 2024-04-22 PROCEDURE — APPNB30 APP NON BILLABLE TIME 0-30 MINS: Performed by: NURSE PRACTITIONER

## 2024-04-22 PROCEDURE — 6370000000 HC RX 637 (ALT 250 FOR IP): Performed by: SURGERY

## 2024-04-22 PROCEDURE — 2500000003 HC RX 250 WO HCPCS: Performed by: SURGERY

## 2024-04-22 PROCEDURE — 2500000003 HC RX 250 WO HCPCS: Performed by: INTERNAL MEDICINE

## 2024-04-22 PROCEDURE — 94640 AIRWAY INHALATION TREATMENT: CPT

## 2024-04-22 PROCEDURE — 6370000000 HC RX 637 (ALT 250 FOR IP): Performed by: NURSE PRACTITIONER

## 2024-04-22 PROCEDURE — 2580000003 HC RX 258: Performed by: PHYSICIAN ASSISTANT

## 2024-04-22 PROCEDURE — 6360000002 HC RX W HCPCS: Performed by: SURGERY

## 2024-04-22 PROCEDURE — 80076 HEPATIC FUNCTION PANEL: CPT

## 2024-04-22 PROCEDURE — 1200000000 HC SEMI PRIVATE

## 2024-04-22 PROCEDURE — 36415 COLL VENOUS BLD VENIPUNCTURE: CPT

## 2024-04-22 PROCEDURE — 85025 COMPLETE CBC W/AUTO DIFF WBC: CPT

## 2024-04-22 RX ORDER — BISACODYL 10 MG
10 SUPPOSITORY, RECTAL RECTAL ONCE
Status: COMPLETED | OUTPATIENT
Start: 2024-04-22 | End: 2024-04-22

## 2024-04-22 RX ADMIN — HYDROMORPHONE HYDROCHLORIDE 1 MG: 1 INJECTION, SOLUTION INTRAMUSCULAR; INTRAVENOUS; SUBCUTANEOUS at 21:21

## 2024-04-22 RX ADMIN — OLIVE OIL AND SOYBEAN OIL 250 ML: 16; 4 INJECTION, EMULSION INTRAVENOUS at 18:25

## 2024-04-22 RX ADMIN — PANTOPRAZOLE SODIUM 40 MG: 40 INJECTION, POWDER, FOR SOLUTION INTRAVENOUS at 08:22

## 2024-04-22 RX ADMIN — TROSPIUM CHLORIDE 20 MG: 20 TABLET, FILM COATED ORAL at 20:45

## 2024-04-22 RX ADMIN — DILTIAZEM HYDROCHLORIDE 120 MG: 120 CAPSULE, EXTENDED RELEASE ORAL at 08:22

## 2024-04-22 RX ADMIN — RANOLAZINE 1000 MG: 500 TABLET, EXTENDED RELEASE ORAL at 08:22

## 2024-04-22 RX ADMIN — RANOLAZINE 1000 MG: 500 TABLET, EXTENDED RELEASE ORAL at 20:45

## 2024-04-22 RX ADMIN — NORTRIPTYLINE HYDROCHLORIDE 75 MG: 25 CAPSULE ORAL at 20:46

## 2024-04-22 RX ADMIN — GABAPENTIN 600 MG: 300 CAPSULE ORAL at 08:22

## 2024-04-22 RX ADMIN — GABAPENTIN 600 MG: 300 CAPSULE ORAL at 20:46

## 2024-04-22 RX ADMIN — TOPIRAMATE 150 MG: 100 TABLET, FILM COATED ORAL at 18:26

## 2024-04-22 RX ADMIN — GABAPENTIN 600 MG: 300 CAPSULE ORAL at 18:26

## 2024-04-22 RX ADMIN — CARVEDILOL 3.12 MG: 3.12 TABLET, FILM COATED ORAL at 18:26

## 2024-04-22 RX ADMIN — HYDROMORPHONE HYDROCHLORIDE 1 MG: 1 INJECTION, SOLUTION INTRAMUSCULAR; INTRAVENOUS; SUBCUTANEOUS at 02:47

## 2024-04-22 RX ADMIN — HYDROMORPHONE HYDROCHLORIDE 1 MG: 1 INJECTION, SOLUTION INTRAMUSCULAR; INTRAVENOUS; SUBCUTANEOUS at 14:51

## 2024-04-22 RX ADMIN — Medication 2 PUFF: at 09:44

## 2024-04-22 RX ADMIN — CARVEDILOL 3.12 MG: 3.12 TABLET, FILM COATED ORAL at 08:22

## 2024-04-22 RX ADMIN — TOPIRAMATE 100 MG: 100 TABLET, FILM COATED ORAL at 08:22

## 2024-04-22 RX ADMIN — BUPROPION HYDROCHLORIDE 300 MG: 150 TABLET, EXTENDED RELEASE ORAL at 08:22

## 2024-04-22 RX ADMIN — SODIUM CHLORIDE: 9 INJECTION, SOLUTION INTRAVENOUS at 02:50

## 2024-04-22 RX ADMIN — ASCORBIC ACID, VITAMIN A PALMITATE, CHOLECALCIFEROL, THIAMINE HYDROCHLORIDE, RIBOFLAVIN-5 PHOSPHATE SODIUM, PYRIDOXINE HYDROCHLORIDE, NIACINAMIDE, DEXPANTHENOL, ALPHA-TOCOPHEROL ACETATE, VITAMIN K1, FOLIC ACID, BIOTIN, CYANOCOBALAMIN: 200; 3300; 200; 6; 3.6; 6; 40; 15; 10; 150; 600; 60; 5 INJECTION, SOLUTION INTRAVENOUS at 18:24

## 2024-04-22 RX ADMIN — HYDROMORPHONE HYDROCHLORIDE 1 MG: 1 INJECTION, SOLUTION INTRAMUSCULAR; INTRAVENOUS; SUBCUTANEOUS at 09:10

## 2024-04-22 RX ADMIN — GABAPENTIN 600 MG: 300 CAPSULE ORAL at 14:51

## 2024-04-22 RX ADMIN — ARIPIPRAZOLE 2 MG: 2 TABLET ORAL at 20:45

## 2024-04-22 RX ADMIN — ISOSORBIDE MONONITRATE 120 MG: 30 TABLET, EXTENDED RELEASE ORAL at 08:22

## 2024-04-22 RX ADMIN — BISACODYL 10 MG: 10 SUPPOSITORY RECTAL at 14:51

## 2024-04-22 RX ADMIN — ENOXAPARIN SODIUM 40 MG: 100 INJECTION SUBCUTANEOUS at 08:22

## 2024-04-22 ASSESSMENT — PAIN DESCRIPTION - DESCRIPTORS
DESCRIPTORS: ACHING
DESCRIPTORS: ACHING;SORE
DESCRIPTORS: ACHING

## 2024-04-22 ASSESSMENT — PAIN SCALES - GENERAL
PAINLEVEL_OUTOF10: 5
PAINLEVEL_OUTOF10: 6
PAINLEVEL_OUTOF10: 7
PAINLEVEL_OUTOF10: 6
PAINLEVEL_OUTOF10: 8
PAINLEVEL_OUTOF10: 9
PAINLEVEL_OUTOF10: 8

## 2024-04-22 ASSESSMENT — PAIN DESCRIPTION - LOCATION
LOCATION: ABDOMEN

## 2024-04-22 ASSESSMENT — PAIN SCALES - WONG BAKER: WONGBAKER_NUMERICALRESPONSE: NO HURT

## 2024-04-22 ASSESSMENT — PAIN DESCRIPTION - ORIENTATION
ORIENTATION: MID
ORIENTATION: RIGHT;MID
ORIENTATION: RIGHT;LEFT;MID

## 2024-04-22 NOTE — PROGRESS NOTES
Nutrition Note    RECOMMENDATIONS  PO Diet: NPO: advance a tolerated when medically appropriate  ONS: recommend Jean BID once diet advances to promote wound healing.  Nutrition Support: con't TPN @ goal rate of 83 ml/hr with 250 ml of 20% lipids biweekly.     ASSESSMENT   Nutrition status is stable as pt continues to toelrate TPN @ goal rate of 83 ml/hr.  NGT was removed yesterday.  No flatus or BM recorded as of yet (LBM 4/13).  Will con't to monitor for ability to advance diet & offer protein supplement for wound healing needs.       Malnutrition Status: Mild malnutrition  Acute Illness    NUTRITION DIAGNOSIS   In context of acute illness or injury, Other (Comment) (mild malnutrition) related to altered GI function as evidenced by NPO or clear liquid status due to medical condition, nutrition support - parenteral nutrition  Increased nutrient needs related to increase demand for energy/nutrients as evidenced by wounds (surgical)    Goals: Initiate PO diet, by next RD assessment     NUTRITION RELATED FINDINGS  Objective: Na 135, Mg 1.6; gluc 168  Wounds: Wound Vac, Surgical Incision    CURRENT NUTRITION THERAPIES  Diet NPO Exceptions are: Sips of Water with Meds, Ice Chips, Popsicles  PN-Adult Premix 5/20 - Standard Electrolytes - Central Line     PO Intake: NPO   PO Supplement Intake:NPO    Current Parenteral Nutrition Orders:  Type and Formula: Premix Central   Lipids: 250ml, Two times weekly  Duration: Continuous  Current PN Order Provides: as below  Goal PN Orders Provides: Clinimix 5/20 @ goal rate of 83 ml/hr provides 1992 ml; 1752 kcal, 100g pro & 3.09 dex load    ANTHROPOMETRICS  Current Height: 157.5 cm (5' 2\")  Current Weight - Scale: 90.8 kg (200 lb 3.2 oz)    Ideal Body Weight (IBW): 110 lbs  (50 kg)        BMI: 36.6      COMPARATIVE STANDARDS  Total Energy Requirements (kcals/day): 1379- 1931     Protein (g):  60- 100g       Fluid (mL/day):  1500- 1931    EDUCATION  Education not indicated     The

## 2024-04-22 NOTE — PROGRESS NOTES
[facility]    4/22/2024 11:57 AM        Name: Ana Gonzales .              Admitted: 4/12/2024  Primary Care Provider: Mellisa Houston MD (Tel: [phone])      Subjective:  .    Seen this am.  at bedside. Pain controlled. No flatus or BM today. She just walked the hallways and did some stairs with therapy. She is very weak no. Coughed up phlegm one more time.    Reviewed interval ancillary notes    Current Medications  PN-Adult Premix 5/20 - Standard Electrolytes - Central Line, Continuous TPN  insulin lispro (HUMALOG) injection vial 0-8 Units, Q4H  0.9 % sodium chloride infusion, Continuous  dextrose bolus 10% 125 mL, PRN   Or  dextrose bolus 10% 250 mL, PRN  glucagon injection 1 mg, PRN  dextrose 10 % infusion, Continuous PRN  enoxaparin (LOVENOX) injection 40 mg, Daily  pantoprazole (PROTONIX) injection 40 mg, Daily  HYDROmorphone HCl PF (DILAUDID) injection 1 mg, Q2H PRN  hydrALAZINE (APRESOLINE) injection 10 mg, Q6H PRN  lidocaine PF 1 % injection 5 mL, Once  sodium chloride flush 0.9 % injection 5-40 mL, 2 times per day  sodium chloride flush 0.9 % injection 5-40 mL, PRN  0.9 % sodium chloride infusion, PRN  fat emulsion (INTRALIPID/NUTRILIPID) 20 % infusion 250 mL, Once per day on Mon Thu  buPROPion (WELLBUTRIN XL) extended release tablet 300 mg, QAM  carvedilol (COREG) tablet 3.125 mg, BID WC  dilTIAZem (CARDIZEM CD) extended release capsule 120 mg, Daily  gabapentin (NEURONTIN) capsule 600 mg, 4x Daily  isosorbide mononitrate (IMDUR) extended release tablet 120 mg, Daily  nortriptyline (PAMELOR) capsule 75 mg, Nightly  ranolazine (RANEXA) extended release tablet 1,000 mg, BID  topiramate (TOPAMAX) tablet 100 mg, QAM  topiramate (TOPAMAX) tablet 150 mg, QPM  trospium (SANCTURA) tablet 20 mg, Nightly  lidocaine PF 1 % injection 5 mL, Once  sodium chloride flush 0.9 % injection 5-40 mL, 2 times per day  sodium chloride flush 0.9 % injection 5-40 mL,      04/21/24  0530 04/21/24  0623 04/22/24  0547   WBC NA* 6.4 5.4   HGB NA* 9.0* 8.8*   PLT NA* 276 272       BMP:    Recent Labs     04/20/24  0508 04/21/24  0623 04/22/24  0547    135* 135*   K 3.6 3.6 3.8    104 102   CO2 23 22 20*   BUN 14 12 12   CREATININE <0.5* <0.5* <0.5*   GLUCOSE 184* 184* 131*       Hepatic:   Recent Labs     04/22/24  0547   AST 13*   ALT <5*   BILITOT <0.2   ALKPHOS 78     URINE culture  50,000 CFU/ml Mixed pathogens  Multiple organisms isolated, no predominance. Culture  indicates probable contamination. Please review colony count  and clinical indications to determine if a repeat culture is  necessary. No further workup to be done.     CTPA:  MPRESSION:  1. No evidence of pulmonary embolism.  2. No evidence of acute airspace consolidation to suggest pneumonia.  3. Chronic parenchymal scarring within the right lung, as above.  4. The stomach and visualized small bowel loops are fluid-filled and  distended, suggestive of a developing small-bowel obstruction as seen on the  abdominal CT earlier today.  5. Coronary atherosclerosis.    CT head:  IMPRESSION:  No acute intracranial abnormality.    IMPRESSION:  CT abd pelvis   Gas and fluid-filled dilated stomach and small bowel with gradual transition  to normal caliber bowel distally at the level of the ileum.  Findings may  represent partial small bowel obstruction or less likely generalized ileus.    Problem List  Principal Problem:    Partial small bowel obstruction (HCC)  Active Problems:    Muscle ache    Mild persistent asthma without complication    Mild malnutrition (HCC)  Resolved Problems:    * No resolved hospital problems. *       Assessment & Plan:   SBO:  PO day 5 from exploratory lapartomy with BRANDON and placement of G tube. On TPN, ice chips. Discussed with surgery. Ng removed on Sunday. Continue maintenance fluids for now. Will refer for LTAC  Cough-resolved. CXR suggesting possible pneumonia. Procalcitonin

## 2024-04-22 NOTE — PROGRESS NOTES
Shift assessment completed, morning medication given per MAR. VSS, alert and oriented. Call light within reach. The care plan and education has been reviewed and mutually agreed upon with the patient.     Purwick removed, patient educated on the importance of ambulating to the bathroom and in the halls

## 2024-04-22 NOTE — CARE COORDINATION
Discharge Planning Note:    Update:    Hospitalist requested a referral placed with:    - [facility] - waiting on response    Will continue to follow.    DEYANIRA Escobar RN    [facility]  Phone: [phone]

## 2024-04-22 NOTE — PROGRESS NOTES
[facility] - Inpatient Rehabilitation Department   Phone: [phone]    Physical Therapy    [] Initial Evaluation            [x] Daily Treatment Note         [] Discharge Summary      Patient: Ana Gonzales   : 1955   MRN: [id number]   Date of Service:  2024  Admitting Diagnosis: Partial small bowel obstruction (HCC)    Current Admission Summary: Ana Gonzales is a 68 y.o. female who presents to the ED status post exploratory laparotomy with lysis of adhesions, Meckel diverticulectomy and appendectomy 3/19/2024 by Dr. Morales with complaint of generalized weakness and nausea/vomiting.  She states she is too weak to ambulate, which is new.  She states the nausea/vomiting began last night.  She is denying abdominal pain, although abdomen is tender to palpation on exam.  Patient reports an episode of shortness of breath earlier today, but is not feeling short of breath at this moment.  Lower extremity edema is noted and patient is unsure of her baseline.  Patient states her chief concerns are the generalized weakness/fatigue and nausea/vomiting.     Past Medical History:  has a past medical history of Arthritis, Asthma, Brain injury (HCC), Bronchitis, CHF (congestive heart failure) (HCC), Depression, Diabetes mellitus (HCC), DM (diabetes mellitus screen), Elevated cholesterol with high triglycerides, Fall, Fibromyalgia, GERD (gastroesophageal reflux disease), HIGH CHOLESTEROL, Hypertension, Microvascular angina (HCC), Migraine, Neuropathy, Panic attacks, PONV (postoperative nausea and vomiting), Post traumatic stress disorder, Short-term memory loss, Skin cancer, Sleep apnea, and Vertigo.  Past Surgical History:  has a past surgical history that includes Hysterectomy; eye surgery (cataract B); Carpal tunnel release; Finger trigger release; Gallbladder surgery; bladder suspension; Skin cancer excision; blepharoplasty; laryngoscopy (2017); Coronary stent placement (2023);

## 2024-04-22 NOTE — PLAN OF CARE
Problem: Discharge Planning  Goal: Discharge to home or other facility with appropriate resources  4/21/2024 1155 by Nuha Mullins RN  Outcome: Progressing     Problem: Safety - Adult  Goal: Free from fall injury  4/21/2024 1155 by Nuha Mullins RN  Outcome: Progressing     Problem: Pain  Goal: Verbalizes/displays adequate comfort level or baseline comfort level  4/21/2024 1155 by Nuha Mullins RN  Outcome: Progressing     Problem: Skin/Tissue Integrity  Goal: Absence of new skin breakdown  Description: 1.  Monitor for areas of redness and/or skin breakdown  2.  Assess vascular access sites hourly  3.  Every 4-6 hours minimum:  Change oxygen saturation probe site  4.  Every 4-6 hours:  If on nasal continuous positive airway pressure, respiratory therapy assess nares and determine need for appliance change or resting period.  4/21/2024 1155 by Nuha Mullins RN  Outcome: Progressing     Problem: Chronic Conditions and Co-morbidities  Goal: Patient's chronic conditions and co-morbidity symptoms are monitored and maintained or improved  4/21/2024 1155 by Nuha Mullins RN  Outcome: Progressing     Problem: Nutrition Deficit:  Goal: Optimize nutritional status  4/21/2024 1155 by Nuha Mullins RN  Outcome: Progressing     Problem: Respiratory - Adult  Goal: Achieves optimal ventilation and oxygenation  4/21/2024 1155 by Nuha Mullins RN  Outcome: Progressing     Problem: Cardiovascular - Adult  Goal: Maintains optimal cardiac output and hemodynamic stability  4/21/2024 1155 by Nuha Mullins RN  Outcome: Progressing  Goal: Absence of cardiac dysrhythmias or at baseline  4/21/2024 1155 by Nuha Mullins RN  Outcome: Progressing     Problem: Skin/Tissue Integrity - Adult  Goal: Skin integrity remains intact  4/21/2024 1155 by Nuha Mullins RN  Outcome: Progressing  Goal: Incisions, wounds, or drain sites healing without S/S of infection  4/21/2024 1155 by

## 2024-04-22 NOTE — PROGRESS NOTES
[facility]        Progress Note    Patient Name: Ana Gonzales  MRN: [id number]  YOB: 1955  Date of Evaluation: 2024    Subjective:  No acute events overnight  Pain controlled  No nausea or vomiting since NGT removed, G-tube to gravity drainage with high output  Denies flatus or stool  Up to chair at this time    Post-Operative Day #34, 5      Vital Signs:  Patient Vitals for the past 24 hrs:   BP Temp Temp src Pulse Resp SpO2 Weight   24 1451 -- -- -- -- 18 -- --   24 1220 114/67 97.1 °F (36.2 °C) Oral 88 16 95 % --   24 0944 -- -- -- 84 16 92 % --   24 0910 -- -- -- -- 18 -- --   24 0815 127/73 98.8 °F (37.1 °C) Oral 80 18 95 % --   24 0550 (!) 119/58 98.8 °F (37.1 °C) Oral 84 18 95 % 90.8 kg (200 lb 3.2 oz)   24 0422 (!) 119/58 -- -- -- -- -- 90.5 kg (199 lb 8.3 oz)   24 2330 114/72 97.7 °F (36.5 °C) Oral 83 16 96 % --   24 2111 -- -- -- 82 16 96 % --   24 2100 119/62 98.7 °F (37.1 °C) Oral 84 18 96 % --   24 1751 136/76 97.3 °F (36.3 °C) Oral 83 17 94 % --        TEMPERATURE HISTORY 24H: Temp (24hrs), Av.1 °F (36.7 °C), Min:97.1 °F (36.2 °C), Max:98.8 °F (37.1 °C)    BLOOD PRESSURE HISTORY: Systolic (36hrs), Av , Min:114 , Max:136    Diastolic (36hrs), Av, Min:58, Max:76      Intake/Output:  I/O last 3 completed shifts:  In: -   Out: 5050 [Urine:3700; Emesis/NG output:1350]  I/O this shift:  In: 120 [P.O.:120]  Out: 450 [Emesis/NG output:450]  Drain/tube Output:  Negative Pressure Wound Therapy Abdomen Anterior;Mid-Output (ml): 50 ml    Physical Exam:  General: awake, alert, oriented to person, place, time  Lungs: unlabored respirations  Abdomen: soft, non-distended, non-tender, bowel sounds hypoactive, G-tube to gravity drainage--bilious  Skin/Wound: open midline abdominal wound with wound VAC in place, seal/suction intact    Labs:  CBC:    Recent Labs     24  0530

## 2024-04-22 NOTE — PROGRESS NOTES
[facility] - Inpatient Rehabilitation Department   Phone: [phone]    Occupational Therapy    [] Initial Evaluation            [x] Daily Treatment Note         [] Discharge Summary      Patient: Ana Gonzales   : 1955   MRN: [id number]   Date of Service:  2024    Admitting Diagnosis:  Partial small bowel obstruction (HCC)  Current Admission Summary: Per H&P: \"Ana Gonzales is a 68 y.o. female with a pmh of upper GI bleed, SBO/persistent ileus, TIA, CHF, CAD who presents with SBO (small bowel obstruction)\"   Past Medical History:  has a past medical history of Arthritis, Asthma, Brain injury (HCC), Bronchitis, CHF (congestive heart failure) (HCC), Depression, Diabetes mellitus (HCC), DM (diabetes mellitus screen), Elevated cholesterol with high triglycerides, Fall, Fibromyalgia, GERD (gastroesophageal reflux disease), HIGH CHOLESTEROL, Hypertension, Microvascular angina (HCC), Migraine, Neuropathy, Panic attacks, PONV (postoperative nausea and vomiting), Post traumatic stress disorder, Short-term memory loss, Skin cancer, Sleep apnea, and Vertigo.  Past Surgical History:  has a past surgical history that includes Hysterectomy; eye surgery (cataract B); Carpal tunnel release; Finger trigger release; Gallbladder surgery; bladder suspension; Skin cancer excision; blepharoplasty; laryngoscopy (2017); Coronary stent placement (2023); joint replacement (Right); back surgery; Upper gastrointestinal endoscopy (N/A, 10/2/2023); Upper gastrointestinal endoscopy (N/A, 3/13/2024); Upper gastrointestinal endoscopy (N/A, 3/14/2024); Upper gastrointestinal endoscopy (N/A, 3/14/2024); Upper gastrointestinal endoscopy (N/A, 3/14/2024); laparotomy (N/A, 3/19/2024); Upper gastrointestinal endoscopy (N/A, 3/26/2024); laparotomy (N/A, 2024); and Gastrostomy tube placement (N/A, 2024).    Discharge Recommendations: Ana Gonzales scored a 17/24 on the AM-PAC ADL Inpatient form.  to therapy session.  General: Pt's  present and very supportive/helpful. COTX of both disciplines due to multiple line mgmt.   Pain: Patient does not rate upon questioning   Pain Interventions: pain medication in place prior to arrival      Activities of Daily Living  Basic Activities of Daily Living  Toileting: minimal assistance.    Toileting Equipment: none  Toileting Comments: assist with hygiene care  Instrumental Activities of Daily Living  No IADL completed on this date.    Functional Mobility  Bed Mobility:  Bed mobility not completed on this date.  Comments: Pt seated in recliner at beginning at EOS  Transfers:  Sit to stand transfer:minimal assistance  Stand to sit transfer: minimal assistance  Toilet transfer: minimal assistance  Toilet transfer equipment: raised toilet seat with rails  Toilet transfer comments: use of grab bar  Comments: To/From recliner and transport chair (multiple x)  Functional Mobility  Functional Mobility Activity: to/from bathroom, ~15ft x 2 + ~70ft + ~20t + ~10ft  Device Use: rolling walker  Required Assistance: minimal assistance  Comment: assist of 2nd for line mgmt (IV/NG, drain, wound vac/wc follow  Balance:  Static Sitting Balance: fair (+): maintains balance at SBA/supervision without use of UE support  Dynamic Sitting Balance: fair (+): maintains balance at SBA/supervision without use of UE support  Static Standing Balance: poor (+): requires min (A) to maintain balance  Dynamic Standing Balance: poor (+): requires min (A) to maintain balance    Other Therapeutic Interventions  1) Step ups at barre in therapy gym on 8\" step x 10 on LLE and x1 on RLE (RLE too weak to continue step ups safely).   2) Pt with 10 reps of standing marches, toe raises and hip abduction    Functional Outcomes  AM-PAC Inpatient Daily Activity Raw Score: 17          Cognition  WFL  Orientation:    alert and oriented x 4  Command Following:   [facility]     Education  Barriers To Learning:

## 2024-04-22 NOTE — PLAN OF CARE
Problem: Discharge Planning  Goal: Discharge to home or other facility with appropriate resources  Outcome: Progressing     Problem: Safety - Adult  Goal: Free from fall injury  Outcome: Progressing     Problem: Pain  Goal: Verbalizes/displays adequate comfort level or baseline comfort level  Outcome: Progressing     Problem: Skin/Tissue Integrity  Goal: Absence of new skin breakdown  Description: 1.  Monitor for areas of redness and/or skin breakdown  2.  Assess vascular access sites hourly  3.  Every 4-6 hours minimum:  Change oxygen saturation probe site  4.  Every 4-6 hours:  If on nasal continuous positive airway pressure, respiratory therapy assess nares and determine need for appliance change or resting period.  Outcome: Progressing     Problem: Chronic Conditions and Co-morbidities  Goal: Patient's chronic conditions and co-morbidity symptoms are monitored and maintained or improved  Outcome: Progressing     Problem: ABCDS Injury Assessment  Goal: Absence of physical injury  Outcome: Progressing     Problem: Nutrition Deficit:  Goal: Optimize nutritional status  Outcome: Progressing     Problem: Respiratory - Adult  Goal: Achieves optimal ventilation and oxygenation  Outcome: Progressing     Problem: Cardiovascular - Adult  Goal: Maintains optimal cardiac output and hemodynamic stability  Outcome: Progressing     Problem: Cardiovascular - Adult  Goal: Absence of cardiac dysrhythmias or at baseline  Outcome: Progressing     Problem: Skin/Tissue Integrity - Adult  Goal: Skin integrity remains intact  Outcome: Progressing     Problem: Skin/Tissue Integrity - Adult  Goal: Incisions, wounds, or drain sites healing without S/S of infection  Outcome: Progressing     Problem: Musculoskeletal - Adult  Goal: Return mobility to safest level of function  Outcome: Progressing     Problem: Musculoskeletal - Adult  Goal: Maintain proper alignment of affected body part  Outcome: Progressing     Problem: Gastrointestinal -

## 2024-04-22 NOTE — PROGRESS NOTES
Shift assessment complete, patient is alert and oriented X 4, VSS, spouse at bedside, Dilaudid for pain, TPN still infusing, BS WDL. No BM during this shift. Safety precautions in .  The care plan and education has been reviewed and mutually agreed upon with the patient.

## 2024-04-22 NOTE — PROGRESS NOTES
Clinical Pharmacy Note    Pharmacy consulted by Aria Carter to manage TPN    Current TPN rate: 83 mL/hr  Goal TPN rate: 83mL/hr     Access: PICC  Indication: SBO    Labs:  General Labs:  BMP:    Lab Results   Component Value Date/Time     04/15/2024 05:00 AM    K 4.1 04/15/2024 05:00 AM     04/15/2024 05:00 AM    CO2 20 04/15/2024 05:00 AM    BUN 3 04/15/2024 05:00 AM    LABALBU 3.9 04/12/2024 03:54 PM    CREATININE 0.6 04/15/2024 05:00 AM    CALCIUM 8.3 04/15/2024 05:00 AM    GFRAA >60 11/24/2020 06:29 PM    GFRAA >60 01/13/2013 05:24 AM    LABGLOM >90 04/15/2024 05:00 AM    GLUCOSE 77 04/15/2024 05:00 AM       Electrolyte replacement as follows:   None needed    Blood sugars over past 24 hours: 131-157    Blood sugar management:  Plan to continue Humalog q4 hour sliding scale at med dose.    Plan to continue TPN at 83 ml/hr.     Thank you for allowing pharmacy to participate in the care of this patient.    Kathleen Chung, PharmD  PGY-1 Pharmacy Resident  [id number]

## 2024-04-23 ENCOUNTER — ANESTHESIA EVENT (OUTPATIENT)
Dept: OPERATING ROOM | Age: 69
End: 2024-04-23
Payer: MEDICARE

## 2024-04-23 LAB
ANION GAP SERPL CALCULATED.3IONS-SCNC: 11 MMOL/L (ref 3–16)
BASOPHILS # BLD: 0.1 K/UL (ref 0–0.2)
BASOPHILS NFR BLD: 0.9 %
BUN SERPL-MCNC: 12 MG/DL (ref 7–20)
CALCIUM SERPL-MCNC: 8.5 MG/DL (ref 8.3–10.6)
CHLORIDE SERPL-SCNC: 101 MMOL/L (ref 99–110)
CO2 SERPL-SCNC: 21 MMOL/L (ref 21–32)
CREAT SERPL-MCNC: <0.5 MG/DL (ref 0.6–1.2)
DEPRECATED RDW RBC AUTO: 16.9 % (ref 12.4–15.4)
EOSINOPHIL # BLD: 0.4 K/UL (ref 0–0.6)
EOSINOPHIL NFR BLD: 7.7 %
GFR SERPLBLD CREATININE-BSD FMLA CKD-EPI: >90 ML/MIN/{1.73_M2}
GLUCOSE BLD-MCNC: 139 MG/DL (ref 70–99)
GLUCOSE BLD-MCNC: 146 MG/DL (ref 70–99)
GLUCOSE BLD-MCNC: 154 MG/DL (ref 70–99)
GLUCOSE BLD-MCNC: 155 MG/DL (ref 70–99)
GLUCOSE BLD-MCNC: 160 MG/DL (ref 70–99)
GLUCOSE BLD-MCNC: 163 MG/DL (ref 70–99)
GLUCOSE SERPL-MCNC: 404 MG/DL (ref 70–99)
HCT VFR BLD AUTO: 26.7 % (ref 36–48)
HGB BLD-MCNC: 8.5 G/DL (ref 12–16)
LYMPHOCYTES # BLD: 0.9 K/UL (ref 1–5.1)
LYMPHOCYTES NFR BLD: 17.2 %
MAGNESIUM SERPL-MCNC: 1.7 MG/DL (ref 1.8–2.4)
MCH RBC QN AUTO: 27.8 PG (ref 26–34)
MCHC RBC AUTO-ENTMCNC: 31.9 G/DL (ref 31–36)
MCV RBC AUTO: 87 FL (ref 80–100)
MONOCYTES # BLD: 0.6 K/UL (ref 0–1.3)
MONOCYTES NFR BLD: 10.3 %
NEUTROPHILS # BLD: 3.5 K/UL (ref 1.7–7.7)
NEUTROPHILS NFR BLD: 63.9 %
PERFORMED ON: ABNORMAL
PHOSPHATE SERPL-MCNC: 4.5 MG/DL (ref 2.5–4.9)
PLATELET # BLD AUTO: 270 K/UL (ref 135–450)
PMV BLD AUTO: 8.5 FL (ref 5–10.5)
POTASSIUM SERPL-SCNC: 3.7 MMOL/L (ref 3.5–5.1)
RBC # BLD AUTO: 3.07 M/UL (ref 4–5.2)
SODIUM SERPL-SCNC: 133 MMOL/L (ref 136–145)
WBC # BLD AUTO: 5.5 K/UL (ref 4–11)

## 2024-04-23 PROCEDURE — 6360000002 HC RX W HCPCS: Performed by: NURSE PRACTITIONER

## 2024-04-23 PROCEDURE — C9113 INJ PANTOPRAZOLE SODIUM, VIA: HCPCS | Performed by: SURGERY

## 2024-04-23 PROCEDURE — 2580000003 HC RX 258: Performed by: PHYSICIAN ASSISTANT

## 2024-04-23 PROCEDURE — 97530 THERAPEUTIC ACTIVITIES: CPT

## 2024-04-23 PROCEDURE — 6370000000 HC RX 637 (ALT 250 FOR IP): Performed by: NURSE PRACTITIONER

## 2024-04-23 PROCEDURE — 80048 BASIC METABOLIC PNL TOTAL CA: CPT

## 2024-04-23 PROCEDURE — 83735 ASSAY OF MAGNESIUM: CPT

## 2024-04-23 PROCEDURE — 99024 POSTOP FOLLOW-UP VISIT: CPT | Performed by: SURGERY

## 2024-04-23 PROCEDURE — 6360000002 HC RX W HCPCS: Performed by: SURGERY

## 2024-04-23 PROCEDURE — 85025 COMPLETE CBC W/AUTO DIFF WBC: CPT

## 2024-04-23 PROCEDURE — APPSS15 APP SPLIT SHARED TIME 0-15 MINUTES: Performed by: NURSE PRACTITIONER

## 2024-04-23 PROCEDURE — 97535 SELF CARE MNGMENT TRAINING: CPT

## 2024-04-23 PROCEDURE — APPNB30 APP NON BILLABLE TIME 0-30 MINS: Performed by: NURSE PRACTITIONER

## 2024-04-23 PROCEDURE — 94640 AIRWAY INHALATION TREATMENT: CPT

## 2024-04-23 PROCEDURE — 1200000000 HC SEMI PRIVATE

## 2024-04-23 PROCEDURE — 6370000000 HC RX 637 (ALT 250 FOR IP): Performed by: SURGERY

## 2024-04-23 PROCEDURE — 2500000003 HC RX 250 WO HCPCS: Performed by: SURGERY

## 2024-04-23 PROCEDURE — 84100 ASSAY OF PHOSPHORUS: CPT

## 2024-04-23 PROCEDURE — 2580000003 HC RX 258: Performed by: SURGERY

## 2024-04-23 RX ORDER — BISACODYL 10 MG
10 SUPPOSITORY, RECTAL RECTAL ONCE
Status: COMPLETED | OUTPATIENT
Start: 2024-04-23 | End: 2024-04-23

## 2024-04-23 RX ORDER — MAGNESIUM SULFATE 1 G/100ML
1000 INJECTION INTRAVENOUS ONCE
Status: COMPLETED | OUTPATIENT
Start: 2024-04-23 | End: 2024-04-23

## 2024-04-23 RX ORDER — LIDOCAINE 4 G/G
1 PATCH TOPICAL DAILY
Status: DISCONTINUED | OUTPATIENT
Start: 2024-04-23 | End: 2024-05-02 | Stop reason: HOSPADM

## 2024-04-23 RX ADMIN — HYDROMORPHONE HYDROCHLORIDE 1 MG: 1 INJECTION, SOLUTION INTRAMUSCULAR; INTRAVENOUS; SUBCUTANEOUS at 06:45

## 2024-04-23 RX ADMIN — GABAPENTIN 600 MG: 300 CAPSULE ORAL at 08:37

## 2024-04-23 RX ADMIN — MAGNESIUM SULFATE HEPTAHYDRATE 1000 MG: 1 INJECTION, SOLUTION INTRAVENOUS at 11:51

## 2024-04-23 RX ADMIN — SODIUM CHLORIDE: 9 INJECTION, SOLUTION INTRAVENOUS at 04:49

## 2024-04-23 RX ADMIN — Medication 2 PUFF: at 21:22

## 2024-04-23 RX ADMIN — GABAPENTIN 600 MG: 300 CAPSULE ORAL at 13:57

## 2024-04-23 RX ADMIN — ISOSORBIDE MONONITRATE 120 MG: 30 TABLET, EXTENDED RELEASE ORAL at 08:36

## 2024-04-23 RX ADMIN — HYDROMORPHONE HYDROCHLORIDE 1 MG: 1 INJECTION, SOLUTION INTRAMUSCULAR; INTRAVENOUS; SUBCUTANEOUS at 21:46

## 2024-04-23 RX ADMIN — BISACODYL 10 MG: 10 SUPPOSITORY RECTAL at 18:13

## 2024-04-23 RX ADMIN — HYDROMORPHONE HYDROCHLORIDE 1 MG: 1 INJECTION, SOLUTION INTRAMUSCULAR; INTRAVENOUS; SUBCUTANEOUS at 09:13

## 2024-04-23 RX ADMIN — DILTIAZEM HYDROCHLORIDE 120 MG: 120 CAPSULE, EXTENDED RELEASE ORAL at 08:37

## 2024-04-23 RX ADMIN — TOPIRAMATE 150 MG: 100 TABLET, FILM COATED ORAL at 18:13

## 2024-04-23 RX ADMIN — RANOLAZINE 1000 MG: 500 TABLET, EXTENDED RELEASE ORAL at 20:55

## 2024-04-23 RX ADMIN — SODIUM CHLORIDE, PRESERVATIVE FREE 10 ML: 5 INJECTION INTRAVENOUS at 08:37

## 2024-04-23 RX ADMIN — HYDROMORPHONE HYDROCHLORIDE 1 MG: 1 INJECTION, SOLUTION INTRAMUSCULAR; INTRAVENOUS; SUBCUTANEOUS at 18:16

## 2024-04-23 RX ADMIN — TIOTROPIUM BROMIDE INHALATION SPRAY 2 PUFF: 3.12 SPRAY, METERED RESPIRATORY (INHALATION) at 09:29

## 2024-04-23 RX ADMIN — HYDROMORPHONE HYDROCHLORIDE 1 MG: 1 INJECTION, SOLUTION INTRAMUSCULAR; INTRAVENOUS; SUBCUTANEOUS at 11:48

## 2024-04-23 RX ADMIN — PANTOPRAZOLE SODIUM 40 MG: 40 INJECTION, POWDER, FOR SOLUTION INTRAVENOUS at 08:37

## 2024-04-23 RX ADMIN — GABAPENTIN 600 MG: 300 CAPSULE ORAL at 20:55

## 2024-04-23 RX ADMIN — BUPROPION HYDROCHLORIDE 300 MG: 150 TABLET, EXTENDED RELEASE ORAL at 08:36

## 2024-04-23 RX ADMIN — CARVEDILOL 3.12 MG: 3.12 TABLET, FILM COATED ORAL at 18:13

## 2024-04-23 RX ADMIN — Medication 2 PUFF: at 09:31

## 2024-04-23 RX ADMIN — CARVEDILOL 3.12 MG: 3.12 TABLET, FILM COATED ORAL at 08:37

## 2024-04-23 RX ADMIN — TOPIRAMATE 100 MG: 100 TABLET, FILM COATED ORAL at 08:37

## 2024-04-23 RX ADMIN — LEUCINE, PHENYLALANINE, LYSINE, METHIONINE, ISOLEUCINE, VALINE, HISTIDINE, THREONINE, TRYPTOPHAN, ALANINE, GLYCINE, ARGININE, PROLINE, SERINE, TYROSINE, SODIUM ACETATE, DIBASIC POTASSIUM PHOSPHATE, MAGNESIUM CHLORIDE, SODIUM CHLORIDE, CALCIUM CHLORIDE, DEXTROSE
365; 280; 290; 200; 300; 290; 240; 210; 90; 1035; 515; 575; 340; 250; 20; 340; 261; 51; 59; 33; 20 INJECTION INTRAVENOUS at 18:10

## 2024-04-23 RX ADMIN — TROSPIUM CHLORIDE 20 MG: 20 TABLET, FILM COATED ORAL at 20:55

## 2024-04-23 RX ADMIN — NORTRIPTYLINE HYDROCHLORIDE 75 MG: 25 CAPSULE ORAL at 20:55

## 2024-04-23 RX ADMIN — ARIPIPRAZOLE 2 MG: 2 TABLET ORAL at 20:55

## 2024-04-23 RX ADMIN — GABAPENTIN 600 MG: 300 CAPSULE ORAL at 18:13

## 2024-04-23 RX ADMIN — HYDROMORPHONE HYDROCHLORIDE 1 MG: 1 INJECTION, SOLUTION INTRAMUSCULAR; INTRAVENOUS; SUBCUTANEOUS at 02:13

## 2024-04-23 RX ADMIN — ENOXAPARIN SODIUM 40 MG: 100 INJECTION SUBCUTANEOUS at 08:36

## 2024-04-23 RX ADMIN — HYDROMORPHONE HYDROCHLORIDE 1 MG: 1 INJECTION, SOLUTION INTRAMUSCULAR; INTRAVENOUS; SUBCUTANEOUS at 15:20

## 2024-04-23 RX ADMIN — RANOLAZINE 1000 MG: 500 TABLET, EXTENDED RELEASE ORAL at 08:36

## 2024-04-23 ASSESSMENT — PAIN SCALES - GENERAL
PAINLEVEL_OUTOF10: 9
PAINLEVEL_OUTOF10: 10
PAINLEVEL_OUTOF10: 0
PAINLEVEL_OUTOF10: 9
PAINLEVEL_OUTOF10: 6
PAINLEVEL_OUTOF10: 8
PAINLEVEL_OUTOF10: 8
PAINLEVEL_OUTOF10: 9
PAINLEVEL_OUTOF10: 9
PAINLEVEL_OUTOF10: 7
PAINLEVEL_OUTOF10: 9

## 2024-04-23 ASSESSMENT — PAIN DESCRIPTION - ORIENTATION
ORIENTATION: LEFT
ORIENTATION: LEFT
ORIENTATION: MID
ORIENTATION: LEFT
ORIENTATION: LOWER;LEFT
ORIENTATION: LEFT

## 2024-04-23 ASSESSMENT — PAIN DESCRIPTION - LOCATION
LOCATION: HAND;WRIST
LOCATION: ABDOMEN;HAND
LOCATION: HAND;WRIST
LOCATION: ABDOMEN
LOCATION: ABDOMEN;WRIST
LOCATION: HAND;WRIST
LOCATION: WRIST

## 2024-04-23 ASSESSMENT — PAIN DESCRIPTION - DESCRIPTORS
DESCRIPTORS: ACHING
DESCRIPTORS: ACHING
DESCRIPTORS: SHOOTING;STABBING
DESCRIPTORS: ACHING
DESCRIPTORS: STABBING;SHOOTING
DESCRIPTORS: ACHING;SHOOTING;STABBING
DESCRIPTORS: STABBING;SHOOTING;DISCOMFORT

## 2024-04-23 ASSESSMENT — PAIN SCALES - WONG BAKER
WONGBAKER_NUMERICALRESPONSE: NO HURT
WONGBAKER_NUMERICALRESPONSE: NO HURT

## 2024-04-23 NOTE — CARE COORDINATION
Discharge Planning Note:     Update:     Hospitalist requested a referral placed with:     - [facility] - unable to accept     Will continue to follow.     VANDANA EscobarN RN    [facility]  Phone: [phone]

## 2024-04-23 NOTE — CARE COORDINATION
[facility] Contin[facility] Nurse  Follow-up Progress Note       NAME:  Ana Gonzales  MEDICAL RECORD NUMBER:  [id number]  AGE:  68 y.o.   GENDER:  female  :  1955  TODAY'S DATE:  2024    Subjective:   Wound Identification:  Wound Type:  surgical  Contributing Factors: none        Patient Goal of Care:  [x] Wound Healing  [] Odor Control  [] Palliative Care  [] Pain Control   [] Other:     Objective:    /74   Pulse 75   Temp 97.6 °F (36.4 °C) (Oral)   Resp 16   Ht 1.575 m (5' 2\")   Wt 96.6 kg (213 lb)   SpO2 96%   BMI 38.96 kg/m²   Etienne Risk Score: Etienne Scale Score: 17  Assessment:   Measurements:  Negative Pressure Wound Therapy Abdomen Anterior;Mid (Active)   $ Standard NPWT >50 sq cm PER TX $ Yes 24 1038   Wound Type Surgical 24 1038   Unit Type wound vac 24 1038   Dressing Type Black Foam 24 1038   Number of pieces used 1 24 1038   Number of pieces removed 1 24 1038   Cycle Continuous 24 1038   Target Pressure (mmHg) 125 24 1038   Canister changed? Yes 24 1038   Dressing Status New dressing applied 24 1038   Dressing Changed Changed/New 24 1038   Drainage Amount None 24   Drainage Description Serosanguinous 24 1512   Dressing Change Due 24 1512   Output (ml) 50 ml 24 1631   Wound Assessment Other (Comment) 24   Mariangel-wound Assessment Intact 24 1038   Odor None 24   Number of days: 5       Wound 24 Buttocks Left (Active)   Dressing Status Clean;Dry;Intact 24 0831   Dressing/Treatment Open to air;Protective barrier 24 0831   Drainage Amount None (dry) 24 0900   Odor None 24 0900   Mariangel-wound Assessment Intact 24 0900   Number of days: 10     Wound vac dressing changed. Patient premedicated before dressing change. Black foam cut to fit wound. Wound  and surrounding cleansed with VASHE.

## 2024-04-23 NOTE — PROGRESS NOTES
[facility] - Inpatient Rehabilitation Department   Phone: [phone]    Occupational Therapy    [] Initial Evaluation            [x] Daily Treatment Note         [] Discharge Summary      Patient: Ana Gonzales   : 1955   MRN: [id number]   Date of Service:  2024    Admitting Diagnosis:  Partial small bowel obstruction (HCC)  Current Admission Summary: Per H&P: \"Ana Gonzales is a 68 y.o. female with a pmh of upper GI bleed, SBO/persistent ileus, TIA, CHF, CAD who presents with SBO (small bowel obstruction)\"   Past Medical History:  has a past medical history of Arthritis, Asthma, Brain injury (HCC), Bronchitis, CHF (congestive heart failure) (HCC), Depression, Diabetes mellitus (HCC), DM (diabetes mellitus screen), Elevated cholesterol with high triglycerides, Fall, Fibromyalgia, GERD (gastroesophageal reflux disease), HIGH CHOLESTEROL, Hypertension, Microvascular angina (HCC), Migraine, Neuropathy, Panic attacks, PONV (postoperative nausea and vomiting), Post traumatic stress disorder, Short-term memory loss, Skin cancer, Sleep apnea, and Vertigo.  Past Surgical History:  has a past surgical history that includes Hysterectomy; eye surgery (cataract B); Carpal tunnel release; Finger trigger release; Gallbladder surgery; bladder suspension; Skin cancer excision; blepharoplasty; laryngoscopy (2017); Coronary stent placement (2023); joint replacement (Right); back surgery; Upper gastrointestinal endoscopy (N/A, 10/2/2023); Upper gastrointestinal endoscopy (N/A, 3/13/2024); Upper gastrointestinal endoscopy (N/A, 3/14/2024); Upper gastrointestinal endoscopy (N/A, 3/14/2024); Upper gastrointestinal endoscopy (N/A, 3/14/2024); laparotomy (N/A, 3/19/2024); Upper gastrointestinal endoscopy (N/A, 3/26/2024); laparotomy (N/A, 2024); and Gastrostomy tube placement (N/A, 2024).    Discharge Recommendations: Ana Gonzales scored a 16/24 on the AM-PAC ADL Inpatient form.

## 2024-04-23 NOTE — PROGRESS NOTES
2045: shift assessment done, VSS, ambulated to the bathroom twice, had a small bowel movement, bed alarm on, call light within reach, plan of care ongoing. The care plan and education has been reviewed and mutually agreed upon with the patient.

## 2024-04-23 NOTE — PROGRESS NOTES
[facility] - Inpatient Rehabilitation Department   Phone: [phone]    Physical Therapy    [] Initial Evaluation            [x] Daily Treatment Note         [] Discharge Summary      Patient: Ana Gonzales   : 1955   MRN: [id number]   Date of Service:  2024  Admitting Diagnosis: Partial small bowel obstruction (HCC)    Current Admission Summary: Ana Gonzales is a 68 y.o. female who presents to the ED status post exploratory laparotomy with lysis of adhesions, Meckel diverticulectomy and appendectomy 3/19/2024 by Dr. Morales with complaint of generalized weakness and nausea/vomiting.  She states she is too weak to ambulate, which is new.  She states the nausea/vomiting began last night.  She is denying abdominal pain, although abdomen is tender to palpation on exam.  Patient reports an episode of shortness of breath earlier today, but is not feeling short of breath at this moment.  Lower extremity edema is noted and patient is unsure of her baseline.  Patient states her chief concerns are the generalized weakness/fatigue and nausea/vomiting.     Past Medical History:  has a past medical history of Arthritis, Asthma, Brain injury (HCC), Bronchitis, CHF (congestive heart failure) (HCC), Depression, Diabetes mellitus (HCC), DM (diabetes mellitus screen), Elevated cholesterol with high triglycerides, Fall, Fibromyalgia, GERD (gastroesophageal reflux disease), HIGH CHOLESTEROL, Hypertension, Microvascular angina (HCC), Migraine, Neuropathy, Panic attacks, PONV (postoperative nausea and vomiting), Post traumatic stress disorder, Short-term memory loss, Skin cancer, Sleep apnea, and Vertigo.  Past Surgical History:  has a past surgical history that includes Hysterectomy; eye surgery (cataract B); Carpal tunnel release; Finger trigger release; Gallbladder surgery; bladder suspension; Skin cancer excision; blepharoplasty; laryngoscopy (2017); Coronary stent placement (2023);  family/caregiver present, and RN notified that patient is okay to use the restroom with  and rolling walker    Plan  Frequency: 3-5 x/per week  Current Treatment Recommendations: strengthening, balance training, functional mobility training, transfer training, gait training, stair training, endurance training, patient/caregiver education, home exercise program, safety education, and equipment evaluation/education    Goals  Patient Goals: to go home    Short Term Goals:  Time Frame: to be met by d/c (Goals re-set on 4/18 following abdominal surgery)  Patient will complete bed mobility at minimal assistance, with HOB slightly elevated   Patient will complete transfers at contact guard assistance   Patient will ambulate 100 ft with use of rolling walker at contact guard assistance  Patient will ascend/descend 4 stairs with (R) ascending handrail at minimal assistance  Patient will complete car transfer at contact guard assistance, with RW    Above goals reviewed on 4/23/2024.  All goals are ongoing at this time unless indicated above.      Therapy Session Time     Session Therapy Time:   Individual Concurrent Group Co-treatment   Time In 1418         Time Out 1457         Minutes 39           Timed Code Treatment Minutes:  39 minutes    Total Treatment Minutes:  39 minutes      Electronically Signed By: Arleen Mckeon, PT  Arleen Mckeon PT, DPT, [id number]

## 2024-04-23 NOTE — PLAN OF CARE
Problem: Discharge Planning  Goal: Discharge to home or other facility with appropriate resources  Outcome: Progressing     Problem: Safety - Adult  Goal: Free from fall injury  Outcome: Progressing     Problem: Pain  Goal: Verbalizes/displays adequate comfort level or baseline comfort level  Outcome: Progressing     Problem: Skin/Tissue Integrity  Goal: Absence of new skin breakdown  Description: 1.  Monitor for areas of redness and/or skin breakdown  2.  Assess vascular access sites hourly  3.  Every 4-6 hours minimum:  Change oxygen saturation probe site  4.  Every 4-6 hours:  If on nasal continuous positive airway pressure, respiratory therapy assess nares and determine need for appliance change or resting period.  Outcome: Progressing     Problem: Chronic Conditions and Co-morbidities  Goal: Patient's chronic conditions and co-morbidity symptoms are monitored and maintained or improved  Outcome: Progressing     Problem: ABCDS Injury Assessment  Goal: Absence of physical injury  Outcome: Progressing     Problem: Nutrition Deficit:  Goal: Optimize nutritional status  Outcome: Progressing  Flowsheets (Taken 4/22/2024 1029 by Tomasa Robles, RD, LD)  Nutrient intake appropriate for improving, restoring, or maintaining nutritional needs:   Assess nutritional status and recommend course of action   Recommend, monitor, and adjust tube feedings and TPN/PPN based on assessed needs     Problem: Respiratory - Adult  Goal: Achieves optimal ventilation and oxygenation  Outcome: Progressing     Problem: Cardiovascular - Adult  Goal: Maintains optimal cardiac output and hemodynamic stability  Outcome: Progressing  Goal: Absence of cardiac dysrhythmias or at baseline  Outcome: Progressing     Problem: Skin/Tissue Integrity - Adult  Goal: Skin integrity remains intact  Outcome: Progressing  Goal: Incisions, wounds, or drain sites healing without S/S of infection  Outcome: Progressing     Problem: Musculoskeletal -

## 2024-04-23 NOTE — PROGRESS NOTES
normal turgor    Labs and Tests:  CBC:   Recent Labs     04/21/24  0623 04/22/24  0547 04/23/24  0445   WBC 6.4 5.4 5.5   HGB 9.0* 8.8* 8.5*    272 270       BMP:    Recent Labs     04/21/24  0623 04/22/24  0547 04/23/24  0445   * 135* 133*   K 3.6 3.8 3.7    102 101   CO2 22 20* 21   BUN 12 12 12   CREATININE <0.5* <0.5* <0.5*   GLUCOSE 184* 131* 404*       Hepatic:   Recent Labs     04/22/24  0547   AST 13*   ALT <5*   BILITOT <0.2   ALKPHOS 78     URINE culture  50,000 CFU/ml Mixed pathogens  Multiple organisms isolated, no predominance. Culture  indicates probable contamination. Please review colony count  and clinical indications to determine if a repeat culture is  necessary. No further workup to be done.     CTPA:  MPRESSION:  1. No evidence of pulmonary embolism.  2. No evidence of acute airspace consolidation to suggest pneumonia.  3. Chronic parenchymal scarring within the right lung, as above.  4. The stomach and visualized small bowel loops are fluid-filled and  distended, suggestive of a developing small-bowel obstruction as seen on the  abdominal CT earlier today.  5. Coronary atherosclerosis.    CT head:  IMPRESSION:  No acute intracranial abnormality.    IMPRESSION:  CT abd pelvis   Gas and fluid-filled dilated stomach and small bowel with gradual transition  to normal caliber bowel distally at the level of the ileum.  Findings may  represent partial small bowel obstruction or less likely generalized ileus.    Problem List  Principal Problem:    Partial small bowel obstruction (HCC)  Active Problems:    Muscle ache    Mild persistent asthma without complication    Mild malnutrition (HCC)  Resolved Problems:    * No resolved hospital problems. *       Assessment & Plan:   SBO:  PO day 6 from exploratory lapartomy with BRANDON and placement of G tube. On TPN, ice chips. Has significant generalized edema.  Will stop IV fluids and follow closely  Malnutrition: on TPN,  edema may represent  anasarca, will follow   Low Mag : will give IV replacement   CAD:  hx of PCI July 2023,  on BB and isorbide .  No longer on DAPT due to hx of UGI bleed   HTN:  in range on CCB therapy   Cough-resolved. CXR suggesting possible pneumonia. Procalcitonin ordered and normal. No fever or leukocytosis. IS encouraged   Left hand pain: add lidoderm patch, wrist splint which has helped in the past   Ambulation encouraged.          Diet: Diet NPO Exceptions are: Sips of Water with Meds, Ice Chips, Popsicles  PN-Adult Premix 5/20 - Standard Electrolytes - Central Line  Code:Full Code  DVT PPXlovenox       VALE Way - CNP   4/23/2024 7:32 AM    Dressing (No Sutures): dry sterile dressing

## 2024-04-23 NOTE — PROGRESS NOTES
[facility]        Progress Note    Patient Name: Ana Gonzales  MRN: [id number]  YOB: 1955  Date of Evaluation: 2024    Subjective:  No acute events overnight  Pain controlled  No nausea or vomiting, G-tube to gravity drainage with high output  Passed small stools following suppository  Resting in bed at this time    Post-Operative Day #35, 6      Vital Signs:  Patient Vitals for the past 24 hrs:   BP Temp Temp src Pulse Resp SpO2 Weight   24 1218 -- -- -- -- 18 -- --   24 1148 115/76 97.3 °F (36.3 °C) Oral 86 16 95 % --   24 0943 -- -- -- -- 16 -- --   24 0913 -- -- -- -- 16 -- --   24 0815 116/74 97.6 °F (36.4 °C) Oral 75 16 96 % --   24 0715 -- -- -- -- 16 -- --   24 0645 -- -- -- -- 16 -- 96.6 kg (213 lb)   24 0445 130/80 97.1 °F (36.2 °C) Axillary 73 18 96 % --   24 0243 -- -- -- -- 16 -- --   24 0213 -- -- -- -- 18 -- --   24 0025 123/65 97.9 °F (36.6 °C) Axillary 74 18 94 % --   24 2151 -- -- -- -- 18 -- --   24 2121 -- -- -- -- 18 -- --   24 2045 114/68 97.1 °F (36.2 °C) Axillary 81 18 95 % --   24 1600 122/70 97.6 °F (36.4 °C) Oral 90 18 96 % --   24 1521 -- -- -- -- 18 -- --   24 1451 -- -- -- -- 18 -- --        TEMPERATURE HISTORY 24H: Temp (24hrs), Av.4 °F (36.3 °C), Min:97.1 °F (36.2 °C), Max:97.9 °F (36.6 °C)    BLOOD PRESSURE HISTORY: Systolic (36hrs), Av , Min:114 , Max:130    Diastolic (36hrs), Av, Min:58, Max:80      Intake/Output:  I/O last 3 completed shifts:  In: 120 [P.O.:120]  Out: 3050 [Urine:1300; Emesis/NG output:1750]  I/O this shift:  In: 10 [I.V.:10]  Out: -   Drain/tube Output:  Negative Pressure Wound Therapy Abdomen Anterior;Mid-Output (ml): 50 ml    Physical Exam:  General: awake, alert, oriented to person, place, time  Lungs: unlabored respirations  Abdomen: soft, mildly distended, non-tender, bowel sounds  Not in Time Range      Fecal occult  Results in Past 30 Days  Result Component Current Result Ref Range Previous Result Ref Range   Occult Blood Diagnostic Result: POSITIVE  Normal range: Negative   (A) (4/12/2024)  Not in Time Range      GI bacterial pathogens by PCR  Results in Past 30 Days  Result Component Current Result Ref Range Previous Result Ref Range   GI Bacterial Pathogens By PCR No Shigella spp/EIEC DNA detected  No Shiga toxin-producing gene(s) detected  No Campylobacter spp. (jejuni and coli)DNA detected  No Salmonella spp. DNA detected  No Vibrio vulnificus/parahaemolyticus/cholerae DNA detected  No Plesiomonas shigelloides DNA detected  No Enterotoxigenic E. coli (ETEC) DNA detected  No Yersinia enterocolitica DNA detected  Normal Range:  None detected   (4/12/2024)  Not in Time Range      C. difficile  No results found for requested labs within last 30 days.     Urine culture  Lab Results   Component Value Date/Time    LABURIN  04/13/2024 03:59 AM     >50,000 CFU/ml Mixed pathogens  Multiple organisms isolated, no predominance. Culture  indicates probable contamination. Please review colony count  and clinical indications to determine if a repeat culture is  necessary. No further workup to be done.         Pathology:  \"Relevant pathology documented under results section     Imaging:  I have personally reviewed the following films:    No results found.    Scheduled Meds:   lidocaine  1 patch TransDERmal Daily    bisacodyl  10 mg Rectal Once    insulin lispro  0-8 Units SubCUTAneous Q4H    enoxaparin  40 mg SubCUTAneous Daily    pantoprazole  40 mg IntraVENous Daily    lidocaine 1 % injection  5 mL IntraDERmal Once    sodium chloride flush  5-40 mL IntraVENous 2 times per day    fat emulsion  250 mL IntraVENous Once per day on Mon Thu    buPROPion  300 mg Oral QAM    carvedilol  3.125 mg Oral BID WC    dilTIAZem  120 mg Oral Daily    gabapentin  600 mg Oral 4x Daily    isosorbide mononitrate  120 mg

## 2024-04-24 ENCOUNTER — ANESTHESIA (OUTPATIENT)
Dept: OPERATING ROOM | Age: 69
End: 2024-04-24
Payer: MEDICARE

## 2024-04-24 ENCOUNTER — APPOINTMENT (OUTPATIENT)
Dept: GENERAL RADIOLOGY | Age: 69
End: 2024-04-24
Payer: MEDICARE

## 2024-04-24 LAB
ANION GAP SERPL CALCULATED.3IONS-SCNC: 12 MMOL/L (ref 3–16)
BASOPHILS # BLD: 0 K/UL (ref 0–0.2)
BASOPHILS NFR BLD: 0.7 %
BUN SERPL-MCNC: 13 MG/DL (ref 7–20)
CALCIUM SERPL-MCNC: 8.7 MG/DL (ref 8.3–10.6)
CHLORIDE SERPL-SCNC: 105 MMOL/L (ref 99–110)
CO2 SERPL-SCNC: 18 MMOL/L (ref 21–32)
CREAT SERPL-MCNC: <0.5 MG/DL (ref 0.6–1.2)
DEPRECATED RDW RBC AUTO: 16.6 % (ref 12.4–15.4)
EOSINOPHIL # BLD: 0.4 K/UL (ref 0–0.6)
EOSINOPHIL NFR BLD: 6.3 %
GFR SERPLBLD CREATININE-BSD FMLA CKD-EPI: >90 ML/MIN/{1.73_M2}
GLUCOSE BLD-MCNC: 112 MG/DL (ref 70–99)
GLUCOSE BLD-MCNC: 130 MG/DL (ref 70–99)
GLUCOSE BLD-MCNC: 147 MG/DL (ref 70–99)
GLUCOSE BLD-MCNC: 154 MG/DL (ref 70–99)
GLUCOSE BLD-MCNC: 157 MG/DL (ref 70–99)
GLUCOSE BLD-MCNC: 164 MG/DL (ref 70–99)
GLUCOSE SERPL-MCNC: 115 MG/DL (ref 70–99)
HCT VFR BLD AUTO: 29.5 % (ref 36–48)
HGB BLD-MCNC: 9.2 G/DL (ref 12–16)
LYMPHOCYTES # BLD: 1.2 K/UL (ref 1–5.1)
LYMPHOCYTES NFR BLD: 17.8 %
MAGNESIUM SERPL-MCNC: 1.7 MG/DL (ref 1.8–2.4)
MCH RBC QN AUTO: 26.6 PG (ref 26–34)
MCHC RBC AUTO-ENTMCNC: 31.3 G/DL (ref 31–36)
MCV RBC AUTO: 85 FL (ref 80–100)
MONOCYTES # BLD: 0.8 K/UL (ref 0–1.3)
MONOCYTES NFR BLD: 11.5 %
NEUTROPHILS # BLD: 4.3 K/UL (ref 1.7–7.7)
NEUTROPHILS NFR BLD: 63.7 %
PERFORMED ON: ABNORMAL
PHOSPHATE SERPL-MCNC: 4.2 MG/DL (ref 2.5–4.9)
PLATELET # BLD AUTO: 316 K/UL (ref 135–450)
PMV BLD AUTO: 8.6 FL (ref 5–10.5)
POTASSIUM SERPL-SCNC: 3.6 MMOL/L (ref 3.5–5.1)
RBC # BLD AUTO: 3.47 M/UL (ref 4–5.2)
SODIUM SERPL-SCNC: 135 MMOL/L (ref 136–145)
WBC # BLD AUTO: 6.8 K/UL (ref 4–11)

## 2024-04-24 PROCEDURE — 83735 ASSAY OF MAGNESIUM: CPT

## 2024-04-24 PROCEDURE — 7100000001 HC PACU RECOVERY - ADDTL 15 MIN: Performed by: SURGERY

## 2024-04-24 PROCEDURE — 1200000000 HC SEMI PRIVATE

## 2024-04-24 PROCEDURE — 7100000000 HC PACU RECOVERY - FIRST 15 MIN: Performed by: SURGERY

## 2024-04-24 PROCEDURE — 6370000000 HC RX 637 (ALT 250 FOR IP): Performed by: SURGERY

## 2024-04-24 PROCEDURE — 3700000001 HC ADD 15 MINUTES (ANESTHESIA): Performed by: SURGERY

## 2024-04-24 PROCEDURE — 2500000003 HC RX 250 WO HCPCS: Performed by: NURSE ANESTHETIST, CERTIFIED REGISTERED

## 2024-04-24 PROCEDURE — 84100 ASSAY OF PHOSPHORUS: CPT

## 2024-04-24 PROCEDURE — 2500000003 HC RX 250 WO HCPCS: Performed by: SURGERY

## 2024-04-24 PROCEDURE — 80048 BASIC METABOLIC PNL TOTAL CA: CPT

## 2024-04-24 PROCEDURE — 73110 X-RAY EXAM OF WRIST: CPT

## 2024-04-24 PROCEDURE — 6360000002 HC RX W HCPCS: Performed by: NURSE PRACTITIONER

## 2024-04-24 PROCEDURE — 97116 GAIT TRAINING THERAPY: CPT

## 2024-04-24 PROCEDURE — 97530 THERAPEUTIC ACTIVITIES: CPT

## 2024-04-24 PROCEDURE — C9113 INJ PANTOPRAZOLE SODIUM, VIA: HCPCS | Performed by: SURGERY

## 2024-04-24 PROCEDURE — 36415 COLL VENOUS BLD VENIPUNCTURE: CPT

## 2024-04-24 PROCEDURE — 2580000003 HC RX 258: Performed by: SURGERY

## 2024-04-24 PROCEDURE — 6360000002 HC RX W HCPCS: Performed by: INTERNAL MEDICINE

## 2024-04-24 PROCEDURE — 6360000002 HC RX W HCPCS: Performed by: NURSE ANESTHETIST, CERTIFIED REGISTERED

## 2024-04-24 PROCEDURE — 3600000002 HC SURGERY LEVEL 2 BASE: Performed by: SURGERY

## 2024-04-24 PROCEDURE — 3700000000 HC ANESTHESIA ATTENDED CARE: Performed by: SURGERY

## 2024-04-24 PROCEDURE — 94640 AIRWAY INHALATION TREATMENT: CPT

## 2024-04-24 PROCEDURE — 85025 COMPLETE CBC W/AUTO DIFF WBC: CPT

## 2024-04-24 PROCEDURE — A4217 STERILE WATER/SALINE, 500 ML: HCPCS | Performed by: SURGERY

## 2024-04-24 PROCEDURE — 99222 1ST HOSP IP/OBS MODERATE 55: CPT | Performed by: NURSE PRACTITIONER

## 2024-04-24 PROCEDURE — 97535 SELF CARE MNGMENT TRAINING: CPT

## 2024-04-24 PROCEDURE — 12035 INTMD RPR S/A/T/EXT 12.6-20: CPT | Performed by: SURGERY

## 2024-04-24 PROCEDURE — 2709999900 HC NON-CHARGEABLE SUPPLY: Performed by: SURGERY

## 2024-04-24 PROCEDURE — 3600000012 HC SURGERY LEVEL 2 ADDTL 15MIN: Performed by: SURGERY

## 2024-04-24 PROCEDURE — 6360000002 HC RX W HCPCS: Performed by: SURGERY

## 2024-04-24 PROCEDURE — APPNB30 APP NON BILLABLE TIME 0-30 MINS: Performed by: NURSE PRACTITIONER

## 2024-04-24 RX ORDER — LIDOCAINE HYDROCHLORIDE 10 MG/ML
INJECTION, SOLUTION EPIDURAL; INFILTRATION; INTRACAUDAL; PERINEURAL
Status: COMPLETED | OUTPATIENT
Start: 2024-04-24 | End: 2024-04-24

## 2024-04-24 RX ORDER — PROPOFOL 10 MG/ML
INJECTION, EMULSION INTRAVENOUS PRN
Status: DISCONTINUED | OUTPATIENT
Start: 2024-04-24 | End: 2024-04-24 | Stop reason: SDUPTHER

## 2024-04-24 RX ORDER — ONDANSETRON 2 MG/ML
INJECTION INTRAMUSCULAR; INTRAVENOUS PRN
Status: DISCONTINUED | OUTPATIENT
Start: 2024-04-24 | End: 2024-04-24 | Stop reason: SDUPTHER

## 2024-04-24 RX ORDER — SODIUM CHLORIDE 0.9 % (FLUSH) 0.9 %
5-40 SYRINGE (ML) INJECTION EVERY 12 HOURS SCHEDULED
Status: CANCELLED | OUTPATIENT
Start: 2024-04-24

## 2024-04-24 RX ORDER — FENTANYL CITRATE 50 UG/ML
INJECTION, SOLUTION INTRAMUSCULAR; INTRAVENOUS PRN
Status: DISCONTINUED | OUTPATIENT
Start: 2024-04-24 | End: 2024-04-24 | Stop reason: SDUPTHER

## 2024-04-24 RX ORDER — CIPROFLOXACIN 2 MG/ML
400 INJECTION, SOLUTION INTRAVENOUS ONCE
Status: DISCONTINUED | OUTPATIENT
Start: 2024-04-24 | End: 2024-04-24

## 2024-04-24 RX ORDER — FENTANYL CITRATE 50 UG/ML
25 INJECTION, SOLUTION INTRAMUSCULAR; INTRAVENOUS EVERY 5 MIN PRN
Status: DISCONTINUED | OUTPATIENT
Start: 2024-04-24 | End: 2024-04-24 | Stop reason: HOSPADM

## 2024-04-24 RX ORDER — MAGNESIUM HYDROXIDE 1200 MG/15ML
LIQUID ORAL CONTINUOUS PRN
Status: COMPLETED | OUTPATIENT
Start: 2024-04-24 | End: 2024-04-24

## 2024-04-24 RX ORDER — MAGNESIUM SULFATE IN WATER 40 MG/ML
2000 INJECTION, SOLUTION INTRAVENOUS ONCE
Status: COMPLETED | OUTPATIENT
Start: 2024-04-24 | End: 2024-04-24

## 2024-04-24 RX ORDER — HYDROMORPHONE HYDROCHLORIDE 2 MG/ML
0.5 INJECTION, SOLUTION INTRAMUSCULAR; INTRAVENOUS; SUBCUTANEOUS EVERY 5 MIN PRN
Status: DISCONTINUED | OUTPATIENT
Start: 2024-04-24 | End: 2024-04-24 | Stop reason: HOSPADM

## 2024-04-24 RX ORDER — SODIUM CHLORIDE 0.9 % (FLUSH) 0.9 %
5-40 SYRINGE (ML) INJECTION PRN
Status: DISCONTINUED | OUTPATIENT
Start: 2024-04-24 | End: 2024-04-24 | Stop reason: HOSPADM

## 2024-04-24 RX ORDER — SODIUM CHLORIDE 9 MG/ML
INJECTION, SOLUTION INTRAVENOUS PRN
Status: DISCONTINUED | OUTPATIENT
Start: 2024-04-24 | End: 2024-04-24 | Stop reason: HOSPADM

## 2024-04-24 RX ORDER — MAGNESIUM SULFATE IN WATER 40 MG/ML
2000 INJECTION, SOLUTION INTRAVENOUS ONCE
Status: DISCONTINUED | OUTPATIENT
Start: 2024-04-24 | End: 2024-04-26

## 2024-04-24 RX ORDER — OXYCODONE HYDROCHLORIDE 5 MG/1
5 TABLET ORAL
Status: DISCONTINUED | OUTPATIENT
Start: 2024-04-24 | End: 2024-04-24 | Stop reason: HOSPADM

## 2024-04-24 RX ORDER — LIDOCAINE HYDROCHLORIDE 20 MG/ML
INJECTION, SOLUTION EPIDURAL; INFILTRATION; INTRACAUDAL; PERINEURAL PRN
Status: DISCONTINUED | OUTPATIENT
Start: 2024-04-24 | End: 2024-04-24 | Stop reason: SDUPTHER

## 2024-04-24 RX ORDER — LEVOFLOXACIN 5 MG/ML
500 INJECTION, SOLUTION INTRAVENOUS ONCE
Status: COMPLETED | OUTPATIENT
Start: 2024-04-24 | End: 2024-04-24

## 2024-04-24 RX ORDER — SODIUM CHLORIDE 0.9 % (FLUSH) 0.9 %
5-40 SYRINGE (ML) INJECTION PRN
Status: CANCELLED | OUTPATIENT
Start: 2024-04-24

## 2024-04-24 RX ORDER — ONDANSETRON 2 MG/ML
4 INJECTION INTRAMUSCULAR; INTRAVENOUS
Status: DISCONTINUED | OUTPATIENT
Start: 2024-04-24 | End: 2024-04-24 | Stop reason: HOSPADM

## 2024-04-24 RX ORDER — FUROSEMIDE 10 MG/ML
40 INJECTION INTRAMUSCULAR; INTRAVENOUS ONCE
Status: COMPLETED | OUTPATIENT
Start: 2024-04-24 | End: 2024-04-24

## 2024-04-24 RX ORDER — HYDRALAZINE HYDROCHLORIDE 20 MG/ML
10 INJECTION INTRAMUSCULAR; INTRAVENOUS
Status: DISCONTINUED | OUTPATIENT
Start: 2024-04-24 | End: 2024-04-24 | Stop reason: HOSPADM

## 2024-04-24 RX ORDER — SODIUM CHLORIDE 0.9 % (FLUSH) 0.9 %
5-40 SYRINGE (ML) INJECTION EVERY 12 HOURS SCHEDULED
Status: DISCONTINUED | OUTPATIENT
Start: 2024-04-24 | End: 2024-04-24 | Stop reason: HOSPADM

## 2024-04-24 RX ORDER — LIDOCAINE HYDROCHLORIDE 10 MG/ML
1 INJECTION, SOLUTION EPIDURAL; INFILTRATION; INTRACAUDAL; PERINEURAL
Status: CANCELLED | OUTPATIENT
Start: 2024-04-24 | End: 2024-04-25

## 2024-04-24 RX ORDER — SODIUM CHLORIDE 9 MG/ML
INJECTION, SOLUTION INTRAVENOUS PRN
Status: CANCELLED | OUTPATIENT
Start: 2024-04-24

## 2024-04-24 RX ORDER — FUROSEMIDE 40 MG/1
40 TABLET ORAL ONCE
Status: DISCONTINUED | OUTPATIENT
Start: 2024-04-24 | End: 2024-04-24

## 2024-04-24 RX ORDER — POTASSIUM CHLORIDE 20 MEQ/1
40 TABLET, EXTENDED RELEASE ORAL ONCE
Status: COMPLETED | OUTPATIENT
Start: 2024-04-24 | End: 2024-04-24

## 2024-04-24 RX ORDER — LABETALOL HYDROCHLORIDE 5 MG/ML
10 INJECTION, SOLUTION INTRAVENOUS
Status: DISCONTINUED | OUTPATIENT
Start: 2024-04-24 | End: 2024-04-24 | Stop reason: HOSPADM

## 2024-04-24 RX ORDER — SODIUM CHLORIDE, SODIUM LACTATE, POTASSIUM CHLORIDE, CALCIUM CHLORIDE 600; 310; 30; 20 MG/100ML; MG/100ML; MG/100ML; MG/100ML
INJECTION, SOLUTION INTRAVENOUS CONTINUOUS
Status: CANCELLED | OUTPATIENT
Start: 2024-04-24

## 2024-04-24 RX ADMIN — LIDOCAINE HYDROCHLORIDE 50 MG: 20 INJECTION, SOLUTION EPIDURAL; INFILTRATION; INTRACAUDAL; PERINEURAL at 13:51

## 2024-04-24 RX ADMIN — PROPOFOL 120 MCG/KG/MIN: 10 INJECTION, EMULSION INTRAVENOUS at 13:52

## 2024-04-24 RX ADMIN — FENTANYL CITRATE 25 MCG: 50 INJECTION, SOLUTION INTRAMUSCULAR; INTRAVENOUS at 13:46

## 2024-04-24 RX ADMIN — CARVEDILOL 3.12 MG: 3.12 TABLET, FILM COATED ORAL at 17:33

## 2024-04-24 RX ADMIN — HYDROMORPHONE HYDROCHLORIDE 1 MG: 1 INJECTION, SOLUTION INTRAMUSCULAR; INTRAVENOUS; SUBCUTANEOUS at 18:16

## 2024-04-24 RX ADMIN — GABAPENTIN 600 MG: 300 CAPSULE ORAL at 21:27

## 2024-04-24 RX ADMIN — GABAPENTIN 600 MG: 300 CAPSULE ORAL at 17:32

## 2024-04-24 RX ADMIN — PROPOFOL 25 MG: 10 INJECTION, EMULSION INTRAVENOUS at 13:53

## 2024-04-24 RX ADMIN — ALTEPLASE 1 MG: 2.2 INJECTION, POWDER, LYOPHILIZED, FOR SOLUTION INTRAVENOUS at 08:41

## 2024-04-24 RX ADMIN — POTASSIUM CHLORIDE 40 MEQ: 1500 TABLET, EXTENDED RELEASE ORAL at 17:32

## 2024-04-24 RX ADMIN — MAGNESIUM SULFATE HEPTAHYDRATE 2000 MG: 40 INJECTION, SOLUTION INTRAVENOUS at 11:40

## 2024-04-24 RX ADMIN — HYDROMORPHONE HYDROCHLORIDE 1 MG: 1 INJECTION, SOLUTION INTRAMUSCULAR; INTRAVENOUS; SUBCUTANEOUS at 21:34

## 2024-04-24 RX ADMIN — TROSPIUM CHLORIDE 20 MG: 20 TABLET, FILM COATED ORAL at 21:27

## 2024-04-24 RX ADMIN — TIOTROPIUM BROMIDE INHALATION SPRAY 2 PUFF: 3.12 SPRAY, METERED RESPIRATORY (INHALATION) at 11:51

## 2024-04-24 RX ADMIN — Medication 10 ML: at 21:29

## 2024-04-24 RX ADMIN — NORTRIPTYLINE HYDROCHLORIDE 75 MG: 25 CAPSULE ORAL at 21:27

## 2024-04-24 RX ADMIN — TOPIRAMATE 150 MG: 100 TABLET, FILM COATED ORAL at 17:32

## 2024-04-24 RX ADMIN — HYDROMORPHONE HYDROCHLORIDE 1 MG: 1 INJECTION, SOLUTION INTRAMUSCULAR; INTRAVENOUS; SUBCUTANEOUS at 11:42

## 2024-04-24 RX ADMIN — FUROSEMIDE 40 MG: 10 INJECTION, SOLUTION INTRAMUSCULAR; INTRAVENOUS at 17:33

## 2024-04-24 RX ADMIN — ASCORBIC ACID, VITAMIN A PALMITATE, CHOLECALCIFEROL, THIAMINE HYDROCHLORIDE, RIBOFLAVIN-5 PHOSPHATE SODIUM, PYRIDOXINE HYDROCHLORIDE, NIACINAMIDE, DEXPANTHENOL, ALPHA-TOCOPHEROL ACETATE, VITAMIN K1, FOLIC ACID, BIOTIN, CYANOCOBALAMIN: 200; 3300; 200; 6; 3.6; 6; 40; 15; 10; 150; 600; 60; 5 INJECTION, SOLUTION INTRAVENOUS at 18:12

## 2024-04-24 RX ADMIN — PANTOPRAZOLE SODIUM 40 MG: 40 INJECTION, POWDER, FOR SOLUTION INTRAVENOUS at 09:54

## 2024-04-24 RX ADMIN — Medication 2 PUFF: at 11:52

## 2024-04-24 RX ADMIN — HYDROMORPHONE HYDROCHLORIDE 1 MG: 1 INJECTION, SOLUTION INTRAMUSCULAR; INTRAVENOUS; SUBCUTANEOUS at 07:25

## 2024-04-24 RX ADMIN — Medication 2 PUFF: at 20:47

## 2024-04-24 RX ADMIN — CARVEDILOL 3.12 MG: 3.12 TABLET, FILM COATED ORAL at 09:54

## 2024-04-24 RX ADMIN — FENTANYL CITRATE 25 MCG: 50 INJECTION, SOLUTION INTRAMUSCULAR; INTRAVENOUS at 13:41

## 2024-04-24 RX ADMIN — RANOLAZINE 1000 MG: 500 TABLET, EXTENDED RELEASE ORAL at 21:27

## 2024-04-24 RX ADMIN — PROPOFOL 25 MG: 10 INJECTION, EMULSION INTRAVENOUS at 13:51

## 2024-04-24 RX ADMIN — ONDANSETRON 4 MG: 2 INJECTION INTRAMUSCULAR; INTRAVENOUS at 13:56

## 2024-04-24 RX ADMIN — ARIPIPRAZOLE 2 MG: 2 TABLET ORAL at 21:27

## 2024-04-24 RX ADMIN — HYDROMORPHONE HYDROCHLORIDE 1 MG: 1 INJECTION, SOLUTION INTRAMUSCULAR; INTRAVENOUS; SUBCUTANEOUS at 01:43

## 2024-04-24 RX ADMIN — LEVOFLOXACIN 500 MG: 5 INJECTION, SOLUTION INTRAVENOUS at 13:47

## 2024-04-24 ASSESSMENT — PAIN DESCRIPTION - LOCATION
LOCATION: WRIST
LOCATION: ARM
LOCATION: HAND
LOCATION: WRIST

## 2024-04-24 ASSESSMENT — PAIN DESCRIPTION - DESCRIPTORS
DESCRIPTORS: ACHING
DESCRIPTORS: ACHING;SHARP
DESCRIPTORS: ACHING
DESCRIPTORS: ACHING

## 2024-04-24 ASSESSMENT — PAIN DESCRIPTION - ORIENTATION
ORIENTATION: LEFT

## 2024-04-24 ASSESSMENT — PAIN SCALES - GENERAL
PAINLEVEL_OUTOF10: 8
PAINLEVEL_OUTOF10: 9
PAINLEVEL_OUTOF10: 6
PAINLEVEL_OUTOF10: 9

## 2024-04-24 ASSESSMENT — PAIN SCALES - WONG BAKER
WONGBAKER_NUMERICALRESPONSE: NO HURT
WONGBAKER_NUMERICALRESPONSE: HURTS A LITTLE BIT

## 2024-04-24 ASSESSMENT — ENCOUNTER SYMPTOMS: SHORTNESS OF BREATH: 0

## 2024-04-24 ASSESSMENT — PAIN - FUNCTIONAL ASSESSMENT: PAIN_FUNCTIONAL_ASSESSMENT: 0-10

## 2024-04-24 NOTE — PLAN OF CARE
Problem: Discharge Planning  Goal: Discharge to home or other facility with appropriate resources  Outcome: Progressing  Flowsheets (Taken 4/24/2024 0954)  Discharge to home or other facility with appropriate resources: Identify barriers to discharge with patient and caregiver     Problem: Safety - Adult  Goal: Free from fall injury  Outcome: Progressing     Problem: Pain  Goal: Verbalizes/displays adequate comfort level or baseline comfort level  Outcome: Progressing     Problem: Gastrointestinal - Adult  Goal: Minimal or absence of nausea and vomiting  Outcome: Progressing  Flowsheets (Taken 4/24/2024 0954)  Minimal or absence of nausea and vomiting: Administer IV fluids as ordered to ensure adequate hydration     Problem: Musculoskeletal - Adult  Goal: Return mobility to safest level of function  Outcome: Progressing  Flowsheets (Taken 4/24/2024 0954)  Return Mobility to Safest Level of Function: Assess patient stability and activity tolerance for standing, transferring and ambulating with or without assistive devices
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
## 2024-04-24 NOTE — ANESTHESIA POSTPROCEDURE EVALUATION
Department of Anesthesiology  Postprocedure Note    Patient: Ana Gonzales  MRN: [id number]  YOB: 1955  Date of evaluation: 4/24/2024    Procedure Summary       Date: 04/24/24 Room / Location: [address]    Anesthesia Start: 1340 Anesthesia Stop: 1422    Procedure: SECONDARY ABDOMINAL WOUND CLOSURE (Abdomen) Diagnosis:       Open wound of abdomen, initial encounter      (Open wound of abdomen, initial encounter [S31.109A])    Surgeons: Srinivasa Morales MD Responsible Provider: Marie Torres MD    Anesthesia Type: general ASA Status: 3            Anesthesia Type: No value filed.    Hortensia Phase I: Hortensia Score: 10    Hortensia Phase II:      Anesthesia Post Evaluation    Patient location during evaluation: PACU  Patient participation: complete - patient participated  Level of consciousness: awake and alert  Airway patency: patent  Nausea & Vomiting: no nausea and no vomiting  Cardiovascular status: hemodynamically stable  Respiratory status: acceptable  Hydration status: stable  Multimodal analgesia pain management approach  Pain management: adequate    No notable events documented.

## 2024-04-24 NOTE — PROGRESS NOTES
at SBA/supervision without use of UE support  Static Standing Balance: poor (+): requires min (A) to maintain balance  Dynamic Standing Balance: poor (-): requires max (A) to maintain balance    Other Therapeutic Interventions    Functional Outcomes  AM-PAC Inpatient Daily Activity Raw Score: 13          Cognition  WFL  Orientation:    alert and oriented x 4  Command Following:   WFL     Education  Barriers To Learning: none  Patient Education: patient educated on goals, OT role and benefits, plan of care, adaptive device training, family education, disease specific education, transfer training, discharge recommendations  Learning Assessment:  patient verbalizes and demonstrates understanding    Assessment  Activity Tolerance: Fair, limited by L wrist pain and generalized weakness/fatigue  Impairments Requiring Therapeutic Intervention: decreased functional mobility, decreased ADL status, decreased safety awareness, decreased endurance, decreased IADL, increased pain, decreased posture  Prognosis: good  Clinical Assessment: Pt recently had a 30+ day hospital admission due to complicated small bowel obstruction. Pt was home for a little over a day before being readmitted to the hospital; pt was planning on HHOT and 24 hr assist from . Pt reported along with  difficulty navigating stairs into house following previous d/c. Pt required increased assist this date and greatly limited by left wrist pain of 10/10. Pt required platform walker left and Mod A x 2 to ambulate 5ft x 2 to/from BSC. Pt Min A x 2 for functional transfers from EOB and BSC. Pt would greatly benefit from intensive inpt therapy at d/c to maximize functional independence and safety. Continue with POC.  Safety Interventions: patient left in bed, bed alarm in place, call light within reach, gait belt, nurse notified, and family/caregiver present    Plan  Frequency: 3-5 x/per week  Current Treatment Recommendations: strengthening, balance  training, functional mobility training, endurance training, patient/caregiver education, ADL/self-care training, IADL training, pain management, home exercise program, and positioning    Goals  Patient Goals: to go back home   Short Term Goals:  Time Frame: discharge  Patient will complete lower body ADL at modified independent   Patient will complete toileting at modified independent   Patient will complete functional mobility at modified independent   Patient will complete bed mobility at modified independent   Patient to maintain standing at modified independent for 12-15 minutes.  Patient to gather and transport IADL items at modified independent     Above goals reviewed on 4/24/2024.  All goals are ongoing at this time unless indicated above.       Therapy Session Time     Individual Group Co-treatment   Time In   1050   Time Out   1128   Minutes   38     Timed Code Treatment Minutes: 38 min  Total Treatment Minutes:  38 min     Electronically Signed By: MOISÉS Mckeon/GUILLERMO [id number]

## 2024-04-24 NOTE — BRIEF OP NOTE
Brief Postoperative Note      Patient: Ana Gonzales  YOB: 1955  MRN: [id number]    Date of Procedure: 4/17/2024    Pre-Op Diagnosis Codes:     * Small bowel obstruction (HCC) [K56.609]    Post-Op Diagnosis: Same       Procedure(s):  EXPLORATORY LAPAROTOMY; LYSIS OF ADHESIONS;  PLACEMENT OF GASTROSTOMY TUBE    Surgeon(s):  Srinivasa Morales MD    Assistant:  Surgical Assistant: Robe Hawkins    Anesthesia: General    Estimated Blood Loss (mL): Minimal    Complications: None    Specimens:   * No specimens in log *    Implants:  * No implants in log *      Drains:   Negative Pressure Wound Therapy Abdomen Anterior;Mid (Active)       Gastrostomy/Enterostomy/Jejunostomy Tube Gastrostomy LUQ 1 24 fr (Active)       Urinary Catheter 04/17/24 Muniz (Active)       [REMOVED] NG/OG/NJ/NE Tube Nasogastric 16 fr Right nostril (Removed)   Surrounding Skin Clean, dry & intact 03/13/24 2000   Securement device Adhesive based montoya 03/13/24 2000   Status Suction-low continuous 03/13/24 2000   Placement Verified Gastric Contents 03/13/24 2000   NG/OG/NJ/NE External Measurement (cm) 64 cm 03/13/24 2000   Free Water/Flush (mL) 30 mL 03/14/24 0337   Output (mL) 50 ml 03/14/24 0600       [REMOVED] NG/OG/NJ/NE Tube Nasogastric 16 fr Right nostril (Removed)   Surrounding Skin Clean, dry & intact 03/23/24 0858   Securement device Adhesive based montoya 03/23/24 0858   Status Suction-low intermittent 03/23/24 0858   Placement Verified X-Ray (Initial) 03/23/24 0858   NG/OG/NJ/NE External Measurement (cm) 55 cm 03/23/24 0858   Drainage Appearance Green 03/23/24 1030   Free Water/Flush (mL) 60 mL 03/21/24 0129   Output (mL) 150 ml 03/23/24 1030   Action Taken Other (comment) 03/23/24 1030       [REMOVED] NG/OG/NJ/NE Tube Nasogastric 16 fr Right nostril (Removed)   Surrounding Skin Clean, dry & intact 03/25/24 2012   Securement device Tape 03/25/24 2012   Status Suction-low continuous 03/25/24 2012   Placement Verified 
  Output (mL) 250 mL 03/18/24 1439       [REMOVED] External Urinary Catheter (Removed)       [REMOVED] External Urinary Catheter (Removed)   Site Assessment Clean,dry & intact 04/09/24 0744   Placement Replaced 04/08/24 0147   Catheter Care Catheter/Wick replaced 04/07/24 2114   Perineal Care Yes 04/07/24 2114   Suction 40 mmgHg continuous 04/07/24 2114   Urine Color Yellow 04/04/24 1051   Urine Appearance Clear 04/04/24 1051   Output (mL) 750 mL 04/06/24 1148       [REMOVED] External Urinary Catheter (Removed)   Site Assessment Clean,dry & intact 04/13/24 0407   Placement Initiated 04/13/24 0407   Perineal Care Yes 04/13/24 0407   Suction 40 mmgHg continuous 04/13/24 0407   Urine Color Sneha 04/13/24 0407   Urine Appearance Hazy 04/13/24 0407   Urine Odor Malodorous 04/13/24 0407   Output (mL) 300 mL 04/13/24 0407       [REMOVED] External Urinary Catheter (Removed)   Site Assessment Clean,dry & intact 04/13/24 2030   Placement Initiated 04/13/24 2030   Suction 40 mmgHg continuous 04/14/24 0602   Output (mL) 400 mL 04/14/24 0602       [REMOVED] External Urinary Catheter (Removed)   Site Assessment Clean,dry & intact 04/17/24 0725   Placement Initiated 04/17/24 0725   Catheter Care Catheter/Wick replaced;Suction Canister/Tubing changed 04/17/24 0725   Perineal Care Yes 04/17/24 0725   Suction 40 mmgHg continuous 04/17/24 0725   Urine Color Yellow 04/17/24 0725   Urine Appearance Clear 04/17/24 0725   Output (mL) 300 mL 04/17/24 0725       [REMOVED] External Urinary Catheter (Removed)   Site Assessment Clean,dry & intact 04/22/24 0550   Placement Replaced 04/22/24 0550   Securement Method Other (Comment) 04/22/24 0550   Catheter Care Catheter/Wick replaced 04/22/24 0550   Perineal Care Yes 04/22/24 0550   Suction 40 mmgHg continuous 04/22/24 0550   Urine Color Yellow 04/22/24 0550   Urine Appearance Clear 04/22/24 0550   Urine Odor Malodorous 04/22/24 0550   Output (mL) 1300 mL 04/22/24 0550       Findings:  Infection

## 2024-04-24 NOTE — PROGRESS NOTES
[facility]    4/24/2024 7:33 AM        Name: Ana Gonzales .              Admitted: 4/12/2024  Primary Care Provider: Mellisa Houston MD (Tel: [phone])      Subjective:  .    Seen this am.  at bedside.   Required IV pain meds for left wrist pain which occurred after working with therapy on the parallel bars.  Reports wrist hurts more than abd wound.       Belly is quiet, no flatus or BM   NPO for wound closure today   Walked in the [facility] on Monday.    PATIENT IS NOT A CANDIDATE FOR LTAC    Reviewed interval ancillary notes    Current Medications  lidocaine 4 % external patch 1 patch, Daily  PN-Adult Premix 5/20 - Standard Electrolytes - Central Line, Continuous TPN  insulin lispro (HUMALOG) injection vial 0-8 Units, Q4H  dextrose bolus 10% 125 mL, PRN   Or  dextrose bolus 10% 250 mL, PRN  glucagon injection 1 mg, PRN  dextrose 10 % infusion, Continuous PRN  enoxaparin (LOVENOX) injection 40 mg, Daily  pantoprazole (PROTONIX) injection 40 mg, Daily  HYDROmorphone HCl PF (DILAUDID) injection 1 mg, Q2H PRN  hydrALAZINE (APRESOLINE) injection 10 mg, Q6H PRN  lidocaine PF 1 % injection 5 mL, Once  sodium chloride flush 0.9 % injection 5-40 mL, 2 times per day  sodium chloride flush 0.9 % injection 5-40 mL, PRN  0.9 % sodium chloride infusion, PRN  fat emulsion (INTRALIPID/NUTRILIPID) 20 % infusion 250 mL, Once per day on Mon Thu  buPROPion (WELLBUTRIN XL) extended release tablet 300 mg, QAM  carvedilol (COREG) tablet 3.125 mg, BID WC  dilTIAZem (CARDIZEM CD) extended release capsule 120 mg, Daily  gabapentin (NEURONTIN) capsule 600 mg, 4x Daily  isosorbide mononitrate (IMDUR) extended release tablet 120 mg, Daily  nortriptyline (PAMELOR) capsule 75 mg, Nightly  ranolazine (RANEXA) extended release tablet 1,000 mg, BID  topiramate (TOPAMAX) tablet 100 mg, QAM  topiramate (TOPAMAX) tablet 150 mg, QPM  trospium (SANCTURA) tablet 20 mg, Nightly  lidocaine PF 1 %

## 2024-04-24 NOTE — PROGRESS NOTES
Pt arrived to PACU from OR awakens to voice with VSS. Abdominal incision dressing CDI- closed with staples, 4x4, ABD and tape. External catheter in place. Will cont to monitor.

## 2024-04-24 NOTE — ANESTHESIA PRE PROCEDURE
laryngitis J37.0   • Abdominal pain, epigastric R10.13   • Allergic rhinitis J30.9   • Asthma J45.909   • Acute asthma exacerbation J45.901   • Carpal tunnel syndrome G56.00   • Chronic diarrhea K52.9   • Constipation K59.00   • Dizziness and giddiness R42   • Other long term (current) drug therapy Z79.899   • Fibromyalgia M79.7   • Gastro-esophageal reflux disease without esophagitis K21.9   • History of colonic polyps Z86.010   • Irritable bowel syndrome K58.9   • Gonalgia M25.569   • Menopausal and perimenopausal disorder N95.9   • Microcytic anemia D50.9   • Muscle ache M79.10   • Nonspecific mesenteric adenitis I88.0   • Osteoarthrosis M19.90   • Arthralgia M25.50   • Extremity pain M79.609   • Other specified personal risk factors, not elsewhere classified Z91.89   • Hypostatic pulmonary congestion J81.1   • Frequent UTI N39.0   • Subdural hemorrhage (HCC) I62.00   • Type 1 diabetes mellitus (HCC) E10.9   • Type 2 diabetes mellitus, without long-term current use of insulin (HCC) E11.9   • 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   • Subjective tinnitus of both ears H93.13   • Bilateral high frequency sensorineural hearing loss H90.3   • Encounter for post surgical wound check Z48.89   • Lumbar spine pain M54.50   • Mild persistent asthma without complication J45.30   • Numbness and tingling of both feet R20.0, R20.2   • PONV (postoperative nausea and vomiting) R11.2, Z98.890   • Spinal stenosis of lumbar region M48.061   • TIA (transient ischemic attack) G45.9   • Word finding difficulty R47.89   • Laryngitis, acute J04.0   • Chronic sinusitis J32.9   • Frontal headache R51.9   • Chronic cough R05.3   • Palpitations R00.2   • Anginal equivalent (HCC) I20.89   • Abnormal stress test R94.39   • Obesity due to excess calories with serious comorbidity E66.09   • Coronary artery disease involving native coronary artery of native heart without angina pectoris I25.10   • (HFpEF) heart failure with preserved 
Signs (Current):   Vitals:    04/24/24 0725 04/24/24 0954 04/24/24 1152 04/24/24 1300   BP:  (!) 142/83  (!) 162/65   Pulse:  76  79   Resp: 18 16  16   Temp:  98.2 °F (36.8 °C)  97.5 °F (36.4 °C)   TempSrc:  Oral  Temporal   SpO2:  95% 94% 99%   Weight:       Height:                                                  BP Readings from Last 3 Encounters:   04/24/24 (!) 162/65   04/10/24 118/73   03/06/24 110/60       NPO Status: Time of last liquid consumption: 0000                        Time of last solid consumption: 0000                        Date of last liquid consumption: 04/24/24                        Date of last solid food consumption: 04/24/24    BMI:   Wt Readings from Last 3 Encounters:   04/24/24 97.5 kg (215 lb)   04/10/24 92.2 kg (203 lb 3.2 oz)   03/06/24 95.7 kg (210 lb 14.4 oz)     Body mass index is 39.32 kg/m².    CBC:   Lab Results   Component Value Date/Time    WBC 6.8 04/24/2024 09:54 AM    RBC 3.47 04/24/2024 09:54 AM    HGB 9.2 04/24/2024 09:54 AM    HCT 29.5 04/24/2024 09:54 AM    MCV 85.0 04/24/2024 09:54 AM    RDW 16.6 04/24/2024 09:54 AM     04/24/2024 09:54 AM       CMP:   Lab Results   Component Value Date/Time     04/24/2024 09:54 AM    K 3.6 04/24/2024 09:54 AM    K 3.8 04/22/2024 05:47 AM     04/24/2024 09:54 AM    CO2 18 04/24/2024 09:54 AM    BUN 13 04/24/2024 09:54 AM    CREATININE <0.5 04/24/2024 09:54 AM    GFRAA >60 11/24/2020 06:29 PM    GFRAA >60 01/13/2013 05:24 AM    AGRATIO 1.1 04/12/2024 03:54 PM    LABGLOM >90 04/24/2024 09:54 AM    GLUCOSE 115 04/24/2024 09:54 AM    PROT 5.4 04/22/2024 05:47 AM    PROT 7.5 04/16/2012 08:58 AM    CALCIUM 8.7 04/24/2024 09:54 AM    BILITOT <0.2 04/22/2024 05:47 AM    ALKPHOS 78 04/22/2024 05:47 AM    AST 13 04/22/2024 05:47 AM    ALT <5 04/22/2024 05:47 AM       POC Tests:   Recent Labs     04/24/24  1157   POCGLU 157*         Coags:   Lab Results   Component Value Date/Time    PROTIME 17.3 03/14/2024 04:52 AM    INR

## 2024-04-24 NOTE — FLOWSHEET NOTE
Phase 1 complete, pt seen by anesthesiologist. VSS, pt resting comfortably. Incision sites x1 is CDI, ice applied. G-tube still draining to gravity, green- brown in color. Will transition to phase 2 for d/c.

## 2024-04-24 NOTE — PROGRESS NOTES
[facility] - Inpatient Rehabilitation Department   Phone: [phone]    Physical Therapy    [] Initial Evaluation            [x] Daily Treatment Note         [] Discharge Summary      Patient: Ana Gonzales   : 1955   MRN: [id number]   Date of Service:  2024  Admitting Diagnosis: SBO (small bowel obstruction) ([facility])    Current Admission Summary: Ana Gonzales is a 68 y.o. female who presents to the ED status post exploratory laparotomy with lysis of adhesions, Meckel diverticulectomy and appendectomy 3/19/2024 by Dr. Morales with complaint of generalized weakness and nausea/vomiting.  She states she is too weak to ambulate, which is new.  She states the nausea/vomiting began last night.  She is denying abdominal pain, although abdomen is tender to palpation on exam.  Patient reports an episode of shortness of breath earlier today, but is not feeling short of breath at this moment.  Lower extremity edema is noted and patient is unsure of her baseline.  Patient states her chief concerns are the generalized weakness/fatigue and nausea/vomiting.     Past Medical History:  has a past medical history of Arthritis, Asthma, Brain injury ([facility]), Bronchitis, CHF (congestive heart failure) ([facility]), Depression, Diabetes mellitus ([facility]), DM (diabetes mellitus screen), Elevated cholesterol with high triglycerides, Fall, Fibromyalgia, GERD (gastroesophageal reflux disease), HIGH CHOLESTEROL, Hypertension, Microvascular angina ([facility]), Migraine, Neuropathy, Panic attacks, PONV (postoperative nausea and vomiting), Post traumatic stress disorder, Short-term memory loss, Skin cancer, Sleep apnea, and Vertigo.  Past Surgical History:  has a past surgical history that includes Hysterectomy; eye surgery (cataract B); Carpal tunnel release; Finger trigger release; Gallbladder surgery; bladder suspension; Skin cancer excision; blepharoplasty; laryngoscopy (2017); Coronary stent placement (2023);

## 2024-04-25 LAB
ANION GAP SERPL CALCULATED.3IONS-SCNC: 11 MMOL/L (ref 3–16)
BASOPHILS # BLD: 0.1 K/UL (ref 0–0.2)
BASOPHILS NFR BLD: 1.3 %
BUN SERPL-MCNC: 16 MG/DL (ref 7–20)
CALCIUM SERPL-MCNC: 8.7 MG/DL (ref 8.3–10.6)
CHLORIDE SERPL-SCNC: 107 MMOL/L (ref 99–110)
CO2 SERPL-SCNC: 20 MMOL/L (ref 21–32)
CREAT SERPL-MCNC: <0.5 MG/DL (ref 0.6–1.2)
DEPRECATED RDW RBC AUTO: 17.1 % (ref 12.4–15.4)
EOSINOPHIL # BLD: 0.4 K/UL (ref 0–0.6)
EOSINOPHIL NFR BLD: 6.7 %
GFR SERPLBLD CREATININE-BSD FMLA CKD-EPI: >90 ML/MIN/{1.73_M2}
GLUCOSE BLD-MCNC: 122 MG/DL (ref 70–99)
GLUCOSE BLD-MCNC: 138 MG/DL (ref 70–99)
GLUCOSE BLD-MCNC: 140 MG/DL (ref 70–99)
GLUCOSE BLD-MCNC: 141 MG/DL (ref 70–99)
GLUCOSE BLD-MCNC: 151 MG/DL (ref 70–99)
GLUCOSE BLD-MCNC: 165 MG/DL (ref 70–99)
GLUCOSE SERPL-MCNC: 123 MG/DL (ref 70–99)
HCT VFR BLD AUTO: 26.9 % (ref 36–48)
HGB BLD-MCNC: 8.5 G/DL (ref 12–16)
LYMPHOCYTES # BLD: 1.2 K/UL (ref 1–5.1)
LYMPHOCYTES NFR BLD: 20 %
MAGNESIUM SERPL-MCNC: 1.8 MG/DL (ref 1.8–2.4)
MCH RBC QN AUTO: 26.8 PG (ref 26–34)
MCHC RBC AUTO-ENTMCNC: 31.7 G/DL (ref 31–36)
MCV RBC AUTO: 84.6 FL (ref 80–100)
MONOCYTES # BLD: 0.8 K/UL (ref 0–1.3)
MONOCYTES NFR BLD: 13.4 %
NEUTROPHILS # BLD: 3.6 K/UL (ref 1.7–7.7)
NEUTROPHILS NFR BLD: 58.6 %
PERFORMED ON: ABNORMAL
PHOSPHATE SERPL-MCNC: 3.9 MG/DL (ref 2.5–4.9)
PLATELET # BLD AUTO: 329 K/UL (ref 135–450)
PMV BLD AUTO: 8.8 FL (ref 5–10.5)
POTASSIUM SERPL-SCNC: 4 MMOL/L (ref 3.5–5.1)
RBC # BLD AUTO: 3.18 M/UL (ref 4–5.2)
SODIUM SERPL-SCNC: 138 MMOL/L (ref 136–145)
WBC # BLD AUTO: 6.1 K/UL (ref 4–11)

## 2024-04-25 PROCEDURE — 94761 N-INVAS EAR/PLS OXIMETRY MLT: CPT

## 2024-04-25 PROCEDURE — 99024 POSTOP FOLLOW-UP VISIT: CPT | Performed by: SURGERY

## 2024-04-25 PROCEDURE — 6370000000 HC RX 637 (ALT 250 FOR IP): Performed by: NURSE PRACTITIONER

## 2024-04-25 PROCEDURE — 94640 AIRWAY INHALATION TREATMENT: CPT

## 2024-04-25 PROCEDURE — 1200000000 HC SEMI PRIVATE

## 2024-04-25 PROCEDURE — 6360000002 HC RX W HCPCS: Performed by: NURSE PRACTITIONER

## 2024-04-25 PROCEDURE — C9113 INJ PANTOPRAZOLE SODIUM, VIA: HCPCS | Performed by: SURGERY

## 2024-04-25 PROCEDURE — 2500000003 HC RX 250 WO HCPCS: Performed by: SURGERY

## 2024-04-25 PROCEDURE — 97112 NEUROMUSCULAR REEDUCATION: CPT

## 2024-04-25 PROCEDURE — 2500000003 HC RX 250 WO HCPCS: Performed by: STUDENT IN AN ORGANIZED HEALTH CARE EDUCATION/TRAINING PROGRAM

## 2024-04-25 PROCEDURE — 80048 BASIC METABOLIC PNL TOTAL CA: CPT

## 2024-04-25 PROCEDURE — 97116 GAIT TRAINING THERAPY: CPT

## 2024-04-25 PROCEDURE — 83735 ASSAY OF MAGNESIUM: CPT

## 2024-04-25 PROCEDURE — 6370000000 HC RX 637 (ALT 250 FOR IP): Performed by: SURGERY

## 2024-04-25 PROCEDURE — 97530 THERAPEUTIC ACTIVITIES: CPT

## 2024-04-25 PROCEDURE — 2580000003 HC RX 258: Performed by: SURGERY

## 2024-04-25 PROCEDURE — 85025 COMPLETE CBC W/AUTO DIFF WBC: CPT

## 2024-04-25 PROCEDURE — 99232 SBSQ HOSP IP/OBS MODERATE 35: CPT | Performed by: NURSE PRACTITIONER

## 2024-04-25 PROCEDURE — 84100 ASSAY OF PHOSPHORUS: CPT

## 2024-04-25 PROCEDURE — 6360000002 HC RX W HCPCS: Performed by: SURGERY

## 2024-04-25 PROCEDURE — 97535 SELF CARE MNGMENT TRAINING: CPT

## 2024-04-25 PROCEDURE — 36415 COLL VENOUS BLD VENIPUNCTURE: CPT

## 2024-04-25 RX ORDER — ACETAMINOPHEN 160 MG/5ML
650 LIQUID ORAL 3 TIMES DAILY
Status: DISCONTINUED | OUTPATIENT
Start: 2024-04-25 | End: 2024-04-25

## 2024-04-25 RX ORDER — FUROSEMIDE 10 MG/ML
20 INJECTION INTRAMUSCULAR; INTRAVENOUS ONCE
Status: COMPLETED | OUTPATIENT
Start: 2024-04-25 | End: 2024-04-25

## 2024-04-25 RX ORDER — KETOROLAC TROMETHAMINE 15 MG/ML
15 INJECTION, SOLUTION INTRAMUSCULAR; INTRAVENOUS EVERY 6 HOURS
Status: DISPENSED | OUTPATIENT
Start: 2024-04-25 | End: 2024-04-26

## 2024-04-25 RX ORDER — ACETAMINOPHEN 325 MG/1
650 TABLET ORAL 3 TIMES DAILY
Status: DISCONTINUED | OUTPATIENT
Start: 2024-04-25 | End: 2024-05-02 | Stop reason: HOSPADM

## 2024-04-25 RX ORDER — POTASSIUM CHLORIDE 20 MEQ/1
20 TABLET, EXTENDED RELEASE ORAL ONCE
Status: COMPLETED | OUTPATIENT
Start: 2024-04-25 | End: 2024-04-25

## 2024-04-25 RX ADMIN — SODIUM CHLORIDE, PRESERVATIVE FREE 10 ML: 5 INJECTION INTRAVENOUS at 20:31

## 2024-04-25 RX ADMIN — RANOLAZINE 1000 MG: 500 TABLET, EXTENDED RELEASE ORAL at 09:43

## 2024-04-25 RX ADMIN — TOPIRAMATE 150 MG: 100 TABLET, FILM COATED ORAL at 18:24

## 2024-04-25 RX ADMIN — HYDROMORPHONE HYDROCHLORIDE 1 MG: 1 INJECTION, SOLUTION INTRAMUSCULAR; INTRAVENOUS; SUBCUTANEOUS at 01:38

## 2024-04-25 RX ADMIN — ARIPIPRAZOLE 2 MG: 2 TABLET ORAL at 20:31

## 2024-04-25 RX ADMIN — SODIUM CHLORIDE, PRESERVATIVE FREE 10 ML: 5 INJECTION INTRAVENOUS at 09:45

## 2024-04-25 RX ADMIN — POTASSIUM CHLORIDE 20 MEQ: 1500 TABLET, EXTENDED RELEASE ORAL at 14:01

## 2024-04-25 RX ADMIN — TOPIRAMATE 100 MG: 100 TABLET, FILM COATED ORAL at 09:44

## 2024-04-25 RX ADMIN — BUPROPION HYDROCHLORIDE 300 MG: 150 TABLET, EXTENDED RELEASE ORAL at 09:44

## 2024-04-25 RX ADMIN — GABAPENTIN 600 MG: 300 CAPSULE ORAL at 09:43

## 2024-04-25 RX ADMIN — ACETAMINOPHEN 650 MG: 325 TABLET ORAL at 14:01

## 2024-04-25 RX ADMIN — GABAPENTIN 600 MG: 300 CAPSULE ORAL at 18:24

## 2024-04-25 RX ADMIN — CARVEDILOL 3.12 MG: 3.12 TABLET, FILM COATED ORAL at 09:44

## 2024-04-25 RX ADMIN — TIOTROPIUM BROMIDE INHALATION SPRAY 2 PUFF: 3.12 SPRAY, METERED RESPIRATORY (INHALATION) at 12:28

## 2024-04-25 RX ADMIN — LEUCINE, PHENYLALANINE, LYSINE, METHIONINE, ISOLEUCINE, VALINE, HISTIDINE, THREONINE, TRYPTOPHAN, ALANINE, GLYCINE, ARGININE, PROLINE, SERINE, TYROSINE, SODIUM ACETATE, DIBASIC POTASSIUM PHOSPHATE, MAGNESIUM CHLORIDE, SODIUM CHLORIDE, CALCIUM CHLORIDE, DEXTROSE
365; 280; 290; 200; 300; 290; 240; 210; 90; 1035; 515; 575; 340; 250; 20; 340; 261; 51; 59; 33; 20 INJECTION INTRAVENOUS at 18:21

## 2024-04-25 RX ADMIN — RANOLAZINE 1000 MG: 500 TABLET, EXTENDED RELEASE ORAL at 20:30

## 2024-04-25 RX ADMIN — HYDROMORPHONE HYDROCHLORIDE 1 MG: 1 INJECTION, SOLUTION INTRAMUSCULAR; INTRAVENOUS; SUBCUTANEOUS at 18:12

## 2024-04-25 RX ADMIN — HYDROMORPHONE HYDROCHLORIDE 1 MG: 1 INJECTION, SOLUTION INTRAMUSCULAR; INTRAVENOUS; SUBCUTANEOUS at 08:33

## 2024-04-25 RX ADMIN — GABAPENTIN 600 MG: 300 CAPSULE ORAL at 14:01

## 2024-04-25 RX ADMIN — Medication 2 PUFF: at 19:49

## 2024-04-25 RX ADMIN — ENOXAPARIN SODIUM 40 MG: 100 INJECTION SUBCUTANEOUS at 09:44

## 2024-04-25 RX ADMIN — CARVEDILOL 3.12 MG: 3.12 TABLET, FILM COATED ORAL at 18:24

## 2024-04-25 RX ADMIN — TROSPIUM CHLORIDE 20 MG: 20 TABLET, FILM COATED ORAL at 20:31

## 2024-04-25 RX ADMIN — FUROSEMIDE 20 MG: 10 INJECTION, SOLUTION INTRAMUSCULAR; INTRAVENOUS at 14:01

## 2024-04-25 RX ADMIN — ISOSORBIDE MONONITRATE 120 MG: 30 TABLET, EXTENDED RELEASE ORAL at 09:43

## 2024-04-25 RX ADMIN — ALTEPLASE 1 MG: 2.2 INJECTION, POWDER, LYOPHILIZED, FOR SOLUTION INTRAVENOUS at 10:48

## 2024-04-25 RX ADMIN — PANTOPRAZOLE SODIUM 40 MG: 40 INJECTION, POWDER, FOR SOLUTION INTRAVENOUS at 09:44

## 2024-04-25 RX ADMIN — ACETAMINOPHEN 650 MG: 325 TABLET ORAL at 20:29

## 2024-04-25 RX ADMIN — OLIVE OIL AND SOYBEAN OIL 250 ML: 16; 4 INJECTION, EMULSION INTRAVENOUS at 18:23

## 2024-04-25 RX ADMIN — DILTIAZEM HYDROCHLORIDE 120 MG: 120 CAPSULE, EXTENDED RELEASE ORAL at 09:43

## 2024-04-25 RX ADMIN — GABAPENTIN 600 MG: 300 CAPSULE ORAL at 20:30

## 2024-04-25 RX ADMIN — NORTRIPTYLINE HYDROCHLORIDE 75 MG: 25 CAPSULE ORAL at 20:31

## 2024-04-25 RX ADMIN — Medication 2 PUFF: at 12:28

## 2024-04-25 ASSESSMENT — PAIN SCALES - GENERAL
PAINLEVEL_OUTOF10: 4
PAINLEVEL_OUTOF10: 8
PAINLEVEL_OUTOF10: 5
PAINLEVEL_OUTOF10: 7
PAINLEVEL_OUTOF10: 5
PAINLEVEL_OUTOF10: 5
PAINLEVEL_OUTOF10: 4
PAINLEVEL_OUTOF10: 9

## 2024-04-25 ASSESSMENT — PAIN SCALES - WONG BAKER
WONGBAKER_NUMERICALRESPONSE: HURTS A LITTLE BIT
WONGBAKER_NUMERICALRESPONSE: NO HURT

## 2024-04-25 ASSESSMENT — PAIN DESCRIPTION - ORIENTATION
ORIENTATION: LEFT

## 2024-04-25 ASSESSMENT — PAIN DESCRIPTION - DESCRIPTORS
DESCRIPTORS: SHARP
DESCRIPTORS: ACHING;SHARP
DESCRIPTORS: SHARP
DESCRIPTORS: SHARP
DESCRIPTORS: DISCOMFORT;NAGGING;SORE
DESCRIPTORS: DISCOMFORT;SORE

## 2024-04-25 ASSESSMENT — PAIN DESCRIPTION - PAIN TYPE: TYPE: ACUTE PAIN

## 2024-04-25 ASSESSMENT — PAIN DESCRIPTION - LOCATION
LOCATION: ARM
LOCATION: WRIST
LOCATION: HAND
LOCATION: HAND
LOCATION: ARM;HAND
LOCATION: WRIST

## 2024-04-25 NOTE — PROGRESS NOTES
[facility] - Inpatient Rehabilitation Department   Phone: [phone]    Occupational Therapy    [] Initial Evaluation            [x] Daily Treatment Note         [] Discharge Summary      Patient: Ana Gonzales   : 1955   MRN: [id number]   Date of Service:  2024    Admitting Diagnosis:  Partial small bowel obstruction (HCC)  Current Admission Summary: Per H&P: \"Ana Gonzales is a 68 y.o. female with a pmh of upper GI bleed, SBO/persistent ileus, TIA, CHF, CAD who presents with SBO (small bowel obstruction)\"   Past Medical History:  has a past medical history of Arthritis, Asthma, Brain injury (HCC), Bronchitis, CHF (congestive heart failure) (HCC), Depression, Diabetes mellitus (HCC), DM (diabetes mellitus screen), Elevated cholesterol with high triglycerides, Fall, Fibromyalgia, GERD (gastroesophageal reflux disease), HIGH CHOLESTEROL, Hypertension, Microvascular angina (HCC), Migraine, Neuropathy, Panic attacks, PONV (postoperative nausea and vomiting), Post traumatic stress disorder, Short-term memory loss, Skin cancer, Sleep apnea, and Vertigo.  Past Surgical History:  has a past surgical history that includes Hysterectomy; eye surgery (cataract B); Carpal tunnel release; Finger trigger release; Gallbladder surgery; bladder suspension; Skin cancer excision; blepharoplasty; laryngoscopy (2017); Coronary stent placement (2023); joint replacement (Right); back surgery; Upper gastrointestinal endoscopy (N/A, 10/2/2023); Upper gastrointestinal endoscopy (N/A, 3/13/2024); Upper gastrointestinal endoscopy (N/A, 3/14/2024); Upper gastrointestinal endoscopy (N/A, 3/14/2024); Upper gastrointestinal endoscopy (N/A, 3/14/2024); laparotomy (N/A, 3/19/2024); Upper gastrointestinal endoscopy (N/A, 3/26/2024); laparotomy (N/A, 2024); Gastrostomy tube placement (N/A, 2024); and Abdomen surgery (N/A, 2024).    Discharge Recommendations: Ana Gonzales scored a  on  (+): requires min (A) to maintain balance  Dynamic Standing Balance: poor (+): requires min (A) to maintain balance    Other Therapeutic Interventions    Functional Outcomes  AM-PAC Inpatient Daily Activity Raw Score: 13          Cognition  WFL  Orientation:    alert and oriented x 4  Command Following:   WFL     Education  Barriers To Learning: none  Patient Education: patient educated on goals, OT role and benefits, plan of care, adaptive device training, family education, disease specific education, transfer training, discharge recommendations  Learning Assessment:  patient verbalizes and demonstrates understanding    Assessment  Activity Tolerance: Fair, limited by L wrist pain and generalized weakness/fatigue  Impairments Requiring Therapeutic Intervention: decreased functional mobility, decreased ADL status, decreased safety awareness, decreased endurance, decreased IADL, increased pain, decreased posture  Prognosis: good  Clinical Assessment: Pt recently had a 30+ day hospital admission due to complicated small bowel obstruction. Pt was home for a little over a day before being readmitted to the hospital; pt was planning on HHOT and 24 hr assist from . Pt reported along with  difficulty navigating stairs into house following previous d/c. Pt required increased assist this date and greatly limited by left wrist pain of 10/10. Pt required platform walker left and Mod A x 2 to ambulate 5ft x 2 to/from BSC. Pt Min A x 2 for functional transfers from EOB and BSC. Pt would greatly benefit from intensive inpt therapy at d/c to maximize functional independence and safety. Continue with POC.  Safety Interventions: patient left in bed, bed alarm in place, call light within reach, gait belt, nurse notified, and family/caregiver present    Plan  Frequency: 3-5 x/per week  Current Treatment Recommendations: strengthening, balance training, functional mobility training, endurance training, patient/caregiver
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
## 2024-04-25 NOTE — PROGRESS NOTES
[facility]    4/25/2024 7:27 AM        Name: Ana Gonzales .              Admitted: 4/12/2024  Primary Care Provider: Mellisa Houston MD (Tel: [phone])      Subjective:  .    Seen this am.  at bedside.   Pt walked in the halls this am with therapy.  Now up in the chair.    Few bowel sounds noted today.  G tube to gravity     Had wound closure on 4/24  Required IV pain meds for left wrist pain which occurred after working with therapy on the parallel bars.  Reports wrist hurts more than abd wound.           PATIENT IS NOT A CANDIDATE FOR LTAC    Reviewed interval ancillary notes    Current Medications  ALTEplase (CATHFLO) injection 1 mg, Once  PN-Adult Premix 5/20 - Standard Electrolytes - Central Line, Continuous TPN  magnesium sulfate 2000 mg in 50 mL IVPB premix, Once  lidocaine 4 % external patch 1 patch, Daily  insulin lispro (HUMALOG) injection vial 0-8 Units, Q4H  dextrose bolus 10% 125 mL, PRN   Or  dextrose bolus 10% 250 mL, PRN  glucagon injection 1 mg, PRN  dextrose 10 % infusion, Continuous PRN  enoxaparin (LOVENOX) injection 40 mg, Daily  pantoprazole (PROTONIX) injection 40 mg, Daily  HYDROmorphone HCl PF (DILAUDID) injection 1 mg, Q2H PRN  hydrALAZINE (APRESOLINE) injection 10 mg, Q6H PRN  lidocaine PF 1 % injection 5 mL, Once  sodium chloride flush 0.9 % injection 5-40 mL, 2 times per day  sodium chloride flush 0.9 % injection 5-40 mL, PRN  0.9 % sodium chloride infusion, PRN  fat emulsion (INTRALIPID/NUTRILIPID) 20 % infusion 250 mL, Once per day on Mon Thu  buPROPion (WELLBUTRIN XL) extended release tablet 300 mg, QAM  carvedilol (COREG) tablet 3.125 mg, BID WC  dilTIAZem (CARDIZEM CD) extended release capsule 120 mg, Daily  gabapentin (NEURONTIN) capsule 600 mg, 4x Daily  isosorbide mononitrate (IMDUR) extended release tablet 120 mg, Daily  nortriptyline (PAMELOR) capsule 75 mg, Nightly  ranolazine (RANEXA) extended release tablet 1,000 mg,  wound vac in place, G tube to gravity in place. Green output noted   Musculoskeletal: No cyanosis in digits, neck supple, left wrist splint removed, mild edema noted, no significant redness , has intense pain with light touch   Neurology: CN 2-12 grossly intact. No speech or motor deficits  Psych: Normal affect. Alert and oriented in time, place and person  Skin: Warm, dry, normal turgor    Labs and Tests:  CBC:   Recent Labs     04/23/24  0445 04/24/24  0954 04/25/24  0603   WBC 5.5 6.8 6.1   HGB 8.5* 9.2* 8.5*    316 329       BMP:    Recent Labs     04/23/24  0445 04/24/24  0954 04/25/24  0603   * 135* 138   K 3.7 3.6 4.0    105 107   CO2 21 18* 20*   BUN 12 13 16   CREATININE <0.5* <0.5* <0.5*   GLUCOSE 404* 115* 123*       Hepatic:   No results for input(s): \"AST\", \"ALT\", \"ALB\", \"BILITOT\", \"ALKPHOS\" in the last 72 hours.  URINE culture  50,000 CFU/ml Mixed pathogens  Multiple organisms isolated, no predominance. Culture  indicates probable contamination. Please review colony count  and clinical indications to determine if a repeat culture is  necessary. No further workup to be done.     CTPA:  MPRESSION:  1. No evidence of pulmonary embolism.  2. No evidence of acute airspace consolidation to suggest pneumonia.  3. Chronic parenchymal scarring within the right lung, as above.  4. The stomach and visualized small bowel loops are fluid-filled and  distended, suggestive of a developing small-bowel obstruction as seen on the  abdominal CT earlier today.  5. Coronary atherosclerosis.    CT head:  IMPRESSION:  No acute intracranial abnormality.    IMPRESSION:  CT abd pelvis   Gas and fluid-filled dilated stomach and small bowel with gradual transition  to normal caliber bowel distally at the level of the ileum.  Findings may  represent partial small bowel obstruction or less likely generalized ileus.    4/24/Xray Left Wrist   IMPRESSION:  Prominent widening of the scapholunate space which could

## 2024-04-25 NOTE — PLAN OF CARE
Problem: Discharge Planning  Goal: Discharge to home or other facility with appropriate resources  4/25/2024 1038 by Nuha Mullins RN  Outcome: Progressing  4/25/2024 0130 by Pascale Rahman RN  Outcome: Progressing     Problem: Safety - Adult  Goal: Free from fall injury  4/25/2024 1038 by Nuha Mullins RN  Outcome: Progressing  4/25/2024 0130 by Pascale Rahman RN  Outcome: Progressing     Problem: Pain  Goal: Verbalizes/displays adequate comfort level or baseline comfort level  4/25/2024 1038 by Nuah Mullins RN  Outcome: Progressing  4/25/2024 0130 by Pascale Rahman RN  Outcome: Progressing     Problem: Skin/Tissue Integrity  Goal: Absence of new skin breakdown  Description: 1.  Monitor for areas of redness and/or skin breakdown  2.  Assess vascular access sites hourly  3.  Every 4-6 hours minimum:  Change oxygen saturation probe site  4.  Every 4-6 hours:  If on nasal continuous positive airway pressure, respiratory therapy assess nares and determine need for appliance change or resting period.  4/25/2024 1038 by Nuha Mullins RN  Outcome: Progressing  4/25/2024 0130 by Pascale Rahman RN  Outcome: Progressing     Problem: Chronic Conditions and Co-morbidities  Goal: Patient's chronic conditions and co-morbidity symptoms are monitored and maintained or improved  4/25/2024 1038 by Nuha Mullins RN  Outcome: Progressing  4/25/2024 0130 by Pascale Rahman RN  Outcome: Progressing     Problem: ABCDS Injury Assessment  Goal: Absence of physical injury  4/25/2024 1038 by Nuha Mullins RN  Outcome: Progressing  4/25/2024 0130 by Pascale Rahman RN  Outcome: Progressing     Problem: Nutrition Deficit:  Goal: Optimize nutritional status  4/25/2024 1038 by Nuha Mullins RN  Outcome: Progressing  4/25/2024 0130 by Pascale Rahman RN  Outcome: Progressing     Problem: Respiratory - Adult  Goal: Achieves optimal ventilation and

## 2024-04-25 NOTE — CARE COORDINATION
Prior Authorization submitted for ARU approval with a reference number: [id number]     ARU will continue to follow progress and update discharge plan as needed.    VANDANA BettsN, .[phone]

## 2024-04-25 NOTE — PROGRESS NOTES
[facility] - Inpatient Rehabilitation Department   Phone: [phone]    Physical Therapy    [] Initial Evaluation            [x] Daily Treatment Note         [] Discharge Summary      Patient: Ana Gonzales   : 1955   MRN: [id number]   Date of Service:  2024  Admitting Diagnosis: Partial small bowel obstruction (HCC)    Current Admission Summary: Ana Gonzales is a 68 y.o. female who presents to the ED status post exploratory laparotomy with lysis of adhesions, Meckel diverticulectomy and appendectomy 3/19/2024 by Dr. Morales with complaint of generalized weakness and nausea/vomiting.  She states she is too weak to ambulate, which is new.  She states the nausea/vomiting began last night.  She is denying abdominal pain, although abdomen is tender to palpation on exam.  Patient reports an episode of shortness of breath earlier today, but is not feeling short of breath at this moment.  Lower extremity edema is noted and patient is unsure of her baseline.  Patient states her chief concerns are the generalized weakness/fatigue and nausea/vomiting.     Past Medical History:  has a past medical history of Arthritis, Asthma, Brain injury (HCC), Bronchitis, CHF (congestive heart failure) (HCC), Depression, Diabetes mellitus (HCC), DM (diabetes mellitus screen), Elevated cholesterol with high triglycerides, Fall, Fibromyalgia, GERD (gastroesophageal reflux disease), HIGH CHOLESTEROL, Hypertension, Microvascular angina (HCC), Migraine, Neuropathy, Panic attacks, PONV (postoperative nausea and vomiting), Post traumatic stress disorder, Short-term memory loss, Skin cancer, Sleep apnea, and Vertigo.  Past Surgical History:  has a past surgical history that includes Hysterectomy; eye surgery (cataract B); Carpal tunnel release; Finger trigger release; Gallbladder surgery; bladder suspension; Skin cancer excision; blepharoplasty; laryngoscopy (2017); Coronary stent placement (2023);  \"cricket\" ringing sounds occasionally  Observation:   General Observation:  Pt has a lumbar scoliosis due to an injury and spine surgery.  She stands with her hips to the L and trunk leaning to the R.  This is chronic and her normal posture.       Subjective  General: Patient supine in bed with spouse present. Patient is agreeable to PT/OT.  Pain: 6/10.  Location: L wrist    Pain Interventions: pain medication in place prior to arrival, repositioned , and therapy activities modified - notified Dr. Morales       Functional Mobility  Bed Mobility:  Supine to Sit: 2 person assistance with Mod A   Scooting: maximum assistance  Comments:  Transfers:  Sit to stand transfer: minimal assistance  Stand to sit transfer: minimal assistance, The pt sat down on the chair twice with min A of 1.  After ambulating, she was fatigued and required a chair to be pulled up to her for safety due to weakness.  She then required min A of 2 to sit safely.   Stand step transfer: 2 person assistance with min A   Comments: Patient transferred from bed to chair.  She then stood an additional time with a chair in front of her with min A for balance for 2 minutes while being assisted with pericare.    Ambulation:  Surface:level surface  Assistive Device: platform walker (L)  Assistance: minimal assistance  Distance: 46'  Gait Mechanics: decreased step length, heels close together, B feet externally rotated, leans to the R   Comments: The pt was fatigued around 2 feet from the chair and had to be assisted to turn and the chair had to be pulled up.  Min A of 2 to safely sit.   Stair Mobility:  Stair mobility not completed on this date.  Comments:  Wheelchair Mobility:  No w/c mobility completed on this date.  Comments:  Balance:  Static Sitting Balance: fair (-): maintains balance at CGA with use of UE support  Dynamic Sitting Balance: poor (+): requires min (A) to maintain balance  Static Standing Balance: poor (+): requires min (A) to

## 2024-04-25 NOTE — PROGRESS NOTES
No significant change clinically  Abdominal examination stable  Appreciate Ortho consult  Continue G-tube to gravity  Continue TPN  Continue PT OT  Patient ready for disposition

## 2024-04-25 NOTE — PROGRESS NOTES
[facility] Orthopedic Surgery  Progress Note      Chief complaint: LEFT wrist pain    Subjective: Seen sitting up in the bed.  Splint in place.   at bedside.  She reports moderate ongoing left wrist pain.  Denies new issues.     Independent imaging review of the left wrist via plain films shows scapholunate widening (chronic) with diffuse radiocarpal degenerative changes - no acute abnormalities noted.     She is RHD.  Denies hx of gout or inflammatory arthropathy.  She does have fibromyalgia    Review of Systems:  Constitutional: Negative for fever, chills, fatigue.   Skin:  Negative for pruritis, rash  Eyes: Negative for photophobia and visual disturbance.   ENT:  Negative for rhinorrhea, epistaxis, sore throat  Respiratory:  Negative for cough and shortness of breath.   Cardiovascular: Negative for chest pain.   Gastrointestinal: Negative for nausea, vomiting, diarrhea.  Genitourinary: Negative for dysuria and difficulty urinating.   Neurological: Negative for confusion, dysarthria, tremors, seizures.   Psychiatric:  Negative for depression or anxiety  Musculoskeletal:  Positive for left wrist pain    Objective:  Vitals:    04/25/24 0445   BP: 134/74   Pulse: 76   Resp: 18   Temp: 98.4 °F (36.9 °C)   SpO2: 93%      Physical Examination:  GENERAL: No apparent distress, well-nourished  SKIN:  Warm and dry  EYES: Nonicteric.   ENT: Mucous membranes moist  HEAD: Normocephalic, atraumatic  RESPIRATORY: Resp easy and unlabored  CARDIOVASCULAR: Regular rate and rhythm  GI: Abdomen soft, nontender  NEURO: Awake and alert.  No speech defect  PSYCHIATRIC: Appropriate affect; not agitated  MUSCULOSKELETAL:  LEFT wrist  Inspection: On exam there are no ulcerations, rashes or lesions about the left wrist.   There is pain to palpation of the dorsal and ventral aspect of the wrist globally.  Minimal swelling   no warmth or erythema..  Motor: Limited wrist ROM due to pain.  Sensation: Grossly intact to light touch throughout  MD

## 2024-04-25 NOTE — PROGRESS NOTES
Shift assessment completed, A&OX4, vitals, BS monitored, incontinent care completed, pure wick replaced, abd dressing clean.dry and intact, c/o pain in her left wrist, TPN infusing 83ml/hr,  pain ,managed with prn dilaudid, family at her bed side, patient resting in her asher, all her evening medications administered as per mar, all safety precautions applied, care plan reviewed with the patient.  PICC line dressing changed.  Pascale Rahman RN

## 2024-04-25 NOTE — PROGRESS NOTES
Clinical Pharmacy Note    Pharmacy consulted by Aria Carter to manage TPN    Current TPN rate: 83 mL/hr  Goal TPN rate: 83mL/hr     Access: PICC  Indication: SBO    Labs:  General Labs:  BMP:    Lab Results   Component Value Date/Time     04/15/2024 05:00 AM    K 4.1 04/15/2024 05:00 AM     04/15/2024 05:00 AM    CO2 20 04/15/2024 05:00 AM    BUN 3 04/15/2024 05:00 AM    LABALBU 3.9 04/12/2024 03:54 PM    CREATININE 0.6 04/15/2024 05:00 AM    CALCIUM 8.3 04/15/2024 05:00 AM    GFRAA >60 11/24/2020 06:29 PM    GFRAA >60 01/13/2013 05:24 AM    LABGLOM >90 04/15/2024 05:00 AM    GLUCOSE 77 04/15/2024 05:00 AM       Electrolyte replacement as follows:   None needed    Blood sugars over past 24 hours: 131-157    Blood sugar management:  Plan to continue Humalog q4 hour sliding scale at med dose.    Plan to continue TPN at 83 ml/hr.     Thank you for allowing pharmacy to participate in the care of this patient.    Kathleen Chung, PharmD  PGY-1 Pharmacy Resident  [id number]

## 2024-04-25 NOTE — OP NOTE
[place] [address]                            OPERATIVE REPORT      PATIENT NAME: SCAR KU              : 1955  MED REC NO: [id number]                      ROOM: [address] [place] [address]  ACCOUNT NO: [id number]                       ADMIT DATE: 2024  PROVIDER: Sera Morales MD      DATE OF PROCEDURE:      SURGEON:  Sera Morales MD    PREOPERATIVE DIAGNOSIS:  Open abdominal wound.    POSTOPERATIVE DIAGNOSIS:  Open abdominal wound.    PROCEDURE:  Planned secondary intermediate abdominal wound closure (17 cm).    ANESTHESIA:  MAC with local.    OPERATIVE INDICATIONS AND CONSENT:  The patient is a 68-year-old female brought to the operating room today for planned secondary wound closure.  She was explained the risks, benefits, and possible complications.    DETAILS OF THE PROCEDURE:  The patient was in the supine position.  After MAC, she was prepped and draped in the usual sterile fashion.    The superficial fascia and subcutaneous tissue were closed with interrupted 3-0 Vicryl sutures.  The skin was closed with staples.  The length of the incision was 17 cm.  A dressing was applied.  She tolerated the procedure without difficulty, and was transferred to the recovery room in stable condition.          SERA MORALES MD      D:  2024 14:12:24     T:  2024 21:13:26     KAMRON/THADDEUS  Job #:  [id number]     Doc#:  [id number]

## 2024-04-26 LAB
ALBUMIN SERPL-MCNC: 2.8 G/DL (ref 3.4–5)
ALP SERPL-CCNC: 119 U/L (ref 40–129)
ALT SERPL-CCNC: 9 U/L (ref 10–40)
ANION GAP SERPL CALCULATED.3IONS-SCNC: 12 MMOL/L (ref 3–16)
AST SERPL-CCNC: 22 U/L (ref 15–37)
BASOPHILS # BLD: 0 K/UL (ref 0–0.2)
BASOPHILS NFR BLD: 0.8 %
BILIRUB DIRECT SERPL-MCNC: <0.2 MG/DL (ref 0–0.3)
BILIRUB INDIRECT SERPL-MCNC: ABNORMAL MG/DL (ref 0–1)
BILIRUB SERPL-MCNC: <0.2 MG/DL (ref 0–1)
BUN SERPL-MCNC: 14 MG/DL (ref 7–20)
CALCIUM SERPL-MCNC: 9 MG/DL (ref 8.3–10.6)
CHLORIDE SERPL-SCNC: 107 MMOL/L (ref 99–110)
CO2 SERPL-SCNC: 21 MMOL/L (ref 21–32)
CREAT SERPL-MCNC: <0.5 MG/DL (ref 0.6–1.2)
DEPRECATED RDW RBC AUTO: 17.4 % (ref 12.4–15.4)
EOSINOPHIL # BLD: 0.4 K/UL (ref 0–0.6)
EOSINOPHIL NFR BLD: 8.2 %
GFR SERPLBLD CREATININE-BSD FMLA CKD-EPI: >90 ML/MIN/{1.73_M2}
GLUCOSE BLD-MCNC: 130 MG/DL (ref 70–99)
GLUCOSE BLD-MCNC: 147 MG/DL (ref 70–99)
GLUCOSE BLD-MCNC: 147 MG/DL (ref 70–99)
GLUCOSE BLD-MCNC: 152 MG/DL (ref 70–99)
GLUCOSE BLD-MCNC: 158 MG/DL (ref 70–99)
GLUCOSE BLD-MCNC: 96 MG/DL (ref 70–99)
GLUCOSE SERPL-MCNC: 102 MG/DL (ref 70–99)
HCT VFR BLD AUTO: 26.4 % (ref 36–48)
HGB BLD-MCNC: 8.4 G/DL (ref 12–16)
LYMPHOCYTES # BLD: 1.2 K/UL (ref 1–5.1)
LYMPHOCYTES NFR BLD: 21.7 %
MAGNESIUM SERPL-MCNC: 1.7 MG/DL (ref 1.8–2.4)
MCH RBC QN AUTO: 26.7 PG (ref 26–34)
MCHC RBC AUTO-ENTMCNC: 31.7 G/DL (ref 31–36)
MCV RBC AUTO: 84.3 FL (ref 80–100)
MONOCYTES # BLD: 0.5 K/UL (ref 0–1.3)
MONOCYTES NFR BLD: 8.5 %
NEUTROPHILS # BLD: 3.3 K/UL (ref 1.7–7.7)
NEUTROPHILS NFR BLD: 60.8 %
PERFORMED ON: ABNORMAL
PERFORMED ON: NORMAL
PLATELET # BLD AUTO: 330 K/UL (ref 135–450)
PMV BLD AUTO: 9 FL (ref 5–10.5)
POTASSIUM SERPL-SCNC: 3.8 MMOL/L (ref 3.5–5.1)
PROT SERPL-MCNC: 5.9 G/DL (ref 6.4–8.2)
RBC # BLD AUTO: 3.13 M/UL (ref 4–5.2)
SODIUM SERPL-SCNC: 140 MMOL/L (ref 136–145)
WBC # BLD AUTO: 5.4 K/UL (ref 4–11)

## 2024-04-26 PROCEDURE — 85025 COMPLETE CBC W/AUTO DIFF WBC: CPT

## 2024-04-26 PROCEDURE — 83735 ASSAY OF MAGNESIUM: CPT

## 2024-04-26 PROCEDURE — 94640 AIRWAY INHALATION TREATMENT: CPT

## 2024-04-26 PROCEDURE — 80048 BASIC METABOLIC PNL TOTAL CA: CPT

## 2024-04-26 PROCEDURE — 6370000000 HC RX 637 (ALT 250 FOR IP): Performed by: SURGERY

## 2024-04-26 PROCEDURE — 6360000002 HC RX W HCPCS: Performed by: NURSE PRACTITIONER

## 2024-04-26 PROCEDURE — 97116 GAIT TRAINING THERAPY: CPT

## 2024-04-26 PROCEDURE — 6370000000 HC RX 637 (ALT 250 FOR IP): Performed by: NURSE PRACTITIONER

## 2024-04-26 PROCEDURE — 80076 HEPATIC FUNCTION PANEL: CPT

## 2024-04-26 PROCEDURE — 2580000003 HC RX 258: Performed by: SURGERY

## 2024-04-26 PROCEDURE — 1200000000 HC SEMI PRIVATE

## 2024-04-26 PROCEDURE — 6360000002 HC RX W HCPCS: Performed by: SURGERY

## 2024-04-26 PROCEDURE — 97530 THERAPEUTIC ACTIVITIES: CPT

## 2024-04-26 PROCEDURE — APPSS15 APP SPLIT SHARED TIME 0-15 MINUTES: Performed by: NURSE PRACTITIONER

## 2024-04-26 PROCEDURE — 94761 N-INVAS EAR/PLS OXIMETRY MLT: CPT

## 2024-04-26 PROCEDURE — 6360000002 HC RX W HCPCS: Performed by: INTERNAL MEDICINE

## 2024-04-26 PROCEDURE — C9113 INJ PANTOPRAZOLE SODIUM, VIA: HCPCS | Performed by: SURGERY

## 2024-04-26 PROCEDURE — 99024 POSTOP FOLLOW-UP VISIT: CPT | Performed by: SURGERY

## 2024-04-26 PROCEDURE — 2500000003 HC RX 250 WO HCPCS: Performed by: INTERNAL MEDICINE

## 2024-04-26 PROCEDURE — 97535 SELF CARE MNGMENT TRAINING: CPT

## 2024-04-26 PROCEDURE — APPNB30 APP NON BILLABLE TIME 0-30 MINS: Performed by: NURSE PRACTITIONER

## 2024-04-26 RX ORDER — BISACODYL 10 MG
10 SUPPOSITORY, RECTAL RECTAL EVERY 6 HOURS
Status: COMPLETED | OUTPATIENT
Start: 2024-04-26 | End: 2024-04-26

## 2024-04-26 RX ORDER — MAGNESIUM SULFATE IN WATER 40 MG/ML
2000 INJECTION, SOLUTION INTRAVENOUS ONCE
Status: COMPLETED | OUTPATIENT
Start: 2024-04-26 | End: 2024-04-26

## 2024-04-26 RX ORDER — METOCLOPRAMIDE HYDROCHLORIDE 5 MG/ML
10 INJECTION INTRAMUSCULAR; INTRAVENOUS EVERY 6 HOURS
Status: DISCONTINUED | OUTPATIENT
Start: 2024-04-26 | End: 2024-05-02 | Stop reason: HOSPADM

## 2024-04-26 RX ORDER — MAGNESIUM SULFATE 1 G/100ML
1000 INJECTION INTRAVENOUS ONCE
Status: DISCONTINUED | OUTPATIENT
Start: 2024-04-26 | End: 2024-04-26

## 2024-04-26 RX ADMIN — NORTRIPTYLINE HYDROCHLORIDE 75 MG: 25 CAPSULE ORAL at 21:15

## 2024-04-26 RX ADMIN — GABAPENTIN 600 MG: 300 CAPSULE ORAL at 18:25

## 2024-04-26 RX ADMIN — BUPROPION HYDROCHLORIDE 300 MG: 150 TABLET, EXTENDED RELEASE ORAL at 09:00

## 2024-04-26 RX ADMIN — MAGNESIUM SULFATE HEPTAHYDRATE 2000 MG: 40 INJECTION, SOLUTION INTRAVENOUS at 11:26

## 2024-04-26 RX ADMIN — Medication 2 PUFF: at 11:04

## 2024-04-26 RX ADMIN — ACETAMINOPHEN 650 MG: 325 TABLET ORAL at 08:59

## 2024-04-26 RX ADMIN — ACETAMINOPHEN 650 MG: 325 TABLET ORAL at 21:13

## 2024-04-26 RX ADMIN — DILTIAZEM HYDROCHLORIDE 120 MG: 120 CAPSULE, EXTENDED RELEASE ORAL at 09:00

## 2024-04-26 RX ADMIN — ASCORBIC ACID, VITAMIN A PALMITATE, CHOLECALCIFEROL, THIAMINE HYDROCHLORIDE, RIBOFLAVIN-5 PHOSPHATE SODIUM, PYRIDOXINE HYDROCHLORIDE, NIACINAMIDE, DEXPANTHENOL, ALPHA-TOCOPHEROL ACETATE, VITAMIN K1, FOLIC ACID, BIOTIN, CYANOCOBALAMIN: 200; 3300; 200; 6; 3.6; 6; 40; 15; 10; 150; 600; 60; 5 INJECTION, SOLUTION INTRAVENOUS at 18:33

## 2024-04-26 RX ADMIN — GABAPENTIN 600 MG: 300 CAPSULE ORAL at 08:59

## 2024-04-26 RX ADMIN — METOCLOPRAMIDE 10 MG: 5 INJECTION, SOLUTION INTRAMUSCULAR; INTRAVENOUS at 15:05

## 2024-04-26 RX ADMIN — PANTOPRAZOLE SODIUM 40 MG: 40 INJECTION, POWDER, FOR SOLUTION INTRAVENOUS at 09:01

## 2024-04-26 RX ADMIN — TOPIRAMATE 150 MG: 100 TABLET, FILM COATED ORAL at 18:25

## 2024-04-26 RX ADMIN — RANOLAZINE 1000 MG: 500 TABLET, EXTENDED RELEASE ORAL at 21:15

## 2024-04-26 RX ADMIN — TIOTROPIUM BROMIDE INHALATION SPRAY 2 PUFF: 3.12 SPRAY, METERED RESPIRATORY (INHALATION) at 11:04

## 2024-04-26 RX ADMIN — Medication 2 PUFF: at 20:46

## 2024-04-26 RX ADMIN — ACETAMINOPHEN 650 MG: 325 TABLET ORAL at 15:05

## 2024-04-26 RX ADMIN — METOCLOPRAMIDE 10 MG: 5 INJECTION, SOLUTION INTRAMUSCULAR; INTRAVENOUS at 21:17

## 2024-04-26 RX ADMIN — TROSPIUM CHLORIDE 20 MG: 20 TABLET, FILM COATED ORAL at 21:13

## 2024-04-26 RX ADMIN — SODIUM CHLORIDE, PRESERVATIVE FREE 10 ML: 5 INJECTION INTRAVENOUS at 09:01

## 2024-04-26 RX ADMIN — HYDROMORPHONE HYDROCHLORIDE 1 MG: 1 INJECTION, SOLUTION INTRAMUSCULAR; INTRAVENOUS; SUBCUTANEOUS at 09:10

## 2024-04-26 RX ADMIN — RANOLAZINE 1000 MG: 500 TABLET, EXTENDED RELEASE ORAL at 08:59

## 2024-04-26 RX ADMIN — BISACODYL 10 MG: 10 SUPPOSITORY RECTAL at 15:05

## 2024-04-26 RX ADMIN — GABAPENTIN 600 MG: 300 CAPSULE ORAL at 15:04

## 2024-04-26 RX ADMIN — CARVEDILOL 3.12 MG: 3.12 TABLET, FILM COATED ORAL at 18:26

## 2024-04-26 RX ADMIN — TOPIRAMATE 100 MG: 100 TABLET, FILM COATED ORAL at 09:00

## 2024-04-26 RX ADMIN — GABAPENTIN 600 MG: 300 CAPSULE ORAL at 21:13

## 2024-04-26 RX ADMIN — ENOXAPARIN SODIUM 40 MG: 100 INJECTION SUBCUTANEOUS at 08:59

## 2024-04-26 RX ADMIN — BISACODYL 10 MG: 10 SUPPOSITORY RECTAL at 21:15

## 2024-04-26 RX ADMIN — ARIPIPRAZOLE 2 MG: 2 TABLET ORAL at 21:13

## 2024-04-26 RX ADMIN — ISOSORBIDE MONONITRATE 120 MG: 30 TABLET, EXTENDED RELEASE ORAL at 08:59

## 2024-04-26 RX ADMIN — SODIUM CHLORIDE, PRESERVATIVE FREE 10 ML: 5 INJECTION INTRAVENOUS at 21:21

## 2024-04-26 RX ADMIN — CARVEDILOL 3.12 MG: 3.12 TABLET, FILM COATED ORAL at 09:00

## 2024-04-26 ASSESSMENT — PAIN SCALES - WONG BAKER
WONGBAKER_NUMERICALRESPONSE: NO HURT

## 2024-04-26 ASSESSMENT — PAIN DESCRIPTION - LOCATION
LOCATION: HAND;WRIST
LOCATION: HAND
LOCATION: HAND;WRIST

## 2024-04-26 ASSESSMENT — PAIN DESCRIPTION - ORIENTATION
ORIENTATION: LEFT

## 2024-04-26 ASSESSMENT — PAIN SCALES - GENERAL
PAINLEVEL_OUTOF10: 0
PAINLEVEL_OUTOF10: 6
PAINLEVEL_OUTOF10: 6
PAINLEVEL_OUTOF10: 7

## 2024-04-26 ASSESSMENT — PAIN DESCRIPTION - DESCRIPTORS
DESCRIPTORS: ACHING
DESCRIPTORS: ACHING

## 2024-04-26 NOTE — PLAN OF CARE
Problem: Discharge Planning  Goal: Discharge to home or other facility with appropriate resources  4/25/2024 2328 by Pascale Rahman RN  Outcome: Progressing     Problem: Safety - Adult  Goal: Free from fall injury  4/25/2024 2328 by Pascale Rahman RN  Outcome: Progressing     Problem: Pain  Goal: Verbalizes/displays adequate comfort level or baseline comfort level  4/25/2024 2328 by Pascale Rahman RN  Outcome: Progressing     Problem: Skin/Tissue Integrity  Goal: Absence of new skin breakdown  Description: 1.  Monitor for areas of redness and/or skin breakdown  2.  Assess vascular access sites hourly  3.  Every 4-6 hours minimum:  Change oxygen saturation probe site  4.  Every 4-6 hours:  If on nasal continuous positive airway pressure, respiratory therapy assess nares and determine need for appliance change or resting period.  4/25/2024 2328 by Pascale Rahman RN  Outcome: Progressing     Problem: Chronic Conditions and Co-morbidities  Goal: Patient's chronic conditions and co-morbidity symptoms are monitored and maintained or improved  4/25/2024 2328 by Pascale Rahman RN  Outcome: Progressing     Problem: ABCDS Injury Assessment  Goal: Absence of physical injury  4/25/2024 2328 by Pascale Rahman RN  Outcome: Progressing     Problem: Nutrition Deficit:  Goal: Optimize nutritional status  4/25/2024 2328 by Pascale Rahman RN  Outcome: Progressing     Problem: Respiratory - Adult  Goal: Achieves optimal ventilation and oxygenation  4/25/2024 2328 by Pascale Rahman RN  Outcome: Progressing     Problem: Cardiovascular - Adult  Goal: Maintains optimal cardiac output and hemodynamic stability  4/25/2024 2328 by Pascale Rahman RN  Outcome: Progressing     Problem: Cardiovascular - Adult  Goal: Absence of cardiac dysrhythmias or at baseline  4/25/2024 2328 by Pascale Rahman RN  Outcome: Progressing     Problem: Skin/Tissue Integrity - Adult  Goal: Skin integrity

## 2024-04-26 NOTE — PROGRESS NOTES
A&OX4, vitals stable, poct monitored every 4/hrs, incontinent care completed, purewick replaced, all her evening medications administered asper mar, family refused to give her toradol, TPN infusing 83ml/hr, family at her bed side, all safety precautions applied, care plan reviewed with the patient.  Pascale Rahman RN

## 2024-04-26 NOTE — PROGRESS NOTES
[facility] - Inpatient Rehabilitation Department   Phone: [phone]    Occupational Therapy    [] Initial Evaluation            [x] Daily Treatment Note         [] Discharge Summary      Patient: Ana Gonzales   : 1955   MRN: [id number]   Date of Service:  2024    Admitting Diagnosis:  Partial small bowel obstruction (HCC)  Current Admission Summary: Per H&P: \"Ana Gonzales is a 68 y.o. female with a pmh of upper GI bleed, SBO/persistent ileus, TIA, CHF, CAD who presents with SBO (small bowel obstruction)\"   Past Medical History:  has a past medical history of Arthritis, Asthma, Brain injury (HCC), Bronchitis, CHF (congestive heart failure) (HCC), Depression, Diabetes mellitus (HCC), DM (diabetes mellitus screen), Elevated cholesterol with high triglycerides, Fall, Fibromyalgia, GERD (gastroesophageal reflux disease), HIGH CHOLESTEROL, Hypertension, Microvascular angina (HCC), Migraine, Neuropathy, Panic attacks, PONV (postoperative nausea and vomiting), Post traumatic stress disorder, Short-term memory loss, Skin cancer, Sleep apnea, and Vertigo.  Past Surgical History:  has a past surgical history that includes Hysterectomy; eye surgery (cataract B); Carpal tunnel release; Finger trigger release; Gallbladder surgery; bladder suspension; Skin cancer excision; blepharoplasty; laryngoscopy (2017); Coronary stent placement (2023); joint replacement (Right); back surgery; Upper gastrointestinal endoscopy (N/A, 10/2/2023); Upper gastrointestinal endoscopy (N/A, 3/13/2024); Upper gastrointestinal endoscopy (N/A, 3/14/2024); Upper gastrointestinal endoscopy (N/A, 3/14/2024); Upper gastrointestinal endoscopy (N/A, 3/14/2024); laparotomy (N/A, 3/19/2024); Upper gastrointestinal endoscopy (N/A, 3/26/2024); laparotomy (N/A, 2024); Gastrostomy tube placement (N/A, 2024); and Abdomen surgery (N/A, 2024).    Discharge Recommendations: Ana Gonzales scored a  on  strengthening, balance training, functional mobility training, endurance training, patient/caregiver education, ADL/self-care training, IADL training, pain management, home exercise program, and positioning    Goals  Patient Goals: to go back home   Short Term Goals:  Time Frame: discharge  Patient will complete lower body ADL at modified independent   Patient will complete toileting at modified independent   Patient will complete functional mobility at modified independent   Patient will complete bed mobility at modified independent   Patient to maintain standing at modified independent for 12-15 minutes.  Patient to gather and transport IADL items at modified independent     Above goals reviewed on 4/26/2024.  All goals are ongoing at this time unless indicated above.       Therapy Session Time     Individual Group Co-treatment   Time In    0930   Time Out    1025   Minutes    65     Timed Code Treatment Minutes: 65 min  Total Treatment Minutes:  65 min     Electronically Signed By:

## 2024-04-26 NOTE — PROGRESS NOTES
Nutrition Note    RECOMMENDATIONS  PO Diet: NPO  ONS: NPO  Nutrition Support: Continue the current TPN at goal rate of 83 ml/hr.     ASSESSMENT   Pt's nutrition status remains stable AEB receiving continuous TPN at goal rate of 83ml/hr to meet 100% nutrition needs. G-tube to gravity noted. Had wound closure on 4/24 per chart. No flatus or BM per pt. Wt hx review indicates wt fluctuations with intermittent diuresis. Will cont. to monitor for ability to advance diet.       Malnutrition Status: No malnutrition  Acute Illness  Findings of the 6 clinical characteristics of malnutrition:  Energy Intake:  No significant decrease in energy intake  Weight Loss:  No significant weight loss     Body Fat Loss:  No significant body fat loss     Muscle Mass Loss:  No significant muscle mass loss    Fluid Accumulation:  Mild Extremities, Generalized   Strength:  Not Performed      NUTRITION DIAGNOSIS   Inadequate oral intake related to altered GI function as evidenced by NPO or clear liquid status due to medical condition, nutrition support - parenteral nutrition  Increased nutrient needs related to increase demand for energy/nutrients as evidenced by wounds    Goals: Initiate PO diet, by next RD assessment     NUTRITION RELATED FINDINGS  Objective: Na + 140, K+ 3.8, Mg 1.7 (replacing per pharmacy), POCG 152.  4/23 smear BM. Generalized edema. BUE trace edema. BLE +2 edema.  Wounds: Wound Vac, Surgical Incision    CURRENT NUTRITION THERAPIES  Diet NPO Exceptions are: Sips of Water with Meds, Ice Chips, Popsicles  PN-Adult Premix 5/20 - Standard Electrolytes - Central Line  PN-Adult Premix 5/20 - Standard Electrolytes - Central Line     PO Intake: NPO   PO Supplement Intake:NPO    Current Parenteral Nutrition Orders:  Type and Formula: Premix Central   Lipids: 250ml, Two times weekly  Duration: Continuous  Current PN Order Provides: as below  Goal PN Orders Provides: Clinimix 5/20 @ goal rate of 83ml/hr to provides 1992ml TV,

## 2024-04-26 NOTE — PROGRESS NOTES
[facility]        Progress Note    Patient Name: Ana Gonzales  MRN: [id number]  YOB: 1955  Date of Evaluation: 2024    Subjective:  No acute events overnight  Pain controlled  No nausea or vomiting, G-tube to gravity drainage  Denies further flatus or stool  Up to chair at this time    Post-Operative Day #38, 9, 2      Vital Signs:  Patient Vitals for the past 24 hrs:   BP Temp Temp src Pulse Resp SpO2 Height Weight   24 1301 100/62 97.5 °F (36.4 °C) Axillary 65 17 98 % -- --   24 1106 -- -- -- 74 16 95 % -- --   24 1053 -- -- -- -- -- -- 1.575 m (5' 2.01\") --   24 0940 -- -- -- -- 18 -- -- --   24 0845 (!) 158/82 97.8 °F (36.6 °C) Oral 73 18 96 % -- --   24 0557 -- -- -- -- -- -- -- 94 kg (207 lb 3.7 oz)   24 0445 (!) 142/62 97.8 °F (36.6 °C) Oral 82 18 94 % -- --   24 0400 (!) 142/62 97.8 °F (36.6 °C) Oral 82 18 94 % -- --   24 0315 -- -- -- -- -- -- -- 91.6 kg (202 lb)   24 0030 132/76 97.1 °F (36.2 °C) Axillary 73 16 95 % -- --   24 1950 -- -- -- 68 16 95 % -- --   24 1945 136/79 97.3 °F (36.3 °C) Oral 69 16 95 % -- --   24 1808 (!) 150/75 97.9 °F (36.6 °C) Oral 69 17 96 % -- --        TEMPERATURE HISTORY 24H: Temp (24hrs), Av.6 °F (36.4 °C), Min:97.1 °F (36.2 °C), Max:97.9 °F (36.6 °C)    BLOOD PRESSURE HISTORY: Systolic (36hrs), Av , Min:100 , Max:158    Diastolic (36hrs), Av, Min:61, Max:85      Intake/Output:  I/O last 3 completed shifts:  In: 4162.6 [P.O.:480; IV Piggyback:48.5]  Out: 3095 [Urine:1870; Emesis/NG output:1225]  I/O this shift:  In: -   Out: 325 [Urine:325]  Drain/tube Output:  Negative Pressure Wound Therapy Abdomen Anterior;Mid-Output (ml): 50 ml    Physical Exam:  General: awake, alert, oriented to person, place, time  Lungs: unlabored respirations  Abdomen: soft, mildly distended, non-tender, bowel sounds present, G-tube to gravity

## 2024-04-26 NOTE — PROGRESS NOTES
[facility]    4/26/2024 10:20 AM        Name: Ana Gonzales .              Admitted: 4/12/2024  Primary Care Provider: Mellisa Houston MD (Tel: [phone])      Subjective:  .    Seen this am.  at bedside.   She is in better spirits today  [place] very hopeless yesterday.    Rare bowel sounds noted.      G tube to gravity , air noted in the bag     Had wound closure on 4/24    Trying to take less pain meds   Weight trending down with intermittent lasix dosing          PATIENT IS NOT A CANDIDATE FOR LTAC    Reviewed interval ancillary notes    Current Medications  magnesium sulfate 1000 mg in dextrose 5% 100 mL IVPB, Once  PN-Adult Premix 5/20 - Standard Electrolytes - Central Line, Continuous TPN  ketorolac (TORADOL) injection 15 mg, Q6H  acetaminophen (TYLENOL) tablet 650 mg, TID  magnesium sulfate 2000 mg in 50 mL IVPB premix, Once  lidocaine 4 % external patch 1 patch, Daily  insulin lispro (HUMALOG) injection vial 0-8 Units, Q4H  dextrose bolus 10% 125 mL, PRN   Or  dextrose bolus 10% 250 mL, PRN  glucagon injection 1 mg, PRN  dextrose 10 % infusion, Continuous PRN  enoxaparin (LOVENOX) injection 40 mg, Daily  pantoprazole (PROTONIX) injection 40 mg, Daily  HYDROmorphone HCl PF (DILAUDID) injection 1 mg, Q2H PRN  hydrALAZINE (APRESOLINE) injection 10 mg, Q6H PRN  lidocaine PF 1 % injection 5 mL, Once  sodium chloride flush 0.9 % injection 5-40 mL, 2 times per day  sodium chloride flush 0.9 % injection 5-40 mL, PRN  0.9 % sodium chloride infusion, PRN  fat emulsion (INTRALIPID/NUTRILIPID) 20 % infusion 250 mL, Once per day on Mon Thu  buPROPion (WELLBUTRIN XL) extended release tablet 300 mg, QAM  carvedilol (COREG) tablet 3.125 mg, BID WC  dilTIAZem (CARDIZEM CD) extended release capsule 120 mg, Daily  gabapentin (NEURONTIN) capsule 600 mg, 4x Daily  isosorbide mononitrate (IMDUR) extended release tablet 120 mg, Daily  nortriptyline (PAMELOR) capsule 75 mg,  accessory muscles. Good inspiratory effort. Clear to auscultation bilaterally, no wheeze or crackles.   GI: wound vac in place, G tube to gravity in place. Green output noted   Musculoskeletal: No cyanosis in digits, neck supple, left wrist splint removed, mild edema noted, no significant redness , has intense pain with light touch   Neurology: CN 2-12 grossly intact. No speech or motor deficits  Psych: Normal affect. Alert and oriented in time, place and person  Skin: Warm, dry, normal turgor    Labs and Tests:  CBC:   Recent Labs     04/24/24  0954 04/25/24  0603 04/26/24  0512   WBC 6.8 6.1 5.4   HGB 9.2* 8.5* 8.4*    329 330       BMP:    Recent Labs     04/24/24  0954 04/25/24  0603 04/26/24  0512   * 138 140   K 3.6 4.0 3.8    107 107   CO2 18* 20* 21   BUN 13 16 14   CREATININE <0.5* <0.5* <0.5*   GLUCOSE 115* 123* 102*       Hepatic:   Recent Labs     04/26/24  0512   AST 22   ALT 9*   BILITOT <0.2   ALKPHOS 119     URINE culture  50,000 CFU/ml Mixed pathogens  Multiple organisms isolated, no predominance. Culture  indicates probable contamination. Please review colony count  and clinical indications to determine if a repeat culture is  necessary. No further workup to be done.     CTPA:  MPRESSION:  1. No evidence of pulmonary embolism.  2. No evidence of acute airspace consolidation to suggest pneumonia.  3. Chronic parenchymal scarring within the right lung, as above.  4. The stomach and visualized small bowel loops are fluid-filled and  distended, suggestive of a developing small-bowel obstruction as seen on the  abdominal CT earlier today.  5. Coronary atherosclerosis.    CT head:  IMPRESSION:  No acute intracranial abnormality.    IMPRESSION:  CT abd pelvis   Gas and fluid-filled dilated stomach and small bowel with gradual transition  to normal caliber bowel distally at the level of the ileum.  Findings may  represent partial small bowel obstruction or less likely generalized
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
## 2024-04-26 NOTE — PROGRESS NOTES
Clinical Pharmacy Note    Pharmacy consulted by Aria Carter to manage TPN    Current TPN rate: 83 mL/hr  Goal TPN rate: 83mL/hr     Access: PICC  Indication: SBO    Labs:  General Labs:  BMP:    Lab Results   Component Value Date/Time     04/26/2024 05:12 AM    K 3.8 04/26/2024 05:12 AM    K 3.8 04/22/2024 05:47 AM     04/26/2024 05:12 AM    CO2 21 04/26/2024 05:12 AM    BUN 14 04/26/2024 05:12 AM    CREATININE <0.5 04/26/2024 05:12 AM    CALCIUM 9.0 04/26/2024 05:12 AM    GFRAA >60 11/24/2020 06:29 PM    GFRAA >60 01/13/2013 05:24 AM    LABGLOM >90 04/26/2024 05:12 AM    GLUCOSE 102 04/26/2024 05:12 AM       Electrolyte replacement as follows:   None needed    Blood sugars over past 24 hours: 130-158    Blood sugar management:  Plan to continue Humalog q4 hour sliding scale at med dose.    Plan to continue TPN at 83 ml/hr.     Thank you for allowing pharmacy to participate in the care of this patient.    Kathleen Chung, PharmD  PGY-1 Pharmacy Resident  [id number]

## 2024-04-26 NOTE — PLAN OF CARE
Problem: Discharge Planning  Goal: Discharge to home or other facility with appropriate resources  4/26/2024 1215 by Arline Tello RN  Outcome: Progressing     Problem: Safety - Adult  Goal: Free from fall injury  4/26/2024 1215 by Arline Tello RN  Outcome: Progressing     Problem: Pain  Goal: Verbalizes/displays adequate comfort level or baseline comfort level  4/26/2024 1215 by Arline Tello RN  Outcome: Progressing     Problem: Skin/Tissue Integrity  Goal: Absence of new skin breakdown  Description: 1.  Monitor for areas of redness and/or skin breakdown  2.  Assess vascular access sites hourly  3.  Every 4-6 hours minimum:  Change oxygen saturation probe site  4.  Every 4-6 hours:  If on nasal continuous positive airway pressure, respiratory therapy assess nares and determine need for appliance change or resting period.  4/26/2024 1215 by Arline Tello RN  Outcome: Progressing     Problem: Chronic Conditions and Co-morbidities  Goal: Patient's chronic conditions and co-morbidity symptoms are monitored and maintained or improved  4/26/2024 1215 by Arline Tello RN  Outcome: Progressing     Problem: ABCDS Injury Assessment  Goal: Absence of physical injury  4/26/2024 1215 by Arline Tello RN  Outcome: Progressing     Problem: Nutrition Deficit:  Goal: Optimize nutritional status  4/26/2024 1215 by Arline Tello RN  Outcome: Progressing  Flowsheets (Taken 4/26/2024 1053 by Michael Subramanian, RD)  Nutrient intake appropriate for improving, restoring, or maintaining nutritional needs:   Assess nutritional status and recommend course of action   Recommend, monitor, and adjust tube feedings and TPN/PPN based on assessed needs     Problem: Respiratory - Adult  Goal: Achieves optimal ventilation and oxygenation  4/26/2024 1215 by Arline Tello RN  Outcome: Progressing     Problem: Cardiovascular - Adult  Goal: Maintains optimal cardiac output and hemodynamic stability  4/26/2024 1215

## 2024-04-27 LAB
ANION GAP SERPL CALCULATED.3IONS-SCNC: 11 MMOL/L (ref 3–16)
BASOPHILS # BLD: 0.1 K/UL (ref 0–0.2)
BASOPHILS NFR BLD: 1.2 %
BUN SERPL-MCNC: 15 MG/DL (ref 7–20)
CALCIUM SERPL-MCNC: 9.1 MG/DL (ref 8.3–10.6)
CHLORIDE SERPL-SCNC: 106 MMOL/L (ref 99–110)
CO2 SERPL-SCNC: 21 MMOL/L (ref 21–32)
CREAT SERPL-MCNC: 0.5 MG/DL (ref 0.6–1.2)
DEPRECATED RDW RBC AUTO: 17.5 % (ref 12.4–15.4)
EOSINOPHIL # BLD: 0.4 K/UL (ref 0–0.6)
EOSINOPHIL NFR BLD: 9.1 %
GFR SERPLBLD CREATININE-BSD FMLA CKD-EPI: >90 ML/MIN/{1.73_M2}
GLUCOSE BLD-MCNC: 140 MG/DL (ref 70–99)
GLUCOSE BLD-MCNC: 143 MG/DL (ref 70–99)
GLUCOSE BLD-MCNC: 148 MG/DL (ref 70–99)
GLUCOSE BLD-MCNC: 151 MG/DL (ref 70–99)
GLUCOSE BLD-MCNC: 164 MG/DL (ref 70–99)
GLUCOSE BLD-MCNC: 169 MG/DL (ref 70–99)
GLUCOSE SERPL-MCNC: 164 MG/DL (ref 70–99)
HCT VFR BLD AUTO: 25.4 % (ref 36–48)
HGB BLD-MCNC: 8.3 G/DL (ref 12–16)
LYMPHOCYTES # BLD: 1.1 K/UL (ref 1–5.1)
LYMPHOCYTES NFR BLD: 24.4 %
MAGNESIUM SERPL-MCNC: 1.9 MG/DL (ref 1.8–2.4)
MCH RBC QN AUTO: 27.3 PG (ref 26–34)
MCHC RBC AUTO-ENTMCNC: 32.8 G/DL (ref 31–36)
MCV RBC AUTO: 83.3 FL (ref 80–100)
MONOCYTES # BLD: 0.4 K/UL (ref 0–1.3)
MONOCYTES NFR BLD: 7.6 %
NEUTROPHILS # BLD: 2.7 K/UL (ref 1.7–7.7)
NEUTROPHILS NFR BLD: 57.7 %
PERFORMED ON: ABNORMAL
PHOSPHATE SERPL-MCNC: 4.7 MG/DL (ref 2.5–4.9)
PLATELET # BLD AUTO: 366 K/UL (ref 135–450)
PMV BLD AUTO: 8.2 FL (ref 5–10.5)
POTASSIUM SERPL-SCNC: 3.5 MMOL/L (ref 3.5–5.1)
RBC # BLD AUTO: 3.04 M/UL (ref 4–5.2)
SODIUM SERPL-SCNC: 138 MMOL/L (ref 136–145)
WBC # BLD AUTO: 4.7 K/UL (ref 4–11)

## 2024-04-27 PROCEDURE — 1200000000 HC SEMI PRIVATE

## 2024-04-27 PROCEDURE — 6360000002 HC RX W HCPCS: Performed by: SURGERY

## 2024-04-27 PROCEDURE — 6370000000 HC RX 637 (ALT 250 FOR IP): Performed by: NURSE PRACTITIONER

## 2024-04-27 PROCEDURE — 6370000000 HC RX 637 (ALT 250 FOR IP): Performed by: SURGERY

## 2024-04-27 PROCEDURE — 94761 N-INVAS EAR/PLS OXIMETRY MLT: CPT

## 2024-04-27 PROCEDURE — 6360000002 HC RX W HCPCS: Performed by: NURSE PRACTITIONER

## 2024-04-27 PROCEDURE — C9113 INJ PANTOPRAZOLE SODIUM, VIA: HCPCS | Performed by: SURGERY

## 2024-04-27 PROCEDURE — 97535 SELF CARE MNGMENT TRAINING: CPT

## 2024-04-27 PROCEDURE — 97110 THERAPEUTIC EXERCISES: CPT

## 2024-04-27 PROCEDURE — 97530 THERAPEUTIC ACTIVITIES: CPT

## 2024-04-27 PROCEDURE — 80048 BASIC METABOLIC PNL TOTAL CA: CPT

## 2024-04-27 PROCEDURE — 85025 COMPLETE CBC W/AUTO DIFF WBC: CPT

## 2024-04-27 PROCEDURE — 2500000003 HC RX 250 WO HCPCS: Performed by: SURGERY

## 2024-04-27 PROCEDURE — 2580000003 HC RX 258: Performed by: SURGERY

## 2024-04-27 PROCEDURE — 99024 POSTOP FOLLOW-UP VISIT: CPT | Performed by: SURGERY

## 2024-04-27 PROCEDURE — 84100 ASSAY OF PHOSPHORUS: CPT

## 2024-04-27 PROCEDURE — 83735 ASSAY OF MAGNESIUM: CPT

## 2024-04-27 PROCEDURE — 2580000003 HC RX 258: Performed by: NURSE PRACTITIONER

## 2024-04-27 RX ORDER — POTASSIUM CHLORIDE 20 MEQ/1
40 TABLET, EXTENDED RELEASE ORAL ONCE
Status: COMPLETED | OUTPATIENT
Start: 2024-04-27 | End: 2024-04-27

## 2024-04-27 RX ORDER — FUROSEMIDE 10 MG/ML
20 INJECTION INTRAMUSCULAR; INTRAVENOUS ONCE
Status: COMPLETED | OUTPATIENT
Start: 2024-04-27 | End: 2024-04-27

## 2024-04-27 RX ORDER — INSULIN LISPRO 100 [IU]/ML
0-4 INJECTION, SOLUTION INTRAVENOUS; SUBCUTANEOUS EVERY 6 HOURS
Status: DISCONTINUED | OUTPATIENT
Start: 2024-04-27 | End: 2024-05-02 | Stop reason: HOSPADM

## 2024-04-27 RX ADMIN — METOCLOPRAMIDE 10 MG: 5 INJECTION, SOLUTION INTRAMUSCULAR; INTRAVENOUS at 18:14

## 2024-04-27 RX ADMIN — CARVEDILOL 3.12 MG: 3.12 TABLET, FILM COATED ORAL at 18:13

## 2024-04-27 RX ADMIN — METOCLOPRAMIDE 10 MG: 5 INJECTION, SOLUTION INTRAMUSCULAR; INTRAVENOUS at 09:02

## 2024-04-27 RX ADMIN — ACETAMINOPHEN 650 MG: 325 TABLET ORAL at 21:07

## 2024-04-27 RX ADMIN — GABAPENTIN 600 MG: 300 CAPSULE ORAL at 18:13

## 2024-04-27 RX ADMIN — HYDROMORPHONE HYDROCHLORIDE 1 MG: 1 INJECTION, SOLUTION INTRAMUSCULAR; INTRAVENOUS; SUBCUTANEOUS at 02:58

## 2024-04-27 RX ADMIN — ARIPIPRAZOLE 2 MG: 2 TABLET ORAL at 21:07

## 2024-04-27 RX ADMIN — GABAPENTIN 600 MG: 300 CAPSULE ORAL at 21:06

## 2024-04-27 RX ADMIN — METOCLOPRAMIDE 10 MG: 5 INJECTION, SOLUTION INTRAMUSCULAR; INTRAVENOUS at 15:24

## 2024-04-27 RX ADMIN — SODIUM CHLORIDE, PRESERVATIVE FREE 10 ML: 5 INJECTION INTRAVENOUS at 09:21

## 2024-04-27 RX ADMIN — PANTOPRAZOLE SODIUM 40 MG: 40 INJECTION, POWDER, FOR SOLUTION INTRAVENOUS at 09:02

## 2024-04-27 RX ADMIN — CARVEDILOL 3.12 MG: 3.12 TABLET, FILM COATED ORAL at 09:02

## 2024-04-27 RX ADMIN — IRON SUCROSE 200 MG: 20 INJECTION, SOLUTION INTRAVENOUS at 09:17

## 2024-04-27 RX ADMIN — FUROSEMIDE 20 MG: 10 INJECTION, SOLUTION INTRAMUSCULAR; INTRAVENOUS at 15:24

## 2024-04-27 RX ADMIN — HYDROMORPHONE HYDROCHLORIDE 1 MG: 1 INJECTION, SOLUTION INTRAMUSCULAR; INTRAVENOUS; SUBCUTANEOUS at 19:22

## 2024-04-27 RX ADMIN — LEUCINE, PHENYLALANINE, LYSINE, METHIONINE, ISOLEUCINE, VALINE, HISTIDINE, THREONINE, TRYPTOPHAN, ALANINE, GLYCINE, ARGININE, PROLINE, SERINE, TYROSINE, SODIUM ACETATE, DIBASIC POTASSIUM PHOSPHATE, MAGNESIUM CHLORIDE, SODIUM CHLORIDE, CALCIUM CHLORIDE, DEXTROSE
365; 280; 290; 200; 300; 290; 240; 210; 90; 1035; 515; 575; 340; 250; 20; 340; 261; 51; 59; 33; 20 INJECTION INTRAVENOUS at 18:26

## 2024-04-27 RX ADMIN — GABAPENTIN 600 MG: 300 CAPSULE ORAL at 09:02

## 2024-04-27 RX ADMIN — TOPIRAMATE 150 MG: 100 TABLET, FILM COATED ORAL at 18:13

## 2024-04-27 RX ADMIN — ACETAMINOPHEN 650 MG: 325 TABLET ORAL at 09:02

## 2024-04-27 RX ADMIN — POTASSIUM CHLORIDE 40 MEQ: 1500 TABLET, EXTENDED RELEASE ORAL at 09:02

## 2024-04-27 RX ADMIN — BUPROPION HYDROCHLORIDE 300 MG: 150 TABLET, EXTENDED RELEASE ORAL at 09:02

## 2024-04-27 RX ADMIN — Medication 10 ML: at 21:07

## 2024-04-27 RX ADMIN — TROSPIUM CHLORIDE 20 MG: 20 TABLET, FILM COATED ORAL at 21:07

## 2024-04-27 RX ADMIN — ENOXAPARIN SODIUM 40 MG: 100 INJECTION SUBCUTANEOUS at 09:03

## 2024-04-27 RX ADMIN — DILTIAZEM HYDROCHLORIDE 120 MG: 120 CAPSULE, EXTENDED RELEASE ORAL at 09:02

## 2024-04-27 RX ADMIN — NORTRIPTYLINE HYDROCHLORIDE 75 MG: 25 CAPSULE ORAL at 21:06

## 2024-04-27 RX ADMIN — METOCLOPRAMIDE 10 MG: 5 INJECTION, SOLUTION INTRAMUSCULAR; INTRAVENOUS at 01:31

## 2024-04-27 RX ADMIN — ACETAMINOPHEN 650 MG: 325 TABLET ORAL at 15:24

## 2024-04-27 RX ADMIN — RANOLAZINE 1000 MG: 500 TABLET, EXTENDED RELEASE ORAL at 21:07

## 2024-04-27 RX ADMIN — RANOLAZINE 1000 MG: 500 TABLET, EXTENDED RELEASE ORAL at 09:02

## 2024-04-27 RX ADMIN — TOPIRAMATE 100 MG: 100 TABLET, FILM COATED ORAL at 09:02

## 2024-04-27 ASSESSMENT — PAIN SCALES - GENERAL
PAINLEVEL_OUTOF10: 0
PAINLEVEL_OUTOF10: 0
PAINLEVEL_OUTOF10: 8
PAINLEVEL_OUTOF10: 0
PAINLEVEL_OUTOF10: 8
PAINLEVEL_OUTOF10: 0

## 2024-04-27 ASSESSMENT — PAIN DESCRIPTION - DESCRIPTORS
DESCRIPTORS: ACHING
DESCRIPTORS: ACHING

## 2024-04-27 ASSESSMENT — PAIN SCALES - WONG BAKER
WONGBAKER_NUMERICALRESPONSE: NO HURT

## 2024-04-27 ASSESSMENT — PAIN DESCRIPTION - LOCATION
LOCATION: HAND
LOCATION: ARM;HAND

## 2024-04-27 ASSESSMENT — PAIN DESCRIPTION - ORIENTATION
ORIENTATION: MID
ORIENTATION: RIGHT;LEFT

## 2024-04-27 NOTE — PROGRESS NOTES
Blood Diagnostic Result: POSITIVE  Normal range: Negative   (A) (4/12/2024)  Not in Time Range      GI bacterial pathogens by PCR  Results in Past 30 Days  Result Component Current Result Ref Range Previous Result Ref Range   GI Bacterial Pathogens By PCR No Shigella spp/EIEC DNA detected  No Shiga toxin-producing gene(s) detected  No Campylobacter spp. (jejuni and coli)DNA detected  No Salmonella spp. DNA detected  No Vibrio vulnificus/parahaemolyticus/cholerae DNA detected  No Plesiomonas shigelloides DNA detected  No Enterotoxigenic E. coli (ETEC) DNA detected  No Yersinia enterocolitica DNA detected  Normal Range:  None detected   (4/12/2024)  Not in Time Range      C. difficile  No results found for requested labs within last 30 days.     Urine culture  Lab Results   Component Value Date/Time    LABURIN  04/13/2024 03:59 AM     >50,000 CFU/ml Mixed pathogens  Multiple organisms isolated, no predominance. Culture  indicates probable contamination. Please review colony count  and clinical indications to determine if a repeat culture is  necessary. No further workup to be done.         Pathology:  \"Relevant pathology documented under results section     Imaging:  I have personally reviewed the following films:    XR WRIST LEFT (MIN 3 VIEWS)    Result Date: 4/24/2024  EXAMINATION: 3 XRAY VIEWS OF THE LEFT WRIST 4/24/2024 3:27 pm COMPARISON: None. HISTORY: ORDERING SYSTEM PROVIDED HISTORY: pain TECHNOLOGIST PROVIDED HISTORY: Reason for exam:->pain Reason for Exam: pain FINDINGS: The bones are osteopenic.  There is moderate narrowing of the radiocarpal joint.  There is mild widening of the scapholunate space.  The visualized distal radius and ulna are intact.  No acute fracture or dislocation is seen. There is mild soft tissue swelling along the dorsum of the wrist.     Prominent widening of the scapholunate space which could indicate ligamentous instability.  Recommend orthopedic follow-up. Moderate osteoarthritic

## 2024-04-27 NOTE — PROGRESS NOTES
Clinical Pharmacy Note    Pharmacy consulted by Aria Carter to manage TPN    Current TPN rate: 83 mL/hr  Goal TPN rate: 83mL/hr     Access: PICC  Indication: SBO    Labs:  General Labs:  BMP:    Lab Results   Component Value Date/Time     04/27/2024 05:52 AM    K 3.5 04/27/2024 05:52 AM    K 3.8 04/22/2024 05:47 AM     04/27/2024 05:52 AM    CO2 21 04/27/2024 05:52 AM    BUN 15 04/27/2024 05:52 AM    CREATININE 0.5 04/27/2024 05:52 AM    CALCIUM 9.1 04/27/2024 05:52 AM    GFRAA >60 11/24/2020 06:29 PM    GFRAA >60 01/13/2013 05:24 AM    LABGLOM >90 04/27/2024 05:52 AM    GLUCOSE 164 04/27/2024 05:52 AM       Electrolyte replacement as follows:   None needed    Blood sugars over past 24 hours: 147-169    Blood sugar management:  Change to continue Humalog q6 hour sliding scale at low dose.    Plan to continue TPN at 83 ml/hr.     Thank you for allowing pharmacy to participate in the care of this patient.    Henrietta Ferreira, PharmD, BCPS  [id number]  4/27/2024 10:50 AM

## 2024-04-27 NOTE — PROGRESS NOTES
Ana Gonzales  4/26/2024  [id number]    Chief Complaint: Partial small bowel obstruction (HCC)    Subjective   She underwent wound closure on 4/24. Continues to have G-tube to dependent drainage. Tolerating ice chips. Denies any fevers, chills, chest pain, shortness of breath, nausea/emesis. Denies flatus or BM.          Objective   Objective:  Patient Vitals for the past 24 hrs:   BP Temp Temp src Pulse Resp SpO2 Height Weight   04/26/24 2048 -- -- -- 68 16 95 % -- --   04/26/24 1627 (!) 109/53 97.4 °F (36.3 °C) Oral 67 17 96 % -- --   04/26/24 1301 100/62 97.5 °F (36.4 °C) Axillary 65 17 98 % -- --   04/26/24 1106 -- -- -- 74 16 95 % -- --   04/26/24 1053 -- -- -- -- -- -- 1.575 m (5' 2.01\") --   04/26/24 0940 -- -- -- -- 18 -- -- --   04/26/24 0845 (!) 158/82 97.8 °F (36.6 °C) Oral 73 18 96 % -- --   04/26/24 0557 -- -- -- -- -- -- -- 94 kg (207 lb 3.7 oz)   04/26/24 0445 (!) 142/62 97.8 °F (36.6 °C) Oral 82 18 94 % -- --   04/26/24 0400 (!) 142/62 97.8 °F (36.6 °C) Oral 82 18 94 % -- --   04/26/24 0315 -- -- -- -- -- -- -- 91.6 kg (202 lb)   04/26/24 0030 132/76 97.1 °F (36.2 °C) Axillary 73 16 95 % -- --     Gen: No distress, pleasant.   HEENT: Normocephalic, atraumatic.   CV: No audible murmurs, well perfused extremities  Resp: No respiratory distress. No increased WOB  Abd: Soft, nontender nondistended. G-tube to gravity drainage.   Ext: No edema.   Neuro: Alert, oriented, appropriately interactive.     Laboratory data: Available via EMR.     Therapy progress:    PT    Rolling: Level of difficulty - A Little   Sit to Stand from a Chair: Level of difficulty - A Little  Supine to Sit: Level of difficulty - A Little     Bed to Chair: Physical Assistance Required - A Little  Ambulate Across Room: Physical Assistance Required - A Little  Ascend 3-5 Steps With HR: Physical Assistance Required - A Little    OT    Eating: Physical Assistance Required - A Little  Grooming: Physical Assistance Required - A Lot  LB

## 2024-04-27 NOTE — PLAN OF CARE
Problem: Discharge Planning  Goal: Discharge to home or other facility with appropriate resources  4/27/2024 1134 by Madhavi Galvez, RN  Outcome: Progressing  Flowsheets (Taken 4/27/2024 0902)  Discharge to home or other facility with appropriate resources: Identify barriers to discharge with patient and caregiver     Problem: Safety - Adult  Goal: Free from fall injury  4/27/2024 1134 by Madhavi Galvez, RN  Outcome: Progressing     Problem: Pain  Goal: Verbalizes/displays adequate comfort level or baseline comfort level  4/27/2024 1134 by Madhavi Galvez RN  Outcome: Progressing     Problem: Chronic Conditions and Co-morbidities  Goal: Patient's chronic conditions and co-morbidity symptoms are monitored and maintained or improved  4/27/2024 1134 by Madhavi Galvez RN  Outcome: Progressing     Problem: Skin/Tissue Integrity - Adult  Goal: Skin integrity remains intact  4/27/2024 1134 by Madhavi Galvez, RN  Outcome: Progressing  Flowsheets (Taken 4/27/2024 0902)  Skin Integrity Remains Intact: Monitor for areas of redness and/or skin breakdown     Problem: Skin/Tissue Integrity - Adult  Goal: Incisions, wounds, or drain sites healing without S/S of infection  4/27/2024 1134 by Madhavi Galvez, RN  Outcome: Progressing

## 2024-04-27 NOTE — PROGRESS NOTES
[facility]    4/27/2024 7:14 AM        Name: Ana Gonzales .              Admitted: 4/12/2024  Primary Care Provider: Mellisa Houston MD (Tel: [phone])      Subjective:  .    Seen this am.  at bedside.   She is in better spirits today  Still not many bowel sounds  Reglan added on Friday,  has Supp but no BM       Had wound closure on 4/24    Trying to take less pain meds   Weight trending down with intermittent lasix dosing , now at # 204          PATIENT IS NOT A CANDIDATE FOR LTAC    Reviewed interval ancillary notes    Current Medications  iron sucrose (VENOFER) 200 mg in sodium chloride 0.9 % 100 mL IVPB, Q24H  PN-Adult Premix 5/20 - Standard Electrolytes - Central Line, Continuous TPN  metoclopramide (REGLAN) injection 10 mg, Q6H  acetaminophen (TYLENOL) tablet 650 mg, TID  lidocaine 4 % external patch 1 patch, Daily  insulin lispro (HUMALOG) injection vial 0-8 Units, Q4H  dextrose bolus 10% 125 mL, PRN   Or  dextrose bolus 10% 250 mL, PRN  glucagon injection 1 mg, PRN  dextrose 10 % infusion, Continuous PRN  enoxaparin (LOVENOX) injection 40 mg, Daily  pantoprazole (PROTONIX) injection 40 mg, Daily  HYDROmorphone HCl PF (DILAUDID) injection 1 mg, Q2H PRN  hydrALAZINE (APRESOLINE) injection 10 mg, Q6H PRN  sodium chloride flush 0.9 % injection 5-40 mL, 2 times per day  sodium chloride flush 0.9 % injection 5-40 mL, PRN  0.9 % sodium chloride infusion, PRN  fat emulsion (INTRALIPID/NUTRILIPID) 20 % infusion 250 mL, Once per day on Mon Thu  buPROPion (WELLBUTRIN XL) extended release tablet 300 mg, QAM  carvedilol (COREG) tablet 3.125 mg, BID WC  dilTIAZem (CARDIZEM CD) extended release capsule 120 mg, Daily  gabapentin (NEURONTIN) capsule 600 mg, 4x Daily  isosorbide mononitrate (IMDUR) extended release tablet 120 mg, Daily  nortriptyline (PAMELOR) capsule 75 mg, Nightly  ranolazine (RANEXA) extended release tablet 1,000 mg, BID  topiramate (TOPAMAX)  ligamentous  instability.  Recommend orthopedic follow-up.     Moderate osteoarthritic changes along the radiocarpal joint and diffuse  osteopenia with no acute bony abnormality.     Mild soft tissue swelling along the dorsum of the wrist.      Problem List  Principal Problem:    Partial small bowel obstruction (HCC)  Active Problems:    Muscle ache    Mild persistent asthma without complication    Mild malnutrition (HCC)  Resolved Problems:    * No resolved hospital problems. *       Assessment & Plan:   SBO:  PO day #10  from exploratory lapartomy with BRANDON and placement of G tube.   POD #3 from wound closure.  G tube to gravity.   On TPN,  Edema vs anasarca.  Weight trending down , now at 204.  Will give 20 mg lasix today    Malnutrition: on TPN,  edema may represent anasarca, will follow   Iron deficiency anemia:  start venofer X 3 days   Low Mag : resolved with  IV replacement   CAD:  hx of PCI July 2023,  on BB and isorbide .  No longer on DAPT due to hx of UGI bleed   HTN:  in range on CCB therapy   Left hand pain: had identical pain years ago that resolved with inactivity.  Continue with ice, biofreeze, scheduled tylenol and wrist splint .  Appreciate input from ortho     Ambulation encouraged.    Hopeful that ARU may accept .  Discussed with TOMAS.  Pre cert is pending     Will check labs every other day..          Diet: Diet NPO Exceptions are: Sips of Water with Meds, Ice Chips, Popsicles  PN-Adult Premix 5/20 - Standard Electrolytes - Central Line  Code:Full Code  DVT PPXlovenox       VALE Way - CNP   4/27/2024 7:14 AM
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
## 2024-04-27 NOTE — PROGRESS NOTES
[facility] - Inpatient Rehabilitation Department   Phone: [phone]    Occupational Therapy    [] Initial Evaluation            [x] Daily Treatment Note         [] Discharge Summary      Patient: Ana Gonzales   : 1955   MRN: [id number]   Date of Service:  2024    Admitting Diagnosis:  Partial small bowel obstruction (HCC)  Current Admission Summary: Per H&P: \"Ana Gonzales is a 68 y.o. female with a pmh of upper GI bleed, SBO/persistent ileus, TIA, CHF, CAD who presents with SBO (small bowel obstruction)\"   Past Medical History:  has a past medical history of Arthritis, Asthma, Brain injury (HCC), Bronchitis, CHF (congestive heart failure) (HCC), Depression, Diabetes mellitus (HCC), DM (diabetes mellitus screen), Elevated cholesterol with high triglycerides, Fall, Fibromyalgia, GERD (gastroesophageal reflux disease), HIGH CHOLESTEROL, Hypertension, Microvascular angina (HCC), Migraine, Neuropathy, Panic attacks, PONV (postoperative nausea and vomiting), Post traumatic stress disorder, Short-term memory loss, Skin cancer, Sleep apnea, and Vertigo.  Past Surgical History:  has a past surgical history that includes Hysterectomy; eye surgery (cataract B); Carpal tunnel release; Finger trigger release; Gallbladder surgery; bladder suspension; Skin cancer excision; blepharoplasty; laryngoscopy (2017); Coronary stent placement (2023); joint replacement (Right); back surgery; Upper gastrointestinal endoscopy (N/A, 10/2/2023); Upper gastrointestinal endoscopy (N/A, 3/13/2024); Upper gastrointestinal endoscopy (N/A, 3/14/2024); Upper gastrointestinal endoscopy (N/A, 3/14/2024); Upper gastrointestinal endoscopy (N/A, 3/14/2024); laparotomy (N/A, 3/19/2024); Upper gastrointestinal endoscopy (N/A, 3/26/2024); laparotomy (N/A, 2024); Gastrostomy tube placement (N/A, 2024); and Abdomen surgery (N/A, 2024).    Discharge Recommendations: Ana Gonzales scored a  on

## 2024-04-28 LAB
GLUCOSE BLD-MCNC: 130 MG/DL (ref 70–99)
GLUCOSE BLD-MCNC: 151 MG/DL (ref 70–99)
GLUCOSE BLD-MCNC: 153 MG/DL (ref 70–99)
GLUCOSE BLD-MCNC: 166 MG/DL (ref 70–99)
GLUCOSE BLD-MCNC: 172 MG/DL (ref 70–99)
PERFORMED ON: ABNORMAL

## 2024-04-28 PROCEDURE — 6360000002 HC RX W HCPCS: Performed by: NURSE PRACTITIONER

## 2024-04-28 PROCEDURE — 97535 SELF CARE MNGMENT TRAINING: CPT

## 2024-04-28 PROCEDURE — 97116 GAIT TRAINING THERAPY: CPT

## 2024-04-28 PROCEDURE — 2580000003 HC RX 258: Performed by: SURGERY

## 2024-04-28 PROCEDURE — 6360000002 HC RX W HCPCS: Performed by: SURGERY

## 2024-04-28 PROCEDURE — C9113 INJ PANTOPRAZOLE SODIUM, VIA: HCPCS | Performed by: SURGERY

## 2024-04-28 PROCEDURE — 6370000000 HC RX 637 (ALT 250 FOR IP): Performed by: SURGERY

## 2024-04-28 PROCEDURE — 1200000000 HC SEMI PRIVATE

## 2024-04-28 PROCEDURE — 2580000003 HC RX 258: Performed by: NURSE PRACTITIONER

## 2024-04-28 PROCEDURE — 2500000003 HC RX 250 WO HCPCS: Performed by: SURGERY

## 2024-04-28 PROCEDURE — 99024 POSTOP FOLLOW-UP VISIT: CPT | Performed by: SURGERY

## 2024-04-28 PROCEDURE — 6370000000 HC RX 637 (ALT 250 FOR IP): Performed by: NURSE PRACTITIONER

## 2024-04-28 PROCEDURE — 97530 THERAPEUTIC ACTIVITIES: CPT

## 2024-04-28 PROCEDURE — 94640 AIRWAY INHALATION TREATMENT: CPT

## 2024-04-28 PROCEDURE — 94761 N-INVAS EAR/PLS OXIMETRY MLT: CPT

## 2024-04-28 RX ORDER — WATER 10 ML/10ML
INJECTION INTRAMUSCULAR; INTRAVENOUS; SUBCUTANEOUS
Status: DISPENSED
Start: 2024-04-28 | End: 2024-04-28

## 2024-04-28 RX ORDER — DOCUSATE SODIUM 100 MG/1
100 CAPSULE, LIQUID FILLED ORAL 2 TIMES DAILY
Status: DISCONTINUED | OUTPATIENT
Start: 2024-04-28 | End: 2024-05-02 | Stop reason: HOSPADM

## 2024-04-28 RX ORDER — KETOROLAC TROMETHAMINE 15 MG/ML
15 INJECTION, SOLUTION INTRAMUSCULAR; INTRAVENOUS EVERY 6 HOURS PRN
Status: DISPENSED | OUTPATIENT
Start: 2024-04-28 | End: 2024-05-01

## 2024-04-28 RX ORDER — POLYETHYLENE GLYCOL 3350 17 G/17G
17 POWDER, FOR SOLUTION ORAL DAILY
Status: DISCONTINUED | OUTPATIENT
Start: 2024-04-28 | End: 2024-04-29

## 2024-04-28 RX ORDER — POTASSIUM CHLORIDE 20 MEQ/1
20 TABLET, EXTENDED RELEASE ORAL ONCE
Status: COMPLETED | OUTPATIENT
Start: 2024-04-28 | End: 2024-04-28

## 2024-04-28 RX ADMIN — KETOROLAC TROMETHAMINE 15 MG: 15 INJECTION, SOLUTION INTRAMUSCULAR; INTRAVENOUS at 23:47

## 2024-04-28 RX ADMIN — ENOXAPARIN SODIUM 40 MG: 100 INJECTION SUBCUTANEOUS at 08:25

## 2024-04-28 RX ADMIN — Medication 2 PUFF: at 19:42

## 2024-04-28 RX ADMIN — METOCLOPRAMIDE 10 MG: 5 INJECTION, SOLUTION INTRAMUSCULAR; INTRAVENOUS at 20:34

## 2024-04-28 RX ADMIN — Medication 2 PUFF: at 11:25

## 2024-04-28 RX ADMIN — GABAPENTIN 600 MG: 300 CAPSULE ORAL at 17:43

## 2024-04-28 RX ADMIN — RANOLAZINE 1000 MG: 500 TABLET, EXTENDED RELEASE ORAL at 08:22

## 2024-04-28 RX ADMIN — RANOLAZINE 1000 MG: 500 TABLET, EXTENDED RELEASE ORAL at 20:45

## 2024-04-28 RX ADMIN — GABAPENTIN 600 MG: 300 CAPSULE ORAL at 20:35

## 2024-04-28 RX ADMIN — ISOSORBIDE MONONITRATE 120 MG: 30 TABLET, EXTENDED RELEASE ORAL at 08:23

## 2024-04-28 RX ADMIN — LEUCINE, PHENYLALANINE, LYSINE, METHIONINE, ISOLEUCINE, VALINE, HISTIDINE, THREONINE, TRYPTOPHAN, ALANINE, GLYCINE, ARGININE, PROLINE, SERINE, TYROSINE, SODIUM ACETATE, DIBASIC POTASSIUM PHOSPHATE, MAGNESIUM CHLORIDE, SODIUM CHLORIDE, CALCIUM CHLORIDE, DEXTROSE
365; 280; 290; 200; 300; 290; 240; 210; 90; 1035; 515; 575; 340; 250; 20; 340; 261; 51; 59; 33; 20 INJECTION INTRAVENOUS at 16:08

## 2024-04-28 RX ADMIN — GABAPENTIN 600 MG: 300 CAPSULE ORAL at 13:19

## 2024-04-28 RX ADMIN — BUPROPION HYDROCHLORIDE 300 MG: 150 TABLET, EXTENDED RELEASE ORAL at 08:23

## 2024-04-28 RX ADMIN — TROSPIUM CHLORIDE 20 MG: 20 TABLET, FILM COATED ORAL at 20:35

## 2024-04-28 RX ADMIN — ARIPIPRAZOLE 2 MG: 2 TABLET ORAL at 20:35

## 2024-04-28 RX ADMIN — ACETAMINOPHEN 650 MG: 325 TABLET ORAL at 08:22

## 2024-04-28 RX ADMIN — POTASSIUM CHLORIDE 20 MEQ: 1500 TABLET, EXTENDED RELEASE ORAL at 13:19

## 2024-04-28 RX ADMIN — GABAPENTIN 600 MG: 300 CAPSULE ORAL at 08:23

## 2024-04-28 RX ADMIN — METOCLOPRAMIDE 10 MG: 5 INJECTION, SOLUTION INTRAMUSCULAR; INTRAVENOUS at 14:50

## 2024-04-28 RX ADMIN — TIOTROPIUM BROMIDE INHALATION SPRAY 2 PUFF: 3.12 SPRAY, METERED RESPIRATORY (INHALATION) at 11:25

## 2024-04-28 RX ADMIN — DOCUSATE SODIUM 100 MG: 100 CAPSULE, LIQUID FILLED ORAL at 20:35

## 2024-04-28 RX ADMIN — METOCLOPRAMIDE 10 MG: 5 INJECTION, SOLUTION INTRAMUSCULAR; INTRAVENOUS at 08:24

## 2024-04-28 RX ADMIN — CARVEDILOL 3.12 MG: 3.12 TABLET, FILM COATED ORAL at 08:24

## 2024-04-28 RX ADMIN — IRON SUCROSE 200 MG: 20 INJECTION, SOLUTION INTRAVENOUS at 13:26

## 2024-04-28 RX ADMIN — METOCLOPRAMIDE 10 MG: 5 INJECTION, SOLUTION INTRAMUSCULAR; INTRAVENOUS at 02:05

## 2024-04-28 RX ADMIN — DILTIAZEM HYDROCHLORIDE 120 MG: 120 CAPSULE, EXTENDED RELEASE ORAL at 08:24

## 2024-04-28 RX ADMIN — NORTRIPTYLINE HYDROCHLORIDE 75 MG: 25 CAPSULE ORAL at 20:35

## 2024-04-28 RX ADMIN — PANTOPRAZOLE SODIUM 40 MG: 40 INJECTION, POWDER, FOR SOLUTION INTRAVENOUS at 08:24

## 2024-04-28 RX ADMIN — TOPIRAMATE 150 MG: 100 TABLET, FILM COATED ORAL at 17:43

## 2024-04-28 RX ADMIN — Medication 10 ML: at 20:46

## 2024-04-28 RX ADMIN — ACETAMINOPHEN 650 MG: 325 TABLET ORAL at 13:19

## 2024-04-28 RX ADMIN — TOPIRAMATE 100 MG: 100 TABLET, FILM COATED ORAL at 08:23

## 2024-04-28 RX ADMIN — ACETAMINOPHEN 650 MG: 325 TABLET ORAL at 20:35

## 2024-04-28 RX ADMIN — CARVEDILOL 3.12 MG: 3.12 TABLET, FILM COATED ORAL at 17:43

## 2024-04-28 ASSESSMENT — PAIN SCALES - WONG BAKER
WONGBAKER_NUMERICALRESPONSE: NO HURT

## 2024-04-28 ASSESSMENT — PAIN DESCRIPTION - ORIENTATION
ORIENTATION: LEFT
ORIENTATION: LEFT

## 2024-04-28 ASSESSMENT — PAIN SCALES - GENERAL
PAINLEVEL_OUTOF10: 5
PAINLEVEL_OUTOF10: 5
PAINLEVEL_OUTOF10: 0

## 2024-04-28 ASSESSMENT — PAIN DESCRIPTION - LOCATION
LOCATION: HAND;WRIST
LOCATION: HAND;WRIST

## 2024-04-28 ASSESSMENT — PAIN DESCRIPTION - DESCRIPTORS: DESCRIPTORS: ACHING

## 2024-04-28 NOTE — PROGRESS NOTES
G-tube clamped per order.  Patient tolerated clear liquid lunch. Hypoactive bowel sounds.  She ambulated to bathroom.  All linens changed.  Currently resting in chair.
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
## 2024-04-28 NOTE — PLAN OF CARE
Problem: Discharge Planning  Goal: Discharge to home or other facility with appropriate resources  4/28/2024 1054 by Rosaline Morocho RN  Outcome: Progressing  4/28/2024 0130 by Arianne Brown RN  Outcome: Progressing     Problem: Safety - Adult  Goal: Free from fall injury  4/28/2024 1054 by Rosaline Morocho RN  Outcome: Progressing  4/28/2024 0130 by Arianne Brown RN  Outcome: Progressing     Problem: Pain  Goal: Verbalizes/displays adequate comfort level or baseline comfort level  4/28/2024 1054 by Rosaline Morocho RN  Outcome: Progressing  4/28/2024 0130 by Arianne Brown RN  Outcome: Progressing     Problem: Skin/Tissue Integrity  Goal: Absence of new skin breakdown  Description: 1.  Monitor for areas of redness and/or skin breakdown  2.  Assess vascular access sites hourly  3.  Every 4-6 hours minimum:  Change oxygen saturation probe site  4.  Every 4-6 hours:  If on nasal continuous positive airway pressure, respiratory therapy assess nares and determine need for appliance change or resting period.  4/28/2024 0130 by Arianne Brown, RN  Outcome: Progressing

## 2024-04-28 NOTE — PROGRESS NOTES
[facility]        Progress Note    Patient Name: Ana Gonzales  MRN: [id number]  YOB: 1955  Date of Evaluation: 2024    Subjective:  No acute events overnight  Pain controlled  No nausea, G-tube to gravity   Passing flatus but no stool    Post-Operative Day #40, 11, 4    Vital Signs:  Patient Vitals for the past 24 hrs:   BP Temp Temp src Pulse Resp SpO2 Weight   24 1144 100/63 97.2 °F (36.2 °C) -- 75 15 95 % --   24 1125 -- -- -- 71 16 95 % --   24 0816 107/64 98.4 °F (36.9 °C) Oral 69 14 95 % --   24 0559 -- -- -- -- -- -- 88.9 kg (196 lb)   24 0432 104/69 97.6 °F (36.4 °C) Oral 69 18 94 % --   24 0104 101/60 97.4 °F (36.3 °C) Oral 67 -- 93 % --   24 2102 102/65 97.5 °F (36.4 °C) Oral 67 16 94 % --   24 1629 118/73 97.5 °F (36.4 °C) Oral 65 17 96 % --   24 1303 108/66 97.8 °F (36.6 °C) Oral 74 18 95 % --      TEMPERATURE HISTORY 24H: Temp (24hrs), Av.6 °F (36.4 °C), Min:97.2 °F (36.2 °C), Max:98.4 °F (36.9 °C)    BLOOD PRESSURE HISTORY: Systolic (36hrs), Av , Min:99 , Max:128    Diastolic (36hrs), Av, Min:60, Max:81      Intake/Output:  I/O last 3 completed shifts:  In: 3726   Out: 3550 [Urine:2650; Emesis/NG output:900]  I/O this shift:  In: 1418 [P.O.:120]  Out: -   Drain/tube Output:  [REMOVED] Negative Pressure Wound Therapy Abdomen Anterior;Mid-Output (ml): 50 ml    Physical Exam:  General: awake, alert, oriented to person, place, time  Lungs: unlabored respirations  Abdomen: soft, mildly distended, appropriate incisional tenderness, G-tube in place with appropriate bilious output, incisions clean dry and intact healing well    Labs:  CBC:    Recent Labs     24  0512 24  0552   WBC 5.4 4.7   HGB 8.4* 8.3*   HCT 26.4* 25.4*    366     BMP:    Recent Labs     24  0512 24  0552    138   K 3.8 3.5    106   CO2 21 21   BUN 14 15   CREATININE

## 2024-04-28 NOTE — CARE COORDINATION
Chart reviewed for discharge planning    CM/SW has continued to follow patient progress to anticipate potential discharge needs. At this time, patient is not ready for discharge.     Barrier(s) to discharge-patient-   Oxygen - n/a   Other - hypoactive bowel sounds per RN     LESVIA spoke to Soham GROSS, after collaboration with RN pt not stable for discharge.     Soham reports pending pre-cert for ARU      Tentative discharge plan- ARU     Tentative discharge date- TBD    *Case management will continue to follow progress and update discharge plan as needed.     Electronically signed by Radha Melgar on 4/28/2024 at 8:26 AM

## 2024-04-28 NOTE — PROGRESS NOTES
[facility] - Inpatient Rehabilitation Department   Phone: [phone]    Physical Therapy    [] Initial Evaluation            [x] Daily Treatment Note         [] Discharge Summary      Patient: Ana Gonzales   : 1955   MRN: [id number]   Date of Service:  2024  Admitting Diagnosis: Partial small bowel obstruction (HCC)  Current Admission Summary: Ana Gonzales is a 68 y.o. female who presents to the ED status post exploratory laparotomy with lysis of adhesions, Meckel diverticulectomy and appendectomy 3/19/2024 by Dr. Morales with complaint of generalized weakness and nausea/vomiting. She states she is too weak to ambulate, which is new. She states the nausea/vomiting began last night. She is denying abdominal pain, although abdomen is tender to palpation on exam. Patient reports an episode of shortness of breath earlier today, but is not feeling short of breath at this moment. Lower extremity edema is noted and patient is unsure of her baseline. Patient states her chief concerns are the generalized weakness/fatigue and nausea/vomiting.     She had additional abdominal surgeries on 24 and 24. She has an acute on chronic ligamentous injury to her L wrist.  Past Medical History:  has a past medical history of Arthritis, Asthma, Brain injury (HCC), Bronchitis, CHF (congestive heart failure) ([facility]), Depression, Diabetes mellitus (HCC), DM (diabetes mellitus screen), Elevated cholesterol with high triglycerides, Fall, Fibromyalgia, GERD (gastroesophageal reflux disease), HIGH CHOLESTEROL, Hypertension, Microvascular angina (HCC), Migraine, Neuropathy, Panic attacks, PONV (postoperative nausea and vomiting), Post traumatic stress disorder, Short-term memory loss, Skin cancer, Sleep apnea, and Vertigo.  Past Surgical History:  has a past surgical history that includes Hysterectomy; eye surgery (cataract B); Carpal tunnel release; Finger trigger release; Gallbladder surgery; bladder

## 2024-04-28 NOTE — PLAN OF CARE
Problem: Discharge Planning  Goal: Discharge to home or other facility with appropriate resources  4/28/2024 0130 by Arianne Brown RN  Outcome: Progressing  4/27/2024 1134 by Madhavi Galvez RN  Outcome: Progressing  Flowsheets (Taken 4/27/2024 0902)  Discharge to home or other facility with appropriate resources: Identify barriers to discharge with patient and caregiver     Problem: Safety - Adult  Goal: Free from fall injury  4/28/2024 0130 by Arianne Brown RN  Outcome: Progressing  4/27/2024 1134 by Madhavi Galvez RN  Outcome: Progressing     Problem: Pain  Goal: Verbalizes/displays adequate comfort level or baseline comfort level  4/28/2024 0130 by Arianne Brown RN  Outcome: Progressing  4/27/2024 1134 by Madhavi Galvez RN  Outcome: Progressing     Problem: Skin/Tissue Integrity  Goal: Absence of new skin breakdown  Description: 1.  Monitor for areas of redness and/or skin breakdown  2.  Assess vascular access sites hourly  3.  Every 4-6 hours minimum:  Change oxygen saturation probe site  4.  Every 4-6 hours:  If on nasal continuous positive airway pressure, respiratory therapy assess nares and determine need for appliance change or resting period.  4/28/2024 0130 by Arianne Brown RN  Outcome: Progressing  4/27/2024 1134 by Madhavi Galvez RN  Outcome: Progressing     Problem: Chronic Conditions and Co-morbidities  Goal: Patient's chronic conditions and co-morbidity symptoms are monitored and maintained or improved  4/28/2024 0130 by Arianne Brown RN  Outcome: Progressing  4/27/2024 1134 by Madhavi Galvez RN  Outcome: Progressing     Problem: ABCDS Injury Assessment  Goal: Absence of physical injury  4/28/2024 0130 by Arianne Brown RN  Outcome: Progressing  4/27/2024 1134 by Madhavi Galvez RN  Outcome: Progressing     Problem: Nutrition Deficit:  Goal: Optimize nutritional status  4/28/2024 0130 by Arianne Brown RN  Outcome: Progressing  4/27/2024 1134 by Madhavi Galvez RN  Outcome:

## 2024-04-28 NOTE — PROGRESS NOTES
[facility]    4/28/2024 7:37 AM        Name: Ana Gonzales .              Admitted: 4/12/2024  Primary Care Provider: Mellisa Houston MD (Tel: [phone])      Subjective:  .    Seen this am.  at bedside.   Awakened from sleep  Walks in the halls on Saturday with therapy   Reglan added on Friday,  has Supp but no BM   Bowel sounds are increased today     Had wound closure on 4/24    Trying to take less pain meds   Weight trending down with intermittent lasix dosing , now at # 196         PATIENT IS NOT A CANDIDATE FOR LTAC    Reviewed interval ancillary notes    Current Medications  iron sucrose (VENOFER) 200 mg in sodium chloride 0.9 % 100 mL IVPB, Q24H  PN-Adult Premix 5/20 - Standard Electrolytes - Central Line, Continuous TPN  insulin lispro (HUMALOG) injection vial 0-4 Units, Q6H  metoclopramide (REGLAN) injection 10 mg, Q6H  acetaminophen (TYLENOL) tablet 650 mg, TID  lidocaine 4 % external patch 1 patch, Daily  dextrose bolus 10% 125 mL, PRN   Or  dextrose bolus 10% 250 mL, PRN  glucagon injection 1 mg, PRN  dextrose 10 % infusion, Continuous PRN  enoxaparin (LOVENOX) injection 40 mg, Daily  pantoprazole (PROTONIX) injection 40 mg, Daily  HYDROmorphone HCl PF (DILAUDID) injection 1 mg, Q2H PRN  hydrALAZINE (APRESOLINE) injection 10 mg, Q6H PRN  sodium chloride flush 0.9 % injection 5-40 mL, PRN  0.9 % sodium chloride infusion, PRN  fat emulsion (INTRALIPID/NUTRILIPID) 20 % infusion 250 mL, Once per day on Mon Thu  buPROPion (WELLBUTRIN XL) extended release tablet 300 mg, QAM  carvedilol (COREG) tablet 3.125 mg, BID WC  dilTIAZem (CARDIZEM CD) extended release capsule 120 mg, Daily  gabapentin (NEURONTIN) capsule 600 mg, 4x Daily  isosorbide mononitrate (IMDUR) extended release tablet 120 mg, Daily  nortriptyline (PAMELOR) capsule 75 mg, Nightly  ranolazine (RANEXA) extended release tablet 1,000 mg, BID  topiramate (TOPAMAX) tablet 100 mg, QAM  topiramate

## 2024-04-29 LAB
ANION GAP SERPL CALCULATED.3IONS-SCNC: 12 MMOL/L (ref 3–16)
BUN SERPL-MCNC: 17 MG/DL (ref 7–20)
CALCIUM SERPL-MCNC: 9.1 MG/DL (ref 8.3–10.6)
CHLORIDE SERPL-SCNC: 108 MMOL/L (ref 99–110)
CO2 SERPL-SCNC: 19 MMOL/L (ref 21–32)
CREAT SERPL-MCNC: 0.6 MG/DL (ref 0.6–1.2)
DEPRECATED RDW RBC AUTO: 17.9 % (ref 12.4–15.4)
GFR SERPLBLD CREATININE-BSD FMLA CKD-EPI: >90 ML/MIN/{1.73_M2}
GLUCOSE BLD-MCNC: 141 MG/DL (ref 70–99)
GLUCOSE BLD-MCNC: 143 MG/DL (ref 70–99)
GLUCOSE BLD-MCNC: 167 MG/DL (ref 70–99)
GLUCOSE BLD-MCNC: 174 MG/DL (ref 70–99)
GLUCOSE SERPL-MCNC: 162 MG/DL (ref 70–99)
HCT VFR BLD AUTO: 28.7 % (ref 36–48)
HGB BLD-MCNC: 9.5 G/DL (ref 12–16)
MAGNESIUM SERPL-MCNC: 1.8 MG/DL (ref 1.8–2.4)
MCH RBC QN AUTO: 27.5 PG (ref 26–34)
MCHC RBC AUTO-ENTMCNC: 33 G/DL (ref 31–36)
MCV RBC AUTO: 83.4 FL (ref 80–100)
PERFORMED ON: ABNORMAL
PHOSPHATE SERPL-MCNC: 4.4 MG/DL (ref 2.5–4.9)
PLATELET # BLD AUTO: 415 K/UL (ref 135–450)
PMV BLD AUTO: 7.9 FL (ref 5–10.5)
POTASSIUM SERPL-SCNC: 3.6 MMOL/L (ref 3.5–5.1)
RBC # BLD AUTO: 3.45 M/UL (ref 4–5.2)
SODIUM SERPL-SCNC: 139 MMOL/L (ref 136–145)
WBC # BLD AUTO: 6.8 K/UL (ref 4–11)

## 2024-04-29 PROCEDURE — 84100 ASSAY OF PHOSPHORUS: CPT

## 2024-04-29 PROCEDURE — 6370000000 HC RX 637 (ALT 250 FOR IP): Performed by: SURGERY

## 2024-04-29 PROCEDURE — 97116 GAIT TRAINING THERAPY: CPT

## 2024-04-29 PROCEDURE — APPNB30 APP NON BILLABLE TIME 0-30 MINS: Performed by: NURSE PRACTITIONER

## 2024-04-29 PROCEDURE — 99024 POSTOP FOLLOW-UP VISIT: CPT | Performed by: SURGERY

## 2024-04-29 PROCEDURE — 1200000000 HC SEMI PRIVATE

## 2024-04-29 PROCEDURE — 6370000000 HC RX 637 (ALT 250 FOR IP): Performed by: NURSE PRACTITIONER

## 2024-04-29 PROCEDURE — 97110 THERAPEUTIC EXERCISES: CPT

## 2024-04-29 PROCEDURE — 97530 THERAPEUTIC ACTIVITIES: CPT

## 2024-04-29 PROCEDURE — 97535 SELF CARE MNGMENT TRAINING: CPT

## 2024-04-29 PROCEDURE — APPSS15 APP SPLIT SHARED TIME 0-15 MINUTES: Performed by: NURSE PRACTITIONER

## 2024-04-29 PROCEDURE — 36415 COLL VENOUS BLD VENIPUNCTURE: CPT

## 2024-04-29 PROCEDURE — 6360000002 HC RX W HCPCS: Performed by: SURGERY

## 2024-04-29 PROCEDURE — 2500000003 HC RX 250 WO HCPCS: Performed by: SURGERY

## 2024-04-29 PROCEDURE — 83735 ASSAY OF MAGNESIUM: CPT

## 2024-04-29 PROCEDURE — 2580000003 HC RX 258: Performed by: SURGERY

## 2024-04-29 PROCEDURE — 2580000003 HC RX 258: Performed by: NURSE PRACTITIONER

## 2024-04-29 PROCEDURE — 94640 AIRWAY INHALATION TREATMENT: CPT

## 2024-04-29 PROCEDURE — 6360000002 HC RX W HCPCS: Performed by: NURSE PRACTITIONER

## 2024-04-29 PROCEDURE — C9113 INJ PANTOPRAZOLE SODIUM, VIA: HCPCS | Performed by: SURGERY

## 2024-04-29 PROCEDURE — 80048 BASIC METABOLIC PNL TOTAL CA: CPT

## 2024-04-29 PROCEDURE — 85027 COMPLETE CBC AUTOMATED: CPT

## 2024-04-29 RX ORDER — METHENAMINE HIPPURATE 1000 MG/1
1 TABLET ORAL 2 TIMES DAILY WITH MEALS
Status: DISCONTINUED | OUTPATIENT
Start: 2024-04-29 | End: 2024-05-02 | Stop reason: HOSPADM

## 2024-04-29 RX ORDER — MENTHOL 1.4 %
ADHESIVE PATCH, MEDICATED TOPICAL 3 TIMES DAILY PRN
Status: DISCONTINUED | OUTPATIENT
Start: 2024-04-29 | End: 2024-05-02 | Stop reason: HOSPADM

## 2024-04-29 RX ORDER — ASPIRIN 81 MG/1
81 TABLET ORAL DAILY
Status: DISCONTINUED | OUTPATIENT
Start: 2024-04-29 | End: 2024-05-02 | Stop reason: HOSPADM

## 2024-04-29 RX ORDER — BISACODYL 10 MG
10 SUPPOSITORY, RECTAL RECTAL EVERY 6 HOURS
Status: COMPLETED | OUTPATIENT
Start: 2024-04-29 | End: 2024-04-29

## 2024-04-29 RX ORDER — TIZANIDINE 4 MG/1
2 TABLET ORAL
Status: DISCONTINUED | OUTPATIENT
Start: 2024-04-29 | End: 2024-05-02 | Stop reason: HOSPADM

## 2024-04-29 RX ORDER — MECLIZINE HCL 12.5 MG/1
25 TABLET ORAL 4 TIMES DAILY
Status: DISCONTINUED | OUTPATIENT
Start: 2024-04-29 | End: 2024-05-02 | Stop reason: HOSPADM

## 2024-04-29 RX ADMIN — TROSPIUM CHLORIDE 20 MG: 20 TABLET, FILM COATED ORAL at 21:19

## 2024-04-29 RX ADMIN — CARVEDILOL 3.12 MG: 3.12 TABLET, FILM COATED ORAL at 09:45

## 2024-04-29 RX ADMIN — ACETAMINOPHEN 650 MG: 325 TABLET ORAL at 09:45

## 2024-04-29 RX ADMIN — METHENAMINE HIPPURATE 1 G: 1 TABLET ORAL at 17:32

## 2024-04-29 RX ADMIN — ENOXAPARIN SODIUM 40 MG: 100 INJECTION SUBCUTANEOUS at 09:47

## 2024-04-29 RX ADMIN — TOPIRAMATE 150 MG: 100 TABLET, FILM COATED ORAL at 17:33

## 2024-04-29 RX ADMIN — GABAPENTIN 600 MG: 300 CAPSULE ORAL at 09:45

## 2024-04-29 RX ADMIN — DOCUSATE SODIUM 100 MG: 100 CAPSULE, LIQUID FILLED ORAL at 21:20

## 2024-04-29 RX ADMIN — DOCUSATE SODIUM 100 MG: 100 CAPSULE, LIQUID FILLED ORAL at 09:46

## 2024-04-29 RX ADMIN — ISOSORBIDE MONONITRATE 120 MG: 30 TABLET, EXTENDED RELEASE ORAL at 09:46

## 2024-04-29 RX ADMIN — Medication 10 ML: at 10:03

## 2024-04-29 RX ADMIN — METOCLOPRAMIDE 10 MG: 5 INJECTION, SOLUTION INTRAMUSCULAR; INTRAVENOUS at 21:20

## 2024-04-29 RX ADMIN — ACETAMINOPHEN 650 MG: 325 TABLET ORAL at 14:52

## 2024-04-29 RX ADMIN — METOCLOPRAMIDE 10 MG: 5 INJECTION, SOLUTION INTRAMUSCULAR; INTRAVENOUS at 14:53

## 2024-04-29 RX ADMIN — ASCORBIC ACID, VITAMIN A PALMITATE, CHOLECALCIFEROL, THIAMINE HYDROCHLORIDE, RIBOFLAVIN-5 PHOSPHATE SODIUM, PYRIDOXINE HYDROCHLORIDE, NIACINAMIDE, DEXPANTHENOL, ALPHA-TOCOPHEROL ACETATE, VITAMIN K1, FOLIC ACID, BIOTIN, CYANOCOBALAMIN: 200; 3300; 200; 6; 3.6; 6; 40; 15; 10; 150; 600; 60; 5 INJECTION, SOLUTION INTRAVENOUS at 17:54

## 2024-04-29 RX ADMIN — PANTOPRAZOLE SODIUM 40 MG: 40 INJECTION, POWDER, FOR SOLUTION INTRAVENOUS at 09:47

## 2024-04-29 RX ADMIN — ACETAMINOPHEN 650 MG: 325 TABLET ORAL at 21:20

## 2024-04-29 RX ADMIN — ARIPIPRAZOLE 2 MG: 2 TABLET ORAL at 21:19

## 2024-04-29 RX ADMIN — CARVEDILOL 3.12 MG: 3.12 TABLET, FILM COATED ORAL at 17:33

## 2024-04-29 RX ADMIN — TOPIRAMATE 100 MG: 100 TABLET, FILM COATED ORAL at 09:47

## 2024-04-29 RX ADMIN — BISACODYL 10 MG: 10 SUPPOSITORY RECTAL at 09:45

## 2024-04-29 RX ADMIN — METOCLOPRAMIDE 10 MG: 5 INJECTION, SOLUTION INTRAMUSCULAR; INTRAVENOUS at 09:47

## 2024-04-29 RX ADMIN — OLIVE OIL AND SOYBEAN OIL 250 ML: 16; 4 INJECTION, EMULSION INTRAVENOUS at 17:58

## 2024-04-29 RX ADMIN — ASPIRIN 81 MG: 81 TABLET, COATED ORAL at 14:53

## 2024-04-29 RX ADMIN — NORTRIPTYLINE HYDROCHLORIDE 75 MG: 25 CAPSULE ORAL at 21:20

## 2024-04-29 RX ADMIN — GABAPENTIN 600 MG: 300 CAPSULE ORAL at 14:52

## 2024-04-29 RX ADMIN — TIZANIDINE 2 MG: 4 TABLET ORAL at 21:19

## 2024-04-29 RX ADMIN — MECLIZINE 25 MG: 12.5 TABLET ORAL at 17:33

## 2024-04-29 RX ADMIN — DILTIAZEM HYDROCHLORIDE 120 MG: 120 CAPSULE, EXTENDED RELEASE ORAL at 09:47

## 2024-04-29 RX ADMIN — MECLIZINE 25 MG: 12.5 TABLET ORAL at 21:20

## 2024-04-29 RX ADMIN — GABAPENTIN 600 MG: 300 CAPSULE ORAL at 17:32

## 2024-04-29 RX ADMIN — GABAPENTIN 600 MG: 300 CAPSULE ORAL at 21:20

## 2024-04-29 RX ADMIN — IRON SUCROSE 200 MG: 20 INJECTION, SOLUTION INTRAVENOUS at 10:03

## 2024-04-29 RX ADMIN — BUPROPION HYDROCHLORIDE 300 MG: 150 TABLET, EXTENDED RELEASE ORAL at 09:46

## 2024-04-29 RX ADMIN — POLYETHYLENE GLYCOL 3350 17 G: 17 POWDER, FOR SOLUTION ORAL at 09:47

## 2024-04-29 RX ADMIN — RANOLAZINE 1000 MG: 500 TABLET, EXTENDED RELEASE ORAL at 09:47

## 2024-04-29 RX ADMIN — Medication 2 PUFF: at 20:21

## 2024-04-29 RX ADMIN — MECLIZINE 25 MG: 12.5 TABLET ORAL at 14:53

## 2024-04-29 RX ADMIN — METOCLOPRAMIDE 10 MG: 5 INJECTION, SOLUTION INTRAMUSCULAR; INTRAVENOUS at 03:17

## 2024-04-29 RX ADMIN — BISACODYL 10 MG: 10 SUPPOSITORY RECTAL at 17:33

## 2024-04-29 RX ADMIN — RANOLAZINE 1000 MG: 500 TABLET, EXTENDED RELEASE ORAL at 21:20

## 2024-04-29 ASSESSMENT — PAIN DESCRIPTION - DESCRIPTORS
DESCRIPTORS: DISCOMFORT;ACHING;SORE
DESCRIPTORS: DISCOMFORT;ACHING

## 2024-04-29 ASSESSMENT — PAIN SCALES - GENERAL
PAINLEVEL_OUTOF10: 5
PAINLEVEL_OUTOF10: 2
PAINLEVEL_OUTOF10: 9
PAINLEVEL_OUTOF10: 0
PAINLEVEL_OUTOF10: 4

## 2024-04-29 ASSESSMENT — PAIN DESCRIPTION - LOCATION
LOCATION: WRIST
LOCATION: ARM;FACE;LEG
LOCATION: ARM;HAND
LOCATION: ABDOMEN;WRIST

## 2024-04-29 ASSESSMENT — PAIN DESCRIPTION - ORIENTATION
ORIENTATION: LEFT
ORIENTATION: LEFT
ORIENTATION: RIGHT;LEFT
ORIENTATION: LEFT

## 2024-04-29 ASSESSMENT — PAIN DESCRIPTION - ONSET: ONSET: AWAKENED FROM SLEEP

## 2024-04-29 ASSESSMENT — PAIN SCALES - WONG BAKER: WONGBAKER_NUMERICALRESPONSE: 0;6

## 2024-04-29 ASSESSMENT — PAIN DESCRIPTION - PAIN TYPE: TYPE: ACUTE PAIN

## 2024-04-29 NOTE — CARE COORDINATION
Discharge Planning Note:    - Possible need for TPN    Talked with Francisca Gonzalez:    - Lisa is running benefits.    Will continue to follow.    DEYANIRA Escobar RN    [facility]  Phone: [phone]

## 2024-04-29 NOTE — PROGRESS NOTES
[facility]    4/29/2024 7:23 AM        Name: Ana Gonzales .              Admitted: 4/12/2024  Primary Care Provider: Mellisa Houston MD (Tel: [phone])      Subjective:  .    Seen this am.  at bedside.   Had some muscle pains last evening. I added her home dose of zanaflex 2 mg at hs.     Started on Clears on Sunday with clamping the G tube.   No BM yet.    She does have active bowel sounds today      Had wound closure on 4/24    She has not had any narcotics in the past 2 days   Weight trending down with intermittent lasix dosing , now at # 197         PATIENT IS NOT A CANDIDATE FOR LTAC    Reviewed interval ancillary notes    Current Medications  methyl salicylate-menthol (ALFONSO CHANDRA GREASELESS) 10-15 % cream CREA, TID PRN  PN-Adult Premix 5/20 - Standard Electrolytes - Central Line, Continuous TPN  ketorolac (TORADOL) injection 15 mg, Q6H PRN  docusate sodium (COLACE) capsule 100 mg, BID  polyethylene glycol (GLYCOLAX) packet 17 g, Daily  iron sucrose (VENOFER) 200 mg in sodium chloride 0.9 % 100 mL IVPB, Q24H  insulin lispro (HUMALOG) injection vial 0-4 Units, Q6H  metoclopramide (REGLAN) injection 10 mg, Q6H  acetaminophen (TYLENOL) tablet 650 mg, TID  lidocaine 4 % external patch 1 patch, Daily  dextrose bolus 10% 125 mL, PRN   Or  dextrose bolus 10% 250 mL, PRN  glucagon injection 1 mg, PRN  dextrose 10 % infusion, Continuous PRN  enoxaparin (LOVENOX) injection 40 mg, Daily  pantoprazole (PROTONIX) injection 40 mg, Daily  HYDROmorphone HCl PF (DILAUDID) injection 1 mg, Q2H PRN  hydrALAZINE (APRESOLINE) injection 10 mg, Q6H PRN  sodium chloride flush 0.9 % injection 5-40 mL, PRN  0.9 % sodium chloride infusion, PRN  fat emulsion (INTRALIPID/NUTRILIPID) 20 % infusion 250 mL, Once per day on Mon Thu  buPROPion (WELLBUTRIN XL) extended release tablet 300 mg, QAM  carvedilol (COREG) tablet 3.125 mg, BID WC  dilTIAZem (CARDIZEM CD) extended release capsule 120

## 2024-04-29 NOTE — PROGRESS NOTES
[facility]        Progress Note    Patient Name: Ana Gonzales  MRN: [id number]  YOB: 1955  Date of Evaluation: 2024    Subjective:  No acute events overnight  Pain controlled  No nausea or vomiting with G-tube clamped, does report some reflux after eating Italian ice this morning  Passing some flatus, no stool, denies bloating  Resting in bed at this time    Post-Operative Day #41, 12, 5      Vital Signs:  Patient Vitals for the past 24 hrs:   BP Temp Temp src Pulse Resp SpO2 Weight   24 0747 -- -- -- -- -- -- 89.7 kg (197 lb 11.2 oz)   24 0423 116/72 98 °F (36.7 °C) Oral 74 16 93 % --   24 2342 131/70 -- -- 76 16 97 % --   24 2030 112/68 97.9 °F (36.6 °C) Oral 76 18 95 % --   24 1942 -- -- -- 77 16 97 % --   24 1617 135/69 97.7 °F (36.5 °C) Oral 77 18 98 % --   24 1144 100/63 97.2 °F (36.2 °C) -- 75 15 95 % --   24 1125 -- -- -- 71 16 95 % --        TEMPERATURE HISTORY 24H: Temp (24hrs), Av.7 °F (36.5 °C), Min:97.2 °F (36.2 °C), Max:98 °F (36.7 °C)    BLOOD PRESSURE HISTORY: Systolic (36hrs), Av , Min:100 , Max:135    Diastolic (36hrs), Av, Min:60, Max:72      Intake/Output:  I/O last 3 completed shifts:  In: 2326 [P.O.:360]  Out: 4025 [Urine:3700; Emesis/NG output:325]  No intake/output data recorded.  Drain/tube Output:  [REMOVED] Negative Pressure Wound Therapy Abdomen Anterior;Mid-Output (ml): 50 ml    Physical Exam:  General: awake, alert, oriented to person, place, time  Lungs: unlabored respirations  Abdomen: soft, mildly distended, non-tender, bowel sounds active, G-tube clamped  Skin/Wound: healing well, no drainage, no erythema, well approximated with staples--dressing changed    Labs:  CBC:    Recent Labs     24  0552 24  0448   WBC 4.7 6.8   HGB 8.3* 9.5*   HCT 25.4* 28.7*    415       BMP:    Recent Labs     24  0552 24  0448    139   K 3.5

## 2024-04-29 NOTE — PROGRESS NOTES
minimal assistance  Lower Extremity Bathing: moderate assistance maximum assistance   Upper Extremity Dressing: minimal assistance  Lower Extremity Dressing: maximum assistance  Toileting: maximum assistance.    Toileting Equipment: none  General Comments: assist for clothing management and pericare for toileting  Max A to salo mare brief - patient able to thread R LE but needed assist with L LE and pulling over hips  Voided urine and stool in bathroom, then again on bsc due to urgency (2nd BM and urination)  Instrumental Activities of Daily Living  No IADL completed on this date.      Functional Mobility    Bed Mobility:  Sit to Supine: maximum assistance  Scooting: maximum assistance  Comments:   Transfers:  Sit to stand transfer:contact guard assistance  Stand to sit transfer: stand by assistance  Toilet transfer: contact guard assistance  Comments:Tx to toilet x 1 CGA, tx to bsc x 3 reps CGA  Functional Mobility  Functional Mobility Activity: to/from bathroom, 20'+20'+10'  Device Use: rolling walker  Required Assistance: contact guard assistance  Comment: patient required sitting rest breaks after ambulation to/from bathroom   Balance:  Static Sitting Balance: fair (+): maintains balance at SBA/supervision without use of UE support  Dynamic Sitting Balance: fair: maintains balance at CGA without use of UE support  Static Standing Balance: fair (-): maintains balance at CGA with use of UE support  Dynamic Standing Balance: fair (-): maintains balance at CGA with use of UE support  Comments:    Other Therapeutic Interventions      Functional Outcomes  AM-PAC Inpatient Daily Activity Raw Score: 13          Cognition  WFL  Orientation:    alert and oriented x 4  Command Following:   WFL     Education  Barriers To Learning: none  Patient Education: patient educated on goals, OT role and benefits, plan of care, adaptive device training, family education, disease specific education, transfer training, discharge  at this time unless indicated above.       Therapy Session Time     Individual Group Co-treatment   Time In   1335   Time Out   1458   Minutes   83     Timed Code Treatment Minutes: 83  Total Treatment Minutes:  83       Electronically Signed By: Arianne Fountain OTR/GUILLERMO [id number]

## 2024-04-29 NOTE — PLAN OF CARE
Problem: Discharge Planning  Goal: Discharge to home or other facility with appropriate resources  Outcome: Progressing     Problem: Safety - Adult  Goal: Free from fall injury  4/29/2024 1054 by Nuha Mullins RN  Outcome: Progressing  4/29/2024 0135 by Arianne Brown RN  Outcome: Progressing     Problem: Pain  Goal: Verbalizes/displays adequate comfort level or baseline comfort level  4/29/2024 1054 by Nuha Mullins RN  Outcome: Progressing  4/29/2024 0135 by Arianne Brown RN  Outcome: Progressing     Problem: Skin/Tissue Integrity  Goal: Absence of new skin breakdown  Description: 1.  Monitor for areas of redness and/or skin breakdown  2.  Assess vascular access sites hourly  3.  Every 4-6 hours minimum:  Change oxygen saturation probe site  4.  Every 4-6 hours:  If on nasal continuous positive airway pressure, respiratory therapy assess nares and determine need for appliance change or resting period.  4/29/2024 1054 by Nuha Mullins RN  Outcome: Progressing  4/29/2024 0135 by Arianne Brown RN  Outcome: Progressing     Problem: Chronic Conditions and Co-morbidities  Goal: Patient's chronic conditions and co-morbidity symptoms are monitored and maintained or improved  4/29/2024 1054 by Nuha Mullins RN  Outcome: Progressing  4/29/2024 0135 by Arianne Brown RN  Outcome: Progressing     Problem: ABCDS Injury Assessment  Goal: Absence of physical injury  Outcome: Progressing     Problem: Nutrition Deficit:  Goal: Optimize nutritional status  Outcome: Progressing     Problem: Respiratory - Adult  Goal: Achieves optimal ventilation and oxygenation  Outcome: Progressing     Problem: Cardiovascular - Adult  Goal: Maintains optimal cardiac output and hemodynamic stability  Outcome: Progressing  Goal: Absence of cardiac dysrhythmias or at baseline  Outcome: Progressing     Problem: Skin/Tissue Integrity - Adult  Goal: Skin integrity remains intact  Outcome:  Progressing  Goal: Incisions, wounds, or drain sites healing without S/S of infection  4/29/2024 1054 by Nuha Mullins RN  Outcome: Progressing  4/29/2024 0135 by Arianne Brown RN  Outcome: Progressing     Problem: Musculoskeletal - Adult  Goal: Return mobility to safest level of function  4/29/2024 1054 by Nuha Mullins RN  Outcome: Progressing  4/29/2024 0135 by Arianne Brown RN  Outcome: Progressing  Goal: Maintain proper alignment of affected body part  Outcome: Progressing     Problem: Gastrointestinal - Adult  Goal: Minimal or absence of nausea and vomiting  4/29/2024 1054 by Nuha Mullins RN  Outcome: Progressing  4/29/2024 0135 by Arianne Brown RN  Outcome: Progressing  Goal: Maintains or returns to baseline bowel function  4/29/2024 1054 by Nuha Mullins RN  Outcome: Progressing  4/29/2024 0135 by Arianne Brown RN  Outcome: Progressing     Problem: Genitourinary - Adult  Goal: Absence of urinary retention  Outcome: Progressing

## 2024-04-29 NOTE — PROGRESS NOTES
Clinical Pharmacy Note    Pharmacy consulted by Aria Carter to manage TPN    Current TPN rate: 83 mL/hr  Goal TPN rate: 83mL/hr     Access: PICC  Indication: SBO    Labs:  General Labs:  BMP:    Lab Results   Component Value Date/Time     04/29/2024 04:48 AM    K 3.6 04/29/2024 04:48 AM    K 3.8 04/22/2024 05:47 AM     04/29/2024 04:48 AM    CO2 19 04/29/2024 04:48 AM    BUN 17 04/29/2024 04:48 AM    CREATININE 0.6 04/29/2024 04:48 AM    CALCIUM 9.1 04/29/2024 04:48 AM    GFRAA >60 11/24/2020 06:29 PM    GFRAA >60 01/13/2013 05:24 AM    LABGLOM >90 04/29/2024 04:48 AM    GLUCOSE 162 04/29/2024 04:48 AM       Electrolyte replacement as follows:   None needed    Blood sugars over past 24 hours: 130-172    Blood sugar management:  Continue Humalog q6 hour sliding scale at low dose.    Plan to continue TPN at 83 ml/hr.     Thank you for allowing pharmacy to participate in the care of this patient.    Kathleen Chung, PharmD  PGY-1 Pharmacy Resident  [id number]

## 2024-04-29 NOTE — CARE COORDINATION
Appeal initiated for ARU authorization.    ARU will continue to follow progress and update discharge plan as needed.    VANDANA BettsN, .[phone]

## 2024-04-29 NOTE — PROGRESS NOTES
[facility] - Inpatient Rehabilitation Department   Phone: [phone]    Physical Therapy    [] Initial Evaluation            [x] Daily Treatment Note         [] Discharge Summary      Patient: Ana Gonzales   : 1955   MRN: [id number]   Date of Service:  2024  Admitting Diagnosis: Partial small bowel obstruction (HCC)  Current Admission Summary: Ana Gonzales is a 68 y.o. female who presents to the ED status post exploratory laparotomy with lysis of adhesions, Meckel diverticulectomy and appendectomy 3/19/2024 by Dr. Morales with complaint of generalized weakness and nausea/vomiting. She states she is too weak to ambulate, which is new. She states the nausea/vomiting began last night. She is denying abdominal pain, although abdomen is tender to palpation on exam. Patient reports an episode of shortness of breath earlier today, but is not feeling short of breath at this moment. Lower extremity edema is noted and patient is unsure of her baseline. Patient states her chief concerns are the generalized weakness/fatigue and nausea/vomiting.     She had additional abdominal surgeries on 24 and 24. She has an acute on chronic ligamentous injury to her L wrist.    Past Medical History:  has a past medical history of Arthritis, Asthma, Brain injury (HCC), Bronchitis, CHF (congestive heart failure) ([facility]), Depression, Diabetes mellitus (HCC), DM (diabetes mellitus screen), Elevated cholesterol with high triglycerides, Fall, Fibromyalgia, GERD (gastroesophageal reflux disease), HIGH CHOLESTEROL, Hypertension, Microvascular angina (HCC), Migraine, Neuropathy, Panic attacks, PONV (postoperative nausea and vomiting), Post traumatic stress disorder, Short-term memory loss, Skin cancer, Sleep apnea, and Vertigo.  Past Surgical History:  has a past surgical history that includes Hysterectomy; eye surgery (cataract B); Carpal tunnel release; Finger trigger release; Gallbladder surgery;  vision is progressively getting worse). Has contacts but recently lost them.   Hearing:   no hearing aid - pt reports hard of hearing in L ear with \"cricket\" ringing sounds occasionally  Observation:   General Observation:  Pt has a lumbar scoliosis due to an injury and spine surgery.  She stands with her hips to the L and trunk leaning to the R. This is chronic and her normal posture. Pt on RA on arrival.      Subjective  General: Pt semi-fowlers in bed on arrival, agreeable to participate in PT treatment session.  Pain: 5/10.  Location: \"everywhere\"    Pain Interventions: RN notified, repositioned , and therapy activities modified     Functional Mobility  Bed Mobility:  Supine to Sit: minimal assistance  Scooting: minimal assistance  Comments: Supine to sit: HOB elevated, increased time required to complete task, pt pulls up on therapist to assist with trunk  Transfers:  Sit to stand transfer: stand by assistance, contact guard assistance  Stand to sit transfer: stand by assistance, contact guard assistance   Comments: Platform walker used for transfers, verbal cues required for back of legs on stand to sit. EOB 2x, recliner 2x.   Ambulation:  Surface:level surface  Assistive Device: platform walker (L)  Assistance: contact guard assistance  Distance: 48'  Gait Mechanics: decreased step length, heels close together, B feet externally rotated, leans to the R, decreased yumiko   Comments: able to avoid objects in room by adjusting route or body position with use of platform walker. Able to    Stair Mobility:  Stair mobility not completed on this date.  Comments:  Wheelchair Mobility:  No w/c mobility completed on this date.  Comments:  Balance:  Static Sitting Balance: fair (+): maintains balance at SBA/supervision without use of UE support  Dynamic Sitting Balance: fair: maintains balance at CGA without use of UE support  Static Standing Balance: fair (-): maintains balance at SBA/CGA with use of UE

## 2024-04-29 NOTE — CARE COORDINATION
PM&R referral received.  Requested for possible P2P consideration.  However, patient does not exhibit a functional need and medical complexity for physiatrist oversight x3-5 times per week.   Dr SHANEL Shelley updated.     Felix Paula, BSN, .[phone]

## 2024-04-29 NOTE — PROGRESS NOTES
Ana Gonzales  4/28/2024  [id number]    Chief Complaint: Partial small bowel obstruction (HCC)    Subjective   Eating clear liquids on interview, states this has been going well for her.       Objective   Objective:  Patient Vitals for the past 24 hrs:   BP Temp Temp src Pulse Resp SpO2 Weight   04/28/24 2342 131/70 -- -- 76 16 97 % --   04/28/24 2030 112/68 97.9 °F (36.6 °C) Oral 76 18 95 % --   04/28/24 1942 -- -- -- 77 16 97 % --   04/28/24 1617 135/69 97.7 °F (36.5 °C) Oral 77 18 98 % --   04/28/24 1144 100/63 97.2 °F (36.2 °C) -- 75 15 95 % --   04/28/24 1125 -- -- -- 71 16 95 % --   04/28/24 0816 107/64 98.4 °F (36.9 °C) Oral 69 14 95 % --   04/28/24 0559 -- -- -- -- -- -- 88.9 kg (196 lb)   04/28/24 0432 104/69 97.6 °F (36.4 °C) Oral 69 18 94 % --   04/28/24 0104 101/60 97.4 °F (36.3 °C) Oral 67 -- 93 % --     Gen: No distress, pleasant.   HEENT: Normocephalic, atraumatic.   CV: No audible murmurs, well perfused extremities  Resp: No respiratory distress. No increased WOB  Abd: Soft, nontender nondistended. G-tube to gravity drainage.   Ext: No edema.   Neuro: Alert, oriented, appropriately interactive.     Laboratory data: Available via EMR.     Therapy progress:    PT    Rolling: Level of difficulty - A Little   Sit to Stand from a Chair: Level of difficulty - A Little  Supine to Sit: Level of difficulty - A Little     Bed to Chair: Physical Assistance Required - A Little  Ambulate Across Room: Physical Assistance Required - A Little  Ascend 3-5 Steps With HR: Physical Assistance Required - A Little    OT    Eating: Physical Assistance Required - A Little  Grooming: Physical Assistance Required - A Lot  LB Dressing: Physical Assistance Required - A Lot  UB Dressing: Physical Assistance Required - A Lot  Bathing: Physical Assistance Required - A Lot  Toileting: Physical Assistance Required - Total    SLP         Body mass index is 35.84 kg/m².       Assessment:  Patient Active Problem List   Diagnosis     contractions)    Acute prerenal azotemia    History of heart artery stent    Upper GI bleed    Slurred speech    Ventricular bigeminy    HANS (acute kidney injury) (HCC)    Partial small bowel obstruction (HCC)    Supraglottitis without airway obstruction    Acute parotitis    Laryngopharyngeal reflux (LPR)    Mild malnutrition (HCC)    Ileus (HCC)       Plan:   Tolerating clear liquids today- pending prior auth per insurance.    Anticipate she will tolerate and benefit from 3 hrs of intensive PT/OT/SLP per day, 5 days per week. She continues to demonstrate medical complexity to warrant hospital-based rehab under the supervision of a physiatrist.       Elkin Antony, DO  4/28/2024, 11:55 PM    * This document was created using dictation software.  While all precautions were taken to ensure accuracy, errors may have occurred.  Please disregard any typographical errors.

## 2024-04-29 NOTE — PLAN OF CARE
Problem: Safety - Adult  Goal: Free from fall injury  Outcome: Progressing     Problem: Pain  Goal: Verbalizes/displays adequate comfort level or baseline comfort level  Outcome: Progressing     Problem: Skin/Tissue Integrity  Goal: Absence of new skin breakdown  Description: 1.  Monitor for areas of redness and/or skin breakdown  2.  Assess vascular access sites hourly  3.  Every 4-6 hours minimum:  Change oxygen saturation probe site  4.  Every 4-6 hours:  If on nasal continuous positive airway pressure, respiratory therapy assess nares and determine need for appliance change or resting period.  Outcome: Progressing     Problem: Chronic Conditions and Co-morbidities  Goal: Patient's chronic conditions and co-morbidity symptoms are monitored and maintained or improved  Outcome: Progressing     Problem: Skin/Tissue Integrity - Adult  Goal: Incisions, wounds, or drain sites healing without S/S of infection  Outcome: Progressing     Problem: Musculoskeletal - Adult  Goal: Return mobility to safest level of function  Outcome: Progressing     Problem: Gastrointestinal - Adult  Goal: Minimal or absence of nausea and vomiting  Outcome: Progressing  Goal: Maintains or returns to baseline bowel function  Outcome: Progressing

## 2024-04-30 LAB
ALBUMIN SERPL-MCNC: 3.1 G/DL (ref 3.4–5)
ALP SERPL-CCNC: 99 U/L (ref 40–129)
ALT SERPL-CCNC: <5 U/L (ref 10–40)
AST SERPL-CCNC: 9 U/L (ref 15–37)
BILIRUB DIRECT SERPL-MCNC: <0.2 MG/DL (ref 0–0.3)
BILIRUB INDIRECT SERPL-MCNC: ABNORMAL MG/DL (ref 0–1)
BILIRUB SERPL-MCNC: <0.2 MG/DL (ref 0–1)
GLUCOSE BLD-MCNC: 112 MG/DL (ref 70–99)
GLUCOSE BLD-MCNC: 113 MG/DL (ref 70–99)
GLUCOSE BLD-MCNC: 132 MG/DL (ref 70–99)
GLUCOSE BLD-MCNC: 152 MG/DL (ref 70–99)
PERFORMED ON: ABNORMAL
PROT SERPL-MCNC: 6 G/DL (ref 6.4–8.2)

## 2024-04-30 PROCEDURE — 94761 N-INVAS EAR/PLS OXIMETRY MLT: CPT

## 2024-04-30 PROCEDURE — 97530 THERAPEUTIC ACTIVITIES: CPT

## 2024-04-30 PROCEDURE — 1200000000 HC SEMI PRIVATE

## 2024-04-30 PROCEDURE — 6370000000 HC RX 637 (ALT 250 FOR IP): Performed by: SURGERY

## 2024-04-30 PROCEDURE — 6370000000 HC RX 637 (ALT 250 FOR IP): Performed by: NURSE PRACTITIONER

## 2024-04-30 PROCEDURE — APPNB30 APP NON BILLABLE TIME 0-30 MINS: Performed by: NURSE PRACTITIONER

## 2024-04-30 PROCEDURE — 6360000002 HC RX W HCPCS: Performed by: SURGERY

## 2024-04-30 PROCEDURE — 6360000002 HC RX W HCPCS: Performed by: NURSE PRACTITIONER

## 2024-04-30 PROCEDURE — 97535 SELF CARE MNGMENT TRAINING: CPT

## 2024-04-30 PROCEDURE — 94640 AIRWAY INHALATION TREATMENT: CPT

## 2024-04-30 PROCEDURE — 36415 COLL VENOUS BLD VENIPUNCTURE: CPT

## 2024-04-30 PROCEDURE — 80076 HEPATIC FUNCTION PANEL: CPT

## 2024-04-30 PROCEDURE — C9113 INJ PANTOPRAZOLE SODIUM, VIA: HCPCS | Performed by: SURGERY

## 2024-04-30 PROCEDURE — 2500000003 HC RX 250 WO HCPCS: Performed by: STUDENT IN AN ORGANIZED HEALTH CARE EDUCATION/TRAINING PROGRAM

## 2024-04-30 PROCEDURE — 97110 THERAPEUTIC EXERCISES: CPT

## 2024-04-30 PROCEDURE — APPSS15 APP SPLIT SHARED TIME 0-15 MINUTES: Performed by: NURSE PRACTITIONER

## 2024-04-30 PROCEDURE — 99024 POSTOP FOLLOW-UP VISIT: CPT | Performed by: SURGERY

## 2024-04-30 RX ORDER — POLYETHYLENE GLYCOL 3350 17 G/17G
17 POWDER, FOR SOLUTION ORAL DAILY
Status: DISCONTINUED | OUTPATIENT
Start: 2024-04-30 | End: 2024-05-02 | Stop reason: HOSPADM

## 2024-04-30 RX ADMIN — GABAPENTIN 600 MG: 300 CAPSULE ORAL at 22:03

## 2024-04-30 RX ADMIN — RANOLAZINE 1000 MG: 500 TABLET, EXTENDED RELEASE ORAL at 22:03

## 2024-04-30 RX ADMIN — GABAPENTIN 600 MG: 300 CAPSULE ORAL at 09:23

## 2024-04-30 RX ADMIN — METHENAMINE HIPPURATE 1 G: 1 TABLET ORAL at 09:23

## 2024-04-30 RX ADMIN — RANOLAZINE 1000 MG: 500 TABLET, EXTENDED RELEASE ORAL at 09:23

## 2024-04-30 RX ADMIN — ACETAMINOPHEN 650 MG: 325 TABLET ORAL at 22:04

## 2024-04-30 RX ADMIN — METHENAMINE HIPPURATE 1 G: 1 TABLET ORAL at 17:10

## 2024-04-30 RX ADMIN — PANTOPRAZOLE SODIUM 40 MG: 40 INJECTION, POWDER, FOR SOLUTION INTRAVENOUS at 09:23

## 2024-04-30 RX ADMIN — ACETAMINOPHEN 650 MG: 325 TABLET ORAL at 09:22

## 2024-04-30 RX ADMIN — GABAPENTIN 600 MG: 300 CAPSULE ORAL at 13:46

## 2024-04-30 RX ADMIN — TROSPIUM CHLORIDE 20 MG: 20 TABLET, FILM COATED ORAL at 22:04

## 2024-04-30 RX ADMIN — NORTRIPTYLINE HYDROCHLORIDE 75 MG: 25 CAPSULE ORAL at 22:03

## 2024-04-30 RX ADMIN — CARVEDILOL 3.12 MG: 3.12 TABLET, FILM COATED ORAL at 09:22

## 2024-04-30 RX ADMIN — METOCLOPRAMIDE 10 MG: 5 INJECTION, SOLUTION INTRAMUSCULAR; INTRAVENOUS at 02:15

## 2024-04-30 RX ADMIN — ENOXAPARIN SODIUM 40 MG: 100 INJECTION SUBCUTANEOUS at 09:23

## 2024-04-30 RX ADMIN — MECLIZINE 25 MG: 12.5 TABLET ORAL at 09:22

## 2024-04-30 RX ADMIN — DILTIAZEM HYDROCHLORIDE 120 MG: 120 CAPSULE, EXTENDED RELEASE ORAL at 09:23

## 2024-04-30 RX ADMIN — METOCLOPRAMIDE 10 MG: 5 INJECTION, SOLUTION INTRAMUSCULAR; INTRAVENOUS at 22:03

## 2024-04-30 RX ADMIN — DOCUSATE SODIUM 100 MG: 100 CAPSULE, LIQUID FILLED ORAL at 09:23

## 2024-04-30 RX ADMIN — GABAPENTIN 600 MG: 300 CAPSULE ORAL at 17:10

## 2024-04-30 RX ADMIN — ARIPIPRAZOLE 2 MG: 2 TABLET ORAL at 22:03

## 2024-04-30 RX ADMIN — METOCLOPRAMIDE 10 MG: 5 INJECTION, SOLUTION INTRAMUSCULAR; INTRAVENOUS at 09:23

## 2024-04-30 RX ADMIN — ASPIRIN 81 MG: 81 TABLET, COATED ORAL at 09:23

## 2024-04-30 RX ADMIN — MECLIZINE 25 MG: 12.5 TABLET ORAL at 22:04

## 2024-04-30 RX ADMIN — TIZANIDINE 2 MG: 4 TABLET ORAL at 22:03

## 2024-04-30 RX ADMIN — MECLIZINE 25 MG: 12.5 TABLET ORAL at 17:10

## 2024-04-30 RX ADMIN — BUPROPION HYDROCHLORIDE 300 MG: 150 TABLET, EXTENDED RELEASE ORAL at 09:22

## 2024-04-30 RX ADMIN — POLYETHYLENE GLYCOL 3350 17 G: 17 POWDER, FOR SOLUTION ORAL at 13:46

## 2024-04-30 RX ADMIN — METOCLOPRAMIDE 10 MG: 5 INJECTION, SOLUTION INTRAMUSCULAR; INTRAVENOUS at 13:46

## 2024-04-30 RX ADMIN — CARVEDILOL 3.12 MG: 3.12 TABLET, FILM COATED ORAL at 17:11

## 2024-04-30 RX ADMIN — Medication 2 PUFF: at 19:45

## 2024-04-30 RX ADMIN — TOPIRAMATE 150 MG: 100 TABLET, FILM COATED ORAL at 17:10

## 2024-04-30 RX ADMIN — LEUCINE, PHENYLALANINE, LYSINE, METHIONINE, ISOLEUCINE, VALINE, HISTIDINE, THREONINE, TRYPTOPHAN, ALANINE, GLYCINE, ARGININE, PROLINE, SERINE, TYROSINE, SODIUM ACETATE, DIBASIC POTASSIUM PHOSPHATE, MAGNESIUM CHLORIDE, SODIUM CHLORIDE, CALCIUM CHLORIDE, DEXTROSE
365; 280; 290; 200; 300; 290; 240; 210; 90; 1035; 515; 575; 340; 250; 20; 340; 261; 51; 59; 33; 20 INJECTION INTRAVENOUS at 18:09

## 2024-04-30 RX ADMIN — MECLIZINE 25 MG: 12.5 TABLET ORAL at 13:46

## 2024-04-30 RX ADMIN — Medication 2 PUFF: at 10:07

## 2024-04-30 RX ADMIN — DOCUSATE SODIUM 100 MG: 100 CAPSULE, LIQUID FILLED ORAL at 22:03

## 2024-04-30 RX ADMIN — ACETAMINOPHEN 650 MG: 325 TABLET ORAL at 17:11

## 2024-04-30 RX ADMIN — TOPIRAMATE 100 MG: 100 TABLET, FILM COATED ORAL at 09:23

## 2024-04-30 RX ADMIN — ISOSORBIDE MONONITRATE 120 MG: 30 TABLET, EXTENDED RELEASE ORAL at 09:22

## 2024-04-30 NOTE — PROGRESS NOTES
Clinical Pharmacy Note    Pharmacy consulted by Aria Carter to manage TPN    Current TPN rate: 83 mL/hr  Goal TPN rate: 83mL/hr     Access: PICC  Indication: SBO    Labs:  General Labs:  BMP:    Lab Results   Component Value Date/Time     04/29/2024 04:48 AM    K 3.6 04/29/2024 04:48 AM    K 3.8 04/22/2024 05:47 AM     04/29/2024 04:48 AM    CO2 19 04/29/2024 04:48 AM    BUN 17 04/29/2024 04:48 AM    CREATININE 0.6 04/29/2024 04:48 AM    CALCIUM 9.1 04/29/2024 04:48 AM    GFRAA >60 11/24/2020 06:29 PM    GFRAA >60 01/13/2013 05:24 AM    LABGLOM >90 04/29/2024 04:48 AM    GLUCOSE 162 04/29/2024 04:48 AM       Electrolyte replacement as follows:   None needed    Blood sugars over past 24 hours: 112-143    Blood sugar management:  Continue Humalog q6 hour sliding scale at low dose.    Plan to continue TPN at 83 ml/hr.     Thank you for allowing pharmacy to participate in the care of this patient.    Kathleen Chung, PharmD  PGY-1 Pharmacy Resident  [id number]

## 2024-04-30 NOTE — PROGRESS NOTES
[facility]    4/30/2024 10:56 AM        Name: Ana Gonzales .              Admitted: 4/12/2024  Primary Care Provider: Mellisa Houston MD (Tel: [phone])      Subjective:  .    Seen this am.  at bedside. Feeling better, pain controlled. Has had 3 bowel movements since yesterday. Does not like clear liquids.         PATIENT IS NOT A CANDIDATE FOR LTAC    Reviewed interval ancillary notes    Current Medications  PN-Adult Premix 5/20 - Standard Electrolytes - Central Line, Continuous TPN  methyl salicylate-menthol (ALFONSO CHANDRA GREASELESS) 10-15 % cream CREA, TID PRN  tiZANidine (ZANAFLEX) tablet 2 mg, QHS  PN-Adult Premix 5/20 - Standard Electrolytes - Central Line, Continuous TPN  aspirin EC tablet 81 mg, Daily  meclizine (ANTIVERT) tablet 25 mg, 4x Daily  methenamine (HIPREX) tablet 1 g, BID WC  ketorolac (TORADOL) injection 15 mg, Q6H PRN  docusate sodium (COLACE) capsule 100 mg, BID  insulin lispro (HUMALOG) injection vial 0-4 Units, Q6H  metoclopramide (REGLAN) injection 10 mg, Q6H  acetaminophen (TYLENOL) tablet 650 mg, TID  lidocaine 4 % external patch 1 patch, Daily  dextrose bolus 10% 125 mL, PRN   Or  dextrose bolus 10% 250 mL, PRN  glucagon injection 1 mg, PRN  dextrose 10 % infusion, Continuous PRN  enoxaparin (LOVENOX) injection 40 mg, Daily  pantoprazole (PROTONIX) injection 40 mg, Daily  HYDROmorphone HCl PF (DILAUDID) injection 1 mg, Q2H PRN  hydrALAZINE (APRESOLINE) injection 10 mg, Q6H PRN  sodium chloride flush 0.9 % injection 5-40 mL, PRN  0.9 % sodium chloride infusion, PRN  fat emulsion (INTRALIPID/NUTRILIPID) 20 % infusion 250 mL, Once per day on Mon Thu  buPROPion (WELLBUTRIN XL) extended release tablet 300 mg, QAM  carvedilol (COREG) tablet 3.125 mg, BID WC  dilTIAZem (CARDIZEM CD) extended release capsule 120 mg, Daily  gabapentin (NEURONTIN) capsule 600 mg, 4x Daily  isosorbide mononitrate (IMDUR) extended release tablet 120 mg,  noted in all quadrants. Tolerating the clamping of  G tube   Musculoskeletal: No cyanosis in digits, neck supple, left wrist splint  intact.  Arm elevated.   Neurology: CN 2-12 grossly intact. No speech or motor deficits  Psych: Normal affect. Alert and oriented in time, place and person  Skin: Warm, dry, normal turgor    Labs and Tests:  CBC:   Recent Labs     04/29/24  0448   WBC 6.8   HGB 9.5*          BMP:    Recent Labs     04/29/24  0448      K 3.6      CO2 19*   BUN 17   CREATININE 0.6   GLUCOSE 162*       Hepatic:   Recent Labs     04/30/24  0717   AST 9*   ALT <5*   BILITOT <0.2   ALKPHOS 99     URINE culture  50,000 CFU/ml Mixed pathogens  Multiple organisms isolated, no predominance. Culture  indicates probable contamination. Please review colony count  and clinical indications to determine if a repeat culture is  necessary. No further workup to be done.     CTPA:  MPRESSION:  1. No evidence of pulmonary embolism.  2. No evidence of acute airspace consolidation to suggest pneumonia.  3. Chronic parenchymal scarring within the right lung, as above.  4. The stomach and visualized small bowel loops are fluid-filled and  distended, suggestive of a developing small-bowel obstruction as seen on the  abdominal CT earlier today.  5. Coronary atherosclerosis.    CT head:  IMPRESSION:  No acute intracranial abnormality.    IMPRESSION:  CT abd pelvis   Gas and fluid-filled dilated stomach and small bowel with gradual transition  to normal caliber bowel distally at the level of the ileum.  Findings may  represent partial small bowel obstruction or less likely generalized ileus.    4/24/Xray Left Wrist   IMPRESSION:  Prominent widening of the scapholunate space which could indicate ligamentous  instability.  Recommend orthopedic follow-up.     Moderate osteoarthritic changes along the radiocarpal joint and diffuse  osteopenia with no acute bony abnormality.     Mild soft tissue swelling along the

## 2024-04-30 NOTE — PLAN OF CARE
Problem: Discharge Planning  Goal: Discharge to home or other facility with appropriate resources  4/29/2024 2152 by Afshan Joy RN  Outcome: Progressing  4/29/2024 1054 by Nuha Mullins RN  Outcome: Progressing     Problem: Safety - Adult  Goal: Free from fall injury  4/29/2024 2152 by Afshan Joy RN  Outcome: Progressing  4/29/2024 1054 by Nuha Mullins RN  Outcome: Progressing     Problem: Pain  Goal: Verbalizes/displays adequate comfort level or baseline comfort level  4/29/2024 2152 by Afshan Joy RN  Outcome: Progressing  4/29/2024 1054 by Nuha Mullins RN  Outcome: Progressing     Problem: Skin/Tissue Integrity  Goal: Absence of new skin breakdown  Description: 1.  Monitor for areas of redness and/or skin breakdown  2.  Assess vascular access sites hourly  3.  Every 4-6 hours minimum:  Change oxygen saturation probe site  4.  Every 4-6 hours:  If on nasal continuous positive airway pressure, respiratory therapy assess nares and determine need for appliance change or resting period.  4/29/2024 2152 by Afshan Joy RN  Outcome: Progressing  4/29/2024 1054 by Nuha Mullins RN  Outcome: Progressing     Problem: Chronic Conditions and Co-morbidities  Goal: Patient's chronic conditions and co-morbidity symptoms are monitored and maintained or improved  4/29/2024 2152 by Afshan Joy RN  Outcome: Progressing  4/29/2024 1054 by Nuha Mullins RN  Outcome: Progressing     Problem: ABCDS Injury Assessment  Goal: Absence of physical injury  4/29/2024 2152 by Afshan Joy RN  Outcome: Progressing  4/29/2024 1054 by Nuha Mullins RN  Outcome: Progressing     Problem: Nutrition Deficit:  Goal: Optimize nutritional status  4/29/2024 2152 by Afshan Joy RN  Outcome: Progressing  4/29/2024 1054 by Nuha Mullins RN  Outcome: Progressing     Problem: Respiratory - Adult  Goal: Achieves optimal ventilation and

## 2024-04-30 NOTE — PROGRESS NOTES
Nutrition Note    RECOMMENDATIONS  PO Diet: Diet advancement per general surgery  ONS: Discontinued Ensure Clear as pt reported dislikes, not consuming   Nutrition Support: Continue Clinimix 5/20 at goal of 83 mL/hr    ASSESSMENT   Pt triggered for follow-up. Started on clear liquid diet yesterday with Ensure Clear ordered TID. One documented meal intake of 51-75% yesterday evening. However, upon visiting this morning, pt reported she is doing \"terrible\" with clear liquid diet stating she dislikes everything and has not eaten in so long that food is not appealing to her. Observed few bites of red jell-O consumed at breakfast. G-tube clamped currently. TPN continues at goal of 83 mL/hr, recommend continue until pt demonstrates adequate po intake. RD will continue to monitor.     Malnutrition Status: No malnutrition  Acute Illness    NUTRITION DIAGNOSIS   Inadequate oral intake related to altered GI function as evidenced by NPO or clear liquid status due to medical condition, nutrition support - parenteral nutrition    Goals: PO intake 50% or greater, by next RD assessment, other (specify) (diet advancement/tolerance)     NUTRITION RELATED FINDINGS  Objective: POCG 112, 132. LBM 4/30. Trace generalized, BUE; +2 BLE edema.  Wounds: Surgical Incision (MINERVA wound etiology x1 to buttocks)    CURRENT NUTRITION THERAPIES  ADULT DIET; Clear Liquid  ADULT ORAL NUTRITION SUPPLEMENT; Breakfast, Lunch, Dinner; Clear Liquid Oral Supplement  PN-Adult Premix 5/20 - Standard Electrolytes - Central Line     PO Intake: Unable to assess   PO Supplement Intake:Refusing to take    Current Parenteral Nutrition Recs:  Type and Formula: Premix Central   Lipids: 250ml, Two times weekly  Duration: Continuous  Current PN Order Provides: As below  Goal PN Orders Provides: Clinimix 5/20 at 83 mL/hr to provides 1992 mL total volume, 1755 calories, 100 grams of protein, and dextrose load of 3.06 mg/kg/min    ANTHROPOMETRICS  Current Height: 157.5

## 2024-04-30 NOTE — PROGRESS NOTES
Shift assessment complete, A&O x4, VSS,  at bedside. BM X1, loose, formed, large output, passing flatus.  Administered all night medications.  The care plan and education has been reviewed and mutually agreed upon with the patient.

## 2024-04-30 NOTE — PROGRESS NOTES
[facility]        Progress Note    Patient Name: Ana Gonzales  MRN: [id number]  YOB: 1955  Date of Evaluation: 2024    Subjective:  No acute events overnight  Pain controlled without narcotics  No nausea or vomiting with G-tube clamped, does report some reflux, tolerating clear liquids but appetite is only fair  Passing flatus and stool x3, denies bloating  Resting in bed at this time    Post-Operative Day #42, 13, 6      Vital Signs:  Patient Vitals for the past 24 hrs:   BP Temp Temp src Pulse Resp SpO2 Height Weight   24 1317 132/73 -- -- 78 17 96 % -- --   24 1017 -- -- -- -- -- -- 1.575 m (5' 2.01\") --   24 1008 -- -- -- 79 -- 96 % -- --   24 0915 (!) 143/70 97.5 °F (36.4 °C) Oral 79 16 95 % -- --   24 0630 (!) 144/73 97.7 °F (36.5 °C) Oral 78 16 96 % -- --   24 0543 -- -- -- -- -- -- -- 90.2 kg (198 lb 13.7 oz)   24 2349 (!) 167/72 97.4 °F (36.3 °C) Axillary 76 16 95 % -- --   24 2100 121/73 97.7 °F (36.5 °C) Oral 76 18 96 % -- --   24 -- -- -- 74 18 96 % -- --   24 1643 106/68 -- -- 83 18 -- -- --   24 1453 124/75 -- -- 83 17 100 % -- --        TEMPERATURE HISTORY 24H: Temp (24hrs), Av.6 °F (36.4 °C), Min:97.4 °F (36.3 °C), Max:97.7 °F (36.5 °C)    BLOOD PRESSURE HISTORY: Systolic (36hrs), Av , Min:106 , Max:167    Diastolic (36hrs), Av, Min:68, Max:84      Intake/Output:  I/O last 3 completed shifts:  In: -   Out: 1600 [Urine:1600]  I/O this shift:  In: -   Out: 700 [Urine:700]  Drain/tube Output:  [REMOVED] Negative Pressure Wound Therapy Abdomen Anterior;Mid-Output (ml): 50 ml    Physical Exam:  General: awake, alert, oriented to person, place, time  Lungs: unlabored respirations  Abdomen: soft, mildly distended, non-tender, bowel sounds active, G-tube clamped  Skin/Wound: healing well, no drainage, no erythema, well approximated with staples--dressing  tiotropium  2 puff Inhalation Daily RT    ARIPiprazole  2 mg Oral Nightly    mometasone-formoterol  2 puff Inhalation BID RT    sodium chloride flush  5-40 mL IntraVENous 2 times per day     Continuous Infusions:   PN-Adult Premix 5/20 - Standard Electrolytes - Central Line      PN-Adult Premix 5/20 - Standard Electrolytes - Central Line 83 mL/hr at 04/29/24 1754    dextrose      sodium chloride      sodium chloride      sodium chloride Stopped (04/14/24 0624)     PRN Meds:.methyl salicylate-menthol, ketorolac, dextrose bolus **OR** dextrose bolus, glucagon (rDNA), dextrose, HYDROmorphone, hydrALAZINE, sodium chloride flush, sodium chloride, sodium chloride, sodium chloride flush, sodium chloride, magnesium sulfate, ondansetron **OR** ondansetron, polyethylene glycol, acetaminophen **OR** acetaminophen, ipratropium 0.5 mg-albuterol 2.5 mg      Assessment:  68 y.o. female admitted with   1. Partial small bowel obstruction (HCC)    2. Hypoxia        OR Date 4/24/2024, planned secondary intermediate abdominal wound closure (17 cm)  OR Date 4/17/2024, open lysis of adhesions (1 hour), placement of 24-Yoruba G-tube for small bowel obstruction  OR Date 3/19/2024, exploratory laparotomy with lysis of adhesions, Meckel diverticulectomy, and appendectomy for small bowel obstruction  UTI  Hypertension  CHF  CAD, on Plavix--last dose prior to admission  Diabetes      Plan:  1. Pain controlled without narcotics, incisional tenderness only on exam, incision healing well, no nausea or vomiting with G-tube clamped, does report some reflux, tolerating clear liquids but appetite is only fair, passing flatus and stool x3, denies bloating, vitals stable; continued supportive care, local wound care, continue Reglan, add daily Miralax  2. Advance to full liquid diet with supplements as tolerated with G-tube clamped--return to gravity drainage for nausea, vomiting, or abdominal bloating; monitor bowel function  3. Continue TPN; monitor

## 2024-04-30 NOTE — PROGRESS NOTES
[facility] - Inpatient Rehabilitation Department   Phone: [phone]    Physical Therapy    [] Initial Evaluation            [x] Daily Treatment Note         [] Discharge Summary      Patient: Ana Gonzales   : 1955   MRN: [id number]   Date of Service:  2024  Admitting Diagnosis: Partial small bowel obstruction (HCC)  Current Admission Summary: Ana Gonzales is a 68 y.o. female who presents to the ED status post exploratory laparotomy with lysis of adhesions, Meckel diverticulectomy and appendectomy 3/19/2024 by Dr. Morales with complaint of generalized weakness and nausea/vomiting. She states she is too weak to ambulate, which is new. She states the nausea/vomiting began last night. She is denying abdominal pain, although abdomen is tender to palpation on exam. Patient reports an episode of shortness of breath earlier today, but is not feeling short of breath at this moment. Lower extremity edema is noted and patient is unsure of her baseline. Patient states her chief concerns are the generalized weakness/fatigue and nausea/vomiting.     She had additional abdominal surgeries on 24 and 24. She has an acute on chronic ligamentous injury to her L wrist.    Past Medical History:  has a past medical history of Arthritis, Asthma, Brain injury (HCC), Bronchitis, CHF (congestive heart failure) ([facility]), Depression, Diabetes mellitus (HCC), DM (diabetes mellitus screen), Elevated cholesterol with high triglycerides, Fall, Fibromyalgia, GERD (gastroesophageal reflux disease), HIGH CHOLESTEROL, Hypertension, Microvascular angina (HCC), Migraine, Neuropathy, Panic attacks, PONV (postoperative nausea and vomiting), Post traumatic stress disorder, Short-term memory loss, Skin cancer, Sleep apnea, and Vertigo.  Past Surgical History:  has a past surgical history that includes Hysterectomy; eye surgery (cataract B); Carpal tunnel release; Finger trigger release; Gallbladder surgery;  will complete transfers at contact guard assistance- met 4/28/24   Patient will ambulate 100 ft with use of rolling walker at contact guard assistance  Patient will ascend/descend 4 stairs with (R) ascending handrail at minimal assistance  Patient will complete car transfer at contact guard assistance, with RW    Patient will complete bed mobility at stand by assistance with bed flat (goal updated for specificity on 4/29)  Patient will complete transfers at supervision assistance         Above goals reviewed on 4/30/2024.  All goals are ongoing at this time unless indicated above.      Therapy Session Time     Session Therapy Time:   Individual Concurrent Group Co-treatment   Time In 1237         Time Out 1304         Minutes 27           Timed Code Treatment Minutes: 27 Minutes    Total Treatment Minutes: 27 minutes        Electronically Signed By: Jeanne Almonte, [place] [id number]

## 2024-05-01 LAB
GLUCOSE BLD-MCNC: 103 MG/DL (ref 70–99)
GLUCOSE BLD-MCNC: 133 MG/DL (ref 70–99)
GLUCOSE BLD-MCNC: 147 MG/DL (ref 70–99)
GLUCOSE BLD-MCNC: 152 MG/DL (ref 70–99)
GLUCOSE BLD-MCNC: 94 MG/DL (ref 70–99)
PERFORMED ON: ABNORMAL
PERFORMED ON: NORMAL

## 2024-05-01 PROCEDURE — 97530 THERAPEUTIC ACTIVITIES: CPT

## 2024-05-01 PROCEDURE — 2580000003 HC RX 258: Performed by: SURGERY

## 2024-05-01 PROCEDURE — 6370000000 HC RX 637 (ALT 250 FOR IP): Performed by: SURGERY

## 2024-05-01 PROCEDURE — 6360000002 HC RX W HCPCS: Performed by: SURGERY

## 2024-05-01 PROCEDURE — 1200000000 HC SEMI PRIVATE

## 2024-05-01 PROCEDURE — 97110 THERAPEUTIC EXERCISES: CPT

## 2024-05-01 PROCEDURE — 6370000000 HC RX 637 (ALT 250 FOR IP): Performed by: NURSE PRACTITIONER

## 2024-05-01 PROCEDURE — 94640 AIRWAY INHALATION TREATMENT: CPT

## 2024-05-01 PROCEDURE — C9113 INJ PANTOPRAZOLE SODIUM, VIA: HCPCS | Performed by: SURGERY

## 2024-05-01 PROCEDURE — 6360000002 HC RX W HCPCS: Performed by: NURSE PRACTITIONER

## 2024-05-01 PROCEDURE — 97116 GAIT TRAINING THERAPY: CPT

## 2024-05-01 PROCEDURE — 94761 N-INVAS EAR/PLS OXIMETRY MLT: CPT

## 2024-05-01 PROCEDURE — APPNB30 APP NON BILLABLE TIME 0-30 MINS: Performed by: NURSE PRACTITIONER

## 2024-05-01 PROCEDURE — APPSS15 APP SPLIT SHARED TIME 0-15 MINUTES: Performed by: NURSE PRACTITIONER

## 2024-05-01 PROCEDURE — 97535 SELF CARE MNGMENT TRAINING: CPT

## 2024-05-01 PROCEDURE — 99024 POSTOP FOLLOW-UP VISIT: CPT | Performed by: SURGERY

## 2024-05-01 RX ORDER — LIDOCAINE HYDROCHLORIDE 10 MG/ML
5 INJECTION, SOLUTION EPIDURAL; INFILTRATION; INTRACAUDAL; PERINEURAL ONCE
Status: DISCONTINUED | OUTPATIENT
Start: 2024-05-01 | End: 2024-05-02 | Stop reason: HOSPADM

## 2024-05-01 RX ORDER — SODIUM CHLORIDE 9 MG/ML
25 INJECTION, SOLUTION INTRAVENOUS PRN
Status: DISCONTINUED | OUTPATIENT
Start: 2024-05-01 | End: 2024-05-02 | Stop reason: HOSPADM

## 2024-05-01 RX ORDER — SODIUM CHLORIDE 0.9 % (FLUSH) 0.9 %
5-40 SYRINGE (ML) INJECTION EVERY 12 HOURS SCHEDULED
Status: DISCONTINUED | OUTPATIENT
Start: 2024-05-01 | End: 2024-05-02 | Stop reason: HOSPADM

## 2024-05-01 RX ORDER — SODIUM CHLORIDE 0.9 % (FLUSH) 0.9 %
5-40 SYRINGE (ML) INJECTION PRN
Status: DISCONTINUED | OUTPATIENT
Start: 2024-05-01 | End: 2024-05-02 | Stop reason: HOSPADM

## 2024-05-01 RX ADMIN — ACETAMINOPHEN 650 MG: 325 TABLET ORAL at 20:58

## 2024-05-01 RX ADMIN — MECLIZINE 25 MG: 12.5 TABLET ORAL at 14:13

## 2024-05-01 RX ADMIN — ISOSORBIDE MONONITRATE 120 MG: 30 TABLET, EXTENDED RELEASE ORAL at 07:52

## 2024-05-01 RX ADMIN — Medication 2 PUFF: at 20:17

## 2024-05-01 RX ADMIN — METHENAMINE HIPPURATE 1 G: 1 TABLET ORAL at 07:53

## 2024-05-01 RX ADMIN — RANOLAZINE 1000 MG: 500 TABLET, EXTENDED RELEASE ORAL at 20:57

## 2024-05-01 RX ADMIN — POLYETHYLENE GLYCOL 3350 17 G: 17 POWDER, FOR SOLUTION ORAL at 07:52

## 2024-05-01 RX ADMIN — GABAPENTIN 600 MG: 300 CAPSULE ORAL at 07:53

## 2024-05-01 RX ADMIN — METHENAMINE HIPPURATE 1 G: 1 TABLET ORAL at 16:36

## 2024-05-01 RX ADMIN — Medication 10 ML: at 07:55

## 2024-05-01 RX ADMIN — GABAPENTIN 600 MG: 300 CAPSULE ORAL at 20:57

## 2024-05-01 RX ADMIN — METOCLOPRAMIDE 10 MG: 5 INJECTION, SOLUTION INTRAMUSCULAR; INTRAVENOUS at 02:12

## 2024-05-01 RX ADMIN — ACETAMINOPHEN 650 MG: 325 TABLET ORAL at 14:13

## 2024-05-01 RX ADMIN — MECLIZINE 25 MG: 12.5 TABLET ORAL at 07:53

## 2024-05-01 RX ADMIN — ARIPIPRAZOLE 2 MG: 2 TABLET ORAL at 20:58

## 2024-05-01 RX ADMIN — METOCLOPRAMIDE 10 MG: 5 INJECTION, SOLUTION INTRAMUSCULAR; INTRAVENOUS at 14:19

## 2024-05-01 RX ADMIN — TROSPIUM CHLORIDE 20 MG: 20 TABLET, FILM COATED ORAL at 20:58

## 2024-05-01 RX ADMIN — TOPIRAMATE 100 MG: 100 TABLET, FILM COATED ORAL at 07:53

## 2024-05-01 RX ADMIN — TIZANIDINE 2 MG: 4 TABLET ORAL at 20:57

## 2024-05-01 RX ADMIN — NORTRIPTYLINE HYDROCHLORIDE 75 MG: 25 CAPSULE ORAL at 20:57

## 2024-05-01 RX ADMIN — DILTIAZEM HYDROCHLORIDE 120 MG: 120 CAPSULE, EXTENDED RELEASE ORAL at 07:54

## 2024-05-01 RX ADMIN — KETOROLAC TROMETHAMINE 15 MG: 15 INJECTION, SOLUTION INTRAMUSCULAR; INTRAVENOUS at 03:32

## 2024-05-01 RX ADMIN — DOCUSATE SODIUM 100 MG: 100 CAPSULE, LIQUID FILLED ORAL at 07:53

## 2024-05-01 RX ADMIN — Medication 2 PUFF: at 08:47

## 2024-05-01 RX ADMIN — ENOXAPARIN SODIUM 40 MG: 100 INJECTION SUBCUTANEOUS at 07:54

## 2024-05-01 RX ADMIN — CARVEDILOL 3.12 MG: 3.12 TABLET, FILM COATED ORAL at 16:36

## 2024-05-01 RX ADMIN — PANTOPRAZOLE SODIUM 40 MG: 40 INJECTION, POWDER, FOR SOLUTION INTRAVENOUS at 07:53

## 2024-05-01 RX ADMIN — TOPIRAMATE 150 MG: 100 TABLET, FILM COATED ORAL at 16:36

## 2024-05-01 RX ADMIN — RANOLAZINE 1000 MG: 500 TABLET, EXTENDED RELEASE ORAL at 07:53

## 2024-05-01 RX ADMIN — MECLIZINE 25 MG: 12.5 TABLET ORAL at 20:58

## 2024-05-01 RX ADMIN — GABAPENTIN 600 MG: 300 CAPSULE ORAL at 14:13

## 2024-05-01 RX ADMIN — BUPROPION HYDROCHLORIDE 300 MG: 150 TABLET, EXTENDED RELEASE ORAL at 07:52

## 2024-05-01 RX ADMIN — CARVEDILOL 3.12 MG: 3.12 TABLET, FILM COATED ORAL at 07:52

## 2024-05-01 RX ADMIN — GABAPENTIN 600 MG: 300 CAPSULE ORAL at 18:33

## 2024-05-01 RX ADMIN — METOCLOPRAMIDE 10 MG: 5 INJECTION, SOLUTION INTRAMUSCULAR; INTRAVENOUS at 07:53

## 2024-05-01 RX ADMIN — ACETAMINOPHEN 650 MG: 325 TABLET ORAL at 07:53

## 2024-05-01 RX ADMIN — DOCUSATE SODIUM 100 MG: 100 CAPSULE, LIQUID FILLED ORAL at 20:58

## 2024-05-01 RX ADMIN — ASPIRIN 81 MG: 81 TABLET, COATED ORAL at 07:52

## 2024-05-01 ASSESSMENT — PAIN SCALES - GENERAL: PAINLEVEL_OUTOF10: 0

## 2024-05-01 NOTE — PLAN OF CARE
Problem: Discharge Planning  Goal: Discharge to home or other facility with appropriate resources  Outcome: Progressing     Problem: Safety - Adult  Goal: Free from fall injury  Outcome: Progressing     Problem: Pain  Goal: Verbalizes/displays adequate comfort level or baseline comfort level  Outcome: Progressing     Problem: Skin/Tissue Integrity  Goal: Absence of new skin breakdown  Description: 1.  Monitor for areas of redness and/or skin breakdown  2.  Assess vascular access sites hourly  3.  Every 4-6 hours minimum:  Change oxygen saturation probe site  4.  Every 4-6 hours:  If on nasal continuous positive airway pressure, respiratory therapy assess nares and determine need for appliance change or resting period.  Outcome: Progressing     Problem: Chronic Conditions and Co-morbidities  Goal: Patient's chronic conditions and co-morbidity symptoms are monitored and maintained or improved  Outcome: Progressing     Problem: ABCDS Injury Assessment  Goal: Absence of physical injury  Outcome: Progressing     Problem: Nutrition Deficit:  Goal: Optimize nutritional status  Outcome: Progressing     Problem: Respiratory - Adult  Goal: Achieves optimal ventilation and oxygenation  Outcome: Progressing     Problem: Cardiovascular - Adult  Goal: Maintains optimal cardiac output and hemodynamic stability  Outcome: Progressing  Goal: Absence of cardiac dysrhythmias or at baseline  Outcome: Progressing     Problem: Skin/Tissue Integrity - Adult  Goal: Skin integrity remains intact  Outcome: Progressing  Goal: Incisions, wounds, or drain sites healing without S/S of infection  Outcome: Progressing     Problem: Musculoskeletal - Adult  Goal: Return mobility to safest level of function  Outcome: Progressing  Goal: Maintain proper alignment of affected body part  Outcome: Progressing     Problem: Gastrointestinal - Adult  Goal: Minimal or absence of nausea and vomiting  Outcome: Progressing  Goal: Maintains or returns to  baseline bowel function  Outcome: Progressing     Problem: Genitourinary - Adult  Goal: Absence of urinary retention  Outcome: Progressing

## 2024-05-01 NOTE — PLAN OF CARE
Problem: Discharge Planning  Goal: Discharge to home or other facility with appropriate resources  5/1/2024 1442 by Madhavi Galvez, RN  Outcome: Progressing  Flowsheets (Taken 5/1/2024 0751)  Discharge to home or other facility with appropriate resources: Identify barriers to discharge with patient and caregiver     Problem: Safety - Adult  Goal: Free from fall injury  5/1/2024 1442 by Madhavi Galvez, RN  Outcome: Progressing     Problem: Pain  Goal: Verbalizes/displays adequate comfort level or baseline comfort level  5/1/2024 1442 by Madhavi Galvez, RN  Outcome: Progressing     Problem: Gastrointestinal - Adult  Goal: Minimal or absence of nausea and vomiting  5/1/2024 1442 by Madhavi Galvez, RN  Outcome: Progressing  Flowsheets (Taken 5/1/2024 0751)  Minimal or absence of nausea and vomiting: Administer IV fluids as ordered to ensure adequate hydration

## 2024-05-01 NOTE — PROGRESS NOTES
[facility] - Inpatient Rehabilitation Department   Phone: [phone]    Occupational Therapy    [] Initial Evaluation            [x] Daily Treatment Note         [] Discharge Summary      Patient: Ana Gonzales   : 1955   MRN: [id number]   Date of Service:  2024    Admitting Diagnosis:  Partial small bowel obstruction (HCC)  Current Admission Summary: Per H&P: \"Ana Gonzales is a 68 y.o. female with a pmh of upper GI bleed, SBO/persistent ileus, TIA, CHF, CAD who presents with SBO (small bowel obstruction)\"   Past Medical History:  has a past medical history of Arthritis, Asthma, Brain injury (HCC), Bronchitis, CHF (congestive heart failure) (HCC), Depression, Diabetes mellitus (HCC), DM (diabetes mellitus screen), Elevated cholesterol with high triglycerides, Fall, Fibromyalgia, GERD (gastroesophageal reflux disease), HIGH CHOLESTEROL, Hypertension, Microvascular angina (HCC), Migraine, Neuropathy, Panic attacks, PONV (postoperative nausea and vomiting), Post traumatic stress disorder, Short-term memory loss, Skin cancer, Sleep apnea, and Vertigo.  Past Surgical History:  has a past surgical history that includes Hysterectomy; eye surgery (cataract B); Carpal tunnel release; Finger trigger release; Gallbladder surgery; bladder suspension; Skin cancer excision; blepharoplasty; laryngoscopy (2017); Coronary stent placement (2023); joint replacement (Right); back surgery; Upper gastrointestinal endoscopy (N/A, 10/2/2023); Upper gastrointestinal endoscopy (N/A, 3/13/2024); Upper gastrointestinal endoscopy (N/A, 3/14/2024); Upper gastrointestinal endoscopy (N/A, 3/14/2024); Upper gastrointestinal endoscopy (N/A, 3/14/2024); laparotomy (N/A, 3/19/2024); Upper gastrointestinal endoscopy (N/A, 3/26/2024); laparotomy (N/A, 2024); Gastrostomy tube placement (N/A, 2024); and Abdomen surgery (N/A, 2024).    Discharge Recommendations: Ana Gonzales scored a 15/ on the  Pt recently had a 30+ day hospital admission due to complicated small bowel obstruction. Pt was home for a little over a day before being readmitted to the hospital; pt was planning on HHOT and 24 hr assist from . Pt reported difficulty navigating stairs into house following previous d/c. Pt has made improvements in functional mobility, but is still very limited in endurance. Pt would greatly benefit from intensive inpt therapy at d/c to maximize functional independence and safety. Continue with POC.  Safety Interventions: patient left in bed, bed alarm in place, call light within reach, gait belt, nurse notified, and family/caregiver present    Plan  Frequency: 3-5 x/per week  Current Treatment Recommendations: strengthening, balance training, functional mobility training, endurance training, patient/caregiver education, ADL/self-care training, IADL training, pain management, home exercise program, and positioning    Goals  Patient Goals: to go back home   Short Term Goals:  Time Frame: discharge  Patient will complete lower body ADL at modified independent   Patient will complete toileting at modified independent   Patient will complete functional mobility at modified independent   Patient will complete bed mobility at modified independent   Patient to maintain standing at modified independent for 12-15 minutes.  Patient to gather and transport IADL items at modified independent     Above goals reviewed on 5/1/2024.  All goals are ongoing at this time unless indicated above.       Therapy Session Time     Individual Concurrent Group Co-treatment   Time In 1246        Time Out 1316        Minutes 30          Timed Code Treatment Minutes:  30    Total Treatment Minutes:  30         Electronically Signed By: Arianne Fountain OTR/L [id number]

## 2024-05-01 NOTE — PROGRESS NOTES
[facility]    5/1/2024 11:52 AM        Name: Ana Gonzales .              Admitted: 4/12/2024  Primary Care Provider: Mellisa Houston MD (Tel: [phone])      Subjective:  .    Patient up in chair.  at bedside. Feeling better-tired after working with therapy. Pain controlled. No BM yesterday but had one this am. Denies bloating or nausea. Wants a [employer] hamburger.     Reviewed interval ancillary notes    Current Medications  PN-Adult Premix 5/20 - Standard Electrolytes - Central Line, Continuous TPN  polyethylene glycol (GLYCOLAX) packet 17 g, Daily  methyl salicylate-menthol (ALFONSO CHANDRA GREASELESS) 10-15 % cream CREA, TID PRN  tiZANidine (ZANAFLEX) tablet 2 mg, QHS  aspirin EC tablet 81 mg, Daily  meclizine (ANTIVERT) tablet 25 mg, 4x Daily  methenamine (HIPREX) tablet 1 g, BID WC  ketorolac (TORADOL) injection 15 mg, Q6H PRN  docusate sodium (COLACE) capsule 100 mg, BID  insulin lispro (HUMALOG) injection vial 0-4 Units, Q6H  metoclopramide (REGLAN) injection 10 mg, Q6H  acetaminophen (TYLENOL) tablet 650 mg, TID  lidocaine 4 % external patch 1 patch, Daily  dextrose bolus 10% 125 mL, PRN   Or  dextrose bolus 10% 250 mL, PRN  glucagon injection 1 mg, PRN  dextrose 10 % infusion, Continuous PRN  enoxaparin (LOVENOX) injection 40 mg, Daily  pantoprazole (PROTONIX) injection 40 mg, Daily  HYDROmorphone HCl PF (DILAUDID) injection 1 mg, Q2H PRN  hydrALAZINE (APRESOLINE) injection 10 mg, Q6H PRN  sodium chloride flush 0.9 % injection 5-40 mL, PRN  0.9 % sodium chloride infusion, PRN  fat emulsion (INTRALIPID/NUTRILIPID) 20 % infusion 250 mL, Once per day on Mon Thu  buPROPion (WELLBUTRIN XL) extended release tablet 300 mg, QAM  carvedilol (COREG) tablet 3.125 mg, BID WC  dilTIAZem (CARDIZEM CD) extended release capsule 120 mg, Daily  gabapentin (NEURONTIN) capsule 600 mg, 4x Daily  isosorbide mononitrate (IMDUR) extended release tablet 120 mg,

## 2024-05-01 NOTE — PROGRESS NOTES
[facility]        Progress Note    Patient Name: Ana Gonzales  MRN: [id number]  YOB: 1955  Date of Evaluation: 2024    Subjective:  No acute events overnight  Pain controlled without narcotics  No nausea or vomiting with G-tube clamped, tolerating full liquids, appetite improving--wants cheeseburger  Passing flatus and stool, denies bloating  Resting in bed at this time    Post-Operative Day #43, 14, 7      Vital Signs:  Patient Vitals for the past 24 hrs:   BP Temp Temp src Pulse Resp SpO2 Weight   24 1217 112/64 97.2 °F (36.2 °C) Oral 79 17 98 % --   24 0847 -- -- -- -- -- 97 % --   24 0751 (!) 144/73 97.6 °F (36.4 °C) Oral 88 16 95 % --   24 0645 -- -- -- -- -- -- 91.6 kg (202 lb)   24 0336 138/70 -- -- 82 18 93 % --   24 2151 123/73 97.8 °F (36.6 °C) Oral 78 18 96 % --   24 -- -- -- 74 18 95 % --   24 1626 113/68 97.6 °F (36.4 °C) Oral 78 18 97 % --        TEMPERATURE HISTORY 24H: Temp (24hrs), Av.6 °F (36.4 °C), Min:97.2 °F (36.2 °C), Max:97.8 °F (36.6 °C)    BLOOD PRESSURE HISTORY: Systolic (36hrs), Av , Min:112 , Max:144    Diastolic (36hrs), Av, Min:64, Max:73      Intake/Output:  I/O last 3 completed shifts:  In: 4306.2 [P.O.:240]  Out: 2500 [Urine:2500]  I/O this shift:  In: 1640   Out: -   Drain/tube Output:  [REMOVED] Negative Pressure Wound Therapy Abdomen Anterior;Mid-Output (ml): 50 ml    Physical Exam:  General: awake, alert, oriented to person, place, time  Lungs: unlabored respirations  Abdomen: soft, mildly distended, non-tender, bowel sounds active, G-tube clamped  Skin/Wound: healing well, no drainage, no erythema, well approximated with staples    Labs:  CBC:    Recent Labs     24  0448   WBC 6.8   HGB 9.5*   HCT 28.7*          BMP:    Recent Labs     24  0448      K 3.6      CO2 19*   BUN 17   CREATININE 0.6   GLUCOSE 162*  Stopped (04/14/24 0624)     PRN Meds:.methyl salicylate-menthol, ketorolac, dextrose bolus **OR** dextrose bolus, glucagon (rDNA), dextrose, HYDROmorphone, hydrALAZINE, sodium chloride flush, sodium chloride, sodium chloride, sodium chloride flush, sodium chloride, magnesium sulfate, ondansetron **OR** ondansetron, polyethylene glycol, acetaminophen **OR** acetaminophen, ipratropium 0.5 mg-albuterol 2.5 mg      Assessment:  68 y.o. female admitted with   1. Partial small bowel obstruction (HCC)    2. Hypoxia        OR Date 4/24/2024, planned secondary intermediate abdominal wound closure (17 cm)  OR Date 4/17/2024, open lysis of adhesions (1 hour), placement of 24-Turkmen G-tube for small bowel obstruction  OR Date 3/19/2024, exploratory laparotomy with lysis of adhesions, Meckel diverticulectomy, and appendectomy for small bowel obstruction  UTI  Hypertension  CHF  CAD, on Plavix--last dose prior to admission  Diabetes      Plan:  1. Pain controlled without narcotics, incisional tenderness only on exam, incision healing well, no nausea or vomiting with G-tube clamped, tolerating full liquids, appetite improving--wants cheeseburger, passing flatus and stool, denies bloating, vitals stable; continued supportive care, local wound care, continue Reglan and daily Miralax  2. Advance to low fiber diet with supplements as tolerated with G-tube clamped--return to gravity drainage for nausea, vomiting, or abdominal bloating; monitor bowel function  3. Stop TPN; monitor and correct electrolytes  4. Activity as tolerated, ambulate TID, up to chair for meals--PT/OT following  5. Pulmonary toilet, incentive spirometry  6. PRN analgesics and antiemetics--minimizing narcotics as tolerated  7. DVT prophylaxis with  Lovenox and SCD's; okay to resume Plavix  8. Management of medical comorbid etiologies per primary team and consulting services  9. Disposition: Anticipate will be ready for discharge from a surgical perspective in the

## 2024-05-01 NOTE — PROGRESS NOTES
[facility] - Inpatient Rehabilitation Department   Phone: [phone]    Physical Therapy    [] Initial Evaluation            [x] Daily Treatment Note         [] Discharge Summary      Patient: Ana Gonzales   : 1955   MRN: [id number]   Date of Service:  2024  Admitting Diagnosis: Partial small bowel obstruction (HCC)  Current Admission Summary: Ana Gonzales is a 68 y.o. female who presents to the ED status post exploratory laparotomy with lysis of adhesions, Meckel diverticulectomy and appendectomy 3/19/2024 by Dr. Morales with complaint of generalized weakness and nausea/vomiting. She states she is too weak to ambulate, which is new. She states the nausea/vomiting began last night. She is denying abdominal pain, although abdomen is tender to palpation on exam. Patient reports an episode of shortness of breath earlier today, but is not feeling short of breath at this moment. Lower extremity edema is noted and patient is unsure of her baseline. Patient states her chief concerns are the generalized weakness/fatigue and nausea/vomiting.     She had additional abdominal surgeries on 24 and 24. She has an acute on chronic ligamentous injury to her L wrist.    Past Medical History:  has a past medical history of Arthritis, Asthma, Brain injury (HCC), Bronchitis, CHF (congestive heart failure) ([facility]), Depression, Diabetes mellitus (HCC), DM (diabetes mellitus screen), Elevated cholesterol with high triglycerides, Fall, Fibromyalgia, GERD (gastroesophageal reflux disease), HIGH CHOLESTEROL, Hypertension, Microvascular angina (HCC), Migraine, Neuropathy, Panic attacks, PONV (postoperative nausea and vomiting), Post traumatic stress disorder, Short-term memory loss, Skin cancer, Sleep apnea, and Vertigo.  Past Surgical History:  has a past surgical history that includes Hysterectomy; eye surgery (cataract B); Carpal tunnel release; Finger trigger release; Gallbladder surgery;

## 2024-05-01 NOTE — PROGRESS NOTES
2151: Shift assessment completed. VSS. Medications given per MAR. Bedside table and call light within reach.  at bedside. The care plan and education has been reviewed and mutually agreed upon with the patient.     0545: Pt refused labs this morning     Linette Roldan RN

## 2024-05-01 NOTE — PROGRESS NOTES
Shift assessment complete; pt comfortably resting in bed. A&ox4. Denies pain or nausea at this time. Dressing to abdomen is clean, dry, & intact. AM medications given per the MAR.  at the bedside. Call light and belongings within reach. The care plan and education has been reviewed and mutually agreed upon with the patient.

## 2024-05-02 ENCOUNTER — HOSPITAL ENCOUNTER (INPATIENT)
Age: 69
DRG: 949 | End: 2024-05-02
Attending: STUDENT IN AN ORGANIZED HEALTH CARE EDUCATION/TRAINING PROGRAM | Admitting: STUDENT IN AN ORGANIZED HEALTH CARE EDUCATION/TRAINING PROGRAM
Payer: MEDICARE

## 2024-05-02 VITALS
OXYGEN SATURATION: 94 % | BODY MASS INDEX: 36.99 KG/M2 | TEMPERATURE: 97.3 F | RESPIRATION RATE: 16 BRPM | HEIGHT: 62 IN | DIASTOLIC BLOOD PRESSURE: 51 MMHG | WEIGHT: 201 LBS | HEART RATE: 70 BPM | SYSTOLIC BLOOD PRESSURE: 102 MMHG

## 2024-05-02 LAB
ALBUMIN SERPL-MCNC: 3.2 G/DL (ref 3.4–5)
ALP SERPL-CCNC: 101 U/L (ref 40–129)
ALT SERPL-CCNC: 6 U/L (ref 10–40)
ANION GAP SERPL CALCULATED.3IONS-SCNC: 11 MMOL/L (ref 3–16)
AST SERPL-CCNC: 12 U/L (ref 15–37)
BILIRUB DIRECT SERPL-MCNC: <0.2 MG/DL (ref 0–0.3)
BILIRUB INDIRECT SERPL-MCNC: ABNORMAL MG/DL (ref 0–1)
BILIRUB SERPL-MCNC: <0.2 MG/DL (ref 0–1)
BUN SERPL-MCNC: 12 MG/DL (ref 7–20)
CALCIUM SERPL-MCNC: 9 MG/DL (ref 8.3–10.6)
CHLORIDE SERPL-SCNC: 105 MMOL/L (ref 99–110)
CO2 SERPL-SCNC: 21 MMOL/L (ref 21–32)
CREAT SERPL-MCNC: 0.5 MG/DL (ref 0.6–1.2)
DEPRECATED RDW RBC AUTO: 19 % (ref 12.4–15.4)
GFR SERPLBLD CREATININE-BSD FMLA CKD-EPI: >90 ML/MIN/{1.73_M2}
GLUCOSE BLD-MCNC: 102 MG/DL (ref 70–99)
GLUCOSE BLD-MCNC: 125 MG/DL (ref 70–99)
GLUCOSE BLD-MCNC: 80 MG/DL (ref 70–99)
GLUCOSE BLD-MCNC: 94 MG/DL (ref 70–99)
GLUCOSE SERPL-MCNC: 98 MG/DL (ref 70–99)
HCT VFR BLD AUTO: 29.2 % (ref 36–48)
HGB BLD-MCNC: 9.2 G/DL (ref 12–16)
MAGNESIUM SERPL-MCNC: 1.6 MG/DL (ref 1.8–2.4)
MCH RBC QN AUTO: 26.9 PG (ref 26–34)
MCHC RBC AUTO-ENTMCNC: 31.4 G/DL (ref 31–36)
MCV RBC AUTO: 85.7 FL (ref 80–100)
PERFORMED ON: ABNORMAL
PERFORMED ON: ABNORMAL
PERFORMED ON: NORMAL
PERFORMED ON: NORMAL
PHOSPHATE SERPL-MCNC: 4.2 MG/DL (ref 2.5–4.9)
PLATELET # BLD AUTO: 368 K/UL (ref 135–450)
PMV BLD AUTO: 8.2 FL (ref 5–10.5)
POTASSIUM SERPL-SCNC: 3.8 MMOL/L (ref 3.5–5.1)
PROT SERPL-MCNC: 5.9 G/DL (ref 6.4–8.2)
RBC # BLD AUTO: 3.41 M/UL (ref 4–5.2)
SODIUM SERPL-SCNC: 137 MMOL/L (ref 136–145)
WBC # BLD AUTO: 7.8 K/UL (ref 4–11)

## 2024-05-02 PROCEDURE — 99024 POSTOP FOLLOW-UP VISIT: CPT | Performed by: SURGERY

## 2024-05-02 PROCEDURE — 6370000000 HC RX 637 (ALT 250 FOR IP): Performed by: SURGERY

## 2024-05-02 PROCEDURE — APPNB30 APP NON BILLABLE TIME 0-30 MINS: Performed by: NURSE PRACTITIONER

## 2024-05-02 PROCEDURE — C1751 CATH, INF, PER/CENT/MIDLINE: HCPCS

## 2024-05-02 PROCEDURE — 6360000002 HC RX W HCPCS: Performed by: SURGERY

## 2024-05-02 PROCEDURE — 80076 HEPATIC FUNCTION PANEL: CPT

## 2024-05-02 PROCEDURE — 94640 AIRWAY INHALATION TREATMENT: CPT

## 2024-05-02 PROCEDURE — 85027 COMPLETE CBC AUTOMATED: CPT

## 2024-05-02 PROCEDURE — 6360000002 HC RX W HCPCS: Performed by: NURSE PRACTITIONER

## 2024-05-02 PROCEDURE — 2580000003 HC RX 258: Performed by: SURGERY

## 2024-05-02 PROCEDURE — 80048 BASIC METABOLIC PNL TOTAL CA: CPT

## 2024-05-02 PROCEDURE — 2580000003 HC RX 258: Performed by: NURSE PRACTITIONER

## 2024-05-02 PROCEDURE — 6370000000 HC RX 637 (ALT 250 FOR IP): Performed by: NURSE PRACTITIONER

## 2024-05-02 PROCEDURE — C9113 INJ PANTOPRAZOLE SODIUM, VIA: HCPCS | Performed by: SURGERY

## 2024-05-02 PROCEDURE — 1280000000 HC REHAB R&B

## 2024-05-02 PROCEDURE — 6370000000 HC RX 637 (ALT 250 FOR IP): Performed by: STUDENT IN AN ORGANIZED HEALTH CARE EDUCATION/TRAINING PROGRAM

## 2024-05-02 PROCEDURE — 36569 INSJ PICC 5 YR+ W/O IMAGING: CPT

## 2024-05-02 PROCEDURE — 97535 SELF CARE MNGMENT TRAINING: CPT

## 2024-05-02 PROCEDURE — 97530 THERAPEUTIC ACTIVITIES: CPT

## 2024-05-02 PROCEDURE — 94761 N-INVAS EAR/PLS OXIMETRY MLT: CPT

## 2024-05-02 PROCEDURE — APPSS15 APP SPLIT SHARED TIME 0-15 MINUTES: Performed by: NURSE PRACTITIONER

## 2024-05-02 PROCEDURE — 83735 ASSAY OF MAGNESIUM: CPT

## 2024-05-02 PROCEDURE — 84100 ASSAY OF PHOSPHORUS: CPT

## 2024-05-02 RX ORDER — IPRATROPIUM BROMIDE AND ALBUTEROL SULFATE 2.5; .5 MG/3ML; MG/3ML
1 SOLUTION RESPIRATORY (INHALATION) EVERY 6 HOURS PRN
Status: CANCELLED | OUTPATIENT
Start: 2024-05-02

## 2024-05-02 RX ORDER — TROSPIUM CHLORIDE 20 MG/1
20 TABLET, FILM COATED ORAL NIGHTLY
Status: CANCELLED | OUTPATIENT
Start: 2024-05-02

## 2024-05-02 RX ORDER — NORTRIPTYLINE HYDROCHLORIDE 25 MG/1
75 CAPSULE ORAL NIGHTLY
Status: DISPENSED | OUTPATIENT
Start: 2024-05-02

## 2024-05-02 RX ORDER — TIZANIDINE 4 MG/1
2 TABLET ORAL
Status: CANCELLED | OUTPATIENT
Start: 2024-05-02

## 2024-05-02 RX ORDER — POLYETHYLENE GLYCOL 3350 17 G/17G
17 POWDER, FOR SOLUTION ORAL DAILY PRN
Status: ACTIVE | OUTPATIENT
Start: 2024-05-02

## 2024-05-02 RX ORDER — MENTHOL 1.4 %
ADHESIVE PATCH, MEDICATED TOPICAL 3 TIMES DAILY PRN
Status: CANCELLED | OUTPATIENT
Start: 2024-05-02

## 2024-05-02 RX ORDER — ENOXAPARIN SODIUM 100 MG/ML
40 INJECTION SUBCUTANEOUS DAILY
Status: CANCELLED | OUTPATIENT
Start: 2024-05-03

## 2024-05-02 RX ORDER — METHENAMINE HIPPURATE 1000 MG/1
1 TABLET ORAL 2 TIMES DAILY WITH MEALS
Status: DISPENSED | OUTPATIENT
Start: 2024-05-02

## 2024-05-02 RX ORDER — ASPIRIN 81 MG/1
81 TABLET ORAL DAILY
Status: DISPENSED | OUTPATIENT
Start: 2024-05-03

## 2024-05-02 RX ORDER — LIDOCAINE 4 G/G
1 PATCH TOPICAL DAILY
Status: DISPENSED | OUTPATIENT
Start: 2024-05-03

## 2024-05-02 RX ORDER — LIDOCAINE 4 G/G
1 PATCH TOPICAL DAILY
Status: CANCELLED | OUTPATIENT
Start: 2024-05-03

## 2024-05-02 RX ORDER — NORTRIPTYLINE HYDROCHLORIDE 25 MG/1
75 CAPSULE ORAL NIGHTLY
Status: CANCELLED | OUTPATIENT
Start: 2024-05-02

## 2024-05-02 RX ORDER — DILTIAZEM HYDROCHLORIDE 120 MG/1
120 CAPSULE, COATED, EXTENDED RELEASE ORAL DAILY
Status: CANCELLED | OUTPATIENT
Start: 2024-05-03

## 2024-05-02 RX ORDER — CARVEDILOL 3.12 MG/1
3.12 TABLET ORAL 2 TIMES DAILY WITH MEALS
Status: DISPENSED | OUTPATIENT
Start: 2024-05-02

## 2024-05-02 RX ORDER — TROSPIUM CHLORIDE 20 MG/1
20 TABLET, FILM COATED ORAL NIGHTLY
Status: DISPENSED | OUTPATIENT
Start: 2024-05-02

## 2024-05-02 RX ORDER — MECLIZINE HCL 12.5 MG/1
25 TABLET ORAL 4 TIMES DAILY
Status: DISPENSED | OUTPATIENT
Start: 2024-05-02

## 2024-05-02 RX ORDER — LANOLIN ALCOHOL/MO/W.PET/CERES
400 CREAM (GRAM) TOPICAL DAILY
Status: DISPENSED | OUTPATIENT
Start: 2024-05-03

## 2024-05-02 RX ORDER — BUPROPION HYDROCHLORIDE 150 MG/1
300 TABLET ORAL EVERY MORNING
Status: DISPENSED | OUTPATIENT
Start: 2024-05-03

## 2024-05-02 RX ORDER — TOPIRAMATE 100 MG/1
100 TABLET, FILM COATED ORAL EVERY MORNING
Status: CANCELLED | OUTPATIENT
Start: 2024-05-03

## 2024-05-02 RX ORDER — MECLIZINE HCL 12.5 MG/1
25 TABLET ORAL 4 TIMES DAILY
Status: CANCELLED | OUTPATIENT
Start: 2024-05-02

## 2024-05-02 RX ORDER — TOPIRAMATE 100 MG/1
150 TABLET, FILM COATED ORAL EVERY EVENING
Status: CANCELLED | OUTPATIENT
Start: 2024-05-02

## 2024-05-02 RX ORDER — IPRATROPIUM BROMIDE AND ALBUTEROL SULFATE 2.5; .5 MG/3ML; MG/3ML
1 SOLUTION RESPIRATORY (INHALATION) EVERY 6 HOURS PRN
Status: ACTIVE | OUTPATIENT
Start: 2024-05-02

## 2024-05-02 RX ORDER — LANOLIN ALCOHOL/MO/W.PET/CERES
400 CREAM (GRAM) TOPICAL DAILY
Status: CANCELLED | OUTPATIENT
Start: 2024-05-03

## 2024-05-02 RX ORDER — METOCLOPRAMIDE 10 MG/1
10 TABLET ORAL
Status: CANCELLED | OUTPATIENT
Start: 2024-05-02

## 2024-05-02 RX ORDER — ACETAMINOPHEN 325 MG/1
650 TABLET ORAL 3 TIMES DAILY
Status: DISPENSED | OUTPATIENT
Start: 2024-05-02

## 2024-05-02 RX ORDER — ARIPIPRAZOLE 2 MG/1
2 TABLET ORAL NIGHTLY
Status: CANCELLED | OUTPATIENT
Start: 2024-05-02

## 2024-05-02 RX ORDER — BUPROPION HYDROCHLORIDE 150 MG/1
300 TABLET ORAL EVERY MORNING
Status: CANCELLED | OUTPATIENT
Start: 2024-05-03

## 2024-05-02 RX ORDER — ONDANSETRON 4 MG/1
4 TABLET, ORALLY DISINTEGRATING ORAL EVERY 8 HOURS PRN
Status: CANCELLED | OUTPATIENT
Start: 2024-05-02

## 2024-05-02 RX ORDER — PANTOPRAZOLE SODIUM 40 MG/1
40 TABLET, DELAYED RELEASE ORAL
Status: CANCELLED | OUTPATIENT
Start: 2024-05-03

## 2024-05-02 RX ORDER — DILTIAZEM HYDROCHLORIDE 120 MG/1
120 CAPSULE, COATED, EXTENDED RELEASE ORAL DAILY
Status: DISPENSED | OUTPATIENT
Start: 2024-05-03

## 2024-05-02 RX ORDER — ACETAMINOPHEN 325 MG/1
650 TABLET ORAL EVERY 6 HOURS PRN
Status: ACTIVE | OUTPATIENT
Start: 2024-05-02

## 2024-05-02 RX ORDER — ISOSORBIDE MONONITRATE 30 MG/1
120 TABLET, EXTENDED RELEASE ORAL DAILY
Status: CANCELLED | OUTPATIENT
Start: 2024-05-03

## 2024-05-02 RX ORDER — TIZANIDINE 4 MG/1
2 TABLET ORAL
Status: DISPENSED | OUTPATIENT
Start: 2024-05-02

## 2024-05-02 RX ORDER — TOPIRAMATE 100 MG/1
100 TABLET, FILM COATED ORAL EVERY MORNING
Status: DISPENSED | OUTPATIENT
Start: 2024-05-03

## 2024-05-02 RX ORDER — METOCLOPRAMIDE 10 MG/1
10 TABLET ORAL
Status: DISPENSED | OUTPATIENT
Start: 2024-05-02

## 2024-05-02 RX ORDER — METHENAMINE HIPPURATE 1000 MG/1
1 TABLET ORAL 2 TIMES DAILY WITH MEALS
Status: CANCELLED | OUTPATIENT
Start: 2024-05-02

## 2024-05-02 RX ORDER — PANTOPRAZOLE SODIUM 40 MG/1
40 TABLET, DELAYED RELEASE ORAL
Status: DISPENSED | OUTPATIENT
Start: 2024-05-03

## 2024-05-02 RX ORDER — ACETAMINOPHEN 325 MG/1
650 TABLET ORAL EVERY 6 HOURS PRN
Status: CANCELLED | OUTPATIENT
Start: 2024-05-02

## 2024-05-02 RX ORDER — MAGNESIUM SULFATE IN WATER 40 MG/ML
2000 INJECTION, SOLUTION INTRAVENOUS ONCE
Status: DISCONTINUED | OUTPATIENT
Start: 2024-05-02 | End: 2024-05-02

## 2024-05-02 RX ORDER — POLYETHYLENE GLYCOL 3350 17 G/17G
17 POWDER, FOR SOLUTION ORAL DAILY PRN
Status: CANCELLED | OUTPATIENT
Start: 2024-05-02

## 2024-05-02 RX ORDER — RANOLAZINE 500 MG/1
1000 TABLET, EXTENDED RELEASE ORAL 2 TIMES DAILY
Status: DISPENSED | OUTPATIENT
Start: 2024-05-02

## 2024-05-02 RX ORDER — DOCUSATE SODIUM 100 MG/1
100 CAPSULE, LIQUID FILLED ORAL 2 TIMES DAILY
Status: DISPENSED | OUTPATIENT
Start: 2024-05-02

## 2024-05-02 RX ORDER — CARVEDILOL 3.12 MG/1
3.12 TABLET ORAL 2 TIMES DAILY WITH MEALS
Status: CANCELLED | OUTPATIENT
Start: 2024-05-02

## 2024-05-02 RX ORDER — MENTHOL 1.4 %
ADHESIVE PATCH, MEDICATED TOPICAL 3 TIMES DAILY PRN
Status: DISPENSED | OUTPATIENT
Start: 2024-05-02

## 2024-05-02 RX ORDER — GABAPENTIN 300 MG/1
600 CAPSULE ORAL 4 TIMES DAILY
Status: CANCELLED | OUTPATIENT
Start: 2024-05-02

## 2024-05-02 RX ORDER — DOCUSATE SODIUM 100 MG/1
100 CAPSULE, LIQUID FILLED ORAL 2 TIMES DAILY
Status: CANCELLED | OUTPATIENT
Start: 2024-05-02

## 2024-05-02 RX ORDER — ENOXAPARIN SODIUM 100 MG/ML
40 INJECTION SUBCUTANEOUS DAILY
Status: DISPENSED | OUTPATIENT
Start: 2024-05-03

## 2024-05-02 RX ORDER — ISOSORBIDE MONONITRATE 60 MG/1
120 TABLET, EXTENDED RELEASE ORAL DAILY
Status: DISPENSED | OUTPATIENT
Start: 2024-05-03

## 2024-05-02 RX ORDER — GABAPENTIN 300 MG/1
600 CAPSULE ORAL 4 TIMES DAILY
Status: DISPENSED | OUTPATIENT
Start: 2024-05-02

## 2024-05-02 RX ORDER — ACETAMINOPHEN 325 MG/1
650 TABLET ORAL 3 TIMES DAILY
Status: CANCELLED | OUTPATIENT
Start: 2024-05-02

## 2024-05-02 RX ORDER — ARIPIPRAZOLE 2 MG/1
2 TABLET ORAL NIGHTLY
Status: DISPENSED | OUTPATIENT
Start: 2024-05-02

## 2024-05-02 RX ORDER — RANOLAZINE 500 MG/1
1000 TABLET, EXTENDED RELEASE ORAL 2 TIMES DAILY
Status: CANCELLED | OUTPATIENT
Start: 2024-05-02

## 2024-05-02 RX ORDER — ONDANSETRON 4 MG/1
4 TABLET, ORALLY DISINTEGRATING ORAL EVERY 8 HOURS PRN
Status: ACTIVE | OUTPATIENT
Start: 2024-05-02

## 2024-05-02 RX ORDER — ASPIRIN 81 MG/1
81 TABLET ORAL DAILY
Status: CANCELLED | OUTPATIENT
Start: 2024-05-03

## 2024-05-02 RX ADMIN — GABAPENTIN 600 MG: 300 CAPSULE ORAL at 07:54

## 2024-05-02 RX ADMIN — CARVEDILOL 3.12 MG: 3.12 TABLET, FILM COATED ORAL at 17:20

## 2024-05-02 RX ADMIN — METOCLOPRAMIDE 10 MG: 10 TABLET ORAL at 20:25

## 2024-05-02 RX ADMIN — METOCLOPRAMIDE 10 MG: 10 TABLET ORAL at 17:20

## 2024-05-02 RX ADMIN — MECLIZINE 25 MG: 12.5 TABLET ORAL at 20:24

## 2024-05-02 RX ADMIN — MECLIZINE 25 MG: 12.5 TABLET ORAL at 17:20

## 2024-05-02 RX ADMIN — METOCLOPRAMIDE 10 MG: 5 INJECTION, SOLUTION INTRAMUSCULAR; INTRAVENOUS at 07:53

## 2024-05-02 RX ADMIN — TIOTROPIUM BROMIDE INHALATION SPRAY 2 PUFF: 3.12 SPRAY, METERED RESPIRATORY (INHALATION) at 09:02

## 2024-05-02 RX ADMIN — ACETAMINOPHEN 650 MG: 325 TABLET ORAL at 17:20

## 2024-05-02 RX ADMIN — TOPIRAMATE 150 MG: 100 TABLET, FILM COATED ORAL at 17:19

## 2024-05-02 RX ADMIN — MECLIZINE 25 MG: 12.5 TABLET ORAL at 07:54

## 2024-05-02 RX ADMIN — ACETAMINOPHEN 650 MG: 325 TABLET ORAL at 07:54

## 2024-05-02 RX ADMIN — RANOLAZINE 1000 MG: 500 TABLET, EXTENDED RELEASE ORAL at 20:26

## 2024-05-02 RX ADMIN — ENOXAPARIN SODIUM 40 MG: 100 INJECTION SUBCUTANEOUS at 07:54

## 2024-05-02 RX ADMIN — Medication 2 PUFF: at 09:02

## 2024-05-02 RX ADMIN — NORTRIPTYLINE HYDROCHLORIDE 75 MG: 25 CAPSULE ORAL at 20:26

## 2024-05-02 RX ADMIN — ISOSORBIDE MONONITRATE 120 MG: 30 TABLET, EXTENDED RELEASE ORAL at 07:54

## 2024-05-02 RX ADMIN — TOPIRAMATE 100 MG: 100 TABLET, FILM COATED ORAL at 07:54

## 2024-05-02 RX ADMIN — METHENAMINE HIPPURATE 1 G: 1 TABLET ORAL at 07:54

## 2024-05-02 RX ADMIN — METOCLOPRAMIDE 10 MG: 5 INJECTION, SOLUTION INTRAMUSCULAR; INTRAVENOUS at 14:03

## 2024-05-02 RX ADMIN — Medication 10 ML: at 08:03

## 2024-05-02 RX ADMIN — MAGNESIUM SULFATE HEPTAHYDRATE 2000 MG: 40 INJECTION, SOLUTION INTRAVENOUS at 07:53

## 2024-05-02 RX ADMIN — CARVEDILOL 3.12 MG: 3.12 TABLET, FILM COATED ORAL at 07:54

## 2024-05-02 RX ADMIN — TIZANIDINE 2 MG: 4 TABLET ORAL at 20:23

## 2024-05-02 RX ADMIN — BUPROPION HYDROCHLORIDE 300 MG: 150 TABLET, EXTENDED RELEASE ORAL at 07:54

## 2024-05-02 RX ADMIN — GABAPENTIN 600 MG: 300 CAPSULE ORAL at 17:20

## 2024-05-02 RX ADMIN — METOCLOPRAMIDE 10 MG: 5 INJECTION, SOLUTION INTRAMUSCULAR; INTRAVENOUS at 03:49

## 2024-05-02 RX ADMIN — TROSPIUM CHLORIDE 20 MG: 20 TABLET, FILM COATED ORAL at 20:25

## 2024-05-02 RX ADMIN — POLYETHYLENE GLYCOL 3350 17 G: 17 POWDER, FOR SOLUTION ORAL at 07:55

## 2024-05-02 RX ADMIN — ACETAMINOPHEN 650 MG: 325 TABLET ORAL at 20:25

## 2024-05-02 RX ADMIN — ARIPIPRAZOLE 2 MG: 2 TABLET ORAL at 20:27

## 2024-05-02 RX ADMIN — ASPIRIN 81 MG: 81 TABLET, COATED ORAL at 07:54

## 2024-05-02 RX ADMIN — DOCUSATE SODIUM 100 MG: 100 CAPSULE, LIQUID FILLED ORAL at 20:25

## 2024-05-02 RX ADMIN — PANTOPRAZOLE SODIUM 40 MG: 40 INJECTION, POWDER, FOR SOLUTION INTRAVENOUS at 07:53

## 2024-05-02 RX ADMIN — GABAPENTIN 600 MG: 300 CAPSULE ORAL at 20:25

## 2024-05-02 RX ADMIN — RANOLAZINE 1000 MG: 500 TABLET, EXTENDED RELEASE ORAL at 07:54

## 2024-05-02 RX ADMIN — METHENAMINE HIPPURATE 1 G: 1 TABLET ORAL at 17:19

## 2024-05-02 RX ADMIN — DOCUSATE SODIUM 100 MG: 100 CAPSULE, LIQUID FILLED ORAL at 07:54

## 2024-05-02 RX ADMIN — DILTIAZEM HYDROCHLORIDE 120 MG: 120 CAPSULE, EXTENDED RELEASE ORAL at 07:54

## 2024-05-02 RX ADMIN — Medication 2 PUFF: at 21:02

## 2024-05-02 ASSESSMENT — PAIN SCALES - GENERAL
PAINLEVEL_OUTOF10: 0
PAINLEVEL_OUTOF10: 0

## 2024-05-02 NOTE — PROGRESS NOTES
bladder suspension; Skin cancer excision; blepharoplasty; laryngoscopy (08/03/2017); Coronary stent placement (06/13/2023); joint replacement (Right); back surgery; Upper gastrointestinal endoscopy (N/A, 10/2/2023); Upper gastrointestinal endoscopy (N/A, 3/13/2024); Upper gastrointestinal endoscopy (N/A, 3/14/2024); Upper gastrointestinal endoscopy (N/A, 3/14/2024); Upper gastrointestinal endoscopy (N/A, 3/14/2024); laparotomy (N/A, 3/19/2024); Upper gastrointestinal endoscopy (N/A, 3/26/2024); laparotomy (N/A, 4/17/2024); Gastrostomy tube placement (N/A, 4/17/2024); and Abdomen surgery (N/A, 4/24/2024).    Discharge Recommendations: Ana Gonzales scored a 10/24 on the AM-PAC short mobility form. Current research shows that an AM-PAC score of 17 or less is typically not associated with a discharge to the patient's home setting. Based on the patient's AM-PAC score and their current functional mobility deficits, it is recommended that the patient have 5-7 sessions per week of Physical Therapy at d/c to increase the patient's independence.  At this time, this patient demonstrates complex nursing, medical, and rehabilitative needs, and would benefit from intensive rehabilitation services upon discharge from the Inpatient setting.  This patient demonstrates the ability to participate in and benefit from an intensive therapy program with a coordinated interdisciplinary team approach to foster frequent, structured, and documented communication among disciplines, who will work together to establish, prioritize, and achieve treatment goals. Please see assessment section for further patient specific details.    If patient discharges prior to next session this note will serve as a discharge summary.  Please see below for the latest assessment towards goals.      DME Required For Discharge: patient has all required DME for discharge    Precautions/Restrictions: high fall risk, low fiber diet  Weight Bearing Restrictions: Able  mobility completed on this date.  Comments:  Balance:  Static Sitting Balance: fair (+): maintains balance at SBA/supervision without use of UE support  Dynamic Sitting Balance: fair (-): maintains balance at CGA with use of UE support  Static Standing Balance: poor (-): requires max (A) to maintain balance  Comments: Pt able to stand for 2-3min with max(A) while therapist completed pericare. Patient tends to lean to (R) side when sitting or standing.    Other Therapeutic Interventions  Therapist assisted w/ pericare with dep (A).     Functional Outcomes  AM-PAC Inpatient Mobility Raw Score : 10              Cognition  WFL  Orientation:    alert and oriented x 4  Command Following:   WFL    Education  Barriers To Learning: none  Patient Education: patient educated on PT role and benefits, general safety, functional mobility training, transfer training  Learning Assessment: patient verbalizes and demonstrates understanding    Assessment  Activity Tolerance: Pt limited by extreme fatigue. Supine BP at end of session: 105/59.  Impairments Requiring Therapeutic Intervention: decreased functional mobility, decreased strength, decreased endurance, decreased balance, increased pain, decreased posture  Prognosis: good  Clinical Assessment: Pt presents with a lot of fatigue and weakness, atypical in relation to past therapy sessions. Pt was not able to ambulate the short distance to the bathroom and needed to use a BSC. Pt required max(A) with bed mobility, stand-step transfers, and standing balance. A Stedy was used to transfer pt from BSC to bed as pt was too weak to ambulate. Medical team was notified of change in status. Pt would benefit from continued skilled PT to promote further functional gains.   Safety Interventions: patient left in chair, chair alarm in place, call light within reach, gait belt, nurse notified, and family/caregiver present    Plan  Frequency: 5-7 x/week  Current Treatment Recommendations:

## 2024-05-02 NOTE — PROGRESS NOTES
artery    Dysarthria    Upper GI hemorrhage    PVC's (premature ventricular contractions)    Acute prerenal azotemia    History of heart artery stent    Upper GI bleed    Slurred speech    Ventricular bigeminy    HANS (acute kidney injury) (HCC)    Partial small bowel obstruction (HCC)    Supraglottitis without airway obstruction    Acute parotitis    Laryngopharyngeal reflux (LPR)    Mild malnutrition (HCC)    Ileus (HCC)       Plan:   Patient remains a good candidate for acute inpatient rehabilitation, pending ongoing medical stability and insurance prior auth. Anticipate she will tolerate and benefit from 3 hrs of intensive PT/OT/SLP per day, 5 days per week. She continues to demonstrate medical complexity to warrant hospital-based rehab under the supervision of a physiatrist.     Lester Shelley MD 5/1/2024, 8:02 PM    * This document was created using dictation software.  While all precautions were taken to ensure accuracy, errors may have occurred.  Please disregard any typographical errors.

## 2024-05-02 NOTE — PLAN OF CARE
THERAPY:  Goals:                   Short Term Goals:  Time Frame: 2-3 weeks  Patient will complete bed mobility at modified independent   Patient will complete transfers at [facility]   Patient will ambulate 50 ft with use of LRAD at [facility]  Patient will ascend/descend 4 stairs with (R) ascending handrail at contact guard assistance  Patient will complete car transfer at [facility]  Patient will perform 5X sit to stand test in 14 seconds or less to decrease risk of falling.               These goals were reviewed with this patient at the time of assessment and Ana Gonzales is in agreement.     Plan of Care: Pt to be seen 5 out of 7 days per week per ARU protocol ( 90 minutes with PT)                     OCCUPATIONAL THERAPY:  Goals:               :Short Term Goals:  Time Frame: 3 weeks  Patient will complete upper body ADL at [facility]   Patient will complete lower body ADL at [facility]   Patient will complete toileting at modified independent   Patient will complete functional transfers at [facility]   Patient will complete functional mobility at modified independent   Patient to maintain standing at [facility] for 6+ minutes.  Patient to gather and transport IADL items at supervision     These goals were reviewed with this patient at the time of assessment and Ana Gonzales is in agreement    Plan of Care:  Pt to be seen 5 out of 7 days per week per ARU protocol ( 90 minutes with OT)    Therapy Treatments will include:  [x]  therapeutic exercises    [x]  gait training     [x]  neuromuscular re-ed                            [x]  transfer training             [] community reintegration    [x] bed mobility                          []  w/c mobility and training  [x]  self care    [x]home mgmt    []  cognitive training            [x]  energy conservation        []  dysphagia tx    []  speech/language/communication therapy   [x]  group

## 2024-05-02 NOTE — PLAN OF CARE
Problem: Discharge Planning  Goal: Discharge to home or other facility with appropriate resources  5/2/2024 0922 by Madhavi Galvez, RN  Outcome: Progressing  Flowsheets (Taken 5/2/2024 0794)  Discharge to home or other facility with appropriate resources: Identify barriers to discharge with patient and caregiver     Problem: Pain  Goal: Verbalizes/displays adequate comfort level or baseline comfort level  5/2/2024 0922 by Madhavi Galvez, RN  Outcome: Progressing     Problem: Nutrition Deficit:  Goal: Optimize nutritional status  5/2/2024 0922 by Madhavi Galvez, RN  Outcome: Progressing     Problem: Gastrointestinal - Adult  Goal: Minimal or absence of nausea and vomiting  5/2/2024 0922 by Madhavi Galvez, RN  Outcome: Progressing  Flowsheets (Taken 5/2/2024 0749)  Minimal or absence of nausea and vomiting: Administer IV fluids as ordered to ensure adequate hydration     Problem: Gastrointestinal - Adult  Goal: Maintains or returns to baseline bowel function  5/2/2024 0922 by Madhavi Galvez, RN  Outcome: Progressing

## 2024-05-02 NOTE — CARE COORDINATION
IMM Letter       05/02/24 1129   IMM Letter   IMM Letter given to Patient/Family/Significant other/Guardian/POA/by: Patient   IMM Letter date given: 05/02/24   IMM Letter time given: 1125     DEYANIRA Escobar RN    [facility]  Phone: [phone]

## 2024-05-02 NOTE — PROGRESS NOTES
Ana VASQUEZ Christian  5/2/2024  [id number]    Chief Complaint: Partial small bowel obstruction (HCC)    Subjective   PICC came out last night and was replaced. Advanced to low fiber diet.     Denies any new pain complaints. Denies fevers, chills, chest pain, dyspnea.          Objective   Objective:  Patient Vitals for the past 24 hrs:   BP Temp Temp src Pulse Resp SpO2 Weight   05/02/24 1135 (!) 102/51 97.3 °F (36.3 °C) Oral 70 16 94 % --   05/02/24 0905 -- -- -- 72 16 91 % --   05/02/24 0749 132/83 97.7 °F (36.5 °C) Oral 85 16 95 % --   05/02/24 0600 -- -- -- -- -- -- 91.2 kg (201 lb)   05/02/24 0025 125/69 98.1 °F (36.7 °C) Oral 85 16 94 % --   05/01/24 2055 118/70 97.6 °F (36.4 °C) Oral 80 16 95 % --   05/01/24 2017 -- -- -- 77 16 94 % --     Gen: No distress, pleasant.   HEENT: Normocephalic, atraumatic.   CV: No audible murmurs, well perfused extremities  Resp: No respiratory distress. No increased WOB  Abd: Soft, nontender nondistended.  Ext: No edema.   Neuro: Alert, oriented, appropriately interactive.     Laboratory data: Available via EMR.     Therapy progress:    PT    Rolling: Level of difficulty - A Little   Sit to Stand from a Chair: Level of difficulty - A Lot  Supine to Sit: Level of difficulty - Total     Bed to Chair: Physical Assistance Required - A Lot  Ambulate Across Room: Physical Assistance Required - A Lot  Ascend 3-5 Steps With HR: Physical Assistance Required - A Lot    OT    Eating: Physical Assistance Required - A Little  Grooming: Physical Assistance Required - A Little  LB Dressing: Physical Assistance Required - A Lot  UB Dressing: Physical Assistance Required - A Little  Bathing: Physical Assistance Required - A Lot  Toileting: Physical Assistance Required - A Lot    SLP         Body mass index is 36.75 kg/m².       Assessment:  Patient Active Problem List   Diagnosis    SERVANDO (obstructive sleep apnea)    Hyperlipidemia    HTN (hypertension), benign    PTSD (post-traumatic stress disorder)     Vitamin D deficiency    Acute bronchitis with bronchospasm    Chest pain    Oropharyngeal dysphagia    Choking    Chronic laryngitis    Abdominal pain, epigastric    Allergic rhinitis    Asthma    Acute asthma exacerbation    Carpal tunnel syndrome    Chronic diarrhea    Constipation    Dizziness and giddiness    Other long term (current) drug therapy    Fibromyalgia    Gastro-esophageal reflux disease without esophagitis    History of colonic polyps    Irritable bowel syndrome    Gonalgia    Menopausal and perimenopausal disorder    Microcytic anemia    Muscle ache    Nonspecific mesenteric adenitis    Osteoarthrosis    Arthralgia    Extremity pain    Other specified personal risk factors, not elsewhere classified    Hypostatic pulmonary congestion    Frequent UTI    Subdural hemorrhage (HCC)    Type 1 diabetes mellitus (HCC)    Type 2 diabetes mellitus, without long-term current use of insulin (HCC)    Neoplasm of uncertain behavior of larynx    Subjective tinnitus of both ears    Bilateral high frequency sensorineural hearing loss    Encounter for post surgical wound check    Lumbar spine pain    Mild persistent asthma without complication    Numbness and tingling of both feet    PONV (postoperative nausea and vomiting)    Spinal stenosis of lumbar region    TIA (transient ischemic attack)    Word finding difficulty    Laryngitis, acute    Chronic sinusitis    Frontal headache    Chronic cough    Palpitations    Anginal equivalent (HCC)    Abnormal stress test    Obesity due to excess calories with serious comorbidity    Coronary artery disease involving native coronary artery of native heart without angina pectoris    (HFpEF) heart failure with preserved ejection fraction (HCC)    TIA involving left internal carotid artery    Dysarthria    Upper GI hemorrhage    PVC's (premature ventricular contractions)    Acute prerenal azotemia    History of heart artery stent    Upper GI bleed    Slurred speech

## 2024-05-02 NOTE — PROGRESS NOTES
2055: Shift assessment completed. VSS. Medications given per MAR. Bedside table and call light within reach. PICC line came out of patients are, unaware of how it happened. Messaged hospitalist, PICC line replacement order in. DIT contacted. The care plan and education has been reviewed and mutually agreed upon with the patient.     TPN was d/c.    Linette Roldan RN

## 2024-05-02 NOTE — PROGRESS NOTES
ARU Admission Assessment    Ethnicity  \"Are you of , /a, or Kazakh origin?\"  Check all that apply:  [x] A.  No, not of , /a, or Kazakh Origin  [] B.  Yes, Australian, Australian American, Chicano/a  [] C.  Yes, Guamanian  [] D.  Yes, Alexis  [] E.  Yes, another , , or Kazakh origin  [] X.  Patient unable to respond  [] Y.  Patient declines to respond    Race  \"What is your race?\"  Check all that apply:  [x] A.  White  [] B.  Black or   [] C.   or   [] D.   Emirati  [] E.  Chinese  [] F.  Mongolian  [] G. Venezuelan  [] H.  Hebrew  [] I.  Frisian  [] J.  Other   [] K.    [] L.  Romanian or Mandy  [] M.  Citizen of [place]  [] N.  Other   [] X.  Patient unable to respond  [] Y.  Patient declines to respond  [] Z.  None of the above    Language  A.  \"What is your preferred language?\"   english    B.  \"Do you need or want an  to communicate with a doctor or health care staff?\"  Check only one:  [x] 0.  No  [] 1.  Yes  [] 9.  Unable to determine    Transportation  \"Has lack of transportation kept you from medical appointments, meetings, work, or from getting things needed for daily living?\"Check all that apply:  [] A.  Yes, it has kept me from medical appointments or from getting my medications  [] B.  Yes, it has kept me from non-medical meetings, appointments, work, or from getting things that I need  [x] C.  No  [] X.  Patient unable to respond  [] Y.  Patient declines to respond    Hearing  Ability to hear (with hearing aid or hearing appliances if normally used)  [x]  0.  Adequate - no difficulty in normal conversation, social interaction, listening to TV  []  1.  Minimal difficulty - difficulty in some environments (e.g. when person speaks softly or setting is noisy)  []  2.  Moderate difficulty - speaker has to increase volume and speak distinctly   []  3.  Highly impaired - absence of

## 2024-05-02 NOTE — PLAN OF CARE
Problem: Discharge Planning  Goal: Discharge to home or other facility with appropriate resources  5/2/2024 0053 by Linette Roldan RN  Outcome: Progressing  5/1/2024 1442 by Madhavi Galvez RN  Outcome: Progressing  Flowsheets (Taken 5/1/2024 0751)  Discharge to home or other facility with appropriate resources: Identify barriers to discharge with patient and caregiver     Problem: Safety - Adult  Goal: Free from fall injury  5/2/2024 0053 by Linette Roldan RN  Outcome: Progressing  5/1/2024 1442 by Madhavi Galvez RN  Outcome: Progressing     Problem: Pain  Goal: Verbalizes/displays adequate comfort level or baseline comfort level  5/2/2024 0053 by Linette Roldan RN  Outcome: Progressing  5/1/2024 1442 by Madhavi Galvez RN  Outcome: Progressing     Problem: Skin/Tissue Integrity  Goal: Absence of new skin breakdown  Description: 1.  Monitor for areas of redness and/or skin breakdown  2.  Assess vascular access sites hourly  3.  Every 4-6 hours minimum:  Change oxygen saturation probe site  4.  Every 4-6 hours:  If on nasal continuous positive airway pressure, respiratory therapy assess nares and determine need for appliance change or resting period.  5/2/2024 0053 by Linette Roldan RN  Outcome: Progressing  5/1/2024 1442 by Madhavi Galvez RN  Outcome: Progressing     Problem: Chronic Conditions and Co-morbidities  Goal: Patient's chronic conditions and co-morbidity symptoms are monitored and maintained or improved  5/2/2024 0053 by Linette Roldan RN  Outcome: Progressing  5/1/2024 1442 by Madhavi Galvez RN  Outcome: Progressing  Flowsheets (Taken 5/1/2024 0751)  Care Plan - Patient's Chronic Conditions and Co-Morbidity Symptoms are Monitored and Maintained or Improved: Monitor and assess patient's chronic conditions and comorbid symptoms for stability, deterioration, or improvement     Problem: ABCDS Injury Assessment  Goal: Absence of physical injury  5/2/2024 0053 by Linette Roldan RN  Outcome:

## 2024-05-02 NOTE — PROGRESS NOTES
4 Eyes Skin Assessment     NAME:  Ana Gonzales  YOB: 1955  MEDICAL RECORD NUMBER:  [id number]    The patient is being assessed for  Admission    I agree that at least one RN has performed a thorough Head to Toe Skin Assessment on the patient. ALL assessment sites listed below have been assessed.      Areas assessed by both nurses:    Head, Face, Ears, Shoulders, Back, Chest, Arms, Elbows, Hands, Sacrum. Buttock, Coccyx, Ischium, Legs. Feet and Heels, and Under Medical Devices         Does the Patient have a Wound? Yes wound(s) were present on assessment. LDA wound assessment was Initiated and completed by RN abd incision, healing blister on left buttocks. Redness around the healing blister and blanchable       Etienne Prevention initiated by RN: Yes  Wound Care Orders initiated by RN: No    Pressure Injury (Stage 3,4, Unstageable, DTI, NWPT, and Complex wounds) if present, place Wound referral order by RN under : No    New Ostomies, if present place, Ostomy referral order under : No     Nurse 1 eSignature: Electronically signed by Sinai Serna RN on 5/2/24 at 4:36 PM EDT    **SHARE this note so that the co-signing nurse can place an eSignature**    Nurse 2 eSignature: Electronically signed by Miguel A Rizzo RN on 5/2/24 at 5:53 PM EDT

## 2024-05-02 NOTE — PROGRESS NOTES
Patient was admitted to room 4904 at 1630.  Patient was oriented to the Call Light, Phone, TV, Thermostat, Bed Controls, Bathroom and Emergency Cord.  Patient verbalized and demonstrated understanding of all.  Patient was also given an overview of Unit Routines for Acute Rehab, including the patient's rights and responsibilities as well as the CMS IRF AVT[employer] Privacy Act Statement providing notice of data collection.  Patient states that their normal bowel regime is regular. Meal times were explained, including how to order food.  The white board, (which is posted on the wall by the door is used for communication) has the Therapy Scheduled that is posted each day along with the name of your doctor, nurse, and therapist for your convenience.  We recommend any family that will be care givers or any care givers the patient has, take part in therapy.  We have no set visiting hours, we suggest non-caregiver friends and family visitors come after therapy (at 4 PM or later) to allow patient to rest in between sessions.      In conjunction with the patient and patient’s family, this nurse worked to establish a tailored Fall TIPS plan to ensure patient safety and compliance:    Falls TIPS Completion    Patient identified as increased risk for harm if fall:  [] Yes     Fall Risks  History of Falls:    [] Yes   Medication Side Effects:   [x] Yes   Walking Aid:    [x] Yes   IV Pole or Equipment:   [] Yes   Unsteady Walk:     [x] Yes   May Forget or Choose Not to Call: [x] Yes     Fall Interventions   Communicate Recent Fall and/or Risk of Harm: [x] Yes   Walking Aids:  Crutches: [] Yes   Cane: [] Yes   Walker: [x] Yes   IV Assistance When Walking: [] Yes   Toileting Schedule: Every 2 Hours  Bedpan:   [] Yes   Assist to Commode: [] Yes   Assist to Bathroom: [x] Yes   Bed Alarm On: [x] Yes   Assistance Out of Bed:  Bedrest: [] Yes   1 Person: [x] Yes   2 People: [] Yes

## 2024-05-02 NOTE — PROGRESS NOTES
Arrived to place PICC line with bedside RN Linette. Pre-procedure and timeout done with RN, discussed limitations of placement and allergies. Consent confirmed. Vital signs stable. Labs, allergies, medications, and code status reviewed. No contraindications noted.     Procedure explained to pt, including the risk and benefits of the procedure. All questions answered. Pt verbalizes understanding of the procedure and states no more questions.       Pt's basilic, brachial, and cephalic are all easily collapsible with no indication for a clot. Vein selected is large enough for catheter. Pt tolerated sterile procedure well, with no difficulty accessing right  basilic vein, when accessed - blood was free flowing and non-pulsatile. Guidewire, introducer, and catheter went in smoothly. PICC line verified with 3CG technology with peaked P-waves (please see image below).      Nurses:  OK to use PICC.    Please replace all existing IV tubing with new IV tubing prior to using the PICC for current IV infusions.  Please remove any PIVs from PICC arm.  All of the above may be sources of infection or an increase chance of a clot.      Post procedure - reorganized pt table, placed pt in lowest position, with call light and educated on line care. Instructed pt/RN not to use arm for at least 30min to avoid bleeding. Reported off to bedside RN.      If you have any questions please call number below and dispatch will direct you to the PICC RN that is on call.    [phone]

## 2024-05-02 NOTE — PROGRESS NOTES
[facility]        Progress Note    Patient Name: Ana Gonzales  MRN: [id number]  YOB: 1955  Date of Evaluation: 2024    Subjective:  No acute events overnight  Pain controlled without narcotics  No nausea or vomiting with G-tube clamped, tolerating low fiber diet but does not care for taste of foods, appetite fair  Passing flatus and stool, denies bloating  Up to chair at this time    Post-Operative Day #44, 15, 8      Vital Signs:  Patient Vitals for the past 24 hrs:   BP Temp Temp src Pulse Resp SpO2 Weight   24 1135 (!) 102/51 97.3 °F (36.3 °C) Oral 70 16 94 % --   24 0905 -- -- -- 72 16 91 % --   24 0749 132/83 97.7 °F (36.5 °C) Oral 85 16 95 % --   24 0600 -- -- -- -- -- -- 91.2 kg (201 lb)   24 0025 125/69 98.1 °F (36.7 °C) Oral 85 16 94 % --   24 2055 118/70 97.6 °F (36.4 °C) Oral 80 16 95 % --   24 -- -- -- 77 16 94 % --   24 1652 122/70 97.4 °F (36.3 °C) Oral 73 16 96 % --   24 1630 (!) 156/77 -- -- 80 16 97 % --   24 1217 112/64 97.2 °F (36.2 °C) Oral 79 17 98 % --        TEMPERATURE HISTORY 24H: Temp (24hrs), Av.6 °F (36.4 °C), Min:97.2 °F (36.2 °C), Max:98.1 °F (36.7 °C)    BLOOD PRESSURE HISTORY: Systolic (36hrs), Av , Min:102 , Max:156    Diastolic (36hrs), Av, Min:51, Max:83      Intake/Output:  I/O last 3 completed shifts:  In: .8 [P.O.:120]  Out: 1200 [Urine:1200]  I/O this shift:  In: 240 [P.O.:240]  Out: -   Drain/tube Output:  [REMOVED] Negative Pressure Wound Therapy Abdomen Anterior;Mid-Output (ml): 50 ml    Physical Exam:  General: awake, alert, oriented to person, place, time  Lungs: unlabored respirations  Abdomen: soft, mildly distended, non-tender, bowel sounds active, G-tube clamped  Skin/Wound: healing well, no drainage, no erythema, well approximated with staples    Labs:  CBC:    Recent Labs     24  0603   WBC 7.8   HGB 9.2*   HCT 29.2*  analgesics and antiemetics--minimizing narcotics as tolerated, transition to PO  7. DVT prophylaxis with  Lovenox and SCD's  8. Management of medical comorbid etiologies per primary team and consulting services  9. Disposition: Okay for discharge to ARU from a surgical perspective; will follow intermittently upon transfer to ARU    EDUCATION:  Educated patient on plan of care and disease process--all questions answered.    Plans discussed with patient, nursing, and MONA Mitchell.  Reviewed and discussed with Dr. Morales.      Signed:  VALE Shaffer - CNP  5/2/2024 11:47 AM    Doing well  Okay for transfer to ARU today

## 2024-05-02 NOTE — PROGRESS NOTES
[facility] - Inpatient Rehabilitation Department   Phone: [phone]    Occupational Therapy    [] Initial Evaluation            [x] Daily Treatment Note         [] Discharge Summary      Patient: Ana Gonzales   : 1955   MRN: [id number]   Date of Service:  2024    Admitting Diagnosis:  Partial small bowel obstruction (HCC)  Current Admission Summary: Per H&P: \"Ana Gonzales is a 68 y.o. female with a pmh of upper GI bleed, SBO/persistent ileus, TIA, CHF, CAD who presents with SBO (small bowel obstruction)\"   Past Medical History:  has a past medical history of Arthritis, Asthma, Brain injury (HCC), Bronchitis, CHF (congestive heart failure) (HCC), Depression, Diabetes mellitus (HCC), DM (diabetes mellitus screen), Elevated cholesterol with high triglycerides, Fall, Fibromyalgia, GERD (gastroesophageal reflux disease), HIGH CHOLESTEROL, Hypertension, Microvascular angina (HCC), Migraine, Neuropathy, Panic attacks, PONV (postoperative nausea and vomiting), Post traumatic stress disorder, Short-term memory loss, Skin cancer, Sleep apnea, and Vertigo.  Past Surgical History:  has a past surgical history that includes Hysterectomy; eye surgery (cataract B); Carpal tunnel release; Finger trigger release; Gallbladder surgery; bladder suspension; Skin cancer excision; blepharoplasty; laryngoscopy (2017); Coronary stent placement (2023); joint replacement (Right); back surgery; Upper gastrointestinal endoscopy (N/A, 10/2/2023); Upper gastrointestinal endoscopy (N/A, 3/13/2024); Upper gastrointestinal endoscopy (N/A, 3/14/2024); Upper gastrointestinal endoscopy (N/A, 3/14/2024); Upper gastrointestinal endoscopy (N/A, 3/14/2024); laparotomy (N/A, 3/19/2024); Upper gastrointestinal endoscopy (N/A, 3/26/2024); laparotomy (N/A, 2024); Gastrostomy tube placement (N/A, 2024); and Abdomen surgery (N/A, 2024).    Discharge Recommendations: Ana Gonzales scored a 15/24 on the  maintain standing at modified independent for 12-15 minutes.  Patient to gather and transport IADL items at modified independent     Above goals reviewed on 5/2/2024.  All goals are ongoing at this time unless indicated above.       Therapy Session Time     Individual Concurrent Group Co-treatment   Time In 0952        Time Out 1032        Minutes 40          Timed Code Treatment Minutes:  40 minutes    Total Treatment Minutes:  40 minutes         Electronically Signed By: Aldo Gross OTR/GUILLERMO [id number]

## 2024-05-02 NOTE — PROGRESS NOTES
Admission assessment completed, vss, alert and oriented, 4 eyes completed, The care plan and education has been reviewed and mutually agreed upon with the patient.  Pt remains free from falls. Fall precautions in place--bed in lowest position, call light within reach, bed alarm in use. Pt aware to call for assistance before getting up.

## 2024-05-03 LAB
ALBUMIN SERPL-MCNC: 3.5 G/DL (ref 3.4–5)
ALP SERPL-CCNC: 103 U/L (ref 40–129)
ALT SERPL-CCNC: 8 U/L (ref 10–40)
ANION GAP SERPL CALCULATED.3IONS-SCNC: 11 MMOL/L (ref 3–16)
AST SERPL-CCNC: 14 U/L (ref 15–37)
BASOPHILS # BLD: 0.1 K/UL (ref 0–0.2)
BASOPHILS NFR BLD: 2.2 %
BILIRUB DIRECT SERPL-MCNC: <0.2 MG/DL (ref 0–0.3)
BILIRUB INDIRECT SERPL-MCNC: ABNORMAL MG/DL (ref 0–1)
BILIRUB SERPL-MCNC: <0.2 MG/DL (ref 0–1)
BUN SERPL-MCNC: 11 MG/DL (ref 7–20)
CALCIUM SERPL-MCNC: 9.3 MG/DL (ref 8.3–10.6)
CHLORIDE SERPL-SCNC: 107 MMOL/L (ref 99–110)
CO2 SERPL-SCNC: 20 MMOL/L (ref 21–32)
CREAT SERPL-MCNC: 0.7 MG/DL (ref 0.6–1.2)
DEPRECATED RDW RBC AUTO: 19.9 % (ref 12.4–15.4)
EOSINOPHIL # BLD: 0.3 K/UL (ref 0–0.6)
EOSINOPHIL NFR BLD: 5.2 %
GFR SERPLBLD CREATININE-BSD FMLA CKD-EPI: >90 ML/MIN/{1.73_M2}
GLUCOSE SERPL-MCNC: 100 MG/DL (ref 70–99)
HCT VFR BLD AUTO: 29.8 % (ref 36–48)
HGB BLD-MCNC: 9.6 G/DL (ref 12–16)
LYMPHOCYTES # BLD: 1.6 K/UL (ref 1–5.1)
LYMPHOCYTES NFR BLD: 27.7 %
MAGNESIUM SERPL-MCNC: 2 MG/DL (ref 1.8–2.4)
MCH RBC QN AUTO: 27.4 PG (ref 26–34)
MCHC RBC AUTO-ENTMCNC: 32.3 G/DL (ref 31–36)
MCV RBC AUTO: 84.9 FL (ref 80–100)
MONOCYTES # BLD: 0.5 K/UL (ref 0–1.3)
MONOCYTES NFR BLD: 8 %
NEUTROPHILS # BLD: 3.4 K/UL (ref 1.7–7.7)
NEUTROPHILS NFR BLD: 56.9 %
PLATELET # BLD AUTO: 391 K/UL (ref 135–450)
PMV BLD AUTO: 8.1 FL (ref 5–10.5)
POTASSIUM SERPL-SCNC: 3.9 MMOL/L (ref 3.5–5.1)
PROT SERPL-MCNC: 6.4 G/DL (ref 6.4–8.2)
RBC # BLD AUTO: 3.51 M/UL (ref 4–5.2)
SODIUM SERPL-SCNC: 138 MMOL/L (ref 136–145)
WBC # BLD AUTO: 5.9 K/UL (ref 4–11)

## 2024-05-03 PROCEDURE — 94761 N-INVAS EAR/PLS OXIMETRY MLT: CPT

## 2024-05-03 PROCEDURE — 36415 COLL VENOUS BLD VENIPUNCTURE: CPT

## 2024-05-03 PROCEDURE — 97166 OT EVAL MOD COMPLEX 45 MIN: CPT

## 2024-05-03 PROCEDURE — 80048 BASIC METABOLIC PNL TOTAL CA: CPT

## 2024-05-03 PROCEDURE — 83735 ASSAY OF MAGNESIUM: CPT

## 2024-05-03 PROCEDURE — 97530 THERAPEUTIC ACTIVITIES: CPT

## 2024-05-03 PROCEDURE — 97161 PT EVAL LOW COMPLEX 20 MIN: CPT

## 2024-05-03 PROCEDURE — 6370000000 HC RX 637 (ALT 250 FOR IP): Performed by: STUDENT IN AN ORGANIZED HEALTH CARE EDUCATION/TRAINING PROGRAM

## 2024-05-03 PROCEDURE — 97110 THERAPEUTIC EXERCISES: CPT

## 2024-05-03 PROCEDURE — 85025 COMPLETE CBC W/AUTO DIFF WBC: CPT

## 2024-05-03 PROCEDURE — 97535 SELF CARE MNGMENT TRAINING: CPT

## 2024-05-03 PROCEDURE — 6360000002 HC RX W HCPCS: Performed by: STUDENT IN AN ORGANIZED HEALTH CARE EDUCATION/TRAINING PROGRAM

## 2024-05-03 PROCEDURE — 1280000000 HC REHAB R&B

## 2024-05-03 PROCEDURE — 36592 COLLECT BLOOD FROM PICC: CPT

## 2024-05-03 PROCEDURE — 94640 AIRWAY INHALATION TREATMENT: CPT

## 2024-05-03 PROCEDURE — 80076 HEPATIC FUNCTION PANEL: CPT

## 2024-05-03 RX ADMIN — TIZANIDINE 2 MG: 4 TABLET ORAL at 20:37

## 2024-05-03 RX ADMIN — METOCLOPRAMIDE 10 MG: 10 TABLET ORAL at 05:23

## 2024-05-03 RX ADMIN — GABAPENTIN 600 MG: 300 CAPSULE ORAL at 13:50

## 2024-05-03 RX ADMIN — METOCLOPRAMIDE 10 MG: 10 TABLET ORAL at 09:49

## 2024-05-03 RX ADMIN — ACETAMINOPHEN 650 MG: 325 TABLET ORAL at 09:50

## 2024-05-03 RX ADMIN — Medication 400 MG: at 09:48

## 2024-05-03 RX ADMIN — TIOTROPIUM BROMIDE INHALATION SPRAY 2 PUFF: 3.12 SPRAY, METERED RESPIRATORY (INHALATION) at 05:54

## 2024-05-03 RX ADMIN — RANOLAZINE 1000 MG: 500 TABLET, EXTENDED RELEASE ORAL at 09:48

## 2024-05-03 RX ADMIN — NORTRIPTYLINE HYDROCHLORIDE 75 MG: 25 CAPSULE ORAL at 20:36

## 2024-05-03 RX ADMIN — MECLIZINE 25 MG: 12.5 TABLET ORAL at 20:36

## 2024-05-03 RX ADMIN — METOCLOPRAMIDE 10 MG: 10 TABLET ORAL at 20:37

## 2024-05-03 RX ADMIN — Medication 2 PUFF: at 05:53

## 2024-05-03 RX ADMIN — RANOLAZINE 1000 MG: 500 TABLET, EXTENDED RELEASE ORAL at 20:36

## 2024-05-03 RX ADMIN — METHENAMINE HIPPURATE 1 G: 1 TABLET ORAL at 09:48

## 2024-05-03 RX ADMIN — MECLIZINE 25 MG: 12.5 TABLET ORAL at 17:27

## 2024-05-03 RX ADMIN — GABAPENTIN 600 MG: 300 CAPSULE ORAL at 17:26

## 2024-05-03 RX ADMIN — ASPIRIN 81 MG: 81 TABLET, COATED ORAL at 09:48

## 2024-05-03 RX ADMIN — Medication 2 PUFF: at 20:34

## 2024-05-03 RX ADMIN — MECLIZINE 25 MG: 12.5 TABLET ORAL at 09:48

## 2024-05-03 RX ADMIN — DILTIAZEM HYDROCHLORIDE 120 MG: 120 CAPSULE, EXTENDED RELEASE ORAL at 09:47

## 2024-05-03 RX ADMIN — ACETAMINOPHEN 650 MG: 325 TABLET ORAL at 13:49

## 2024-05-03 RX ADMIN — ENOXAPARIN SODIUM 40 MG: 100 INJECTION SUBCUTANEOUS at 09:47

## 2024-05-03 RX ADMIN — CARVEDILOL 3.12 MG: 3.12 TABLET, FILM COATED ORAL at 17:26

## 2024-05-03 RX ADMIN — METHENAMINE HIPPURATE 1 G: 1 TABLET ORAL at 17:26

## 2024-05-03 RX ADMIN — GABAPENTIN 600 MG: 300 CAPSULE ORAL at 09:48

## 2024-05-03 RX ADMIN — CARVEDILOL 3.12 MG: 3.12 TABLET, FILM COATED ORAL at 09:48

## 2024-05-03 RX ADMIN — GABAPENTIN 600 MG: 300 CAPSULE ORAL at 20:37

## 2024-05-03 RX ADMIN — BUPROPION HYDROCHLORIDE 300 MG: 150 TABLET, EXTENDED RELEASE ORAL at 09:47

## 2024-05-03 RX ADMIN — ARIPIPRAZOLE 2 MG: 2 TABLET ORAL at 20:36

## 2024-05-03 RX ADMIN — METOCLOPRAMIDE 10 MG: 10 TABLET ORAL at 17:26

## 2024-05-03 RX ADMIN — PANTOPRAZOLE SODIUM 40 MG: 40 TABLET, DELAYED RELEASE ORAL at 05:23

## 2024-05-03 RX ADMIN — TOPIRAMATE 150 MG: 100 TABLET, FILM COATED ORAL at 17:26

## 2024-05-03 RX ADMIN — ACETAMINOPHEN 650 MG: 325 TABLET ORAL at 20:37

## 2024-05-03 RX ADMIN — TOPIRAMATE 100 MG: 100 TABLET, FILM COATED ORAL at 09:49

## 2024-05-03 RX ADMIN — MECLIZINE 25 MG: 12.5 TABLET ORAL at 13:50

## 2024-05-03 RX ADMIN — TROSPIUM CHLORIDE 20 MG: 20 TABLET, FILM COATED ORAL at 20:36

## 2024-05-03 RX ADMIN — ISOSORBIDE MONONITRATE 120 MG: 60 TABLET, EXTENDED RELEASE ORAL at 09:49

## 2024-05-03 ASSESSMENT — PAIN SCALES - GENERAL
PAINLEVEL_OUTOF10: 0

## 2024-05-03 NOTE — H&P
Patient: Ana Gonzales  [id number]  Date: 5/3/2024      Chief Complaint: Debility secondary to partial small bowel obstruction s/p ex-lap w/ lysis of adhesions    History of Present Illness/Hospital Course:  Ana Gonzales is 68 y.o. female with PMHx notable for HTN, DM2, HLD, GERD, SERVANDO, TBI, depression, lumbar fusion who was recently admitted 3/12-4/10/24 with GI bleed (s/p EGD w/ ulcer clipping on 3/14), small bowel obstruction (s/p ex-lap w/ lysis of adhesions, Meckel diverticulectomy, and appendectomy on 3/20) who returned on 4/12 with recurrent small bowel obstruction. She returned to OR on 4/17 for ex-lap with lysis of adhesions and G-tube placement, and subsequent return to OR on 4/24 for secondary wound closure.  G-tube was initially placed to gravity drainage, eventually patient tolerated clamping.  She remained NPO, receiving TPN initially but eventually began to tolerate an oral diet and TPN was able to be stopped.  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     Currently, patient reports that she is doing well.  She is tolerating her diet, but endorses diminished appetite and notes that food does not taste quite right to her. Denies any fevers, chills, chest pain, dyspnea, abdominal pain, nausea, vomiting.  Passing flatus and moving her bowels.    Prior Level of Function:  Mod-I for mobility with quad cane. Independent with ADLs. Requires assistance with IADLs.   Lives with spouse in 2 level house with 4 KATELIN, ground floor set-up is possible.     Current Level of Function:  PT  Bed Mobility:  Sit to Supine: 2 person assistance with max (A)   Comments: Assistance w/ legs and at trunk/head  Transfers:  Sit to stand transfer: moderate assistance, maximum assistance, mod (A) progressing to max (A)  Stand to  wound closure on 4/24  - G-tube clamped  - Continue PO Reglan, PRN Miralax  - Zofran PRN for nausea  - Continue low fiber diet.   - TPN stopped, ok to remove PICC.     #. CAD  - s/p PCI July 2023  - BB, Imdur, Cardizem  - Aspirin monotherapy, not to resume Plavix due to history of GI bleed per cardiology  - Stress test previously recommended per Cardiology, will need outpatient follow-up    #. Chronic Diastolic Heart Failure  - BB  - Holding Lasix for now  - Monitor daily weights    #. Left scapholunate dissociation, radiocarpal arthritis  - Weightbearing as tolerated  - Wrist splint with activity  - Pain control as below  - Follow-up outpatient with Ortho Hand    #. YASMANI  - s/p Venofer x3 days  - Hgb 9.6    Chronic Conditions:   - HTN: Continue antihypertensives, as above  - DM2: Last A1c 5.4%.  Well-controlled off medications.  Continue once daily POCT glucose checks for now.  - HLD: Intolerant of statins.  - GERD: PPI  - SERVANDO: Not on home CPAP  - Depression: Continue bupropion, nortriptyline, aripiprazole  - Chronic Migraine: Continue topiramate.  May consider dose reduction if appetite remains poor.  - Hx of lumbar fusion: Continue tizanidine nightly for spasm    CODE: Full Code  Diet: ADULT DIET; Regular; Low Fiber  Bowels: Per protocol  Bladder: Per protocol   Pain: Tylenol 650 mg TID scheduled plus TID PRN for breakthrough.   DVT PPx: Lovenox 40 mg subcutaneous daily  Follow-ups: General Surgery, Cardiology, PCP  ELOS: 13 days    Lester Shelley MD 5/3/2024, 12:50 PM     * This document was created using dictation software.  While all precautions were taken to ensure accuracy, errors may have occurred.  Please disregard any typographical errors.

## 2024-05-03 NOTE — PROGRESS NOTES
[facility] - Inpatient Rehabilitation Department   Phone: [phone]    Occupational Therapy    [x] Initial Evaluation            [] Daily Treatment Note         [] Discharge Summary      Patient: Ana Gonzales   : 1955   MRN: [id number]   Date of Service:  5/3/2024    Admitting Diagnosis:  Partial small bowel obstruction (HCC)  Current Admission Summary: Ana Gonzales is 68 y.o. female with PMHx notable for HTN, DM2, HLD, GERD, SERVANDO, TBI, depression, lumbar fusion who was recently admitted 3/12-4/10/24 with GI bleed (s/p EGD w/ ulcer clipping on 3/14), small bowel obstruction (s/p ex-lap w/ lysis of adhesions, Meckel diverticulectomy, and appendectomy on 3/20) who returned on  with recurrent small bowel obstruction. She returned to OR on  for ex-lap with lysis of adhesions and G-tube placement, and subsequent return to OR on  for secondary wound closure.  G-tube was initially placed to gravity drainage, eventually patient tolerated clamping.  She remained NPO, receiving TPN initially but eventually began to tolerate an oral diet and TPN was able to be stopped.  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   Past Medical History:  has a past medical history of Arthritis, Asthma, Brain injury ([facility]), Bronchitis, CHF (congestive heart failure) ([facility]), Depression, Diabetes mellitus ([facility]), DM (diabetes mellitus screen), Elevated cholesterol with high triglycerides, Fall, Fibromyalgia, GERD (gastroesophageal reflux disease), HIGH CHOLESTEROL, Hypertension, Microvascular angina ([facility]), Migraine, Neuropathy, Panic attacks, PONV (postoperative nausea and vomiting), Post traumatic stress disorder, Short-term memory loss, Skin cancer, Sleep apnea, and Vertigo.  Past Surgical History:  has a past  modified independent   Patient will complete functional transfers at modified independent   Patient will complete functional mobility at modified independent   Patient to maintain standing at modified independent for 6+ minutes.  Patient to gather and transport IADL items at supervision     Above goals reviewed on 5/3/2024.  All goals are ongoing at this time unless indicated above.       Therapy Session Time     Individual Group Co-treatment   Time In  0830      Time Out  0945      Minutes  75         Second Session Therapy Time:   Individual Concurrent Group Co-treatment   Time In  1415         Time Out  1439         Minutes  24           Timed Code Treatment Minutes:   60 + 24   Total Treatment Minutes:  99       Electronically Signed By: MARY Rabago, OTR/L #[id number]

## 2024-05-03 NOTE — DISCHARGE INSTR - COC
Continuity of Care Form    Patient Name: Ana Gonzales   :  1955  MRN:  [id number]    Admit date:  2024  Discharge date:  5/10/2024    Code Status Order: Full Code   Advance Directives:     Admitting Physician:  Lester Shelley MD  PCP: Mellisa Houston MD    Discharging Nurse: Sinai Serna  Discharging Hospital Unit/Room#: ARU-4904/4904-01  Discharging Unit Phone Number: [phone]    Emergency Contact:   Extended Emergency Contact Information  Primary Emergency Contact: Charli Gonzales  Address: [address] [place] [address]  Home Phone: [phone]  Mobile Phone: [phone]  Relation: Spouse  Secondary Emergency Contact: Leeroy Gonzales  Home Phone: [phone]  Relation: Child    Past Surgical History:  Past Surgical History:   Procedure Laterality Date    ABDOMEN SURGERY N/A 2024    SECONDARY ABDOMINAL WOUND CLOSURE performed by Srinivasa Morales MD at [facility] OR    BACK SURGERY      thoracic spine    BLADDER SUSPENSION      BLEPHAROPLASTY      CARPAL TUNNEL RELEASE      B    CORONARY STENT PLACEMENT  2023    ~Successful PCI to LAD with 3.5x38 MUSHTAQ.  PCI to D2 with 2.25x18 MUSHTAQ. KB LAD and D2 with stent balloon and 3.0x15.    EYE SURGERY  cataract B    FINGER TRIGGER RELEASE      GALLBLADDER SURGERY      GASTROSTOMY TUBE PLACEMENT N/A 2024    PLACEMENT OF GASTROSTOMY TUBE performed by Srinivasa Morales MD at [facility] OR    HYSTERECTOMY (CERVIX STATUS UNKNOWN)      JOINT REPLACEMENT Right     knee    LAPAROTOMY N/A 3/19/2024    EXPLORATORY LAPAROTOMY, LYSIS OF ADHESIONS,MECKELS DIVERTICULECTOMY, APPENDECTOMY performed by Srinivasa Morales MD at [facility] OR    LAPAROTOMY N/A 2024    EXPLORATORY LAPAROTOMY; LYSIS OF ADHESIONS; performed by Srinivasa Morales MD at [facility] OR    LARYNGOSCOPY  2017     DIRECT LARYNGOSCOPY AND BIOPSIES OF RIGHT AND LEFT ARYTENOIDS    SKIN CANCER EXCISION      UPPER GASTROINTESTINAL ENDOSCOPY  ***    Physician Certification: I certify the above information and transfer of Ana Gonzales  is necessary for the continuing treatment of the diagnosis listed and that she requires {Admit to Appropriate Level of Care:13557} for {GREATER/LESS:854411681} 30 days.     Update Admission H&P: {CHP DME Changes in HandP:643614144}    PHYSICIAN SIGNATURE:  {Esignature:[id number]}

## 2024-05-03 NOTE — PROGRESS NOTES
Admission Period/Goal QM Codes for Ana Gonzales.    QM Admit Code Goal Code   Eating     Oral Hygiene     Toileting Hygiene     Shower/Bathing     UB Dressing     LB Dressing     Putting on/off Footwear     Rolling Left and Right     Sit To Lying     Lying to Sitting on Bedside     Sit to Stand     Chair/Bed to Chair Transfer     Toilet Transfers     Car Transfers     Walk 10 Feet     Walk 50 Feet with Two Turns     Walk 150 Feet     Walk 10 Feet on Uneven Surfaces     1 Step (Curb)     4 Steps     12 Steps     Picking up Object from Floor     Wheel 50 Feet with 2 Turns     Type     Wheel 150 Feet     Type         The above codes were determined by the treatment team to be the patient's accurate admission assessment codes based on assessment performed soon after the patient's admission and prior to the benefit of services provided by staff, or if appropriate, the patient's usual performance at admission.    OT:       PT:       RN:       ST:       :

## 2024-05-03 NOTE — PLAN OF CARE
Problem: Safety - Adult  Goal: Free from fall injury  5/3/2024 0017 by Jelena Hester, RN  Outcome: Progressing     Problem: Pain  Goal: Verbalizes/displays adequate comfort level or baseline comfort level  5/3/2024 0017 by Jelena Hester, RN  Outcome: Progressing     Problem: ABCDS Injury Assessment  Goal: Absence of physical injury  Outcome: Progressing

## 2024-05-03 NOTE — CARE COORDINATION
Social Work Admission Assessment    Objective:  Met with pt to complete initial assessment and review role of  in rehab process.  Pt oriented to unit.  Pt states understanding of this.     Current Home Situation:  Patient lives  with family - Kamlesh    Pt's plans are: Disabled      Accessibility to community resources/transportation: Patient reported that she is active with [facility] ([facility]    Has pt experienced a recent loss or signigicant life event that would impact their care or ability to participate?  No    Has pt ever been treated for emotional disorders?  Yes: Comment: Patient reported that she is currently being treated for depression and PTSD.  Patient reported that she sees a Psychologist and a Psychiatrist.    How does pt and family cope with stressful events and this hospitalization?  talking to social supports    Special Problem Areas: None     Discharge Plan:Home with Home Health Care    Impression/Plan:  Ana Gonzales is a 68 year old female that has been admitted to ARU. Provided patient with this SW's card to contact as needed. Will continue to follow for support and discharge planning.      Electronically signed by SHIRA Hamm on 5/3/2024 at 2:36 PM

## 2024-05-03 NOTE — PROGRESS NOTES
suspension; Skin cancer excision; blepharoplasty; laryngoscopy (08/03/2017); Coronary stent placement (06/13/2023); joint replacement (Right); back surgery; Upper gastrointestinal endoscopy (N/A, 10/2/2023); Upper gastrointestinal endoscopy (N/A, 3/13/2024); Upper gastrointestinal endoscopy (N/A, 3/14/2024); Upper gastrointestinal endoscopy (N/A, 3/14/2024); Upper gastrointestinal endoscopy (N/A, 3/14/2024); laparotomy (N/A, 3/19/2024); Upper gastrointestinal endoscopy (N/A, 3/26/2024); laparotomy (N/A, 4/17/2024); Gastrostomy tube placement (N/A, 4/17/2024); and Abdomen surgery (N/A, 4/24/2024).  Discharge Recommendations: home with home health PT  DME Required For Discharge: rolling walker  Precautions/Restrictions: high fall risk  Weight Bearing Restrictions: weight bearing as tolerated  [] Right Upper Extremity  [] Left Upper Extremity [] Right Lower Extremity  [] Left Lower Extremity     Required Braces/Orthotics: wrist cock up splint - for comfort   [] Right  [] Left  Positional Restrictions:no positional restrictions    Pre-Admission Information   Lives With: spouse    Type of Home: house  Home Layout: two level, able to live on main level  Home Access:  2 step to enter with handrail.  Handrails are located on R side., also has a portable ramp  Bathroom Layout: walker accessible, tub/shower unit  Bathroom Equipment: grab bars in shower, shower chair, hand held shower head  Toilet Height: elevated height  Home Equipment: rolling walker, quad cane, lift chair  Transfer Assistance: modified independent with use of quad cane  Ambulation Assistance:modified independent with use of quad cane  ADL Assistance: independent with all ADL's  IADL Assistance: requires assistance with meal prep, requires assistance with cleaning, requires assistance for medication management, requires assistance for financial management  Active :        [x] Yes  [] No- car  Hand Dominance: [] Left  [x] Right  Current Employment:  retired.  Occupation: drive a school bus  Hobbies: watch grand kids play sports, play bingo  Recent Falls: a couple falls in the last 6 months, fell out of bed twice,  lowered mattress to prevent that    Examination   Vision:   Vision Corrective Device: wears contacts - wears reading glasses when not wearing contacts  Feels as if her L eye is weaker and she does not see as well  Hearing:   WFL  Observation:   Edema: Edema located in (B) LE.  Edema Level: 1+: trace with minimal depression  Posture:   R lateral lean in sitting, rounded head and shoulders  Sensation:   denies numbness and tingling  Proprioception:    WFL  Tone:   Normotonic  Coordination Testing:   Finger to Nose: Impaired on Left  Heel to Shin: WFL  Alternating Toe Tapping: Impaired on Left    Pt with undershooting of LUE  ROM:   (B) LE AROM WFL  Strength:   (B) LE strength grossly +3  Therapist Clinical Decision Making (Complexity): low complexity  Clinical Presentation: stable      Subjective  General: Pt in chair on arrival.  States she is very tired after the shower.  Pain: 0/10  Pain Interventions: not applicable       Functional Mobility  Bed Mobility: (second session)  Supine to Sit: minimal assistance  Sit to Supine: minimal assistance  Rolling Left: moderate assistance  Rolling Right: contact guard assistance  Comments:  Transfers:  Sit to stand transfer: contact guard assistance, minimal assistance, CGA from w/c, Jazmin from recliner  Stand to sit transfer: contact guard assistance  Stand step transfer: minimal assistance  Toilet transfer: minimal assistance  Car transfer: moderate assistance  Comments:  Ambulation:  Surface:level surface  Assistive Device: rolling walker  Assistance: contact guard assistance  Distance: 37'  Gait Mechanics: flexed posture, decreased step length and height  Comments:    Stair Mobility:  Number of Steps: 3  Step Height: 6 inch  Hand Rails: (R) ascending handrail  Assistance: moderate

## 2024-05-03 NOTE — CARE COORDINATION
Discharge Planning:     (CM) spoke with [facility] ([facility] staff, Christina ([phone]), who reported that patient's information will need faxed to them at discharge for a new start of care as patient's services recently ended.      Electronically signed by SHIRA Hamm on 5/3/2024 at 2:45 PM

## 2024-05-03 NOTE — PROGRESS NOTES
Nutrition Note    RECOMMENDATIONS  PO Diet: Low fiber as tolerated  ONS: Chocolate Ensure TID  Nutrition Support: d/c'd prior to ARU     ASSESSMENT   Nutrition intervention for new ARU admission.  Prior to ARU, pt was NPO/on liquids for most of hospitalization.  Diet advanced yesterday to low fiber diet & pt reports is tolerating well.  Pt has increased nutrient needs for healing s/p surgery.  RD suggested Ensure supplements, & pt agrees to receive TID. No significant wt changes per hx.  Will monitor for consistently adequate intake.       Malnutrition Status: No malnutrition  Acute Illness    NUTRITION DIAGNOSIS   Increased nutrient needs related to increase demand for energy/nutrients as evidenced by wounds    Goals: PO intake 50% or greater     NUTRITION RELATED FINDINGS  Objective: Labs reviewed; Edema: trace generalized/extremities.  LBM 5/1  Wounds: Surgical Incision (unable to determine etiology x 1 wound to buttocks)    CURRENT NUTRITION THERAPIES  ADULT DIET; Regular; Low Fiber     PO Intake: 26-50% (x 1 meal recorded)   PO Supplement Intake:     ANTHROPOMETRICS  Current Height: 160 cm (5' 3\")  Current Weight - Scale: 92 kg (202 lb 13.2 oz)    Admission weight: 90.3 kg (199 lb)  Ideal Body Weight (IBW): 115 lbs  (52 kg)        BMI: 35.8      COMPARATIVE STANDARDS  Total Energy Requirements (kcals/day): 1403- 1964     Protein (g):  90- 117g       Fluid (mL/day):  1500- 1964    EDUCATION  Education not indicated     The patient will be monitored per nutrition standards of care. Consult dietitian if additional nutrition interventions are needed prior to RD reassessment.     Tomasa Robles RD, LD    Contact: [phone]

## 2024-05-03 NOTE — PLAN OF CARE
Problem: Discharge Planning  Goal: Discharge to home or other facility with appropriate resources  Outcome: Progressing  Flowsheets (Taken 5/3/2024 1056)  Discharge to home or other facility with appropriate resources:   Identify barriers to discharge with patient and caregiver   Arrange for needed discharge resources and transportation as appropriate   Identify discharge learning needs (meds, wound care, etc)   Arrange for interpreters to assist at discharge as needed     Problem: Safety - Adult  Goal: Free from fall injury  5/3/2024 1056 by Yanci Braun RN  Outcome: Progressing  Flowsheets (Taken 5/3/2024 1056)  Free From Fall Injury: Instruct family/caregiver on patient safety  5/3/2024 0017 by Jelena Hester RN  Outcome: Progressing     Problem: Pain  Goal: Verbalizes/displays adequate comfort level or baseline comfort level  5/3/2024 1056 by Yanci Braun RN  Outcome: Progressing  Flowsheets (Taken 5/3/2024 1056)  Verbalizes/displays adequate comfort level or baseline comfort level:   Assess pain using appropriate pain scale   Encourage patient to monitor pain and request assistance   Administer analgesics based on type and severity of pain and evaluate response   Implement non-pharmacological measures as appropriate and evaluate response  5/3/2024 0017 by Jelena Hester RN  Outcome: Progressing     Problem: ABCDS Injury Assessment  Goal: Absence of physical injury  5/3/2024 1056 by Yanci Braun RN  Outcome: Progressing  Flowsheets (Taken 5/3/2024 1056)  Absence of Physical Injury: Implement safety measures based on patient assessment  5/3/2024 0017 by Jelena Hester RN  Outcome: Progressing     Problem: Nutrition Deficit:  Goal: Optimize nutritional status  Outcome: Progressing  Flowsheets  Taken 5/3/2024 1056 by Yanci Braun RN  Nutrient intake appropriate for improving, restoring, or maintaining nutritional needs:   Assess nutritional status and recommend course  of action   Monitor oral intake, labs, and treatment plans   Recommend appropriate diets, oral nutritional supplements, and vitamin/mineral supplements  Taken 5/3/2024 0926 by Tomasa Robles, KAMALJIT, LD  Nutrient intake appropriate for improving, restoring, or maintaining nutritional needs:   Monitor oral intake, labs, and treatment plans   Recommend appropriate diets, oral nutritional supplements, and vitamin/mineral supplements

## 2024-05-04 PROCEDURE — 97116 GAIT TRAINING THERAPY: CPT

## 2024-05-04 PROCEDURE — 6360000002 HC RX W HCPCS: Performed by: STUDENT IN AN ORGANIZED HEALTH CARE EDUCATION/TRAINING PROGRAM

## 2024-05-04 PROCEDURE — 97535 SELF CARE MNGMENT TRAINING: CPT

## 2024-05-04 PROCEDURE — 97530 THERAPEUTIC ACTIVITIES: CPT

## 2024-05-04 PROCEDURE — 94640 AIRWAY INHALATION TREATMENT: CPT

## 2024-05-04 PROCEDURE — 1280000000 HC REHAB R&B

## 2024-05-04 PROCEDURE — 97110 THERAPEUTIC EXERCISES: CPT

## 2024-05-04 PROCEDURE — 6370000000 HC RX 637 (ALT 250 FOR IP): Performed by: STUDENT IN AN ORGANIZED HEALTH CARE EDUCATION/TRAINING PROGRAM

## 2024-05-04 PROCEDURE — 94761 N-INVAS EAR/PLS OXIMETRY MLT: CPT

## 2024-05-04 RX ADMIN — MECLIZINE 25 MG: 12.5 TABLET ORAL at 16:48

## 2024-05-04 RX ADMIN — MECLIZINE 25 MG: 12.5 TABLET ORAL at 13:55

## 2024-05-04 RX ADMIN — CARVEDILOL 3.12 MG: 3.12 TABLET, FILM COATED ORAL at 16:48

## 2024-05-04 RX ADMIN — TOPIRAMATE 150 MG: 100 TABLET, FILM COATED ORAL at 16:47

## 2024-05-04 RX ADMIN — ACETAMINOPHEN 650 MG: 325 TABLET ORAL at 08:22

## 2024-05-04 RX ADMIN — TOPIRAMATE 100 MG: 100 TABLET, FILM COATED ORAL at 08:22

## 2024-05-04 RX ADMIN — BUPROPION HYDROCHLORIDE 300 MG: 150 TABLET, EXTENDED RELEASE ORAL at 08:23

## 2024-05-04 RX ADMIN — METOCLOPRAMIDE 10 MG: 10 TABLET ORAL at 05:52

## 2024-05-04 RX ADMIN — DILTIAZEM HYDROCHLORIDE 120 MG: 120 CAPSULE, EXTENDED RELEASE ORAL at 08:22

## 2024-05-04 RX ADMIN — ARIPIPRAZOLE 2 MG: 2 TABLET ORAL at 20:26

## 2024-05-04 RX ADMIN — TIOTROPIUM BROMIDE INHALATION SPRAY 2 PUFF: 3.12 SPRAY, METERED RESPIRATORY (INHALATION) at 07:45

## 2024-05-04 RX ADMIN — METHENAMINE HIPPURATE 1 G: 1 TABLET ORAL at 08:23

## 2024-05-04 RX ADMIN — Medication 2 PUFF: at 07:45

## 2024-05-04 RX ADMIN — METHENAMINE HIPPURATE 1 G: 1 TABLET ORAL at 16:48

## 2024-05-04 RX ADMIN — MECLIZINE 25 MG: 12.5 TABLET ORAL at 20:27

## 2024-05-04 RX ADMIN — NORTRIPTYLINE HYDROCHLORIDE 75 MG: 25 CAPSULE ORAL at 20:26

## 2024-05-04 RX ADMIN — Medication 400 MG: at 08:22

## 2024-05-04 RX ADMIN — GABAPENTIN 600 MG: 300 CAPSULE ORAL at 13:55

## 2024-05-04 RX ADMIN — METOCLOPRAMIDE 10 MG: 10 TABLET ORAL at 16:47

## 2024-05-04 RX ADMIN — RANOLAZINE 1000 MG: 500 TABLET, EXTENDED RELEASE ORAL at 20:26

## 2024-05-04 RX ADMIN — MECLIZINE 25 MG: 12.5 TABLET ORAL at 08:23

## 2024-05-04 RX ADMIN — Medication 2 PUFF: at 20:56

## 2024-05-04 RX ADMIN — TIZANIDINE 2 MG: 4 TABLET ORAL at 20:27

## 2024-05-04 RX ADMIN — TROSPIUM CHLORIDE 20 MG: 20 TABLET, FILM COATED ORAL at 20:28

## 2024-05-04 RX ADMIN — CARVEDILOL 3.12 MG: 3.12 TABLET, FILM COATED ORAL at 08:23

## 2024-05-04 RX ADMIN — GABAPENTIN 600 MG: 300 CAPSULE ORAL at 08:23

## 2024-05-04 RX ADMIN — RANOLAZINE 1000 MG: 500 TABLET, EXTENDED RELEASE ORAL at 08:23

## 2024-05-04 RX ADMIN — ASPIRIN 81 MG: 81 TABLET, COATED ORAL at 08:23

## 2024-05-04 RX ADMIN — PANTOPRAZOLE SODIUM 40 MG: 40 TABLET, DELAYED RELEASE ORAL at 05:52

## 2024-05-04 RX ADMIN — ACETAMINOPHEN 650 MG: 325 TABLET ORAL at 20:27

## 2024-05-04 RX ADMIN — ENOXAPARIN SODIUM 40 MG: 100 INJECTION SUBCUTANEOUS at 08:23

## 2024-05-04 RX ADMIN — ISOSORBIDE MONONITRATE 120 MG: 60 TABLET, EXTENDED RELEASE ORAL at 08:23

## 2024-05-04 RX ADMIN — ACETAMINOPHEN 650 MG: 325 TABLET ORAL at 13:55

## 2024-05-04 RX ADMIN — GABAPENTIN 600 MG: 300 CAPSULE ORAL at 16:47

## 2024-05-04 RX ADMIN — METOCLOPRAMIDE 10 MG: 10 TABLET ORAL at 20:27

## 2024-05-04 RX ADMIN — GABAPENTIN 600 MG: 300 CAPSULE ORAL at 20:27

## 2024-05-04 ASSESSMENT — PAIN SCALES - GENERAL
PAINLEVEL_OUTOF10: 0
PAINLEVEL_OUTOF10: 0

## 2024-05-04 NOTE — PLAN OF CARE
Problem: Safety - Adult  Goal: Free from fall injury  5/3/2024 2256 by Jelena Hester, RN  Outcome: Progressing     Problem: Pain  Goal: Verbalizes/displays adequate comfort level or baseline comfort level  5/3/2024 2256 by Jelena Hester, RN  Outcome: Progressing     Problem: Skin/Tissue Integrity  Goal: Absence of new skin breakdown  Description: 1.  Monitor for areas of redness and/or skin breakdown  2.  Assess vascular access sites hourly  3.  Every 4-6 hours minimum:  Change oxygen saturation probe site  4.  Every 4-6 hours:  If on nasal continuous positive airway pressure, respiratory therapy assess nares and determine need for appliance change or resting period.  Outcome: Progressing

## 2024-05-04 NOTE — PROGRESS NOTES
reclined. Agreeable to therapy. Pleasant and cooperative throughout.   Pain: 0/10  Pain Interventions: not applicable        Activities of Daily Living  Basic Activities of Daily Living  Grooming: contact guard assistance  Grooming Comments: static stance at sink for hand hygiene following toileting, noted to require periodic unilateral forearm support on surface for energy conservation  Lower Extremity Dressing: moderate assistance  Dressing Comments: pt used dressing stick to doff socks w/ SBA and no verbal cues, donned compression stockings w/ Max A,  donned socks w/ use of sock aide and set-up A  Toileting: contact guard assistance.    Toileting Comments: voiding urine while seated on commode, noted bowel smear on bathing wipes however no smear in brief, requires increased time w/ 1 rest break  General comments: provided w/ long handled sponge, reacher, dressing stick, and sock aide this date to improve independence in ADLs and reduce caregiver burden, pt somewhat receptive to edu and would benefit from continued education. Pt would rather use figure four technique to complete LB dressing however is currently limited d/t global weakness.   Instrumental Activities of Daily Living  No IADL completed on this date.  Functional Mobility  Bed Mobility:  Bed mobility not completed on this date.  Comments:  Transfers:  Sit to stand transfer:contact guard assistance  Stand to sit transfer: contact guard assistance  Toilet transfer: contact guard assistance  Toilet transfer equipment: standard toilet, grab bars, walker  Comments:  Functional Mobility  Functional Mobility Activity: to/from bathroom  Device Use: rolling walker  Required Assistance: initial CGA however regressed to Cathleen with fatigue following shower   Comment: assist for pacing RW and for balance; pt w/ flexed forward posture   Balance:  Static Sitting Balance: fair (+): maintains balance at SBA/supervision without use of UE support  Dynamic Sitting Balance:  burden.   Safety Interventions: patient left in chair, chair alarm in place, call light within reach, patient at risk for falls, and nurse notified    Plan  Frequency: 5 x/week, 90 min/day  Current Treatment Recommendations: strengthening, ROM, balance training, functional mobility training, transfer training, endurance training, patient/caregiver education, ADL/self-care training, IADL training, safety education, and equipment evaluation/education    Goals  Patient Goals: \"get stronger so I can go home\"    Short Term Goals:  Time Frame: 3 weeks  Patient will complete upper body ADL at modified independent   Patient will complete lower body ADL at modified independent   Patient will complete toileting at modified independent   Patient will complete functional transfers at modified independent   Patient will complete functional mobility at modified independent   Patient to maintain standing at modified independent for 6+ minutes.  Patient to gather and transport IADL items at supervision     Above goals reviewed on 5/4/2024.  All goals are ongoing at this time unless indicated above.       Therapy Session Time  First Session Therapy Time:   Individual Group Co-treatment   Time In 1000      Time Out 1030      Minutes 30         Second Session Therapy Time:   Individual Group Co-treatment   Time In 1245      Time Out 1345      Minutes 60        Timed Code Treatment Minutes:  30+60    Total Treatment Minutes:  90       Electronically Signed By: MARY Miller, OTR/L, [id number] 5/4/2024 2:14 PM

## 2024-05-04 NOTE — PROGRESS NOTES
[facility] - Inpatient Rehabilitation Department   Phone: [phone]    Physical Therapy    [] Initial Evaluation            [x] Daily Treatment Note         [] Discharge Summary      Patient: Ana Gonzales   : 1955   MRN: [id number]   Date of Service:  2024  Admitting Diagnosis: Partial small bowel obstruction (HCC)  Current Admission Summary: Ana Gonzales is a 68 y.o. female who presents to the ED status post exploratory laparotomy with lysis of adhesions, Meckel diverticulectomy and appendectomy 3/19/2024 by Dr. Morales with complaint of generalized weakness and nausea/vomiting. She states she is too weak to ambulate, which is new. She states the nausea/vomiting began last night. She is denying abdominal pain, although abdomen is tender to palpation on exam. Patient reports an episode of shortness of breath earlier today, but is not feeling short of breath at this moment. Lower extremity edema is noted and patient is unsure of her baseline. Patient states her chief concerns are the generalized weakness/fatigue and nausea/vomiting.      She had additional abdominal surgeries on 24 and 24. She has an acute on chronic ligamentous injury to her L wrist.  Past Medical History:  has a past medical history of Arthritis, Asthma, Brain injury (HCC), Bronchitis, CHF (congestive heart failure) ([facility]), Depression, Diabetes mellitus (HCC), DM (diabetes mellitus screen), Elevated cholesterol with high triglycerides, Fall, Fibromyalgia, GERD (gastroesophageal reflux disease), HIGH CHOLESTEROL, Hypertension, Microvascular angina (HCC), Migraine, Neuropathy, Panic attacks, PONV (postoperative nausea and vomiting), Post traumatic stress disorder, Short-term memory loss, Skin cancer, Sleep apnea, and Vertigo.  Past Surgical History:  has a past surgical history that includes Hysterectomy; eye surgery (cataract B); Carpal tunnel release; Finger trigger release; Gallbladder surgery; bladder  support  Static Standing Balance: poor (+): requires min (A) to maintain balance  Dynamic Standing Balance: poor: requires mod (A) to maintain balance  Comments: Pt was assisted with dressing by PT to save time for rest of session.     Other Therapeutic Interventions    1st session: Standing posterior pelvic tilt at ballet bar x15 reps, standing hip ABD at ballet bar x10 reps BLE, side stepping 4x15' CGA.  Seated exercises - knee flexion with theraband x10 reps BLE, isometric hip ADD with ball x1 reps, hip ABD with theraband x15 reps, seated cone tapping to the R and L BLE x20 reps.  Stand step transfer with RW and CGA from w/c to recliner.       2nd session: Pt seated in recliner on arrival, pleasant and agreeable to participate in PT treatment session. Pt denies pain at this time but states that she is very fatigued from earlier PT/OT sessions. Pt transferred STS from reclining chair with mod A and ambulated with same mechanics as first session with CGA and use of RW, 15' from reclining chair to toilet. Pt urinated and then performed pericare with mod A. Pt also assisted with lower body dressing due to mild incontinence of bowel. Pt required assistance with threading brief and pants. Pt attempted to ambulate from toilet to the reclining chair but B LEs \"gave out\" and pt required max A to be lowered to the toilet. Jose Ramon virginie utilized to assist the pt from the restroom. Pt performed toilet transfer with use of jose ramon stedy and mod A. Pt returned to bed and required mod A for sit to supine transfer. Once in bed, vitals assessed: BP: 134/71, HR: 75, SpO2: 99%. RN present in room assessing pt. Following extended supine rest break, pt performed supine AROM therapeutic exercises including: quad sets, glute sets, short arc quads, ankle pumps, and clam shells (after rolling B with min A) (1 x 10 B). Pt was assisted scooting up in bed with dependent A x 2. Pt was positioned for comfort in semi-fowlers with her call light

## 2024-05-04 NOTE — PLAN OF CARE
Problem: Safety - Adult  Goal: Free from fall injury  5/4/2024 1009 by Isamar Escalera RN  Outcome: Progressing  Note: Pt remains free from falls.  Safety precautions in place.  Bed in lowest position, bed/chair wheels locked, call light with in reach, bedside table in reach, bed/chair alarm on, fall risk wrist band on.

## 2024-05-05 VITALS
SYSTOLIC BLOOD PRESSURE: 147 MMHG | DIASTOLIC BLOOD PRESSURE: 72 MMHG | RESPIRATION RATE: 18 BRPM | HEIGHT: 63 IN | WEIGHT: 199.52 LBS | TEMPERATURE: 97.8 F | HEART RATE: 82 BPM | OXYGEN SATURATION: 97 % | BODY MASS INDEX: 35.35 KG/M2

## 2024-05-05 PROCEDURE — 6360000002 HC RX W HCPCS: Performed by: STUDENT IN AN ORGANIZED HEALTH CARE EDUCATION/TRAINING PROGRAM

## 2024-05-05 PROCEDURE — 94640 AIRWAY INHALATION TREATMENT: CPT

## 2024-05-05 PROCEDURE — 94761 N-INVAS EAR/PLS OXIMETRY MLT: CPT

## 2024-05-05 PROCEDURE — 1280000000 HC REHAB R&B

## 2024-05-05 PROCEDURE — 6370000000 HC RX 637 (ALT 250 FOR IP): Performed by: STUDENT IN AN ORGANIZED HEALTH CARE EDUCATION/TRAINING PROGRAM

## 2024-05-05 RX ADMIN — GABAPENTIN 600 MG: 300 CAPSULE ORAL at 16:49

## 2024-05-05 RX ADMIN — TOPIRAMATE 150 MG: 100 TABLET, FILM COATED ORAL at 16:48

## 2024-05-05 RX ADMIN — RANOLAZINE 1000 MG: 500 TABLET, EXTENDED RELEASE ORAL at 20:36

## 2024-05-05 RX ADMIN — METHENAMINE HIPPURATE 1 G: 1 TABLET ORAL at 16:51

## 2024-05-05 RX ADMIN — TIOTROPIUM BROMIDE INHALATION SPRAY 2 PUFF: 3.12 SPRAY, METERED RESPIRATORY (INHALATION) at 08:40

## 2024-05-05 RX ADMIN — NORTRIPTYLINE HYDROCHLORIDE 75 MG: 25 CAPSULE ORAL at 20:36

## 2024-05-05 RX ADMIN — MECLIZINE 25 MG: 12.5 TABLET ORAL at 07:36

## 2024-05-05 RX ADMIN — DILTIAZEM HYDROCHLORIDE 120 MG: 120 CAPSULE, EXTENDED RELEASE ORAL at 07:37

## 2024-05-05 RX ADMIN — GABAPENTIN 600 MG: 300 CAPSULE ORAL at 07:36

## 2024-05-05 RX ADMIN — MECLIZINE 25 MG: 12.5 TABLET ORAL at 20:37

## 2024-05-05 RX ADMIN — CARVEDILOL 3.12 MG: 3.12 TABLET, FILM COATED ORAL at 07:37

## 2024-05-05 RX ADMIN — ISOSORBIDE MONONITRATE 120 MG: 60 TABLET, EXTENDED RELEASE ORAL at 07:37

## 2024-05-05 RX ADMIN — GABAPENTIN 600 MG: 300 CAPSULE ORAL at 20:37

## 2024-05-05 RX ADMIN — RANOLAZINE 1000 MG: 500 TABLET, EXTENDED RELEASE ORAL at 07:36

## 2024-05-05 RX ADMIN — ASPIRIN 81 MG: 81 TABLET, COATED ORAL at 07:36

## 2024-05-05 RX ADMIN — ARIPIPRAZOLE 2 MG: 2 TABLET ORAL at 20:36

## 2024-05-05 RX ADMIN — METOCLOPRAMIDE 10 MG: 10 TABLET ORAL at 11:45

## 2024-05-05 RX ADMIN — MECLIZINE 25 MG: 12.5 TABLET ORAL at 11:45

## 2024-05-05 RX ADMIN — ACETAMINOPHEN 650 MG: 325 TABLET ORAL at 20:36

## 2024-05-05 RX ADMIN — Medication 400 MG: at 07:37

## 2024-05-05 RX ADMIN — CARVEDILOL 3.12 MG: 3.12 TABLET, FILM COATED ORAL at 16:49

## 2024-05-05 RX ADMIN — Medication 2 PUFF: at 20:54

## 2024-05-05 RX ADMIN — METOCLOPRAMIDE 10 MG: 10 TABLET ORAL at 20:38

## 2024-05-05 RX ADMIN — GABAPENTIN 600 MG: 300 CAPSULE ORAL at 11:45

## 2024-05-05 RX ADMIN — ACETAMINOPHEN 650 MG: 325 TABLET ORAL at 16:49

## 2024-05-05 RX ADMIN — TOPIRAMATE 100 MG: 100 TABLET, FILM COATED ORAL at 07:36

## 2024-05-05 RX ADMIN — METOCLOPRAMIDE 10 MG: 10 TABLET ORAL at 05:56

## 2024-05-05 RX ADMIN — PANTOPRAZOLE SODIUM 40 MG: 40 TABLET, DELAYED RELEASE ORAL at 05:56

## 2024-05-05 RX ADMIN — METOCLOPRAMIDE 10 MG: 10 TABLET ORAL at 16:49

## 2024-05-05 RX ADMIN — BUPROPION HYDROCHLORIDE 300 MG: 150 TABLET, EXTENDED RELEASE ORAL at 07:37

## 2024-05-05 RX ADMIN — ACETAMINOPHEN 650 MG: 325 TABLET ORAL at 07:37

## 2024-05-05 RX ADMIN — MECLIZINE 25 MG: 12.5 TABLET ORAL at 16:49

## 2024-05-05 RX ADMIN — TROSPIUM CHLORIDE 20 MG: 20 TABLET, FILM COATED ORAL at 20:36

## 2024-05-05 RX ADMIN — METHENAMINE HIPPURATE 1 G: 1 TABLET ORAL at 07:36

## 2024-05-05 RX ADMIN — Medication 2 PUFF: at 08:40

## 2024-05-05 RX ADMIN — ENOXAPARIN SODIUM 40 MG: 100 INJECTION SUBCUTANEOUS at 07:36

## 2024-05-05 RX ADMIN — TIZANIDINE 2 MG: 4 TABLET ORAL at 20:37

## 2024-05-05 ASSESSMENT — PAIN SCALES - GENERAL
PAINLEVEL_OUTOF10: 0
PAINLEVEL_OUTOF10: 0

## 2024-05-05 NOTE — PLAN OF CARE
Problem: Discharge Planning  Goal: Discharge to home or other facility with appropriate resources  Outcome: Progressing     Problem: Safety - Adult  Goal: Free from fall injury  5/5/2024 0918 by Sinai Serna RN  Outcome: Progressing     Problem: Pain  Goal: Verbalizes/displays adequate comfort level or baseline comfort level  Outcome: Progressing

## 2024-05-05 NOTE — PROGRESS NOTES
Morning assessment completed, vss, alert and oriented, schedule meds given, The care plan and education has been reviewed and mutually agreed upon with the patient. Pt remains free from falls. Fall precautions in place--bed in lowest position, call light within reach, bed alarm in use. Pt aware to call for assistance before getting up.

## 2024-05-05 NOTE — DISCHARGE SUMMARY
resolution is recommended.     CT CHEST PULMONARY EMBOLISM W CONTRAST  Result Date: 4/15/2024    1. No evidence of pulmonary embolism. 2. No evidence of acute airspace consolidation to suggest pneumonia. 3. Chronic parenchymal scarring within the right lung, as above. 4. The stomach and visualized small bowel loops are fluid-filled and distended, suggestive of a developing small-bowel obstruction as seen on the abdominal CT earlier today. 5. Coronary atherosclerosis.     XR ABDOMEN (2 VIEWS)  Result Date: 4/15/2024    Stable bowel gas pattern favoring ileus or partial small bowel obstruction.     XR ABDOMEN (2 VIEWS)  Result Date: 4/14/2024    1. Worsening dilated loops of small bowel favoring a partial small bowel obstruction rather than ileus.     CT ABDOMEN PELVIS W IV CONTRAST Additional Contrast? None  Result Date: 4/12/2024    Gas and fluid-filled dilated stomach and small bowel with gradual transition to normal caliber bowel distally at the level of the ileum.  Findings may represent partial small bowel obstruction or less likely generalized ileus.      XR ABDOMEN (2 VIEWS)    Persistent diffuse gaseous distension of small and large bowel, with improved gaseous distension of the stomach.  Findings may represent persistent ileus. Continued radiographic follow-up is suggested.     XR ABDOMEN (2 VIEWS)    Findings could represent ileus with air distention of the stomach and small and to lesser extent large bowel loops.  Air distention of the stomach has progressed from prior exam.         Signed:    Lynsey Garza PA-C   5/5/2024

## 2024-05-05 NOTE — PLAN OF CARE
Problem: Safety - Adult  Goal: Free from fall injury  5/4/2024 2144 by Jelena Hester, RN  Outcome: Progressing     Problem: ABCDS Injury Assessment  Goal: Absence of physical injury  Outcome: Progressing     Problem: Skin/Tissue Integrity  Goal: Absence of new skin breakdown  Description: 1.  Monitor for areas of redness and/or skin breakdown  2.  Assess vascular access sites hourly  3.  Every 4-6 hours minimum:  Change oxygen saturation probe site  4.  Every 4-6 hours:  If on nasal continuous positive airway pressure, respiratory therapy assess nares and determine need for appliance change or resting period.  Outcome: Progressing

## 2024-05-06 ENCOUNTER — APPOINTMENT (OUTPATIENT)
Dept: GENERAL RADIOLOGY | Age: 69
DRG: 949 | End: 2024-05-06
Attending: STUDENT IN AN ORGANIZED HEALTH CARE EDUCATION/TRAINING PROGRAM
Payer: MEDICARE

## 2024-05-06 LAB
ANION GAP SERPL CALCULATED.3IONS-SCNC: 11 MMOL/L (ref 3–16)
BASOPHILS # BLD: 0.1 K/UL (ref 0–0.2)
BASOPHILS NFR BLD: 1.5 %
BUN SERPL-MCNC: 8 MG/DL (ref 7–20)
CALCIUM SERPL-MCNC: 9 MG/DL (ref 8.3–10.6)
CHLORIDE SERPL-SCNC: 110 MMOL/L (ref 99–110)
CO2 SERPL-SCNC: 21 MMOL/L (ref 21–32)
CREAT SERPL-MCNC: 0.6 MG/DL (ref 0.6–1.2)
DEPRECATED RDW RBC AUTO: 19.5 % (ref 12.4–15.4)
EKG ATRIAL RATE: 58 BPM
EKG DIAGNOSIS: NORMAL
EKG P AXIS: 23 DEGREES
EKG P-R INTERVAL: 174 MS
EKG Q-T INTERVAL: 434 MS
EKG QRS DURATION: 90 MS
EKG QTC CALCULATION (BAZETT): 426 MS
EKG R AXIS: 30 DEGREES
EKG T AXIS: 41 DEGREES
EKG VENTRICULAR RATE: 58 BPM
EOSINOPHIL # BLD: 0.3 K/UL (ref 0–0.6)
EOSINOPHIL NFR BLD: 6.5 %
GFR SERPLBLD CREATININE-BSD FMLA CKD-EPI: >90 ML/MIN/{1.73_M2}
GLUCOSE SERPL-MCNC: 109 MG/DL (ref 70–99)
HCT VFR BLD AUTO: 30.5 % (ref 36–48)
HGB BLD-MCNC: 9.6 G/DL (ref 12–16)
LYMPHOCYTES # BLD: 1.3 K/UL (ref 1–5.1)
LYMPHOCYTES NFR BLD: 27.6 %
MAGNESIUM SERPL-MCNC: 1.7 MG/DL (ref 1.8–2.4)
MCH RBC QN AUTO: 26.8 PG (ref 26–34)
MCHC RBC AUTO-ENTMCNC: 31.5 G/DL (ref 31–36)
MCV RBC AUTO: 85.2 FL (ref 80–100)
MONOCYTES # BLD: 0.4 K/UL (ref 0–1.3)
MONOCYTES NFR BLD: 8 %
NEUTROPHILS # BLD: 2.7 K/UL (ref 1.7–7.7)
NEUTROPHILS NFR BLD: 56.4 %
NT-PROBNP SERPL-MCNC: 367 PG/ML (ref 0–124)
PLATELET # BLD AUTO: 341 K/UL (ref 135–450)
PMV BLD AUTO: 8.4 FL (ref 5–10.5)
POTASSIUM SERPL-SCNC: 3.4 MMOL/L (ref 3.5–5.1)
RBC # BLD AUTO: 3.58 M/UL (ref 4–5.2)
SODIUM SERPL-SCNC: 142 MMOL/L (ref 136–145)
WBC # BLD AUTO: 4.9 K/UL (ref 4–11)

## 2024-05-06 PROCEDURE — 80048 BASIC METABOLIC PNL TOTAL CA: CPT

## 2024-05-06 PROCEDURE — 97110 THERAPEUTIC EXERCISES: CPT

## 2024-05-06 PROCEDURE — 36415 COLL VENOUS BLD VENIPUNCTURE: CPT

## 2024-05-06 PROCEDURE — 97112 NEUROMUSCULAR REEDUCATION: CPT

## 2024-05-06 PROCEDURE — 71045 X-RAY EXAM CHEST 1 VIEW: CPT

## 2024-05-06 PROCEDURE — 97530 THERAPEUTIC ACTIVITIES: CPT

## 2024-05-06 PROCEDURE — 83735 ASSAY OF MAGNESIUM: CPT

## 2024-05-06 PROCEDURE — 99024 POSTOP FOLLOW-UP VISIT: CPT | Performed by: SURGERY

## 2024-05-06 PROCEDURE — 83880 ASSAY OF NATRIURETIC PEPTIDE: CPT

## 2024-05-06 PROCEDURE — 93005 ELECTROCARDIOGRAM TRACING: CPT | Performed by: STUDENT IN AN ORGANIZED HEALTH CARE EDUCATION/TRAINING PROGRAM

## 2024-05-06 PROCEDURE — 97535 SELF CARE MNGMENT TRAINING: CPT

## 2024-05-06 PROCEDURE — 6370000000 HC RX 637 (ALT 250 FOR IP): Performed by: STUDENT IN AN ORGANIZED HEALTH CARE EDUCATION/TRAINING PROGRAM

## 2024-05-06 PROCEDURE — 1280000000 HC REHAB R&B

## 2024-05-06 PROCEDURE — 93010 ELECTROCARDIOGRAM REPORT: CPT | Performed by: INTERNAL MEDICINE

## 2024-05-06 PROCEDURE — 94761 N-INVAS EAR/PLS OXIMETRY MLT: CPT

## 2024-05-06 PROCEDURE — 94640 AIRWAY INHALATION TREATMENT: CPT

## 2024-05-06 PROCEDURE — APPSS15 APP SPLIT SHARED TIME 0-15 MINUTES: Performed by: NURSE PRACTITIONER

## 2024-05-06 PROCEDURE — 85025 COMPLETE CBC W/AUTO DIFF WBC: CPT

## 2024-05-06 PROCEDURE — APPNB30 APP NON BILLABLE TIME 0-30 MINS: Performed by: NURSE PRACTITIONER

## 2024-05-06 PROCEDURE — 97116 GAIT TRAINING THERAPY: CPT

## 2024-05-06 PROCEDURE — 6360000002 HC RX W HCPCS: Performed by: STUDENT IN AN ORGANIZED HEALTH CARE EDUCATION/TRAINING PROGRAM

## 2024-05-06 RX ORDER — LANOLIN ALCOHOL/MO/W.PET/CERES
400 CREAM (GRAM) TOPICAL 2 TIMES DAILY
Status: DISCONTINUED | OUTPATIENT
Start: 2024-05-06 | End: 2024-05-10 | Stop reason: HOSPADM

## 2024-05-06 RX ORDER — FUROSEMIDE 40 MG/1
40 TABLET ORAL DAILY
Status: DISCONTINUED | OUTPATIENT
Start: 2024-05-06 | End: 2024-05-10 | Stop reason: HOSPADM

## 2024-05-06 RX ORDER — HYDROXYZINE HYDROCHLORIDE 10 MG/1
10 TABLET, FILM COATED ORAL 3 TIMES DAILY PRN
Status: DISCONTINUED | OUTPATIENT
Start: 2024-05-06 | End: 2024-05-10 | Stop reason: HOSPADM

## 2024-05-06 RX ORDER — POTASSIUM CHLORIDE 20 MEQ/1
20 TABLET, EXTENDED RELEASE ORAL ONCE
Status: COMPLETED | OUTPATIENT
Start: 2024-05-06 | End: 2024-05-06

## 2024-05-06 RX ADMIN — NORTRIPTYLINE HYDROCHLORIDE 75 MG: 25 CAPSULE ORAL at 20:56

## 2024-05-06 RX ADMIN — MECLIZINE 25 MG: 12.5 TABLET ORAL at 20:56

## 2024-05-06 RX ADMIN — ASPIRIN 81 MG: 81 TABLET, COATED ORAL at 09:54

## 2024-05-06 RX ADMIN — METOCLOPRAMIDE 10 MG: 10 TABLET ORAL at 12:00

## 2024-05-06 RX ADMIN — Medication 2 PUFF: at 19:30

## 2024-05-06 RX ADMIN — GABAPENTIN 600 MG: 300 CAPSULE ORAL at 15:09

## 2024-05-06 RX ADMIN — METOCLOPRAMIDE 10 MG: 10 TABLET ORAL at 20:57

## 2024-05-06 RX ADMIN — RANOLAZINE 1000 MG: 500 TABLET, EXTENDED RELEASE ORAL at 20:57

## 2024-05-06 RX ADMIN — ARIPIPRAZOLE 2 MG: 2 TABLET ORAL at 20:56

## 2024-05-06 RX ADMIN — ENOXAPARIN SODIUM 40 MG: 100 INJECTION SUBCUTANEOUS at 09:52

## 2024-05-06 RX ADMIN — ACETAMINOPHEN 650 MG: 325 TABLET ORAL at 15:10

## 2024-05-06 RX ADMIN — METHENAMINE HIPPURATE 1 G: 1 TABLET ORAL at 18:07

## 2024-05-06 RX ADMIN — TIOTROPIUM BROMIDE INHALATION SPRAY 2 PUFF: 3.12 SPRAY, METERED RESPIRATORY (INHALATION) at 05:59

## 2024-05-06 RX ADMIN — POTASSIUM CHLORIDE 20 MEQ: 1500 TABLET, EXTENDED RELEASE ORAL at 09:53

## 2024-05-06 RX ADMIN — TOPIRAMATE 150 MG: 100 TABLET, FILM COATED ORAL at 18:04

## 2024-05-06 RX ADMIN — RANOLAZINE 1000 MG: 500 TABLET, EXTENDED RELEASE ORAL at 09:55

## 2024-05-06 RX ADMIN — GABAPENTIN 600 MG: 300 CAPSULE ORAL at 18:04

## 2024-05-06 RX ADMIN — METOCLOPRAMIDE 10 MG: 10 TABLET ORAL at 06:10

## 2024-05-06 RX ADMIN — GABAPENTIN 600 MG: 300 CAPSULE ORAL at 20:57

## 2024-05-06 RX ADMIN — TIZANIDINE 2 MG: 4 TABLET ORAL at 20:56

## 2024-05-06 RX ADMIN — ACETAMINOPHEN 650 MG: 325 TABLET ORAL at 20:57

## 2024-05-06 RX ADMIN — ACETAMINOPHEN 650 MG: 325 TABLET ORAL at 09:54

## 2024-05-06 RX ADMIN — DOCUSATE SODIUM 100 MG: 100 CAPSULE, LIQUID FILLED ORAL at 20:57

## 2024-05-06 RX ADMIN — Medication 2 PUFF: at 05:59

## 2024-05-06 RX ADMIN — BUPROPION HYDROCHLORIDE 300 MG: 150 TABLET, EXTENDED RELEASE ORAL at 09:53

## 2024-05-06 RX ADMIN — MECLIZINE 25 MG: 12.5 TABLET ORAL at 18:04

## 2024-05-06 RX ADMIN — TOPIRAMATE 100 MG: 100 TABLET, FILM COATED ORAL at 09:53

## 2024-05-06 RX ADMIN — GABAPENTIN 600 MG: 300 CAPSULE ORAL at 09:52

## 2024-05-06 RX ADMIN — TROSPIUM CHLORIDE 20 MG: 20 TABLET, FILM COATED ORAL at 20:57

## 2024-05-06 RX ADMIN — METOCLOPRAMIDE 10 MG: 10 TABLET ORAL at 18:04

## 2024-05-06 RX ADMIN — MECLIZINE 25 MG: 12.5 TABLET ORAL at 09:53

## 2024-05-06 RX ADMIN — PANTOPRAZOLE SODIUM 40 MG: 40 TABLET, DELAYED RELEASE ORAL at 06:09

## 2024-05-06 RX ADMIN — FUROSEMIDE 40 MG: 40 TABLET ORAL at 09:55

## 2024-05-06 RX ADMIN — CARVEDILOL 3.12 MG: 3.12 TABLET, FILM COATED ORAL at 18:04

## 2024-05-06 RX ADMIN — DILTIAZEM HYDROCHLORIDE 120 MG: 120 CAPSULE, EXTENDED RELEASE ORAL at 12:00

## 2024-05-06 RX ADMIN — MECLIZINE 25 MG: 12.5 TABLET ORAL at 15:09

## 2024-05-06 RX ADMIN — METHENAMINE HIPPURATE 1 G: 1 TABLET ORAL at 09:53

## 2024-05-06 RX ADMIN — Medication 400 MG: at 20:57

## 2024-05-06 RX ADMIN — Medication 400 MG: at 09:54

## 2024-05-06 ASSESSMENT — PAIN SCALES - GENERAL
PAINLEVEL_OUTOF10: 0
PAINLEVEL_OUTOF10: 2
PAINLEVEL_OUTOF10: 5
PAINLEVEL_OUTOF10: 5
PAINLEVEL_OUTOF10: 0
PAINLEVEL_OUTOF10: 4

## 2024-05-06 ASSESSMENT — PAIN DESCRIPTION - PAIN TYPE
TYPE: ACUTE PAIN

## 2024-05-06 ASSESSMENT — PAIN DESCRIPTION - LOCATION
LOCATION: HEAD

## 2024-05-06 ASSESSMENT — PAIN DESCRIPTION - DESCRIPTORS
DESCRIPTORS: ACHING

## 2024-05-06 ASSESSMENT — PAIN DESCRIPTION - ORIENTATION
ORIENTATION: MID;ANTERIOR
ORIENTATION: ANTERIOR
ORIENTATION: MID;ANTERIOR

## 2024-05-06 ASSESSMENT — PAIN - FUNCTIONAL ASSESSMENT
PAIN_FUNCTIONAL_ASSESSMENT: ACTIVITIES ARE NOT PREVENTED

## 2024-05-06 ASSESSMENT — PAIN DESCRIPTION - FREQUENCY
FREQUENCY: CONTINUOUS

## 2024-05-06 ASSESSMENT — PAIN SCALES - WONG BAKER: WONGBAKER_NUMERICALRESPONSE: NO HURT

## 2024-05-06 ASSESSMENT — PAIN DESCRIPTION - ONSET
ONSET: ON-GOING
ONSET: ON-GOING

## 2024-05-06 NOTE — PROGRESS NOTES
[facility]        Progress Note    Patient Name: Ana Gonzales  MRN: [id number]  YOB: 1955  Date of Evaluation: 2024    Subjective:  No acute events overnight  Pain controlled without narcotics  No nausea or vomiting with G-tube clamped, tolerating low fiber diet, appetite improved  Passing flatus and stool  Up to chair at this time    Post-Operative Day #48, 19, 12      Vital Signs:  Patient Vitals for the past 24 hrs:   BP Temp Temp src Pulse Resp SpO2 Weight   24 0845 (!) 97/59 97.8 °F (36.6 °C) Oral 67 18 94 % --   24 0600 -- -- -- -- -- -- 88.2 kg (194 lb 7.1 oz)   24 2030 (!) 147/72 97.8 °F (36.6 °C) Oral 82 18 97 % --   24 1635 116/68 -- -- 78 -- -- --        TEMPERATURE HISTORY 24H: Temp (24hrs), Av.8 °F (36.6 °C), Min:97.8 °F (36.6 °C), Max:97.8 °F (36.6 °C)    BLOOD PRESSURE HISTORY: Systolic (36hrs), Av , Min:97 , Max:147    Diastolic (36hrs), Av, Min:59, Max:75      Intake/Output:  I/O last 3 completed shifts:  In: 300 [P.O.:240; NG/GT:60]  Out: -   I/O this shift:  In: 120 [P.O.:120]  Out: -   Drain/tube Output:       Physical Exam:  General: awake, alert, oriented to person, place, time  Lungs: unlabored respirations  Abdomen: soft, mildly distended, non-tender, bowel sounds active, G-tube clamped  Skin/Wound: healing well, no drainage, no erythema, well approximated with staples    Labs:  CBC:    Recent Labs     24  0552   WBC 4.9   HGB 9.6*   HCT 30.5*          BMP:    Recent Labs     24  0552      K 3.4*      CO2 21   BUN 8   CREATININE 0.6   GLUCOSE 109*       Hepatic:    No results for input(s): \"AST\", \"ALT\", \"BILITOT\", \"ALKPHOS\" in the last 72 hours.    Invalid input(s): \"ALB\"    Amylase:  No results found for: \"AMYLASE\"  Lipase:    Lab Results   Component Value Date/Time    LIPASE 6.0 2019 04:22 PM    LIPASE 21 2010 12:44 PM      Mag:    Lab Results

## 2024-05-06 NOTE — PROGRESS NOTES
kg/m².    Assessment/Plan:  Functional progress: Ambulating 75 feet x 2 contact-guard assist with rolling walker.  This patient continues to require an ARU level of care from all disciplines to address the following issues:    #. Debility secondary to partial small bowel obstruction   - s/p ex-lap w/ lysis of adhesions and G-tube placement on 4/17, secondary wound closure on 4/24  - G-tube clamped  - Continue PO Reglan, PRN Miralax  - Zofran PRN for nausea  - Continue low fiber diet.   - TPN stopped, ok to remove PICC.      #.  Shortness of breath  - EKG obtained without any acute ischemic changes  - Chest x-ray pending  - BNP mildly elevated, 367. Similar to prior level on review of prior labs.   - Resumed Lasix, as below  - If not improved, request PRN breathing treatment    #. CAD  - s/p PCI July 2023  - BB, Imdur, Cardizem  - Aspirin monotherapy, not to resume Plavix due to history of GI bleed per cardiology  - Stress test previously recommended per Cardiology, will need outpatient follow-up    #. Chronic Diastolic Heart Failure  - BB  - Resume Lasix 40 mg daily  - Monitor daily weights     #. Left scapholunate dissociation, radiocarpal arthritis  - Weightbearing as tolerated  - Wrist splint with activity  - Pain control as below  - Follow-up outpatient with Ortho [facility]     #. YASMANI  - s/p Venofer x3 days  - Hgb 9.6     #.  Electrolyte disturbance  - Replete potassium orally  -Increase oral magnesium repletion    Chronic Conditions:   - HTN: Continue antihypertensives, as above  - DM2: Last A1c 5.4%.  Well-controlled off medications.  Continue once daily POCT glucose checks for now.  - HLD: Intolerant of statins.  - GERD: PPI  - SERVANDO: Not on home CPAP  - COPD: Spiriva, Dulera, PRN DuoNebs  - Depression: Continue bupropion, nortriptyline, aripiprazole  - Chronic Migraine: Continue topiramate.  May consider dose reduction if appetite remains poor.  - Hx of lumbar fusion: Continue tizanidine nightly for spasm     CODE:

## 2024-05-06 NOTE — PLAN OF CARE
Problem: Discharge Planning  Goal: Discharge to home or other facility with appropriate resources  5/6/2024 1957 by Anastacia Villalpando RN  Outcome: Progressing    Flowsheets (Taken 5/6/2024 1032)  Discharge to home or other facility with appropriate resources:   Identify barriers to discharge with patient and caregiver   Identify discharge learning needs (meds, wound care, etc)   Arrange for needed discharge resources and transportation as appropriate   Refer to discharge planning if patient needs post-hospital services based on physician order or complex needs related to functional status, cognitive ability or social support system     Problem: Safety - Adult  Goal: Free from fall injury  5/6/2024 1957 by Anastacia Villalpando RN  Outcome: Progressing    Flowsheets (Taken 5/6/2024 1032)  Free From Fall Injury: Instruct family/caregiver on patient safety     Problem: Pain  Goal: Verbalizes/displays adequate comfort level or baseline comfort level  5/6/2024 1957 by Anastacia Villalpando RN  Outcome: Progressing    Flowsheets (Taken 5/6/2024 1032)  Verbalizes/displays adequate comfort level or baseline comfort level:   Encourage patient to monitor pain and request assistance   Assess pain using appropriate pain scale   Administer analgesics based on type and severity of pain and evaluate response   Implement non-pharmacological measures as appropriate and evaluate response     Problem: ABCDS Injury Assessment  Goal: Absence of physical injury  5/6/2024 1957 by Anastacia Villalpando RN  Outcome: Progressing    Problem: Nutrition Deficit:  Goal: Optimize nutritional status  5/6/2024 1957 by Anastacia Villalpando RN  Outcome: Progressing    Flowsheets (Taken 5/6/2024 1032)  Nutrient intake appropriate for improving, restoring, or maintaining nutritional needs:   Assess nutritional status and recommend course of action   Monitor oral intake, labs, and treatment plans   Recommend appropriate diets, oral nutritional supplements, and  intact

## 2024-05-06 NOTE — PATIENT CARE CONFERENCE
TBD pending progress  Factors facilitating achievement of predicted outcomes: Family support and Cooperative  Barriers to the achievement of predicted outcomes: Decreased endurance, global weakness     Patient Goals:   To get back home.     Interdisciplinary Individualized Plan of Care Review of Previous Week:    Medical and functional progress towards goals:  Medically the patient is doing ok, some SOB but doesn't appear Cardiac related, restarted lasix, replacing low potassium and magnesium.  Pt is gradually progressing towards goals- able to complete ADLs w/ use of AE for bathing/dressing with CGA to Jazmin overall. IADL activities initiated with introduction to AE for EC and safety- pt tolerated well overall and is receptive to AE use.  Pt presents with decreased balance, endurance, and overall functional mobility related to prolonged hospitalization.  Pt with mild improvement in endurance with increased ambulation distance, but continues to require significant assistance for stair negotiation and bed mobility.    Barriers towards progress:  global weakness, poor endurance, chronic back pain   Interventions to address Barriers:  ADL/IADL training w/ AE as needed, strengthening, balance activities   Goals still appropriate:  Yes  Modifications to goals: None  Continue Current Plan of Care:  Yes  Modifications to Plan of Care: None    Rehab Team Members in attendance for Team Conference:  Renetta Samayoa, MSW, LSW    Tomasa Robles, RD, LD    Junior Gonzales, OTR/L    Lopez Washington PT, DPT    Sinai Serna, SHELTON Lazaro, RN  (Charge RN)    Chance Thurston PT, DPT,     I, the Rehab Physician, led the above team conference and agree with all decisions made, and approve the established interdisciplinary plan of care as documented within the medical record of Ana Gonzales.    Lester Shelley MD

## 2024-05-06 NOTE — PROGRESS NOTES
[facility] - Inpatient Rehabilitation Department   Phone: [phone]    Physical Therapy    [] Initial Evaluation            [x] Daily Treatment Note         [] Discharge Summary      Patient: Ana Gonzales   : 1955   MRN: [id number]   Date of Service:  2024  Admitting Diagnosis: Partial small bowel obstruction (HCC)  Current Admission Summary: Ana Gonzales is a 68 y.o. female who presents to the ED status post exploratory laparotomy with lysis of adhesions, Meckel diverticulectomy and appendectomy 3/19/2024 by Dr. Morales with complaint of generalized weakness and nausea/vomiting. She states she is too weak to ambulate, which is new. She states the nausea/vomiting began last night. She is denying abdominal pain, although abdomen is tender to palpation on exam. Patient reports an episode of shortness of breath earlier today, but is not feeling short of breath at this moment. Lower extremity edema is noted and patient is unsure of her baseline. Patient states her chief concerns are the generalized weakness/fatigue and nausea/vomiting.      She had additional abdominal surgeries on 24 and 24. She has an acute on chronic ligamentous injury to her L wrist.  Past Medical History:  has a past medical history of Arthritis, Asthma, Brain injury (HCC), Bronchitis, CHF (congestive heart failure) ([facility]), Depression, Diabetes mellitus (HCC), DM (diabetes mellitus screen), Elevated cholesterol with high triglycerides, Fall, Fibromyalgia, GERD (gastroesophageal reflux disease), HIGH CHOLESTEROL, Hypertension, Microvascular angina (HCC), Migraine, Neuropathy, Panic attacks, PONV (postoperative nausea and vomiting), Post traumatic stress disorder, Short-term memory loss, Skin cancer, Sleep apnea, and Vertigo.  Past Surgical History:  has a past surgical history that includes Hysterectomy; eye surgery (cataract B); Carpal tunnel release; Finger trigger release; Gallbladder surgery; bladder

## 2024-05-06 NOTE — PLAN OF CARE
Problem: Discharge Planning  Goal: Discharge to home or other facility with appropriate resources  5/5/2024 2006 by Anastacia Villalpando RN  Outcome: Progressing     Problem: Safety - Adult  Goal: Free from fall injury  5/5/2024 2006 by Anastacia Villalpando RN  Outcome: Progressing     Problem: Pain  Goal: Verbalizes/displays adequate comfort level or baseline comfort level  5/5/2024 2006 by Anastacia Villalpando RN  Outcome: Progressing     Problem: Skin/Tissue Integrity  Goal: Absence of new skin breakdown  Description: 1.  Monitor for areas of redness and/or skin breakdown  2.  Assess vascular access sites hourly  3.  Every 4-6 hours minimum:  Change oxygen saturation probe site  4.  Every 4-6 hours:  If on nasal continuous positive airway pressure, respiratory therapy assess nares and determine need for appliance change or resting period.  Outcome: Progressing

## 2024-05-06 NOTE — PROGRESS NOTES
room >> hallway ~45ft during session, CGA; transfers completed at CGA w/ min cues for hand placement/safety.     Toileting: CGA for clothing over hips in stance, eugenie hygiene seated after pt voided urine. Hand hygiene w/c level at sink w/ set-up.     IADL activity: pt completed light household activities to address endurance, EC techniques, and balance- pt introduced to walker basket for item transportation prior to beginning. In kitchen area pt retrieves 2 items from upper shelf, 2 from counter top, and 1 from refrigerator and transports items to table via basket; education on RW management provided prior to pt completing- throughout activity pt required min cues for proximity to items and RW use; pt then completes laundry activity requiring pt to retrieve 12 items placed around room (4 on floor)- use of reacher and walker basket throughout, pt required 1 rest break during retrieval- items folded seated w/ increased time. Pt educated on EC alternatives for IADLs as well as increasing participation in tasks to gradually improve endurance upon d/c to home- pt verbalized understanding.     Pt requesting to lay in bed at EOS- sit>supine with Cathleen (assist to 1 LE). Pt left in bed, bed alarm on, call light and needs within reach.         Functional Outcomes                                       Cognition  Arousal/Alertness: appropriate responses to stimuli  Following Commands: follows one step commands consistently, follows multi step commands with increased time  Initiation: requires cues for some  Sequencing: requires cues for some  Comments: pt denied any cognitive changes; overall appears WFL during BADLs however pt would benefit from further assessment and incorporation of cog tasks within session; fatigue and anxiety may be compounding factors.   Orientation:    oriented to person, oriented to place, and oriented to situation  Command Following:   accurately follows one step commands     Education  Barriers To

## 2024-05-06 NOTE — PLAN OF CARE
Problem: Discharge Planning  Goal: Discharge to home or other facility with appropriate resources  Outcome: Progressing  Flowsheets (Taken 5/6/2024 1032)  Discharge to home or other facility with appropriate resources:   Identify barriers to discharge with patient and caregiver   Identify discharge learning needs (meds, wound care, etc)   Arrange for needed discharge resources and transportation as appropriate   Refer to discharge planning if patient needs post-hospital services based on physician order or complex needs related to functional status, cognitive ability or social support system     Problem: Safety - Adult  Goal: Free from fall injury  Outcome: Progressing  Flowsheets (Taken 5/6/2024 1032)  Free From Fall Injury: Instruct family/caregiver on patient safety     Problem: Pain  Goal: Verbalizes/displays adequate comfort level or baseline comfort level  Outcome: Progressing  Flowsheets (Taken 5/6/2024 1032)  Verbalizes/displays adequate comfort level or baseline comfort level:   Encourage patient to monitor pain and request assistance   Assess pain using appropriate pain scale   Administer analgesics based on type and severity of pain and evaluate response   Implement non-pharmacological measures as appropriate and evaluate response     Problem: ABCDS Injury Assessment  Goal: Absence of physical injury  Outcome: Progressing  Flowsheets (Taken 5/6/2024 1032)  Absence of Physical Injury: Implement safety measures based on patient assessment     Problem: Nutrition Deficit:  Goal: Optimize nutritional status  Outcome: Progressing  Flowsheets (Taken 5/6/2024 1032)  Nutrient intake appropriate for improving, restoring, or maintaining nutritional needs:   Assess nutritional status and recommend course of action   Monitor oral intake, labs, and treatment plans   Recommend appropriate diets, oral nutritional supplements, and vitamin/mineral supplements     Problem: Skin/Tissue Integrity  Goal: Absence of new skin

## 2024-05-07 LAB
ANION GAP SERPL CALCULATED.3IONS-SCNC: 13 MMOL/L (ref 3–16)
BUN SERPL-MCNC: 7 MG/DL (ref 7–20)
CALCIUM SERPL-MCNC: 8.8 MG/DL (ref 8.3–10.6)
CHLORIDE SERPL-SCNC: 108 MMOL/L (ref 99–110)
CO2 SERPL-SCNC: 19 MMOL/L (ref 21–32)
CREAT SERPL-MCNC: 0.5 MG/DL (ref 0.6–1.2)
GFR SERPLBLD CREATININE-BSD FMLA CKD-EPI: >90 ML/MIN/{1.73_M2}
GLUCOSE SERPL-MCNC: 101 MG/DL (ref 70–99)
MAGNESIUM SERPL-MCNC: 1.8 MG/DL (ref 1.8–2.4)
POTASSIUM SERPL-SCNC: 3.2 MMOL/L (ref 3.5–5.1)
SODIUM SERPL-SCNC: 140 MMOL/L (ref 136–145)

## 2024-05-07 PROCEDURE — 80048 BASIC METABOLIC PNL TOTAL CA: CPT

## 2024-05-07 PROCEDURE — 94640 AIRWAY INHALATION TREATMENT: CPT

## 2024-05-07 PROCEDURE — 97110 THERAPEUTIC EXERCISES: CPT

## 2024-05-07 PROCEDURE — 94760 N-INVAS EAR/PLS OXIMETRY 1: CPT

## 2024-05-07 PROCEDURE — 83735 ASSAY OF MAGNESIUM: CPT

## 2024-05-07 PROCEDURE — 97530 THERAPEUTIC ACTIVITIES: CPT

## 2024-05-07 PROCEDURE — 6370000000 HC RX 637 (ALT 250 FOR IP): Performed by: STUDENT IN AN ORGANIZED HEALTH CARE EDUCATION/TRAINING PROGRAM

## 2024-05-07 PROCEDURE — 97116 GAIT TRAINING THERAPY: CPT

## 2024-05-07 PROCEDURE — 97535 SELF CARE MNGMENT TRAINING: CPT

## 2024-05-07 PROCEDURE — 6360000002 HC RX W HCPCS: Performed by: STUDENT IN AN ORGANIZED HEALTH CARE EDUCATION/TRAINING PROGRAM

## 2024-05-07 PROCEDURE — 1280000000 HC REHAB R&B

## 2024-05-07 RX ADMIN — GABAPENTIN 600 MG: 300 CAPSULE ORAL at 16:30

## 2024-05-07 RX ADMIN — TIOTROPIUM BROMIDE INHALATION SPRAY 2 PUFF: 3.12 SPRAY, METERED RESPIRATORY (INHALATION) at 05:58

## 2024-05-07 RX ADMIN — ENOXAPARIN SODIUM 40 MG: 100 INJECTION SUBCUTANEOUS at 08:36

## 2024-05-07 RX ADMIN — POTASSIUM BICARBONATE 25 MEQ: 978 TABLET, EFFERVESCENT ORAL at 08:36

## 2024-05-07 RX ADMIN — MECLIZINE 25 MG: 12.5 TABLET ORAL at 16:29

## 2024-05-07 RX ADMIN — METHENAMINE HIPPURATE 1 G: 1 TABLET ORAL at 16:29

## 2024-05-07 RX ADMIN — RANOLAZINE 1000 MG: 500 TABLET, EXTENDED RELEASE ORAL at 20:41

## 2024-05-07 RX ADMIN — ARIPIPRAZOLE 2 MG: 2 TABLET ORAL at 20:42

## 2024-05-07 RX ADMIN — DOCUSATE SODIUM 100 MG: 100 CAPSULE, LIQUID FILLED ORAL at 20:40

## 2024-05-07 RX ADMIN — ISOSORBIDE MONONITRATE 120 MG: 60 TABLET, EXTENDED RELEASE ORAL at 08:38

## 2024-05-07 RX ADMIN — PANTOPRAZOLE SODIUM 40 MG: 40 TABLET, DELAYED RELEASE ORAL at 06:04

## 2024-05-07 RX ADMIN — Medication 2 PUFF: at 20:16

## 2024-05-07 RX ADMIN — GABAPENTIN 600 MG: 300 CAPSULE ORAL at 08:38

## 2024-05-07 RX ADMIN — ACETAMINOPHEN 650 MG: 325 TABLET ORAL at 16:29

## 2024-05-07 RX ADMIN — ACETAMINOPHEN 650 MG: 325 TABLET ORAL at 08:38

## 2024-05-07 RX ADMIN — TOPIRAMATE 150 MG: 100 TABLET, FILM COATED ORAL at 16:30

## 2024-05-07 RX ADMIN — TOPIRAMATE 100 MG: 100 TABLET, FILM COATED ORAL at 08:38

## 2024-05-07 RX ADMIN — CARVEDILOL 3.12 MG: 3.12 TABLET, FILM COATED ORAL at 08:38

## 2024-05-07 RX ADMIN — METHENAMINE HIPPURATE 1 G: 1 TABLET ORAL at 08:38

## 2024-05-07 RX ADMIN — RANOLAZINE 1000 MG: 500 TABLET, EXTENDED RELEASE ORAL at 08:38

## 2024-05-07 RX ADMIN — MECLIZINE 25 MG: 12.5 TABLET ORAL at 08:39

## 2024-05-07 RX ADMIN — MECLIZINE 25 MG: 12.5 TABLET ORAL at 20:40

## 2024-05-07 RX ADMIN — Medication 400 MG: at 20:41

## 2024-05-07 RX ADMIN — FUROSEMIDE 40 MG: 40 TABLET ORAL at 08:38

## 2024-05-07 RX ADMIN — GABAPENTIN 600 MG: 300 CAPSULE ORAL at 12:22

## 2024-05-07 RX ADMIN — METOCLOPRAMIDE 10 MG: 10 TABLET ORAL at 06:04

## 2024-05-07 RX ADMIN — METOCLOPRAMIDE 10 MG: 10 TABLET ORAL at 12:23

## 2024-05-07 RX ADMIN — GABAPENTIN 600 MG: 300 CAPSULE ORAL at 20:40

## 2024-05-07 RX ADMIN — BUPROPION HYDROCHLORIDE 300 MG: 150 TABLET, EXTENDED RELEASE ORAL at 08:38

## 2024-05-07 RX ADMIN — MECLIZINE 25 MG: 12.5 TABLET ORAL at 12:23

## 2024-05-07 RX ADMIN — Medication 400 MG: at 08:39

## 2024-05-07 RX ADMIN — DILTIAZEM HYDROCHLORIDE 120 MG: 120 CAPSULE, EXTENDED RELEASE ORAL at 08:39

## 2024-05-07 RX ADMIN — NORTRIPTYLINE HYDROCHLORIDE 75 MG: 25 CAPSULE ORAL at 20:42

## 2024-05-07 RX ADMIN — METOCLOPRAMIDE 10 MG: 10 TABLET ORAL at 16:30

## 2024-05-07 RX ADMIN — CARVEDILOL 3.12 MG: 3.12 TABLET, FILM COATED ORAL at 16:29

## 2024-05-07 RX ADMIN — ASPIRIN 81 MG: 81 TABLET, COATED ORAL at 08:39

## 2024-05-07 RX ADMIN — METOCLOPRAMIDE 10 MG: 10 TABLET ORAL at 20:41

## 2024-05-07 RX ADMIN — Medication 2 PUFF: at 05:58

## 2024-05-07 RX ADMIN — TIZANIDINE 2 MG: 4 TABLET ORAL at 20:41

## 2024-05-07 RX ADMIN — TROSPIUM CHLORIDE 20 MG: 20 TABLET, FILM COATED ORAL at 20:40

## 2024-05-07 RX ADMIN — ACETAMINOPHEN 650 MG: 325 TABLET ORAL at 20:41

## 2024-05-07 ASSESSMENT — PAIN - FUNCTIONAL ASSESSMENT: PAIN_FUNCTIONAL_ASSESSMENT: ACTIVITIES ARE NOT PREVENTED

## 2024-05-07 ASSESSMENT — PAIN SCALES - GENERAL
PAINLEVEL_OUTOF10: 0
PAINLEVEL_OUTOF10: 0
PAINLEVEL_OUTOF10: 1
PAINLEVEL_OUTOF10: 2

## 2024-05-07 ASSESSMENT — PAIN DESCRIPTION - FREQUENCY: FREQUENCY: CONTINUOUS

## 2024-05-07 ASSESSMENT — PAIN DESCRIPTION - DESCRIPTORS: DESCRIPTORS: ACHING

## 2024-05-07 ASSESSMENT — PAIN DESCRIPTION - PAIN TYPE: TYPE: ACUTE PAIN

## 2024-05-07 ASSESSMENT — PAIN DESCRIPTION - ORIENTATION: ORIENTATION: UPPER

## 2024-05-07 ASSESSMENT — PAIN DESCRIPTION - ONSET: ONSET: ON-GOING

## 2024-05-07 ASSESSMENT — PAIN DESCRIPTION - LOCATION: LOCATION: HEAD

## 2024-05-07 NOTE — PROGRESS NOTES
Therapy         Body mass index is 33.98 kg/m².    Assessment/Plan:  Functional progress: Negotiating curb step with CGA.   This patient continues to require an ARU level of care from all disciplines to address the following issues:    #. Debility secondary to partial small bowel obstruction   - s/p ex-lap w/ lysis of adhesions and G-tube placement on 4/17, secondary wound closure on 4/24  - G-tube clamped  - Continue PO Reglan, PRN Miralax  - Zofran PRN for nausea  - Continue low fiber diet.   - TPN stopped, ok to remove PICC.      #.  Shortness of breath, improved  - VSS  - EKG obtained without any acute ischemic changes  - Chest x-ray with low lung volumes, no acute abnormality  - BNP mildly elevated, 367. Similar to prior level on review of prior labs.   - Resumed Lasix, as below  - Breathing treatments PRN  - Hydroxyzine PRN for anxiety    #. CAD  - s/p PCI July 2023  - BB, Imdur, Cardizem  - Aspirin monotherapy, not to resume Plavix due to history of GI bleed per cardiology  - Stress test previously recommended per Cardiology, will need outpatient follow-up    #. Chronic Diastolic Heart Failure  - BB  - Continue Lasix 40 mg daily  - Monitor daily weights     #. Left scapholunate dissociation, radiocarpal arthritis  - Weightbearing as tolerated  - Wrist splint with activity  - Pain control as below  - Follow-up outpatient with Ortho Hand     #. YASMANI  - s/p Venofer x3 days  - Hgb 9.6     #.  Electrolyte disturbance  - Replete potassium bicarb orally  - Continue oral magnesium repletion BID    Chronic Conditions:   - HTN: Continue antihypertensives, as above  - DM2: Last A1c 5.4%.  Well-controlled off medications.  Continue once daily POCT glucose checks for now.  - HLD: Intolerant of statins.  - GERD: PPI  - SERVANDO: Not on home CPAP  - COPD: Spiriva, Dulera, PRN DuoNebs  - Depression: Continue bupropion, nortriptyline, aripiprazole  - Chronic Migraine: Continue topiramate.  May consider dose reduction if appetite

## 2024-05-07 NOTE — PROGRESS NOTES
[facility] - Inpatient Rehabilitation Department   Phone: [phone]    Occupational Therapy    [] Initial Evaluation            [x] Daily Treatment Note         [] Discharge Summary      Patient: Ana Gonzales   : 1955   MRN: [id number]   Date of Service:  2024    Admitting Diagnosis:  Partial small bowel obstruction (HCC)  Current Admission Summary: Ana Gonzales is 68 y.o. female with PMHx notable for HTN, DM2, HLD, GERD, SERVANDO, TBI, depression, lumbar fusion who was recently admitted 3/12-4/10/24 with GI bleed (s/p EGD w/ ulcer clipping on 3/14), small bowel obstruction (s/p ex-lap w/ lysis of adhesions, Meckel diverticulectomy, and appendectomy on 3/20) who returned on  with recurrent small bowel obstruction. She returned to OR on  for ex-lap with lysis of adhesions and G-tube placement, and subsequent return to OR on  for secondary wound closure.  G-tube was initially placed to gravity drainage, eventually patient tolerated clamping.  She remained NPO, receiving TPN initially but eventually began to tolerate an oral diet and TPN was able to be stopped.  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   Past Medical History:  has a past medical history of Arthritis, Asthma, Brain injury ([facility]), Bronchitis, CHF (congestive heart failure) ([facility]), Depression, Diabetes mellitus ([facility]), DM (diabetes mellitus screen), Elevated cholesterol with high triglycerides, Fall, Fibromyalgia, GERD (gastroesophageal reflux disease), HIGH CHOLESTEROL, Hypertension, Microvascular angina ([facility]), Migraine, Neuropathy, Panic attacks, PONV (postoperative nausea and vomiting), Post traumatic stress disorder, Short-term memory loss, Skin cancer, Sleep apnea, and Vertigo.  Past Surgical History:  has a past

## 2024-05-07 NOTE — CARE COORDINATION
Team conference/Weekly Summary     Team conference held today. Spoke with patient,  and family to discuss progress with therapy, barriers to discharge, and plans to return home. Estimated discharge date set for 5/10/2024. Patient anticipates discharging to Home with [facility] and needed supports. Will continue to follow for support and discharge planning.    Possible need for wheelchair at discharge.      Electronically signed by SHIRA Hamm on 5/7/2024 at 12:49 PM

## 2024-05-07 NOTE — PLAN OF CARE
Problem: Discharge Planning  Goal: Discharge to home or other facility with appropriate resources  5/7/2024 0903 by Sinai Serna RN  Outcome: Progressing     Problem: Safety - Adult  Goal: Free from fall injury  5/7/2024 0903 by Sinai Serna RN  Outcome: Progressing     Problem: Pain  Goal: Verbalizes/displays adequate comfort level or baseline comfort level  5/7/2024 0903 by Sinai Serna RN  Outcome: Progressing

## 2024-05-07 NOTE — PROGRESS NOTES
Nutrition Note    RECOMMENDATIONS  PO Diet: regular  ONS: Chocolate Ensure TID     ASSESSMENT   Nutrition status is stable AEB good po intake per RN. Pt does not care for hospital food, but is eating foods brought in by family.  Wt is down 9 lb from initial admission wt.  Some wt loss expected s/p surgery & pt being NPO or on clear liquids for most of hospitalization.  Will remove low fiber restriction & con't chocolate Ensure TID.       Malnutrition Status: No malnutrition  Acute Illness    NUTRITION DIAGNOSIS   Increased nutrient needs related to increase demand for energy/nutrients as evidenced by wounds (surgical)    Goals: PO intake 50% or greater     NUTRITION RELATED FINDINGS  Objective: Edema: +2 pitting LUE, RLE, trace edema LLE; k+ 3.2; gluc 101  Wounds: Surgical Incision    CURRENT NUTRITION THERAPIES  ADULT DIET; Regular; Low Fiber     PO Intake: %, 26-50%   PO Supplement Intake:Unable to assess    ANTHROPOMETRICS  Current Height: 160 cm (5' 3\")  Current Weight - Scale: 87 kg (191 lb 12.8 oz)    Admission weight: 90.3 kg (199 lb)  Ideal Body Weight (IBW): 115 lbs  (52 kg)      BMI: 33.8      COMPARATIVE STANDARDS  Total Energy Requirements (kcals/day): 1403- 1964     Protein (g):  90- 117g       Fluid (mL/day):  1500- 1964    EDUCATION  Education not indicated     The patient will be monitored per nutrition standards of care. Consult dietitian if additional nutrition interventions are needed prior to RD reassessment.     Tomasa Robles, RD, LD    Contact: [phone]

## 2024-05-07 NOTE — PROGRESS NOTES
[facility] - Inpatient Rehabilitation Department   Phone: [phone]    Physical Therapy    [] Initial Evaluation            [x] Daily Treatment Note         [] Discharge Summary      Patient: Ana Gonzales   : 1955   MRN: [id number]   Date of Service:  2024  Admitting Diagnosis: Partial small bowel obstruction (HCC)  Current Admission Summary: Ana Gonzales is a 68 y.o. female who presents to the ED status post exploratory laparotomy with lysis of adhesions, Meckel diverticulectomy and appendectomy 3/19/2024 by Dr. Morales with complaint of generalized weakness and nausea/vomiting. She states she is too weak to ambulate, which is new. She states the nausea/vomiting began last night. She is denying abdominal pain, although abdomen is tender to palpation on exam. Patient reports an episode of shortness of breath earlier today, but is not feeling short of breath at this moment. Lower extremity edema is noted and patient is unsure of her baseline. Patient states her chief concerns are the generalized weakness/fatigue and nausea/vomiting.      She had additional abdominal surgeries on 24 and 24. She has an acute on chronic ligamentous injury to her L wrist.  Past Medical History:  has a past medical history of Arthritis, Asthma, Brain injury (HCC), Bronchitis, CHF (congestive heart failure) ([facility]), Depression, Diabetes mellitus (HCC), DM (diabetes mellitus screen), Elevated cholesterol with high triglycerides, Fall, Fibromyalgia, GERD (gastroesophageal reflux disease), HIGH CHOLESTEROL, Hypertension, Microvascular angina (HCC), Migraine, Neuropathy, Panic attacks, PONV (postoperative nausea and vomiting), Post traumatic stress disorder, Short-term memory loss, Skin cancer, Sleep apnea, and Vertigo.  Past Surgical History:  has a past surgical history that includes Hysterectomy; eye surgery (cataract B); Carpal tunnel release; Finger trigger release; Gallbladder surgery; bladder

## 2024-05-08 LAB
ANION GAP SERPL CALCULATED.3IONS-SCNC: 11 MMOL/L (ref 3–16)
BASOPHILS # BLD: 0.1 K/UL (ref 0–0.2)
BASOPHILS NFR BLD: 1.5 %
BUN SERPL-MCNC: 10 MG/DL (ref 7–20)
CALCIUM SERPL-MCNC: 8.7 MG/DL (ref 8.3–10.6)
CHLORIDE SERPL-SCNC: 111 MMOL/L (ref 99–110)
CO2 SERPL-SCNC: 20 MMOL/L (ref 21–32)
CREAT SERPL-MCNC: 0.6 MG/DL (ref 0.6–1.2)
DEPRECATED RDW RBC AUTO: 19.8 % (ref 12.4–15.4)
EOSINOPHIL # BLD: 0.3 K/UL (ref 0–0.6)
EOSINOPHIL NFR BLD: 7.2 %
GFR SERPLBLD CREATININE-BSD FMLA CKD-EPI: >90 ML/MIN/{1.73_M2}
GLUCOSE SERPL-MCNC: 109 MG/DL (ref 70–99)
HCT VFR BLD AUTO: 31.8 % (ref 36–48)
HGB BLD-MCNC: 9.8 G/DL (ref 12–16)
LYMPHOCYTES # BLD: 1.3 K/UL (ref 1–5.1)
LYMPHOCYTES NFR BLD: 28.6 %
MAGNESIUM SERPL-MCNC: 1.9 MG/DL (ref 1.8–2.4)
MCH RBC QN AUTO: 26.4 PG (ref 26–34)
MCHC RBC AUTO-ENTMCNC: 30.9 G/DL (ref 31–36)
MCV RBC AUTO: 85.6 FL (ref 80–100)
MONOCYTES # BLD: 0.3 K/UL (ref 0–1.3)
MONOCYTES NFR BLD: 7.2 %
NEUTROPHILS # BLD: 2.5 K/UL (ref 1.7–7.7)
NEUTROPHILS NFR BLD: 55.5 %
PLATELET # BLD AUTO: 337 K/UL (ref 135–450)
PMV BLD AUTO: 8.5 FL (ref 5–10.5)
POTASSIUM SERPL-SCNC: 3.5 MMOL/L (ref 3.5–5.1)
RBC # BLD AUTO: 3.72 M/UL (ref 4–5.2)
SODIUM SERPL-SCNC: 142 MMOL/L (ref 136–145)
WBC # BLD AUTO: 4.4 K/UL (ref 4–11)

## 2024-05-08 PROCEDURE — 97530 THERAPEUTIC ACTIVITIES: CPT

## 2024-05-08 PROCEDURE — 97116 GAIT TRAINING THERAPY: CPT

## 2024-05-08 PROCEDURE — 85025 COMPLETE CBC W/AUTO DIFF WBC: CPT

## 2024-05-08 PROCEDURE — 94640 AIRWAY INHALATION TREATMENT: CPT

## 2024-05-08 PROCEDURE — 6370000000 HC RX 637 (ALT 250 FOR IP): Performed by: STUDENT IN AN ORGANIZED HEALTH CARE EDUCATION/TRAINING PROGRAM

## 2024-05-08 PROCEDURE — 97535 SELF CARE MNGMENT TRAINING: CPT

## 2024-05-08 PROCEDURE — 83735 ASSAY OF MAGNESIUM: CPT

## 2024-05-08 PROCEDURE — 80048 BASIC METABOLIC PNL TOTAL CA: CPT

## 2024-05-08 PROCEDURE — 6360000002 HC RX W HCPCS: Performed by: STUDENT IN AN ORGANIZED HEALTH CARE EDUCATION/TRAINING PROGRAM

## 2024-05-08 PROCEDURE — 1280000000 HC REHAB R&B

## 2024-05-08 PROCEDURE — 97110 THERAPEUTIC EXERCISES: CPT

## 2024-05-08 PROCEDURE — 36415 COLL VENOUS BLD VENIPUNCTURE: CPT

## 2024-05-08 RX ADMIN — DOCUSATE SODIUM 100 MG: 100 CAPSULE, LIQUID FILLED ORAL at 10:07

## 2024-05-08 RX ADMIN — TIOTROPIUM BROMIDE INHALATION SPRAY 2 PUFF: 3.12 SPRAY, METERED RESPIRATORY (INHALATION) at 05:24

## 2024-05-08 RX ADMIN — CARVEDILOL 3.12 MG: 3.12 TABLET, FILM COATED ORAL at 16:16

## 2024-05-08 RX ADMIN — METHENAMINE HIPPURATE 1 G: 1 TABLET ORAL at 17:32

## 2024-05-08 RX ADMIN — ENOXAPARIN SODIUM 40 MG: 100 INJECTION SUBCUTANEOUS at 10:07

## 2024-05-08 RX ADMIN — RANOLAZINE 1000 MG: 500 TABLET, EXTENDED RELEASE ORAL at 21:48

## 2024-05-08 RX ADMIN — BUPROPION HYDROCHLORIDE 300 MG: 150 TABLET, EXTENDED RELEASE ORAL at 10:05

## 2024-05-08 RX ADMIN — MECLIZINE 25 MG: 12.5 TABLET ORAL at 16:15

## 2024-05-08 RX ADMIN — Medication 400 MG: at 21:39

## 2024-05-08 RX ADMIN — GABAPENTIN 600 MG: 300 CAPSULE ORAL at 13:36

## 2024-05-08 RX ADMIN — ACETAMINOPHEN 650 MG: 325 TABLET ORAL at 10:07

## 2024-05-08 RX ADMIN — GABAPENTIN 600 MG: 300 CAPSULE ORAL at 21:40

## 2024-05-08 RX ADMIN — DILTIAZEM HYDROCHLORIDE 120 MG: 120 CAPSULE, EXTENDED RELEASE ORAL at 10:04

## 2024-05-08 RX ADMIN — RANOLAZINE 1000 MG: 500 TABLET, EXTENDED RELEASE ORAL at 10:05

## 2024-05-08 RX ADMIN — Medication 400 MG: at 10:05

## 2024-05-08 RX ADMIN — METOCLOPRAMIDE 10 MG: 10 TABLET ORAL at 10:07

## 2024-05-08 RX ADMIN — DOCUSATE SODIUM 100 MG: 100 CAPSULE, LIQUID FILLED ORAL at 21:41

## 2024-05-08 RX ADMIN — MECLIZINE 25 MG: 12.5 TABLET ORAL at 13:37

## 2024-05-08 RX ADMIN — FUROSEMIDE 40 MG: 40 TABLET ORAL at 10:06

## 2024-05-08 RX ADMIN — METHENAMINE HIPPURATE 1 G: 1 TABLET ORAL at 10:37

## 2024-05-08 RX ADMIN — ARIPIPRAZOLE 2 MG: 2 TABLET ORAL at 21:38

## 2024-05-08 RX ADMIN — METOCLOPRAMIDE 10 MG: 10 TABLET ORAL at 16:16

## 2024-05-08 RX ADMIN — ASPIRIN 81 MG: 81 TABLET, COATED ORAL at 10:05

## 2024-05-08 RX ADMIN — ACETAMINOPHEN 650 MG: 325 TABLET ORAL at 21:38

## 2024-05-08 RX ADMIN — TOPIRAMATE 150 MG: 100 TABLET, FILM COATED ORAL at 17:32

## 2024-05-08 RX ADMIN — METOCLOPRAMIDE 10 MG: 10 TABLET ORAL at 21:39

## 2024-05-08 RX ADMIN — GABAPENTIN 600 MG: 300 CAPSULE ORAL at 10:05

## 2024-05-08 RX ADMIN — ACETAMINOPHEN 650 MG: 325 TABLET ORAL at 15:02

## 2024-05-08 RX ADMIN — MECLIZINE 25 MG: 12.5 TABLET ORAL at 10:06

## 2024-05-08 RX ADMIN — TIZANIDINE 2 MG: 4 TABLET ORAL at 21:39

## 2024-05-08 RX ADMIN — NORTRIPTYLINE HYDROCHLORIDE 75 MG: 25 CAPSULE ORAL at 21:38

## 2024-05-08 RX ADMIN — ISOSORBIDE MONONITRATE 120 MG: 60 TABLET, EXTENDED RELEASE ORAL at 10:06

## 2024-05-08 RX ADMIN — MECLIZINE 25 MG: 12.5 TABLET ORAL at 21:39

## 2024-05-08 RX ADMIN — PANTOPRAZOLE SODIUM 40 MG: 40 TABLET, DELAYED RELEASE ORAL at 06:17

## 2024-05-08 RX ADMIN — TOPIRAMATE 100 MG: 100 TABLET, FILM COATED ORAL at 10:06

## 2024-05-08 RX ADMIN — METOCLOPRAMIDE 10 MG: 10 TABLET ORAL at 06:17

## 2024-05-08 RX ADMIN — GABAPENTIN 600 MG: 300 CAPSULE ORAL at 16:16

## 2024-05-08 RX ADMIN — TROSPIUM CHLORIDE 20 MG: 20 TABLET, FILM COATED ORAL at 21:39

## 2024-05-08 RX ADMIN — Medication 2 PUFF: at 05:24

## 2024-05-08 RX ADMIN — CARVEDILOL 3.12 MG: 3.12 TABLET, FILM COATED ORAL at 10:06

## 2024-05-08 ASSESSMENT — PAIN SCALES - GENERAL
PAINLEVEL_OUTOF10: 0
PAINLEVEL_OUTOF10: 0

## 2024-05-08 NOTE — PROGRESS NOTES
Assessment completed. Patient sitting up in chair, alert and oriented x 4 and able to make all her needs known. Spouse at side. Brace on to left hand for comfort. Denied any pain during assessment but later on c/o slight pain to forehead. Incision to abd CDI with staples in place. Gtube in place and flushes easily; sutures in place, site dry and red, no drainage noted. Medications administered as ordered. POC and education reviewed with patient and mutually agreed upon. Safety interventions in place. Call light in reach. Will continue to monitor.

## 2024-05-08 NOTE — PROGRESS NOTES
Shift assessment done. All night time medications given and patient tolerated well. Surgical incision on mid abdomen clean, dry and intact. PEG tube in place, clean, dry and intact. All fall precautions implemented. All needs attended. Electronically signed by Charley Hsieh RN on 5/7/2024 at 10:48 PM

## 2024-05-08 NOTE — PROGRESS NOTES
[facility] - Inpatient Rehabilitation Department   Phone: [phone]    Occupational Therapy    [] Initial Evaluation            [x] Daily Treatment Note         [] Discharge Summary      Patient: Ana Gonzales   : 1955   MRN: [id number]   Date of Service:  2024    Admitting Diagnosis:  Partial small bowel obstruction (HCC)  Current Admission Summary: Ana Gonzales is 68 y.o. female with PMHx notable for HTN, DM2, HLD, GERD, SERVANDO, TBI, depression, lumbar fusion who was recently admitted 3/12-4/10/24 with GI bleed (s/p EGD w/ ulcer clipping on 3/14), small bowel obstruction (s/p ex-lap w/ lysis of adhesions, Meckel diverticulectomy, and appendectomy on 3/20) who returned on  with recurrent small bowel obstruction. She returned to OR on  for ex-lap with lysis of adhesions and G-tube placement, and subsequent return to OR on  for secondary wound closure.  G-tube was initially placed to gravity drainage, eventually patient tolerated clamping.  She remained NPO, receiving TPN initially but eventually began to tolerate an oral diet and TPN was able to be stopped.  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   Past Medical History:  has a past medical history of Arthritis, Asthma, Brain injury ([facility]), Bronchitis, CHF (congestive heart failure) ([facility]), Depression, Diabetes mellitus ([facility]), DM (diabetes mellitus screen), Elevated cholesterol with high triglycerides, Fall, Fibromyalgia, GERD (gastroesophageal reflux disease), HIGH CHOLESTEROL, Hypertension, Microvascular angina ([facility]), Migraine, Neuropathy, Panic attacks, PONV (postoperative nausea and vomiting), Post traumatic stress disorder, Short-term memory loss, Skin cancer, Sleep apnea, and Vertigo.  Past Surgical History:  has a past

## 2024-05-08 NOTE — PROGRESS NOTES
Ana VASQUEZ Christian  5/8/2024  [id number]    Chief Complaint: Partial small bowel obstruction (HCC)    Subjective:   No acute events overnight. Denies any fevers, chills, chest pain, shortness of breath, abdominal pain. Tolerating her diet. Had a BM yesterday.     Last BM: Stool Occurrence: 1 (05/07/24 2032)    Objective:  Patient Vitals for the past 24 hrs:   BP Temp Temp src Pulse Resp SpO2 Weight   05/08/24 1616 129/69 -- -- 70 -- -- --   05/08/24 1000 125/74 97.5 °F (36.4 °C) Oral 81 18 (!) 6 % --   05/08/24 0245 -- -- -- -- -- -- 89 kg (196 lb 4.8 oz)   05/07/24 2032 127/67 97.9 °F (36.6 °C) Oral 71 18 96 % --     Gen: No distress, pleasant.   HEENT: Normocephalic, atraumatic.   CV: Regular rate and rhythm. Extremities warm, well perfused.   Resp: No respiratory distress. CTAB.  Abd: Soft, nontender.  Ext: Improving edema, compression stockings in place.  Neuro: Alert, oriented, appropriately interactive.     Laboratory data: Available via EMR.     Therapy progress:       PT    Supine to Sit: Supervision or touching assistance  Sit to Supine: Partial/moderate assistance   Sit to Stand: Supervision or touching assistance  Chair/Bed to Chair Transfer: Supervision or touching assistance  Car Transfer: Partial/moderate assistance  Ambulation 10 ft: Supervision or touching assistance  Ambulation 50 ft: Supervision or touching assistance  Ambulation 150 ft: Supervision or touching assistance  Stairs - 1 Step: Supervision or touching assistance  Stairs - 4 Step: Supervision or touching assistance  Stairs - 12 Step:      OT    Eating: Setup or clean-up assistance  Oral Hygiene: Setup or clean-up assistance  Bathing: Supervision or touching assistance  Upper Body Dressing: Setup or clean-up assistance  Lower Body Dressing: Partial/moderate assistance  Toilet Transfer: Supervision or touching assistance  Toilet Hygiene: Supervision or touching assistance    Speech Therapy         Body mass index is 34.77

## 2024-05-08 NOTE — PLAN OF CARE
Problem: Pain  Goal: Verbalizes/displays adequate comfort level or baseline comfort level  5/7/2024 2246 by Charley Hsieh RN  Outcome: Progressing  5/7/2024 0903 by Sinai Serna RN  Outcome: Progressing     Problem: ABCDS Injury Assessment  Goal: Absence of physical injury  Outcome: Progressing     Problem: Safety - Adult  Goal: Free from fall injury  5/7/2024 2246 by Charley Hsieh RN  Outcome: Progressing  5/7/2024 0903 by Sinai Serna RN  Outcome: Progressing     Problem: Nutrition Deficit:  Goal: Optimize nutritional status  Outcome: Progressing  Flowsheets (Taken 5/7/2024 1026 by Tomasa Robles, RD, LD)  Nutrient intake appropriate for improving, restoring, or maintaining nutritional needs:   Monitor oral intake, labs, and treatment plans   Recommend appropriate diets, oral nutritional supplements, and vitamin/mineral supplements     Problem: Discharge Planning  Goal: Discharge to home or other facility with appropriate resources  5/7/2024 2246 by Charley Hsieh RN  Outcome: Progressing  5/7/2024 0903 by Sinai Serna RN  Outcome: Progressing

## 2024-05-08 NOTE — PLAN OF CARE
Problem: Discharge Planning  Goal: Discharge to home or other facility with appropriate resources  5/8/2024 1048 by Leda Lazaro RN  Outcome: Progressing  Flowsheets (Taken 5/8/2024 1020)  Discharge to home or other facility with appropriate resources:   Identify barriers to discharge with patient and caregiver   Identify discharge learning needs (meds, wound care, etc)  5/7/2024 2246 by Charley Hsieh RN  Outcome: Progressing     Problem: Safety - Adult  Goal: Free from fall injury  5/8/2024 1048 by Leda Lazaro RN  Outcome: Progressing  5/7/2024 2246 by Charley Hsieh RN  Outcome: Progressing     Problem: Pain  Goal: Verbalizes/displays adequate comfort level or baseline comfort level  5/8/2024 1048 by Leda Lazaro RN  Outcome: Progressing  Flowsheets (Taken 5/8/2024 1000)  Verbalizes/displays adequate comfort level or baseline comfort level:   Encourage patient to monitor pain and request assistance   Assess pain using appropriate pain scale   Administer analgesics based on type and severity of pain and evaluate response   Implement non-pharmacological measures as appropriate and evaluate response  5/7/2024 2246 by Charley Hsieh RN  Outcome: Progressing     Problem: ABCDS Injury Assessment  Goal: Absence of physical injury  5/8/2024 1048 by Leda Lazaro RN  Outcome: Progressing  5/7/2024 2246 by Charley Hsieh RN  Outcome: Progressing     Problem: Nutrition Deficit:  Goal: Optimize nutritional status  5/8/2024 1048 by Leda Lazaro RN  Outcome: Progressing  5/7/2024 2246 by Charley Hsieh RN  Outcome: Progressing  Flowsheets (Taken 5/7/2024 1026 by Tomasa Robles, RD, LD)  Nutrient intake appropriate for improving, restoring, or maintaining nutritional needs:   Monitor oral intake, labs, and treatment plans   Recommend appropriate diets, oral nutritional supplements, and vitamin/mineral supplements     Problem: Skin/Tissue Integrity  Goal: Absence of new skin breakdown  Description: 1.

## 2024-05-08 NOTE — PROGRESS NOTES
floor in standing position at contact guard assistance  Comments:     Other Therapeutic Interventions    1st session: Performed functional mobility as documented above and 5X STS test as documented below.  Performed alternating toe taps on 6\" step with BUE assist X 10, step ups B X 10 with CGA-Jazmin.  Performed trampoline toss without AD X 10 X 2 reps with unweighted ball.  Performed hip abduction, B heel raises with BUE assist.  Performed wall walks with small unweighted ball X 10 to focus on upright posture with SBA.  Pt returned to room and remained in recliner with alarm on and all needs in reach.    2nd session: Pt in chair on arrival.  States she already went to the bathroom.  Pt ambulated 64' with RW and SBA.  Performed seated stepper X 5 minutes.  Performed step ups on airex pad with RW for balance assist and CGA.  Performed STS on airex pad with min-modA X 5 reps without UE support.  Pt ambulated 64' with RW and CGA and returned to supine in bed with Jazmin for LLE.  Pt remained in bed with alarm on, all needs in reach, and family present.    Functional Outcomes             5X STS = 14.8 seconds (5/8/2024)    Cognition  WFL  Orientation:    alert and oriented x 4  Command Following:   WFL    Education  Barriers To Learning: none  Patient Education: patient educated on goals, PT role and benefits, plan of care, functional mobility training, adaptive device training, energy conservation, transfer training  Learning Assessment:  patient verbalizes understanding, would benefit from continued reinforcement    Assessment  Activity Tolerance: decreased endurance  Impairments Requiring Therapeutic Intervention: decreased functional mobility, decreased ROM, decreased strength, decreased endurance, decreased balance, decreased coordination, decreased posture  Prognosis: good  Clinical Assessment: Pt with improvement in endurance with increased walking distance.  Pt continues to require use of RW for energy conservation

## 2024-05-09 LAB
GLUCOSE BLD-MCNC: 100 MG/DL (ref 70–99)
PERFORMED ON: ABNORMAL

## 2024-05-09 PROCEDURE — 97530 THERAPEUTIC ACTIVITIES: CPT

## 2024-05-09 PROCEDURE — 97110 THERAPEUTIC EXERCISES: CPT

## 2024-05-09 PROCEDURE — 6360000002 HC RX W HCPCS: Performed by: STUDENT IN AN ORGANIZED HEALTH CARE EDUCATION/TRAINING PROGRAM

## 2024-05-09 PROCEDURE — 1280000000 HC REHAB R&B

## 2024-05-09 PROCEDURE — APPNB30 APP NON BILLABLE TIME 0-30 MINS: Performed by: NURSE PRACTITIONER

## 2024-05-09 PROCEDURE — 99024 POSTOP FOLLOW-UP VISIT: CPT | Performed by: SURGERY

## 2024-05-09 PROCEDURE — APPSS15 APP SPLIT SHARED TIME 0-15 MINUTES: Performed by: NURSE PRACTITIONER

## 2024-05-09 PROCEDURE — 6370000000 HC RX 637 (ALT 250 FOR IP): Performed by: STUDENT IN AN ORGANIZED HEALTH CARE EDUCATION/TRAINING PROGRAM

## 2024-05-09 PROCEDURE — 97535 SELF CARE MNGMENT TRAINING: CPT

## 2024-05-09 PROCEDURE — 94640 AIRWAY INHALATION TREATMENT: CPT

## 2024-05-09 RX ADMIN — ASPIRIN 81 MG: 81 TABLET, COATED ORAL at 10:12

## 2024-05-09 RX ADMIN — TOPIRAMATE 100 MG: 100 TABLET, FILM COATED ORAL at 10:10

## 2024-05-09 RX ADMIN — ISOSORBIDE MONONITRATE 120 MG: 60 TABLET, EXTENDED RELEASE ORAL at 10:11

## 2024-05-09 RX ADMIN — MECLIZINE 25 MG: 12.5 TABLET ORAL at 20:42

## 2024-05-09 RX ADMIN — TOPIRAMATE 150 MG: 100 TABLET, FILM COATED ORAL at 17:08

## 2024-05-09 RX ADMIN — MECLIZINE 25 MG: 12.5 TABLET ORAL at 17:08

## 2024-05-09 RX ADMIN — POLYETHYLENE GLYCOL 3350 17 G: 17 POWDER, FOR SOLUTION ORAL at 12:31

## 2024-05-09 RX ADMIN — METOCLOPRAMIDE 10 MG: 10 TABLET ORAL at 17:08

## 2024-05-09 RX ADMIN — METOCLOPRAMIDE 10 MG: 10 TABLET ORAL at 10:11

## 2024-05-09 RX ADMIN — NORTRIPTYLINE HYDROCHLORIDE 75 MG: 25 CAPSULE ORAL at 20:43

## 2024-05-09 RX ADMIN — Medication 400 MG: at 10:10

## 2024-05-09 RX ADMIN — Medication 2 PUFF: at 21:13

## 2024-05-09 RX ADMIN — FUROSEMIDE 40 MG: 40 TABLET ORAL at 10:11

## 2024-05-09 RX ADMIN — MECLIZINE 25 MG: 12.5 TABLET ORAL at 10:11

## 2024-05-09 RX ADMIN — Medication 400 MG: at 20:43

## 2024-05-09 RX ADMIN — PANTOPRAZOLE SODIUM 40 MG: 40 TABLET, DELAYED RELEASE ORAL at 06:25

## 2024-05-09 RX ADMIN — METHENAMINE HIPPURATE 1 G: 1 TABLET ORAL at 10:11

## 2024-05-09 RX ADMIN — MECLIZINE 25 MG: 12.5 TABLET ORAL at 12:31

## 2024-05-09 RX ADMIN — DOCUSATE SODIUM 100 MG: 100 CAPSULE, LIQUID FILLED ORAL at 10:12

## 2024-05-09 RX ADMIN — METHENAMINE HIPPURATE 1 G: 1 TABLET ORAL at 17:15

## 2024-05-09 RX ADMIN — Medication 2 PUFF: at 06:07

## 2024-05-09 RX ADMIN — TROSPIUM CHLORIDE 20 MG: 20 TABLET, FILM COATED ORAL at 20:42

## 2024-05-09 RX ADMIN — RANOLAZINE 1000 MG: 500 TABLET, EXTENDED RELEASE ORAL at 10:11

## 2024-05-09 RX ADMIN — RANOLAZINE 1000 MG: 500 TABLET, EXTENDED RELEASE ORAL at 20:42

## 2024-05-09 RX ADMIN — ACETAMINOPHEN 650 MG: 325 TABLET ORAL at 20:42

## 2024-05-09 RX ADMIN — ACETAMINOPHEN 650 MG: 325 TABLET ORAL at 09:17

## 2024-05-09 RX ADMIN — ARIPIPRAZOLE 2 MG: 2 TABLET ORAL at 20:43

## 2024-05-09 RX ADMIN — ACETAMINOPHEN 650 MG: 325 TABLET ORAL at 15:15

## 2024-05-09 RX ADMIN — BUPROPION HYDROCHLORIDE 300 MG: 150 TABLET, EXTENDED RELEASE ORAL at 10:12

## 2024-05-09 RX ADMIN — GABAPENTIN 600 MG: 300 CAPSULE ORAL at 12:31

## 2024-05-09 RX ADMIN — DILTIAZEM HYDROCHLORIDE 120 MG: 120 CAPSULE, EXTENDED RELEASE ORAL at 10:11

## 2024-05-09 RX ADMIN — GABAPENTIN 600 MG: 300 CAPSULE ORAL at 17:08

## 2024-05-09 RX ADMIN — GABAPENTIN 600 MG: 300 CAPSULE ORAL at 09:17

## 2024-05-09 RX ADMIN — CARVEDILOL 3.12 MG: 3.12 TABLET, FILM COATED ORAL at 17:08

## 2024-05-09 RX ADMIN — GABAPENTIN 600 MG: 300 CAPSULE ORAL at 20:43

## 2024-05-09 RX ADMIN — ENOXAPARIN SODIUM 40 MG: 100 INJECTION SUBCUTANEOUS at 10:10

## 2024-05-09 RX ADMIN — CARVEDILOL 3.12 MG: 3.12 TABLET, FILM COATED ORAL at 10:10

## 2024-05-09 RX ADMIN — TIZANIDINE 2 MG: 4 TABLET ORAL at 20:42

## 2024-05-09 RX ADMIN — TIOTROPIUM BROMIDE INHALATION SPRAY 2 PUFF: 3.12 SPRAY, METERED RESPIRATORY (INHALATION) at 06:07

## 2024-05-09 RX ADMIN — METOCLOPRAMIDE 10 MG: 10 TABLET ORAL at 20:42

## 2024-05-09 RX ADMIN — METOCLOPRAMIDE 10 MG: 10 TABLET ORAL at 06:25

## 2024-05-09 ASSESSMENT — PAIN DESCRIPTION - DESCRIPTORS
DESCRIPTORS: ACHING
DESCRIPTORS: ACHING

## 2024-05-09 ASSESSMENT — PAIN SCALES - GENERAL
PAINLEVEL_OUTOF10: 0
PAINLEVEL_OUTOF10: 3
PAINLEVEL_OUTOF10: 5

## 2024-05-09 ASSESSMENT — PAIN DESCRIPTION - ORIENTATION
ORIENTATION: MID
ORIENTATION: MID

## 2024-05-09 ASSESSMENT — PAIN DESCRIPTION - LOCATION
LOCATION: HEAD
LOCATION: HEAD

## 2024-05-09 NOTE — DISCHARGE INSTRUCTIONS
reacher, walker basket, tub transfer bench, hand held shower head     []  Independent ?  [x]  Modified Independent ?  [x]  Supervision                []  Minimal Assistance ? []  Moderate Assistance ? []  Maximal Assistance   Initial Supervision for bathing/showers reccommended. Ongoing supervision with medication management and homemaking tasks encouraged however it's recommended that patient participate in activities for executive functional skills and endurance/strengthening opportunities.      Driving Restriction:      []  YES   [x] NO : however since it's been a long period of time since patient has driven Patient should contact primary care physician for referral for  evaluation prior to returning to driving.      Mobility:     Ambulation: Device: rolling walker     []  Independent ?  [x]  Modified Independent ?  []  Supervision                []  Minimal Assistance ? []  Moderate Assistance ? []  Maximal Assistance     Stairs:  Device: 2    Number of stairs:     Handrails:    [x] Right   [] Left      [] Bilateral   []  Independent ?  []  Modified Independent ?  []  Supervision                []  Minimal Assistance ? []  Moderate Assistance ? []  Maximal Assistance       Current Diet Consistency:?       []  Regular   [] Soft and Bite-Sized  ?[] Minced and Moist     ?[]  Puree     Current Liquid Level:?         [] Thin Liquids     [] Mildly Thick (Nectar) ?    [] Moderately Thick (Honey)    [] Pudding Thick    [] Galeana Water Protocol     Dietary Restrictions:?      []  General Diet   []  Tube Feed, w/ Diet   []  Tube Feed, NPO   []  Carb Control   []  Cardiac   []  Renal    []  Other:     
Warm/Dry

## 2024-05-09 NOTE — PROGRESS NOTES
[facility] - Inpatient Rehabilitation Department   Phone: [phone]    Physical Therapy    [] Initial Evaluation            [x] Daily Treatment Note         [x] Discharge Summary      Patient: Ana Gonzales   : 1955   MRN: [id number]   Date of Service:  2024  Admitting Diagnosis: Partial small bowel obstruction (HCC)  Current Admission Summary: Ana Gonzales is a 68 y.o. female who presents to the ED status post exploratory laparotomy with lysis of adhesions, Meckel diverticulectomy and appendectomy 3/19/2024 by Dr. Morales with complaint of generalized weakness and nausea/vomiting. She states she is too weak to ambulate, which is new. She states the nausea/vomiting began last night. She is denying abdominal pain, although abdomen is tender to palpation on exam. Patient reports an episode of shortness of breath earlier today, but is not feeling short of breath at this moment. Lower extremity edema is noted and patient is unsure of her baseline. Patient states her chief concerns are the generalized weakness/fatigue and nausea/vomiting.      She had additional abdominal surgeries on 24 and 24. She has an acute on chronic ligamentous injury to her L wrist.  Past Medical History:  has a past medical history of Arthritis, Asthma, Brain injury (HCC), Bronchitis, CHF (congestive heart failure) ([facility]), Depression, Diabetes mellitus (HCC), DM (diabetes mellitus screen), Elevated cholesterol with high triglycerides, Fall, Fibromyalgia, GERD (gastroesophageal reflux disease), HIGH CHOLESTEROL, Hypertension, Microvascular angina (HCC), Migraine, Neuropathy, Panic attacks, PONV (postoperative nausea and vomiting), Post traumatic stress disorder, Short-term memory loss, Skin cancer, Sleep apnea, and Vertigo.  Past Surgical History:  has a past surgical history that includes Hysterectomy; eye surgery (cataract B); Carpal tunnel release; Finger trigger release; Gallbladder surgery;

## 2024-05-09 NOTE — PROGRESS NOTES
Ana Gonzales  5/9/2024  [id number]    Chief Complaint: Partial small bowel obstruction (HCC)    Subjective:   No acute events overnight.     Denies fevers, chills, chest pain, shortness of breath, abdominal pain. Tolerating her diet. Asking about staple removal, discussed plan to reach out to Gen Surg prior to discharge.     Last BM: Stool Occurrence: 1 (05/07/24 2032)    Objective:  Patient Vitals for the past 24 hrs:   BP Temp Temp src Pulse Resp SpO2 Weight   05/09/24 0900 125/74 97.8 °F (36.6 °C) -- 78 16 92 % --   05/09/24 0610 -- -- -- 70 15 95 % --   05/09/24 0335 -- -- -- -- -- -- 88.2 kg (194 lb 6.4 oz)   05/08/24 2117 (!) 143/75 98.5 °F (36.9 °C) Oral 73 14 95 % --     Gen: No distress, pleasant.   HEENT: Normocephalic, atraumatic.   CV: Regular rate and rhythm. Extremities warm, well perfused.   Resp: No respiratory distress. CTAB.  Abd: Soft, nontender.  Ext: Improving edema, compression stockings in place.  Neuro: Alert, oriented, appropriately interactive.     Laboratory data: Available via EMR.     Therapy progress:       PT    Supine to Sit: Independent  Sit to Supine: Independent   Sit to Stand: Independent  Chair/Bed to Chair Transfer: Independent  Car Transfer: Independent  Ambulation 10 ft: Independent  Ambulation 50 ft: Independent  Ambulation 150 ft: Independent  Stairs - 1 Step: Supervision or touching assistance  Stairs - 4 Step: Supervision or touching assistance  Stairs - 12 Step:      OT    Eating: Setup or clean-up assistance  Oral Hygiene: Independent  Bathing: Supervision or touching assistance  Upper Body Dressing: Setup or clean-up assistance  Lower Body Dressing: Supervision or touching assistance  Toilet Transfer: Independent  Toilet Hygiene: Independent    Speech Therapy         Body mass index is 34.44 kg/m².    Assessment/Plan:  Functional progress: Ambulating 200' mod-I w/ RW.   This patient continues to require an ARU level of care from all disciplines to address the

## 2024-05-09 NOTE — PROGRESS NOTES
[facility] - Inpatient Rehabilitation Department   Phone: [phone]    Occupational Therapy    [] Initial Evaluation            [x] Daily Treatment Note         [] Discharge Summary      Patient: Ana Gonzales   : 1955   MRN: [id number]   Date of Service:  2024    Admitting Diagnosis:  Partial small bowel obstruction (HCC)  Current Admission Summary: Ana Gonzales is 68 y.o. female with PMHx notable for HTN, DM2, HLD, GERD, SERVANDO, TBI, depression, lumbar fusion who was recently admitted 3/12-4/10/24 with GI bleed (s/p EGD w/ ulcer clipping on 3/14), small bowel obstruction (s/p ex-lap w/ lysis of adhesions, Meckel diverticulectomy, and appendectomy on 3/20) who returned on  with recurrent small bowel obstruction. She returned to OR on  for ex-lap with lysis of adhesions and G-tube placement, and subsequent return to OR on  for secondary wound closure.  G-tube was initially placed to gravity drainage, eventually patient tolerated clamping.  She remained NPO, receiving TPN initially but eventually began to tolerate an oral diet and TPN was able to be stopped.  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   Past Medical History:  has a past medical history of Arthritis, Asthma, Brain injury ([facility]), Bronchitis, CHF (congestive heart failure) ([facility]), Depression, Diabetes mellitus ([facility]), DM (diabetes mellitus screen), Elevated cholesterol with high triglycerides, Fall, Fibromyalgia, GERD (gastroesophageal reflux disease), HIGH CHOLESTEROL, Hypertension, Microvascular angina ([facility]), Migraine, Neuropathy, Panic attacks, PONV (postoperative nausea and vomiting), Post traumatic stress disorder, Short-term memory loss, Skin cancer, Sleep apnea, and Vertigo.  Past Surgical History:  has a past

## 2024-05-09 NOTE — PROGRESS NOTES
Assessment complete. Alert, oriented x4. Denies pain. The care plan and education has been reviewed and mutually agreed upon with the patient. In bed, alarm on, bed in lowest position, call light and table within reach. No further needs expressed at this time.

## 2024-05-09 NOTE — PROGRESS NOTES
[facility]        Progress Note    Patient Name: Aan Gonzales  MRN: [id number]  YOB: 1955  Date of Evaluation: 2024    Subjective:  No acute events overnight  Pain controlled without narcotics  No nausea or vomiting with G-tube clamped, tolerating low fiber diet, appetite is good  Passing flatus, last stool was 2 days ago  Resting in bed at this time, in good spirits    Post-Operative Day #51, 22, 15      Vital Signs:  Patient Vitals for the past 24 hrs:   BP Temp Temp src Pulse Resp SpO2 Weight   24 0900 125/74 97.8 °F (36.6 °C) -- 78 16 92 % --   24 0610 -- -- -- 70 15 95 % --   24 0335 -- -- -- -- -- -- 88.2 kg (194 lb 6.4 oz)   24 2117 (!) 143/75 98.5 °F (36.9 °C) Oral 73 14 95 % --   24 1616 129/69 -- -- 70 -- -- --        TEMPERATURE HISTORY 24H: Temp (24hrs), Av.2 °F (36.8 °C), Min:97.8 °F (36.6 °C), Max:98.5 °F (36.9 °C)    BLOOD PRESSURE HISTORY: Systolic (36hrs), Av , Min:125 , Max:143    Diastolic (36hrs), Av, Min:69, Max:75      Intake/Output:  I/O last 3 completed shifts:  In: 1170 [P.O.:1080; NG/GT:90]  Out: -   No intake/output data recorded.  Drain/tube Output:       Physical Exam:  General: awake, alert, oriented to person, place, time  Lungs: unlabored respirations  Abdomen: soft, non-distended, non-tender, bowel sounds active, G-tube clamped  Skin/Wound: healing well, no drainage, no erythema, well approximated with staples    Labs:  CBC:    Recent Labs     24  0556   WBC 4.4   HGB 9.8*   HCT 31.8*          BMP:    Recent Labs     24  0558 24  0556    142   K 3.2* 3.5    111*   CO2 19* 20*   BUN 7 10   CREATININE 0.5* 0.6   GLUCOSE 101* 109*       Hepatic:    No results for input(s): \"AST\", \"ALT\", \"BILITOT\", \"ALKPHOS\" in the last 72 hours.    Invalid input(s): \"ALB\"    Amylase:  No results found for: \"AMYLASE\"  Lipase:    Lab Results   Component Value

## 2024-05-09 NOTE — PLAN OF CARE
Problem: Discharge Planning  Goal: Discharge to home or other facility with appropriate resources  Outcome: Progressing     Problem: Safety - Adult  Goal: Free from fall injury  5/9/2024 1522 by Miguel A Rizzo RN  Outcome: Progressing     Problem: Pain  Goal: Verbalizes/displays adequate comfort level or baseline comfort level  5/9/2024 1522 by Miguel A Rizzo RN  Outcome: Progressing     Problem: ABCDS Injury Assessment  Goal: Absence of physical injury  5/9/2024 1522 by Miguel A Rizzo RN  Outcome: Progressing     Problem: Nutrition Deficit:  Goal: Optimize nutritional status  Outcome: Progressing     Problem: Skin/Tissue Integrity  Goal: Absence of new skin breakdown  Description: 1.  Monitor for areas of redness and/or skin breakdown  2.  Assess vascular access sites hourly  3.  Every 4-6 hours minimum:  Change oxygen saturation probe site  4.  Every 4-6 hours:  If on nasal continuous positive airway pressure, respiratory therapy assess nares and determine need for appliance change or resting period.  5/9/2024 1522 by Miguel A Rizzo RN  Outcome: Progressing

## 2024-05-10 VITALS
WEIGHT: 196.21 LBS | SYSTOLIC BLOOD PRESSURE: 133 MMHG | RESPIRATION RATE: 16 BRPM | HEART RATE: 73 BPM | HEIGHT: 63 IN | DIASTOLIC BLOOD PRESSURE: 79 MMHG | OXYGEN SATURATION: 93 % | BODY MASS INDEX: 34.77 KG/M2 | TEMPERATURE: 97.9 F

## 2024-05-10 LAB
ANION GAP SERPL CALCULATED.3IONS-SCNC: 13 MMOL/L (ref 3–16)
BASOPHILS # BLD: 0 K/UL (ref 0–0.2)
BASOPHILS NFR BLD: 0.9 %
BUN SERPL-MCNC: 7 MG/DL (ref 7–20)
CALCIUM SERPL-MCNC: 9.1 MG/DL (ref 8.3–10.6)
CHLORIDE SERPL-SCNC: 107 MMOL/L (ref 99–110)
CO2 SERPL-SCNC: 21 MMOL/L (ref 21–32)
CREAT SERPL-MCNC: 0.6 MG/DL (ref 0.6–1.2)
DEPRECATED RDW RBC AUTO: 20.1 % (ref 12.4–15.4)
EOSINOPHIL # BLD: 0.4 K/UL (ref 0–0.6)
EOSINOPHIL NFR BLD: 8.5 %
GFR SERPLBLD CREATININE-BSD FMLA CKD-EPI: >90 ML/MIN/{1.73_M2}
GLUCOSE BLD-MCNC: 98 MG/DL (ref 70–99)
GLUCOSE SERPL-MCNC: 103 MG/DL (ref 70–99)
HCT VFR BLD AUTO: 31.9 % (ref 36–48)
HGB BLD-MCNC: 10.3 G/DL (ref 12–16)
LYMPHOCYTES # BLD: 1.4 K/UL (ref 1–5.1)
LYMPHOCYTES NFR BLD: 32.6 %
MAGNESIUM SERPL-MCNC: 2 MG/DL (ref 1.8–2.4)
MCH RBC QN AUTO: 27.5 PG (ref 26–34)
MCHC RBC AUTO-ENTMCNC: 32.1 G/DL (ref 31–36)
MCV RBC AUTO: 85.6 FL (ref 80–100)
MONOCYTES # BLD: 0.3 K/UL (ref 0–1.3)
MONOCYTES NFR BLD: 7.7 %
NEUTROPHILS # BLD: 2.2 K/UL (ref 1.7–7.7)
NEUTROPHILS NFR BLD: 50.3 %
PERFORMED ON: NORMAL
PLATELET # BLD AUTO: 338 K/UL (ref 135–450)
PMV BLD AUTO: 8.4 FL (ref 5–10.5)
POTASSIUM SERPL-SCNC: 3.2 MMOL/L (ref 3.5–5.1)
RBC # BLD AUTO: 3.73 M/UL (ref 4–5.2)
SODIUM SERPL-SCNC: 141 MMOL/L (ref 136–145)
WBC # BLD AUTO: 4.4 K/UL (ref 4–11)

## 2024-05-10 PROCEDURE — 94640 AIRWAY INHALATION TREATMENT: CPT

## 2024-05-10 PROCEDURE — 6360000002 HC RX W HCPCS: Performed by: STUDENT IN AN ORGANIZED HEALTH CARE EDUCATION/TRAINING PROGRAM

## 2024-05-10 PROCEDURE — 80048 BASIC METABOLIC PNL TOTAL CA: CPT

## 2024-05-10 PROCEDURE — 6370000000 HC RX 637 (ALT 250 FOR IP): Performed by: STUDENT IN AN ORGANIZED HEALTH CARE EDUCATION/TRAINING PROGRAM

## 2024-05-10 PROCEDURE — 85025 COMPLETE CBC W/AUTO DIFF WBC: CPT

## 2024-05-10 PROCEDURE — 36415 COLL VENOUS BLD VENIPUNCTURE: CPT

## 2024-05-10 PROCEDURE — 97535 SELF CARE MNGMENT TRAINING: CPT

## 2024-05-10 PROCEDURE — 83735 ASSAY OF MAGNESIUM: CPT

## 2024-05-10 RX ORDER — POTASSIUM CHLORIDE 20 MEQ/1
20 TABLET, EXTENDED RELEASE ORAL DAILY
Qty: 30 TABLET | Refills: 0 | Status: SHIPPED | OUTPATIENT
Start: 2024-05-10

## 2024-05-10 RX ORDER — POTASSIUM CHLORIDE 20 MEQ/1
40 TABLET, EXTENDED RELEASE ORAL ONCE
Status: COMPLETED | OUTPATIENT
Start: 2024-05-10 | End: 2024-05-10

## 2024-05-10 RX ORDER — LANOLIN ALCOHOL/MO/W.PET/CERES
400 CREAM (GRAM) TOPICAL 2 TIMES DAILY
Qty: 30 TABLET | Refills: 0 | Status: SHIPPED | OUTPATIENT
Start: 2024-05-10

## 2024-05-10 RX ORDER — METOCLOPRAMIDE 10 MG/1
10 TABLET ORAL
Qty: 120 TABLET | Refills: 0 | Status: SHIPPED | OUTPATIENT
Start: 2024-05-10

## 2024-05-10 RX ORDER — POLYETHYLENE GLYCOL 3350 17 G/17G
17 POWDER, FOR SOLUTION ORAL DAILY PRN
Qty: 30 PACKET | Refills: 0 | Status: SHIPPED | OUTPATIENT
Start: 2024-05-10 | End: 2024-06-09

## 2024-05-10 RX ORDER — PSEUDOEPHEDRINE HCL 30 MG
100 TABLET ORAL 2 TIMES DAILY
Qty: 60 CAPSULE | Refills: 0 | Status: SHIPPED | OUTPATIENT
Start: 2024-05-10

## 2024-05-10 RX ADMIN — RANOLAZINE 1000 MG: 500 TABLET, EXTENDED RELEASE ORAL at 08:20

## 2024-05-10 RX ADMIN — Medication 2 PUFF: at 05:44

## 2024-05-10 RX ADMIN — POTASSIUM CHLORIDE 40 MEQ: 1500 TABLET, EXTENDED RELEASE ORAL at 08:19

## 2024-05-10 RX ADMIN — ENOXAPARIN SODIUM 40 MG: 100 INJECTION SUBCUTANEOUS at 08:18

## 2024-05-10 RX ADMIN — MECLIZINE 25 MG: 12.5 TABLET ORAL at 08:20

## 2024-05-10 RX ADMIN — DOCUSATE SODIUM 100 MG: 100 CAPSULE, LIQUID FILLED ORAL at 09:30

## 2024-05-10 RX ADMIN — TOPIRAMATE 100 MG: 100 TABLET, FILM COATED ORAL at 08:19

## 2024-05-10 RX ADMIN — ISOSORBIDE MONONITRATE 120 MG: 60 TABLET, EXTENDED RELEASE ORAL at 08:20

## 2024-05-10 RX ADMIN — METHENAMINE HIPPURATE 1 G: 1 TABLET ORAL at 08:20

## 2024-05-10 RX ADMIN — CARVEDILOL 3.12 MG: 3.12 TABLET, FILM COATED ORAL at 08:19

## 2024-05-10 RX ADMIN — Medication 400 MG: at 08:19

## 2024-05-10 RX ADMIN — METOCLOPRAMIDE 10 MG: 10 TABLET ORAL at 05:29

## 2024-05-10 RX ADMIN — DILTIAZEM HYDROCHLORIDE 120 MG: 120 CAPSULE, EXTENDED RELEASE ORAL at 08:19

## 2024-05-10 RX ADMIN — GABAPENTIN 600 MG: 300 CAPSULE ORAL at 08:20

## 2024-05-10 RX ADMIN — POLYETHYLENE GLYCOL 3350 17 G: 17 POWDER, FOR SOLUTION ORAL at 09:31

## 2024-05-10 RX ADMIN — PANTOPRAZOLE SODIUM 40 MG: 40 TABLET, DELAYED RELEASE ORAL at 05:29

## 2024-05-10 RX ADMIN — ASPIRIN 81 MG: 81 TABLET, COATED ORAL at 08:20

## 2024-05-10 RX ADMIN — ACETAMINOPHEN 650 MG: 325 TABLET ORAL at 08:19

## 2024-05-10 RX ADMIN — FUROSEMIDE 40 MG: 40 TABLET ORAL at 08:20

## 2024-05-10 RX ADMIN — BUPROPION HYDROCHLORIDE 300 MG: 150 TABLET, EXTENDED RELEASE ORAL at 08:20

## 2024-05-10 ASSESSMENT — PAIN SCALES - GENERAL: PAINLEVEL_OUTOF10: 0

## 2024-05-10 NOTE — PROGRESS NOTES
Educated patient's  on flushing G tube, verbalized understanding, patient's   showed demonstration by flushing her G tube with 30 ml fluid, no further questions asked, seems to understand the process.

## 2024-05-10 NOTE — PROGRESS NOTES
CLINICAL PHARMACY NOTE: MEDS TO BEDS    Total # of Prescriptions Filled: 5   The following medications were delivered to the patient:  DOCUSATE SODIUM 100MG CAPS  POLYETHYLENE GLYCOL 3350 17GM/SCOOP  METOCLOPRAMIDE HCL 10MG TABS  MAGNESIUM OXIDE 400 (240 MG) TABS  POTASSIUM CHLORIDE JENNIFER ER 20 TBCR    Additional Documentation: [facility] approved to deliver medications to patient room=signed  [facility]

## 2024-05-10 NOTE — PROGRESS NOTES
Morning assessment completed, vss, alert and oriented, denies pain, The care plan and education has been reviewed and mutually agreed upon with the patient. Pt remains free from falls. Fall precautions in place--bed in lowest position, call light within reach, bed alarm in use. Pt aware to call for assistance before getting up.

## 2024-05-10 NOTE — PLAN OF CARE
Problem: Safety - Adult  Goal: Free from fall injury  5/9/2024 2313 by Jelena Hester RN  Outcome: Progressing     Problem: Pain  Goal: Verbalizes/displays adequate comfort level or baseline comfort level  5/9/2024 2313 by Jelena Hester RN  Outcome: Progressing     Problem: ABCDS Injury Assessment  Goal: Absence of physical injury  5/9/2024 2313 by Jelena Hester RN  Outcome: Progressing

## 2024-05-10 NOTE — DISCHARGE SUMMARY
Physical Medicine & Rehabilitation  Discharge Summary     Patient Identification:  Ana Gonzales  : 1955  Admit date: 2024  Discharge date:   Attending provider: Lester Shelley MD        Primary care provider: Mellisa Houston MD     Discharge Diagnoses:   Patient Active Problem List   Diagnosis    SERVANDO (obstructive sleep apnea)    Hyperlipidemia    HTN (hypertension), benign    PTSD (post-traumatic stress disorder)    Vitamin D deficiency    Acute bronchitis with bronchospasm    Chest pain    Oropharyngeal dysphagia    Choking    Chronic laryngitis    Abdominal pain, epigastric    Allergic rhinitis    Asthma    Acute asthma exacerbation    Carpal tunnel syndrome    Chronic diarrhea    Constipation    Dizziness and giddiness    Other long term (current) drug therapy    Fibromyalgia    Gastro-esophageal reflux disease without esophagitis    History of colonic polyps    Irritable bowel syndrome    Gonalgia    Menopausal and perimenopausal disorder    Microcytic anemia    Muscle ache    Nonspecific mesenteric adenitis    Osteoarthrosis    Arthralgia    Extremity pain    Other specified personal risk factors, not elsewhere classified    Hypostatic pulmonary congestion    Frequent UTI    Subdural hemorrhage (HCC)    Type 1 diabetes mellitus (HCC)    Type 2 diabetes mellitus, without long-term current use of insulin (HCC)    Neoplasm of uncertain behavior of larynx    Subjective tinnitus of both ears    Bilateral high frequency sensorineural hearing loss    Encounter for post surgical wound check    Lumbar spine pain    Mild persistent asthma without complication    Numbness and tingling of both feet    PONV (postoperative nausea and vomiting)    Spinal stenosis of lumbar region    TIA (transient ischemic attack)    Word finding difficulty    Laryngitis, acute    Chronic sinusitis    Frontal headache    Chronic cough    Palpitations    Anginal equivalent (HCC)    Abnormal stress test    Obesity due to

## 2024-05-10 NOTE — CARE COORDINATION
05/10/24 0828   IMM Letter   IMM Letter given to Patient/Family/Significant other/Guardian/POA/by: IMM given to the patien by CM   IMM Letter date given: 05/10/24   IMM Letter time given: 0829

## 2024-05-10 NOTE — PLAN OF CARE
Problem: Discharge Planning  Goal: Discharge to home or other facility with appropriate resources  Outcome: Progressing     Problem: Safety - Adult  Goal: Free from fall injury  5/10/2024 0855 by Sinai Serna RN  Outcome: Progressing     Problem: Pain  Goal: Verbalizes/displays adequate comfort level or baseline comfort level  5/10/2024 0855 by Sinai Serna RN  Outcome: Progressing

## 2024-05-10 NOTE — CARE COORDINATION
Case Management -  Discharge Note      Patient Name: Ana Gonzales                   YOB: 1955            Readmission Risk (Low < 19, Mod (19-27), High > 27): Readmission Risk Score: 24.1    Current PCP: Mellisa Houston MD    ([facility]) Important Message from Medicare:    Date: 05/10/2024      Patient/patient representative has been educated on the benefits of Home Health Care as well as the possible risks of declining recommended services. Patient/patient representative has acknowledged the information provided and decided on the following discharge plan. Patient/ patient representative has been provided freedom of choice regarding service provider, supported by basic dialogue that supports the patient's individualized plan of care/goals.    Home Care Information:   Is patient resuming current home health care services: Yes     Home Care Agency:   [address] [address]  Phone: [phone]  Fax: [phone]            Services: PT/OT/RN  Home Health Order Obtained: Yes    Home health agency notified of discharge.      Financial    Payor: [employer] MEDICARE / Plan: [employer] MEDICARE-ADVANTAGE PPO / Product Type: Medicare /     Pharmacy:  Potential assistance Purchasing Medications:    Meds-to-Beds request: Yes      [id number] - [place], [place] - [address] [phone] - F [phone]  [address] [address]  Phone: [phone] Fax: [phone]    [id number] - [place], [place] - [address] [phone] - F [phone]  [address] [address]  Phone: [phone] Fax: [phone]      Notes:    Additional Case Management Notes: The  faxed [facility] orders to [facility].

## 2024-05-10 NOTE — PROGRESS NOTES
[facility] Discharge Assessment    Transportation  \"Has lack of transportation kept you from medical appointments, meetings, work, or from getting things needed for daily living?\"Check all that apply:  [] A.  Yes, it has kept me from medical appointments or from getting my medications  [] B.  Yes, it has kept me from non-medical meetings, appointments, work, or from getting things that I need  [x] C.  No  [] X.  Patient unable to respond  [] Y.  Patient declines to respond    Provision of Current Reconciled Medication List to Subsequent Provider at Discharge  [] No, current reconciled medication list not provided to the subsequent provider.  [x] Yes, current reconciled medication list provided to the subsequent provider. (**Select route of transmission below**)   [x] Via Electronic Health Record   [] Via Health Information Exchange Organization  [] Verbal (e.g. in person, telephone, video conferencing)  [] Paper-based (e.g. fax, copies, printouts)   [] Other Methods (e.g. texting, email, CDs)    Provision of Current Reconciled Medication List to Patient at Discharge  [] No, current reconciled medication list not provided to the patient, family and/or caregiver.   [x] Yes, current reconciled medication list provided to the patient, family and/or caregiver.  (**Select route of transmission below**)   [] Via Electronic Health Record (e.g., electronic access to patient portal)   [] Via Health Information Exchange Organization  [x] Verbal (e.g. in person, telephone, video conferencing)  [x] Paper-based (e.g. fax, copies, printouts)   [] Other Methods (e.g. texting, email, CDs)    Health Literacy  \"How often do you need to have someone help you when you read instructions, pamphlets, or other written material from your doctor or pharmacy?\"  []  0.  Never  [x]  1.  Rarely  []  2.  Sometimes  []  3.  Often  []  4.  Always  []  7.  Patient declines to respond  []  8.  Patient unable to respond    BIMS - **Must be completed in the

## 2024-05-10 NOTE — PROGRESS NOTES
Comments: pt requires increased time      Instrumental Activities of Daily Living  No IADL completed on this date.    Functional Mobility  Bed Mobility:  Supine to Sit: modified independent  Comments: increased time to complete, HOB elevated, use of HR  Transfers:  Sit to stand transfer:Independent  Stand to sit transfer: Independent  Toilet transfer: modified independent  Toilet transfer equipment: standard toilet, grab bars, walker  Shower transfer: modified independent  Shower transfer equipment: tub transfer bench, grab bars, walker  Comments:    Functional Mobility  Functional Mobility Activity: to/from bathroom  Device Use: rolling walker  Required Assistance: modified independent  Comment: good pace, no LOB noted    Balance:  Static Sitting Balance: good: independent with functional balance in unsupported position  Dynamic Sitting Balance: good: independent with functional balance in unsupported position  Static Standing Balance: fair (-): maintains balance at [place] with use of UE support  Dynamic Standing Balance: fair (-): maintains balance at [place] with use of UE support      Additional Activity:              Functional Outcomes                                       Cognition  Arousal/Alertness: appropriate responses to stimuli  Following Commands: follows multi step commands with increased time  Memory: appears intact  Insights: fully aware of deficits  Initiation: requires cues for some  Sequencing: requires cues for some  Comments: pt denied any cognitive changes; overall appears WFL during BADLs however pt would benefit from incorporation of cog tasks within sessions; fatigue and anxiety may be compounding factors at times.   Orientation:    oriented to person, oriented to place, and oriented to situation  Command Following:   accurately follows one step commands     Education  Barriers To Learning: none   Patient Education: patient educated on goals, OT role and benefits, plan of care, ADL adaptive

## 2024-05-10 NOTE — PROGRESS NOTES
Pt discharged home per private vehicle with a  responsible adult who states they will be with them for the next 24 hours.  Wheeled to front of the hospital via wheelchair. No IV line upon d/c, discharge instructions given no further questions from patient/ family, patient went home with all her belongings.

## 2024-05-16 ENCOUNTER — OFFICE VISIT (OUTPATIENT)
Dept: SURGERY | Age: 69
End: 2024-05-16

## 2024-05-16 VITALS — BODY MASS INDEX: 34.72 KG/M2 | SYSTOLIC BLOOD PRESSURE: 142 MMHG | DIASTOLIC BLOOD PRESSURE: 90 MMHG | WEIGHT: 196 LBS

## 2024-05-16 DIAGNOSIS — K56.609 SBO (SMALL BOWEL OBSTRUCTION) (HCC): Primary | ICD-10-CM

## 2024-05-16 PROCEDURE — 99024 POSTOP FOLLOW-UP VISIT: CPT | Performed by: SURGERY

## 2024-05-16 NOTE — PROGRESS NOTES
Subjective   Patient ID: Ana Gonzales is a 68 y.o. female.    HPI    Review of Systems       Objective   Physical Exam   Abdomen soft  Incision well-healed    Assessment   68-year-old female seen in follow-up for a small bowel obstruction.  She required 2 surgeries for lysis of adhesions and had a prolonged postoperative course.  She is now home and doing fairly well.  Her strength and energy levels are improving.  She is tolerating a general diet and having bowel function.  Staples were removed and Steri-Strips were placed.  The bumper on her G-tube was cutting into her skin and causing pain.  She is no longer using it.  It has been in place greater than 1 month.  The sutures were removed and the G-tube was removed as well.      Plan   Dry bandage over G-tube site until healed  Follow-up as needed        Srinivasa Morales MD

## 2024-05-29 RX ORDER — CARVEDILOL 3.12 MG/1
3.12 TABLET ORAL 2 TIMES DAILY WITH MEALS
Qty: 180 TABLET | Refills: 3 | Status: SHIPPED | OUTPATIENT
Start: 2024-05-29

## 2024-05-29 NOTE — TELEPHONE ENCOUNTER
Medication Refill    Medication needing refilled:   carvedilol (COREG) 3.125 MG - NEED SCRIPT SENT TO LOCAL [facility] PH[facility] LISTED BELOW    Dosage of the medication:    How are you taking this medication (QD, BID, TID, QID, PRN): 1 tablet by mouth 2 times daily (with meals)     30 or 90 day supply called in: 90 day supply    When will you run out of your medication:    Which Pharmacy are we sending the medication to?:   [id number] - [facility] [address] - P [phone] - F [phone]

## 2024-05-29 NOTE — TELEPHONE ENCOUNTER
Received refill request for  carvedilol from [facility] pharmacy.    Last ov: 03/06/2024 NPTS    Last Refill: 04/10/2024 #60 w/ 3     Next appointment: 06/19/2024 NPTS

## 2024-06-24 ENCOUNTER — OFFICE VISIT (OUTPATIENT)
Dept: CARDIOLOGY CLINIC | Age: 69
End: 2024-06-24
Payer: MEDICARE

## 2024-06-24 VITALS
DIASTOLIC BLOOD PRESSURE: 64 MMHG | HEIGHT: 63 IN | OXYGEN SATURATION: 95 % | HEART RATE: 76 BPM | BODY MASS INDEX: 33.66 KG/M2 | SYSTOLIC BLOOD PRESSURE: 112 MMHG | WEIGHT: 190 LBS

## 2024-06-24 DIAGNOSIS — I25.10 CORONARY ARTERY DISEASE INVOLVING NATIVE CORONARY ARTERY OF NATIVE HEART WITHOUT ANGINA PECTORIS: ICD-10-CM

## 2024-06-24 DIAGNOSIS — I49.3 PVC'S (PREMATURE VENTRICULAR CONTRACTIONS): Primary | ICD-10-CM

## 2024-06-24 DIAGNOSIS — R94.31 ABNORMAL ELECTROCARDIOGRAPHY: ICD-10-CM

## 2024-06-24 DIAGNOSIS — E78.2 MIXED HYPERLIPIDEMIA: ICD-10-CM

## 2024-06-24 PROCEDURE — 3074F SYST BP LT 130 MM HG: CPT | Performed by: NURSE PRACTITIONER

## 2024-06-24 PROCEDURE — 99214 OFFICE O/P EST MOD 30 MIN: CPT | Performed by: NURSE PRACTITIONER

## 2024-06-24 PROCEDURE — 1123F ACP DISCUSS/DSCN MKR DOCD: CPT | Performed by: NURSE PRACTITIONER

## 2024-06-24 PROCEDURE — 3078F DIAST BP <80 MM HG: CPT | Performed by: NURSE PRACTITIONER

## 2024-06-24 RX ORDER — POTASSIUM CHLORIDE 20 MEQ/1
20 TABLET, EXTENDED RELEASE ORAL DAILY
Qty: 90 TABLET | Refills: 0 | Status: SHIPPED | OUTPATIENT
Start: 2024-06-24
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
## 2024-07-03 ENCOUNTER — HOSPITAL ENCOUNTER (OUTPATIENT)
Age: 69
Discharge: HOME | End: 2024-07-05
Payer: MEDICARE

## 2024-07-03 VITALS
BODY MASS INDEX: 33.66 KG/M2 | HEART RATE: 70 BPM | WEIGHT: 190 LBS | SYSTOLIC BLOOD PRESSURE: 120 MMHG | HEIGHT: 63 IN | DIASTOLIC BLOOD PRESSURE: 65 MMHG

## 2024-07-03 DIAGNOSIS — R94.31 ABNORMAL ELECTROCARDIOGRAPHY: ICD-10-CM

## 2024-07-03 LAB
ECHO BSA: 1.96 M2
NUC STRESS EJECTION FRACTION: 68 %
NUC STRESS LV EDV: 75 ML (ref 56–104)
NUC STRESS LV ESV: 24 ML (ref 19–49)
NUC STRESS LV MASS: 115 G
STRESS BASELINE DIAS BP: 65 MMHG
STRESS BASELINE HR: 71 BPM
STRESS BASELINE SYS BP: 120 MMHG
STRESS ESTIMATED WORKLOAD: 1 METS
STRESS EXERCISE DUR MIN: 4 MIN
STRESS EXERCISE DUR SEC: 0 SEC
STRESS O2 SAT PEAK: 88 %
STRESS O2 SAT REST: 91 %
STRESS PEAK DIAS BP: 48 MMHG
STRESS PEAK SYS BP: 93 MMHG
STRESS PERCENT HR ACHIEVED: 52 %
STRESS POST PEAK HR: 79 BPM
STRESS RATE PRESSURE PRODUCT: 7347 BPM*MMHG
STRESS TARGET HR: 151 BPM
TID: 1.03

## 2024-07-03 PROCEDURE — 93017 CV STRESS TEST TRACING ONLY: CPT

## 2024-07-03 PROCEDURE — 78452 HT MUSCLE IMAGE SPECT MULT: CPT

## 2024-07-03 PROCEDURE — A9502 TC99M TETROFOSMIN: HCPCS | Performed by: NURSE PRACTITIONER

## 2024-07-03 PROCEDURE — 6360000002 HC RX W HCPCS: Performed by: NURSE PRACTITIONER

## 2024-07-03 PROCEDURE — 3430000000 HC RX DIAGNOSTIC RADIOPHARMACEUTICAL: Performed by: NURSE PRACTITIONER

## 2024-07-03 PROCEDURE — 6360000002 HC RX W HCPCS: Performed by: INTERNAL MEDICINE

## 2024-07-03 RX ORDER — REGADENOSON 0.08 MG/ML
0.4 INJECTION, SOLUTION INTRAVENOUS
Status: COMPLETED | OUTPATIENT
Start: 2024-07-03 | End: 2024-07-03

## 2024-07-03 RX ORDER — AMINOPHYLLINE 25 MG/ML
100 INJECTION, SOLUTION INTRAVENOUS
Status: COMPLETED | OUTPATIENT
Start: 2024-07-03 | End: 2024-07-03

## 2024-07-03 RX ADMIN — REGADENOSON 0.4 MG: 0.08 INJECTION, SOLUTION INTRAVENOUS at 09:35

## 2024-07-03 RX ADMIN — TETROFOSMIN 31.6 MILLICURIE: 1.38 INJECTION, POWDER, LYOPHILIZED, FOR SOLUTION INTRAVENOUS at 09:48

## 2024-07-03 RX ADMIN — TETROFOSMIN 10.2 MILLICURIE: 1.38 INJECTION, POWDER, LYOPHILIZED, FOR SOLUTION INTRAVENOUS at 08:29

## 2024-07-03 RX ADMIN — AMINOPHYLLINE 100 MG: 25 INJECTION, SOLUTION INTRAVENOUS at 09:49

## 2024-07-12 NOTE — TELEPHONE ENCOUNTER
Received refill request for Renexa from [facility].    Last ov:06/24/2024 NPTS    Last Refill:12/12/2023    Next appointment:08/05/2024 NPTS

## 2024-07-17 RX ORDER — RANOLAZINE 500 MG/1
500 TABLET, EXTENDED RELEASE ORAL 2 TIMES DAILY
Qty: 180 TABLET | Refills: 2 | Status: SHIPPED | OUTPATIENT
Start: 2024-07-17

## 2024-08-05 ENCOUNTER — OFFICE VISIT (OUTPATIENT)
Dept: CARDIOLOGY CLINIC | Age: 69
End: 2024-08-05
Payer: MEDICARE

## 2024-08-05 VITALS
OXYGEN SATURATION: 94 % | HEART RATE: 76 BPM | SYSTOLIC BLOOD PRESSURE: 102 MMHG | HEIGHT: 63 IN | DIASTOLIC BLOOD PRESSURE: 70 MMHG | BODY MASS INDEX: 33.49 KG/M2 | WEIGHT: 189 LBS

## 2024-08-05 DIAGNOSIS — E78.2 MIXED HYPERLIPIDEMIA: ICD-10-CM

## 2024-08-05 DIAGNOSIS — I49.3 PVC'S (PREMATURE VENTRICULAR CONTRACTIONS): Primary | ICD-10-CM

## 2024-08-05 DIAGNOSIS — I25.10 CORONARY ARTERY DISEASE INVOLVING NATIVE CORONARY ARTERY OF NATIVE HEART WITHOUT ANGINA PECTORIS: ICD-10-CM

## 2024-08-05 PROCEDURE — 3078F DIAST BP <80 MM HG: CPT | Performed by: NURSE PRACTITIONER

## 2024-08-05 PROCEDURE — 3074F SYST BP LT 130 MM HG: CPT | Performed by: NURSE PRACTITIONER

## 2024-08-05 PROCEDURE — 99214 OFFICE O/P EST MOD 30 MIN: CPT | Performed by: NURSE PRACTITIONER

## 2024-08-05 PROCEDURE — 1123F ACP DISCUSS/DSCN MKR DOCD: CPT | Performed by: NURSE PRACTITIONER

## 2024-08-05 RX ORDER — TOPIRAMATE 100 MG/1
150 TABLET, FILM COATED ORAL EVERY EVENING
COMMUNITY

## 2024-08-05 NOTE — PROGRESS NOTES
to the patient/family. Alternatives to my treatment were discussed.      The patient is not currently smoking.    Patient is on a beta-blocker  Patient is on an ace-i/ARB  Patient is on a statin    Dual Antiplatelet therapy has been recommended / prescribed for this patient. Education conducted on adverse reactions including bleeding was discussed.    The patient verbalizes understanding not to stop medications without discussing with us.    Discussed exercise: 30-60 minutes 7 days/week  Discussed Low saturated fat/CARLY diet.     Thank you for allowing to us to participate in the care of Ana Gonzales.    VALE Alexander    Documentation of today's visit sent to PCP

## 2024-08-05 NOTE — PATIENT INSTRUCTIONS
Decrease ranexa to 500 mg twice a day since you also take diltiazem    Appt with Dr. Bates in 6 months

## 2024-09-06 ENCOUNTER — TELEPHONE (OUTPATIENT)
Dept: CARDIOLOGY CLINIC | Age: 69
End: 2024-09-06

## 2024-09-06 RX ORDER — DILTIAZEM HYDROCHLORIDE 120 MG/1
120 CAPSULE, COATED, EXTENDED RELEASE ORAL DAILY
Qty: 90 CAPSULE | Refills: 3 | Status: SHIPPED | OUTPATIENT
Start: 2024-09-06

## 2024-09-06 NOTE — TELEPHONE ENCOUNTER
Medication Refill    Medication needing refilled:  dilTIAZem (CARDIZEM CD   Dosage of the medication:  120 MG extended release capsule   How are you taking this medication (QD, BID, TID, QID, PRN):    30 or 90 day supply called in:    When will you run out of your medication:    Which Pharmacy are we sending the medication to?:  [id number] - [place], [place] - [address] - P [phone] - F [phone]  [address] [place] [id number]  Phone: [phone]  Fax: [phone]

## 2024-09-09 ENCOUNTER — HOSPITAL ENCOUNTER (OUTPATIENT)
Dept: WOMENS IMAGING | Age: 69
Discharge: HOME | End: 2024-09-09
Payer: MEDICARE

## 2024-09-09 VITALS — HEIGHT: 63 IN | WEIGHT: 185 LBS | BODY MASS INDEX: 32.78 KG/M2

## 2024-09-09 DIAGNOSIS — R92.8 ABNORMAL MAMMOGRAM: ICD-10-CM

## 2024-09-09 PROCEDURE — G0279 TOMOSYNTHESIS, MAMMO: HCPCS

## 2024-09-16 ENCOUNTER — TELEPHONE (OUTPATIENT)
Dept: CARDIOLOGY CLINIC | Age: 69
End: 2024-09-16

## 2024-10-01 ENCOUNTER — TELEPHONE (OUTPATIENT)
Dept: CARDIOLOGY CLINIC | Age: 69
End: 2024-10-01

## 2024-10-01 NOTE — TELEPHONE ENCOUNTER
Pt called they are scheduled for MRI CARDIAC WO CONTRAST Manuela 10/3  pt stated they are claustrophobic and would like to know if there is something office can prescribed to help their claustrophobia?    Pls advise if rx gets call in send to [facility] [address]      Pt left pt know

## 2024-10-02 NOTE — TELEPHONE ENCOUNTER
LMOM for patient to return call in regards to message below.       Nancy Pratt, APRN - CNP  You1 hour ago (10:42 AM)       See if she can get a dose of ativan or xanax from her PCP to take prior to the test (I prefer their input d/t other medicines she takes)

## 2024-10-03 ENCOUNTER — HOSPITAL ENCOUNTER (OUTPATIENT)
Dept: MRI IMAGING | Age: 69
Discharge: HOME | End: 2024-10-03
Payer: MEDICARE

## 2024-10-03 DIAGNOSIS — I49.3 PVC'S (PREMATURE VENTRICULAR CONTRACTIONS): ICD-10-CM

## 2024-10-03 PROCEDURE — A9585 GADOBUTROL INJECTION: HCPCS | Performed by: NURSE PRACTITIONER

## 2024-10-03 PROCEDURE — 75565 CARD MRI VELOC FLOW MAPPING: CPT

## 2024-10-03 PROCEDURE — 6360000004 HC RX CONTRAST MEDICATION: Performed by: NURSE PRACTITIONER

## 2024-10-03 RX ORDER — GADOBUTROL 604.72 MG/ML
13 INJECTION INTRAVENOUS
Status: COMPLETED | OUTPATIENT
Start: 2024-10-03 | End: 2024-10-03

## 2024-10-03 RX ADMIN — GADOBUTROL 13 ML: 604.72 INJECTION INTRAVENOUS at 16:16

## 2024-10-04 ENCOUNTER — TELEPHONE (OUTPATIENT)
Dept: CARDIOLOGY CLINIC | Age: 69
End: 2024-10-04

## 2024-10-07 RX ORDER — POTASSIUM CHLORIDE 1500 MG/1
20 TABLET, EXTENDED RELEASE ORAL DAILY
Qty: 90 TABLET | Refills: 0 | Status: SHIPPED | OUTPATIENT
Start: 2024-10-07

## 2024-10-07 NOTE — TELEPHONE ENCOUNTER
Requested Prescriptions     Pending Prescriptions Disp Refills    potassium chloride (KLOR-CON HAL) 20 MEQ extended release tablet [Pharmacy Med Name: Potassium Chloride Mary ER Oral Tablet Extended Release 20 MEQ] 90 tablet 0     Sig: TAKE 1 TABLET BY MOUTH EVERY DAY      [id number]    Last OV:  8/5/2024 NPTS    Next OV: 0/01/2025 PSC    Last Labs: 05/10/2024 BMP    Last Filled: 06/24/2024 NPTS       No

## 2024-10-16 RX ORDER — EVOLOCUMAB 140 MG/ML
INJECTION, SOLUTION SUBCUTANEOUS
Qty: 6 ADJUSTABLE DOSE PRE-FILLED PEN SYRINGE | Refills: 3 | Status: SHIPPED | OUTPATIENT
Start: 2024-10-16

## 2024-10-16 NOTE — TELEPHONE ENCOUNTER
Received refill request for  REPATHA SURECLICK 140 MG/ML SOAJ  from [facility]     Last OV: 8/5/24    Next OV:     Last Labs:     Last Filled: 10/10/23

## 2024-10-23 ENCOUNTER — TELEPHONE (OUTPATIENT)
Dept: CARDIOLOGY CLINIC | Age: 69
End: 2024-10-23

## 2024-10-23 RX ORDER — EVOLOCUMAB 140 MG/ML
INJECTION, SOLUTION SUBCUTANEOUS
Qty: 6 ADJUSTABLE DOSE PRE-FILLED PEN SYRINGE | Refills: 3 | OUTPATIENT
Start: 2024-10-23

## 2024-12-05 RX ORDER — ISOSORBIDE MONONITRATE 120 MG/1
120 TABLET, EXTENDED RELEASE ORAL DAILY
Qty: 90 TABLET | Refills: 3 | Status: SHIPPED | OUTPATIENT
Start: 2024-12-05

## 2024-12-05 NOTE — TELEPHONE ENCOUNTER
Last ov:24 NPTS  Next ov: recall list 2025 PSC  Last EK24  Last labs:5/10/24  Last filled:   Disp Refills Start End    isosorbide mononitrate (IMDUR) 120 MG extended release tablet 90 tablet 3 2023 --    Sig - Route: Take 1 tablet by mouth daily - Oral    Sent to pharmacy as: Isosorbide Mononitrate  MG Oral Tablet Extended Release 24 Hour (IMDUR)    Cosign for Ordering: Accepted by Sb Bates MD on 2023 10:27 AM    E-Prescribing Status: Receipt confirmed by pharmacy (2023  9:51 AM EDT)

## 2024-12-17 RX ORDER — ASPIRIN 81 MG/1
81 TABLET, COATED ORAL DAILY
Qty: 90 TABLET | Refills: 3 | Status: SHIPPED | OUTPATIENT
Start: 2024-12-17

## 2024-12-17 NOTE — TELEPHONE ENCOUNTER
Received refill request for Christian mantilla from [facility].     Last OV: 08/05/24    Next OV: 02/13/25 BRINDA/Paulo    Last Labs: N/A  Last Filled: 09/18/24

## 2025-01-02 RX ORDER — POTASSIUM CHLORIDE 1500 MG/1
20 TABLET, EXTENDED RELEASE ORAL DAILY
Qty: 90 TABLET | Refills: 0 | Status: SHIPPED | OUTPATIENT
Start: 2025-01-02

## 2025-01-02 RX ORDER — FUROSEMIDE 40 MG/1
40 TABLET ORAL 2 TIMES DAILY
Qty: 180 TABLET | Refills: 0 | OUTPATIENT
Start: 2025-01-02

## 2025-01-02 NOTE — TELEPHONE ENCOUNTER
Received refill request for Potassium Chloride Mary ER Oral Tablet Extended Release 20 MEQ  from [facility] pharmacy.     Last OV: 8/5/24    Next OV: n/a    Last Labs:     Last Filled: 10/7/24

## 2025-01-03 RX ORDER — POTASSIUM CHLORIDE 1500 MG/1
20 TABLET, EXTENDED RELEASE ORAL DAILY
Qty: 90 TABLET | Refills: 0 | OUTPATIENT
Start: 2025-01-03

## 2025-01-07 RX ORDER — FUROSEMIDE 40 MG/1
40 TABLET ORAL DAILY
Qty: 180 TABLET | Refills: 3 | Status: SHIPPED | OUTPATIENT
Start: 2025-01-07 | End: 2025-01-10

## 2025-01-10 ENCOUNTER — TELEPHONE (OUTPATIENT)
Dept: CARDIOLOGY CLINIC | Age: 70
End: 2025-01-10

## 2025-01-10 RX ORDER — FUROSEMIDE 40 MG/1
40 TABLET ORAL 2 TIMES DAILY
Qty: 180 TABLET | Refills: 3 | Status: SHIPPED | OUTPATIENT
Start: 2025-01-10

## 2025-01-10 NOTE — TELEPHONE ENCOUNTER
Reviewed chart. Pt previously on 40mg BID. Pt should continue home dose. No changes to meds have been made. Called and left message with instructions for patient to call back if she is only currently taking one pill

## 2025-01-10 NOTE — TELEPHONE ENCOUNTER
Pt called to clarify how many furosemide pills does she needs to take a day.  Will it be one are 2 pills daily.  Please call to clarify.  Thank you

## 2025-01-13 RX ORDER — FUROSEMIDE 40 MG/1
40 TABLET ORAL 2 TIMES DAILY
Qty: 180 TABLET | Refills: 3 | OUTPATIENT
Start: 2025-01-13

## 2025-01-24 ENCOUNTER — TRANSCRIBE ORDERS (OUTPATIENT)
Dept: ADMINISTRATIVE | Age: 70
End: 2025-01-24

## 2025-01-24 DIAGNOSIS — R92.8 ABNORMAL MAMMOGRAM: Primary | ICD-10-CM

## 2025-02-07 ENCOUNTER — HOSPITAL ENCOUNTER (OUTPATIENT)
Dept: WOMENS IMAGING | Age: 70
Discharge: HOME | End: 2025-02-07
Payer: MEDICARE

## 2025-02-07 VITALS — BODY MASS INDEX: 36.62 KG/M2 | HEIGHT: 62 IN | WEIGHT: 199 LBS

## 2025-02-07 DIAGNOSIS — R92.8 ABNORMAL MAMMOGRAM: ICD-10-CM

## 2025-02-07 PROCEDURE — 77066 DX MAMMO INCL CAD BI: CPT

## 2025-03-05 ENCOUNTER — OFFICE VISIT (OUTPATIENT)
Age: 70
End: 2025-03-05

## 2025-03-05 VITALS
SYSTOLIC BLOOD PRESSURE: 136 MMHG | BODY MASS INDEX: 36.8 KG/M2 | DIASTOLIC BLOOD PRESSURE: 84 MMHG | WEIGHT: 200 LBS | OXYGEN SATURATION: 95 % | HEIGHT: 62 IN | HEART RATE: 85 BPM | TEMPERATURE: 97.7 F | RESPIRATION RATE: 16 BRPM

## 2025-03-05 DIAGNOSIS — R50.9 FEVER, UNSPECIFIED FEVER CAUSE: ICD-10-CM

## 2025-03-05 DIAGNOSIS — J01.00 ACUTE MAXILLARY SINUSITIS, RECURRENCE NOT SPECIFIED: Primary | ICD-10-CM

## 2025-03-05 LAB
INFLUENZA A ANTIGEN, POC: NORMAL
INFLUENZA B ANTIGEN, POC: NORMAL
Lab: NORMAL
QC PASS/FAIL: NORMAL
SARS-COV-2 RDRP RESP QL NAA+PROBE: NEGATIVE

## 2025-03-05 RX ORDER — AZITHROMYCIN 250 MG/1
TABLET, FILM COATED ORAL
Qty: 6 TABLET | Refills: 0 | Status: SHIPPED | OUTPATIENT
Start: 2025-03-05 | End: 2025-03-15

## 2025-03-05 ASSESSMENT — ENCOUNTER SYMPTOMS
NAUSEA: 1
SINUS PRESSURE: 1

## 2025-03-06 NOTE — PROGRESS NOTES
Ana Gonzales (:  1955) is a 69 y.o. female,New patient, here for evaluation of the following chief complaint(s):  Generalized Body Aches and Congestion (Feels like she has the flu nausea congestion generalized body aches )      ASSESSMENT/PLAN:  1. Fever, unspecified fever cause  - POCT Influenza A/B Antigen (BD Veritor) NEGATIVE  - POCT COVID-19 Rapid, NAAT NEGATIVE   2-SINUSITIS:  AZITHROMYCIN PRESCRIBED    Return if symptoms worsen or fail to improve.    SUBJECTIVE/OBJECTIVE:  PRESENT TO CLINIC WITH GENERALIZED BODY ACHES, NAUSEA AND CONGESTION.      History provided by:  Patient  Generalized Body Aches  Associated symptoms: congestion and nausea        Vitals:    25 192   BP: 136/84   Pulse: 85   Resp: 16   Temp: 97.7 °F (36.5 °C)   SpO2: 95%   Weight: 90.7 kg (200 lb)   Height: 1.575 m (5' 2\")       Review of Systems   HENT:  Positive for congestion and sinus pressure.    Gastrointestinal:  Positive for nausea.       Physical Exam  Constitutional:       Appearance: Normal appearance.   HENT:      Head: Normocephalic and atraumatic.      Nose: Congestion present.      Mouth/Throat:      Mouth: Mucous membranes are moist.   Eyes:      Pupils: Pupils are equal, round, and reactive to light.   Pulmonary:      Effort: Pulmonary effort is normal.      Breath sounds: Normal breath sounds.   Musculoskeletal:         General: Normal range of motion.      Cervical back: Normal range of motion and neck supple.   Skin:     General: Skin is warm.   Neurological:      Mental Status: She is alert and oriented to person, place, and time.   Psychiatric:         Mood and Affect: Mood normal.         Behavior: Behavior normal.           An electronic signature was used to authenticate this note.    --Efrain Moreno DO

## 2025-03-25 RX ORDER — POTASSIUM CHLORIDE 1500 MG/1
20 TABLET, EXTENDED RELEASE ORAL DAILY
Qty: 90 TABLET | Refills: 0 | Status: SHIPPED | OUTPATIENT
Start: 2025-03-25

## 2025-03-25 NOTE — TELEPHONE ENCOUNTER
Last ov:24 NPTS  Next ov:25 PSC  Last EK24  Last labs:5/10/24  Last filled:   Disp Refills Start End    potassium chloride (KLOR-CON M) 20 MEQ extended release tablet 90 tablet 0 2025 --    Sig - Route: Take 1 tablet by mouth daily - Oral    Sent to pharmacy as: Potassium Chloride Mary ER 20 MEQ Oral Tablet Extended Release (KLOR-CON M)    E-Prescribing Status: Receipt confirmed by pharmacy (2025  3:44 PM EST)

## 2025-03-27 NOTE — PROGRESS NOTES
[facility]  [phone]      Patient: Ana Gonzales  YOB: 1955         Chief Complaint   Patient presents with    Follow-up     Occasional pains in chest. Not a new symptom        Referring provider: Mellisa Houston MD    History of Present Illness:   Ana Gonzales is a 69 y.o. female presenting in follow up.     Today she is here with her  who is also a patient of mine. She reports she has been doing very well since her hospitalization last year. She is taking a GLP1 and has been losing weight, has been helpful for joint pain. Palpitations have been doing well. Her angina has been managed well on ranexa and imdur.     With regard to medication therapy he/she has been compliant with prescribed regimen and has tolerated therapy to date.    Past Medical History:   has a past medical history of Arthritis, Asthma, Brain injury ([facility]), Bronchitis, CHF (congestive heart failure) ([facility]), Depression, Diabetes mellitus ([facility]), DM (diabetes mellitus screen), Elevated cholesterol with high triglycerides, Fall, Fibromyalgia, GERD (gastroesophageal reflux disease), HIGH CHOLESTEROL, Hypertension, Microvascular angina, Migraine, Neuropathy, Panic attacks, PONV (postoperative nausea and vomiting), Post traumatic stress disorder, Short-term memory loss, Skin cancer, Sleep apnea, and Vertigo.    Surgical History:   has a past surgical history that includes Hysterectomy; eye surgery (cataract B); Carpal tunnel release; Finger trigger release; Gallbladder surgery; bladder suspension; Skin cancer excision; blepharoplasty; laryngoscopy (08/03/2017); Coronary stent placement (06/13/2023); joint replacement (Right); back surgery; Upper gastrointestinal endoscopy (N/A, 10/2/2023); Upper gastrointestinal endoscopy (N/A, 3/13/2024); Upper gastrointestinal endoscopy (N/A, 3/14/2024); Upper gastrointestinal endoscopy (N/A, 3/14/2024); Upper gastrointestinal endoscopy (N/A, 3/14/2024); laparotomy (N/A,

## 2025-04-04 ENCOUNTER — OFFICE VISIT (OUTPATIENT)
Dept: CARDIOLOGY CLINIC | Age: 70
End: 2025-04-04
Payer: MEDICARE

## 2025-04-04 VITALS
BODY MASS INDEX: 36.44 KG/M2 | HEIGHT: 62 IN | DIASTOLIC BLOOD PRESSURE: 68 MMHG | RESPIRATION RATE: 16 BRPM | SYSTOLIC BLOOD PRESSURE: 116 MMHG | OXYGEN SATURATION: 94 % | WEIGHT: 198 LBS | HEART RATE: 68 BPM

## 2025-04-04 DIAGNOSIS — I15.2 HYPERTENSION ASSOCIATED WITH DIABETES: ICD-10-CM

## 2025-04-04 DIAGNOSIS — R00.2 PALPITATIONS: ICD-10-CM

## 2025-04-04 DIAGNOSIS — E11.59 HYPERTENSION ASSOCIATED WITH DIABETES: ICD-10-CM

## 2025-04-04 DIAGNOSIS — E78.2 MIXED HYPERLIPIDEMIA: ICD-10-CM

## 2025-04-04 DIAGNOSIS — I50.32 CHRONIC HEART FAILURE WITH PRESERVED EJECTION FRACTION (HCC): ICD-10-CM

## 2025-04-04 DIAGNOSIS — I49.3 PVC'S (PREMATURE VENTRICULAR CONTRACTIONS): ICD-10-CM

## 2025-04-04 DIAGNOSIS — I25.10 CORONARY ARTERY DISEASE INVOLVING NATIVE CORONARY ARTERY OF NATIVE HEART WITHOUT ANGINA PECTORIS: Primary | ICD-10-CM

## 2025-04-04 PROCEDURE — 1159F MED LIST DOCD IN RCRD: CPT | Performed by: INTERNAL MEDICINE

## 2025-04-04 PROCEDURE — 3078F DIAST BP <80 MM HG: CPT | Performed by: INTERNAL MEDICINE

## 2025-04-04 PROCEDURE — 99214 OFFICE O/P EST MOD 30 MIN: CPT | Performed by: INTERNAL MEDICINE

## 2025-04-04 PROCEDURE — 1123F ACP DISCUSS/DSCN MKR DOCD: CPT | Performed by: INTERNAL MEDICINE

## 2025-04-04 PROCEDURE — 3074F SYST BP LT 130 MM HG: CPT | Performed by: INTERNAL MEDICINE

## 2025-04-04 PROCEDURE — G2211 COMPLEX E/M VISIT ADD ON: HCPCS | Performed by: INTERNAL MEDICINE

## 2025-04-04 RX ORDER — RANOLAZINE 500 MG/1
500 TABLET, EXTENDED RELEASE ORAL 2 TIMES DAILY
Qty: 180 TABLET | Refills: 3 | Status: SHIPPED | OUTPATIENT
Start: 2025-04-04

## 2025-04-04 RX ORDER — EVOLOCUMAB 140 MG/ML
1 INJECTION, SOLUTION SUBCUTANEOUS
Qty: 6 ADJUSTABLE DOSE PRE-FILLED PEN SYRINGE | Refills: 3 | Status: SHIPPED | OUTPATIENT
Start: 2025-04-04

## 2025-04-04 RX ORDER — CYCLOSPORINE OPHTHALMIC SOLUTION 1 MG/ML
SOLUTION/ DROPS OPHTHALMIC
COMMUNITY

## 2025-04-04 RX ORDER — FUROSEMIDE 40 MG/1
40 TABLET ORAL 2 TIMES DAILY
Qty: 180 TABLET | Refills: 3 | Status: SHIPPED | OUTPATIENT
Start: 2025-04-04

## 2025-04-04 RX ORDER — PREDNISOLONE ACETATE 10 MG/ML
SUSPENSION/ DROPS OPHTHALMIC
COMMUNITY
Start: 2025-03-07

## 2025-04-04 RX ORDER — POTASSIUM CHLORIDE 1500 MG/1
20 TABLET, EXTENDED RELEASE ORAL DAILY
Qty: 90 TABLET | Refills: 3 | Status: SHIPPED | OUTPATIENT
Start: 2025-04-04

## 2025-04-04 RX ORDER — OFLOXACIN 3 MG/ML
SOLUTION/ DROPS OPHTHALMIC
COMMUNITY
Start: 2025-04-01

## 2025-04-04 RX ORDER — CARVEDILOL 3.12 MG/1
3.12 TABLET ORAL 2 TIMES DAILY WITH MEALS
Qty: 180 TABLET | Refills: 3 | Status: SHIPPED | OUTPATIENT
Start: 2025-04-04

## 2025-04-04 RX ORDER — ASPIRIN 81 MG/1
81 TABLET ORAL DAILY
Qty: 90 TABLET | Refills: 3 | Status: SHIPPED | OUTPATIENT
Start: 2025-04-04

## 2025-04-04 RX ORDER — ISOSORBIDE MONONITRATE 120 MG/1
120 TABLET, EXTENDED RELEASE ORAL DAILY
Qty: 90 TABLET | Refills: 3 | Status: SHIPPED | OUTPATIENT
Start: 2025-04-04

## 2025-04-04 RX ORDER — DILTIAZEM HYDROCHLORIDE 120 MG/1
120 CAPSULE, COATED, EXTENDED RELEASE ORAL DAILY
Qty: 90 CAPSULE | Refills: 3 | Status: SHIPPED | OUTPATIENT
Start: 2025-04-04

## 2025-04-04 NOTE — PATIENT INSTRUCTIONS
Thank you for coming to see me today   Continue to be active   No changes to your medications, refills sent on all cardiac medications

## 2025-04-28 NOTE — RT PROTOCOL NOTE
Call Adriana sandoval/ [facility] in [place] # [phone] fax# [phone] needs to clarify if patient needs PT? Is scheduled for surg 9/12   equivalent RT Bronchodilator order with Frequency of every 4 hours PRN for wheezing or increased work of breathing using Per Protocol order mode.        4-6 - enter or revise RT Bronchodilator order(s) to two equivalent RT bronchodilator orders with one order with BID Frequency and one order with Frequency of every 4 hours PRN wheezing or increased work of breathing using Per Protocol order mode.        7-10 - enter or revise RT Bronchodilator order(s) to two equivalent RT bronchodilator orders with one order with TID Frequency and one order with Frequency of every 4 hours PRN wheezing or increased work of breathing using Per Protocol order mode.       11-13 - enter or revise RT Bronchodilator order(s) to one equivalent RT bronchodilator order with QID Frequency and an Albuterol order with Frequency of every 4 hours PRN wheezing or increased work of breathing using Per Protocol order mode.      Greater than 13 - enter or revise RT Bronchodilator order(s) to one equivalent RT bronchodilator order with every 4 hours Frequency and an Albuterol order with Frequency of every 2 hours PRN wheezing or increased work of breathing using Per Protocol order mode.     RT to enter RT Home Evaluation for COPD & MDI Assessment order using Per Protocol order mode.    Electronically signed by DAR BEASLEY RCP on 4/13/2024 at 9:50 PMRT Inhaler-Nebulizer Bronchodilator Protocol Note    There is a bronchodilator order in the chart from a provider indicating to follow the RT Bronchodilator Protocol and there is an “Initiate RT Inhaler-Nebulizer Bronchodilator Protocol” order as well (see protocol at bottom of note).    CXR Findings:  XR CHEST PORTABLE    Result Date: 4/12/2024  No radiographic evidence of acute pulmonary disease.       The findings from the last RT Protocol Assessment were as follows:   History Pulmonary Disease: None or smoker <15 pack years  Respiratory Pattern: Regular pattern and RR 12-20 bpm  Breath Sounds:  Slightly diminished and/or crackles  Cough: Strong, spontaneous, non-productive  Indication for Bronchodilator Therapy: Decreased or absent breath sounds  Bronchodilator Assessment Score: 2    Aerosolized bronchodilator medication orders have been revised according to the RT Inhaler-Nebulizer Bronchodilator Protocol below.    Respiratory Therapist to perform RT Therapy Protocol Assessment initially then follow the protocol.  Repeat RT Therapy Protocol Assessment PRN for score 0-3 or on second treatment, BID, and PRN for scores above 3.    No Indications - adjust the frequency to every 6 hours PRN wheezing or bronchospasm, if no treatments needed after 48 hours then discontinue using Per Protocol order mode.     If indication present, adjust the RT bronchodilator orders based on the Bronchodilator Assessment Score as indicated below.  Use Inhaler orders unless patient has one or more of the following: on home nebulizer, not able to hold breath for 10 seconds, is not alert and oriented, cannot activate and use MDI correctly, or respiratory rate 25 breaths per minute or more, then use the equivalent nebulizer order(s) with same Frequency and PRN reasons based on the score.  If a patient is on this medication at home then do not decrease Frequency below that used at home.    0-3 - enter or revise RT bronchodilator order(s) to equivalent RT Bronchodilator order with Frequency of every 4 hours PRN for wheezing or increased work of breathing using Per Protocol order mode.        4-6 - enter or revise RT Bronchodilator order(s) to two equivalent RT bronchodilator orders with one order with BID Frequency and one order with Frequency of every 4 hours PRN wheezing or increased work of breathing using Per Protocol order mode.        7-10 - enter or revise RT Bronchodilator order(s) to two equivalent RT bronchodilator orders with one order with TID Frequency and one order with Frequency of every 4 hours PRN wheezing or increased

## 2025-06-10 ENCOUNTER — OFFICE VISIT (OUTPATIENT)
Age: 70
End: 2025-06-10

## 2025-06-10 VITALS
BODY MASS INDEX: 36.01 KG/M2 | HEIGHT: 60 IN | HEART RATE: 93 BPM | SYSTOLIC BLOOD PRESSURE: 104 MMHG | OXYGEN SATURATION: 94 % | TEMPERATURE: 98.1 F | WEIGHT: 183.4 LBS | DIASTOLIC BLOOD PRESSURE: 68 MMHG

## 2025-06-10 DIAGNOSIS — R06.2 WHEEZING: ICD-10-CM

## 2025-06-10 DIAGNOSIS — J18.9 PNEUMONIA OF RIGHT LOWER LOBE DUE TO INFECTIOUS ORGANISM: Primary | ICD-10-CM

## 2025-06-10 RX ORDER — DOXYCYCLINE HYCLATE 100 MG
100 TABLET ORAL 2 TIMES DAILY
Qty: 14 TABLET | Refills: 0 | Status: SHIPPED | OUTPATIENT
Start: 2025-06-10 | End: 2025-06-17

## 2025-06-10 ASSESSMENT — ENCOUNTER SYMPTOMS
EYE PAIN: 0
RHINORRHEA: 1
VOMITING: 0
DIARRHEA: 0
CHEST TIGHTNESS: 0
GASTROINTESTINAL NEGATIVE: 1
WHEEZING: 1
TROUBLE SWALLOWING: 0
FACIAL SWELLING: 0
COUGH: 1
NAUSEA: 0
SORE THROAT: 0
SINUS PAIN: 0
SHORTNESS OF BREATH: 1
ABDOMINAL PAIN: 0
EYES NEGATIVE: 1
VOICE CHANGE: 0

## 2025-06-10 NOTE — PROGRESS NOTES
Ana Gonzales (:  1955) is a 70 y.o. female,Established patient, here for evaluation of the following chief complaint(s):  Cough (Pt states cough since this morning started about 5am. White and green mucus. )    Patient here for evaluation of cough. Patient assessment consistent with pnuemonia. Doxycycline prescribed and patient educated side effects and how to take.  Follow up with pcp as discussed.     ASSESSMENT/PLAN:  1. Pneumonia of right lower lobe due to infectious organism    - doxycycline hyclate (VIBRA-TABS) 100 MG tablet; Take 1 tablet by mouth 2 times daily for 7 days  Dispense: 14 tablet; Refill: 0  -symptomatic treatment for cough    2. Wheezing  -patient declined nebulizer treatment     - Continue taking Combivent as prescribed    Return if symptoms worsen or fail to improve.     AVS reviewed. All questions answered.  Instructions were acknowledged and verbalized by the patient.     SUBJECTIVE/OBJECTIVE:  HPI:   70 y.o. female with hx of asthma presents alone for complaint of persistent, purulent, cough x 1 days.  She has had rhinorrhea for a couple of weeks. She recently traveled on an [employer].     Patient provided the HPI by themself - the patient is considered to be a reliable historian.      History provided by:  Patient  Cough  This is a new problem. The current episode started today. The problem has been rapidly worsening. The problem occurs constantly. The cough is Productive of purulent sputum and productive of sputum. Associated symptoms include rhinorrhea, shortness of breath (with activity) and wheezing (mild). Pertinent negatives include no chest pain, chills, ear pain, fever, headaches, myalgias, postnasal drip, rash or sore throat. Risk factors for lung disease include travel. She has tried steroid inhaler and a beta-agonist inhaler for the symptoms. The treatment provided no relief. Her past medical history is significant for asthma.       Vitals:    06/10/25 1618   BP:

## 2025-06-10 NOTE — PATIENT INSTRUCTIONS
Follow up with your primary care provider as discussed.    Take medication with a full glass of water and do not lay down for one hour after taking.     Take medication as prescribed.    Increase fluid intake    Take a medicine like acetaminophen (sample brand name: Tylenol) or ibuprofen (sample brand names: Advil, Motrin) to help bring down your fever or for discomfort.    Go to the nearest emergency room for symptoms including, but not limited to, shortness of breath, chest pain, mental status change, fevers >101, difficulty or inability to swallow, dehydration, or if symptoms worsen.

## 2025-08-18 ENCOUNTER — OFFICE VISIT (OUTPATIENT)
Dept: CARDIOLOGY CLINIC | Age: 70
End: 2025-08-18
Payer: MEDICARE

## 2025-08-18 VITALS
BODY MASS INDEX: 31.83 KG/M2 | SYSTOLIC BLOOD PRESSURE: 114 MMHG | OXYGEN SATURATION: 95 % | HEIGHT: 62 IN | DIASTOLIC BLOOD PRESSURE: 66 MMHG | HEART RATE: 82 BPM | WEIGHT: 173 LBS

## 2025-08-18 DIAGNOSIS — R07.9 CHEST PAIN, UNSPECIFIED TYPE: ICD-10-CM

## 2025-08-18 DIAGNOSIS — E11.59 HYPERTENSION ASSOCIATED WITH DIABETES (HCC): ICD-10-CM

## 2025-08-18 DIAGNOSIS — I50.32 CHRONIC HEART FAILURE WITH PRESERVED EJECTION FRACTION (HCC): ICD-10-CM

## 2025-08-18 DIAGNOSIS — I15.2 HYPERTENSION ASSOCIATED WITH DIABETES (HCC): ICD-10-CM

## 2025-08-18 DIAGNOSIS — I25.10 CORONARY ARTERY DISEASE INVOLVING NATIVE CORONARY ARTERY OF NATIVE HEART WITHOUT ANGINA PECTORIS: Primary | ICD-10-CM

## 2025-08-18 DIAGNOSIS — R00.2 PALPITATIONS: ICD-10-CM

## 2025-08-18 DIAGNOSIS — E78.2 MIXED HYPERLIPIDEMIA: ICD-10-CM

## 2025-08-18 PROCEDURE — 99214 OFFICE O/P EST MOD 30 MIN: CPT | Performed by: INTERNAL MEDICINE

## 2025-08-18 PROCEDURE — 3074F SYST BP LT 130 MM HG: CPT | Performed by: INTERNAL MEDICINE

## 2025-08-18 PROCEDURE — G2211 COMPLEX E/M VISIT ADD ON: HCPCS | Performed by: INTERNAL MEDICINE

## 2025-08-18 PROCEDURE — 3078F DIAST BP <80 MM HG: CPT | Performed by: INTERNAL MEDICINE

## 2025-08-18 PROCEDURE — 1159F MED LIST DOCD IN RCRD: CPT | Performed by: INTERNAL MEDICINE

## 2025-08-18 PROCEDURE — 1123F ACP DISCUSS/DSCN MKR DOCD: CPT | Performed by: INTERNAL MEDICINE

## 2025-08-18 RX ORDER — POTASSIUM CHLORIDE 1500 MG/1
20 TABLET, EXTENDED RELEASE ORAL DAILY
Qty: 90 TABLET | Refills: 3 | Status: SHIPPED | OUTPATIENT
Start: 2025-08-18

## 2025-08-18 RX ORDER — RANOLAZINE 500 MG/1
500 TABLET, EXTENDED RELEASE ORAL 2 TIMES DAILY
Qty: 180 TABLET | Refills: 3 | Status: SHIPPED | OUTPATIENT
Start: 2025-08-18

## 2025-08-18 RX ORDER — FUROSEMIDE 40 MG/1
40 TABLET ORAL 2 TIMES DAILY
Qty: 180 TABLET | Refills: 3 | Status: SHIPPED | OUTPATIENT
Start: 2025-08-18

## 2025-08-18 RX ORDER — ASPIRIN 81 MG/1
81 TABLET ORAL DAILY
Qty: 90 TABLET | Refills: 3 | Status: SHIPPED | OUTPATIENT
Start: 2025-08-18

## 2025-08-18 RX ORDER — DILTIAZEM HYDROCHLORIDE 120 MG/1
120 CAPSULE, COATED, EXTENDED RELEASE ORAL DAILY
Qty: 90 CAPSULE | Refills: 3 | Status: SHIPPED | OUTPATIENT
Start: 2025-08-18

## 2025-08-18 RX ORDER — EVOLOCUMAB 140 MG/ML
140 INJECTION, SOLUTION SUBCUTANEOUS
Qty: 6 ADJUSTABLE DOSE PRE-FILLED PEN SYRINGE | Refills: 3 | Status: SHIPPED | OUTPATIENT
Start: 2025-08-18

## 2025-08-18 RX ORDER — ISOSORBIDE MONONITRATE 120 MG/1
120 TABLET, EXTENDED RELEASE ORAL DAILY
Qty: 90 TABLET | Refills: 3 | Status: SHIPPED | OUTPATIENT
Start: 2025-08-18

## 2025-08-19 ENCOUNTER — TELEPHONE (OUTPATIENT)
Dept: CARDIOLOGY CLINIC | Age: 70
End: 2025-08-19

## 2025-08-27 ENCOUNTER — HOSPITAL ENCOUNTER (OUTPATIENT)
Age: 70
Discharge: HOME | End: 2025-08-29
Attending: INTERNAL MEDICINE
Payer: MEDICARE

## 2025-08-27 ENCOUNTER — RESULTS FOLLOW-UP (OUTPATIENT)
Dept: CARDIOLOGY CLINIC | Age: 70
End: 2025-08-27

## 2025-08-27 DIAGNOSIS — R07.9 CHEST PAIN, UNSPECIFIED TYPE: ICD-10-CM

## 2025-08-27 LAB
NUC STRESS EJECTION FRACTION: 69 %
NUC STRESS LV EDV: 61 ML (ref 56–104)
NUC STRESS LV ESV: 19 ML (ref 19–49)
NUC STRESS LV MASS: 106 G
STRESS BASELINE DIAS BP: 71 MMHG
STRESS BASELINE HR: 85 BPM
STRESS BASELINE ST DEPRESSION: 0 MM
STRESS BASELINE SYS BP: 115 MMHG
STRESS ESTIMATED WORKLOAD: 1 METS
STRESS EXERCISE DUR MIN: 4 MIN
STRESS EXERCISE DUR SEC: 0 SEC
STRESS O2 SAT PEAK: 95 %
STRESS O2 SAT REST: 93 %
STRESS PEAK DIAS BP: 60 MMHG
STRESS PEAK SYS BP: 125 MMHG
STRESS PERCENT HR ACHIEVED: 56 %
STRESS POST PEAK HR: 84 BPM
STRESS RATE PRESSURE PRODUCT: NORMAL BPM*MMHG
STRESS ST DEPRESSION: 0 MM
STRESS TARGET HR: 150 BPM
TID: 1.19

## 2025-08-27 PROCEDURE — 78452 HT MUSCLE IMAGE SPECT MULT: CPT | Performed by: INTERNAL MEDICINE

## 2025-08-27 PROCEDURE — 93016 CV STRESS TEST SUPVJ ONLY: CPT | Performed by: INTERNAL MEDICINE

## 2025-08-27 PROCEDURE — 93018 CV STRESS TEST I&R ONLY: CPT | Performed by: INTERNAL MEDICINE

## 2025-08-27 PROCEDURE — 93017 CV STRESS TEST TRACING ONLY: CPT

## 2025-08-27 PROCEDURE — 3430000000 HC RX DIAGNOSTIC RADIOPHARMACEUTICAL: Performed by: INTERNAL MEDICINE

## 2025-08-27 PROCEDURE — A9502 TC99M TETROFOSMIN: HCPCS | Performed by: INTERNAL MEDICINE

## 2025-08-27 PROCEDURE — 6360000002 HC RX W HCPCS: Performed by: INTERNAL MEDICINE

## 2025-08-27 PROCEDURE — 78452 HT MUSCLE IMAGE SPECT MULT: CPT

## 2025-08-27 RX ORDER — AMINOPHYLLINE 25 MG/ML
100 INJECTION, SOLUTION INTRAVENOUS ONCE
Status: COMPLETED | OUTPATIENT
Start: 2025-08-27 | End: 2025-08-27

## 2025-08-27 RX ORDER — REGADENOSON 0.08 MG/ML
0.4 INJECTION, SOLUTION INTRAVENOUS
Status: COMPLETED | OUTPATIENT
Start: 2025-08-27 | End: 2025-08-27

## 2025-08-27 RX ADMIN — AMINOPHYLLINE 100 MG: 25 INJECTION, SOLUTION INTRAVENOUS at 14:01

## 2025-08-27 RX ADMIN — REGADENOSON 0.4 MG: 0.08 INJECTION, SOLUTION INTRAVENOUS at 13:56

## 2025-08-27 RX ADMIN — TETROFOSMIN 32.7 MILLICURIE: 1.38 INJECTION, POWDER, LYOPHILIZED, FOR SOLUTION INTRAVENOUS at 14:00

## 2025-08-27 RX ADMIN — TETROFOSMIN 11.2 MILLICURIE: 1.38 INJECTION, POWDER, LYOPHILIZED, FOR SOLUTION INTRAVENOUS at 12:50

## 2025-09-02 ENCOUNTER — TELEPHONE (OUTPATIENT)
Dept: CARDIOLOGY CLINIC | Age: 70
End: 2025-09-02

## (undated) DEVICE — MERCY FAIRFIELD TURNOVER KIT: Brand: MEDLINE INDUSTRIES, INC.

## (undated) DEVICE — DRAPE,LAP,CHOLE,W/TROUGHS,STERILE: Brand: MEDLINE

## (undated) DEVICE — SUTURE VICRYL + SZ 0 L18IN ABSRB CLR VCP112G

## (undated) DEVICE — RETRIEVER ENDOSCP L160CM SHTH DIA2.5MM NET 4X5.5CM PLAT

## (undated) DEVICE — SUTURE PDS + SZ 1 L96IN ABSRB VLT L65MM TP-1 1/2 CIR PDP880G

## (undated) DEVICE — 1LYRTR 16FR10ML 100%SILI SNAP: Brand: MEDLINE INDUSTRIES, INC.

## (undated) DEVICE — PAD ABSRB W8XL10IN ABD HYDROPHOBIC NONWOVEN THCK LAYR CELOS

## (undated) DEVICE — CONTAINER,SPECIMEN,OR STERILE,4OZ: Brand: MEDLINE

## (undated) DEVICE — Z DISCONTINUED USE 2218132 SUTURE ETHILON SZ 3-0 L30IN NONABSORBABLE BLK L36MM FSLX 3/8 1673BH

## (undated) DEVICE — SOLUTION IV IRRIG WATER 500ML POUR BRL ST 2F7113

## (undated) DEVICE — ENDOSCOPIC KIT 6X3/16 FT COLON W/ 1.1 OZ 2 GWN W/O BRSH

## (undated) DEVICE — REPLAY HEMOSTASIS CLIP, 16MM SPAN: Brand: REPLAY

## (undated) DEVICE — AIR/WATER CLEANING ADAPTER FOR OLYMPUS® GI ENDOSCOPE: Brand: BULLDOG®

## (undated) DEVICE — ELECTRODE PT RET AD L9FT HI MOIST COND ADH HYDRGEL CORDED

## (undated) DEVICE — SUTURE ABSORBABLE MONOFILAMENT 0 CTX 60 IN VIO PDS + PDP990G

## (undated) DEVICE — BLANKET WRM W29.9XL79.1IN UP BODY FORC AIR MISTRAL-AIR

## (undated) DEVICE — SUTURE VICRYL + ABSORBABLE BRAIDED 3-0 12X18 IN COAT UD VCP110G

## (undated) DEVICE — SUTURE PERMAHAND SZ 3-0 L18IN NONABSORBABLE BLK L26MM SH C013D

## (undated) DEVICE — WOUND RETRACTOR AND PROTECTOR: Brand: ALEXIS WOUND PROTECTOR-RETRACTOR

## (undated) DEVICE — MAJOR SET UP PK

## (undated) DEVICE — CANISTER NEG PRSS 500ML WND THER W/ TBNG NO PRSS RANG W/

## (undated) DEVICE — BLADE ES L6IN ELASTOMERIC COAT INSUL DURABLE BEND UPTO

## (undated) DEVICE — SUTURE PROL SZ 2-0 L36IN NONABSORBABLE BLU SH L26MM 1/2 CIR 8523H

## (undated) DEVICE — SHEET, T, LAPAROTOMY, STERILE: Brand: MEDLINE

## (undated) DEVICE — DRAPE THER FLUID WARMING 66X44 IN FLAT SLUSH DBL DISC ORS

## (undated) DEVICE — 12ML ENFIT SYRINGE, STERILE: Brand: MEDLINE

## (undated) DEVICE — LIGHT HANDLE: Brand: DEVON

## (undated) DEVICE — SEALER ENDOSCP NANO COAT OPN DIV CRV L JAW LIGASURE IMPACT

## (undated) DEVICE — WET SKIN PREP TRAY: Brand: MEDLINE INDUSTRIES, INC.

## (undated) DEVICE — MOUTHPIECE ENDOSCP L CTRL OPN AND SIDE PORTS DISP

## (undated) DEVICE — APPLICATOR MEDICATED 26 CC SOLUTION HI LT ORNG CHLORAPREP

## (undated) DEVICE — GAUZE,SPONGE,4"X4",8PLY,STRL,LF,10/TRAY: Brand: MEDLINE

## (undated) DEVICE — STERILE POLYISOPRENE POWDER-FREE SURGICAL GLOVES: Brand: PROTEXIS

## (undated) DEVICE — SUTURE PERMA-HAND SZ 2-0 L30IN NONABSORBABLE BLK L26MM SH K833H

## (undated) DEVICE — BAG,DRAINAGE,4L,A/R TOWER,LL,SLIDE TAP: Brand: MEDLINE

## (undated) DEVICE — SHEET,DRAPE,53X77,STERILE: Brand: MEDLINE

## (undated) DEVICE — COVER LT HNDL BLU PLAS

## (undated) DEVICE — PAD,ABDOMINAL,8"X10",ST,LF: Brand: MEDLINE

## (undated) DEVICE — BW-412T DISP COMBO CLEANING BRUSH: Brand: SINGLE USE COMBINATION CLEANING BRUSH

## (undated) DEVICE — SUTURE VICRYL SZ 3-0 L18IN ABSRB UD L26MM SH 1/2 CIR J864D

## (undated) DEVICE — PENCIL ES L3M BTTN SWCH S STL HEX LOK BLDE ELECTRD HOLSTER

## (undated) DEVICE — GOWN SIRUS NONREIN XL W/TWL: Brand: MEDLINE INDUSTRIES, INC.

## (undated) DEVICE — SUTURE MONOCRYL + SZ 3-0 L27IN ABSRB UD PS1 L24MM 3/8 CIR REV MCP936H

## (undated) DEVICE — GASTROSTOMY TUBE WITH ENFIT CONNECTOR, 24FR: Brand: GASTROSTOMY TUBE WITH ENFIT CONNECTOR

## (undated) DEVICE — PAD THRM M SPL TORSO

## (undated) DEVICE — 3M™ IOBAN™ 2 ANTIMICROBIAL INCISE DRAPE 6650EZ: Brand: IOBAN™ 2

## (undated) DEVICE — GOWN AURORA NONREINF LG: Brand: MEDLINE INDUSTRIES, INC.

## (undated) DEVICE — ADHESIVE SKIN CLOSURE WND 8.661X1.5 IN 22 CM LIQUIBAND SECUR

## (undated) DEVICE — DRESSING NEG PRSS M 18X12.5X3.3CM POLYUR FOR WND THER VAC

## (undated) DEVICE — SOLUTION IRRIG 1000ML 0.9% SOD CHL USP POUR PLAS BTL

## (undated) DEVICE — SOLUTION IRRIG 1000ML STRL H2O USP PLAS POUR BTL

## (undated) DEVICE — STAPLER SKIN H3.9MM WIRE DIA0.58MM CRWN 6.9MM 35 STPL ROT

## (undated) DEVICE — SYRINGE MED 10ML TRNSLUC BRL PLUNG BLK MRK POLYPR CTRL

## (undated) DEVICE — VALVE SUCTION AIR H2O SET ORCA POD + DISP

## (undated) DEVICE — SINGLE USE AIR/WATER, SUCTION AND BIOPSY VALVES SET: Brand: ORCAPOD™

## (undated) DEVICE — SPONGE LAP W18XL18IN WHT COT 4 PLY FLD STRUNG RADPQ DISP ST 2 PER PACK

## (undated) DEVICE — SKIN MARKER,REGULAR TIP WITH RULER: Brand: DEVON

## (undated) DEVICE — STAPLER INT L75MM H1.5X1.8X2MM STD TI 6 ROW LIN CUT

## (undated) DEVICE — SUTURE VICRYL + SZ 3-0 L18IN ABSRB UD SH 1/2 CIR TAPERCUT NDL VCP864D

## (undated) DEVICE — STAPLER INT H4.4X1.5MM DIA75MM 0DEG TI UNIV 6 ROW CART

## (undated) DEVICE — FORCEPS BX PED L160CM JAW DIA1.8MM WRK CHN 2MM W/ NDL DISP

## (undated) DEVICE — SUTURE ETHILON SZ 2 L30IN NONABSORBABLE BLK L75MM LR 3/8 CIR 490T

## (undated) DEVICE — HYPODERMIC SAFETY NEEDLE: Brand: MAGELLAN